# Patient Record
Sex: MALE | Race: BLACK OR AFRICAN AMERICAN | NOT HISPANIC OR LATINO
[De-identification: names, ages, dates, MRNs, and addresses within clinical notes are randomized per-mention and may not be internally consistent; named-entity substitution may affect disease eponyms.]

---

## 2017-03-21 ENCOUNTER — FORM ENCOUNTER (OUTPATIENT)
Age: 77
End: 2017-03-21

## 2017-03-22 ENCOUNTER — OUTPATIENT (OUTPATIENT)
Dept: OUTPATIENT SERVICES | Facility: HOSPITAL | Age: 77
LOS: 1 days | End: 2017-03-22
Payer: COMMERCIAL

## 2017-03-22 ENCOUNTER — APPOINTMENT (OUTPATIENT)
Dept: UROLOGY | Facility: CLINIC | Age: 77
End: 2017-03-22

## 2017-03-22 ENCOUNTER — APPOINTMENT (OUTPATIENT)
Dept: ULTRASOUND IMAGING | Facility: IMAGING CENTER | Age: 77
End: 2017-03-22

## 2017-03-22 DIAGNOSIS — R39.15 URGENCY OF URINATION: ICD-10-CM

## 2017-03-22 DIAGNOSIS — N40.1 BENIGN PROSTATIC HYPERPLASIA WITH LOWER URINARY TRACT SYMPTOMS: ICD-10-CM

## 2017-03-22 DIAGNOSIS — N13.8 OTHER OBSTRUCTIVE AND REFLUX UROPATHY: ICD-10-CM

## 2017-03-22 DIAGNOSIS — Z98.49 CATARACT EXTRACTION STATUS, UNSPECIFIED EYE: Chronic | ICD-10-CM

## 2017-03-22 PROCEDURE — 76770 US EXAM ABDO BACK WALL COMP: CPT

## 2017-04-20 ENCOUNTER — EMERGENCY (EMERGENCY)
Facility: HOSPITAL | Age: 77
LOS: 1 days | Discharge: HOME | End: 2017-04-20
Attending: EMERGENCY MEDICINE | Admitting: EMERGENCY MEDICINE
Payer: MEDICARE

## 2017-04-20 VITALS
SYSTOLIC BLOOD PRESSURE: 123 MMHG | TEMPERATURE: 98 F | HEART RATE: 88 BPM | DIASTOLIC BLOOD PRESSURE: 73 MMHG | OXYGEN SATURATION: 98 % | RESPIRATION RATE: 17 BRPM

## 2017-04-20 VITALS
HEART RATE: 150 BPM | OXYGEN SATURATION: 100 % | DIASTOLIC BLOOD PRESSURE: 76 MMHG | SYSTOLIC BLOOD PRESSURE: 106 MMHG | RESPIRATION RATE: 18 BRPM

## 2017-04-20 DIAGNOSIS — Z98.49 CATARACT EXTRACTION STATUS, UNSPECIFIED EYE: Chronic | ICD-10-CM

## 2017-04-20 LAB
ALBUMIN SERPL ELPH-MCNC: 4.2 G/DL — SIGNIFICANT CHANGE UP (ref 3.3–5)
ALP SERPL-CCNC: 52 U/L — SIGNIFICANT CHANGE UP (ref 40–120)
ALT FLD-CCNC: 12 U/L — SIGNIFICANT CHANGE UP (ref 4–41)
APTT BLD: 28.9 SEC — SIGNIFICANT CHANGE UP (ref 27.5–37.4)
AST SERPL-CCNC: 22 U/L — SIGNIFICANT CHANGE UP (ref 4–40)
BASOPHILS # BLD AUTO: 0.01 K/UL — SIGNIFICANT CHANGE UP (ref 0–0.2)
BASOPHILS NFR BLD AUTO: 0.2 % — SIGNIFICANT CHANGE UP (ref 0–2)
BILIRUB SERPL-MCNC: 0.6 MG/DL — SIGNIFICANT CHANGE UP (ref 0.2–1.2)
BUN SERPL-MCNC: 13 MG/DL — SIGNIFICANT CHANGE UP (ref 7–23)
CALCIUM SERPL-MCNC: 10 MG/DL — SIGNIFICANT CHANGE UP (ref 8.4–10.5)
CHLORIDE SERPL-SCNC: 102 MMOL/L — SIGNIFICANT CHANGE UP (ref 98–107)
CK MB BLD-MCNC: 4.05 NG/ML — SIGNIFICANT CHANGE UP (ref 1–6.6)
CK SERPL-CCNC: 263 U/L — HIGH (ref 30–200)
CO2 SERPL-SCNC: 28 MMOL/L — SIGNIFICANT CHANGE UP (ref 22–31)
CREAT SERPL-MCNC: 1.4 MG/DL — HIGH (ref 0.5–1.3)
EOSINOPHIL # BLD AUTO: 0.01 K/UL — SIGNIFICANT CHANGE UP (ref 0–0.5)
EOSINOPHIL NFR BLD AUTO: 0.2 % — SIGNIFICANT CHANGE UP (ref 0–6)
GLUCOSE SERPL-MCNC: 65 MG/DL — LOW (ref 70–99)
HCT VFR BLD CALC: 42.2 % — SIGNIFICANT CHANGE UP (ref 39–50)
HGB BLD-MCNC: 14.2 G/DL — SIGNIFICANT CHANGE UP (ref 13–17)
IMM GRANULOCYTES NFR BLD AUTO: 0.2 % — SIGNIFICANT CHANGE UP (ref 0–1.5)
INR BLD: 1.22 — HIGH (ref 0.88–1.17)
LYMPHOCYTES # BLD AUTO: 1.68 K/UL — SIGNIFICANT CHANGE UP (ref 1–3.3)
LYMPHOCYTES # BLD AUTO: 31.3 % — SIGNIFICANT CHANGE UP (ref 13–44)
MCHC RBC-ENTMCNC: 29.4 PG — SIGNIFICANT CHANGE UP (ref 27–34)
MCHC RBC-ENTMCNC: 33.6 % — SIGNIFICANT CHANGE UP (ref 32–36)
MCV RBC AUTO: 87.4 FL — SIGNIFICANT CHANGE UP (ref 80–100)
MONOCYTES # BLD AUTO: 0.39 K/UL — SIGNIFICANT CHANGE UP (ref 0–0.9)
MONOCYTES NFR BLD AUTO: 7.3 % — SIGNIFICANT CHANGE UP (ref 2–14)
NEUTROPHILS # BLD AUTO: 3.27 K/UL — SIGNIFICANT CHANGE UP (ref 1.8–7.4)
NEUTROPHILS NFR BLD AUTO: 60.8 % — SIGNIFICANT CHANGE UP (ref 43–77)
PLATELET # BLD AUTO: 165 K/UL — SIGNIFICANT CHANGE UP (ref 150–400)
PMV BLD: 11 FL — SIGNIFICANT CHANGE UP (ref 7–13)
POTASSIUM SERPL-MCNC: 4.5 MMOL/L — SIGNIFICANT CHANGE UP (ref 3.5–5.3)
POTASSIUM SERPL-SCNC: 4.5 MMOL/L — SIGNIFICANT CHANGE UP (ref 3.5–5.3)
PROT SERPL-MCNC: 7.3 G/DL — SIGNIFICANT CHANGE UP (ref 6–8.3)
PROTHROM AB SERPL-ACNC: 13.7 SEC — HIGH (ref 9.8–13.1)
RBC # BLD: 4.83 M/UL — SIGNIFICANT CHANGE UP (ref 4.2–5.8)
RBC # FLD: 13 % — SIGNIFICANT CHANGE UP (ref 10.3–14.5)
SODIUM SERPL-SCNC: 139 MMOL/L — SIGNIFICANT CHANGE UP (ref 135–145)
TROPONIN T SERPL-MCNC: < 0.06 NG/ML — SIGNIFICANT CHANGE UP (ref 0–0.06)
WBC # BLD: 5.37 K/UL — SIGNIFICANT CHANGE UP (ref 3.8–10.5)
WBC # FLD AUTO: 5.37 K/UL — SIGNIFICANT CHANGE UP (ref 3.8–10.5)

## 2017-04-20 PROCEDURE — 70450 CT HEAD/BRAIN W/O DYE: CPT | Mod: 26

## 2017-04-20 PROCEDURE — 93010 ELECTROCARDIOGRAM REPORT: CPT

## 2017-04-20 PROCEDURE — 99285 EMERGENCY DEPT VISIT HI MDM: CPT | Mod: 25,GC

## 2017-04-20 RX ORDER — SODIUM CHLORIDE 9 MG/ML
1000 INJECTION INTRAMUSCULAR; INTRAVENOUS; SUBCUTANEOUS ONCE
Qty: 0 | Refills: 0 | Status: COMPLETED | OUTPATIENT
Start: 2017-04-20 | End: 2017-04-20

## 2017-04-20 RX ADMIN — SODIUM CHLORIDE 1000 MILLILITER(S): 9 INJECTION INTRAMUSCULAR; INTRAVENOUS; SUBCUTANEOUS at 22:52

## 2017-04-20 NOTE — ED PROVIDER NOTE - OBJECTIVE STATEMENT
77 y/o male w/ hx of DM, HLD, HTN, Afib / flutter on Xarelto presents to the ED for dizziness and hypotension. Patient report that symptoms started today while patient was looking into the fridge when he had dizziness described as room spinning. NO associated nausea/vomiting. He sat down to check his HP and noted that it was lower than ususal. Reporting sbp in the ?80's. No chest pain, shortness of breath, no history of this in the past. On ROS, he has no other complaints.

## 2017-04-20 NOTE — ED PROVIDER NOTE - MEDICAL DECISION MAKING DETAILS
77 y/o male sent to ED for dizziness in the setting of tachycardia and hypotension. patient at [facility] reports symptoms resolved, in aflutter. per family he has been on xarelto for over 1 year and has history of flutter. sending cardiac enzymes, ct head, plan to dispo to home 75 y/o male sent to ED for dizziness in the setting of tachycardia and hypotension. patient at [facility] reports symptoms resolved, in aflutter. per family he has been on xarelto for over 1 year and has history of flutter. sending cardiac enzymes, ct head, plan to dispo to home  Yayo att: 75 yo man presenting with transient lightheadedness, palpitations.  No LOC, no chest pain.  Patient on xarelto for what sounds like atrial fibrillation, currently rate wnl.  Likely atrial flutter with RVR, now resolved.  Neurologic exam wnl.  Patient informed of ED visit findings, understands plan.  Patient provided with written and further verbal instructions not included in discharge paperwork.  Patient instructed to follow up with their primary care physician in 2-3 days and return for new, worsened, or persistent symptoms.

## 2017-04-20 NOTE — ED ADULT TRIAGE NOTE - CHIEF COMPLAINT QUOTE
Pt c/o of feeling lightheaded with palpitations he was sent in by his PMD for SVT. Pt denies chest pain.

## 2017-04-20 NOTE — ED PROVIDER NOTE - PHYSICAL EXAMINATION
Yayo att: General: Well appearing, nontoxic, no acute distress; Head: Normocephalic Atraumatic; Eyes: PERRL, EOMI; ENT: Airway patent; Neck: supple; Chest: Lungs clear to auscultation bilateral; Cardiac: irregular rhythm, normal rate, no murmurs, rubs or gallops; Abdomen: soft, nontender, nondistended; no guarding or rebound; Musculoskeletal: Calves symmetric, nontender, no palpable cord; Skin: No rash, normal skin tone; Neuro: Alert and Oriented to person, place, and time; No focal deficit, CN 2-12 symmetric and intact

## 2017-04-20 NOTE — ED PROVIDER NOTE - PMH
BPH (benign prostatic hyperplasia)    Hernia, inguinal, right    HTN (hypertension)    T2DM (type 2 diabetes mellitus)  > 20 yrs

## 2017-07-07 ENCOUNTER — OUTPATIENT (OUTPATIENT)
Dept: OUTPATIENT SERVICES | Facility: HOSPITAL | Age: 77
LOS: 1 days | End: 2017-07-07
Payer: COMMERCIAL

## 2017-07-07 ENCOUNTER — APPOINTMENT (OUTPATIENT)
Dept: NUCLEAR MEDICINE | Facility: IMAGING CENTER | Age: 77
End: 2017-07-07

## 2017-07-07 DIAGNOSIS — Z98.49 CATARACT EXTRACTION STATUS, UNSPECIFIED EYE: Chronic | ICD-10-CM

## 2017-07-07 DIAGNOSIS — Z00.8 ENCOUNTER FOR OTHER GENERAL EXAMINATION: ICD-10-CM

## 2017-07-07 PROCEDURE — A9538: CPT

## 2017-07-07 PROCEDURE — 78472 GATED HEART PLANAR SINGLE: CPT

## 2017-08-18 ENCOUNTER — MEDICATION RENEWAL (OUTPATIENT)
Age: 77
End: 2017-08-18

## 2017-08-23 ENCOUNTER — MEDICATION RENEWAL (OUTPATIENT)
Age: 77
End: 2017-08-23

## 2017-09-20 ENCOUNTER — APPOINTMENT (OUTPATIENT)
Dept: UROLOGY | Facility: CLINIC | Age: 77
End: 2017-09-20
Payer: MEDICARE

## 2017-09-20 VITALS — HEART RATE: 65 BPM | DIASTOLIC BLOOD PRESSURE: 80 MMHG | RESPIRATION RATE: 16 BRPM | SYSTOLIC BLOOD PRESSURE: 144 MMHG

## 2017-09-20 PROCEDURE — 99214 OFFICE O/P EST MOD 30 MIN: CPT

## 2017-09-24 LAB
ALBUMIN SERPL ELPH-MCNC: 4 G/DL
ALP BLD-CCNC: 44 U/L
ALT SERPL-CCNC: 19 U/L
ANION GAP SERPL CALC-SCNC: 14 MMOL/L
APPEARANCE: CLEAR
AST SERPL-CCNC: 23 U/L
BACTERIA UR CULT: NORMAL
BACTERIA: NEGATIVE
BASOPHILS # BLD AUTO: 0.01 K/UL
BASOPHILS NFR BLD AUTO: 0.3 %
BILIRUB SERPL-MCNC: 0.6 MG/DL
BILIRUBIN URINE: NEGATIVE
BLOOD URINE: NEGATIVE
BUN SERPL-MCNC: 10 MG/DL
CALCIUM SERPL-MCNC: 9.5 MG/DL
CHLORIDE SERPL-SCNC: 103 MMOL/L
CO2 SERPL-SCNC: 25 MMOL/L
COLOR: YELLOW
CORE LAB FLUID CYTOLOGY: NORMAL
CREAT SERPL-MCNC: 1.16 MG/DL
EOSINOPHIL # BLD AUTO: 0.06 K/UL
EOSINOPHIL NFR BLD AUTO: 1.8 %
GLUCOSE QUALITATIVE U: NORMAL MG/DL
GLUCOSE SERPL-MCNC: 130 MG/DL
HBA1C MFR BLD HPLC: 6.4 %
HCT VFR BLD CALC: 39.3 %
HGB BLD-MCNC: 13 G/DL
HYALINE CASTS: 0 /LPF
IMM GRANULOCYTES NFR BLD AUTO: 0.3 %
KETONES URINE: NEGATIVE
LEUKOCYTE ESTERASE URINE: NEGATIVE
LYMPHOCYTES # BLD AUTO: 0.97 K/UL
LYMPHOCYTES NFR BLD AUTO: 29.4 %
MAN DIFF?: NORMAL
MCHC RBC-ENTMCNC: 29.1 PG
MCHC RBC-ENTMCNC: 33.1 GM/DL
MCV RBC AUTO: 87.9 FL
MICROSCOPIC-UA: NORMAL
MONOCYTES # BLD AUTO: 0.38 K/UL
MONOCYTES NFR BLD AUTO: 11.5 %
NEUTROPHILS # BLD AUTO: 1.87 K/UL
NEUTROPHILS NFR BLD AUTO: 56.7 %
NITRITE URINE: NEGATIVE
PH URINE: 7.5
PLATELET # BLD AUTO: 161 K/UL
POTASSIUM SERPL-SCNC: 4.4 MMOL/L
PROT SERPL-MCNC: 7 G/DL
PROTEIN URINE: NEGATIVE MG/DL
PSA SERPL-MCNC: 1.21 NG/ML
RBC # BLD: 4.47 M/UL
RBC # FLD: 13.2 %
RED BLOOD CELLS URINE: 1 /HPF
SODIUM SERPL-SCNC: 142 MMOL/L
SPECIFIC GRAVITY URINE: 1.01
SQUAMOUS EPITHELIAL CELLS: 0 /HPF
TESTOST SERPL-MCNC: 321.7 NG/DL
UROBILINOGEN URINE: 1 MG/DL
WBC # FLD AUTO: 3.3 K/UL
WHITE BLOOD CELLS URINE: 0 /HPF

## 2017-10-01 LAB — CORE LAB FLUID CYTOLOGY: NORMAL

## 2017-10-06 LAB
BASOPHILS # BLD AUTO: 0.01 K/UL
BASOPHILS NFR BLD AUTO: 0.3 %
EOSINOPHIL # BLD AUTO: 0.04 K/UL
EOSINOPHIL NFR BLD AUTO: 1.2 %
HCT VFR BLD CALC: 35.8 %
HGB BLD-MCNC: 12.4 G/DL
IMM GRANULOCYTES NFR BLD AUTO: 0 %
LYMPHOCYTES # BLD AUTO: 1.2 K/UL
LYMPHOCYTES NFR BLD AUTO: 35.9 %
MAN DIFF?: NORMAL
MCHC RBC-ENTMCNC: 30 PG
MCHC RBC-ENTMCNC: 34.6 GM/DL
MCV RBC AUTO: 86.7 FL
MONOCYTES # BLD AUTO: 0.34 K/UL
MONOCYTES NFR BLD AUTO: 10.2 %
NEUTROPHILS # BLD AUTO: 1.75 K/UL
NEUTROPHILS NFR BLD AUTO: 52.4 %
PLATELET # BLD AUTO: 158 K/UL
RBC # BLD: 4.13 M/UL
RBC # FLD: 13.4 %
WBC # FLD AUTO: 3.34 K/UL

## 2017-10-27 ENCOUNTER — INPATIENT (INPATIENT)
Facility: HOSPITAL | Age: 77
LOS: 12 days | Discharge: HOME | End: 2017-11-09
Attending: INTERNAL MEDICINE | Admitting: INTERNAL MEDICINE
Payer: MEDICARE

## 2017-10-27 VITALS
RESPIRATION RATE: 14 BRPM | SYSTOLIC BLOOD PRESSURE: 124 MMHG | OXYGEN SATURATION: 97 % | DIASTOLIC BLOOD PRESSURE: 77 MMHG | TEMPERATURE: 98 F | HEART RATE: 75 BPM

## 2017-10-27 DIAGNOSIS — N40.0 BENIGN PROSTATIC HYPERPLASIA WITHOUT LOWER URINARY TRACT SYMPTOMS: ICD-10-CM

## 2017-10-27 DIAGNOSIS — Z98.49 CATARACT EXTRACTION STATUS, UNSPECIFIED EYE: Chronic | ICD-10-CM

## 2017-10-27 DIAGNOSIS — E78.5 HYPERLIPIDEMIA, UNSPECIFIED: ICD-10-CM

## 2017-10-27 DIAGNOSIS — E11.9 TYPE 2 DIABETES MELLITUS WITHOUT COMPLICATIONS: ICD-10-CM

## 2017-10-27 DIAGNOSIS — I31.3 PERICARDIAL EFFUSION (NONINFLAMMATORY): ICD-10-CM

## 2017-10-27 DIAGNOSIS — I10 ESSENTIAL (PRIMARY) HYPERTENSION: ICD-10-CM

## 2017-10-27 DIAGNOSIS — I48.91 UNSPECIFIED ATRIAL FIBRILLATION: ICD-10-CM

## 2017-10-27 LAB
ALBUMIN SERPL ELPH-MCNC: 3.8 G/DL — SIGNIFICANT CHANGE UP (ref 3.3–5)
ALP SERPL-CCNC: 98 U/L — SIGNIFICANT CHANGE UP (ref 40–120)
ALT FLD-CCNC: 133 U/L — HIGH (ref 4–41)
APPEARANCE UR: CLEAR — SIGNIFICANT CHANGE UP
AST SERPL-CCNC: 102 U/L — HIGH (ref 4–40)
BACTERIA # UR AUTO: SIGNIFICANT CHANGE UP
BASOPHILS # BLD AUTO: 0.01 K/UL — SIGNIFICANT CHANGE UP (ref 0–0.2)
BASOPHILS NFR BLD AUTO: 0.2 % — SIGNIFICANT CHANGE UP (ref 0–2)
BILIRUB SERPL-MCNC: 0.8 MG/DL — SIGNIFICANT CHANGE UP (ref 0.2–1.2)
BILIRUB UR-MCNC: NEGATIVE — SIGNIFICANT CHANGE UP
BLOOD UR QL VISUAL: NEGATIVE — SIGNIFICANT CHANGE UP
BUN SERPL-MCNC: 25 MG/DL — HIGH (ref 7–23)
CALCIUM SERPL-MCNC: 9 MG/DL — SIGNIFICANT CHANGE UP (ref 8.4–10.5)
CHLORIDE SERPL-SCNC: 99 MMOL/L — SIGNIFICANT CHANGE UP (ref 98–107)
CO2 SERPL-SCNC: 24 MMOL/L — SIGNIFICANT CHANGE UP (ref 22–31)
COLOR SPEC: YELLOW — SIGNIFICANT CHANGE UP
CREAT SERPL-MCNC: 1.28 MG/DL — SIGNIFICANT CHANGE UP (ref 0.5–1.3)
EOSINOPHIL # BLD AUTO: 0.01 K/UL — SIGNIFICANT CHANGE UP (ref 0–0.5)
EOSINOPHIL NFR BLD AUTO: 0.2 % — SIGNIFICANT CHANGE UP (ref 0–6)
GLUCOSE BLDC GLUCOMTR-MCNC: 182 MG/DL — HIGH (ref 70–99)
GLUCOSE BLDC GLUCOMTR-MCNC: 196 MG/DL — HIGH (ref 70–99)
GLUCOSE SERPL-MCNC: 216 MG/DL — HIGH (ref 70–99)
GLUCOSE UR-MCNC: NEGATIVE — SIGNIFICANT CHANGE UP
HCT VFR BLD CALC: 32.8 % — LOW (ref 39–50)
HGB BLD-MCNC: 11.4 G/DL — LOW (ref 13–17)
HYALINE CASTS # UR AUTO: SIGNIFICANT CHANGE UP (ref 0–?)
IMM GRANULOCYTES # BLD AUTO: 0.06 # — SIGNIFICANT CHANGE UP
IMM GRANULOCYTES NFR BLD AUTO: 1 % — SIGNIFICANT CHANGE UP (ref 0–1.5)
KETONES UR-MCNC: SIGNIFICANT CHANGE UP
LEUKOCYTE ESTERASE UR-ACNC: NEGATIVE — SIGNIFICANT CHANGE UP
LIDOCAIN IGE QN: 66.1 U/L — HIGH (ref 7–60)
LYMPHOCYTES # BLD AUTO: 0.6 K/UL — LOW (ref 1–3.3)
LYMPHOCYTES # BLD AUTO: 10.1 % — LOW (ref 13–44)
MCHC RBC-ENTMCNC: 29.6 PG — SIGNIFICANT CHANGE UP (ref 27–34)
MCHC RBC-ENTMCNC: 34.8 % — SIGNIFICANT CHANGE UP (ref 32–36)
MCV RBC AUTO: 85.2 FL — SIGNIFICANT CHANGE UP (ref 80–100)
MONOCYTES # BLD AUTO: 0.71 K/UL — SIGNIFICANT CHANGE UP (ref 0–0.9)
MONOCYTES NFR BLD AUTO: 12 % — SIGNIFICANT CHANGE UP (ref 2–14)
MUCOUS THREADS # UR AUTO: SIGNIFICANT CHANGE UP
NEUTROPHILS # BLD AUTO: 4.53 K/UL — SIGNIFICANT CHANGE UP (ref 1.8–7.4)
NEUTROPHILS NFR BLD AUTO: 76.5 % — SIGNIFICANT CHANGE UP (ref 43–77)
NITRITE UR-MCNC: NEGATIVE — SIGNIFICANT CHANGE UP
NRBC # FLD: 0 — SIGNIFICANT CHANGE UP
NT-PROBNP SERPL-SCNC: 497.9 PG/ML — SIGNIFICANT CHANGE UP
PH UR: 5.5 — SIGNIFICANT CHANGE UP (ref 4.6–8)
PLATELET # BLD AUTO: 183 K/UL — SIGNIFICANT CHANGE UP (ref 150–400)
PMV BLD: 11.3 FL — SIGNIFICANT CHANGE UP (ref 7–13)
POTASSIUM SERPL-MCNC: 5 MMOL/L — SIGNIFICANT CHANGE UP (ref 3.5–5.3)
POTASSIUM SERPL-SCNC: 5 MMOL/L — SIGNIFICANT CHANGE UP (ref 3.5–5.3)
PROT SERPL-MCNC: 7.1 G/DL — SIGNIFICANT CHANGE UP (ref 6–8.3)
PROT UR-MCNC: 30 — SIGNIFICANT CHANGE UP
RBC # BLD: 3.85 M/UL — LOW (ref 4.2–5.8)
RBC # FLD: 13.6 % — SIGNIFICANT CHANGE UP (ref 10.3–14.5)
RBC CASTS # UR COMP ASSIST: SIGNIFICANT CHANGE UP (ref 0–?)
SODIUM SERPL-SCNC: 137 MMOL/L — SIGNIFICANT CHANGE UP (ref 135–145)
SP GR SPEC: 1.02 — SIGNIFICANT CHANGE UP (ref 1–1.03)
SQUAMOUS # UR AUTO: SIGNIFICANT CHANGE UP
UROBILINOGEN FLD QL: NORMAL E.U. — SIGNIFICANT CHANGE UP (ref 0.1–0.2)
WBC # BLD: 5.92 K/UL — SIGNIFICANT CHANGE UP (ref 3.8–10.5)
WBC # FLD AUTO: 5.92 K/UL — SIGNIFICANT CHANGE UP (ref 3.8–10.5)
WBC UR QL: SIGNIFICANT CHANGE UP (ref 0–?)

## 2017-10-27 PROCEDURE — 71010: CPT | Mod: 26

## 2017-10-27 PROCEDURE — 74177 CT ABD & PELVIS W/CONTRAST: CPT | Mod: 26

## 2017-10-27 PROCEDURE — 93010 ELECTROCARDIOGRAM REPORT: CPT

## 2017-10-27 PROCEDURE — 93306 TTE W/DOPPLER COMPLETE: CPT | Mod: 26

## 2017-10-27 RX ORDER — DEXTROSE 50 % IN WATER 50 %
25 SYRINGE (ML) INTRAVENOUS ONCE
Qty: 0 | Refills: 0 | Status: DISCONTINUED | OUTPATIENT
Start: 2017-10-27 | End: 2017-11-09

## 2017-10-27 RX ORDER — ACETAMINOPHEN 500 MG
1000 TABLET ORAL ONCE
Qty: 0 | Refills: 0 | Status: COMPLETED | OUTPATIENT
Start: 2017-10-27 | End: 2017-10-27

## 2017-10-27 RX ORDER — DEXTROSE 50 % IN WATER 50 %
1 SYRINGE (ML) INTRAVENOUS ONCE
Qty: 0 | Refills: 0 | Status: DISCONTINUED | OUTPATIENT
Start: 2017-10-27 | End: 2017-11-09

## 2017-10-27 RX ORDER — DEXTROSE 50 % IN WATER 50 %
12.5 SYRINGE (ML) INTRAVENOUS ONCE
Qty: 0 | Refills: 0 | Status: DISCONTINUED | OUTPATIENT
Start: 2017-10-27 | End: 2017-11-09

## 2017-10-27 RX ORDER — SIMVASTATIN 20 MG/1
20 TABLET, FILM COATED ORAL AT BEDTIME
Qty: 0 | Refills: 0 | Status: DISCONTINUED | OUTPATIENT
Start: 2017-10-27 | End: 2017-11-09

## 2017-10-27 RX ORDER — ENOXAPARIN SODIUM 100 MG/ML
80 INJECTION SUBCUTANEOUS
Qty: 0 | Refills: 0 | Status: DISCONTINUED | OUTPATIENT
Start: 2017-10-27 | End: 2017-10-28

## 2017-10-27 RX ORDER — CARVEDILOL PHOSPHATE 80 MG/1
12.5 CAPSULE, EXTENDED RELEASE ORAL EVERY 12 HOURS
Qty: 0 | Refills: 0 | Status: DISCONTINUED | OUTPATIENT
Start: 2017-10-27 | End: 2017-11-09

## 2017-10-27 RX ORDER — INFLUENZA VIRUS VACCINE 15; 15; 15; 15 UG/.5ML; UG/.5ML; UG/.5ML; UG/.5ML
0.5 SUSPENSION INTRAMUSCULAR ONCE
Qty: 0 | Refills: 0 | Status: DISCONTINUED | OUTPATIENT
Start: 2017-10-27 | End: 2017-11-09

## 2017-10-27 RX ORDER — FUROSEMIDE 40 MG
40 TABLET ORAL
Qty: 0 | Refills: 0 | Status: DISCONTINUED | OUTPATIENT
Start: 2017-10-27 | End: 2017-10-28

## 2017-10-27 RX ORDER — HYDRALAZINE HCL 50 MG
50 TABLET ORAL THREE TIMES A DAY
Qty: 0 | Refills: 0 | Status: DISCONTINUED | OUTPATIENT
Start: 2017-10-27 | End: 2017-11-09

## 2017-10-27 RX ORDER — ONDANSETRON 8 MG/1
4 TABLET, FILM COATED ORAL ONCE
Qty: 0 | Refills: 0 | Status: COMPLETED | OUTPATIENT
Start: 2017-10-27 | End: 2017-10-27

## 2017-10-27 RX ORDER — SODIUM CHLORIDE 9 MG/ML
1000 INJECTION, SOLUTION INTRAVENOUS
Qty: 0 | Refills: 0 | Status: DISCONTINUED | OUTPATIENT
Start: 2017-10-27 | End: 2017-11-09

## 2017-10-27 RX ORDER — INSULIN LISPRO 100/ML
VIAL (ML) SUBCUTANEOUS AT BEDTIME
Qty: 0 | Refills: 0 | Status: DISCONTINUED | OUTPATIENT
Start: 2017-10-27 | End: 2017-11-09

## 2017-10-27 RX ORDER — GLUCAGON INJECTION, SOLUTION 0.5 MG/.1ML
1 INJECTION, SOLUTION SUBCUTANEOUS ONCE
Qty: 0 | Refills: 0 | Status: DISCONTINUED | OUTPATIENT
Start: 2017-10-27 | End: 2017-11-09

## 2017-10-27 RX ORDER — OXYBUTYNIN CHLORIDE 5 MG
10 TABLET ORAL DAILY
Qty: 0 | Refills: 0 | Status: DISCONTINUED | OUTPATIENT
Start: 2017-10-27 | End: 2017-11-07

## 2017-10-27 RX ORDER — DOXAZOSIN MESYLATE 4 MG
8 TABLET ORAL AT BEDTIME
Qty: 0 | Refills: 0 | Status: DISCONTINUED | OUTPATIENT
Start: 2017-10-27 | End: 2017-11-09

## 2017-10-27 RX ORDER — INSULIN LISPRO 100/ML
VIAL (ML) SUBCUTANEOUS
Qty: 0 | Refills: 0 | Status: DISCONTINUED | OUTPATIENT
Start: 2017-10-27 | End: 2017-11-09

## 2017-10-27 RX ORDER — SODIUM CHLORIDE 9 MG/ML
1000 INJECTION INTRAMUSCULAR; INTRAVENOUS; SUBCUTANEOUS ONCE
Qty: 0 | Refills: 0 | Status: COMPLETED | OUTPATIENT
Start: 2017-10-27 | End: 2017-10-27

## 2017-10-27 RX ORDER — LISINOPRIL 2.5 MG/1
20 TABLET ORAL DAILY
Qty: 0 | Refills: 0 | Status: DISCONTINUED | OUTPATIENT
Start: 2017-10-27 | End: 2017-11-09

## 2017-10-27 RX ADMIN — Medication 400 MILLIGRAM(S): at 05:39

## 2017-10-27 RX ADMIN — Medication 40 MILLIGRAM(S): at 18:10

## 2017-10-27 RX ADMIN — CARVEDILOL PHOSPHATE 12.5 MILLIGRAM(S): 80 CAPSULE, EXTENDED RELEASE ORAL at 18:09

## 2017-10-27 RX ADMIN — Medication: at 18:10

## 2017-10-27 RX ADMIN — SIMVASTATIN 20 MILLIGRAM(S): 20 TABLET, FILM COATED ORAL at 23:48

## 2017-10-27 RX ADMIN — SODIUM CHLORIDE 1000 MILLILITER(S): 9 INJECTION INTRAMUSCULAR; INTRAVENOUS; SUBCUTANEOUS at 05:40

## 2017-10-27 RX ADMIN — Medication 50 MILLIGRAM(S): at 23:48

## 2017-10-27 NOTE — ED PROVIDER NOTE - PROGRESS NOTE DETAILS
Patient signed out to me. Stable, appears comfortable no concerns at this time. CT read results with no SBO but found incidental moderate pericardial effusion.

## 2017-10-27 NOTE — CONSULT NOTE ADULT - ASSESSMENT
77 male with incidental finding of large pericardial effusion with echo findings of signs of early tamponade.      Plan:  Urgent CT chest  Hold anticoagulation until pericardial effusion drained  Please have IR drain effusion in am   Above discussed with Dr. Chew 77 male with incidental finding of large pericardial effusion with echo findings of signs of early tamponade.      Plan:  Urgent CT chest  Hold anticoagulation until pericardial effusion drained (Xarelto, Lovenox)  Please have IR drain effusion in am   Above discussed with Dr. Chew

## 2017-10-27 NOTE — H&P ADULT - PMH
Afib    BPH (benign prostatic hyperplasia)    Hernia, inguinal, right    HTN (hypertension)    Hyperlipidemia    T2DM (type 2 diabetes mellitus)  > 20 yrs

## 2017-10-27 NOTE — ED PROVIDER NOTE - MEDICAL DECISION MAKING DETAILS
77M with abd pain and distention. concern for SBO, volvulus, appy, margarita, etc. Plan: labs, fluids, pain control/antiemetics PRN, UA, CTAP, reassess.

## 2017-10-27 NOTE — ED ADULT TRIAGE NOTE - CHIEF COMPLAINT QUOTE
pt c/o weakness and abdominal pain x 1 week. last Bm this morning. denies n/v/d, constipation, chest pain, fevers, chills or urinary symtpoms. pt was scheduled for abdominal sonogram today @ 1pm but pt states he is in too much pain.

## 2017-10-27 NOTE — ED ADULT NURSE REASSESSMENT NOTE - NS ED NURSE REASSESS COMMENT FT1
Patient is in NAD, and has room available.  Report given to nurse on floor via phone.  Patient awaiting transportation.  Will continue to monitor patient closely. Bladimir VIERA

## 2017-10-27 NOTE — H&P ADULT - HISTORY OF PRESENT ILLNESS
78 yo M presents with abdominal discomfort after eating x 1 month. PT state diffuse abdominal discomfort after eating meals.+ occasional RAMACHANDRAN ( walks 10 blocks) NO chest pain, SOB, RAMACHANDRAN,  orthopnea, palpitations, diaphoresis, lightheadedness, dizziness, syncope, fever chills, malaise, myalgias, anorexia, weight changes ( loss or gain), nightsweats, generalized fatigue abdominal pain, N/V/C/D BRBPR, melena, urinary symptoms, cough, and wheezing.

## 2017-10-27 NOTE — CONSULT NOTE ADULT - SUBJECTIVE AND OBJECTIVE BOX
76 yo M with history of a fib, htn, hld, dm and bph  presents with abdominal discomfort after eating x 3 weeks. PT state diffuse abdominal discomfort after eating meals.+ occasional RAMACHANDRAN ( walks 10 blocks).  Patient presented to ER after waking up this morning unable to catch his breath.  Patient with sudden onset of shortness of breath.  Ct of abd/pelvis done in ER with incidental finding of large pericardial effusion, small bilateral pleural effusion and cholelithasis.  Echo showing large pericardial effusion with signs of early tamponade.       PAST MEDICAL & SURGICAL HISTORY:  Afib  Hyperlipidemia  HTN (hypertension)  Hernia, inguinal, right  BPH (benign prostatic hyperplasia)  T2DM (type 2 diabetes mellitus): &gt; 20 yrs  S/P cataract surgery: bilateral 4 years ago   REVIEW OF SYSTEMS      General:No Weight change/ Fatigue/ HA/Dizzy	    Skin/Breast: No Rashes/ Lesions/ Masses  	  Ophthalmologic: No Blurry vision/ Glaucoma/ Blindness  	  ENMT: No Hearing loss/ Drainage/ Lesions	    Respiratory and Thorax: No Cough/ Wheezing/ Hemoptysis/ Sputum production; +SOB, +RAMACHANDRAN  	  Cardiovascular: No Chest pain/ Palpitations/ Diaphoresis: +RAMACHANDRAN     Gastrointestinal: No Nausea/ Vomiting; +Abdominal discomfort after eating x 3 weeks,  constipation followed by diarrhea   Genitourinary: No Heamturia/ Dysuria/ Frequency change/ Impotence	    Musculoskeletal: No Pain/ Weakness/ Claudication	    Neurological: No Seizures/ TIA/CVA/ Parastesias	    Psychiatric: No Dementia/ Depression/ SI/HI	    Hematology/Lymphatics: No hx of bleeding/ +pedal edema 	    Endocrine:	No Hyperglycemia/ Hypoglycemia    Allergic/Immunologic:	 No Anaphylaxis/ Intolerance/ Recent illnesses    Physical Exam:    ICU Vital Signs Last 24 Hrs  T(C): 36.2 (27 Oct 2017 13:34), Max: 36.6 (27 Oct 2017 05:27)  T(F): 97.2 (27 Oct 2017 13:34), Max: 97.9 (27 Oct 2017 05:27)  HR: 89 (27 Oct 2017 18:02) (68 - 110)  BP: 140/99 (27 Oct 2017 18:02) (117/85 - 140/99)  BP(mean): --  ABP: --  ABP(mean): --  RR: 18 (27 Oct 2017 18:02) (14 - 18)  SpO2: 98% (27 Oct 2017 18:02) (95% - 100%)    General: WN/WD NAD  Neurology: A&Ox3, nonfocal, JIMENEZ x 4  Eyes: PERRLA/ EOMI, Gross vision intact  ENT/Neck: Neck supple, trachea midline, No JVD, Gross hearing intact  Respiratory: CTA B/L, No wheezing, rales, rhonchi  CV: irregular, A fib   Abdominal: Soft, NT, ND +BS, softly distended   Extremities: +pedal edema b/l, + peripheral pulses  Skin: No Rashes, Hematoma, Ecchymosis

## 2017-10-27 NOTE — ED PROVIDER NOTE - ATTENDING CONTRIBUTION TO CARE
ED Attending (Jordan HIGGINS): I have personally performed a face to face bedside history and physical examination of this patient. I have discussed the history, examination, assessment and plan of management with the resident. ED Attending (Jordan HIGGINS): I have personally performed a face to face bedside history and physical examination of this patient. I have discussed the history, examination, assessment and plan of management with the resident.  LIN CLINTON  ATTENDING, MD: 76 yo M with past medical history of Hypertension, hyperlipidemia, DM.  Patient states he is on Xarelto but pt doesn't know why.  Patient presents with abd pain for 1 week (PSHx hernia surgery). The pain is diffuse, non-radiating, intermittent, a/w N without vomiting, but also decreased appetite, decreased appetite and abdominal distention. BM today less than usual.  +Flatus.  No F/C.  No chest pain, SOB.  LIN CLINTON, ATTENDING NOTE:  Patient is awake and alert and in no acute distress.  Normocephalic/atraumatic.  Auricles are normal.  Neck supple.  Lungs CTAB, no wheeze, no rhonchi,  no rales.  Heart is regular rate and rhythm.  Abdomen is soft, ++BS, diffuse tenderness to palpation.  Back is nontender, no CVAT.  Moving all 4 extremities.   Neurologically grossly intact.  Affect is appropriate.  DR. CLINTON, ATTENDING MD-  I performed a face to face bedside interview with patient regarding history of present illness, review of symptoms and past medical history. I completed an independent physical exam.  I have discussed patient's plan of care with the resident.   I agree with note as stated above, having amended the EMR as needed to reflect my findings. I have discussed the assessment and plan of care.  This includes during the time I functioned as the attending physician for this patient.

## 2017-10-27 NOTE — ED ADULT NURSE NOTE - OBJECTIVE STATEMENT
Pt 77y male, aaox3 and amb, pt speaks Ecuadorean but family at bedside willing to translate. Pt presents to ED c/o generalized diffuse abd pain x1wk. As per family pt was due to get ultrasound of abd today but was told by pmd that if pain increased to go to ED. Pt abd appears distended and soft, no tenderness observed, this is a new onset x 1 wk as per family. Pt has had decreased appetite. LBM this morning but "not a lot". Pt denies n/v/d, cp, sob, ha, fever, chills. Pt has swelling to bilat feet that decreases with elevation. IV placed 20G to left AC, labs drawn and sent, will continue to monitor. Pt 77y male, aaox3 and amb, pt speaks Tanzanian but family at bedside willing to translate. Pt presents to ED c/o generalized diffuse abd pain x1wk. As per family pt was due to get ultrasound of abd today but was told by pmd that if pain increased to go to ED. Pt abd appears distended and soft, no tenderness observed, this is a new onset x 1 wk as per family. Pt has had decreased appetite. LBM this morning but "not a lot". Pt denies n/v/d, cp, sob, ha, fever, chills. Skin clean, dry and intact. Pt has swelling to bilat feet that decreases with elevation. IV placed 20G to left AC, labs drawn and sent, will continue to monitor.

## 2017-10-27 NOTE — ED ADULT NURSE REASSESSMENT NOTE - NS ED NURSE REASSESS COMMENT FT1
Received patient report at 08:15 a.m., VSS and in NAD.  (+) abd distension noted, soft and non tender.  Patient awaiting CT scan, CT PO contrast completed and tolerated by patient. Family present at the bedside. Bladimir VIERA

## 2017-10-27 NOTE — H&P ADULT - PROBLEM SELECTOR PLAN 1
Check TTE  ESR, CRP. TSH Consider pericardiocentesis for diagnostic and therapeutic tap- possible 2/2 viral pericarditis  will obtain prior TST/echo results from DR Levine's office  Case dw Dr Babin/ shauna  Will check RUQ sonogram pt with + cholithiasis

## 2017-10-27 NOTE — ED PROVIDER NOTE - NOTES
Was unaware of any effusion. Would like the patient to be admitted for further work up of this new finding.

## 2017-10-27 NOTE — ED PROVIDER NOTE - CARDIAC, MLM
Normal rate, regular rhythm.  Heart sounds S1, S2. Normal rate, regular rhythm.  Heart sounds S1, S2. Pulses normal and equal bilaterally

## 2017-10-27 NOTE — ED PROVIDER NOTE - OBJECTIVE STATEMENT
77M h/o HTN, HLD, DM, on xarelto but pt unsure why, p/w abd pain x 1 week. The pain is diffuse, nonradiating, intermittent, associated with nausea, decreased appetite and abdominal distention. Hx hernia surgery. Last BM a few hours ago, small amount, +flatus. No fever, cp, sob, vomiting, diarrhea, or dysuria. Pt fluent and comfortable conversing in English.

## 2017-10-28 DIAGNOSIS — R79.89 OTHER SPECIFIED ABNORMAL FINDINGS OF BLOOD CHEMISTRY: ICD-10-CM

## 2017-10-28 DIAGNOSIS — K59.00 CONSTIPATION, UNSPECIFIED: ICD-10-CM

## 2017-10-28 DIAGNOSIS — I48.91 UNSPECIFIED ATRIAL FIBRILLATION: ICD-10-CM

## 2017-10-28 DIAGNOSIS — K80.20 CALCULUS OF GALLBLADDER WITHOUT CHOLECYSTITIS WITHOUT OBSTRUCTION: ICD-10-CM

## 2017-10-28 LAB
APTT BLD: 27.3 SEC — LOW (ref 27.5–37.4)
BACTERIA UR CULT: SIGNIFICANT CHANGE UP
BASOPHILS # BLD AUTO: 0.01 K/UL — SIGNIFICANT CHANGE UP (ref 0–0.2)
BASOPHILS NFR BLD AUTO: 0.2 % — SIGNIFICANT CHANGE UP (ref 0–2)
BLD GP AB SCN SERPL QL: NEGATIVE — SIGNIFICANT CHANGE UP
BODY FLUID TYPE: SIGNIFICANT CHANGE UP
BUN SERPL-MCNC: 21 MG/DL — SIGNIFICANT CHANGE UP (ref 7–23)
CALCIUM SERPL-MCNC: 8.7 MG/DL — SIGNIFICANT CHANGE UP (ref 8.4–10.5)
CHLORIDE SERPL-SCNC: 99 MMOL/L — SIGNIFICANT CHANGE UP (ref 98–107)
CHOLEST SERPL-MCNC: 87 MG/DL — LOW (ref 120–199)
CK SERPL-CCNC: 141 U/L — SIGNIFICANT CHANGE UP (ref 30–200)
CLARITY SPEC: SIGNIFICANT CHANGE UP
CO2 SERPL-SCNC: 24 MMOL/L — SIGNIFICANT CHANGE UP (ref 22–31)
COLOR FLD: SIGNIFICANT CHANGE UP
CREAT SERPL-MCNC: 1.1 MG/DL — SIGNIFICANT CHANGE UP (ref 0.5–1.3)
CRYSTALS FLD MICRO: SIGNIFICANT CHANGE UP
EOSINOPHIL # BLD AUTO: 0.01 K/UL — SIGNIFICANT CHANGE UP (ref 0–0.5)
EOSINOPHIL NFR BLD AUTO: 0.2 % — SIGNIFICANT CHANGE UP (ref 0–6)
ERYTHROCYTE [SEDIMENTATION RATE] IN BLOOD: 21 MM/HR — HIGH (ref 1–15)
GLUCOSE BLDC GLUCOMTR-MCNC: 166 MG/DL — HIGH (ref 70–99)
GLUCOSE BLDC GLUCOMTR-MCNC: 231 MG/DL — HIGH (ref 70–99)
GLUCOSE BLDC GLUCOMTR-MCNC: 248 MG/DL — HIGH (ref 70–99)
GLUCOSE FLD-MCNC: 104 MG/DL — SIGNIFICANT CHANGE UP
GLUCOSE SERPL-MCNC: 212 MG/DL — HIGH (ref 70–99)
GRAM STN WND: SIGNIFICANT CHANGE UP
HBA1C BLD-MCNC: 7.2 % — HIGH (ref 4–5.6)
HCT VFR BLD CALC: 30.2 % — LOW (ref 39–50)
HCT VFR BLD CALC: 31.3 % — LOW (ref 39–50)
HCT VFR BLD CALC: 31.4 % — LOW (ref 39–50)
HDLC SERPL-MCNC: 18 MG/DL — LOW (ref 35–55)
HGB BLD-MCNC: 10.4 G/DL — LOW (ref 13–17)
HGB BLD-MCNC: 10.8 G/DL — LOW (ref 13–17)
HGB BLD-MCNC: 10.8 G/DL — LOW (ref 13–17)
IMM GRANULOCYTES # BLD AUTO: 0.04 # — SIGNIFICANT CHANGE UP
IMM GRANULOCYTES NFR BLD AUTO: 0.9 % — SIGNIFICANT CHANGE UP (ref 0–1.5)
INR BLD: 1.41 — HIGH (ref 0.88–1.17)
LDH SERPL L TO P-CCNC: SIGNIFICANT CHANGE UP U/L
LIPID PNL WITH DIRECT LDL SERPL: 56 MG/DL — SIGNIFICANT CHANGE UP
LYMPHOCYTES # BLD AUTO: 0.72 K/UL — LOW (ref 1–3.3)
LYMPHOCYTES # BLD AUTO: 15.9 % — SIGNIFICANT CHANGE UP (ref 13–44)
LYMPHOCYTES NFR FLD: 15 % — SIGNIFICANT CHANGE UP
MAGNESIUM SERPL-MCNC: 2.2 MG/DL — SIGNIFICANT CHANGE UP (ref 1.6–2.6)
MCHC RBC-ENTMCNC: 28.7 PG — SIGNIFICANT CHANGE UP (ref 27–34)
MCHC RBC-ENTMCNC: 29.1 PG — SIGNIFICANT CHANGE UP (ref 27–34)
MCHC RBC-ENTMCNC: 29.3 PG — SIGNIFICANT CHANGE UP (ref 27–34)
MCHC RBC-ENTMCNC: 34.4 % — SIGNIFICANT CHANGE UP (ref 32–36)
MCHC RBC-ENTMCNC: 34.4 % — SIGNIFICANT CHANGE UP (ref 32–36)
MCHC RBC-ENTMCNC: 34.5 % — SIGNIFICANT CHANGE UP (ref 32–36)
MCV RBC AUTO: 83.5 FL — SIGNIFICANT CHANGE UP (ref 80–100)
MCV RBC AUTO: 84.6 FL — SIGNIFICANT CHANGE UP (ref 80–100)
MCV RBC AUTO: 84.8 FL — SIGNIFICANT CHANGE UP (ref 80–100)
MONOCYTES # BLD AUTO: 0.59 K/UL — SIGNIFICANT CHANGE UP (ref 0–0.9)
MONOCYTES # FLD: 3 % — SIGNIFICANT CHANGE UP
MONOCYTES NFR BLD AUTO: 13 % — SIGNIFICANT CHANGE UP (ref 2–14)
NEUTROPHILS # BLD AUTO: 3.17 K/UL — SIGNIFICANT CHANGE UP (ref 1.8–7.4)
NEUTROPHILS NFR BLD AUTO: 69.8 % — SIGNIFICANT CHANGE UP (ref 43–77)
NEUTS SEG NFR FLD MANUAL: 77 % — SIGNIFICANT CHANGE UP
NRBC # FLD: 0 — SIGNIFICANT CHANGE UP
OTHER CELLS FLD MANUAL: 5 % — SIGNIFICANT CHANGE UP
PLATELET # BLD AUTO: 184 K/UL — SIGNIFICANT CHANGE UP (ref 150–400)
PLATELET # BLD AUTO: 188 K/UL — SIGNIFICANT CHANGE UP (ref 150–400)
PLATELET # BLD AUTO: 206 K/UL — SIGNIFICANT CHANGE UP (ref 150–400)
PMV BLD: 10.8 FL — SIGNIFICANT CHANGE UP (ref 7–13)
PMV BLD: 10.8 FL — SIGNIFICANT CHANGE UP (ref 7–13)
PMV BLD: 11.8 FL — SIGNIFICANT CHANGE UP (ref 7–13)
POTASSIUM SERPL-MCNC: 4.4 MMOL/L — SIGNIFICANT CHANGE UP (ref 3.5–5.3)
POTASSIUM SERPL-SCNC: 4.4 MMOL/L — SIGNIFICANT CHANGE UP (ref 3.5–5.3)
PROT FLD-MCNC: 5.7 G/DL — SIGNIFICANT CHANGE UP
PROTHROM AB SERPL-ACNC: 15.9 SEC — HIGH (ref 9.8–13.1)
RBC # BLD: 3.57 M/UL — LOW (ref 4.2–5.8)
RBC # BLD: 3.69 M/UL — LOW (ref 4.2–5.8)
RBC # BLD: 3.76 M/UL — LOW (ref 4.2–5.8)
RBC # FLD: 13.2 % — SIGNIFICANT CHANGE UP (ref 10.3–14.5)
RBC # FLD: 13.7 % — SIGNIFICANT CHANGE UP (ref 10.3–14.5)
RBC # FLD: 13.8 % — SIGNIFICANT CHANGE UP (ref 10.3–14.5)
RCV VOL RI: HIGH CELL/UL (ref 0–5)
RH IG SCN BLD-IMP: POSITIVE — SIGNIFICANT CHANGE UP
RH IG SCN BLD-IMP: POSITIVE — SIGNIFICANT CHANGE UP
SODIUM SERPL-SCNC: 137 MMOL/L — SIGNIFICANT CHANGE UP (ref 135–145)
SPECIMEN SOURCE: SIGNIFICANT CHANGE UP
SPECIMEN SOURCE: SIGNIFICANT CHANGE UP
TOTAL CELLS COUNTED, BODY FLUID: 100 CELLS — SIGNIFICANT CHANGE UP
TOTAL NUCLEATED CELL COUNT, BODY FLUID: 8904 CELL/UL — HIGH (ref 0–5)
TRIGL SERPL-MCNC: 62 MG/DL — SIGNIFICANT CHANGE UP (ref 10–149)
TSH SERPL-MCNC: 2.02 UIU/ML — SIGNIFICANT CHANGE UP (ref 0.27–4.2)
WBC # BLD: 4.54 K/UL — SIGNIFICANT CHANGE UP (ref 3.8–10.5)
WBC # BLD: 4.77 K/UL — SIGNIFICANT CHANGE UP (ref 3.8–10.5)
WBC # BLD: 6.27 K/UL — SIGNIFICANT CHANGE UP (ref 3.8–10.5)
WBC # FLD AUTO: 4.54 K/UL — SIGNIFICANT CHANGE UP (ref 3.8–10.5)
WBC # FLD AUTO: 4.77 K/UL — SIGNIFICANT CHANGE UP (ref 3.8–10.5)
WBC # FLD AUTO: 6.27 K/UL — SIGNIFICANT CHANGE UP (ref 3.8–10.5)

## 2017-10-28 PROCEDURE — 71250 CT THORAX DX C-: CPT | Mod: 26

## 2017-10-28 PROCEDURE — 76700 US EXAM ABDOM COMPLETE: CPT | Mod: 26

## 2017-10-28 PROCEDURE — 71010: CPT | Mod: 26

## 2017-10-28 PROCEDURE — 77012 CT SCAN FOR NEEDLE BIOPSY: CPT | Mod: 26

## 2017-10-28 PROCEDURE — 88305 TISSUE EXAM BY PATHOLOGIST: CPT | Mod: 26

## 2017-10-28 PROCEDURE — 88112 CYTOPATH CELL ENHANCE TECH: CPT | Mod: 26

## 2017-10-28 PROCEDURE — 99233 SBSQ HOSP IP/OBS HIGH 50: CPT

## 2017-10-28 PROCEDURE — 33015: CPT

## 2017-10-28 RX ORDER — METOPROLOL TARTRATE 50 MG
5 TABLET ORAL ONCE
Qty: 0 | Refills: 0 | Status: COMPLETED | OUTPATIENT
Start: 2017-10-28 | End: 2017-10-28

## 2017-10-28 RX ORDER — POLYETHYLENE GLYCOL 3350 17 G/17G
17 POWDER, FOR SOLUTION ORAL
Qty: 0 | Refills: 0 | Status: DISCONTINUED | OUTPATIENT
Start: 2017-10-28 | End: 2017-11-09

## 2017-10-28 RX ORDER — DIGOXIN 250 MCG
0.25 TABLET ORAL ONCE
Qty: 0 | Refills: 0 | Status: DISCONTINUED | OUTPATIENT
Start: 2017-10-28 | End: 2017-10-28

## 2017-10-28 RX ORDER — DOCUSATE SODIUM 100 MG
100 CAPSULE ORAL THREE TIMES A DAY
Qty: 0 | Refills: 0 | Status: DISCONTINUED | OUTPATIENT
Start: 2017-10-28 | End: 2017-11-09

## 2017-10-28 RX ORDER — SODIUM CHLORIDE 9 MG/ML
1000 INJECTION INTRAMUSCULAR; INTRAVENOUS; SUBCUTANEOUS
Qty: 0 | Refills: 0 | Status: DISCONTINUED | OUTPATIENT
Start: 2017-10-28 | End: 2017-10-30

## 2017-10-28 RX ORDER — SENNA PLUS 8.6 MG/1
2 TABLET ORAL AT BEDTIME
Qty: 0 | Refills: 0 | Status: DISCONTINUED | OUTPATIENT
Start: 2017-10-28 | End: 2017-11-09

## 2017-10-28 RX ADMIN — Medication 50 MILLIGRAM(S): at 05:46

## 2017-10-28 RX ADMIN — SIMVASTATIN 20 MILLIGRAM(S): 20 TABLET, FILM COATED ORAL at 21:44

## 2017-10-28 RX ADMIN — SODIUM CHLORIDE 100 MILLILITER(S): 9 INJECTION INTRAMUSCULAR; INTRAVENOUS; SUBCUTANEOUS at 18:00

## 2017-10-28 RX ADMIN — Medication 8 MILLIGRAM(S): at 21:44

## 2017-10-28 RX ADMIN — CARVEDILOL PHOSPHATE 12.5 MILLIGRAM(S): 80 CAPSULE, EXTENDED RELEASE ORAL at 19:25

## 2017-10-28 RX ADMIN — Medication: at 17:22

## 2017-10-28 RX ADMIN — Medication 40 MILLIGRAM(S): at 05:46

## 2017-10-28 RX ADMIN — CARVEDILOL PHOSPHATE 12.5 MILLIGRAM(S): 80 CAPSULE, EXTENDED RELEASE ORAL at 05:46

## 2017-10-28 RX ADMIN — Medication 8 MILLIGRAM(S): at 00:29

## 2017-10-28 RX ADMIN — Medication: at 12:15

## 2017-10-28 RX ADMIN — Medication 100 MILLIGRAM(S): at 21:43

## 2017-10-28 RX ADMIN — Medication 50 MILLIGRAM(S): at 17:19

## 2017-10-28 RX ADMIN — Medication 5 MILLIGRAM(S): at 11:56

## 2017-10-28 RX ADMIN — LISINOPRIL 20 MILLIGRAM(S): 2.5 TABLET ORAL at 05:46

## 2017-10-28 RX ADMIN — SENNA PLUS 2 TABLET(S): 8.6 TABLET ORAL at 21:44

## 2017-10-28 RX ADMIN — Medication 10 MILLIGRAM(S): at 17:22

## 2017-10-28 NOTE — CONSULT NOTE ADULT - SUBJECTIVE AND OBJECTIVE BOX
Chief Complaint:  Patient is a 77y old  Male who presents with a chief complaint of "Abdominal discomfort x 1 month" (27 Oct 2017 16:30)      HPI: This ia a 77 year old male with a past medical history significant for A fib on Xarelto, HTN HLD, DM, BPH who presents with abdominal discomfort after eating x 1 month. PT states he has diffuse abdominal discomfort after eating meals.+ occasional RAMACHANDRAN ( walks 10 blocks) NO chest pain, SOB, RAMACHANDRAN,  orthopnea, palpitations, diaphoresis, lightheadedness, dizziness, syncope, fever chills, malaise, myalgias, anorexia, weight changes ( loss or gain), night sweats, generalized fatigue abdominal pain, N/V/C/D BRBPR, melena, urinary symptoms, cough, and wheezing.    On admission pt had a CT Abd/pelvis  No bowel obstruction. Moderate to large pericardial effusion. Mild ascites. Cholelithiasis.  US abd: Small ascites. Cholelithiasis without acute cholecystitis.        Allergies:  No Known Allergies      Medications:  carvedilol 12.5 milliGRAM(s) Oral every 12 hours  dextrose 5%. 1000 milliLiter(s) IV Continuous <Continuous>  dextrose 50% Injectable 12.5 Gram(s) IV Push once  dextrose 50% Injectable 25 Gram(s) IV Push once  dextrose 50% Injectable 25 Gram(s) IV Push once  dextrose Gel 1 Dose(s) Oral once PRN  doxazosin 8 milliGRAM(s) Oral at bedtime  glucagon  Injectable 1 milliGRAM(s) IntraMuscular once PRN  hydrALAZINE 50 milliGRAM(s) Oral three times a day  influenza   Vaccine 0.5 milliLiter(s) IntraMuscular once  insulin lispro (HumaLOG) corrective regimen sliding scale   SubCutaneous three times a day before meals  insulin lispro (HumaLOG) corrective regimen sliding scale   SubCutaneous at bedtime  lisinopril 20 milliGRAM(s) Oral daily  oxybutynin 10 milliGRAM(s) Oral daily  simvastatin 20 milliGRAM(s) Oral at bedtime      PMHX/PSHX:  Afib  Hyperlipidemia  HTN (hypertension)  Hernia, inguinal, right  BPH (benign prostatic hyperplasia)  T2DM (type 2 diabetes mellitus)  HTN (hypertension)  S/P cataract surgery  No significant past surgical history  No significant past surgical history      Family history:  No pertinent family history in first degree relatives      Social History: denies etoh, tobacco use, and illicit drug use    ROS:     General:  No wt loss, fevers, chills, night sweats, fatigue,   Eyes:  Good vision, no reported pain  ENT:  No sore throat, pain, runny nose, dysphagia  CV:  No pain, palpitations, hypo/hypertension  Resp:  No dyspnea, cough, tachypnea, wheezing  GI:  +diffuse abd pain, + No constipation,  No nausea, No vomiting, No diarrhea, No weight loss, No fever, No pruritis, No rectal bleeding, No tarry stools, No dysphagia,  :  No pain, bleeding, incontinence, nocturia  Muscle:  No pain, weakness  Neuro:  No weakness, tingling, memory problems  Psych:  No fatigue, insomnia, mood problems, depression  Endocrine:  No polyuria, polydipsia, cold/heat intolerance  Heme:  No petechiae, ecchymosis, easy bruisability  Skin:  No rash, tattoos, scars, edema      PHYSICAL EXAM:   Vital Signs:  Vital Signs Last 24 Hrs  T(C): 37 (28 Oct 2017 05:34), Max: 37 (28 Oct 2017 05:34)  T(F): 98.6 (28 Oct 2017 05:34), Max: 98.6 (28 Oct 2017 05:34)  HR: 77 (28 Oct 2017 14:30) (71 - 134)  BP: 115/78 (28 Oct 2017 14:30) (115/78 - 140/99)  BP(mean): --  RR: 24 (28 Oct 2017 14:30) (18 - 24)  SpO2: 98% (28 Oct 2017 14:30) (98% - 100%)  Daily     Daily     GENERAL:  Appears stated age, well-groomed, well-nourished, no distress  HEENT:  NC/AT,  conjunctivae clear and pink, no thyromegaly, nodules, adenopathy, no JVD, sclera -anicteric  CHEST:  Full & symmetric excursion, no increased effort, breath sounds clear  HEART:  Regular rhythm, S1, S2, no murmur/rub/S3/S4, no abdominal bruit, no edema  ABDOMEN:  Soft, +diffuse tenderness, + distended, normoactive bowel sounds  EXTREMITIES:  no cyanosis, clubbing or edema  SKIN:  No rash/erythema/ecchymoses/petechiae/wounds/abscess/warm/dry  NEURO:  Alert, oriented, no asterixis, no tremor, no encephalopathy    LABS:                        10.4   4.54  )-----------( 188      ( 28 Oct 2017 05:50 )             30.2     10-28    137  |  99  |  21  ----------------------------<  212<H>  4.4   |  24  |  1.10    Ca    8.7      28 Oct 2017 05:50  Mg     2.2     10-28    TPro  7.1  /  Alb  3.8  /  TBili  0.8  /  DBili  x   /  AST  102<H>  /  ALT  133<H>  /  AlkPhos  98  10-27    LIVER FUNCTIONS - ( 27 Oct 2017 05:09 )  Alb: 3.8 g/dL / Pro: 7.1 g/dL / ALK PHOS: 98 u/L / ALT: 133 u/L / AST: 102 u/L / GGT: x           PT/INR - ( 28 Oct 2017 09:00 )   PT: 15.9 SEC;   INR: 1.41          PTT - ( 28 Oct 2017 09:00 )  PTT:27.3 SEC  Urinalysis Basic - ( 27 Oct 2017 05:48 )    Color: YELLOW / Appearance: CLEAR / S.019 / pH: 5.5  Gluc: NEGATIVE / Ketone: TRACE  / Bili: NEGATIVE / Urobili: NORMAL E.U.   Blood: NEGATIVE / Protein: 30 / Nitrite: NEGATIVE   Leuk Esterase: NEGATIVE / RBC: 0-2 / WBC 0-2   Sq Epi: OCC / Non Sq Epi: x / Bacteria: FEW          Imaging: Chief Complaint:  Patient is a 77y old  Male who presents with a chief complaint of "Abdominal discomfort x 1 month" (27 Oct 2017 16:30)      HPI: This ia a 77 year old male with a past medical history significant for A fib on Xarelto, HTN HLD, DM, BPH who presents with abdominal discomfort after eating x 1 month. PT states he has diffuse abdominal discomfort after eating meals.+ occasional RAMACHANDRAN ( walks 10 blocks) NO chest pain, SOB, RAMACHANDRAN,  orthopnea, palpitations, diaphoresis, lightheadedness, dizziness, syncope, fever chills, malaise, myalgias, anorexia, weight changes ( loss or gain), night sweats, generalized fatigue abdominal pain, N/V/C/D BRBPR, melena, urinary symptoms, cough, and wheezing.    On admission pt had a CT Abd/pelvis  No bowel obstruction. Moderate to large pericardial effusion. Mild ascites. Cholelithiasis.  US abd: Small ascites. Cholelithiasis without acute cholecystitis.        Allergies:  No Known Allergies      Medications:  carvedilol 12.5 milliGRAM(s) Oral every 12 hours  dextrose 5%. 1000 milliLiter(s) IV Continuous <Continuous>  dextrose 50% Injectable 12.5 Gram(s) IV Push once  dextrose 50% Injectable 25 Gram(s) IV Push once  dextrose 50% Injectable 25 Gram(s) IV Push once  dextrose Gel 1 Dose(s) Oral once PRN  doxazosin 8 milliGRAM(s) Oral at bedtime  glucagon  Injectable 1 milliGRAM(s) IntraMuscular once PRN  hydrALAZINE 50 milliGRAM(s) Oral three times a day  influenza   Vaccine 0.5 milliLiter(s) IntraMuscular once  insulin lispro (HumaLOG) corrective regimen sliding scale   SubCutaneous three times a day before meals  insulin lispro (HumaLOG) corrective regimen sliding scale   SubCutaneous at bedtime  lisinopril 20 milliGRAM(s) Oral daily  oxybutynin 10 milliGRAM(s) Oral daily  simvastatin 20 milliGRAM(s) Oral at bedtime      PMHX/PSHX:  Afib  Hyperlipidemia  HTN (hypertension)  Hernia, inguinal, right  BPH (benign prostatic hyperplasia)  T2DM (type 2 diabetes mellitus)  HTN (hypertension)  S/P cataract surgery  No significant past surgical history  No significant past surgical history      Family history:  No pertinent family history in first degree relatives      Social History: denies etoh, tobacco use, and illicit drug use    ROS:     General:  No wt loss, fevers, chills, night sweats, fatigue,   Eyes:  Good vision, no reported pain  ENT:  No sore throat, pain, runny nose, dysphagia  CV:  No pain, palpitations, hypo/hypertension  Resp:  No dyspnea, cough, tachypnea, wheezing  GI:  +diffuse abd pain, + constipation,  No nausea, No vomiting, No diarrhea, No weight loss, No fever, No pruritis, No rectal bleeding, No tarry stools, No dysphagia,  :  No pain, bleeding, incontinence, nocturia  Muscle:  No pain, weakness  Neuro:  No weakness, tingling, memory problems  Psych:  No fatigue, insomnia, mood problems, depression  Endocrine:  No polyuria, polydipsia, cold/heat intolerance  Heme:  No petechiae, ecchymosis, easy bruisability  Skin:  No rash, tattoos, scars, edema      PHYSICAL EXAM:   Vital Signs:  Vital Signs Last 24 Hrs  T(C): 37 (28 Oct 2017 05:34), Max: 37 (28 Oct 2017 05:34)  T(F): 98.6 (28 Oct 2017 05:34), Max: 98.6 (28 Oct 2017 05:34)  HR: 77 (28 Oct 2017 14:30) (71 - 134)  BP: 115/78 (28 Oct 2017 14:30) (115/78 - 140/99)  BP(mean): --  RR: 24 (28 Oct 2017 14:30) (18 - 24)  SpO2: 98% (28 Oct 2017 14:30) (98% - 100%)  Daily     Daily     GENERAL:  Appears stated age, well-groomed, well-nourished, no distress  HEENT:  NC/AT,  conjunctivae clear and pink, no thyromegaly, nodules, adenopathy, no JVD, sclera -anicteric  CHEST:  Full & symmetric excursion, no increased effort, breath sounds clear  HEART:  Regular rhythm, S1, S2, no murmur/rub/S3/S4, no abdominal bruit, no edema  ABDOMEN:  Soft, +diffuse tenderness, + distended, normoactive bowel sounds  EXTREMITIES:  no cyanosis, clubbing or edema  SKIN:  No rash/erythema/ecchymoses/petechiae/wounds/abscess/warm/dry  NEURO:  Alert, oriented, no asterixis, no tremor, no encephalopathy    LABS:                        10.4   4.54  )-----------( 188      ( 28 Oct 2017 05:50 )             30.2     10-28    137  |  99  |  21  ----------------------------<  212<H>  4.4   |  24  |  1.10    Ca    8.7      28 Oct 2017 05:50  Mg     2.2     10-28    TPro  7.1  /  Alb  3.8  /  TBili  0.8  /  DBili  x   /  AST  102<H>  /  ALT  133<H>  /  AlkPhos  98  10-27    LIVER FUNCTIONS - ( 27 Oct 2017 05:09 )  Alb: 3.8 g/dL / Pro: 7.1 g/dL / ALK PHOS: 98 u/L / ALT: 133 u/L / AST: 102 u/L / GGT: x           PT/INR - ( 28 Oct 2017 09:00 )   PT: 15.9 SEC;   INR: 1.41          PTT - ( 28 Oct 2017 09:00 )  PTT:27.3 SEC  Urinalysis Basic - ( 27 Oct 2017 05:48 )    Color: YELLOW / Appearance: CLEAR / S.019 / pH: 5.5  Gluc: NEGATIVE / Ketone: TRACE  / Bili: NEGATIVE / Urobili: NORMAL E.U.   Blood: NEGATIVE / Protein: 30 / Nitrite: NEGATIVE   Leuk Esterase: NEGATIVE / RBC: 0-2 / WBC 0-2   Sq Epi: OCC / Non Sq Epi: x / Bacteria: FEW          Imaging:

## 2017-10-28 NOTE — CONSULT NOTE ADULT - PROBLEM SELECTOR RECOMMENDATION 4
echocardiogram with large pericardial effusion consistent with early tamponade physiology  plan for pericardiocentesis with IR  f/u cardiology recs

## 2017-10-28 NOTE — CONSULT NOTE ADULT - PROBLEM SELECTOR RECOMMENDATION 3
likely secondary to passive venous congestion from pericardial effusion  viral hepatitis serologies ordered likely secondary to passive venous congestion from pericardial effusion  viral hepatitis serologies ordered  hepatic cysts noted on imaging -- ? simple/infectious/pre malignancy  check echinococcus ab, entameba ab  f/u AFP, CEA  MRI abdomen when optimized

## 2017-10-28 NOTE — CONSULT NOTE ADULT - ASSESSMENT
77 year old male with abdominal pain and RAMACHANDRAN found to have pericardial effusion and elevated LFTs

## 2017-10-28 NOTE — CONSULT NOTE ADULT - PROBLEM SELECTOR RECOMMENDATION 9
abd U/s with cholelithiasis  surgery follow up abd U/s with cholelithiasis  consider HIDA  surgery follow up

## 2017-10-28 NOTE — PROGRESS NOTE ADULT - SUBJECTIVE AND OBJECTIVE BOX
Pt. seen this am with Dr. Chew. Pt. ambulating and sitting on edge of bed. In no distress  Denies CP or SOB at present. States some RAMACHANDRAN that has worsen over past few days.   Pt. when seen had new onset Afib, HR 130s on Tele. Denies palpitations. Family at bedside as well.  Vital Signs Last 24 Hrs  T(C): 37 (28 Oct 2017 05:34), Max: 37 (28 Oct 2017 05:34)  T(F): 98.6 (28 Oct 2017 05:34), Max: 98.6 (28 Oct 2017 05:34)  HR: 89 (28 Oct 2017 15:01) (71 - 134)  BP: 127/84 (28 Oct 2017 15:01) (115/78 - 151/75)  BP(mean): --  RR: 24 (28 Oct 2017 15:01) (18 - 24)  SpO2: 100% (28 Oct 2017 15:01) (97% - 100%)    A&O x3 Full conversant and appropriate.  On RA, no SOB  Lungs CTA  HR tachy, irregular. Afib on monitor  Abd soft NT  No LE edema    EXAM: CT CHEST      PROCEDURE DATE: Oct 28 2017        INTERPRETATION: CLINICAL INFORMATION: Pericardial effusion, incidentally  identified on recent CT abdomen/pelvis    COMPARISON: CT abdomen/pelvis 10/27/2017    PROCEDURE:  CT of the Chest was performed without intravenous contrast.  Sagittal and coronal reformats were performed.    FINDINGS:    CHEST:    LUNGS AND LARGE AIRWAYS: Imaging was acquired in expiratory phase. Bilateral  lower lobe passive atelectasis. Lungs are otherwise clear.  PLEURA: Small bilateral pleural effusions, larger on the right.  VESSELS: Atherosclerotic calcifications of the aorta and coronary arteries.  HEART: Moderate pericardial effusion with intermediate density fluid. No  compression of the right atrium. The heart is normal in size.  MEDIASTINUM AND ALICE: No lymphadenopathy.  CHEST WALL AND LOWER NECK: Within normal limits.  VISUALIZED UPPER ABDOMEN: Multiple hepatic cysts, better characterized on  same day CT abdomen/pelvis. Trace ascites.  BONES: A well-circumscribed calcific density adjacent to left lateral eighth  rib. Mild thoracic spondylosis.    IMPRESSION:    Moderate pericardial effusion with slightly higher density fluid which may  be secondary to proteinaceous/hemorrhagic material.  Small bilateral pleural effusions, right greater than left.    Patient name: COLTON JARAMILLO  YOB: 1940   Age: 77 (M)   MR#: [id number]  Study Date: 10/27/2017  Location: [address] kSonographer: Gloria Hernandez  Study quality: Technically Fair  Referring Physician: Yadiel Babin MD  Blood Pressure: 151/83 mmHg  Height: 178 cm  Weight: 90 kg  BSA: 2.1 m2  ------------------------------------------------------------------------  PROCEDURE: Transthoracic echocardiogram with 2-D, M-Mode  and complete spectral and color flow Doppler.  INDICATION: Pericardial effusion (noninflammatory) (I31.3)  ------------------------------------------------------------------------  DIMENSIONS:  Dimensions:     Normal Values:  LA:     3.2 cm    2.0 - 4.0 cm  Ao:     3.3 cm    2.0 - 3.8 cm  SEPTUM: 0.9 cm    0.6 - 1.2 cm  PWT:    0.8 cm    0.6 - 1.1 cm  LVIDd:  5.9 cm    3.0 - 5.6 cm  LVIDs:  4.4 cm    1.8 - 4.0 cm  Derived Variables:  LVMI: 94 g/m2  RWT: 0.27  Fractional short: 25 %  Ejection Fraction (Teicholtz): 49 %  ------------------------------------------------------------------------  OBSERVATIONS:  Mitral Valve: Mitral annular calcification, otherwise  normal mitral valve. Minimal mitral regurgitation.  Aortic Root: Normal aortic root.  Aortic Valve: Calcified trileaflet aortic valve with normal  opening.  Left Atrium: Normal left atrium.  LA volume index = 30  cc/m2.  Left Ventricle: Mild global left ventricular systolic  dysfunction. Normal left ventricular internal dimensions  and wall thicknesses.  Right Heart: Normal right atrium. Normal right ventricular  size and function. Normal tricuspid valve. Mild tricuspid  regurgitation. Normal pulmonic valve.  Pericardium/PleuraLarge pericardial effusion, measuring  about  2.3 cm posterior to the LV, about  2.6 cm lateral to  the LV, about  2.2 cm around the LV apex, and about  3.2 cm  adjacent to the RV free wall.  Moderate pericardial  effusion (about  1.5 cm) anterior to the RV.  Increased  respirophasic variability in the transmitral and  transtricuspid spectral Doppler signals is seen.  In  addition, the RA and RV appear somewhat compressed in the  subcostal view.  These findings are consistent with early  tamponade physiology.  Hemodynamic: Estimated right ventricular systolic pressure  equals 26 mm Hg, assuming right atrial pressure equals 10  mm Hg, consistent with normal pulmonary pressures.  ------------------------------------------------------------------------  CONCLUSIONS:  1. Mitral annular calcification, otherwise normal mitral  valve. Minimal mitral regurgitation.  2. Normal left ventricular internal dimensions and wall  thicknesses.  3. Mild global left ventricular systolic dysfunction.  4. Normal right ventricular size and function.  5. Large pericardial effusion, measuring about  2.3 cm  posterior to the LV, about  2.6 cm lateral to the LV, about   2.2 cm around the LV apex, and about  3.2 cm adjacent to  the RV free wall.  Moderate pericardial effusion (about  1.5 cm) anterior to the RV.  Increased respirophasic  variability in the transmitral and transtricuspid spectral  Doppler signals is seen.  In addition, the RA and RV appear  somewhat compressed in the subcostal view.  These findings  are consistent with early tamponade physiology.  *** No previous Echo exam.  Case discussed with Dr. Yadiel Babin at 18:30.  ------------------------------------------------------------------------  Confirmed on  10/27/2017 - 18:31:02 by Arjun Zuleta M.D.            MIKAELA POTTS M.D., RADIOLOGY RESIDENT  This document has been electronically signed.  LAVERN LITTLE M.D. ATTENDING RADIOLOGIST                        10.4   4.54  )-----------( 188      ( 28 Oct 2017 05:50 )             30.2   10-28    137  |  99  |  21  ----------------------------<  212<H>  4.4   |  24  |  1.10    Ca    8.7      28 Oct 2017 05:50  Mg     2.2     10-28    TPro  7.1  /  Alb  3.8  /  TBili  0.8  /  DBili  x   /  AST  102<H>  /  ALT  133<H>  /  AlkPhos  98  10-27  PT/INR - ( 28 Oct 2017 09:00 )   PT: 15.9 SEC;   INR: 1.41          PTT - ( 28 Oct 2017 09:00 )  PTT:27.3 SEC

## 2017-10-28 NOTE — CHART NOTE - NSCHARTNOTEFT_GEN_A_CORE
IR called this morning for pericardiocentesis, pt initially planned for IR procedure at 4-5pm given Xarelto use on 10/26 (prefer off AC for 48hrs). Around 10:30am pt noted to be in afib with -140s. HR previously at 70s afib. /61 sat 100 at RA. Pt seen by cardiothoracic at beside concern pt may become unstable given now in afib 140s. IR called again, will expedite procedure, planned at 12:30pm. Dr Babin aware of discussion recommend to give digoxin 0.25mg IV and metoprolol 5mg IV for rate control. Will continue to monitor IR called this morning for pericardiocentesis, pt initially planned for IR procedure at 4-5pm given Xarelto use on 10/26 (prefer off AC for 48hrs). Around 10:30am pt noted to be in afib with -140s. HR previously at 70s afib. /61 sat 100 at RA. Pt seen by cardiothoracic at beside concern pt may become unstable given now in afib 140s. IR called again, will expedite procedure, planned at 12:30pm. Dr Babin aware of discussion recommend to give digoxin 0.25mg IV and metoprolol 5mg IV for rate control. Will continue to monitor    Pt s/p IR aspirated 580cc of pericardial fluid (dark red), drain placed by IR. Upon arrival to floor. HR 80s. BP 150s systolic. sat well room air. 15 min upon arrival had drain 750cc of dark red fluid. IR and thoracic made aware (per consult expecting large drainage, monitor VS) If VS unstable would need CCU care. CBC check post procedure, case d/w Dr Babin will start IV fluid

## 2017-10-28 NOTE — CONSULT NOTE ADULT - PROBLEM SELECTOR RECOMMENDATION 2
abdominal pain 2/2 constipation?   bowel regimen with senna 2 tabs at bedtime, colace 100 mg TID, Miralax 17 grams BID ordered  monitor for bms

## 2017-10-29 DIAGNOSIS — D64.9 ANEMIA, UNSPECIFIED: ICD-10-CM

## 2017-10-29 DIAGNOSIS — K76.89 OTHER SPECIFIED DISEASES OF LIVER: ICD-10-CM

## 2017-10-29 LAB
ALBUMIN SERPL ELPH-MCNC: 3.1 G/DL — LOW (ref 3.3–5)
ALP SERPL-CCNC: 80 U/L — SIGNIFICANT CHANGE UP (ref 40–120)
ALT FLD-CCNC: 94 U/L — HIGH (ref 4–41)
AST SERPL-CCNC: 53 U/L — HIGH (ref 4–40)
BILIRUB DIRECT SERPL-MCNC: 0.2 MG/DL — SIGNIFICANT CHANGE UP (ref 0.1–0.2)
BILIRUB SERPL-MCNC: 0.6 MG/DL — SIGNIFICANT CHANGE UP (ref 0.2–1.2)
BUN SERPL-MCNC: 17 MG/DL — SIGNIFICANT CHANGE UP (ref 7–23)
CALCIUM SERPL-MCNC: 8.1 MG/DL — LOW (ref 8.4–10.5)
CEA SERPL-MCNC: 1.2 NG/ML — SIGNIFICANT CHANGE UP (ref 1–3.8)
CHLORIDE SERPL-SCNC: 104 MMOL/L — SIGNIFICANT CHANGE UP (ref 98–107)
CO2 SERPL-SCNC: 25 MMOL/L — SIGNIFICANT CHANGE UP (ref 22–31)
CREAT SERPL-MCNC: 1.06 MG/DL — SIGNIFICANT CHANGE UP (ref 0.5–1.3)
CULTURE - ACID FAST SMEAR CONCENTRATED: SIGNIFICANT CHANGE UP
GLUCOSE BLDC GLUCOMTR-MCNC: 168 MG/DL — HIGH (ref 70–99)
GLUCOSE BLDC GLUCOMTR-MCNC: 185 MG/DL — HIGH (ref 70–99)
GLUCOSE BLDC GLUCOMTR-MCNC: 244 MG/DL — HIGH (ref 70–99)
GLUCOSE BLDC GLUCOMTR-MCNC: 244 MG/DL — HIGH (ref 70–99)
GLUCOSE SERPL-MCNC: 128 MG/DL — HIGH (ref 70–99)
HBV SURFACE AG SER-ACNC: NEGATIVE — SIGNIFICANT CHANGE UP
HCT VFR BLD CALC: 30.9 % — LOW (ref 39–50)
HGB BLD-MCNC: 10.6 G/DL — LOW (ref 13–17)
MAGNESIUM SERPL-MCNC: 1.8 MG/DL — SIGNIFICANT CHANGE UP (ref 1.6–2.6)
MCHC RBC-ENTMCNC: 29.3 PG — SIGNIFICANT CHANGE UP (ref 27–34)
MCHC RBC-ENTMCNC: 34.3 % — SIGNIFICANT CHANGE UP (ref 32–36)
MCV RBC AUTO: 85.4 FL — SIGNIFICANT CHANGE UP (ref 80–100)
NRBC # FLD: 0 — SIGNIFICANT CHANGE UP
PLATELET # BLD AUTO: 212 K/UL — SIGNIFICANT CHANGE UP (ref 150–400)
PMV BLD: 11.3 FL — SIGNIFICANT CHANGE UP (ref 7–13)
POTASSIUM SERPL-MCNC: 3.8 MMOL/L — SIGNIFICANT CHANGE UP (ref 3.5–5.3)
POTASSIUM SERPL-SCNC: 3.8 MMOL/L — SIGNIFICANT CHANGE UP (ref 3.5–5.3)
PROT SERPL-MCNC: 5.8 G/DL — LOW (ref 6–8.3)
RBC # BLD: 3.62 M/UL — LOW (ref 4.2–5.8)
RBC # FLD: 13.7 % — SIGNIFICANT CHANGE UP (ref 10.3–14.5)
SODIUM SERPL-SCNC: 141 MMOL/L — SIGNIFICANT CHANGE UP (ref 135–145)
SPECIMEN SOURCE: SIGNIFICANT CHANGE UP
WBC # BLD: 5.57 K/UL — SIGNIFICANT CHANGE UP (ref 3.8–10.5)
WBC # FLD AUTO: 5.57 K/UL — SIGNIFICANT CHANGE UP (ref 3.8–10.5)

## 2017-10-29 PROCEDURE — 99232 SBSQ HOSP IP/OBS MODERATE 35: CPT

## 2017-10-29 RX ADMIN — Medication 100 MILLIGRAM(S): at 12:44

## 2017-10-29 RX ADMIN — Medication 1: at 18:23

## 2017-10-29 RX ADMIN — SIMVASTATIN 20 MILLIGRAM(S): 20 TABLET, FILM COATED ORAL at 21:24

## 2017-10-29 RX ADMIN — SENNA PLUS 2 TABLET(S): 8.6 TABLET ORAL at 21:24

## 2017-10-29 RX ADMIN — CARVEDILOL PHOSPHATE 12.5 MILLIGRAM(S): 80 CAPSULE, EXTENDED RELEASE ORAL at 17:27

## 2017-10-29 RX ADMIN — CARVEDILOL PHOSPHATE 12.5 MILLIGRAM(S): 80 CAPSULE, EXTENDED RELEASE ORAL at 05:28

## 2017-10-29 RX ADMIN — Medication 50 MILLIGRAM(S): at 12:44

## 2017-10-29 RX ADMIN — Medication 100 MILLIGRAM(S): at 21:24

## 2017-10-29 RX ADMIN — Medication 50 MILLIGRAM(S): at 21:24

## 2017-10-29 RX ADMIN — LISINOPRIL 20 MILLIGRAM(S): 2.5 TABLET ORAL at 05:28

## 2017-10-29 RX ADMIN — Medication 50 MILLIGRAM(S): at 05:28

## 2017-10-29 RX ADMIN — Medication 2: at 12:59

## 2017-10-29 RX ADMIN — Medication 1: at 09:09

## 2017-10-29 RX ADMIN — Medication 10 MILLIGRAM(S): at 12:44

## 2017-10-29 RX ADMIN — Medication 100 MILLIGRAM(S): at 05:28

## 2017-10-29 RX ADMIN — POLYETHYLENE GLYCOL 3350 17 GRAM(S): 17 POWDER, FOR SOLUTION ORAL at 17:28

## 2017-10-29 RX ADMIN — Medication 8 MILLIGRAM(S): at 21:24

## 2017-10-29 RX ADMIN — SODIUM CHLORIDE 100 MILLILITER(S): 9 INJECTION INTRAMUSCULAR; INTRAVENOUS; SUBCUTANEOUS at 05:27

## 2017-10-29 RX ADMIN — POLYETHYLENE GLYCOL 3350 17 GRAM(S): 17 POWDER, FOR SOLUTION ORAL at 05:28

## 2017-10-29 NOTE — CONSULT NOTE ADULT - ASSESSMENT
77M with anemia without any renal insufficiency and above clinical features. Discussed the results, relevance, the diagnosis of anemia, the broad differential in detail with him and his daughter and all questions answered. Will recommend:  - continue Rx as per medicine, cardiology  - initiate w/u as follows:  - obtain iron studies, B12, folate, retic, LD, hapto, occult blood  - SPEP, UPEP  - SIFx and UIFx  - f/u on cytology results on the pericardial fluid

## 2017-10-29 NOTE — PROGRESS NOTE ADULT - SUBJECTIVE AND OBJECTIVE BOX
Subjective: "I feel much better. No abd pain or SOB" Sitting OOB, family at bedside.     Vital Signs:  Vital Signs Last 24 Hrs  T(C): 36.7 (10-29-17 @ 11:44), Max: 36.8 (10-28-17 @ 21:29)  T(F): 98.1 (10-29-17 @ 11:44), Max: 98.3 (10-28-17 @ 21:29)  HR: 76 (10-29-17 @ 16:59) (76 - 88)  BP: 122/65 (10-29-17 @ 16:59) (104/52 - 146/99)  RR: 16 (10-29-17 @ 11:44) (16 - 20)  SpO2: 98% (10-29-17 @ 11:44) (96% - 98%) on (O2)    Telemetry/Alarms:  General: WN/WD NAD  Neurology: Awake, nonfocal, JIMENEZ x 4  Eyes: Scleras clear, PERRLA/ EOMI, Gross vision intact  ENT:Gross hearing intact, grossly patent pharynx, no stridor  Neck: Neck supple, trachea midline, No JVD,   Respiratory: CTA B/L, No wheezing, rales, rhonchi  CV: afib, rhythm irreg S1S2, no murmurs, rubs or gallops  Abdominal: Soft, NT, ND +BS,   Extremities: No edema, + peripheral pulses  Skin: No Rashes, Hematoma, Ecchymosis  Lymphatic: No Neck, axilla, groin LAD  Psych: Oriented x 3, normal affect  Tubes: Left ant chest pericardial PTC with serosang output approx 1.5 liters drained as of this am.   Relevant labs, radiology and Medications reviewed                        10.6   5.57  )-----------( 212      ( 29 Oct 2017 06:00 )             30.9     10-29    141  |  104  |  17  ----------------------------<  128<H>  3.8   |  25  |  1.06    Ca    8.1<L>      29 Oct 2017 06:00  Mg     1.8     10-29    TPro  5.8<L>  /  Alb  3.1<L>  /  TBili  0.6  /  DBili  0.2  /  AST  53<H>  /  ALT  94<H>  /  AlkPhos  80  10-29    PT/INR - ( 28 Oct 2017 09:00 )   PT: 15.9 SEC;   INR: 1.41          PTT - ( 28 Oct 2017 09:00 )  PTT:27.3 SEC  MEDICATIONS  (STANDING):  carvedilol 12.5 milliGRAM(s) Oral every 12 hours  dextrose 5%. 1000 milliLiter(s) (50 mL/Hr) IV Continuous <Continuous>  dextrose 50% Injectable 12.5 Gram(s) IV Push once  dextrose 50% Injectable 25 Gram(s) IV Push once  dextrose 50% Injectable 25 Gram(s) IV Push once  docusate sodium 100 milliGRAM(s) Oral three times a day  doxazosin 8 milliGRAM(s) Oral at bedtime  hydrALAZINE 50 milliGRAM(s) Oral three times a day  influenza   Vaccine 0.5 milliLiter(s) IntraMuscular once  insulin lispro (HumaLOG) corrective regimen sliding scale   SubCutaneous three times a day before meals  insulin lispro (HumaLOG) corrective regimen sliding scale   SubCutaneous at bedtime  lisinopril 20 milliGRAM(s) Oral daily  oxybutynin 10 milliGRAM(s) Oral daily  polyethylene glycol 3350 17 Gram(s) Oral two times a day  senna 2 Tablet(s) Oral at bedtime  simvastatin 20 milliGRAM(s) Oral at bedtime  sodium chloride 0.9%. 1000 milliLiter(s) (100 mL/Hr) IV Continuous <Continuous>    MEDICATIONS  (PRN):  dextrose Gel 1 Dose(s) Oral once PRN Blood Glucose LESS THAN 70 milliGRAM(s)/deciliter  glucagon  Injectable 1 milliGRAM(s) IntraMuscular once PRN Glucose LESS THAN 70 milligrams/deciliter    Pertinent Physical Exam  I&O's Summary    28 Oct 2017 07:01  -  29 Oct 2017 07:00  --------------------------------------------------------  IN: 0 mL / OUT: 930 mL / NET: -930 mL

## 2017-10-29 NOTE — CONSULT NOTE ADULT - SUBJECTIVE AND OBJECTIVE BOX
Patient is a 77y old  Male who presents with a chief complaint of "Abdominal discomfort x 1 month".       HPI:  76 yo M presents with abdominal discomfort after eating x 1 month. PT state diffuse abdominal discomfort after eating meals.+ occasional RAMACHANDRAN ( walks 10 blocks) NO chest pain, SOB, RAMACHANDRAN,  orthopnea, palpitations, diaphoresis, lightheadedness, dizziness, syncope, fever chills, malaise, myalgias, anorexia, weight changes ( loss or gain), nightsweats, generalized fatigue abdominal pain, N/V/C/D BRBPR, melena, urinary symptoms, cough, and wheezing. (27 Oct 2017 16:30)      PAST MEDICAL & SURGICAL HISTORY:  Afib  Hyperlipidemia  HTN (hypertension)  Hernia, inguinal, right  BPH (benign prostatic hyperplasia)  T2DM (type 2 diabetes mellitus): &gt; 20 yrs  S/P cataract surgery: bilateral 4 years ago      Review of Systems:   CONSTITUTIONAL: No fever, weight loss, or fatigue  EYES: No eye pain, visual disturbances, or discharge  ENMT:  No difficulty hearing, tinnitus, vertigo; No sinus or throat pain  NECK: No pain or stiffness  RESPIRATORY: No cough, wheezing, chills or hemoptysis; No shortness of breath  CARDIOVASCULAR: No chest pain, palpitations, dizziness, or leg swelling  GASTROINTESTINAL: No abdominal or epigastric pain. No nausea, vomiting, or hematemesis; No diarrhea or constipation. No melena or hematochezia.  NEUROLOGICAL: No headaches, memory loss, loss of strength, numbness, or tremors  SKIN: No itching, burning, rashes, or lesions   MUSCULOSKELETAL: No joint pain or swelling; No muscle, back, or extremity pain  PSYCHIATRIC: No depression, anxiety, mood swings, or difficulty sleeping      Allergies    No Known Allergies    Intolerances        Social History:     FAMILY HISTORY:  No pertinent family history in first degree relatives      MEDICATIONS  (STANDING):  carvedilol 12.5 milliGRAM(s) Oral every 12 hours  dextrose 5%. 1000 milliLiter(s) (50 mL/Hr) IV Continuous <Continuous>  dextrose 50% Injectable 12.5 Gram(s) IV Push once  dextrose 50% Injectable 25 Gram(s) IV Push once  dextrose 50% Injectable 25 Gram(s) IV Push once  docusate sodium 100 milliGRAM(s) Oral three times a day  doxazosin 8 milliGRAM(s) Oral at bedtime  hydrALAZINE 50 milliGRAM(s) Oral three times a day  influenza   Vaccine 0.5 milliLiter(s) IntraMuscular once  insulin lispro (HumaLOG) corrective regimen sliding scale   SubCutaneous three times a day before meals  insulin lispro (HumaLOG) corrective regimen sliding scale   SubCutaneous at bedtime  lisinopril 20 milliGRAM(s) Oral daily  oxybutynin 10 milliGRAM(s) Oral daily  polyethylene glycol 3350 17 Gram(s) Oral two times a day  senna 2 Tablet(s) Oral at bedtime  simvastatin 20 milliGRAM(s) Oral at bedtime  sodium chloride 0.9%. 1000 milliLiter(s) (100 mL/Hr) IV Continuous <Continuous>    MEDICATIONS  (PRN):  dextrose Gel 1 Dose(s) Oral once PRN Blood Glucose LESS THAN 70 milliGRAM(s)/deciliter  glucagon  Injectable 1 milliGRAM(s) IntraMuscular once PRN Glucose LESS THAN 70 milligrams/deciliter      CAPILLARY BLOOD GLUCOSE      POCT Blood Glucose.: 185 mg/dL (29 Oct 2017 17:31)  POCT Blood Glucose.: 244 mg/dL (29 Oct 2017 12:41)  POCT Blood Glucose.: 168 mg/dL (29 Oct 2017 08:35)  POCT Blood Glucose.: 166 mg/dL (28 Oct 2017 21:41)    I&O's Summary    28 Oct 2017 07:01  -  29 Oct 2017 07:00  --------------------------------------------------------  IN: 0 mL / OUT: 930 mL / NET: -930 mL        PHYSICAL EXAM:  GENERAL: NAD, well-developed  HEAD:  Atraumatic, Normocephalic  NECK: Supple, No JVD  CHEST/LUNG: Clear to auscultation bilaterally; No wheezing.  HEART: Regular rate and rhythm; No murmurs, rubs, or gallops  ABDOMEN: Soft, Nontender, Nondistended; Bowel sounds present  EXTREMITIES:  2+ Peripheral Pulses, No clubbing, cyanosis, or edema  PSYCH: AAOx3  NEUROLOGY: non-focal  SKIN: No rashes or lesions    LABS:                        10.6   5.57  )-----------( 212      ( 29 Oct 2017 06:00 )             30.9     10-29    141  |  104  |  17  ----------------------------<  128<H>  3.8   |  25  |  1.06    Ca    8.1<L>      29 Oct 2017 06:00  Mg     1.8     10-29    TPro  5.8<L>  /  Alb  3.1<L>  /  TBili  0.6  /  DBili  0.2  /  AST  53<H>  /  ALT  94<H>  /  AlkPhos  80  10-29    PT/INR - ( 28 Oct 2017 09:00 )   PT: 15.9 SEC;   INR: 1.41          PTT - ( 28 Oct 2017 09:00 )  PTT:27.3 SEC  CARDIAC MARKERS ( 28 Oct 2017 05:50 )  x     / x     / 141 u/L / x     / x            CAPILLARY BLOOD GLUCOSE      POCT Blood Glucose.: 185 mg/dL (29 Oct 2017 17:31)  POCT Blood Glucose.: 244 mg/dL (29 Oct 2017 12:41)  POCT Blood Glucose.: 168 mg/dL (29 Oct 2017 08:35)  POCT Blood Glucose.: 166 mg/dL (28 Oct 2017 21:41)    10-28 @ 17:26  Culture-urine --  Culture results --  method type --  Organism --  Organism Identification --  Specimen source DRAINAGE  10-27 @ 06:24  Culture-urine   NO GROWTH AT 24 HOURS  Culture results --  method type --  Organism --  Organism Identification --  Specimen source URINE MIDSTREAM           10-28 @ 17:26  Culture blood --  Culture results --  Gram stain --  Gram stain blood --  Method type --  Organism --  Organism identification --  Specimen source DRAINAGE   10-27 @ 06:24  Culture blood --  Culture results --  Gram stain --  Gram stain blood --  Method type --  Organism --  Organism identification --  Specimen source URINE MIDSTREAM      RADIOLOGY & ADDITIONAL TESTS:    Imaging Personally Reviewed:    Consultant(s) Notes Reviewed:      Care Discussed with Consultants/Other Providers:    Thanks for consult. Will follow.

## 2017-10-29 NOTE — CONSULT NOTE ADULT - SUBJECTIVE AND OBJECTIVE BOX
Patient is a 77y old  Male who presents with a chief complaint of "Abdominal discomfort x 1 month" (27 Oct 2017 16:30). Has no prior h/o anemia. Denies any visible bleeding, no h/o transfusion. A detailed ROS remarkable for change in bowel habits recently, did have a colonoscopy by Dr. Carbajal a couple of years ago. No weight loss.      PAST MEDICAL & SURGICAL HISTORY:  Afib  Hyperlipidemia  HTN (hypertension)  Hernia, inguinal, right  BPH (benign prostatic hyperplasia)  T2DM (type 2 diabetes mellitus): &gt; 20 yrs  S/P cataract surgery: bilateral 4 years ago    Meds:  carvedilol 12.5 milliGRAM(s) Oral every 12 hours  dextrose 5%. 1000 milliLiter(s) IV Continuous <Continuous>  dextrose 50% Injectable 12.5 Gram(s) IV Push once  dextrose 50% Injectable 25 Gram(s) IV Push once  dextrose 50% Injectable 25 Gram(s) IV Push once  dextrose Gel 1 Dose(s) Oral once PRN  docusate sodium 100 milliGRAM(s) Oral three times a day  doxazosin 8 milliGRAM(s) Oral at bedtime  glucagon  Injectable 1 milliGRAM(s) IntraMuscular once PRN  hydrALAZINE 50 milliGRAM(s) Oral three times a day  influenza   Vaccine 0.5 milliLiter(s) IntraMuscular once  insulin lispro (HumaLOG) corrective regimen sliding scale   SubCutaneous three times a day before meals  insulin lispro (HumaLOG) corrective regimen sliding scale   SubCutaneous at bedtime  lisinopril 20 milliGRAM(s) Oral daily  oxybutynin 10 milliGRAM(s) Oral daily  polyethylene glycol 3350 17 Gram(s) Oral two times a day  senna 2 Tablet(s) Oral at bedtime  simvastatin 20 milliGRAM(s) Oral at bedtime  sodium chloride 0.9%. 1000 milliLiter(s) IV Continuous <Continuous>      No Known Allergies    SHx: never smoked, denies alcohol use    FAMILY HISTORY:  No pertinent family history in first degree relatives      Vital Signs Last 24 Hrs  T(C): 36.7 (29 Oct 2017 11:44), Max: 36.8 (28 Oct 2017 21:29)  T(F): 98.1 (29 Oct 2017 11:44), Max: 98.3 (28 Oct 2017 21:29)  HR: 76 (29 Oct 2017 11:44) (76 - 89)  BP: 120/61 (29 Oct 2017 11:44) (104/52 - 151/75)  BP(mean): 89 (29 Oct 2017 05:22) (80 - 90)  RR: 16 (29 Oct 2017 11:44) (16 - 24)  SpO2: 98% (29 Oct 2017 11:44) (96% - 100%)                          10.6   5.57  )-----------( 212      ( 29 Oct 2017 06:00 )             30.9       10-29    141  |  104  |  17  ----------------------------<  128<H>  3.8   |  25  |  1.06    Ca    8.1<L>      29 Oct 2017 06:00  Mg     1.8     10-29        PT/INR - ( 28 Oct 2017 09:00 )   PT: 15.9 SEC;   INR: 1.41          PTT - ( 28 Oct 2017 09:00 )  PTT:27.3 SEC    PBS: RBCs are mainly normocytic, normochromic, no significant polychromasia, moderate rouleoux formation, no nucleated red cells. WBCs appear WNL, no significant bilobed or hypersegmented PMNs, no immature cells. WBCs adequate, no clumps.    CT chest: IMPRESSION:     Moderate pericardial effusion with slightly higher density fluid which   may be secondary to proteinaceous/hemorrhagicmaterial.   Small bilateral pleural effusions, right greater than left.     CT abd/pelvis: IMPRESSION:      No CT evidence of renal,  ureter or bladder lesions.    Simple hepatic cysts.      Enlarged prostate gland.    Mild cardiomegaly.    Cholelithiasis.      MRI pelvis: IMPRESSION: No evidence of scrotal or testicular lesion.    Large left hydrocele.      sonogram abd: IMPRESSION:     Small ascites. Cholelithiasis without acute cholecystitis.

## 2017-10-29 NOTE — PROGRESS NOTE ADULT - SUBJECTIVE AND OBJECTIVE BOX
PRESENTING CC: Abdominal Pain-noted Pericardial Effusion    SUBJ:78 yo Male Hx Atrial fibrillation on Xarelto-last dose-10/26,HTN, presents with abdominal discomfort after eating x 1 month. PT state diffuse abdominal discomfort after eating meals.+ occasional RAMACHANDRAN ( walks 10 blocks),had CT Abdomen,revealing incidental-Pericardial Effusion,TTE-Large pericardial effusion underwent IR guided drainage-drained 580cc dark red fluid,still draining-950cc overnight,no dyspnea,abdominal pain has resolved. Remains in atrial fibrillation      PMH -reviewed admission note, no change since admission  Heart faliure: acute [ ] chronic [ ] acute or chronic [ ] diastolic [ ] systolic [ ] combied systolic and diastolic[ ]  AIRAM: ATN[ ] renal medullary necrosis [ ] CKD I [ ]CKDII [ ]CKD III [ ]CKD IV [ ]CKD V [ ]Other pathological lesions [ ]    MEDICATIONS  (STANDING):  carvedilol 12.5 milliGRAM(s) Oral every 12 hours  docusate sodium 100 milliGRAM(s) Oral three times a day  doxazosin 8 milliGRAM(s) Oral at bedtime  hydrALAZINE 50 milliGRAM(s) Oral three times a day  influenza   Vaccine 0.5 milliLiter(s) IntraMuscular once  insulin lispro (HumaLOG) corrective regimen sliding scale   SubCutaneous three times a day before meals  insulin lispro (HumaLOG) corrective regimen sliding scale   SubCutaneous at bedtime  lisinopril 20 milliGRAM(s) Oral daily  oxybutynin 10 milliGRAM(s) Oral daily  polyethylene glycol 3350 17 Gram(s) Oral two times a day  senna 2 Tablet(s) Oral at bedtime  simvastatin 20 milliGRAM(s) Oral at bedtime  sodium chloride 0.9%. 1000 milliLiter(s) (100 mL/Hr) IV Continuous <Continuous>    MEDICATIONS  (PRN):  dextrose Gel 1 Dose(s) Oral once PRN Blood Glucose LESS THAN 70 milliGRAM(s)/deciliter  glucagon  Injectable 1 milliGRAM(s) IntraMuscular once PRN Glucose LESS THAN 70 milligrams/deciliter          FAMILY HISTORY:  No pertinent family history in first degree relatives  No family history of premature coronary artery disease or sudden cardiac death      REVIEW OF SYSTEMS:  Constitutional: [ ] fever, [ ]weight loss,  [x ]fatigue  Eyes: [ ] visual changes  Respiratory: [ ]shortness of breath;  [ ] cough, [ ]wheezing, [ ]chills, [ ]hemoptysis  Cardiovascular: [x ] chest pain, [ ]palpitations, [ ]dizziness,  [ ]leg swelling  Gastrointestinal: [x ] abdominal pain, [ ]nausea, [ ]vomiting,  [ ]diarrhea   Genitourinary: [ ] dysuria, [ ] hematuria  Neurologic: [ ] headaches [ ] tremors  Skin: [ ] itching, [ ]burning, [ ] rashes  Endocrine: [ ] heat or cold intolerance  Musculoskeletal: [ ] joint pain or swelling; [ ] muscle, back, or extremity pain  Psychiatric: [ ] depression, [ ]anxiety, [ ]mood swings, or [ ]difficulty sleeping  Hematologic: [ ] easy bruising, [ ] bleeding gums    [x] All remaining systems negative except as per above.     Vital Signs Last 24 Hrs  T(C): 36.8 (29 Oct 2017 05:22), Max: 36.8 (28 Oct 2017 21:29)  T(F): 98.2 (29 Oct 2017 05:22), Max: 98.3 (28 Oct 2017 21:29)  HR: 80 (29 Oct 2017 05:22) (77 - 134)  BP: 113/64 (29 Oct 2017 05:22) (104/52 - 151/75)  BP(mean): 89 (29 Oct 2017 05:22) (80 - 90)  RR: 19 (29 Oct 2017 05:22) (18 - 24)  SpO2: 98% (29 Oct 2017 05:22) (96% - 100%)  I&O's Summary    28 Oct 2017 07:01  -  29 Oct 2017 07:00  --------------------------------------------------------  IN: 0 mL / OUT: 930 mL / NET: -930 mL        PHYSICAL EXAM:  General: No acute distress  HEENT: EOMI, PERRL  Neck: Supple, No JVD  Lungs: Clear to percussion bilaterally; No rales or wheezing  Heart: Irregular irregular rhythm; 3/6 systolic murmur,no  rubs, or gallops  Abdomen: Nontender, bowel sounds present  Extremities: No clubbing, cyanosis, or edema  Nervous system:  Alert & Oriented X3, no focal deficits  Psychiatric: Normal affect  Skin: No rashes or lesions    LABS:  10-29    141  |  104  |  17  ----------------------------<  128<H>  3.8   |  25  |  1.06    Ca    8.1<L>      29 Oct 2017 06:00  Mg     1.8     10-29      Creatinine Trend: 1.06<--, 1.10<--, 1.28<--                        10.6   5.57  )-----------( 212      ( 29 Oct 2017 06:00 )             30.9     PT/INR - ( 28 Oct 2017 09:00 )   PT: 15.9 SEC;   INR: 1.41     PTT - ( 28 Oct 2017 09:00 )  PTT:27.3 SEC  Lipid Panel:   Cardiac Enzymes: CARDIAC MARKERS ( 28 Oct 2017 05:50 )  x     / x     / 141 u/L / x     / x        Body Fluid Differential (10.28.17 @ 13:45)    Total Cells Counted, Body Fluid: 100 cells    Seg Count, Body Fluid: 77 %    Lymphocyte Count, Body Fluid: 15 %    Monocytes - Body Fluid: 3 %    Other Body Cells: 5: IMMATURE CELLS  Cell Count, Body Fluid (10.28.17 @ 13:45)    Body Fluid Type: PERICARDIAL    Color - Body Fluid: RED    Clarity Body Fluid: BLOODY    Total Nucleated Cell Count, Body Fluid: 8904 cell/uL    Total RBC Count: [id number]: Red Cell count correlates with the number and proportion of cells on cytospin preparation. cell/uL    RADIOLOGY:IR PROCEDURE  Impression: SUCCESSFUL CT-GUIDED PERCUTANEOUS DRAINAGE OF THE PERICARDIAL  FLUID, YIELDING 580 ML OF DARK RED/BROWN FLUID.    TELEMETRY:Atrial fibrillation rates-~~80's      IMPRESSION AND PLAN:    78 yo M moderate to large pericardial effusion and biliary colic    Problem/Plan - 1:  ·  Problem: Pericardial effusion.  Plan:  Moderate Pericardial Effusion with early tamponade physiology,Thoracic consult noted.  ESR, CRP. TSH Had Pericardiocentesis for diagnostic and therapeutic tap- possible 2/2 viral pericarditis-IR drainage- drained dark red fluid-hemorrhagic-await cytology. Hematocrit remains stable.  Will check RUQ sonogram pt with + cholithiasis. IMPRESSION: Small ascites. Cholelithiasis without acute cholecystitis.    Problem/Plan - 2:  ·  Problem: Atrial Hhihmovlzmpe-Tnoruvwey-[id number].  Plan: on Xarelto- hold for pericardial effusion drainage  continue coreg.     Problem/Plan - 3:  ·  Problem: HTN (hypertension).  Plan: coreg, hydralazine, lasix held   Problem/Plan - 4:  ·  Problem: T2DM (type 2 diabetes mellitus).  Plan: FS Q6 NISS holding metformin, januvia and glipizide check A1c.     Problem/Plan - 5:  ·  Problem: BPH (benign prostatic hyperplasia).  Plan: doxazosin, proscar.     Problem/Plan - 6:  Problem: Hyperlipidemia. Plan: pravastatin.

## 2017-10-29 NOTE — CONSULT NOTE ADULT - ASSESSMENT
Patient is a 77y old  Male who presents with a chief complaint of "Abdominal discomfort x 1 month".     Pericardial Effusion:  S/p drainage.  Cardio f/up noted.  Pericardial window as per Thoracic surgery.    HTN:  Coreg/Hydralazine    A Fib:  Hold AC till procedure.    Dw family.

## 2017-10-29 NOTE — PROGRESS NOTE ADULT - SUBJECTIVE AND OBJECTIVE BOX
Interval Events: Abdominal pain has resolved as pt is now having bms. Pt denies n/v/d/c. Tolerating PO    HPI:76 yo M presents with abdominal discomfort after eating x 1 month. PT state diffuse abdominal discomfort after eating meals.+ occasional RAMACHANDRAN ( walks 10 blocks) NO chest pain, SOB, RAMACHANDRAN,  orthopnea, palpitations, diaphoresis, lightheadedness, dizziness, syncope, fever chills, malaise, myalgias, anorexia, weight changes ( loss or gain), nightsweats, generalized fatigue abdominal pain, N/V/C/D BRBPR, melena, urinary symptoms, cough, and wheezing. (27 Oct 2017 16:30)    MEDICATIONS  (STANDING):  carvedilol 12.5 milliGRAM(s) Oral every 12 hours  dextrose 5%. 1000 milliLiter(s) (50 mL/Hr) IV Continuous <Continuous>  dextrose 50% Injectable 12.5 Gram(s) IV Push once  dextrose 50% Injectable 25 Gram(s) IV Push once  dextrose 50% Injectable 25 Gram(s) IV Push once  docusate sodium 100 milliGRAM(s) Oral three times a day  doxazosin 8 milliGRAM(s) Oral at bedtime  hydrALAZINE 50 milliGRAM(s) Oral three times a day  influenza   Vaccine 0.5 milliLiter(s) IntraMuscular once  insulin lispro (HumaLOG) corrective regimen sliding scale   SubCutaneous three times a day before meals  insulin lispro (HumaLOG) corrective regimen sliding scale   SubCutaneous at bedtime  lisinopril 20 milliGRAM(s) Oral daily  oxybutynin 10 milliGRAM(s) Oral daily  polyethylene glycol 3350 17 Gram(s) Oral two times a day  senna 2 Tablet(s) Oral at bedtime  simvastatin 20 milliGRAM(s) Oral at bedtime  sodium chloride 0.9%. 1000 milliLiter(s) (100 mL/Hr) IV Continuous <Continuous>    MEDICATIONS  (PRN):  dextrose Gel 1 Dose(s) Oral once PRN Blood Glucose LESS THAN 70 milliGRAM(s)/deciliter  glucagon  Injectable 1 milliGRAM(s) IntraMuscular once PRN Glucose LESS THAN 70 milligrams/deciliter      Allergies    No Known Allergies    Intolerances          General:  No wt loss, fevers, chills, night sweats, fatigue,   Eyes:  Good vision, no reported pain  ENT:  No sore throat, pain, runny nose, dysphagia  CV:  No pain, palpitations, hypo/hypertension  Resp:  No dyspnea, cough, tachypnea, wheezing  GI:  No pain, No nausea, No vomiting, No diarrhea, No constipation, No weight loss, No fever, No pruritis, No rectal bleeding, No tarry stools, No dysphagia,  :  No pain, bleeding, incontinence, nocturia  Muscle:  No pain, weakness  Neuro:  No weakness, tingling, memory problems  Psych:  No fatigue, insomnia, mood problems, depression  Endocrine:  No polyuria, polydipsia, cold/heat intolerance  Heme:  No petechiae, ecchymosis, easy bruisability  Skin:  No rash, tattoos, scars, edema      PHYSICAL EXAM:   Vital Signs:  Vital Signs Last 24 Hrs  T(C): 36.7 (29 Oct 2017 11:44), Max: 36.8 (28 Oct 2017 21:29)  T(F): 98.1 (29 Oct 2017 11:44), Max: 98.3 (28 Oct 2017 21:29)  HR: 76 (29 Oct 2017 11:44) (76 - 88)  BP: 120/61 (29 Oct 2017 11:44) (104/52 - 146/99)  BP(mean): 89 (29 Oct 2017 05:22) (80 - 90)  RR: 16 (29 Oct 2017 11:44) (16 - 20)  SpO2: 98% (29 Oct 2017 11:44) (96% - 98%)  Daily     Daily Weight in k.3 (29 Oct 2017 05:22)I&O's Summary    28 Oct 2017 07:01  -  29 Oct 2017 07:00  --------------------------------------------------------  IN: 0 mL / OUT: 930 mL / NET: -930 mL        GENERAL:  Appears stated age, well-groomed, well-nourished, no distress  HEENT:  NC/AT,  conjunctivae clear and pink, no thyromegaly, nodules, adenopathy, no JVD, sclera -anicteric  CHEST:  Full & symmetric excursion, no increased effort, breath sounds clear  HEART:  Regular rhythm, S1, S2, no murmur/rub/S3/S4, no abdominal bruit, no edema  ABDOMEN:  Soft, non-tender, non-distended, normoactive bowel sounds,  no masses ,no hepato-splenomegaly, no signs of chronic liver disease  EXTREMITIES:  no cyanosis, clubbing or edema  SKIN:  No rash/erythema/ecchymoses/petechiae/wounds/abscess/warm/dry  NEURO:  Alert, oriented, no asterixis, no tremor, no encephalopathy      LABS:                        10.6   5.57  )-----------( 212      ( 29 Oct 2017 06:00 )             30.9     10-29    141  |  104  |  17  ----------------------------<  128<H>  3.8   |  25  |  1.06    Ca    8.1<L>      29 Oct 2017 06:00  Mg     1.8     10-29    TPro  5.8<L>  /  Alb  3.1<L>  /  TBili  0.6  /  DBili  0.2  /  AST  53<H>  /  ALT  94<H>  /  AlkPhos  80  10-29    PT/INR - ( 28 Oct 2017 09:00 )   PT: 15.9 SEC;   INR: 1.41          PTT - ( 28 Oct 2017 09:00 )  PTT:27.3 SEC    amylase   lipaseLipase, Serum: 66.1 U/L (10-27 @ 05:09)    RADIOLOGY & ADDITIONAL TESTS:

## 2017-10-30 ENCOUNTER — APPOINTMENT (OUTPATIENT)
Dept: UROLOGY | Facility: CLINIC | Age: 77
End: 2017-10-30

## 2017-10-30 ENCOUNTER — RESULT REVIEW (OUTPATIENT)
Age: 77
End: 2017-10-30

## 2017-10-30 LAB
AFP-TM SERPL-MCNC: 0.7 NG/ML — SIGNIFICANT CHANGE UP
APTT BLD: 28.3 SEC — SIGNIFICANT CHANGE UP (ref 27.5–37.4)
BLD GP AB SCN SERPL QL: NEGATIVE — SIGNIFICANT CHANGE UP
BUN SERPL-MCNC: 13 MG/DL — SIGNIFICANT CHANGE UP (ref 7–23)
CALCIUM SERPL-MCNC: 8.5 MG/DL — SIGNIFICANT CHANGE UP (ref 8.4–10.5)
CHLORIDE SERPL-SCNC: 103 MMOL/L — SIGNIFICANT CHANGE UP (ref 98–107)
CO2 SERPL-SCNC: 22 MMOL/L — SIGNIFICANT CHANGE UP (ref 22–31)
CREAT SERPL-MCNC: 0.96 MG/DL — SIGNIFICANT CHANGE UP (ref 0.5–1.3)
FERRITIN SERPL-MCNC: 175.8 NG/ML — SIGNIFICANT CHANGE UP (ref 30–400)
FOLATE SERPL-MCNC: 14.2 NG/ML — SIGNIFICANT CHANGE UP (ref 4.7–20)
GAS PNL BLDMV: SIGNIFICANT CHANGE UP
GAS PNL BLDMV: SIGNIFICANT CHANGE UP
GLUCOSE BLDC GLUCOMTR-MCNC: 177 MG/DL — HIGH (ref 70–99)
GLUCOSE BLDC GLUCOMTR-MCNC: 193 MG/DL — HIGH (ref 70–99)
GLUCOSE BLDC GLUCOMTR-MCNC: 198 MG/DL — HIGH (ref 70–99)
GLUCOSE SERPL-MCNC: 167 MG/DL — HIGH (ref 70–99)
GRAM STN WND: SIGNIFICANT CHANGE UP
GRAM STN WND: SIGNIFICANT CHANGE UP
HAPTOGLOB SERPL-MCNC: 214 MG/DL — HIGH (ref 34–200)
HAV IGG+IGM SER QL: REACTIVE — SIGNIFICANT CHANGE UP
HBV CORE AB SER-ACNC: REACTIVE — SIGNIFICANT CHANGE UP
HBV CORE IGM SER-ACNC: NONREACTIVE — SIGNIFICANT CHANGE UP
HBV SURFACE AB SER-ACNC: REACTIVE — SIGNIFICANT CHANGE UP
HCT VFR BLD CALC: 28.1 % — LOW (ref 39–50)
HCT VFR BLD CALC: 32.1 % — LOW (ref 39–50)
HCV AB S/CO SERPL IA: 0.08 S/CO — SIGNIFICANT CHANGE UP
HCV AB SERPL-IMP: SIGNIFICANT CHANGE UP
HGB BLD-MCNC: 11 G/DL — LOW (ref 13–17)
HGB BLD-MCNC: 11.1 G/DL — LOW (ref 13–17)
INR BLD: 1.37 — HIGH (ref 0.88–1.17)
IRON SATN MFR SERPL: 213 UG/DL — SIGNIFICANT CHANGE UP (ref 155–535)
IRON SATN MFR SERPL: 23 UG/DL — LOW (ref 45–165)
LDH SERPL L TO P-CCNC: 475 U/L — HIGH (ref 135–225)
MAGNESIUM SERPL-MCNC: 1.9 MG/DL — SIGNIFICANT CHANGE UP (ref 1.6–2.6)
MANUAL SMEAR VERIFICATION: SIGNIFICANT CHANGE UP
MCHC RBC-ENTMCNC: 28.8 PG — SIGNIFICANT CHANGE UP (ref 27–34)
MCHC RBC-ENTMCNC: 33.3 PG — SIGNIFICANT CHANGE UP (ref 27–34)
MCHC RBC-ENTMCNC: 34.6 % — SIGNIFICANT CHANGE UP (ref 32–36)
MCHC RBC-ENTMCNC: 39.1 % — HIGH (ref 32–36)
MCV RBC AUTO: 83.4 FL — SIGNIFICANT CHANGE UP (ref 80–100)
MCV RBC AUTO: 85.2 FL — SIGNIFICANT CHANGE UP (ref 80–100)
NRBC # FLD: 0 — SIGNIFICANT CHANGE UP
NRBC # FLD: 0.08 — SIGNIFICANT CHANGE UP
NRBC FLD-RTO: 1.6 — SIGNIFICANT CHANGE UP
OB PNL STL: NEGATIVE — SIGNIFICANT CHANGE UP
PH FLD: 7.1 PH — SIGNIFICANT CHANGE UP
PLATELET # BLD AUTO: 212 K/UL — SIGNIFICANT CHANGE UP (ref 150–400)
PLATELET # BLD AUTO: 874 K/UL — HIGH (ref 150–400)
PLATELET COUNT - ESTIMATE: NORMAL — SIGNIFICANT CHANGE UP
PMV BLD: 10.8 FL — SIGNIFICANT CHANGE UP (ref 7–13)
PMV BLD: 12.2 FL — SIGNIFICANT CHANGE UP (ref 7–13)
POTASSIUM SERPL-MCNC: 4.2 MMOL/L — SIGNIFICANT CHANGE UP (ref 3.5–5.3)
POTASSIUM SERPL-SCNC: 4.2 MMOL/L — SIGNIFICANT CHANGE UP (ref 3.5–5.3)
PROT SERPL-MCNC: 5.8 G/DL — LOW (ref 6–8.3)
PROT UR-MCNC: 12.9 MG/DL — SIGNIFICANT CHANGE UP
PROTHROM AB SERPL-ACNC: 15.4 SEC — HIGH (ref 9.8–13.1)
RBC # BLD: 3.3 M/UL — LOW (ref 4.2–5.8)
RBC # BLD: 3.85 M/UL — LOW (ref 4.2–5.8)
RBC # FLD: 13.4 % — SIGNIFICANT CHANGE UP (ref 10.3–14.5)
RBC # FLD: 15.6 % — HIGH (ref 10.3–14.5)
RETICS #: 0.1 10X6/UL — HIGH (ref 0.02–0.07)
RETICS/RBC NFR: 3.6 % — HIGH (ref 0.5–2.5)
RH IG SCN BLD-IMP: POSITIVE — SIGNIFICANT CHANGE UP
SODIUM SERPL-SCNC: 138 MMOL/L — SIGNIFICANT CHANGE UP (ref 135–145)
SPECIMEN SOURCE: SIGNIFICANT CHANGE UP
UIBC SERPL-MCNC: 190 UG/DL — SIGNIFICANT CHANGE UP (ref 110–370)
VIT B12 SERPL-MCNC: 212 PG/ML — SIGNIFICANT CHANGE UP (ref 200–900)
WBC # BLD: 4.96 K/UL — SIGNIFICANT CHANGE UP (ref 3.8–10.5)
WBC # BLD: 5.5 K/UL — SIGNIFICANT CHANGE UP (ref 3.8–10.5)
WBC # FLD AUTO: 4.96 K/UL — SIGNIFICANT CHANGE UP (ref 3.8–10.5)
WBC # FLD AUTO: 5.5 K/UL — SIGNIFICANT CHANGE UP (ref 3.8–10.5)

## 2017-10-30 PROCEDURE — 88112 CYTOPATH CELL ENHANCE TECH: CPT | Mod: 26

## 2017-10-30 PROCEDURE — 84166 PROTEIN E-PHORESIS/URINE/CSF: CPT | Mod: 26

## 2017-10-30 PROCEDURE — 84165 PROTEIN E-PHORESIS SERUM: CPT | Mod: 26

## 2017-10-30 PROCEDURE — 86335 IMMUNFIX E-PHORSIS/URINE/CSF: CPT | Mod: 26

## 2017-10-30 PROCEDURE — 71250 CT THORAX DX C-: CPT | Mod: 26

## 2017-10-30 PROCEDURE — 88341 IMHCHEM/IMCYTCHM EA ADD ANTB: CPT | Mod: 26

## 2017-10-30 PROCEDURE — 86334 IMMUNOFIX E-PHORESIS SERUM: CPT | Mod: 26

## 2017-10-30 PROCEDURE — 32659 THORACOSCOPY W/SAC DRAINAGE: CPT

## 2017-10-30 PROCEDURE — 88305 TISSUE EXAM BY PATHOLOGIST: CPT | Mod: 26,59

## 2017-10-30 PROCEDURE — 88342 IMHCHEM/IMCYTCHM 1ST ANTB: CPT | Mod: 26

## 2017-10-30 PROCEDURE — 71010: CPT | Mod: 26

## 2017-10-30 PROCEDURE — 88331 PATH CONSLTJ SURG 1 BLK 1SPC: CPT | Mod: 26

## 2017-10-30 PROCEDURE — 88305 TISSUE EXAM BY PATHOLOGIST: CPT | Mod: 26

## 2017-10-30 RX ORDER — FENTANYL CITRATE 50 UG/ML
25 INJECTION INTRAVENOUS
Qty: 0 | Refills: 0 | Status: DISCONTINUED | OUTPATIENT
Start: 2017-10-30 | End: 2017-10-30

## 2017-10-30 RX ORDER — HYDRALAZINE HCL 50 MG
5 TABLET ORAL ONCE
Qty: 0 | Refills: 0 | Status: COMPLETED | OUTPATIENT
Start: 2017-10-30 | End: 2017-10-30

## 2017-10-30 RX ORDER — SODIUM CHLORIDE 9 MG/ML
1000 INJECTION, SOLUTION INTRAVENOUS
Qty: 0 | Refills: 0 | Status: DISCONTINUED | OUTPATIENT
Start: 2017-10-30 | End: 2017-10-31

## 2017-10-30 RX ORDER — HEPARIN SODIUM 5000 [USP'U]/ML
5000 INJECTION INTRAVENOUS; SUBCUTANEOUS EVERY 8 HOURS
Qty: 0 | Refills: 0 | Status: DISCONTINUED | OUTPATIENT
Start: 2017-10-30 | End: 2017-10-31

## 2017-10-30 RX ADMIN — Medication 50 MILLIGRAM(S): at 22:19

## 2017-10-30 RX ADMIN — SIMVASTATIN 20 MILLIGRAM(S): 20 TABLET, FILM COATED ORAL at 22:19

## 2017-10-30 RX ADMIN — Medication 5 MILLIGRAM(S): at 12:02

## 2017-10-30 RX ADMIN — Medication 8 MILLIGRAM(S): at 22:19

## 2017-10-30 RX ADMIN — FENTANYL CITRATE 25 MICROGRAM(S): 50 INJECTION INTRAVENOUS at 16:30

## 2017-10-30 RX ADMIN — CARVEDILOL PHOSPHATE 12.5 MILLIGRAM(S): 80 CAPSULE, EXTENDED RELEASE ORAL at 19:05

## 2017-10-30 RX ADMIN — FENTANYL CITRATE 25 MICROGRAM(S): 50 INJECTION INTRAVENOUS at 17:00

## 2017-10-30 RX ADMIN — FENTANYL CITRATE 25 MICROGRAM(S): 50 INJECTION INTRAVENOUS at 16:45

## 2017-10-30 RX ADMIN — FENTANYL CITRATE 25 MICROGRAM(S): 50 INJECTION INTRAVENOUS at 17:15

## 2017-10-30 RX ADMIN — Medication 10 MILLIGRAM(S): at 19:05

## 2017-10-30 RX ADMIN — SENNA PLUS 2 TABLET(S): 8.6 TABLET ORAL at 22:18

## 2017-10-30 RX ADMIN — HEPARIN SODIUM 5000 UNIT(S): 5000 INJECTION INTRAVENOUS; SUBCUTANEOUS at 22:21

## 2017-10-30 RX ADMIN — SODIUM CHLORIDE 30 MILLILITER(S): 9 INJECTION, SOLUTION INTRAVENOUS at 17:00

## 2017-10-30 RX ADMIN — Medication 1: at 09:46

## 2017-10-30 RX ADMIN — Medication 100 MILLIGRAM(S): at 22:19

## 2017-10-30 RX ADMIN — FENTANYL CITRATE 25 MICROGRAM(S): 50 INJECTION INTRAVENOUS at 15:55

## 2017-10-30 RX ADMIN — FENTANYL CITRATE 25 MICROGRAM(S): 50 INJECTION INTRAVENOUS at 16:05

## 2017-10-30 RX ADMIN — Medication 1: at 16:50

## 2017-10-30 NOTE — PROGRESS NOTE ADULT - SUBJECTIVE AND OBJECTIVE BOX
PRESENTING CC: Abdominal pain    SUBJ: 78 yo Male Hx Atrial fibrillation on Xarelto-last dose-10/26,HTN, presents with abdominal discomfort after eating x 1 month. PT state diffuse abdominal discomfort after eating meals.+ occasional RAMACHANDRAN ( walks 10 blocks),had CT Abdomen,revealing incidental-Pericardial Effusion,TTE-Large pericardial effusion underwent IR guided drainage-drained 580cc dark red fluid,still draining-58cc overnight,no dyspnea,abdominal pain has resolved. Remains in paroxysmal, atrial fibrillation,bursts RVR.Seen by Oncology-        PMH -reviewed admission note, no change since admission  Heart faliure: acute [ ] chronic [ ] acute or chronic [ ] diastolic [ ] systolic [ ] combied systolic and diastolic[ ]  AIRAM: ATN[ ] renal medullary necrosis [ ] CKD I [ ]CKDII [ ]CKD III [ ]CKD IV [ ]CKD V [ ]Other pathological lesions [ ]    MEDICATIONS  (STANDING):  carvedilol 12.5 milliGRAM(s) Oral every 12 hours  docusate sodium 100 milliGRAM(s) Oral three times a day  doxazosin 8 milliGRAM(s) Oral at bedtime  hydrALAZINE 50 milliGRAM(s) Oral three times a day  influenza   Vaccine 0.5 milliLiter(s) IntraMuscular once  insulin lispro (HumaLOG) corrective regimen sliding scale   SubCutaneous three times a day before meals  insulin lispro (HumaLOG) corrective regimen sliding scale   SubCutaneous at bedtime  lisinopril 20 milliGRAM(s) Oral daily  oxybutynin 10 milliGRAM(s) Oral daily  polyethylene glycol 3350 17 Gram(s) Oral two times a day  senna 2 Tablet(s) Oral at bedtime  simvastatin 20 milliGRAM(s) Oral at bedtime  sodium chloride 0.9%. 1000 milliLiter(s) (100 mL/Hr) IV Continuous <Continuous>    MEDICATIONS  (PRN):  dextrose Gel 1 Dose(s) Oral once PRN Blood Glucose LESS THAN 70 milliGRAM(s)/deciliter  glucagon  Injectable 1 milliGRAM(s) IntraMuscular once PRN Glucose LESS THAN 70 milligrams/deciliter          FAMILY HISTORY:  No pertinent family history in first degree relatives  No family history of premature coronary artery disease or sudden cardiac death      REVIEW OF SYSTEMS:  Constitutional: [ ] fever, [ ]weight loss,  [x ]fatigue  Eyes: [ ] visual changes  Respiratory: [ ]shortness of breath;  [ ] cough, [ ]wheezing, [ ]chills, [ ]hemoptysis  Cardiovascular: [x ] chest pain, [ ]palpitations, [ ]dizziness,  [ ]leg swelling  Gastrointestinal: [x ] abdominal pain, [ ]nausea, [ ]vomiting,  [ ]diarrhea   Genitourinary: [ ] dysuria, [ ] hematuria  Neurologic: [ ] headaches [ ] tremors  Skin: [ ] itching, [ ]burning, [ ] rashes  Endocrine: [ ] heat or cold intolerance  Musculoskeletal: [ ] joint pain or swelling; [ ] muscle, back, or extremity pain  Psychiatric: [ ] depression, [ ]anxiety, [ ]mood swings, or [ ]difficulty sleeping  Hematologic: [ ] easy bruising, [ ] bleeding gums    [x] All remaining systems negative except as per above.     Vital Signs Last 24 Hrs  T(C): 36.6 (30 Oct 2017 06:01), Max: 36.9 (29 Oct 2017 21:21)  T(F): 97.8 (30 Oct 2017 06:01), Max: 98.4 (29 Oct 2017 21:21)  HR: 74 (30 Oct 2017 06:01) (74 - 80)  BP: 132/75 (30 Oct 2017 06:01) (120/61 - 132/75)  RR: 17 (30 Oct 2017 06:01) (16 - 17)  SpO2: 99% (30 Oct 2017 06:01) (98% - 99%)  I&O's Summary    29 Oct 2017 07:01  -  30 Oct 2017 07:00  --------------------------------------------------------  IN: 0 mL / OUT: 58 mL / NET: -58 mL        PHYSICAL EXAM:  General: No acute distress  HEENT: EOMI, PERRL  Neck: Supple, No JVD  Lungs: Clear to percussion bilaterally; No rales or wheezing  Heart: Regular rate and rhythm; 2/6 systolic murmur,no  rubs, or gallops  Abdomen: Nontender, bowel sounds present  Extremities: No clubbing, cyanosis, or edema  Nervous system:  Alert & Oriented X3, no focal deficits  Psychiatric: Normal affect  Skin: No rashes or lesions    LABS:  10-29    141  |  104  |  17  ----------------------------<  128<H>  3.8   |  25  |  1.06    Ca    8.1<L>      29 Oct 2017 06:00  Mg     1.8     10-29    TPro  5.8<L>  /  Alb  3.1<L>  /  TBili  0.6  /  DBili  0.2  /  AST  53<H>  /  ALT  94<H>  /  AlkPhos  80  10-29    Creatinine Trend: 1.06<--, 1.10<--, 1.28<--                        10.6   5.57  )-----------( 212      ( 29 Oct 2017 06:00 )             30.9     PT/INR - ( 28 Oct 2017 09:00 )   PT: 15.9 SEC;   INR: 1.41     PTT - ( 28 Oct 2017 09:00 )  PTT:27.3 SEC    RADIOLOGY: RAD CHEST PORTABLE ROUTINENo complicating pneumothoraces or  other extra alveolar air. Trace left effusion. No focal consolidations.      TELEMETRY:Paroxysmal atrial fibrillation,bursts RVR    IMPRESSION AND PLAN:      78 yo M moderate to large pericardial effusion and biliary colic    Problem/Plan - 1:  ·  Problem: Pericardial effusion.  Plan:  Moderate Pericardial Effusion with early tamponade physiology,Thoracic consult noted.  ESR, CRP. TSH Had Pericardiocentesis for diagnostic and therapeutic tap- possible 2/2 viral pericarditis-IR drainage- drained dark red fluid-hemorrhagic-await cytology. Hematocrit remains stable.  Will check RUQ sonogram pt with + cholithiasis. IMPRESSION: Small ascites. Cholelithiasis without acute cholecystitis.    Problem/Plan - 2:  ·  Problem: Atrial Juhbhnucqsca-Dxxwvfecz-XPBPV1-3.  Plan: on Xarelto- hold for pericardial effusion drainage  continue coreg.     Problem/Plan - 3:  ·  Problem: HTN (hypertension).  Plan: coreg, hydralazine, lasix held   Problem/Plan - 4:  ·  Problem: T2DM (type 2 diabetes mellitus).  Plan: FS Q6 NISS holding metformin, januvia and glipizide check A1c.     Problem/Plan - 5:  ·  Problem: BPH (benign prostatic hyperplasia).  Plan: doxazosin, proscar.

## 2017-10-30 NOTE — PROGRESS NOTE ADULT - SUBJECTIVE AND OBJECTIVE BOX
INTERVAL HPI/OVERNIGHT EVENTS:    pt reports no abdominal pain  denied any current n/v  "nervous for procedure"    MEDICATIONS  (STANDING):  carvedilol 12.5 milliGRAM(s) Oral every 12 hours  dextrose 5%. 1000 milliLiter(s) (50 mL/Hr) IV Continuous <Continuous>  dextrose 50% Injectable 12.5 Gram(s) IV Push once  dextrose 50% Injectable 25 Gram(s) IV Push once  dextrose 50% Injectable 25 Gram(s) IV Push once  docusate sodium 100 milliGRAM(s) Oral three times a day  doxazosin 8 milliGRAM(s) Oral at bedtime  hydrALAZINE 50 milliGRAM(s) Oral three times a day  influenza   Vaccine 0.5 milliLiter(s) IntraMuscular once  insulin lispro (HumaLOG) corrective regimen sliding scale   SubCutaneous three times a day before meals  insulin lispro (HumaLOG) corrective regimen sliding scale   SubCutaneous at bedtime  lisinopril 20 milliGRAM(s) Oral daily  oxybutynin 10 milliGRAM(s) Oral daily  polyethylene glycol 3350 17 Gram(s) Oral two times a day  senna 2 Tablet(s) Oral at bedtime  simvastatin 20 milliGRAM(s) Oral at bedtime  sodium chloride 0.9%. 1000 milliLiter(s) (100 mL/Hr) IV Continuous <Continuous>    MEDICATIONS  (PRN):  dextrose Gel 1 Dose(s) Oral once PRN Blood Glucose LESS THAN 70 milliGRAM(s)/deciliter  glucagon  Injectable 1 milliGRAM(s) IntraMuscular once PRN Glucose LESS THAN 70 milligrams/deciliter      Allergies    No Known Allergies    Intolerances        Review of Systems:    General:  No wt loss, fevers, chills, night sweats, fatigue   Eyes:  Good vision, no reported pain  ENT:  No sore throat, pain, runny nose, dysphagia  CV:  No pain, palpitations, hypo/hypertension  Resp:  No dyspnea, cough, tachypnea, wheezing  GI:  No pain, No nausea, No vomiting, No diarrhea, No constipation, No weight loss, No fever, No pruritis, No rectal bleeding, No melena, No dysphagia  :  No pain, bleeding, incontinence, nocturia  Muscle:  No pain, weakness  Neuro:  No weakness, tingling, memory problems  Psych:  No fatigue, insomnia, mood problems, depression  Endocrine:  No polyuria, polydypsia, cold/heat intolerance  Heme:  No petechiae, ecchymosis, easy bruisability  Skin:  No rash, tattoos, scars, edema      Vital Signs Last 24 Hrs  T(C): 36.8 (30 Oct 2017 11:46), Max: 36.9 (29 Oct 2017 21:21)  T(F): 98.3 (30 Oct 2017 11:46), Max: 98.4 (29 Oct 2017 21:21)  HR: 84 (30 Oct 2017 11:46) (74 - 84)  BP: 175/106 (30 Oct 2017 11:53) (122/65 - 175/106)  BP(mean): --  RR: 18 (30 Oct 2017 11:46) (17 - 18)  SpO2: 98% (30 Oct 2017 11:46) (98% - 99%)    PHYSICAL EXAM:    Constitutional: NAD  HEENT: EOMI, throat clear  Neck: No LAD, supple  Respiratory: CTA and P  Cardiovascular: S1 and S2, RRR, no M  Gastrointestinal: BS+, soft, NT/ND, neg HSM,  Extremities: No peripheral edema, neg clubbing, cyanosis  Vascular: 2+ peripheral pulses  Neurological: A/O x 3, no focal deficits  Psychiatric: Normal mood, normal affect  Skin: No rashes      LABS:                        11.1   5.50  )-----------( 212      ( 30 Oct 2017 10:35 )             32.1     10-30    138  |  103  |  13  ----------------------------<  167<H>  4.2   |  22  |  0.96    Ca    8.5      30 Oct 2017 06:12  Mg     1.9     10-30    TPro  5.8<L>  /  Alb  x   /  TBili  x   /  DBili  x   /  AST  x   /  ALT  x   /  AlkPhos  x   10-30    PT/INR - ( 30 Oct 2017 06:12 )   PT: 15.4 SEC;   INR: 1.37          PTT - ( 30 Oct 2017 06:12 )  PTT:28.3 SEC      RADIOLOGY & ADDITIONAL TESTS:    < from: US Abdomen Complete (10.28.17 @ 08:44) >    EXAM:  US ABDOMEN COMPLETE        PROCEDURE DATE:  Oct 28 2017         INTERPRETATION:  CLINICAL INFORMATION: Abdominal pain.    COMPARISON: CT 10/27/2017.     TECHNIQUE: Sonography of the abdomen.     FINDINGS:    Liver: Multiple cysts.    Bile ducts: Normal caliber. Common bile duct measures 5 mm.     Gallbladder: Gallstones.  Contracted.     Pancreas: Visualized portions are within normal limits.    Spleen: 8.5 cm. Within normal limits.    Right kidney: 10.4 cm. No hydronephrosis.        Left kidney: 11.1 cm.  No hydronephrosis.        Ascites: Small free fluid in the abdomen.    Aorta and IVC: Visualized portions are within normal limits.    IMPRESSION:     Small ascites. Cholelithiasis without acute cholecystitis.

## 2017-10-30 NOTE — PROGRESS NOTE ADULT - SUBJECTIVE AND OBJECTIVE BOX
Patient is a 77y old  Male who presents with a chief complaint of "Abdominal discomfort x 1 month" (27 Oct 2017 16:30)      SUBJECTIVE / OVERNIGHT EVENTS:   S/p pericardial window.  Denies CP/SOB/Palpitation/HA.    MEDICATIONS  (STANDING):  carvedilol 12.5 milliGRAM(s) Oral every 12 hours  dextrose 5%. 1000 milliLiter(s) (50 mL/Hr) IV Continuous <Continuous>  dextrose 50% Injectable 12.5 Gram(s) IV Push once  dextrose 50% Injectable 25 Gram(s) IV Push once  dextrose 50% Injectable 25 Gram(s) IV Push once  docusate sodium 100 milliGRAM(s) Oral three times a day  doxazosin 8 milliGRAM(s) Oral at bedtime  heparin  Injectable 5000 Unit(s) SubCutaneous every 8 hours  hydrALAZINE 50 milliGRAM(s) Oral three times a day  influenza   Vaccine 0.5 milliLiter(s) IntraMuscular once  insulin lispro (HumaLOG) corrective regimen sliding scale   SubCutaneous three times a day before meals  insulin lispro (HumaLOG) corrective regimen sliding scale   SubCutaneous at bedtime  lactated ringers. 1000 milliLiter(s) (30 mL/Hr) IV Continuous <Continuous>  lisinopril 20 milliGRAM(s) Oral daily  oxybutynin 10 milliGRAM(s) Oral daily  polyethylene glycol 3350 17 Gram(s) Oral two times a day  senna 2 Tablet(s) Oral at bedtime  simvastatin 20 milliGRAM(s) Oral at bedtime    MEDICATIONS  (PRN):  dextrose Gel 1 Dose(s) Oral once PRN Blood Glucose LESS THAN 70 milliGRAM(s)/deciliter  glucagon  Injectable 1 milliGRAM(s) IntraMuscular once PRN Glucose LESS THAN 70 milligrams/deciliter        CAPILLARY BLOOD GLUCOSE      POCT Blood Glucose.: 177 mg/dL (30 Oct 2017 22:26)  POCT Blood Glucose.: 198 mg/dL (30 Oct 2017 16:43)  POCT Blood Glucose.: 193 mg/dL (30 Oct 2017 09:01)    I&O's Summary    29 Oct 2017 07:01  -  30 Oct 2017 07:00  --------------------------------------------------------  IN: 0 mL / OUT: 58 mL / NET: -58 mL    30 Oct 2017 07:01  -  30 Oct 2017 23:51  --------------------------------------------------------  IN: 570 mL / OUT: 235 mL / NET: 335 mL        PHYSICAL EXAM:  GENERAL: NAD, well-developed  HEAD:  Atraumatic, Normocephalic  NECK: Supple, No JVD  CHEST/LUNG: Clear to auscultation bilaterally; No wheezing.  HEART: Regular rate and rhythm; No murmurs, rubs, or gallops  ABDOMEN: Soft, Nontender, Nondistended; Bowel sounds present  EXTREMITIES:   No clubbing, cyanosis, or edema  NEUROLOGY: AAO X 3  SKIN: No rashes    LABS:                        11.1   5.50  )-----------( 212      ( 30 Oct 2017 10:35 )             32.1     10-30    138  |  103  |  13  ----------------------------<  167<H>  4.2   |  22  |  0.96    Ca    8.5      30 Oct 2017 06:12  Mg     1.9     10-30    TPro  5.8<L>  /  Alb  x   /  TBili  x   /  DBili  x   /  AST  x   /  ALT  x   /  AlkPhos  x   10-30    PT/INR - ( 30 Oct 2017 06:12 )   PT: 15.4 SEC;   INR: 1.37          PTT - ( 30 Oct 2017 06:12 )  PTT:28.3 SEC        CAPILLARY BLOOD GLUCOSE      POCT Blood Glucose.: 177 mg/dL (30 Oct 2017 22:26)  POCT Blood Glucose.: 198 mg/dL (30 Oct 2017 16:43)  POCT Blood Glucose.: 193 mg/dL (30 Oct 2017 09:01)    10-28 @ 17:26  Culture-urine --  Culture results --  method type --  Organism --  Organism Identification --  Specimen source DRAINAGE  10-27 @ 06:24  Culture-urine   NO GROWTH AT 24 HOURS  Culture results --  method type --  Organism --  Organism Identification --  Specimen source URINE MIDSTREAM           10-28 @ 17:26  Culture blood --  Culture results --  Gram stain --  Gram stain blood --  Method type --  Organism --  Organism identification --  Specimen source DRAINAGE   10-27 @ 06:24  Culture blood --  Culture results --  Gram stain --  Gram stain blood --  Method type --  Organism --  Organism identification --  Specimen source URINE MIDSTREAM      RADIOLOGY & ADDITIONAL TESTS:    Imaging Personally Reviewed:    Consultant(s) Notes Reviewed:      Care Discussed with Consultants/Other Providers:

## 2017-10-30 NOTE — PROGRESS NOTE ADULT - PROBLEM SELECTOR PLAN 7
iron studies, vit B12, folate, Ferratin, retic, LDH, haptoglobin  FOBT -p  monitor H/H, transfuse prn
iron studies, vit B12, folate, Ferratin, retic, LDH, haptoglobin  FOBT -p  monitor H/H, transfuse prn

## 2017-10-31 LAB
APTT BLD: 27.8 SEC — SIGNIFICANT CHANGE UP (ref 27.5–37.4)
BUN SERPL-MCNC: 7 MG/DL — SIGNIFICANT CHANGE UP (ref 7–23)
CALCIUM SERPL-MCNC: 8.5 MG/DL — SIGNIFICANT CHANGE UP (ref 8.4–10.5)
CHLORIDE SERPL-SCNC: 100 MMOL/L — SIGNIFICANT CHANGE UP (ref 98–107)
CO2 SERPL-SCNC: 22 MMOL/L — SIGNIFICANT CHANGE UP (ref 22–31)
CREAT SERPL-MCNC: 0.8 MG/DL — SIGNIFICANT CHANGE UP (ref 0.5–1.3)
CULTURE - ACID FAST SMEAR CONCENTRATED: SIGNIFICANT CHANGE UP
CULTURE - ACID FAST SMEAR CONCENTRATED: SIGNIFICANT CHANGE UP
E HISTOLYT AB SER-ACNC: NEGATIVE — SIGNIFICANT CHANGE UP
ECHINOCOCCUS AB TITR SER: NEGATIVE — SIGNIFICANT CHANGE UP
GLUCOSE BLDC GLUCOMTR-MCNC: 169 MG/DL — HIGH (ref 70–99)
GLUCOSE BLDC GLUCOMTR-MCNC: 202 MG/DL — HIGH (ref 70–99)
GLUCOSE BLDC GLUCOMTR-MCNC: 266 MG/DL — HIGH (ref 70–99)
GLUCOSE BLDC GLUCOMTR-MCNC: 312 MG/DL — HIGH (ref 70–99)
GLUCOSE SERPL-MCNC: 168 MG/DL — HIGH (ref 70–99)
HCT VFR BLD CALC: 34.2 % — LOW (ref 39–50)
HGB BLD-MCNC: 11.8 G/DL — LOW (ref 13–17)
INR BLD: 1.31 — HIGH (ref 0.88–1.17)
MAGNESIUM SERPL-MCNC: 2.1 MG/DL — SIGNIFICANT CHANGE UP (ref 1.6–2.6)
MCHC RBC-ENTMCNC: 29.1 PG — SIGNIFICANT CHANGE UP (ref 27–34)
MCHC RBC-ENTMCNC: 34.5 % — SIGNIFICANT CHANGE UP (ref 32–36)
MCV RBC AUTO: 84.4 FL — SIGNIFICANT CHANGE UP (ref 80–100)
NRBC # FLD: 0 — SIGNIFICANT CHANGE UP
PLATELET # BLD AUTO: 217 K/UL — SIGNIFICANT CHANGE UP (ref 150–400)
PMV BLD: 10.8 FL — SIGNIFICANT CHANGE UP (ref 7–13)
POTASSIUM SERPL-MCNC: 4.2 MMOL/L — SIGNIFICANT CHANGE UP (ref 3.5–5.3)
POTASSIUM SERPL-SCNC: 4.2 MMOL/L — SIGNIFICANT CHANGE UP (ref 3.5–5.3)
PROTHROM AB SERPL-ACNC: 14.7 SEC — HIGH (ref 9.8–13.1)
RBC # BLD: 4.05 M/UL — LOW (ref 4.2–5.8)
RBC # FLD: 13.7 % — SIGNIFICANT CHANGE UP (ref 10.3–14.5)
SODIUM SERPL-SCNC: 137 MMOL/L — SIGNIFICANT CHANGE UP (ref 135–145)
SPECIMEN SOURCE: SIGNIFICANT CHANGE UP
SPECIMEN SOURCE: SIGNIFICANT CHANGE UP
WBC # BLD: 6.51 K/UL — SIGNIFICANT CHANGE UP (ref 3.8–10.5)
WBC # FLD AUTO: 6.51 K/UL — SIGNIFICANT CHANGE UP (ref 3.8–10.5)

## 2017-10-31 PROCEDURE — 71010: CPT | Mod: 26

## 2017-10-31 RX ORDER — MAGNESIUM SULFATE 500 MG/ML
2 VIAL (ML) INJECTION ONCE
Qty: 0 | Refills: 0 | Status: COMPLETED | OUTPATIENT
Start: 2017-10-31 | End: 2017-10-31

## 2017-10-31 RX ORDER — OXYCODONE HYDROCHLORIDE 5 MG/1
5 TABLET ORAL EVERY 4 HOURS
Qty: 0 | Refills: 0 | Status: DISCONTINUED | OUTPATIENT
Start: 2017-10-31 | End: 2017-11-04

## 2017-10-31 RX ORDER — ACETAMINOPHEN 500 MG
650 TABLET ORAL EVERY 6 HOURS
Qty: 0 | Refills: 0 | Status: DISCONTINUED | OUTPATIENT
Start: 2017-10-31 | End: 2017-11-09

## 2017-10-31 RX ORDER — HEPARIN SODIUM 5000 [USP'U]/ML
INJECTION INTRAVENOUS; SUBCUTANEOUS
Qty: 25000 | Refills: 0 | Status: DISCONTINUED | OUTPATIENT
Start: 2017-10-31 | End: 2017-11-04

## 2017-10-31 RX ORDER — METOPROLOL TARTRATE 50 MG
2.5 TABLET ORAL ONCE
Qty: 0 | Refills: 0 | Status: COMPLETED | OUTPATIENT
Start: 2017-10-31 | End: 2017-10-31

## 2017-10-31 RX ORDER — OXYCODONE HYDROCHLORIDE 5 MG/1
10 TABLET ORAL EVERY 6 HOURS
Qty: 0 | Refills: 0 | Status: DISCONTINUED | OUTPATIENT
Start: 2017-10-31 | End: 2017-11-05

## 2017-10-31 RX ADMIN — HEPARIN SODIUM 880 UNIT(S)/HR: 5000 INJECTION INTRAVENOUS; SUBCUTANEOUS at 21:53

## 2017-10-31 RX ADMIN — Medication: at 08:19

## 2017-10-31 RX ADMIN — SENNA PLUS 2 TABLET(S): 8.6 TABLET ORAL at 21:56

## 2017-10-31 RX ADMIN — HEPARIN SODIUM 5000 UNIT(S): 5000 INJECTION INTRAVENOUS; SUBCUTANEOUS at 05:10

## 2017-10-31 RX ADMIN — Medication 2.5 MILLIGRAM(S): at 04:59

## 2017-10-31 RX ADMIN — CARVEDILOL PHOSPHATE 12.5 MILLIGRAM(S): 80 CAPSULE, EXTENDED RELEASE ORAL at 05:10

## 2017-10-31 RX ADMIN — OXYCODONE HYDROCHLORIDE 5 MILLIGRAM(S): 5 TABLET ORAL at 04:50

## 2017-10-31 RX ADMIN — Medication 50 MILLIGRAM(S): at 14:22

## 2017-10-31 RX ADMIN — SIMVASTATIN 20 MILLIGRAM(S): 20 TABLET, FILM COATED ORAL at 21:56

## 2017-10-31 RX ADMIN — Medication 100 MILLIGRAM(S): at 21:56

## 2017-10-31 RX ADMIN — Medication 100 MILLIGRAM(S): at 14:21

## 2017-10-31 RX ADMIN — Medication 100 MILLIGRAM(S): at 05:11

## 2017-10-31 RX ADMIN — OXYCODONE HYDROCHLORIDE 5 MILLIGRAM(S): 5 TABLET ORAL at 03:55

## 2017-10-31 RX ADMIN — Medication 3: at 17:34

## 2017-10-31 RX ADMIN — Medication 8 MILLIGRAM(S): at 21:56

## 2017-10-31 RX ADMIN — CARVEDILOL PHOSPHATE 12.5 MILLIGRAM(S): 80 CAPSULE, EXTENDED RELEASE ORAL at 17:36

## 2017-10-31 RX ADMIN — Medication 10 MILLIGRAM(S): at 12:21

## 2017-10-31 RX ADMIN — POLYETHYLENE GLYCOL 3350 17 GRAM(S): 17 POWDER, FOR SOLUTION ORAL at 17:36

## 2017-10-31 RX ADMIN — POLYETHYLENE GLYCOL 3350 17 GRAM(S): 17 POWDER, FOR SOLUTION ORAL at 05:10

## 2017-10-31 RX ADMIN — SODIUM CHLORIDE 30 MILLILITER(S): 9 INJECTION, SOLUTION INTRAVENOUS at 03:55

## 2017-10-31 RX ADMIN — Medication 50 MILLIGRAM(S): at 05:11

## 2017-10-31 RX ADMIN — Medication 50 MILLIGRAM(S): at 21:56

## 2017-10-31 RX ADMIN — HEPARIN SODIUM 5000 UNIT(S): 5000 INJECTION INTRAVENOUS; SUBCUTANEOUS at 14:21

## 2017-10-31 RX ADMIN — LISINOPRIL 20 MILLIGRAM(S): 2.5 TABLET ORAL at 05:11

## 2017-10-31 RX ADMIN — Medication 2: at 12:21

## 2017-10-31 RX ADMIN — Medication 50 GRAM(S): at 03:56

## 2017-10-31 NOTE — PROGRESS NOTE ADULT - SUBJECTIVE AND OBJECTIVE BOX
PRESENTING CC: Pericardial effusion    SUBJ: 78 yo Male Hx Atrial fibrillation on Xarelto-last dose-10/26,HTN, presents with abdominal discomfort after eating x 1 month. PT state diffuse abdominal discomfort after eating meals.+ occasional RAMACHANDRAN ( walks 10 blocks),had CT Abdomen,revealing incidental-Pericardial Effusion,TTE-Large pericardial effusion,had IR guided drain-underwent Pericardial Window yesterday,no dyspnea,    PMH -reviewed admission note, no change since admission  Heart faliure: acute [ ] chronic [ ] acute or chronic [ ] diastolic [ ] systolic [ ] combied systolic and diastolic[ ]  AIRAM: ATN[ ] renal medullary necrosis [ ] CKD I [ ]CKDII [ ]CKD III [ ]CKD IV [ ]CKD V [ ]Other pathological lesions [ ]    MEDICATIONS  (STANDING):  carvedilol 12.5 milliGRAM(s) Oral every 12 hours  docusate sodium 100 milliGRAM(s) Oral three times a day  doxazosin 8 milliGRAM(s) Oral at bedtime  heparin  Injectable 5000 Unit(s) SubCutaneous every 8 hours  hydrALAZINE 50 milliGRAM(s) Oral three times a day  influenza   Vaccine 0.5 milliLiter(s) IntraMuscular once  insulin lispro (HumaLOG) corrective regimen sliding scale   SubCutaneous three times a day before meals  insulin lispro (HumaLOG) corrective regimen sliding scale   SubCutaneous at bedtime  lactated ringers. 1000 milliLiter(s) (30 mL/Hr) IV Continuous <Continuous>  lisinopril 20 milliGRAM(s) Oral daily  oxybutynin 10 milliGRAM(s) Oral daily  polyethylene glycol 3350 17 Gram(s) Oral two times a day  senna 2 Tablet(s) Oral at bedtime  simvastatin 20 milliGRAM(s) Oral at bedtime    MEDICATIONS  (PRN):  acetaminophen   Tablet. 650 milliGRAM(s) Oral every 6 hours PRN Mild Pain (1 - 3)  dextrose Gel 1 Dose(s) Oral once PRN Blood Glucose LESS THAN 70 milliGRAM(s)/deciliter  glucagon  Injectable 1 milliGRAM(s) IntraMuscular once PRN Glucose LESS THAN 70 milligrams/deciliter  oxyCODONE    IR 5 milliGRAM(s) Oral every 4 hours PRN Moderate Pain (4 - 6)  oxyCODONE    IR 10 milliGRAM(s) Oral every 6 hours PRN Severe Pain (7 - 10)          FAMILY HISTORY:  No pertinent family history in first degree relatives  No family history of premature coronary artery disease or sudden cardiac death      REVIEW OF SYSTEMS:  Constitutional: [ ] fever, [ ]weight loss,  [x ]fatigue  Eyes: [ ] visual changes  Respiratory: [ ]shortness of breath;  [ ] cough, [ ]wheezing, [ ]chills, [ ]hemoptysis  Cardiovascular: [x ] chest pain, [ ]palpitations, [ ]dizziness,  [ ]leg swelling  Gastrointestinal: [ ] abdominal pain, [ ]nausea, [ ]vomiting,  [ ]diarrhea   Genitourinary: [ ] dysuria, [ ] hematuria  Neurologic: [ ] headaches [ ] tremors  Skin: [ ] itching, [ ]burning, [ ] rashes  Endocrine: [ ] heat or cold intolerance  Musculoskeletal: [ ] joint pain or swelling; [ ] muscle, back, or extremity pain  Psychiatric: [ ] depression, [ ]anxiety, [ ]mood swings, or [ ]difficulty sleeping  Hematologic: [ ] easy bruising, [ ] bleeding gums    [x] All remaining systems negative except as per above.     Vital Signs Last 24 Hrs  T(C): 37.2 (31 Oct 2017 04:58), Max: 37.2 (31 Oct 2017 04:58)  T(F): 98.9 (31 Oct 2017 04:58), Max: 98.9 (31 Oct 2017 04:58)  HR: 106 (31 Oct 2017 04:58) (74 - 122)  BP: 124/76 (31 Oct 2017 04:58) (101/58 - 175/106)  BP(mean): --  RR: 18 (31 Oct 2017 04:58) (14 - 23)  SpO2: 98% (31 Oct 2017 04:58) (95% - 100%)  I&O's Summary    30 Oct 2017 07:01  -  31 Oct 2017 07:00  --------------------------------------------------------  IN: 720 mL / OUT: 1520 mL / NET: -800 mL        PHYSICAL EXAM:  General: No acute distress  HEENT: EOMI, PERRL  Neck: Supple, No JVD  Lungs: Clear to percussion bilaterally; No rales or wheezing  Heart: Irregular irregular rhythm; No murmurs, rubs, or gallops  Abdomen: Nontender, bowel sounds present  Extremities: No clubbing, cyanosis, or edema  Nervous system:  Alert & Oriented X3, no focal deficits  Psychiatric: Normal affect  Skin: No rashes or lesions    LABS:  10-31    137  |  100  |  7   ----------------------------<  168<H>  4.2   |  22  |  0.80    Ca    8.5      31 Oct 2017 05:30  Mg     2.1     10-31    TPro  5.8<L>  /  Alb  x   /  TBili  x   /  DBili  x   /  AST  x   /  ALT  x   /  AlkPhos  x   10-30    Creatinine Trend: 0.80<--, 0.96<--, 1.06<--, 1.10<--, 1.28<--                        11.8   6.51  )-----------( 217      ( 31 Oct 2017 05:30 )             34.2     PT/INR - ( 31 Oct 2017 05:30 )   PT: 14.7 SEC;   INR: 1.31          PTT - ( 30 Oct 2017 06:12 )  PTT:28.3 SEC  Lipid Panel:   Cardiac Enzymes:           TELEMETRY: Sinus rhythm PAF    IMPRESSION AND PLAN:      78 yo M moderate to large pericardial effusion and biliary colic    Problem/Plan - 1:  ·  Problem: Pericardial effusion.  Plan:  Moderate Pericardial Effusion with early tamponade physiology,s/p IR drainage s/p Pericardial Window POD#1  ESR, CRP. TSH Had Pericardiocentesis for diagnostic and therapeutic tap- possible 2/2 viral pericarditis-IR drainage- drained dark red fluid-hemorrhagic-await cytology. Hematocrit remains stable.  Will check RUQ sonogram pt with + cholithiasis. IMPRESSION: Small ascites. Cholelithiasis without acute cholecystitis.    Problem/Plan - 2:  ·  Problem: Atrial [id number].  Plan: on Xarelto- hold for pericardial effusion drainage  continue coreg.     Problem/Plan - 3:  ·  Problem: HTN (hypertension).  Plan: coreg, hydralazine, lasix held   Problem/Plan - 4:  ·  Problem: T2DM (type 2 diabetes mellitus).  Plan: FS Q6 NISS holding metformin, januvia and glipizide check A1c.     Problem/Plan - 5:  ·  Problem: BPH (benign prostatic hyperplasia).  Plan: doxazosin, proscar.

## 2017-10-31 NOTE — PROGRESS NOTE ADULT - SUBJECTIVE AND OBJECTIVE BOX
Subjective: no acute complaints    Vital Signs:  Vital Signs Last 24 Hrs  T(C): 36.7 (10-31-17 @ 13:13), Max: 37.2 (10-31-17 @ 04:58)  T(F): 98.1 (10-31-17 @ 13:13), Max: 98.9 (10-31-17 @ 04:58)  HR: 83 (10-31-17 @ 13:13) (74 - 122)  BP: 130/56 (10-31-17 @ 13:13) (101/58 - 163/86)  RR: 18 (10-31-17 @ 13:13) (14 - 23)  SpO2: 99% (10-31-17 @ 13:13) (95% - 100%) on (O2)    Telemetry/Alarms:  General: WN/WD NAD  Neurology: Awake, nonfocal, JIMENEZ x 4  Eyes: Scleras clear, PERRLA/ EOMI, Gross vision intact  ENT:Gross hearing intact, grossly patent pharynx, no stridor  Neck: Neck supple, trachea midline, No JVD,   Respiratory: CTA B/L, No wheezing, rales, rhonchi  CV: RRR, S1S2, no murmurs, rubs or gallops  Abdominal: Soft, NT, ND +BS, dela cruz iintact  Extremities: No edema, + peripheral pulses  Skin: No Rashes, Hematoma, Ecchymosis  Lymphatic: No Neck, axilla, groin LAD  Psych: Oriented x 3, normal affect  incisions: c,d,i  Tubes: pericardial drain to suction drained 100cc/24h    Relevant labs, radiology and Medications reviewed                        11.8   6.51  )-----------( 217      ( 31 Oct 2017 05:30 )             34.2     10-31    137  |  100  |  7   ----------------------------<  168<H>  4.2   |  22  |  0.80    Ca    8.5      31 Oct 2017 05:30  Mg     2.1     10-31    TPro  5.8<L>  /  Alb  x   /  TBili  x   /  DBili  x   /  AST  x   /  ALT  x   /  AlkPhos  x   10-30    PT/INR - ( 31 Oct 2017 05:30 )   PT: 14.7 SEC;   INR: 1.31          PTT - ( 30 Oct 2017 06:12 )  PTT:28.3 SEC  MEDICATIONS  (STANDING):  carvedilol 12.5 milliGRAM(s) Oral every 12 hours  dextrose 5%. 1000 milliLiter(s) (50 mL/Hr) IV Continuous <Continuous>  dextrose 50% Injectable 12.5 Gram(s) IV Push once  dextrose 50% Injectable 25 Gram(s) IV Push once  dextrose 50% Injectable 25 Gram(s) IV Push once  docusate sodium 100 milliGRAM(s) Oral three times a day  doxazosin 8 milliGRAM(s) Oral at bedtime  heparin  Injectable 5000 Unit(s) SubCutaneous every 8 hours  hydrALAZINE 50 milliGRAM(s) Oral three times a day  influenza   Vaccine 0.5 milliLiter(s) IntraMuscular once  insulin lispro (HumaLOG) corrective regimen sliding scale   SubCutaneous three times a day before meals  insulin lispro (HumaLOG) corrective regimen sliding scale   SubCutaneous at bedtime  lactated ringers. 1000 milliLiter(s) (30 mL/Hr) IV Continuous <Continuous>  lisinopril 20 milliGRAM(s) Oral daily  oxybutynin 10 milliGRAM(s) Oral daily  polyethylene glycol 3350 17 Gram(s) Oral two times a day  senna 2 Tablet(s) Oral at bedtime  simvastatin 20 milliGRAM(s) Oral at bedtime    MEDICATIONS  (PRN):  acetaminophen   Tablet. 650 milliGRAM(s) Oral every 6 hours PRN Mild Pain (1 - 3)  dextrose Gel 1 Dose(s) Oral once PRN Blood Glucose LESS THAN 70 milliGRAM(s)/deciliter  glucagon  Injectable 1 milliGRAM(s) IntraMuscular once PRN Glucose LESS THAN 70 milligrams/deciliter  oxyCODONE    IR 5 milliGRAM(s) Oral every 4 hours PRN Moderate Pain (4 - 6)  oxyCODONE    IR 10 milliGRAM(s) Oral every 6 hours PRN Severe Pain (7 - 10)    Pertinent Physical Exam  I&O's Summary    30 Oct 2017 07:01  -  31 Oct 2017 07:00  --------------------------------------------------------  IN: 810 mL / OUT: 1520 mL / NET: -710 mL    31 Oct 2017 07:01  -  31 Oct 2017 14:41  --------------------------------------------------------  IN: 430 mL / OUT: 775 mL / NET: -345 mL        Assessment  77y Male  w/ PAST MEDICAL & SURGICAL HISTORY:  Afib  Hyperlipidemia  HTN (hypertension)  Hernia, inguinal, right  BPH (benign prostatic hyperplasia)  T2DM (type 2 diabetes mellitus): &gt; 20 yrs  S/P cataract surgery: bilateral 4 years ago  admitted with complaints of Patient is a 77y old  Male who presents with a chief complaint of "Abdominal discomfort x 1 month" (27 Oct 2017 16:30)  .  On (10/30/17), patient underwent Pericardial window operation  . Postoperative course/issues: dela cruz inserted for retention    PLAN  chest tube to waterseal  will continue to hold anticoagulation  Neuro: Pain management  Pulm: Encourage coughing, deep breathing and use of incentive spirometry. Wean off supplemental oxygen as able. Daily CXR.   Cardio: Monitor telemetry/alarms  GI: Tolerating diet. Continue stool softeners.  Renal: monitor urine output, supplement electrolytes as needed  Vasc: Heparin SC/SCDs for DVT prophylaxis  Heme: Stable H/H. .   ID: Off antibiotics. Stable.  Therapy: OOB/ambulate  Tubes: Monitor Chest tube output  Disposition: Aim to D/C to home once drain removed  Discussed with Cardiothoracic Team at AM rounds.

## 2017-10-31 NOTE — PROGRESS NOTE ADULT - SUBJECTIVE AND OBJECTIVE BOX
78 yo male w/ PMHx a-fib, HTN, DM, BPH, and repair of hydrocele who presented w/ pericardial effusion currently POD#1 from pericardial drainage and window. Currently pt denies pain, sob, fever, chills, nausea and vomiting.       Vital Signs:  Vital Signs Last 24 Hrs  T(C): 37.2 (10-31-17 @ 04:58), Max: 37.2 (10-31-17 @ 04:58)  T(F): 98.9 (10-31-17 @ 04:58), Max: 98.9 (10-31-17 @ 04:58)  HR: 106 (10-31-17 @ 04:58) (74 - 122)  BP: 124/76 (10-31-17 @ 04:58) (101/58 - 175/106)  RR: 18 (10-31-17 @ 04:58) (14 - 23)  SpO2: 98% (10-31-17 @ 04:58) (95% - 100%) on (O2)    Telemetry/Alarms:  Gen:  Pulm:  Card:  Abd:  LE:  CT:    CXR:     Relevant labs, radiology and Medications reviewed                        11.1   5.50  )-----------( 212      ( 30 Oct 2017 10:35 )             32.1     10-30    138  |  103  |  13  ----------------------------<  167<H>  4.2   |  22  |  0.96    Ca    8.5      30 Oct 2017 06:12  Mg     1.9     10-30    TPro  5.8<L>  /  Alb  x   /  TBili  x   /  DBili  x   /  AST  x   /  ALT  x   /  AlkPhos  x   10-30    PT/INR - ( 30 Oct 2017 06:12 )   PT: 15.4 SEC;   INR: 1.37          PTT - ( 30 Oct 2017 06:12 )  PTT:28.3 SEC  Pertinent Physical Exam  I&O's Summary    29 Oct 2017 07:01  -  30 Oct 2017 07:00  --------------------------------------------------------  IN: 0 mL / OUT: 58 mL / NET: -58 mL    30 Oct 2017 07:01  -  31 Oct 2017 06:04  --------------------------------------------------------  IN: 720 mL / OUT: 1520 mL / NET: -800 mL        Assessment  77y Male  w/ PAST MEDICAL & SURGICAL HISTORY:  Afib  Hyperlipidemia  HTN (hypertension)  Hernia, inguinal, right  BPH (benign prostatic hyperplasia)  T2DM (type 2 diabetes mellitus): &gt; 20 yrs  S/P cataract surgery: bilateral 4 years ago   Patient is a 77y old  Male who presents with a chief complaint of "Abdominal discomfort x 1 month" (27 Oct 2017 16:30)  .  On (Date), patient underwent Pericardial window operation  .     Postoperative course/issues:                                                                                                       PLAN  78 yo male s/p drainage of pericardial effusion and pericardial window  - Continue monitoring   - Pain management w/ oxycodone  - Continue jatin drain to suction  - f/u CXR/labs and output  - s/p 2gr mag and x1 lopressor 2.5 for a-fib  - Failed TOV, dela cruz inserted

## 2017-10-31 NOTE — PROGRESS NOTE ADULT - SUBJECTIVE AND OBJECTIVE BOX
Patient is a 77y old  Male who presents with a chief complaint of "Abdominal discomfort x 1 month" (27 Oct 2017 16:30)      SUBJECTIVE / OVERNIGHT EVENTS:   Feels better.  Denies CP/SOB/Palpitation/HA.    MEDICATIONS  (STANDING):  carvedilol 12.5 milliGRAM(s) Oral every 12 hours  dextrose 5%. 1000 milliLiter(s) (50 mL/Hr) IV Continuous <Continuous>  dextrose 50% Injectable 12.5 Gram(s) IV Push once  dextrose 50% Injectable 25 Gram(s) IV Push once  dextrose 50% Injectable 25 Gram(s) IV Push once  docusate sodium 100 milliGRAM(s) Oral three times a day  doxazosin 8 milliGRAM(s) Oral at bedtime  heparin  Infusion.  Unit(s)/Hr (8.8 mL/Hr) IV Continuous <Continuous>  hydrALAZINE 50 milliGRAM(s) Oral three times a day  influenza   Vaccine 0.5 milliLiter(s) IntraMuscular once  insulin lispro (HumaLOG) corrective regimen sliding scale   SubCutaneous three times a day before meals  insulin lispro (HumaLOG) corrective regimen sliding scale   SubCutaneous at bedtime  lisinopril 20 milliGRAM(s) Oral daily  oxybutynin 10 milliGRAM(s) Oral daily  polyethylene glycol 3350 17 Gram(s) Oral two times a day  senna 2 Tablet(s) Oral at bedtime  simvastatin 20 milliGRAM(s) Oral at bedtime    MEDICATIONS  (PRN):  acetaminophen   Tablet. 650 milliGRAM(s) Oral every 6 hours PRN Mild Pain (1 - 3)  dextrose Gel 1 Dose(s) Oral once PRN Blood Glucose LESS THAN 70 milliGRAM(s)/deciliter  glucagon  Injectable 1 milliGRAM(s) IntraMuscular once PRN Glucose LESS THAN 70 milligrams/deciliter  oxyCODONE    IR 5 milliGRAM(s) Oral every 4 hours PRN Moderate Pain (4 - 6)  oxyCODONE    IR 10 milliGRAM(s) Oral every 6 hours PRN Severe Pain (7 - 10)        CAPILLARY BLOOD GLUCOSE      POCT Blood Glucose.: 202 mg/dL (31 Oct 2017 21:34)  POCT Blood Glucose.: 266 mg/dL (31 Oct 2017 16:19)  POCT Blood Glucose.: 312 mg/dL (31 Oct 2017 11:49)  POCT Blood Glucose.: 169 mg/dL (31 Oct 2017 08:05)    I&O's Summary    30 Oct 2017 07:01  -  31 Oct 2017 07:00  --------------------------------------------------------  IN: 810 mL / OUT: 1520 mL / NET: -710 mL    31 Oct 2017 07:01  -  31 Oct 2017 23:44  --------------------------------------------------------  IN: 650 mL / OUT: 1609 mL / NET: -959 mL        PHYSICAL EXAM:  GENERAL: NAD, well-developed  HEAD:  Atraumatic, Normocephalic  NECK: Supple, No JVD  CHEST/LUNG: Clear to auscultation bilaterally; No wheezing.  HEART: Regular rate and rhythm; No murmurs, rubs, or gallops  ABDOMEN: Soft, Nontender, Nondistended; Bowel sounds present  EXTREMITIES:   No clubbing, cyanosis, or edema  NEUROLOGY: AAO X 3  SKIN: No rashes    LABS:                        11.8   6.51  )-----------( 217      ( 31 Oct 2017 05:30 )             34.2     10-31    137  |  100  |  7   ----------------------------<  168<H>  4.2   |  22  |  0.80    Ca    8.5      31 Oct 2017 05:30  Mg     2.1     10-31    TPro  5.8<L>  /  Alb  x   /  TBili  x   /  DBili  x   /  AST  x   /  ALT  x   /  AlkPhos  x   10-30    PT/INR - ( 31 Oct 2017 05:30 )   PT: 14.7 SEC;   INR: 1.31          PTT - ( 31 Oct 2017 20:00 )  PTT:27.8 SEC        CAPILLARY BLOOD GLUCOSE      POCT Blood Glucose.: 202 mg/dL (31 Oct 2017 21:34)  POCT Blood Glucose.: 266 mg/dL (31 Oct 2017 16:19)  POCT Blood Glucose.: 312 mg/dL (31 Oct 2017 11:49)  POCT Blood Glucose.: 169 mg/dL (31 Oct 2017 08:05)    10-30 @ 20:43  Culture-urine --  Culture results --  method type --  Organism --  Organism Identification --  Specimen source OTHER  10-30 @ 20:39  Culture-urine --  Culture results --  method type --  Organism --  Organism Identification --  Specimen source OTHER  10-28 @ 17:26  Culture-urine --  Culture results --  method type --  Organism --  Organism Identification --  Specimen source DRAINAGE  10-27 @ 06:24  Culture-urine   NO GROWTH AT 24 HOURS  Culture results --  method type --  Organism --  Organism Identification --  Specimen source URINE MIDSTREAM           10-30 @ 20:43  Culture blood --  Culture results --  Gram stain --  Gram stain blood --  Method type --  Organism --  Organism identification --  Specimen source OTHER   10-30 @ 20:39  Culture blood --  Culture results --  Gram stain --  Gram stain blood --  Method type --  Organism --  Organism identification --  Specimen source OTHER   10-28 @ 17:26  Culture blood --  Culture results --  Gram stain --  Gram stain blood --  Method type --  Organism --  Organism identification --  Specimen source DRAINAGE   10-27 @ 06:24  Culture blood --  Culture results --  Gram stain --  Gram stain blood --  Method type --  Organism --  Organism identification --  Specimen source URINE MIDSTREAM      RADIOLOGY & ADDITIONAL TESTS:    Imaging Personally Reviewed:    Consultant(s) Notes Reviewed:      Care Discussed with Consultants/Other Providers:

## 2017-10-31 NOTE — PROGRESS NOTE ADULT - SUBJECTIVE AND OBJECTIVE BOX
INTERVAL HPI/OVERNIGHT EVENTS:    pt seen with family  bedside  no abdominal pain   tolerating diet  'feeling better'      MEDICATIONS  (STANDING):  carvedilol 12.5 milliGRAM(s) Oral every 12 hours  dextrose 5%. 1000 milliLiter(s) (50 mL/Hr) IV Continuous <Continuous>  dextrose 50% Injectable 12.5 Gram(s) IV Push once  dextrose 50% Injectable 25 Gram(s) IV Push once  dextrose 50% Injectable 25 Gram(s) IV Push once  docusate sodium 100 milliGRAM(s) Oral three times a day  doxazosin 8 milliGRAM(s) Oral at bedtime  heparin  Injectable 5000 Unit(s) SubCutaneous every 8 hours  hydrALAZINE 50 milliGRAM(s) Oral three times a day  influenza   Vaccine 0.5 milliLiter(s) IntraMuscular once  insulin lispro (HumaLOG) corrective regimen sliding scale   SubCutaneous three times a day before meals  insulin lispro (HumaLOG) corrective regimen sliding scale   SubCutaneous at bedtime  lactated ringers. 1000 milliLiter(s) (30 mL/Hr) IV Continuous <Continuous>  lisinopril 20 milliGRAM(s) Oral daily  oxybutynin 10 milliGRAM(s) Oral daily  polyethylene glycol 3350 17 Gram(s) Oral two times a day  senna 2 Tablet(s) Oral at bedtime  simvastatin 20 milliGRAM(s) Oral at bedtime    MEDICATIONS  (PRN):  acetaminophen   Tablet. 650 milliGRAM(s) Oral every 6 hours PRN Mild Pain (1 - 3)  dextrose Gel 1 Dose(s) Oral once PRN Blood Glucose LESS THAN 70 milliGRAM(s)/deciliter  glucagon  Injectable 1 milliGRAM(s) IntraMuscular once PRN Glucose LESS THAN 70 milligrams/deciliter  oxyCODONE    IR 5 milliGRAM(s) Oral every 4 hours PRN Moderate Pain (4 - 6)  oxyCODONE    IR 10 milliGRAM(s) Oral every 6 hours PRN Severe Pain (7 - 10)      Allergies    No Known Allergies    Intolerances        Review of Systems:    General:  No wt loss, fevers, chills, night sweats, fatigue   Eyes:  Good vision, no reported pain  ENT:  No sore throat, pain, runny nose, dysphagia  CV:  No pain, palpitations, hypo/hypertension  Resp:  No dyspnea, cough, tachypnea, wheezing  GI:  No pain, No nausea, No vomiting, No diarrhea, No constipation, No weight loss, No fever, No pruritis, No rectal bleeding, No melena, No dysphagia  :  No pain, bleeding, incontinence, nocturia  Muscle:  No pain, weakness  Neuro:  No weakness, tingling, memory problems  Psych:  No fatigue, insomnia, mood problems, depression  Endocrine:  No polyuria, polydypsia, cold/heat intolerance  Heme:  No petechiae, ecchymosis, easy bruisability  Skin:  No rash, tattoos, scars, edema      Vital Signs Last 24 Hrs  T(C): 37.2 (31 Oct 2017 09:05), Max: 37.2 (31 Oct 2017 04:58)  T(F): 98.9 (31 Oct 2017 09:05), Max: 98.9 (31 Oct 2017 04:58)  HR: 80 (31 Oct 2017 09:05) (74 - 122)  BP: 136/55 (31 Oct 2017 09:05) (101/58 - 163/86)  BP(mean): --  RR: 18 (31 Oct 2017 09:05) (14 - 23)  SpO2: 98% (31 Oct 2017 09:05) (95% - 100%)    PHYSICAL EXAM:    Constitutional: NAD  HEENT: EOMI, throat clear  Neck: No LAD, supple  Respiratory: CTA and P  Cardiovascular: S1 and S2, RRR, no M  Gastrointestinal: BS+, soft, NT/ND, neg HSM,  Extremities: No peripheral edema, neg clubbing, cyanosis  Vascular: 2+ peripheral pulses  Neurological: A/O x 3, no focal deficits  Psychiatric: Normal mood, normal affect  Skin: No rashes      LABS:                        11.8   6.51  )-----------( 217      ( 31 Oct 2017 05:30 )             34.2     10-31    137  |  100  |  7   ----------------------------<  168<H>  4.2   |  22  |  0.80    Ca    8.5      31 Oct 2017 05:30  Mg     2.1     10-31    TPro  5.8<L>  /  Alb  x   /  TBili  x   /  DBili  x   /  AST  x   /  ALT  x   /  AlkPhos  x   10-30    PT/INR - ( 31 Oct 2017 05:30 )   PT: 14.7 SEC;   INR: 1.31          PTT - ( 30 Oct 2017 06:12 )  PTT:28.3 SEC      RADIOLOGY & ADDITIONAL TESTS:

## 2017-11-01 LAB
APPEARANCE UR: SIGNIFICANT CHANGE UP
APTT BLD: 36.4 SEC — SIGNIFICANT CHANGE UP (ref 27.5–37.4)
APTT BLD: 47.1 SEC — HIGH (ref 27.5–37.4)
APTT BLD: 58.6 SEC — HIGH (ref 27.5–37.4)
BACTERIA # UR AUTO: SIGNIFICANT CHANGE UP
BILIRUB UR-MCNC: NEGATIVE — SIGNIFICANT CHANGE UP
BLOOD UR QL VISUAL: HIGH
BUN SERPL-MCNC: 10 MG/DL — SIGNIFICANT CHANGE UP (ref 7–23)
CALCIUM SERPL-MCNC: 8.4 MG/DL — SIGNIFICANT CHANGE UP (ref 8.4–10.5)
CHLORIDE SERPL-SCNC: 101 MMOL/L — SIGNIFICANT CHANGE UP (ref 98–107)
CO2 SERPL-SCNC: 25 MMOL/L — SIGNIFICANT CHANGE UP (ref 22–31)
COLOR SPEC: HIGH
CREAT SERPL-MCNC: 0.9 MG/DL — SIGNIFICANT CHANGE UP (ref 0.5–1.3)
GAS PNL BLDMV: SIGNIFICANT CHANGE UP
GLUCOSE BLDC GLUCOMTR-MCNC: 194 MG/DL — HIGH (ref 70–99)
GLUCOSE BLDC GLUCOMTR-MCNC: 254 MG/DL — HIGH (ref 70–99)
GLUCOSE BLDC GLUCOMTR-MCNC: 284 MG/DL — HIGH (ref 70–99)
GLUCOSE SERPL-MCNC: 199 MG/DL — HIGH (ref 70–99)
GLUCOSE UR-MCNC: 150 — SIGNIFICANT CHANGE UP
HCT VFR BLD CALC: 30.1 % — LOW (ref 39–50)
HCT VFR BLD CALC: 30.7 % — LOW (ref 39–50)
HGB BLD-MCNC: 10.7 G/DL — LOW (ref 13–17)
HGB BLD-MCNC: 10.7 G/DL — LOW (ref 13–17)
KETONES UR-MCNC: SIGNIFICANT CHANGE UP
LEUKOCYTE ESTERASE UR-ACNC: HIGH
MCHC RBC-ENTMCNC: 28.8 PG — SIGNIFICANT CHANGE UP (ref 27–34)
MCHC RBC-ENTMCNC: 29.7 PG — SIGNIFICANT CHANGE UP (ref 27–34)
MCHC RBC-ENTMCNC: 34.9 % — SIGNIFICANT CHANGE UP (ref 32–36)
MCHC RBC-ENTMCNC: 35.5 % — SIGNIFICANT CHANGE UP (ref 32–36)
MCV RBC AUTO: 82.7 FL — SIGNIFICANT CHANGE UP (ref 80–100)
MCV RBC AUTO: 83.6 FL — SIGNIFICANT CHANGE UP (ref 80–100)
MUCOUS THREADS # UR AUTO: SIGNIFICANT CHANGE UP
NITRITE UR-MCNC: NEGATIVE — SIGNIFICANT CHANGE UP
NRBC # FLD: 0 — SIGNIFICANT CHANGE UP
NRBC # FLD: 0 — SIGNIFICANT CHANGE UP
PH UR: 6 — SIGNIFICANT CHANGE UP (ref 4.6–8)
PLATELET # BLD AUTO: 206 K/UL — SIGNIFICANT CHANGE UP (ref 150–400)
PLATELET # BLD AUTO: 207 K/UL — SIGNIFICANT CHANGE UP (ref 150–400)
PMV BLD: 10.4 FL — SIGNIFICANT CHANGE UP (ref 7–13)
PMV BLD: 10.7 FL — SIGNIFICANT CHANGE UP (ref 7–13)
POTASSIUM SERPL-MCNC: 4.1 MMOL/L — SIGNIFICANT CHANGE UP (ref 3.5–5.3)
POTASSIUM SERPL-SCNC: 4.1 MMOL/L — SIGNIFICANT CHANGE UP (ref 3.5–5.3)
PROT UR-MCNC: 30 — SIGNIFICANT CHANGE UP
RBC # BLD: 3.6 M/UL — LOW (ref 4.2–5.8)
RBC # BLD: 3.71 M/UL — LOW (ref 4.2–5.8)
RBC # FLD: 13.1 % — SIGNIFICANT CHANGE UP (ref 10.3–14.5)
RBC # FLD: 13.5 % — SIGNIFICANT CHANGE UP (ref 10.3–14.5)
RBC CASTS # UR COMP ASSIST: >50 — HIGH (ref 0–?)
SODIUM SERPL-SCNC: 137 MMOL/L — SIGNIFICANT CHANGE UP (ref 135–145)
SP GR SPEC: 1.01 — SIGNIFICANT CHANGE UP (ref 1–1.03)
SPECIMEN SOURCE: SIGNIFICANT CHANGE UP
SPECIMEN SOURCE: SIGNIFICANT CHANGE UP
UROBILINOGEN FLD QL: NORMAL E.U. — SIGNIFICANT CHANGE UP (ref 0.1–0.2)
WBC # BLD: 5.99 K/UL — SIGNIFICANT CHANGE UP (ref 3.8–10.5)
WBC # BLD: 6.02 K/UL — SIGNIFICANT CHANGE UP (ref 3.8–10.5)
WBC # FLD AUTO: 5.99 K/UL — SIGNIFICANT CHANGE UP (ref 3.8–10.5)
WBC # FLD AUTO: 6.02 K/UL — SIGNIFICANT CHANGE UP (ref 3.8–10.5)
WBC UR QL: >50 — HIGH (ref 0–?)

## 2017-11-01 PROCEDURE — 71010: CPT | Mod: 26

## 2017-11-01 RX ORDER — FUROSEMIDE 40 MG
40 TABLET ORAL DAILY
Qty: 0 | Refills: 0 | Status: DISCONTINUED | OUTPATIENT
Start: 2017-11-01 | End: 2017-11-05

## 2017-11-01 RX ORDER — POTASSIUM CHLORIDE 20 MEQ
20 PACKET (EA) ORAL DAILY
Qty: 0 | Refills: 0 | Status: DISCONTINUED | OUTPATIENT
Start: 2017-11-01 | End: 2017-11-09

## 2017-11-01 RX ADMIN — Medication 8 MILLIGRAM(S): at 21:18

## 2017-11-01 RX ADMIN — Medication 10 MILLIGRAM(S): at 12:19

## 2017-11-01 RX ADMIN — POLYETHYLENE GLYCOL 3350 17 GRAM(S): 17 POWDER, FOR SOLUTION ORAL at 05:27

## 2017-11-01 RX ADMIN — Medication 100 MILLIGRAM(S): at 21:21

## 2017-11-01 RX ADMIN — Medication 50 MILLIGRAM(S): at 21:18

## 2017-11-01 RX ADMIN — Medication 50 MILLIGRAM(S): at 12:19

## 2017-11-01 RX ADMIN — Medication 2: at 12:17

## 2017-11-01 RX ADMIN — LISINOPRIL 20 MILLIGRAM(S): 2.5 TABLET ORAL at 05:27

## 2017-11-01 RX ADMIN — Medication 3: at 18:10

## 2017-11-01 RX ADMIN — HEPARIN SODIUM 1180 UNIT(S)/HR: 5000 INJECTION INTRAVENOUS; SUBCUTANEOUS at 18:56

## 2017-11-01 RX ADMIN — Medication 20 MILLIEQUIVALENT(S): at 12:17

## 2017-11-01 RX ADMIN — Medication 100 MILLIGRAM(S): at 05:27

## 2017-11-01 RX ADMIN — CARVEDILOL PHOSPHATE 12.5 MILLIGRAM(S): 80 CAPSULE, EXTENDED RELEASE ORAL at 05:27

## 2017-11-01 RX ADMIN — SIMVASTATIN 20 MILLIGRAM(S): 20 TABLET, FILM COATED ORAL at 21:18

## 2017-11-01 RX ADMIN — Medication 50 MILLIGRAM(S): at 05:27

## 2017-11-01 RX ADMIN — Medication 1: at 21:44

## 2017-11-01 RX ADMIN — SENNA PLUS 2 TABLET(S): 8.6 TABLET ORAL at 21:21

## 2017-11-01 RX ADMIN — CARVEDILOL PHOSPHATE 12.5 MILLIGRAM(S): 80 CAPSULE, EXTENDED RELEASE ORAL at 18:10

## 2017-11-01 RX ADMIN — Medication 40 MILLIGRAM(S): at 12:17

## 2017-11-01 RX ADMIN — Medication 100 MILLIGRAM(S): at 12:19

## 2017-11-01 RX ADMIN — Medication 1: at 08:30

## 2017-11-01 RX ADMIN — HEPARIN SODIUM 980 UNIT(S)/HR: 5000 INJECTION INTRAVENOUS; SUBCUTANEOUS at 04:47

## 2017-11-01 RX ADMIN — HEPARIN SODIUM 1180 UNIT(S)/HR: 5000 INJECTION INTRAVENOUS; SUBCUTANEOUS at 12:25

## 2017-11-01 NOTE — PROGRESS NOTE ADULT - SUBJECTIVE AND OBJECTIVE BOX
PRESENTING CC: Abdominal pain-Pericardial Effusion    SUBJ:  76 yo Male Hx Atrial fibrillation on Xarelto-last dose-10/26,HTN, presents with abdominal discomfort after eating x 1 month. PT state diffuse abdominal discomfort after eating meals.+ occasional RAMACHANDRAN ( walks 10 blocks),had CT Abdomen,revealing incidental-Pericardial Effusion,TTE-Large pericardial effusion,had IR guided drain-underwent Pericardial Window yesterday,no dyspnea,lying in bed-Drain-65cc/24 Hrs      PMH -reviewed admission note, no change since admission  Heart faliure: acute [ ] chronic [ ] acute or chronic [ ] diastolic [ ] systolic [ ] combied systolic and diastolic[ ]  AIRAM: ATN[ ] renal medullary necrosis [ ] CKD I [ ]CKDII [ ]CKD III [ ]CKD IV [ ]CKD V [ ]Other pathological lesions [ ]    MEDICATIONS  (STANDING):  carvedilol 12.5 milliGRAM(s) Oral every 12 hours  docusate sodium 100 milliGRAM(s) Oral three times a day  doxazosin 8 milliGRAM(s) Oral at bedtime  heparin  Infusion.  Unit(s)/Hr (8.8 mL/Hr) IV Continuous <Continuous>  hydrALAZINE 50 milliGRAM(s) Oral three times a day  influenza   Vaccine 0.5 milliLiter(s) IntraMuscular once  insulin lispro (HumaLOG) corrective regimen sliding scale   SubCutaneous three times a day before meals  insulin lispro (HumaLOG) corrective regimen sliding scale   SubCutaneous at bedtime  lisinopril 20 milliGRAM(s) Oral daily  oxybutynin 10 milliGRAM(s) Oral daily  polyethylene glycol 3350 17 Gram(s) Oral two times a day  senna 2 Tablet(s) Oral at bedtime  simvastatin 20 milliGRAM(s) Oral at bedtime    MEDICATIONS  (PRN):  acetaminophen   Tablet. 650 milliGRAM(s) Oral every 6 hours PRN Mild Pain (1 - 3)  dextrose Gel 1 Dose(s) Oral once PRN Blood Glucose LESS THAN 70 milliGRAM(s)/deciliter  glucagon  Injectable 1 milliGRAM(s) IntraMuscular once PRN Glucose LESS THAN 70 milligrams/deciliter  oxyCODONE    IR 5 milliGRAM(s) Oral every 4 hours PRN Moderate Pain (4 - 6)  oxyCODONE    IR 10 milliGRAM(s) Oral every 6 hours PRN Severe Pain (7 - 10)          FAMILY HISTORY:  No pertinent family history in first degree relatives  No family history of premature coronary artery disease or sudden cardiac death      REVIEW OF SYSTEMS:  Constitutional: [ ] fever, [ ]weight loss,  [x ]fatigue  Eyes: [ ] visual changes  Respiratory: [x ]shortness of breath;  [ ] cough, [ ]wheezing, [ ]chills, [ ]hemoptysis  Cardiovascular: [x ] chest pain, [ ]palpitations, [ ]dizziness,  [ ]leg swelling  Gastrointestinal: [x ] abdominal pain, [x ]nausea, [ ]vomiting,  [ ]diarrhea   Genitourinary: [ ] dysuria, [ ] hematuria  Neurologic: [ ] headaches [ ] tremors  Skin: [ ] itching, [ ]burning, [ ] rashes  Endocrine: [ ] heat or cold intolerance  Musculoskeletal: [ ] joint pain or swelling; [ ] muscle, back, or extremity pain  Psychiatric: [ ] depression, [ ]anxiety, [ ]mood swings, or [ ]difficulty sleeping  Hematologic: [ ] easy bruising, [ ] bleeding gums    [x] All remaining systems negative except as per above.     Vital Signs Last 24 Hrs  T(C): 37.1 (01 Nov 2017 05:20), Max: 37.5 (31 Oct 2017 16:29)  T(F): 98.7 (01 Nov 2017 05:20), Max: 99.5 (31 Oct 2017 16:29)  HR: 109 (01 Nov 2017 05:20) (80 - 109)  BP: 117/59 (01 Nov 2017 05:20) (117/59 - 153/81)  RR: 18 (01 Nov 2017 05:20) (18 - 18)  SpO2: 95% (01 Nov 2017 05:20) (95% - 99%)  I&O's Summary    30 Oct 2017 07:01  -  31 Oct 2017 07:00  --------------------------------------------------------  IN: 810 mL / OUT: 1520 mL / NET: -710 mL    31 Oct 2017 07:01  -  01 Nov 2017 06:30  --------------------------------------------------------  IN: 650 mL / OUT: 2165 mL / NET: -1515 mL        PHYSICAL EXAM:  General: No acute distress  HEENT: EOMI, PERRL  Neck: Supple, No JVD  Lungs: Clear to percussion bilaterally; No rales or wheezing  Heart: Regular rate and rhythm; 2/6 systolic murmur,no rubs, or gallops  Abdomen: Nontender, bowel sounds present  Extremities: No clubbing, cyanosis, or edema  Nervous system:  Alert & Oriented X3, no focal deficits  Psychiatric: Normal affect  Skin: No rashes or lesions    LABS:  10-31    137  |  100  |  7   ----------------------------<  168<H>  4.2   |  22  |  0.80    Ca    8.5      31 Oct 2017 05:30  Mg     2.1     10-31      Creatinine Trend: 0.80<--, 0.96<--, 1.06<--, 1.10<--, 1.28<--                        10.7   5.99  )-----------( 206      ( 01 Nov 2017 04:00 )             30.1     PT/INR - ( 31 Oct 2017 05:30 )   PT: 14.7 SEC;   INR: 1.31          PTT - ( 01 Nov 2017 04:00 )  PTT:47.1 SEC      RADIOLOGY: RAD CHEST PORTABLE ROUTINE  IMPRESSION:  Right midlung linear atelectasis again seen. Trace right pleural effusion.Pericardial drain is unchanged in position.No pneumothorax is seen.      TELEMETRY: Sinus Rhythm    IMPRESSION AND PLAN:    76 yo M moderate to large pericardial effusion and biliary colic    Problem/Plan - 1:  ·  Problem: Pericardial effusion.  Plan:  Moderate Pericardial Effusion with early tamponade physiology,s/p IR drainage s/p Pericardial Window POD#1  ESR, CRP. TSH Had Pericardiocentesis for diagnostic and therapeutic tap- possible 2/2 viral pericarditis-IR drainage- drained dark red fluid-hemorrhagic-await cytology. Hematocrit remains stable.  Will check RUQ sonogram pt with + cholithiasis. IMPRESSION: Small ascites. Cholelithiasis without acute cholecystitis. Awaiting drain removal as per Thoracic.    Problem/Plan - 2:  ·  Problem: Atrial Sshodurjbvms-Eknntlzjn-PXYCX2-3.  Plan: on Xarelto- hold for pericardial effusion drainage  continue coreg.     Problem/Plan - 3:  ·  Problem: HTN (hypertension).  Plan: coreg, hydralazine, lasix held     Problem/Plan - 4:  ·  Problem: T2DM (type 2 diabetes mellitus).  Plan: FS Q6 NISS holding metformin, januvia and glipizide check A1c.     Problem/Plan - 5:  ·  Problem: BPH (benign prostatic hyperplasia).  Plan: doxazosin, proscar.

## 2017-11-01 NOTE — PROGRESS NOTE ADULT - SUBJECTIVE AND OBJECTIVE BOX
Patient is a 77y old  Male who presents with a chief complaint of "Abdominal discomfort x 1 month" (27 Oct 2017 16:30)      SUBJECTIVE / OVERNIGHT EVENTS:   Feels better.  Denies CP/SOB/Palpitation/HA.    MEDICATIONS  (STANDING):  carvedilol 12.5 milliGRAM(s) Oral every 12 hours  dextrose 5%. 1000 milliLiter(s) (50 mL/Hr) IV Continuous <Continuous>  dextrose 50% Injectable 12.5 Gram(s) IV Push once  dextrose 50% Injectable 25 Gram(s) IV Push once  dextrose 50% Injectable 25 Gram(s) IV Push once  docusate sodium 100 milliGRAM(s) Oral three times a day  doxazosin 8 milliGRAM(s) Oral at bedtime  furosemide    Tablet 40 milliGRAM(s) Oral daily  heparin  Infusion.  Unit(s)/Hr (8.8 mL/Hr) IV Continuous <Continuous>  hydrALAZINE 50 milliGRAM(s) Oral three times a day  influenza   Vaccine 0.5 milliLiter(s) IntraMuscular once  insulin lispro (HumaLOG) corrective regimen sliding scale   SubCutaneous three times a day before meals  insulin lispro (HumaLOG) corrective regimen sliding scale   SubCutaneous at bedtime  lisinopril 20 milliGRAM(s) Oral daily  oxybutynin 10 milliGRAM(s) Oral daily  polyethylene glycol 3350 17 Gram(s) Oral two times a day  potassium chloride    Tablet ER 20 milliEquivalent(s) Oral daily  senna 2 Tablet(s) Oral at bedtime  simvastatin 20 milliGRAM(s) Oral at bedtime    MEDICATIONS  (PRN):  acetaminophen   Tablet. 650 milliGRAM(s) Oral every 6 hours PRN Mild Pain (1 - 3)  dextrose Gel 1 Dose(s) Oral once PRN Blood Glucose LESS THAN 70 milliGRAM(s)/deciliter  glucagon  Injectable 1 milliGRAM(s) IntraMuscular once PRN Glucose LESS THAN 70 milligrams/deciliter  oxyCODONE    IR 5 milliGRAM(s) Oral every 4 hours PRN Moderate Pain (4 - 6)  oxyCODONE    IR 10 milliGRAM(s) Oral every 6 hours PRN Severe Pain (7 - 10)        CAPILLARY BLOOD GLUCOSE  246 (2017 12:12)      POCT Blood Glucose.: 254 mg/dL (2017 21:37)  POCT Blood Glucose.: 284 mg/dL (2017 17:13)  POCT Blood Glucose.: 194 mg/dL (2017 08:25)    I&O's Summary    31 Oct 2017 07:  -  2017 07:00  --------------------------------------------------------  IN: 650 mL / OUT: 2165 mL / NET: -1515 mL    2017 07:01  -  2017 22:34  --------------------------------------------------------  IN: 0 mL / OUT: 1318 mL / NET: -1318 mL        PHYSICAL EXAM:  GENERAL: NAD, well-developed  HEAD:  Atraumatic, Normocephalic  NECK: Supple, No JVD  CHEST/LUNG: Clear to auscultation bilaterally; No wheezing.  HEART: Regular rate and rhythm; No murmurs, rubs, or gallops  ABDOMEN: Soft, Nontender, Nondistended; Bowel sounds present  EXTREMITIES:   No clubbing, cyanosis, or edema  NEUROLOGY: AAO X 3  SKIN: No rashes    LABS:                        10.7   6.02  )-----------( 207      ( 2017 05:48 )             30.7     11-01    137  |  101  |  10  ----------------------------<  199<H>  4.1   |  25  |  0.90    Ca    8.4      2017 05:48  Mg     2.1     10-31      PT/INR - ( 31 Oct 2017 05:30 )   PT: 14.7 SEC;   INR: 1.31          PTT - ( 2017 18:19 )  PTT:58.6 SEC      Urinalysis Basic - ( 2017 06:00 )    Color: PINK / Appearance: HAZY / S.015 / pH: 6.0  Gluc: 150 / Ketone: MODERATE  / Bili: NEGATIVE / Urobili: NORMAL E.U.   Blood: MODERATE / Protein: 30 / Nitrite: NEGATIVE   Leuk Esterase: MODERATE / RBC: >50 / WBC >50   Sq Epi: x / Non Sq Epi: x / Bacteria: MOD      CAPILLARY BLOOD GLUCOSE  246 (2017 12:12)      POCT Blood Glucose.: 254 mg/dL (2017 21:37)  POCT Blood Glucose.: 284 mg/dL (2017 17:13)  POCT Blood Glucose.: 194 mg/dL (2017 08:25)    10-30 @ 20:43  Culture-urine --  Culture results --  method type --  Organism --  Organism Identification --  Specimen source OTHER  10-30 @ 20:39  Culture-urine --  Culture results --  method type --  Organism --  Organism Identification --  Specimen source OTHER  10-28 @ 17:26  Culture-urine --  Culture results --  method type --  Organism --  Organism Identification --  Specimen source DRAINAGE  10-27 @ 06:24  Culture-urine   NO GROWTH AT 24 HOURS  Culture results --  method type --  Organism --  Organism Identification --  Specimen source URINE MIDSTREAM           10-30 @ 20:43  Culture blood --  Culture results --  Gram stain --  Gram stain blood --  Method type --  Organism --  Organism identification --  Specimen source OTHER   10-30 @ 20:39  Culture blood --  Culture results --  Gram stain --  Gram stain blood --  Method type --  Organism --  Organism identification --  Specimen source OTHER   10-28 @ 17:26  Culture blood --  Culture results --  Gram stain --  Gram stain blood --  Method type --  Organism --  Organism identification --  Specimen source DRAINAGE   10-27 @ 06:24  Culture blood --  Culture results --  Gram stain --  Gram stain blood --  Method type --  Organism --  Organism identification --  Specimen source URINE MIDSTREAM      RADIOLOGY & ADDITIONAL TESTS:    Imaging Personally Reviewed:    Consultant(s) Notes Reviewed:      Care Discussed with Consultants/Other Providers:

## 2017-11-01 NOTE — PROGRESS NOTE ADULT - SUBJECTIVE AND OBJECTIVE BOX
INTERVAL HPI/OVERNIGHT EVENTS:    +brown bm  no abd pain   tolerating po intake      MEDICATIONS  (STANDING):  carvedilol 12.5 milliGRAM(s) Oral every 12 hours  dextrose 5%. 1000 milliLiter(s) (50 mL/Hr) IV Continuous <Continuous>  dextrose 50% Injectable 12.5 Gram(s) IV Push once  dextrose 50% Injectable 25 Gram(s) IV Push once  dextrose 50% Injectable 25 Gram(s) IV Push once  docusate sodium 100 milliGRAM(s) Oral three times a day  doxazosin 8 milliGRAM(s) Oral at bedtime  furosemide    Tablet 40 milliGRAM(s) Oral daily  heparin  Infusion.  Unit(s)/Hr (8.8 mL/Hr) IV Continuous <Continuous>  hydrALAZINE 50 milliGRAM(s) Oral three times a day  influenza   Vaccine 0.5 milliLiter(s) IntraMuscular once  insulin lispro (HumaLOG) corrective regimen sliding scale   SubCutaneous three times a day before meals  insulin lispro (HumaLOG) corrective regimen sliding scale   SubCutaneous at bedtime  lisinopril 20 milliGRAM(s) Oral daily  oxybutynin 10 milliGRAM(s) Oral daily  polyethylene glycol 3350 17 Gram(s) Oral two times a day  potassium chloride    Tablet ER 20 milliEquivalent(s) Oral daily  senna 2 Tablet(s) Oral at bedtime  simvastatin 20 milliGRAM(s) Oral at bedtime    MEDICATIONS  (PRN):  acetaminophen   Tablet. 650 milliGRAM(s) Oral every 6 hours PRN Mild Pain (1 - 3)  dextrose Gel 1 Dose(s) Oral once PRN Blood Glucose LESS THAN 70 milliGRAM(s)/deciliter  glucagon  Injectable 1 milliGRAM(s) IntraMuscular once PRN Glucose LESS THAN 70 milligrams/deciliter  oxyCODONE    IR 5 milliGRAM(s) Oral every 4 hours PRN Moderate Pain (4 - 6)  oxyCODONE    IR 10 milliGRAM(s) Oral every 6 hours PRN Severe Pain (7 - 10)      Allergies    No Known Allergies    Intolerances        Review of Systems:    General:  No wt loss, fevers, chills, night sweats, fatigue   Eyes:  Good vision, no reported pain  ENT:  No sore throat, pain, runny nose, dysphagia  CV:  No pain, palpitations, hypo/hypertension  Resp:  No dyspnea, cough, tachypnea, wheezing  GI:  No pain, No nausea, No vomiting, No diarrhea, No constipation, No weight loss, No fever, No pruritis, No rectal bleeding, No melena, No dysphagia  :  No pain, bleeding, incontinence, nocturia  Muscle:  No pain, weakness  Neuro:  No weakness, tingling, memory problems  Psych:  No fatigue, insomnia, mood problems, depression  Endocrine:  No polyuria, polydypsia, cold/heat intolerance  Heme:  No petechiae, ecchymosis, easy bruisability  Skin:  No rash, tattoos, scars, edema      Vital Signs Last 24 Hrs  T(C): 36.8 (2017 12:13), Max: 37.5 (31 Oct 2017 16:29)  T(F): 98.2 (2017 12:13), Max: 99.5 (31 Oct 2017 16:29)  HR: 83 (2017 12:13) (83 - 109)  BP: 125/67 (2017 12:13) (117/59 - 153/81)  BP(mean): --  RR: 19 (2017 12:13) (17 - 19)  SpO2: 99% (2017 12:13) (95% - 99%)    PHYSICAL EXAM:    Constitutional: NAD  HEENT: EOMI, throat clear  Neck: No LAD, supple  Respiratory: CTA and P  Cardiovascular: S1 and S2, RRR, no M  Gastrointestinal: BS+, soft, NT/ND, neg HSM,  Extremities: No peripheral edema, neg clubbing, cyanosis  Vascular: 2+ peripheral pulses  Neurological: A/O x 3, no focal deficits  Psychiatric: Normal mood, normal affect  Skin: No rashes      LABS:                        10.7   6.02  )-----------( 207      ( 2017 05:48 )             30.7     11-01    137  |  101  |  10  ----------------------------<  199<H>  4.1   |  25  |  0.90    Ca    8.4      2017 05:48  Mg     2.1     10-31      PT/INR - ( 31 Oct 2017 05:30 )   PT: 14.7 SEC;   INR: 1.31          PTT - ( 2017 10:49 )  PTT:36.4 SEC  Urinalysis Basic - ( 2017 06:00 )    Color: PINK / Appearance: HAZY / S.015 / pH: 6.0  Gluc: 150 / Ketone: MODERATE  / Bili: NEGATIVE / Urobili: NORMAL E.U.   Blood: MODERATE / Protein: 30 / Nitrite: NEGATIVE   Leuk Esterase: MODERATE / RBC: >50 / WBC >50   Sq Epi: x / Non Sq Epi: x / Bacteria: MOD        RADIOLOGY & ADDITIONAL TESTS:

## 2017-11-01 NOTE — PROGRESS NOTE ADULT - SUBJECTIVE AND OBJECTIVE BOX
Events since last visit noted, denies any active symptoms other than pain on movement. Has some leg swelling. Rest of the detailed ROS unremarkable. Eating well.    Meds:   acetaminophen   Tablet. 650 milliGRAM(s) Oral every 6 hours PRN  carvedilol 12.5 milliGRAM(s) Oral every 12 hours  dextrose 5%. 1000 milliLiter(s) IV Continuous <Continuous>  dextrose 50% Injectable 12.5 Gram(s) IV Push once  dextrose 50% Injectable 25 Gram(s) IV Push once  dextrose 50% Injectable 25 Gram(s) IV Push once  dextrose Gel 1 Dose(s) Oral once PRN  docusate sodium 100 milliGRAM(s) Oral three times a day  doxazosin 8 milliGRAM(s) Oral at bedtime  furosemide    Tablet 40 milliGRAM(s) Oral daily  glucagon  Injectable 1 milliGRAM(s) IntraMuscular once PRN  heparin  Infusion.  Unit(s)/Hr IV Continuous <Continuous>  hydrALAZINE 50 milliGRAM(s) Oral three times a day  influenza   Vaccine 0.5 milliLiter(s) IntraMuscular once  insulin lispro (HumaLOG) corrective regimen sliding scale   SubCutaneous three times a day before meals  insulin lispro (HumaLOG) corrective regimen sliding scale   SubCutaneous at bedtime  lisinopril 20 milliGRAM(s) Oral daily  oxybutynin 10 milliGRAM(s) Oral daily  oxyCODONE    IR 5 milliGRAM(s) Oral every 4 hours PRN  oxyCODONE    IR 10 milliGRAM(s) Oral every 6 hours PRN  polyethylene glycol 3350 17 Gram(s) Oral two times a day  potassium chloride    Tablet ER 20 milliEquivalent(s) Oral daily  senna 2 Tablet(s) Oral at bedtime  simvastatin 20 milliGRAM(s) Oral at bedtime      Vital Signs Last 24 Hrs  T(C): 36.8 (2017 12:13), Max: 37.5 (2017 00:06)  T(F): 98.2 (2017 12:13), Max: 99.5 (2017 00:06)  HR: 83 (2017 12:13) (83 - 109)  BP: 125/67 (2017 12:13) (117/59 - 153/81)  BP(mean): --  RR: 19 (2017 12:13) (17 - 19)  SpO2: 99% (2017 12:13) (95% - 99%)                          10.7   6.02  )-----------( 207      ( 2017 05:48 )             30.7       11-01    137  |  101  |  10  ----------------------------<  199<H>  4.1   |  25  |  0.90    Ca    8.4      2017 05:48  Mg     2.1     10-31                PT/INR - ( 31 Oct 2017 05:30 )   PT: 14.7 SEC;   INR: 1.31          PTT - ( 2017 10:49 )  PTT:36.4 SEC    Special Hematology Pathology:                       Department of Pathology   Laboratory Medicine                           [phone] [place] [address] [phone]            PATIENT: COLTON JARAMILLO                   /SEX:      40 - M         ACCT#: [id number]                             REG DR:       STEFANY BRAGA                            UNIT#: [id number]                               LOCATION:     [facility]                                                 SPECIAL HEMATOLOGY PATHOLOGY              SUBMITTING DR:  STEFANY BRAGA                  SPECIMEN #: [id number]                           [address] [facility]                        DATE OBTAINED : 10/30/17                       [place]     [id number]                     DATE SUBMITTED: 10/30/17                                                        DATE REPORTED:  10/30/17       TELEPHONE:      [phone]                       [facility]                                 DR: STEFANY BRAGA                   ====================================================================================              ****DIAGNOSIS****                                      URINE PROTEIN ELECTROPHORESIS      ===========================================================                      Albumin  -  NONE      Alpha-1 Globulin  -  NONE      Alpha-2 Globulin  -  NONE      Beta Globulins    -  NONE      Gamma Globulins   -  NONE      Total Protein     - 12.9 MG/DL      Specimen Type     -  URINE             ASSESSMENT:        NORMAL URINE PROTEIN ELECTROPHORESIS           PROCEDURES: 55573 - PE CSF, D89.0    --------------------------------------------------------------------------------------------         Signed                                          HELENE VINES MD 10/30/17 1725              --------------------------------------------------------------------------------------------                                                                                                                       ** END OF REPORT **     (10.30.17 @ 11:10)    Special Hematology Pathology:                       Department of Pathology   Laboratory Medicine                           [phone] [place] [address] [phone]            PATIENT: COLTON JARAMILLO                   /SEX:      40 - M         ACCT#: [id number]                             REG DR:       CHASITY OLIVAS                          UNIT#: [id number]                               LOCATION:     [facility]                                                 SPECIAL HEMATOLOGY PATHOLOGY              SUBMITTING DR:  STEFANY BRAGA                  SPECIMEN #: [id number]                           [address] [facility]                        DATE OBTAINED : 10/30/17                       [place]     [id number]                     DATE SUBMITTED: 10/30/17                                                        DATE REPORTED:  17       TELEPHONE:      [phone]                       STATUS: JOHN                                 DR: CHASITY OLIVAS                 ====================================================================================              ****DIAGNOSIS****                        IMMUNOFIXATION               IGG IMMUNOFIX POLYCLONAL        IGA IMMUNOFIX    POLYCLONAL        IGM IMMUNOFIX    POLYCLONAL        SURJIT KAPPA        POLYCLONAL        SURJIT LAMBDA       POLYCLONAL        SPECIMEN TYPE    SERUM             INTERPRETATION OF IMMUNOFIXATION TESTING:      NO EVIDENCE OF MONOCLONAL COMPONENTS           PROCEDURES: 36322 - IMMUNOOSEI, D89.2    --------------------------------------------------------------------------------------------         Signed                                          HELENE VINES MD 17 0818              --------------------------------------------------------------------------------------------                                                                                                                                 ** END OF REPORT **     (10.30.17 @ 11:10)    Special Hematology Pathology:                       Department of Pathology   Laboratory Medicine                           [address] [address] [place] [address] [phone]            PATIENT: COLTON JARAMILLO                   /SEX:      40 - M         ACCT#: [id number]                             REG DR:       STEFANY BRAGA                            UNIT#: [id number]                               LOCATION:     [facility]                                                 SPECIAL HEMATOLOGY PATHOLOGY              SUBMITTING DR:  STEFANY BRAGA                  SPECIMEN #: [id number]                           [address] [facility]                        DATE OBTAINED : 10/30/17                       [place]     [id number]                     DATE SUBMITTED: 10/30/17                                                        DATE REPORTED:  10/30/17       TELEPHONE:      [phone]                       STATUS: JOHN                                 DR: STEFANY BRAGA                   ====================================================================================              ****DIAGNOSIS****                                      SERUM PROTEIN ELECTROPHORESIS      ===========================================================                                            Reference Range         Total Protein -  5.8                6.2 - 8.2 G/DL             Albumin   -  2.55                3.3 - 4.4 G/DL      Alpha-1 Globulin -  0.29                0.1 - 0.3 G/DL      Alpha-2 Globulin -  0.89                0.6 - 1.0 G/DL      Beta Globulins   -  0.98                0.6 - 1.1 G/DL      Gamma Globulins  -  1.10                0.7 - 1.7 G/DL             ASSESSMENT:        Hypoalbuminemia           PROCEDURES: 34996 - PJ NIXON, E88.09    --------------------------------------------------------------------------------------------         Tho VINES MD 10/30/17 1717              --------------------------------------------------------------------------------------------                                                                                                                            ** END OF REPORT **     (10.30.17 @ 11:10)

## 2017-11-01 NOTE — PROGRESS NOTE ADULT - SUBJECTIVE AND OBJECTIVE BOX
Subjective: "I feel ok. Sometimes I have some pain where the tube is" PT. denies chest pain or SOB. Taking some pain meds with relief. OOB and amb in hallways this morning.     Vital Signs:  Vital Signs Last 24 Hrs  T(C): 36.8 (11-01-17 @ 12:13), Max: 37.5 (10-31-17 @ 16:29)  T(F): 98.2 (11-01-17 @ 12:13), Max: 99.5 (10-31-17 @ 16:29)  HR: 83 (11-01-17 @ 12:13) (83 - 109)  BP: 125/67 (11-01-17 @ 12:13) (117/59 - 153/81)  RR: 19 (11-01-17 @ 12:13) (17 - 19)  SpO2: 99% (11-01-17 @ 12:13) (95% - 99%) on (O2)    Telemetry/Alarms:  General: WN/WD NAD  Neurology: Awake, nonfocal, JIMENEZ x 4  Eyes: Scleras clear, PERRLA/ EOMI, Gross vision intact  ENT:Gross hearing intact, grossly patent pharynx, no stridor  Neck: Neck supple, trachea midline, No JVD,   Respiratory: CTA B/L, No wheezing, rales, rhonchi  CV: RRR, S1S2, no murmurs, rubs or gallops  Abdominal: Soft, NT, ND +BS,   Extremities: No edema, + peripheral pulses  Skin: No Rashes, Hematoma, Ecchymosis  Lymphatic: No Neck, axilla, groin LAD  Psych: Oriented x 3, normal affect  Incisions:   Tubes:  Relevant labs, radiology and Medications reviewed                        10.7   6.02  )-----------( 207      ( 01 Nov 2017 05:48 )             30.7     11-01    137  |  101  |  10  ----------------------------<  199<H>  4.1   |  25  |  0.90    Ca    8.4      01 Nov 2017 05:48  Mg     2.1     10-31      PT/INR - ( 31 Oct 2017 05:30 )   PT: 14.7 SEC;   INR: 1.31          PTT - ( 01 Nov 2017 10:49 )  PTT:36.4 SEC  MEDICATIONS  (STANDING):  carvedilol 12.5 milliGRAM(s) Oral every 12 hours  dextrose 5%. 1000 milliLiter(s) (50 mL/Hr) IV Continuous <Continuous>  dextrose 50% Injectable 12.5 Gram(s) IV Push once  dextrose 50% Injectable 25 Gram(s) IV Push once  dextrose 50% Injectable 25 Gram(s) IV Push once  docusate sodium 100 milliGRAM(s) Oral three times a day  doxazosin 8 milliGRAM(s) Oral at bedtime  furosemide    Tablet 40 milliGRAM(s) Oral daily  heparin  Infusion.  Unit(s)/Hr (8.8 mL/Hr) IV Continuous <Continuous>  hydrALAZINE 50 milliGRAM(s) Oral three times a day  influenza   Vaccine 0.5 milliLiter(s) IntraMuscular once  insulin lispro (HumaLOG) corrective regimen sliding scale   SubCutaneous three times a day before meals  insulin lispro (HumaLOG) corrective regimen sliding scale   SubCutaneous at bedtime  lisinopril 20 milliGRAM(s) Oral daily  oxybutynin 10 milliGRAM(s) Oral daily  polyethylene glycol 3350 17 Gram(s) Oral two times a day  potassium chloride    Tablet ER 20 milliEquivalent(s) Oral daily  senna 2 Tablet(s) Oral at bedtime  simvastatin 20 milliGRAM(s) Oral at bedtime    MEDICATIONS  (PRN):  acetaminophen   Tablet. 650 milliGRAM(s) Oral every 6 hours PRN Mild Pain (1 - 3)  dextrose Gel 1 Dose(s) Oral once PRN Blood Glucose LESS THAN 70 milliGRAM(s)/deciliter  glucagon  Injectable 1 milliGRAM(s) IntraMuscular once PRN Glucose LESS THAN 70 milligrams/deciliter  oxyCODONE    IR 5 milliGRAM(s) Oral every 4 hours PRN Moderate Pain (4 - 6)  oxyCODONE    IR 10 milliGRAM(s) Oral every 6 hours PRN Severe Pain (7 - 10)    Pertinent Physical Exam  I&O's Summary    31 Oct 2017 07:01  -  01 Nov 2017 07:00  --------------------------------------------------------  IN: 650 mL / OUT: 2165 mL / NET: -1515 mL    01 Nov 2017 07:01  -  01 Nov 2017 15:02  --------------------------------------------------------  IN: 0 mL / OUT: 510 mL / NET: -510 mL        Assessment  77y Male  w/ PAST MEDICAL & SURGICAL HISTORY:  Afib  Hyperlipidemia  HTN (hypertension)  Hernia, inguinal, right  BPH (benign prostatic hyperplasia)  T2DM (type 2 diabetes mellitus): &gt; 20 yrs  S/P cataract surgery: bilateral 4 years ago  admitted with complaints of Patient is a 77y old  Male who presents with a chief complaint of "Abdominal discomfort x 1 month" (27 Oct 2017 16:30)  .  On (Date), patient underwent Pericardial window operation  . Postoperative course/issues:    PLAN  Neuro: Pain management  Pulm: Encourage coughing, deep breathing and use of incentive spirometry. Wean off supplemental oxygen as able. Daily CXR.   Cardio: Monitor telemetry/alarms  GI: Tolerating diet. Continue stool softeners.  Renal: monitor urine output, supplement electrolytes as needed  Vasc: Heparin SC/SCDs for DVT prophylaxis  Heme: Stable H/H. .   ID: Off antibiotics. Stable.  Therapy: OOB/ambulate  Tubes: Monitor Chest tube output  Disposition: Aim to D/C to home on  Discussed with Cardiothoracic Team at AM rounds. Subjective: "I feel ok. Sometimes I have some pain where the tube is" PT. denies chest pain or SOB. Taking some pain meds with relief. OOB and amb in hallways this morning.     Vital Signs:  Vital Signs Last 24 Hrs  T(C): 36.8 (11-01-17 @ 12:13), Max: 37.5 (10-31-17 @ 16:29)  T(F): 98.2 (11-01-17 @ 12:13), Max: 99.5 (10-31-17 @ 16:29)  HR: 83 (11-01-17 @ 12:13) (83 - 109)  BP: 125/67 (11-01-17 @ 12:13) (117/59 - 153/81)  RR: 19 (11-01-17 @ 12:13) (17 - 19)  SpO2: 99% (11-01-17 @ 12:13) (95% - 99%) on (O2)    Telemetry/Alarms: Tele- occasional -115 then converts to SR  General: WN/WD NAD  Neurology: Awake, nonfocal, JIMENEZ x 4  Eyes: Scleras clear, PERRLA/ EOMI, Gross vision intact  ENT:Gross hearing intact, grossly patent pharynx, no stridor  Neck: Neck supple, trachea midline, No JVD,   Respiratory: CTA B/L, slight dec bilat LL.  No wheezing, rales, rhonchi  CV: irreg, S1S2, no murmurs, rubs or gallops  Abdominal: Soft, NT, ND +BS, No BM  : Ramírez in place for retention, noted to have some increasing hematuria this am. No clots.   Extremities: No edema, + peripheral pulses  Skin: No Rashes, Hematoma, Ecchymosis  Lymphatic: No Neck, axilla, groin LAD  Psych: Oriented x 3, normal affect  Incisions:   Tubes:  Relevant labs, radiology and Medications reviewed                        10.7   6.02  )-----------( 207      ( 01 Nov 2017 05:48 )             30.7     11-01    137  |  101  |  10  ----------------------------<  199<H>  4.1   |  25  |  0.90    Ca    8.4      01 Nov 2017 05:48  Mg     2.1     10-31      PT/INR - ( 31 Oct 2017 05:30 )   PT: 14.7 SEC;   INR: 1.31          PTT - ( 01 Nov 2017 10:49 )  PTT:36.4 SEC  MEDICATIONS  (STANDING):  carvedilol 12.5 milliGRAM(s) Oral every 12 hours  dextrose 5%. 1000 milliLiter(s) (50 mL/Hr) IV Continuous <Continuous>  dextrose 50% Injectable 12.5 Gram(s) IV Push once  dextrose 50% Injectable 25 Gram(s) IV Push once  dextrose 50% Injectable 25 Gram(s) IV Push once  docusate sodium 100 milliGRAM(s) Oral three times a day  doxazosin 8 milliGRAM(s) Oral at bedtime  furosemide    Tablet 40 milliGRAM(s) Oral daily  heparin  Infusion.  Unit(s)/Hr (8.8 mL/Hr) IV Continuous <Continuous>  hydrALAZINE 50 milliGRAM(s) Oral three times a day  influenza   Vaccine 0.5 milliLiter(s) IntraMuscular once  insulin lispro (HumaLOG) corrective regimen sliding scale   SubCutaneous three times a day before meals  insulin lispro (HumaLOG) corrective regimen sliding scale   SubCutaneous at bedtime  lisinopril 20 milliGRAM(s) Oral daily  oxybutynin 10 milliGRAM(s) Oral daily  polyethylene glycol 3350 17 Gram(s) Oral two times a day  potassium chloride    Tablet ER 20 milliEquivalent(s) Oral daily  senna 2 Tablet(s) Oral at bedtime  simvastatin 20 milliGRAM(s) Oral at bedtime    MEDICATIONS  (PRN):  acetaminophen   Tablet. 650 milliGRAM(s) Oral every 6 hours PRN Mild Pain (1 - 3)  dextrose Gel 1 Dose(s) Oral once PRN Blood Glucose LESS THAN 70 milliGRAM(s)/deciliter  glucagon  Injectable 1 milliGRAM(s) IntraMuscular once PRN Glucose LESS THAN 70 milligrams/deciliter  oxyCODONE    IR 5 milliGRAM(s) Oral every 4 hours PRN Moderate Pain (4 - 6)  oxyCODONE    IR 10 milliGRAM(s) Oral every 6 hours PRN Severe Pain (7 - 10)    Pertinent Physical Exam  I&O's Summary    31 Oct 2017 07:01  -  01 Nov 2017 07:00  --------------------------------------------------------  IN: 650 mL / OUT: 2165 mL / NET: -1515 mL    01 Nov 2017 07:01  -  01 Nov 2017 15:02  --------------------------------------------------------  IN: 0 mL / OUT: 510 mL / NET: -510 mL        Assessment  77y Male  w/ PAST MEDICAL & SURGICAL HISTORY:  Afib  Hyperlipidemia  HTN (hypertension)  Hernia, inguinal, right  BPH (benign prostatic hyperplasia)  T2DM (type 2 diabetes mellitus): &gt; 20 yrs  S/P cataract surgery: bilateral 4 years ago  admitted with complaints of Patient is a 77y old  Male who presents with a chief complaint of "Abdominal discomfort x 1 month" (27 Oct 2017 16:30)  .  On (Date), patient underwent Pericardial window operation  . Postoperative course/issues:    PLAN  Neuro: Pain management  Pulm: Encourage coughing, deep breathing and use of incentive spirometry. Wean off supplemental oxygen as able. Daily CXR.   Cardio: Monitor telemetry/alarms  GI: Tolerating diet. Continue stool softeners.  Renal: monitor urine output, supplement electrolytes as needed  Vasc: Heparin SC/SCDs for DVT prophylaxis  Heme: Stable H/H. .   ID: Off antibiotics. Stable.  Therapy: OOB/ambulate  Tubes: Monitor Chest tube output  Disposition: Aim to D/C to home on  Discussed with Cardiothoracic Team at AM rounds. Subjective: "I feel ok. Sometimes I have some pain where the tube is" PT. denies chest pain or SOB. Taking some pain meds with relief. OOB and amb in hallways this morning.     Vital Signs:  Vital Signs Last 24 Hrs  T(C): 36.8 (11-01-17 @ 12:13), Max: 37.5 (10-31-17 @ 16:29)  T(F): 98.2 (11-01-17 @ 12:13), Max: 99.5 (10-31-17 @ 16:29)  HR: 83 (11-01-17 @ 12:13) (83 - 109)  BP: 125/67 (11-01-17 @ 12:13) (117/59 - 153/81)  RR: 19 (11-01-17 @ 12:13) (17 - 19)  SpO2: 99% (11-01-17 @ 12:13) (95% - 99%) on (O2)    Telemetry/Alarms: Tele- occasional -115 then converts to SR  General: WN/WD NAD  Neurology: Awake, nonfocal, JIMENEZ x 4  Eyes: Scleras clear, PERRLA/ EOMI, Gross vision intact  ENT:Gross hearing intact, grossly patent pharynx, no stridor  Neck: Neck supple, trachea midline, No JVD,   Respiratory: CTA B/L, slight dec bilat LL.  No wheezing, rales, rhonchi  CV: irreg, S1S2, no murmurs, rubs or gallops  Abdominal: Soft, NT, ND +BS, No BM  : Dela Cruz in place for retention, noted to have some increasing hematuria this am. No clots.   Extremities: No edema, + peripheral pulses  Skin: No Rashes, Hematoma, Ecchymosis  Lymphatic: No Neck, axilla, groin LAD  Psych: Oriented x 3, normal affect  Incisions: subxyphoid c/d/i  Tubes: Jatin 55cc/24hrs on WS  Relevant labs, radiology and Medications reviewed           CXR w some pulm edema               10.7   6.02  )-----------( 207      ( 01 Nov 2017 05:48 )             30.7     11-01    137  |  101  |  10  ----------------------------<  199<H>  4.1   |  25  |  0.90    Ca    8.4      01 Nov 2017 05:48  Mg     2.1     10-31      PT/INR - ( 31 Oct 2017 05:30 )   PT: 14.7 SEC;   INR: 1.31          PTT - ( 01 Nov 2017 10:49 )  PTT:36.4 SEC  MEDICATIONS  (STANDING):  carvedilol 12.5 milliGRAM(s) Oral every 12 hours  dextrose 5%. 1000 milliLiter(s) (50 mL/Hr) IV Continuous <Continuous>  dextrose 50% Injectable 12.5 Gram(s) IV Push once  dextrose 50% Injectable 25 Gram(s) IV Push once  dextrose 50% Injectable 25 Gram(s) IV Push once  docusate sodium 100 milliGRAM(s) Oral three times a day  doxazosin 8 milliGRAM(s) Oral at bedtime  furosemide    Tablet 40 milliGRAM(s) Oral daily  heparin  Infusion.  Unit(s)/Hr (8.8 mL/Hr) IV Continuous <Continuous>  hydrALAZINE 50 milliGRAM(s) Oral three times a day  influenza   Vaccine 0.5 milliLiter(s) IntraMuscular once  insulin lispro (HumaLOG) corrective regimen sliding scale   SubCutaneous three times a day before meals  insulin lispro (HumaLOG) corrective regimen sliding scale   SubCutaneous at bedtime  lisinopril 20 milliGRAM(s) Oral daily  oxybutynin 10 milliGRAM(s) Oral daily  polyethylene glycol 3350 17 Gram(s) Oral two times a day  potassium chloride    Tablet ER 20 milliEquivalent(s) Oral daily  senna 2 Tablet(s) Oral at bedtime  simvastatin 20 milliGRAM(s) Oral at bedtime    MEDICATIONS  (PRN):  acetaminophen   Tablet. 650 milliGRAM(s) Oral every 6 hours PRN Mild Pain (1 - 3)  dextrose Gel 1 Dose(s) Oral once PRN Blood Glucose LESS THAN 70 milliGRAM(s)/deciliter  glucagon  Injectable 1 milliGRAM(s) IntraMuscular once PRN Glucose LESS THAN 70 milligrams/deciliter  oxyCODONE    IR 5 milliGRAM(s) Oral every 4 hours PRN Moderate Pain (4 - 6)  oxyCODONE    IR 10 milliGRAM(s) Oral every 6 hours PRN Severe Pain (7 - 10)    Pertinent Physical Exam  I&O's Summary    31 Oct 2017 07:01 - 01 Nov 2017 07:00  --------------------------------------------------------  IN: 650 mL / OUT: 2165 mL / NET: -1515 mL    01 Nov 2017 07:01  -  01 Nov 2017 15:02  --------------------------------------------------------  IN: 0 mL / OUT: 510 mL / NET: -510 mL        Assessment  77y Male  w/ PAST MEDICAL & SURGICAL HISTORY:  Afib  Hyperlipidemia  HTN (hypertension)  Hernia, inguinal, right  BPH (benign prostatic hyperplasia)  T2DM (type 2 diabetes mellitus): &gt; 20 yrs  S/P cataract surgery: bilateral 4 years ago  admitted with complaints of Patient is a 77y old  Male who presents with a chief complaint of "Abdominal discomfort x 1 month" (27 Oct 2017 16:30)  .Assessment  77y Male  w/ PAST MEDICAL & SURGICAL HISTORY:  Afib  Hyperlipidemia  HTN (hypertension)  Hernia, inguinal, right  BPH (benign prostatic hyperplasia)  T2DM (type 2 diabetes mellitus): &gt; 20 yrs  S/P cataract surgery: bilateral 4 years ago  admitted with complaints of Patient is a 77y old  Male who presents with a chief complaint of "Abdominal discomfort x 1 month" (27 Oct 2017 16:30)  Found to have large pericardial effusion w early tamponade. On 10/28- Had IR placed pericardial PTC, over 1.5 liter drained. On 10/30-Had subxyphoid pericardial window. Post op with PAF, rapid rate at times. Urinary retention requriing dela cruz insertion.     PLAN  Neuro: Pain management  Pulm: Encourage coughing, deep breathing and use of incentive spirometry. Wean off supplemental oxygen as able. Daily CXR.   Cardio: Monitor telemetry/alarms. Dr. Babin to manage HR. On Heparin gtt Nomogram for a/c. Will restart Lasix daily for pulm edema.   GI: Tolerating diet. Continue stool softeners.  : Will cont dela cruz cath and monitor hematuria. Uro consult called.   Renal: monitor urine output, supplement electrolytes as needed  Vasc: Heparin SC/SCDs for DVT prophylaxis  Heme: Stable H/H. .   ID: Off antibiotics. Stable.  Therapy: OOB/ambulate  Tubes: Monitor jatin output, will d/c when less than 30cc/24hrs. FU cytology and cultures.   Disposition: Aim to D/C to home once medically/surgically cleared.   Discussed with Cardiothoracic Team at AM rounds.

## 2017-11-02 LAB
APTT BLD: 57.8 SEC — HIGH (ref 27.5–37.4)
BUN SERPL-MCNC: 10 MG/DL — SIGNIFICANT CHANGE UP (ref 7–23)
CALCIUM SERPL-MCNC: 8.8 MG/DL — SIGNIFICANT CHANGE UP (ref 8.4–10.5)
CHLORIDE SERPL-SCNC: 102 MMOL/L — SIGNIFICANT CHANGE UP (ref 98–107)
CO2 SERPL-SCNC: 26 MMOL/L — SIGNIFICANT CHANGE UP (ref 22–31)
CREAT SERPL-MCNC: 0.96 MG/DL — SIGNIFICANT CHANGE UP (ref 0.5–1.3)
GLUCOSE BLDC GLUCOMTR-MCNC: 223 MG/DL — HIGH (ref 70–99)
GLUCOSE BLDC GLUCOMTR-MCNC: 238 MG/DL — HIGH (ref 70–99)
GLUCOSE BLDC GLUCOMTR-MCNC: 250 MG/DL — HIGH (ref 70–99)
GLUCOSE BLDC GLUCOMTR-MCNC: 383 MG/DL — HIGH (ref 70–99)
GLUCOSE SERPL-MCNC: 210 MG/DL — HIGH (ref 70–99)
HCT VFR BLD CALC: 30.9 % — LOW (ref 39–50)
HGB BLD-MCNC: 10.6 G/DL — LOW (ref 13–17)
MCHC RBC-ENTMCNC: 28.5 PG — SIGNIFICANT CHANGE UP (ref 27–34)
MCHC RBC-ENTMCNC: 34.3 % — SIGNIFICANT CHANGE UP (ref 32–36)
MCV RBC AUTO: 83.1 FL — SIGNIFICANT CHANGE UP (ref 80–100)
NON-GYNECOLOGICAL CYTOLOGY STUDY: SIGNIFICANT CHANGE UP
NON-GYNECOLOGICAL CYTOLOGY STUDY: SIGNIFICANT CHANGE UP
NRBC # FLD: 0 — SIGNIFICANT CHANGE UP
PLATELET # BLD AUTO: 230 K/UL — SIGNIFICANT CHANGE UP (ref 150–400)
PMV BLD: 10.6 FL — SIGNIFICANT CHANGE UP (ref 7–13)
POTASSIUM SERPL-MCNC: 3.9 MMOL/L — SIGNIFICANT CHANGE UP (ref 3.5–5.3)
POTASSIUM SERPL-SCNC: 3.9 MMOL/L — SIGNIFICANT CHANGE UP (ref 3.5–5.3)
RBC # BLD: 3.72 M/UL — LOW (ref 4.2–5.8)
RBC # FLD: 13.4 % — SIGNIFICANT CHANGE UP (ref 10.3–14.5)
SODIUM SERPL-SCNC: 139 MMOL/L — SIGNIFICANT CHANGE UP (ref 135–145)
SURGICAL PATHOLOGY STUDY: SIGNIFICANT CHANGE UP
WBC # BLD: 4.35 K/UL — SIGNIFICANT CHANGE UP (ref 3.8–10.5)
WBC # FLD AUTO: 4.35 K/UL — SIGNIFICANT CHANGE UP (ref 3.8–10.5)

## 2017-11-02 PROCEDURE — 71010: CPT | Mod: 26

## 2017-11-02 RX ADMIN — CARVEDILOL PHOSPHATE 12.5 MILLIGRAM(S): 80 CAPSULE, EXTENDED RELEASE ORAL at 17:45

## 2017-11-02 RX ADMIN — Medication 50 MILLIGRAM(S): at 22:03

## 2017-11-02 RX ADMIN — Medication 2: at 08:50

## 2017-11-02 RX ADMIN — CARVEDILOL PHOSPHATE 12.5 MILLIGRAM(S): 80 CAPSULE, EXTENDED RELEASE ORAL at 06:00

## 2017-11-02 RX ADMIN — Medication 10 MILLIGRAM(S): at 12:14

## 2017-11-02 RX ADMIN — Medication 100 MILLIGRAM(S): at 14:25

## 2017-11-02 RX ADMIN — Medication 2: at 17:44

## 2017-11-02 RX ADMIN — Medication 50 MILLIGRAM(S): at 14:25

## 2017-11-02 RX ADMIN — POLYETHYLENE GLYCOL 3350 17 GRAM(S): 17 POWDER, FOR SOLUTION ORAL at 06:00

## 2017-11-02 RX ADMIN — Medication 50 MILLIGRAM(S): at 06:00

## 2017-11-02 RX ADMIN — SENNA PLUS 2 TABLET(S): 8.6 TABLET ORAL at 22:03

## 2017-11-02 RX ADMIN — Medication 100 MILLIGRAM(S): at 22:03

## 2017-11-02 RX ADMIN — Medication 100 MILLIGRAM(S): at 06:00

## 2017-11-02 RX ADMIN — Medication 8 MILLIGRAM(S): at 22:03

## 2017-11-02 RX ADMIN — Medication 5: at 12:11

## 2017-11-02 RX ADMIN — OXYCODONE HYDROCHLORIDE 5 MILLIGRAM(S): 5 TABLET ORAL at 22:11

## 2017-11-02 RX ADMIN — LISINOPRIL 20 MILLIGRAM(S): 2.5 TABLET ORAL at 06:00

## 2017-11-02 RX ADMIN — Medication 40 MILLIGRAM(S): at 06:00

## 2017-11-02 RX ADMIN — HEPARIN SODIUM 1180 UNIT(S)/HR: 5000 INJECTION INTRAVENOUS; SUBCUTANEOUS at 03:19

## 2017-11-02 RX ADMIN — OXYCODONE HYDROCHLORIDE 5 MILLIGRAM(S): 5 TABLET ORAL at 23:10

## 2017-11-02 RX ADMIN — SIMVASTATIN 20 MILLIGRAM(S): 20 TABLET, FILM COATED ORAL at 22:03

## 2017-11-02 RX ADMIN — Medication 20 MILLIEQUIVALENT(S): at 12:12

## 2017-11-02 NOTE — CHART NOTE - NSCHARTNOTEFT_GEN_A_CORE
Called to evaluate gross hematuria after dela cruz placement. Dela Cruz was repositioned by  team yesterday, urine now returning to normal color.  Would suggest flomax if no medical contraindication.  No  contraindication to TOV.

## 2017-11-02 NOTE — PROGRESS NOTE ADULT - SUBJECTIVE AND OBJECTIVE BOX
Patient is a 77y old  Male who presents with a chief complaint of "Abdominal discomfort x 1 month" (27 Oct 2017 16:30)      SUBJECTIVE / OVERNIGHT EVENTS:   Feels better.  Denies CP/SOB/Palpitation/HA.    MEDICATIONS  (STANDING):  carvedilol 12.5 milliGRAM(s) Oral every 12 hours  dextrose 5%. 1000 milliLiter(s) (50 mL/Hr) IV Continuous <Continuous>  dextrose 50% Injectable 12.5 Gram(s) IV Push once  dextrose 50% Injectable 25 Gram(s) IV Push once  dextrose 50% Injectable 25 Gram(s) IV Push once  docusate sodium 100 milliGRAM(s) Oral three times a day  doxazosin 8 milliGRAM(s) Oral at bedtime  furosemide    Tablet 40 milliGRAM(s) Oral daily  heparin  Infusion.  Unit(s)/Hr (8.8 mL/Hr) IV Continuous <Continuous>  hydrALAZINE 50 milliGRAM(s) Oral three times a day  influenza   Vaccine 0.5 milliLiter(s) IntraMuscular once  insulin lispro (HumaLOG) corrective regimen sliding scale   SubCutaneous three times a day before meals  insulin lispro (HumaLOG) corrective regimen sliding scale   SubCutaneous at bedtime  lisinopril 20 milliGRAM(s) Oral daily  oxybutynin 10 milliGRAM(s) Oral daily  polyethylene glycol 3350 17 Gram(s) Oral two times a day  potassium chloride    Tablet ER 20 milliEquivalent(s) Oral daily  senna 2 Tablet(s) Oral at bedtime  simvastatin 20 milliGRAM(s) Oral at bedtime    MEDICATIONS  (PRN):  acetaminophen   Tablet. 650 milliGRAM(s) Oral every 6 hours PRN Mild Pain (1 - 3)  dextrose Gel 1 Dose(s) Oral once PRN Blood Glucose LESS THAN 70 milliGRAM(s)/deciliter  glucagon  Injectable 1 milliGRAM(s) IntraMuscular once PRN Glucose LESS THAN 70 milligrams/deciliter  oxyCODONE    IR 5 milliGRAM(s) Oral every 4 hours PRN Moderate Pain (4 - 6)  oxyCODONE    IR 10 milliGRAM(s) Oral every 6 hours PRN Severe Pain (7 - 10)        CAPILLARY BLOOD GLUCOSE      POCT Blood Glucose.: 238 mg/dL (2017 17:32)  POCT Blood Glucose.: 383 mg/dL (2017 11:55)  POCT Blood Glucose.: 223 mg/dL (2017 08:28)  POCT Blood Glucose.: 254 mg/dL (2017 21:37)    I&O's Summary    2017 07:01  -  2017 07:00  --------------------------------------------------------  IN: 0 mL / OUT: 2326 mL / NET: -2326 mL    2017 07:01  -  2017 21:01  --------------------------------------------------------  IN: 50 mL / OUT: 2325 mL / NET: -2275 mL        PHYSICAL EXAM:  GENERAL: NAD, well-developed  HEAD:  Atraumatic, Normocephalic  NECK: Supple, No JVD  CHEST/LUNG: Clear to auscultation bilaterally; No wheezing.  HEART: Regular rate and rhythm; No murmurs, rubs, or gallops  ABDOMEN: Soft, Nontender, Nondistended; Bowel sounds present  EXTREMITIES:   No clubbing, cyanosis, or edema  NEUROLOGY: AAO X 3  SKIN: No rashes    LABS:                        10.6   4.35  )-----------( 230      ( 2017 05:54 )             30.9     11-02    139  |  102  |  10  ----------------------------<  210<H>  3.9   |  26  |  0.96    Ca    8.8      2017 05:54      PTT - ( 2017 02:03 )  PTT:57.8 SEC      Urinalysis Basic - ( 2017 06:00 )    Color: PINK / Appearance: HAZY / S.015 / pH: 6.0  Gluc: 150 / Ketone: MODERATE  / Bili: NEGATIVE / Urobili: NORMAL E.U.   Blood: MODERATE / Protein: 30 / Nitrite: NEGATIVE   Leuk Esterase: MODERATE / RBC: >50 / WBC >50   Sq Epi: x / Non Sq Epi: x / Bacteria: MOD      CAPILLARY BLOOD GLUCOSE      POCT Blood Glucose.: 238 mg/dL (2017 17:32)  POCT Blood Glucose.: 383 mg/dL (2017 11:55)  POCT Blood Glucose.: 223 mg/dL (2017 08:28)  POCT Blood Glucose.: 254 mg/dL (2017 21:37)    10-30 @ 20:43  Culture-urine --  Culture results --  method type --  Organism --  Organism Identification --  Specimen source OTHER  10-30 @ 20:39  Culture-urine --  Culture results --  method type --  Organism --  Organism Identification --  Specimen source OTHER  10-28 @ 17:26  Culture-urine --  Culture results --  method type --  Organism --  Organism Identification --  Specimen source DRAINAGE  10-27 @ 06:24  Culture-urine   NO GROWTH AT 24 HOURS  Culture results --  method type --  Organism --  Organism Identification --  Specimen source URINE MIDSTREAM           10-30 @ 20:43  Culture blood --  Culture results --  Gram stain --  Gram stain blood --  Method type --  Organism --  Organism identification --  Specimen source OTHER   10-30 @ 20:39  Culture blood --  Culture results --  Gram stain --  Gram stain blood --  Method type --  Organism --  Organism identification --  Specimen source OTHER   10-28 @ 17:26  Culture blood --  Culture results --  Gram stain --  Gram stain blood --  Method type --  Organism --  Organism identification --  Specimen source DRAINAGE   10-27 @ 06:24  Culture blood --  Culture results --  Gram stain --  Gram stain blood --  Method type --  Organism --  Organism identification --  Specimen source URINE MIDSTREAM      RADIOLOGY & ADDITIONAL TESTS:    Imaging Personally Reviewed:    Consultant(s) Notes Reviewed:      Care Discussed with Consultants/Other Providers:

## 2017-11-02 NOTE — PROVIDER CONTACT NOTE (OTHER) - REASON
Lab was unable to result patient's PTT sample; need new order for new sample - c/w home Protonix 40 mg PO daily

## 2017-11-02 NOTE — PROGRESS NOTE ADULT - SUBJECTIVE AND OBJECTIVE BOX
Pt. seen. OOB to chair this am. Feels well. No chest pain or SOB    Vital Signs Last 24 Hrs  T(C): 36.8 (02 Nov 2017 09:39), Max: 37.3 (01 Nov 2017 21:11)  T(F): 98.3 (02 Nov 2017 09:39), Max: 99.1 (01 Nov 2017 21:11)  HR: 70 (02 Nov 2017 09:39) (70 - 88)  BP: 124/64 (02 Nov 2017 09:39) (121/60 - 153/77)  BP(mean): --  RR: 17 (02 Nov 2017 09:39) (17 - 19)  SpO2: 97% (02 Nov 2017 09:39) (95% - 100%)    Tele- PAF 70s-80s  A+O x 3 MAEE  OOB w min assist  Lungs CTA  HR irreg, tachy at times. S1 S2  Pericardial Issac 27cc/24hrs.   Abd NT,ND  Ramírez in place, hematuria resolving.  consult pnding this am.  No LE edema  A/P: 78 yo M POD #3 of subxyphoid window.    Issac removed at bedside this am.   Dsg in place. Can be removed in 48hrs. No further Thoracic intervention or involvement.   FU cytology and culture  Per Dr. Babin can transfer back to Tele PA service  FU URo and TOV  Signed out to Tele PA  Pt. can FU with Dr. Chew in 2 weeks in office once discharged. [phone]

## 2017-11-02 NOTE — PROGRESS NOTE ADULT - SUBJECTIVE AND OBJECTIVE BOX
INTERVAL HPI/OVERNIGHT EVENTS:    denies n/v/d/c, abdominal pain, melena or brbpr     MEDICATIONS  (STANDING):  carvedilol 12.5 milliGRAM(s) Oral every 12 hours  dextrose 5%. 1000 milliLiter(s) (50 mL/Hr) IV Continuous <Continuous>  dextrose 50% Injectable 12.5 Gram(s) IV Push once  dextrose 50% Injectable 25 Gram(s) IV Push once  dextrose 50% Injectable 25 Gram(s) IV Push once  docusate sodium 100 milliGRAM(s) Oral three times a day  doxazosin 8 milliGRAM(s) Oral at bedtime  furosemide    Tablet 40 milliGRAM(s) Oral daily  heparin  Infusion.  Unit(s)/Hr (8.8 mL/Hr) IV Continuous <Continuous>  hydrALAZINE 50 milliGRAM(s) Oral three times a day  influenza   Vaccine 0.5 milliLiter(s) IntraMuscular once  insulin lispro (HumaLOG) corrective regimen sliding scale   SubCutaneous three times a day before meals  insulin lispro (HumaLOG) corrective regimen sliding scale   SubCutaneous at bedtime  lisinopril 20 milliGRAM(s) Oral daily  oxybutynin 10 milliGRAM(s) Oral daily  polyethylene glycol 3350 17 Gram(s) Oral two times a day  potassium chloride    Tablet ER 20 milliEquivalent(s) Oral daily  senna 2 Tablet(s) Oral at bedtime  simvastatin 20 milliGRAM(s) Oral at bedtime    MEDICATIONS  (PRN):  acetaminophen   Tablet. 650 milliGRAM(s) Oral every 6 hours PRN Mild Pain (1 - 3)  dextrose Gel 1 Dose(s) Oral once PRN Blood Glucose LESS THAN 70 milliGRAM(s)/deciliter  glucagon  Injectable 1 milliGRAM(s) IntraMuscular once PRN Glucose LESS THAN 70 milligrams/deciliter  oxyCODONE    IR 5 milliGRAM(s) Oral every 4 hours PRN Moderate Pain (4 - 6)  oxyCODONE    IR 10 milliGRAM(s) Oral every 6 hours PRN Severe Pain (7 - 10)      Allergies    No Known Allergies    Intolerances        Review of Systems:    General:  No wt loss, fevers, chills, night sweats, fatigue   Eyes:  Good vision, no reported pain  ENT:  No sore throat, pain, runny nose, dysphagia  CV:  No pain, palpitations, hypo/hypertension  Resp:  No dyspnea, cough, tachypnea, wheezing  GI:  No pain, No nausea, No vomiting, No diarrhea, No constipation, No weight loss, No fever, No pruritis, No rectal bleeding, No melena, No dysphagia  :  No pain, bleeding, incontinence, nocturia  Muscle:  No pain, weakness  Neuro:  No weakness, tingling, memory problems  Psych:  No fatigue, insomnia, mood problems, depression  Endocrine:  No polyuria, polydypsia, cold/heat intolerance  Heme:  No petechiae, ecchymosis, easy bruisability  Skin:  No rash, tattoos, scars, edema      Vital Signs Last 24 Hrs  T(C): 36.9 (2017 13:42), Max: 37.3 (2017 21:11)  T(F): 98.4 (2017 13:42), Max: 99.1 (2017 21:11)  HR: 85 (2017 13:42) (70 - 93)  BP: 132/69 (2017 13:42) (121/60 - 153/77)  BP(mean): --  RR: 18 (2017 13:42) (17 - 19)  SpO2: 100% (2017 13:42) (94% - 100%)    PHYSICAL EXAM:    Constitutional: NAD  HEENT: EOMI, throat clear  Neck: No LAD, supple  Respiratory: CTA and P  Cardiovascular: S1 and S2, RRR, no M  Gastrointestinal: BS+, soft, NT/ND, neg HSM,  Extremities: No peripheral edema, neg clubbing, cyanosis  Vascular: 2+ peripheral pulses  Neurological: A/O x 3, no focal deficits  Psychiatric: Normal mood, normal affect  Skin: No rashes      LABS:                        10.6   4.35  )-----------( 230      ( 2017 05:54 )             30.9     11-02    139  |  102  |  10  ----------------------------<  210<H>  3.9   |  26  |  0.96    Ca    8.8      2017 05:54      PTT - ( 2017 02:03 )  PTT:57.8 SEC  Urinalysis Basic - ( 2017 06:00 )    Color: PINK / Appearance: HAZY / S.015 / pH: 6.0  Gluc: 150 / Ketone: MODERATE  / Bili: NEGATIVE / Urobili: NORMAL E.U.   Blood: MODERATE / Protein: 30 / Nitrite: NEGATIVE   Leuk Esterase: MODERATE / RBC: >50 / WBC >50   Sq Epi: x / Non Sq Epi: x / Bacteria: MOD        RADIOLOGY & ADDITIONAL TESTS:

## 2017-11-03 ENCOUNTER — TRANSCRIPTION ENCOUNTER (OUTPATIENT)
Age: 77
End: 2017-11-03

## 2017-11-03 LAB
APTT BLD: 61.8 SEC — HIGH (ref 27.5–37.4)
CULTURE - SURGICAL SITE: SIGNIFICANT CHANGE UP
GAS PNL BLDMV: SIGNIFICANT CHANGE UP
GLUCOSE BLDC GLUCOMTR-MCNC: 241 MG/DL — HIGH (ref 70–99)
GLUCOSE BLDC GLUCOMTR-MCNC: 248 MG/DL — HIGH (ref 70–99)
GLUCOSE BLDC GLUCOMTR-MCNC: 258 MG/DL — HIGH (ref 70–99)
GLUCOSE BLDC GLUCOMTR-MCNC: 311 MG/DL — HIGH (ref 70–99)
HCT VFR BLD CALC: 32.8 % — LOW (ref 39–50)
HGB BLD-MCNC: 11.1 G/DL — LOW (ref 13–17)
MCHC RBC-ENTMCNC: 28.6 PG — SIGNIFICANT CHANGE UP (ref 27–34)
MCHC RBC-ENTMCNC: 33.8 % — SIGNIFICANT CHANGE UP (ref 32–36)
MCV RBC AUTO: 84.5 FL — SIGNIFICANT CHANGE UP (ref 80–100)
NRBC # FLD: 0 — SIGNIFICANT CHANGE UP
PLATELET # BLD AUTO: 240 K/UL — SIGNIFICANT CHANGE UP (ref 150–400)
PMV BLD: 10.9 FL — SIGNIFICANT CHANGE UP (ref 7–13)
RBC # BLD: 3.88 M/UL — LOW (ref 4.2–5.8)
RBC # FLD: 13.3 % — SIGNIFICANT CHANGE UP (ref 10.3–14.5)
WBC # BLD: 3.53 K/UL — LOW (ref 3.8–10.5)
WBC # FLD AUTO: 3.53 K/UL — LOW (ref 3.8–10.5)

## 2017-11-03 RX ORDER — LIDOCAINE 4 G/100G
1 CREAM TOPICAL THREE TIMES A DAY
Qty: 0 | Refills: 0 | Status: DISCONTINUED | OUTPATIENT
Start: 2017-11-03 | End: 2017-11-09

## 2017-11-03 RX ADMIN — Medication 50 MILLIGRAM(S): at 21:05

## 2017-11-03 RX ADMIN — Medication 8 MILLIGRAM(S): at 21:05

## 2017-11-03 RX ADMIN — Medication 40 MILLIGRAM(S): at 05:36

## 2017-11-03 RX ADMIN — Medication 4: at 12:11

## 2017-11-03 RX ADMIN — Medication 50 MILLIGRAM(S): at 14:03

## 2017-11-03 RX ADMIN — Medication 100 MILLIGRAM(S): at 14:03

## 2017-11-03 RX ADMIN — POLYETHYLENE GLYCOL 3350 17 GRAM(S): 17 POWDER, FOR SOLUTION ORAL at 05:36

## 2017-11-03 RX ADMIN — Medication 3: at 18:04

## 2017-11-03 RX ADMIN — CARVEDILOL PHOSPHATE 12.5 MILLIGRAM(S): 80 CAPSULE, EXTENDED RELEASE ORAL at 18:04

## 2017-11-03 RX ADMIN — CARVEDILOL PHOSPHATE 12.5 MILLIGRAM(S): 80 CAPSULE, EXTENDED RELEASE ORAL at 05:36

## 2017-11-03 RX ADMIN — Medication 2: at 07:58

## 2017-11-03 RX ADMIN — OXYCODONE HYDROCHLORIDE 5 MILLIGRAM(S): 5 TABLET ORAL at 21:05

## 2017-11-03 RX ADMIN — Medication 100 MILLIGRAM(S): at 05:36

## 2017-11-03 RX ADMIN — Medication 10 MILLIGRAM(S): at 12:17

## 2017-11-03 RX ADMIN — OXYCODONE HYDROCHLORIDE 5 MILLIGRAM(S): 5 TABLET ORAL at 21:40

## 2017-11-03 RX ADMIN — Medication 50 MILLIGRAM(S): at 05:36

## 2017-11-03 RX ADMIN — HEPARIN SODIUM 1180 UNIT(S)/HR: 5000 INJECTION INTRAVENOUS; SUBCUTANEOUS at 08:48

## 2017-11-03 RX ADMIN — SIMVASTATIN 20 MILLIGRAM(S): 20 TABLET, FILM COATED ORAL at 21:05

## 2017-11-03 RX ADMIN — LISINOPRIL 20 MILLIGRAM(S): 2.5 TABLET ORAL at 05:36

## 2017-11-03 RX ADMIN — Medication 20 MILLIEQUIVALENT(S): at 12:04

## 2017-11-03 NOTE — PROGRESS NOTE ADULT - SUBJECTIVE AND OBJECTIVE BOX
INTERVAL HPI/OVERNIGHT EVENTS:    denies n/v/d/c, abdominal pain, melena or brbpr     MEDICATIONS  (STANDING):  carvedilol 12.5 milliGRAM(s) Oral every 12 hours  dextrose 5%. 1000 milliLiter(s) (50 mL/Hr) IV Continuous <Continuous>  dextrose 50% Injectable 12.5 Gram(s) IV Push once  dextrose 50% Injectable 25 Gram(s) IV Push once  dextrose 50% Injectable 25 Gram(s) IV Push once  docusate sodium 100 milliGRAM(s) Oral three times a day  doxazosin 8 milliGRAM(s) Oral at bedtime  furosemide    Tablet 40 milliGRAM(s) Oral daily  heparin  Infusion.  Unit(s)/Hr (8.8 mL/Hr) IV Continuous <Continuous>  hydrALAZINE 50 milliGRAM(s) Oral three times a day  influenza   Vaccine 0.5 milliLiter(s) IntraMuscular once  insulin lispro (HumaLOG) corrective regimen sliding scale   SubCutaneous three times a day before meals  insulin lispro (HumaLOG) corrective regimen sliding scale   SubCutaneous at bedtime  lisinopril 20 milliGRAM(s) Oral daily  oxybutynin 10 milliGRAM(s) Oral daily  polyethylene glycol 3350 17 Gram(s) Oral two times a day  potassium chloride    Tablet ER 20 milliEquivalent(s) Oral daily  senna 2 Tablet(s) Oral at bedtime  simvastatin 20 milliGRAM(s) Oral at bedtime    MEDICATIONS  (PRN):  acetaminophen   Tablet. 650 milliGRAM(s) Oral every 6 hours PRN Mild Pain (1 - 3)  dextrose Gel 1 Dose(s) Oral once PRN Blood Glucose LESS THAN 70 milliGRAM(s)/deciliter  glucagon  Injectable 1 milliGRAM(s) IntraMuscular once PRN Glucose LESS THAN 70 milligrams/deciliter  lidocaine 2% Gel 1 Application(s) Topical three times a day PRN dela cruz discomfort  oxyCODONE    IR 5 milliGRAM(s) Oral every 4 hours PRN Moderate Pain (4 - 6)  oxyCODONE    IR 10 milliGRAM(s) Oral every 6 hours PRN Severe Pain (7 - 10)      Allergies    No Known Allergies    Intolerances        Review of Systems:    General:  No wt loss, fevers, chills, night sweats, fatigue   Eyes:  Good vision, no reported pain  ENT:  No sore throat, pain, runny nose, dysphagia  CV:  No pain, palpitations, hypo/hypertension  Resp:  No dyspnea, cough, tachypnea, wheezing  GI:  No pain, No nausea, No vomiting, No diarrhea, No constipation, No weight loss, No fever, No pruritis, No rectal bleeding, No melena, No dysphagia  :  No pain, bleeding, incontinence, nocturia  Muscle:  No pain, weakness  Neuro:  No weakness, tingling, memory problems  Psych:  No fatigue, insomnia, mood problems, depression  Endocrine:  No polyuria, polydypsia, cold/heat intolerance  Heme:  No petechiae, ecchymosis, easy bruisability  Skin:  No rash, tattoos, scars, edema      Vital Signs Last 24 Hrs  T(C): 36.8 (03 Nov 2017 11:54), Max: 36.9 (02 Nov 2017 13:42)  T(F): 98.3 (03 Nov 2017 11:54), Max: 98.4 (02 Nov 2017 13:42)  HR: 80 (03 Nov 2017 11:54) (70 - 85)  BP: 150/71 (03 Nov 2017 11:54) (126/61 - 150/71)  BP(mean): --  RR: 18 (03 Nov 2017 11:54) (18 - 18)  SpO2: 99% (03 Nov 2017 11:54) (95% - 100%)    PHYSICAL EXAM:    Constitutional: NAD  HEENT: EOMI, throat clear  Neck: No LAD, supple  Respiratory: CTA and P  Cardiovascular: S1 and S2, RRR, no M  Gastrointestinal: BS+, soft, NT/ND, neg HSM,  Extremities: No peripheral edema, neg clubbing, cyanosis  Vascular: 2+ peripheral pulses  Neurological: A/O x 3, no focal deficits  Psychiatric: Normal mood, normal affect  Skin: No rashes      LABS:                        11.1   3.53  )-----------( 240      ( 03 Nov 2017 06:00 )             32.8     11-02    139  |  102  |  10  ----------------------------<  210<H>  3.9   |  26  |  0.96    Ca    8.8      02 Nov 2017 05:54      PTT - ( 03 Nov 2017 06:00 )  PTT:61.8 SEC      RADIOLOGY & ADDITIONAL TESTS:

## 2017-11-03 NOTE — PROGRESS NOTE ADULT - SUBJECTIVE AND OBJECTIVE BOX
Pt has been feeling OK, other than some pain at the site of catheter, none other active symptoms on ROS.    Meds:  acetaminophen   Tablet. 650 milliGRAM(s) Oral every 6 hours PRN  carvedilol 12.5 milliGRAM(s) Oral every 12 hours  dextrose 5%. 1000 milliLiter(s) IV Continuous <Continuous>  dextrose 50% Injectable 12.5 Gram(s) IV Push once  dextrose 50% Injectable 25 Gram(s) IV Push once  dextrose 50% Injectable 25 Gram(s) IV Push once  dextrose Gel 1 Dose(s) Oral once PRN  docusate sodium 100 milliGRAM(s) Oral three times a day  doxazosin 8 milliGRAM(s) Oral at bedtime  furosemide    Tablet 40 milliGRAM(s) Oral daily  glucagon  Injectable 1 milliGRAM(s) IntraMuscular once PRN  heparin  Infusion.  Unit(s)/Hr IV Continuous <Continuous>  hydrALAZINE 50 milliGRAM(s) Oral three times a day  influenza   Vaccine 0.5 milliLiter(s) IntraMuscular once  insulin lispro (HumaLOG) corrective regimen sliding scale   SubCutaneous three times a day before meals  insulin lispro (HumaLOG) corrective regimen sliding scale   SubCutaneous at bedtime  lidocaine 2% Gel 1 Application(s) Topical three times a day PRN  lisinopril 20 milliGRAM(s) Oral daily  oxybutynin 10 milliGRAM(s) Oral daily  oxyCODONE    IR 5 milliGRAM(s) Oral every 4 hours PRN  oxyCODONE    IR 10 milliGRAM(s) Oral every 6 hours PRN  polyethylene glycol 3350 17 Gram(s) Oral two times a day  potassium chloride    Tablet ER 20 milliEquivalent(s) Oral daily  senna 2 Tablet(s) Oral at bedtime  simvastatin 20 milliGRAM(s) Oral at bedtime      Vital Signs Last 24 Hrs  T(C): 36.8 (03 Nov 2017 08:00), Max: 36.9 (02 Nov 2017 12:12)  T(F): 98.3 (03 Nov 2017 08:00), Max: 98.5 (02 Nov 2017 12:12)  HR: 80 (03 Nov 2017 08:00) (70 - 93)  BP: 126/61 (03 Nov 2017 08:00) (126/61 - 143/69)  BP(mean): --  RR: 18 (03 Nov 2017 08:00) (18 - 19)  SpO2: 98% (03 Nov 2017 08:00) (94% - 100%)                          11.1   3.53  )-----------( 240      ( 03 Nov 2017 06:00 )             32.8       11-02    139  |  102  |  10  ----------------------------<  210<H>  3.9   |  26  |  0.96    Ca    8.8      02 Nov 2017 05:54      PTT - ( 03 Nov 2017 06:00 )  PTT:61.8 SEC    cytopath: Cytopathology - Non Gyn Report:   ACCESSION No:  [id number]    COLTON JARAMILLO               2        Cytopathology Report            Specimen(s) Submitted  PERICARDIAL FLUID      Clinical History  Pericardial effusion.      Gross Description  Received: 40  ml of bloody fluid received in [facility]  Prepared: 1 Thin Prep slide, 1 cell block , 1 smear      Final Diagnosis  PERICARDIAL FLUID  NEGATIVE FOR MALIGNANT CELLS.    The cytology specimen and cell block consist of reactive  mesothelial cells in a bacground of lymphocytes, blood, and  scattered histiocytes.  Immunostains for CK 5/6 and calretinin show reactive mesothelial  cells.  Stain for  demonstrates histiocytes. CEA shows  extensive background staining.  MOC-31 and Terell/ep4 are negative.  The immunophenotype supports the above diagnosis.    Please see concurrent Cytology specimen report:  [id number].  Please see concurrent biopsy specimen  report  [id number].    Slide(s) with built in immunohistochemical study control(s) and  negative control associated with this case has/have been verified  by the sign out pathologist.  For slide(s) without built in controls positive control slides  has/have been reviewed and approved by immunohistochemistry lab  These immunohistochemical/ in-situ hybridization tests have been  developed and their performance characteristics determined by  [facility], Department of Pathology,  Division of Immunopathology, [address] [address].  It has not been cleared or approved by the U.[place]. Food  and Drug Administration.  The FDA has determined that such  clearance or approval is not necessary.  This test is used for  clinical purposes.  The laboratory is certified under the CLIA-88  as qualified to perform high complexity clinical              COLTON JARAMILLO               2        Cytopathology Report          testing.    Screened by: Garry HERRERA(ASCP)  Verified by: ELPIDIO GUZMAN MD Pathologist  (Electronic Signature)  Reported on: 11/02/17 13:00 EDT, [address] [place] [address]  [id number]  Cytology technical processing performed at [address] [place] [id number]  _________________________________________________________________ (10.30.17 @ 15:28)      Surg Path: Surgical Pathology Report:   ACCESSION No:  [id number]    COLTON JARAMILLO               1        Surgical Final Report          Final Diagnosis    1,2.  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  (Electronic Signature)  Reported on: 11/02/17 10:17 EDT,[address] [place] [address]  [id number]  _________________________________________________________________    Intraoperative Consultation  1. Pericardium  - Benign with chronic inflammation  By Dr. LINDSAY Guzman    Clinical History  pericardial effusion    Specimen(s) Submitted  1     1-Pericardium  2     2-Pericardium    Gross Description  1.   The specimen is received fresh for intraoperative  consultation and the specimen container is labeled: Pericardium.  It consists of a 1.5 x 1.3 x 0.2 cm portion of tan-brown shaggy  soft tissue which is bisected and entirely submitted for frozen  sectioning.  Entirely submitted.  One cassette.    2.   The specimen is received in formalin andthe specimen  container is labeled: Pericardium.  It consists of a 3.7 x 3.7 x  0.1 cm sheetlike portion of tan-pink rubbery soft tissue.  Both  aspects are slightly shaggy.  Representative sections submitted.  One cassette.    In addition to other data that may appear on the specimen  containers, all labels have been inspected to confirm the  presence of the patient's name and date of birth.  RO 10/31/17 18:20 (10.30.17 @ 13:40)

## 2017-11-04 LAB
APPEARANCE UR: CLEAR — SIGNIFICANT CHANGE UP
APTT BLD: 70 SEC — HIGH (ref 27.5–37.4)
BILIRUB UR-MCNC: NEGATIVE — SIGNIFICANT CHANGE UP
BLOOD UR QL VISUAL: HIGH
BUN SERPL-MCNC: 6 MG/DL — LOW (ref 7–23)
CALCIUM SERPL-MCNC: 8.6 MG/DL — SIGNIFICANT CHANGE UP (ref 8.4–10.5)
CHLORIDE SERPL-SCNC: 102 MMOL/L — SIGNIFICANT CHANGE UP (ref 98–107)
CO2 SERPL-SCNC: 29 MMOL/L — SIGNIFICANT CHANGE UP (ref 22–31)
COLOR SPEC: SIGNIFICANT CHANGE UP
CREAT SERPL-MCNC: 0.85 MG/DL — SIGNIFICANT CHANGE UP (ref 0.5–1.3)
GLUCOSE BLDC GLUCOMTR-MCNC: 217 MG/DL — HIGH (ref 70–99)
GLUCOSE BLDC GLUCOMTR-MCNC: 229 MG/DL — HIGH (ref 70–99)
GLUCOSE BLDC GLUCOMTR-MCNC: 254 MG/DL — HIGH (ref 70–99)
GLUCOSE BLDC GLUCOMTR-MCNC: 290 MG/DL — HIGH (ref 70–99)
GLUCOSE SERPL-MCNC: 222 MG/DL — HIGH (ref 70–99)
GLUCOSE UR-MCNC: 1000 — HIGH
HCT VFR BLD CALC: 29.7 % — LOW (ref 39–50)
HGB BLD-MCNC: 10.3 G/DL — LOW (ref 13–17)
KETONES UR-MCNC: NEGATIVE — SIGNIFICANT CHANGE UP
LEUKOCYTE ESTERASE UR-ACNC: NEGATIVE — SIGNIFICANT CHANGE UP
MCHC RBC-ENTMCNC: 29 PG — SIGNIFICANT CHANGE UP (ref 27–34)
MCHC RBC-ENTMCNC: 34.7 % — SIGNIFICANT CHANGE UP (ref 32–36)
MCV RBC AUTO: 83.7 FL — SIGNIFICANT CHANGE UP (ref 80–100)
MUCOUS THREADS # UR AUTO: SIGNIFICANT CHANGE UP
NITRITE UR-MCNC: NEGATIVE — SIGNIFICANT CHANGE UP
NRBC # FLD: 0 — SIGNIFICANT CHANGE UP
PH UR: 7 — SIGNIFICANT CHANGE UP (ref 4.6–8)
PLATELET # BLD AUTO: 229 K/UL — SIGNIFICANT CHANGE UP (ref 150–400)
PMV BLD: 11.1 FL — SIGNIFICANT CHANGE UP (ref 7–13)
POTASSIUM SERPL-MCNC: 4 MMOL/L — SIGNIFICANT CHANGE UP (ref 3.5–5.3)
POTASSIUM SERPL-SCNC: 4 MMOL/L — SIGNIFICANT CHANGE UP (ref 3.5–5.3)
PROT UR-MCNC: NEGATIVE — SIGNIFICANT CHANGE UP
RBC # BLD: 3.55 M/UL — LOW (ref 4.2–5.8)
RBC # FLD: 13.3 % — SIGNIFICANT CHANGE UP (ref 10.3–14.5)
RBC CASTS # UR COMP ASSIST: >50 — HIGH (ref 0–?)
SODIUM SERPL-SCNC: 140 MMOL/L — SIGNIFICANT CHANGE UP (ref 135–145)
SP GR SPEC: 1 — SIGNIFICANT CHANGE UP (ref 1–1.03)
SPECIMEN SOURCE: SIGNIFICANT CHANGE UP
SQUAMOUS # UR AUTO: SIGNIFICANT CHANGE UP
UROBILINOGEN FLD QL: NORMAL E.U. — SIGNIFICANT CHANGE UP (ref 0.1–0.2)
WBC # BLD: 3.44 K/UL — LOW (ref 3.8–10.5)
WBC # FLD AUTO: 3.44 K/UL — LOW (ref 3.8–10.5)

## 2017-11-04 RX ORDER — PHENAZOPYRIDINE HCL 100 MG
100 TABLET ORAL
Qty: 0 | Refills: 0 | Status: COMPLETED | OUTPATIENT
Start: 2017-11-04 | End: 2017-11-05

## 2017-11-04 RX ORDER — RIVAROXABAN 15 MG-20MG
20 KIT ORAL EVERY 24 HOURS
Qty: 0 | Refills: 0 | Status: DISCONTINUED | OUTPATIENT
Start: 2017-11-04 | End: 2017-11-09

## 2017-11-04 RX ADMIN — Medication 100 MILLIGRAM(S): at 21:42

## 2017-11-04 RX ADMIN — LISINOPRIL 20 MILLIGRAM(S): 2.5 TABLET ORAL at 05:13

## 2017-11-04 RX ADMIN — Medication 3: at 08:26

## 2017-11-04 RX ADMIN — OXYCODONE HYDROCHLORIDE 5 MILLIGRAM(S): 5 TABLET ORAL at 23:39

## 2017-11-04 RX ADMIN — Medication 50 MILLIGRAM(S): at 16:03

## 2017-11-04 RX ADMIN — Medication 50 MILLIGRAM(S): at 21:43

## 2017-11-04 RX ADMIN — SIMVASTATIN 20 MILLIGRAM(S): 20 TABLET, FILM COATED ORAL at 21:43

## 2017-11-04 RX ADMIN — LIDOCAINE 1 APPLICATION(S): 4 CREAM TOPICAL at 08:25

## 2017-11-04 RX ADMIN — SENNA PLUS 2 TABLET(S): 8.6 TABLET ORAL at 21:43

## 2017-11-04 RX ADMIN — CARVEDILOL PHOSPHATE 12.5 MILLIGRAM(S): 80 CAPSULE, EXTENDED RELEASE ORAL at 17:26

## 2017-11-04 RX ADMIN — Medication 20 MILLIEQUIVALENT(S): at 12:44

## 2017-11-04 RX ADMIN — CARVEDILOL PHOSPHATE 12.5 MILLIGRAM(S): 80 CAPSULE, EXTENDED RELEASE ORAL at 05:13

## 2017-11-04 RX ADMIN — Medication 40 MILLIGRAM(S): at 05:13

## 2017-11-04 RX ADMIN — OXYCODONE HYDROCHLORIDE 5 MILLIGRAM(S): 5 TABLET ORAL at 05:13

## 2017-11-04 RX ADMIN — Medication 100 MILLIGRAM(S): at 05:12

## 2017-11-04 RX ADMIN — RIVAROXABAN 20 MILLIGRAM(S): KIT at 18:20

## 2017-11-04 RX ADMIN — Medication 3: at 12:45

## 2017-11-04 RX ADMIN — Medication 8 MILLIGRAM(S): at 21:42

## 2017-11-04 RX ADMIN — Medication 50 MILLIGRAM(S): at 05:12

## 2017-11-04 RX ADMIN — OXYCODONE HYDROCHLORIDE 5 MILLIGRAM(S): 5 TABLET ORAL at 05:45

## 2017-11-04 RX ADMIN — Medication 2: at 17:25

## 2017-11-04 RX ADMIN — Medication 100 MILLIGRAM(S): at 16:03

## 2017-11-04 RX ADMIN — HEPARIN SODIUM 1180 UNIT(S)/HR: 5000 INJECTION INTRAVENOUS; SUBCUTANEOUS at 07:07

## 2017-11-04 RX ADMIN — Medication 10 MILLIGRAM(S): at 12:44

## 2017-11-04 RX ADMIN — POLYETHYLENE GLYCOL 3350 17 GRAM(S): 17 POWDER, FOR SOLUTION ORAL at 05:13

## 2017-11-04 RX ADMIN — Medication 100 MILLIGRAM(S): at 18:19

## 2017-11-04 NOTE — PROGRESS NOTE ADULT - SUBJECTIVE AND OBJECTIVE BOX
PRESENTING CC: Pericardial effusion s/p Window    SUBJ: 76 yo Male Hx Atrial fibrillation on Xarelto-last dose-10/26,HTN, presents with abdominal discomfort after eating x 1 month. PT state diffuse abdominal discomfort after eating meals.+ occasional RAMACHANDRAN ( walks 10 blocks),had CT Abdomen,revealing incidental-Pericardial Effusion,TTE-Large pericardial effusion,had IR guided drain-underwent Pericardial Window,CT removed. NO dyspnea chest pain.      PMH -reviewed admission note, no change since admission  Heart faliure: acute [ ] chronic [ ] acute or chronic [ ] diastolic [ ] systolic [ ] combied systolic and diastolic[ ]  AIRAM: ATN[ ] renal medullary necrosis [ ] CKD I [ ]CKDII [ ]CKD III [ ]CKD IV [ ]CKD V [ ]Other pathological lesions [ ]    MEDICATIONS  (STANDING):  carvedilol 12.5 milliGRAM(s) Oral every 12 hours  docusate sodium 100 milliGRAM(s) Oral three times a day  doxazosin 8 milliGRAM(s) Oral at bedtime  furosemide    Tablet 40 milliGRAM(s) Oral daily  heparin  Infusion.  Unit(s)/Hr (8.8 mL/Hr) IV Continuous <Continuous>  hydrALAZINE 50 milliGRAM(s) Oral three times a day  influenza   Vaccine 0.5 milliLiter(s) IntraMuscular once  insulin lispro (HumaLOG) corrective regimen sliding scale   SubCutaneous three times a day before meals  insulin lispro (HumaLOG) corrective regimen sliding scale   SubCutaneous at bedtime  lisinopril 20 milliGRAM(s) Oral daily  oxybutynin 10 milliGRAM(s) Oral daily  polyethylene glycol 3350 17 Gram(s) Oral two times a day  potassium chloride    Tablet ER 20 milliEquivalent(s) Oral daily  senna 2 Tablet(s) Oral at bedtime  simvastatin 20 milliGRAM(s) Oral at bedtime    MEDICATIONS  (PRN):  acetaminophen   Tablet. 650 milliGRAM(s) Oral every 6 hours PRN Mild Pain (1 - 3)  dextrose Gel 1 Dose(s) Oral once PRN Blood Glucose LESS THAN 70 milliGRAM(s)/deciliter  glucagon  Injectable 1 milliGRAM(s) IntraMuscular once PRN Glucose LESS THAN 70 milligrams/deciliter  lidocaine 2% Gel 1 Application(s) Topical three times a day PRN dela cruz discomfort  oxyCODONE    IR 5 milliGRAM(s) Oral every 4 hours PRN Moderate Pain (4 - 6)  oxyCODONE    IR 10 milliGRAM(s) Oral every 6 hours PRN Severe Pain (7 - 10)          FAMILY HISTORY:  No pertinent family history in first degree relatives    No family history of premature coronary artery disease or sudden cardiac death      REVIEW OF SYSTEMS:  Constitutional: [ ] fever, [ ]weight loss,  [x ]fatigue  Eyes: [ ] visual changes  Respiratory: [x ]shortness of breath;  [ ] cough, [ ]wheezing, [ ]chills, [ ]hemoptysis  Cardiovascular: [x ] chest pain, [ ]palpitations, [ ]dizziness,  [ ]leg swelling  Gastrointestinal: [ ] abdominal pain, [ ]nausea, [ ]vomiting,  [ ]diarrhea   Genitourinary: [ ] dysuria, [ ] hematuria  Neurologic: [ ] headaches [ ] tremors  Skin: [ ] itching, [ ]burning, [ ] rashes  Endocrine: [ ] heat or cold intolerance  Musculoskeletal: [ ] joint pain or swelling; [ ] muscle, back, or extremity pain  Psychiatric: [ ] depression, [ ]anxiety, [ ]mood swings, or [ ]difficulty sleeping  Hematologic: [ ] easy bruising, [ ] bleeding gums    [x] All remaining systems negative except as per above.     Vital Signs Last 24 Hrs  T(C): 36.8 (04 Nov 2017 05:11), Max: 36.9 (03 Nov 2017 20:52)  T(F): 98.2 (04 Nov 2017 05:11), Max: 98.5 (03 Nov 2017 20:52)  HR: 80 (04 Nov 2017 08:30) (71 - 80)  BP: 151/59 (04 Nov 2017 08:30) (127/63 - 151/59)  BP(mean): 84 (04 Nov 2017 08:30) (84 - 84)  RR: 18 (04 Nov 2017 08:30) (18 - 19)  SpO2: 97% (04 Nov 2017 08:30) (96% - 99%)  I&O's Summary    03 Nov 2017 07:01  -  04 Nov 2017 07:00  --------------------------------------------------------  IN: 715 mL / OUT: 3020 mL / NET: -2305 mL    04 Nov 2017 08:01  -  04 Nov 2017 09:09  --------------------------------------------------------  IN: 0 mL / OUT: 500 mL / NET: -500 mL        PHYSICAL EXAM:  General: No acute distress  HEENT: EOMI, PERRL  Neck: Supple, No JVD  Lungs: Clear to percussion bilaterally; No rales or wheezing  Heart: Regular rate and rhythm; No murmurs, rubs, or gallops  Abdomen: Nontender, bowel sounds present  Extremities: No clubbing, cyanosis, or edema  Nervous system:  Alert & Oriented X3, no focal deficits  Psychiatric: Normal affect  Skin: No rashes or lesions    LABS:  11-04    140  |  102  |  6<L>  ----------------------------<  222<H>  4.0   |  29  |  0.85    Ca    8.6      04 Nov 2017 05:15      Creatinine Trend: 0.85<--, 0.96<--, 0.90<--, 0.80<--, 0.96<--, 1.06<--                        10.3   3.44  )-----------( 229      ( 04 Nov 2017 05:15 )             29.7     PTT - ( 04 Nov 2017 05:15 )  PTT:70.0 SEC  Lipid Panel:   Cardiac Enzymes:           IMPRESSION AND PLAN:      76 yo M moderate to large pericardial effusion and biliary colic    Problem/Plan - 1:  ·  Problem: Pericardial effusion.  Plan:  Moderate Pericardial Effusion with early tamponade physiology,s/p IR drainage s/p Pericardial Window POD#1  ESR, CRP. TSH Had Pericardiocentesis for diagnostic and therapeutic tap- possible 2/2 viral pericarditis-IR drainage- drained dark red fluid-hemorrhagic-await cytology. Hematocrit remains stable.  Will check RUQ sonogram pt with + cholithiasis. IMPRESSION: Small ascites. Cholelithiasis without acute cholecystitis.     Problem/Plan - 2:  ·  Problem: Atrial Aigkkycgpdtb-Vxrxnrbgx-[id number].  Plan: on Xarelto- Restart  continue coreg.     Problem/Plan - 3:  ·  Problem: HTN (hypertension).  Plan: coreg, hydralazine, lasix held     Problem/Plan - 4:  ·  Problem: T2DM (type 2 diabetes mellitus).  Plan: FS Q6 NISS holding metformin, januvia and glipizide check A1c.     Problem/Plan - 5:  ·  Problem: BPH (benign prostatic hyperplasia).  Plan: doxazosin, proscar.

## 2017-11-04 NOTE — CHART NOTE - NSCHARTNOTEFT_GEN_A_CORE
Discussed with Dr. Babin. Will D/C Heparin gtt and resume Xarelto 20mg daily. Repeat echocardiogram ordered to assess pericardial effusion S/P window.

## 2017-11-04 NOTE — PROGRESS NOTE ADULT - SUBJECTIVE AND OBJECTIVE BOX
INTERVAL HPI/OVERNIGHT EVENTS:    denies n/v/d/c, abdominal pain, melena or brbpr     MEDICATIONS  (STANDING):  carvedilol 12.5 milliGRAM(s) Oral every 12 hours  dextrose 5%. 1000 milliLiter(s) (50 mL/Hr) IV Continuous <Continuous>  dextrose 50% Injectable 12.5 Gram(s) IV Push once  dextrose 50% Injectable 25 Gram(s) IV Push once  dextrose 50% Injectable 25 Gram(s) IV Push once  docusate sodium 100 milliGRAM(s) Oral three times a day  doxazosin 8 milliGRAM(s) Oral at bedtime  furosemide    Tablet 40 milliGRAM(s) Oral daily  heparin  Infusion.  Unit(s)/Hr (8.8 mL/Hr) IV Continuous <Continuous>  hydrALAZINE 50 milliGRAM(s) Oral three times a day  influenza   Vaccine 0.5 milliLiter(s) IntraMuscular once  insulin lispro (HumaLOG) corrective regimen sliding scale   SubCutaneous three times a day before meals  insulin lispro (HumaLOG) corrective regimen sliding scale   SubCutaneous at bedtime  lisinopril 20 milliGRAM(s) Oral daily  oxybutynin 10 milliGRAM(s) Oral daily  polyethylene glycol 3350 17 Gram(s) Oral two times a day  potassium chloride    Tablet ER 20 milliEquivalent(s) Oral daily  senna 2 Tablet(s) Oral at bedtime  simvastatin 20 milliGRAM(s) Oral at bedtime    MEDICATIONS  (PRN):  acetaminophen   Tablet. 650 milliGRAM(s) Oral every 6 hours PRN Mild Pain (1 - 3)  dextrose Gel 1 Dose(s) Oral once PRN Blood Glucose LESS THAN 70 milliGRAM(s)/deciliter  glucagon  Injectable 1 milliGRAM(s) IntraMuscular once PRN Glucose LESS THAN 70 milligrams/deciliter  lidocaine 2% Gel 1 Application(s) Topical three times a day PRN dela cruz discomfort  oxyCODONE    IR 5 milliGRAM(s) Oral every 4 hours PRN Moderate Pain (4 - 6)  oxyCODONE    IR 10 milliGRAM(s) Oral every 6 hours PRN Severe Pain (7 - 10)      Allergies    No Known Allergies    Intolerances        Review of Systems:    General:  No wt loss, fevers, chills, night sweats, fatigue   Eyes:  Good vision, no reported pain  ENT:  No sore throat, pain, runny nose, dysphagia  CV:  No pain, palpitations, hypo/hypertension  Resp:  No dyspnea, cough, tachypnea, wheezing  GI:  No pain, No nausea, No vomiting, No diarrhea, No constipation, No weight loss, No fever, No pruritis, No rectal bleeding, No melena, No dysphagia  :  No pain, bleeding, incontinence, nocturia  Muscle:  No pain, weakness  Neuro:  No weakness, tingling, memory problems  Psych:  No fatigue, insomnia, mood problems, depression  Endocrine:  No polyuria, polydypsia, cold/heat intolerance  Heme:  No petechiae, ecchymosis, easy bruisability  Skin:  No rash, tattoos, scars, edema      Vital Signs Last 24 Hrs  T(C): 36.8 (04 Nov 2017 05:11), Max: 36.9 (03 Nov 2017 20:52)  T(F): 98.2 (04 Nov 2017 05:11), Max: 98.5 (03 Nov 2017 20:52)  HR: 80 (04 Nov 2017 08:30) (71 - 80)  BP: 151/59 (04 Nov 2017 08:30) (127/63 - 151/59)  BP(mean): 84 (04 Nov 2017 08:30) (84 - 84)  RR: 18 (04 Nov 2017 08:30) (18 - 19)  SpO2: 97% (04 Nov 2017 08:30) (96% - 99%)    PHYSICAL EXAM:    Constitutional: NAD  HEENT: EOMI, throat clear  Neck: No LAD, supple  Respiratory: CTA and P  Cardiovascular: S1 and S2, RRR, no M  Gastrointestinal: BS+, soft, NT/ND, neg HSM,  Extremities: No peripheral edema, neg clubbing, cyanosis  Vascular: 2+ peripheral pulses  Neurological: A/O x 3, no focal deficits  Psychiatric: Normal mood, normal affect  Skin: No rashes      LABS:                        10.3   3.44  )-----------( 229      ( 04 Nov 2017 05:15 )             29.7     11-04    140  |  102  |  6<L>  ----------------------------<  222<H>  4.0   |  29  |  0.85    Ca    8.6      04 Nov 2017 05:15      PTT - ( 04 Nov 2017 05:15 )  PTT:70.0 SEC      RADIOLOGY & ADDITIONAL TESTS:

## 2017-11-04 NOTE — PROGRESS NOTE ADULT - SUBJECTIVE AND OBJECTIVE BOX
Patient is a 77y old  Male who presents with a chief complaint of "Abdominal discomfort x 1 month" (27 Oct 2017 16:30)      SUBJECTIVE / OVERNIGHT EVENTS:   Feels better.  Denies CP/SOB/Palpitation/HA.    MEDICATIONS  (STANDING):  carvedilol 12.5 milliGRAM(s) Oral every 12 hours  dextrose 5%. 1000 milliLiter(s) (50 mL/Hr) IV Continuous <Continuous>  dextrose 50% Injectable 12.5 Gram(s) IV Push once  dextrose 50% Injectable 25 Gram(s) IV Push once  dextrose 50% Injectable 25 Gram(s) IV Push once  docusate sodium 100 milliGRAM(s) Oral three times a day  doxazosin 8 milliGRAM(s) Oral at bedtime  furosemide    Tablet 40 milliGRAM(s) Oral daily  hydrALAZINE 50 milliGRAM(s) Oral three times a day  influenza   Vaccine 0.5 milliLiter(s) IntraMuscular once  insulin lispro (HumaLOG) corrective regimen sliding scale   SubCutaneous three times a day before meals  insulin lispro (HumaLOG) corrective regimen sliding scale   SubCutaneous at bedtime  lisinopril 20 milliGRAM(s) Oral daily  oxybutynin 10 milliGRAM(s) Oral daily  phenazopyridine 100 milliGRAM(s) Oral two times a day  polyethylene glycol 3350 17 Gram(s) Oral two times a day  potassium chloride    Tablet ER 20 milliEquivalent(s) Oral daily  rivaroxaban 20 milliGRAM(s) Oral every 24 hours  senna 2 Tablet(s) Oral at bedtime  simvastatin 20 milliGRAM(s) Oral at bedtime    MEDICATIONS  (PRN):  acetaminophen   Tablet. 650 milliGRAM(s) Oral every 6 hours PRN Mild Pain (1 - 3)  dextrose Gel 1 Dose(s) Oral once PRN Blood Glucose LESS THAN 70 milliGRAM(s)/deciliter  glucagon  Injectable 1 milliGRAM(s) IntraMuscular once PRN Glucose LESS THAN 70 milligrams/deciliter  lidocaine 2% Gel 1 Application(s) Topical three times a day PRN dela cruz discomfort  oxyCODONE    IR 5 milliGRAM(s) Oral every 4 hours PRN Moderate Pain (4 - 6)  oxyCODONE    IR 10 milliGRAM(s) Oral every 6 hours PRN Severe Pain (7 - 10)        CAPILLARY BLOOD GLUCOSE  217 (2017 17:21)  290 (2017 12:42)      POCT Blood Glucose.: 217 mg/dL (2017 16:59)  POCT Blood Glucose.: 290 mg/dL (2017 11:57)  POCT Blood Glucose.: 254 mg/dL (2017 08:09)  POCT Blood Glucose.: 248 mg/dL (2017 21:59)    I&O's Summary    2017 07:01  -  2017 07:00  --------------------------------------------------------  IN: 715 mL / OUT: 3020 mL / NET: -2305 mL    2017 08:01  -  2017 18:56  --------------------------------------------------------  IN: 0 mL / OUT: 1750 mL / NET: -1750 mL        PHYSICAL EXAM:  GENERAL: NAD, well-developed  HEAD:  Atraumatic, Normocephalic  NECK: Supple, No JVD  CHEST/LUNG: Clear to auscultation bilaterally; No wheezing.  HEART: Regular rate and rhythm; No murmurs, rubs, or gallops  ABDOMEN: Soft, Nontender, Nondistended; Bowel sounds present  EXTREMITIES:   No clubbing, cyanosis, or edema  NEUROLOGY: AAO X 3  SKIN: No rashes    LABS:                        10.3   3.44  )-----------( 229      ( 2017 05:15 )             29.7     11-04    140  |  102  |  6<L>  ----------------------------<  222<H>  4.0   |  29  |  0.85    Ca    8.6      2017 05:15      PTT - ( 2017 05:15 )  PTT:70.0 SEC      Urinalysis Basic - ( 2017 12:05 )    Color: PLYEL / Appearance: CLEAR / S.005 / pH: 7.0  Gluc: 1000 / Ketone: NEGATIVE  / Bili: NEGATIVE / Urobili: NORMAL E.U.   Blood: MODERATE / Protein: NEGATIVE / Nitrite: NEGATIVE   Leuk Esterase: NEGATIVE / RBC: >50 / WBC x   Sq Epi: OCC / Non Sq Epi: x / Bacteria: x      CAPILLARY BLOOD GLUCOSE  217 (2017 17:21)  290 (2017 12:42)      POCT Blood Glucose.: 217 mg/dL (2017 16:59)  POCT Blood Glucose.: 290 mg/dL (2017 11:57)  POCT Blood Glucose.: 254 mg/dL (2017 08:09)  POCT Blood Glucose.: 248 mg/dL (2017 21:59)    10-30 @ 20:43  Culture-urine --  Culture results --  method type --  Organism --  Organism Identification --  Specimen source OTHER  10-30 @ 20:39  Culture-urine --  Culture results --  method type --  Organism --  Organism Identification --  Specimen source OTHER           10-30 @ 20:43  Culture blood --  Culture results --  Gram stain --  Gram stain blood --  Method type --  Organism --  Organism identification --  Specimen source OTHER   10-30 @ 20:39  Culture blood --  Culture results --  Gram stain --  Gram stain blood --  Method type --  Organism --  Organism identification --  Specimen source OTHER      RADIOLOGY & ADDITIONAL TESTS:    Imaging Personally Reviewed:    Consultant(s) Notes Reviewed:      Care Discussed with Consultants/Other Providers:

## 2017-11-05 LAB
ALBUMIN SERPL ELPH-MCNC: 3.3 G/DL — SIGNIFICANT CHANGE UP (ref 3.3–5)
ALP SERPL-CCNC: 71 U/L — SIGNIFICANT CHANGE UP (ref 40–120)
ALT FLD-CCNC: 30 U/L — SIGNIFICANT CHANGE UP (ref 4–41)
APTT BLD: 32.8 SEC — SIGNIFICANT CHANGE UP (ref 27.5–37.4)
AST SERPL-CCNC: 24 U/L — SIGNIFICANT CHANGE UP (ref 4–40)
BILIRUB DIRECT SERPL-MCNC: 0.2 MG/DL — SIGNIFICANT CHANGE UP (ref 0.1–0.2)
BILIRUB SERPL-MCNC: 0.5 MG/DL — SIGNIFICANT CHANGE UP (ref 0.2–1.2)
BUN SERPL-MCNC: 9 MG/DL — SIGNIFICANT CHANGE UP (ref 7–23)
CALCIUM SERPL-MCNC: 9.1 MG/DL — SIGNIFICANT CHANGE UP (ref 8.4–10.5)
CHLORIDE SERPL-SCNC: 97 MMOL/L — LOW (ref 98–107)
CO2 SERPL-SCNC: 25 MMOL/L — SIGNIFICANT CHANGE UP (ref 22–31)
CREAT SERPL-MCNC: 0.93 MG/DL — SIGNIFICANT CHANGE UP (ref 0.5–1.3)
CULTURE - SURGICAL SITE: SIGNIFICANT CHANGE UP
CULTURE - SURGICAL SITE: SIGNIFICANT CHANGE UP
GLUCOSE BLDC GLUCOMTR-MCNC: 185 MG/DL — HIGH (ref 70–99)
GLUCOSE BLDC GLUCOMTR-MCNC: 248 MG/DL — HIGH (ref 70–99)
GLUCOSE BLDC GLUCOMTR-MCNC: 249 MG/DL — HIGH (ref 70–99)
GLUCOSE BLDC GLUCOMTR-MCNC: 288 MG/DL — HIGH (ref 70–99)
GLUCOSE SERPL-MCNC: 268 MG/DL — HIGH (ref 70–99)
HCT VFR BLD CALC: 36.4 % — LOW (ref 39–50)
HGB BLD-MCNC: 12.3 G/DL — LOW (ref 13–17)
MCHC RBC-ENTMCNC: 28.5 PG — SIGNIFICANT CHANGE UP (ref 27–34)
MCHC RBC-ENTMCNC: 33.8 % — SIGNIFICANT CHANGE UP (ref 32–36)
MCV RBC AUTO: 84.3 FL — SIGNIFICANT CHANGE UP (ref 80–100)
NRBC # FLD: 0 — SIGNIFICANT CHANGE UP
PLATELET # BLD AUTO: 260 K/UL — SIGNIFICANT CHANGE UP (ref 150–400)
PMV BLD: 11.3 FL — SIGNIFICANT CHANGE UP (ref 7–13)
POTASSIUM SERPL-MCNC: 4.3 MMOL/L — SIGNIFICANT CHANGE UP (ref 3.5–5.3)
POTASSIUM SERPL-SCNC: 4.3 MMOL/L — SIGNIFICANT CHANGE UP (ref 3.5–5.3)
PROT SERPL-MCNC: 6.9 G/DL — SIGNIFICANT CHANGE UP (ref 6–8.3)
RBC # BLD: 4.32 M/UL — SIGNIFICANT CHANGE UP (ref 4.2–5.8)
RBC # FLD: 13.3 % — SIGNIFICANT CHANGE UP (ref 10.3–14.5)
SODIUM SERPL-SCNC: 134 MMOL/L — LOW (ref 135–145)
WBC # BLD: 3.09 K/UL — LOW (ref 3.8–10.5)
WBC # FLD AUTO: 3.09 K/UL — LOW (ref 3.8–10.5)

## 2017-11-05 RX ORDER — METOPROLOL TARTRATE 50 MG
5 TABLET ORAL ONCE
Qty: 0 | Refills: 0 | Status: COMPLETED | OUTPATIENT
Start: 2017-11-05 | End: 2017-11-05

## 2017-11-05 RX ORDER — PHENAZOPYRIDINE HCL 100 MG
100 TABLET ORAL
Qty: 0 | Refills: 0 | Status: DISCONTINUED | OUTPATIENT
Start: 2017-11-05 | End: 2017-11-05

## 2017-11-05 RX ORDER — PHENAZOPYRIDINE HCL 100 MG
100 TABLET ORAL EVERY 8 HOURS
Qty: 0 | Refills: 0 | Status: COMPLETED | OUTPATIENT
Start: 2017-11-05 | End: 2017-11-08

## 2017-11-05 RX ORDER — FUROSEMIDE 40 MG
20 TABLET ORAL
Qty: 0 | Refills: 0 | Status: COMPLETED | OUTPATIENT
Start: 2017-11-05 | End: 2017-11-06

## 2017-11-05 RX ADMIN — Medication 100 MILLIGRAM(S): at 13:05

## 2017-11-05 RX ADMIN — OXYCODONE HYDROCHLORIDE 5 MILLIGRAM(S): 5 TABLET ORAL at 00:09

## 2017-11-05 RX ADMIN — SENNA PLUS 2 TABLET(S): 8.6 TABLET ORAL at 21:41

## 2017-11-05 RX ADMIN — Medication 20 MILLIGRAM(S): at 17:17

## 2017-11-05 RX ADMIN — Medication: at 12:57

## 2017-11-05 RX ADMIN — Medication 50 MILLIGRAM(S): at 13:02

## 2017-11-05 RX ADMIN — OXYCODONE HYDROCHLORIDE 10 MILLIGRAM(S): 5 TABLET ORAL at 18:54

## 2017-11-05 RX ADMIN — CARVEDILOL PHOSPHATE 12.5 MILLIGRAM(S): 80 CAPSULE, EXTENDED RELEASE ORAL at 17:17

## 2017-11-05 RX ADMIN — RIVAROXABAN 20 MILLIGRAM(S): KIT at 17:17

## 2017-11-05 RX ADMIN — POLYETHYLENE GLYCOL 3350 17 GRAM(S): 17 POWDER, FOR SOLUTION ORAL at 06:32

## 2017-11-05 RX ADMIN — Medication 100 MILLIGRAM(S): at 21:41

## 2017-11-05 RX ADMIN — Medication 650 MILLIGRAM(S): at 21:52

## 2017-11-05 RX ADMIN — Medication 100 MILLIGRAM(S): at 13:01

## 2017-11-05 RX ADMIN — Medication 50 MILLIGRAM(S): at 06:32

## 2017-11-05 RX ADMIN — SIMVASTATIN 20 MILLIGRAM(S): 20 TABLET, FILM COATED ORAL at 21:41

## 2017-11-05 RX ADMIN — Medication 10 MILLIGRAM(S): at 12:57

## 2017-11-05 RX ADMIN — Medication 100 MILLIGRAM(S): at 06:32

## 2017-11-05 RX ADMIN — Medication 40 MILLIGRAM(S): at 06:32

## 2017-11-05 RX ADMIN — Medication 650 MILLIGRAM(S): at 22:45

## 2017-11-05 RX ADMIN — Medication 20 MILLIEQUIVALENT(S): at 12:59

## 2017-11-05 RX ADMIN — Medication 5 MILLIGRAM(S): at 03:37

## 2017-11-05 RX ADMIN — Medication 8 MILLIGRAM(S): at 21:41

## 2017-11-05 RX ADMIN — CARVEDILOL PHOSPHATE 12.5 MILLIGRAM(S): 80 CAPSULE, EXTENDED RELEASE ORAL at 06:32

## 2017-11-05 RX ADMIN — Medication: at 17:17

## 2017-11-05 RX ADMIN — Medication 50 MILLIGRAM(S): at 21:41

## 2017-11-05 RX ADMIN — LISINOPRIL 20 MILLIGRAM(S): 2.5 TABLET ORAL at 06:32

## 2017-11-05 RX ADMIN — POLYETHYLENE GLYCOL 3350 17 GRAM(S): 17 POWDER, FOR SOLUTION ORAL at 17:17

## 2017-11-05 RX ADMIN — Medication: at 08:45

## 2017-11-05 RX ADMIN — LIDOCAINE 1 APPLICATION(S): 4 CREAM TOPICAL at 03:38

## 2017-11-05 NOTE — PROVIDER CONTACT NOTE (OTHER) - BACKGROUND
gabriel -  Tele Service -[phone]-  Patient asymptomatic converted from NSR to a. fib  90's low high 105HR - yelena haynes for any question ext [phone]

## 2017-11-05 NOTE — PROVIDER CONTACT NOTE (OTHER) - REASON
c/o of difficulty urinating - States he has to bear down for the urine to come but I&0 500cc 300 cc consistently.

## 2017-11-05 NOTE — PROGRESS NOTE ADULT - SUBJECTIVE AND OBJECTIVE BOX
PRESENTING CC: Pericardial Effusion s/p Window    SUBJ: 76 yo Male Hx Atrial fibrillation on Xarelto-last dose-10/26,HTN, presents with abdominal discomfort after eating x 1 month. PT state diffuse abdominal discomfort after eating meals.+ occasional RAMACHANDRAN ( walks 10 blocks),had CT Abdomen,revealing incidental-Pericardial Effusion,TTE-Large pericardial effusion,had IR guided drain-underwent Pericardial Window,CT removed. NO dyspnea chest pain.C/o dysuria,noted foot edema.Restaerted on Xarelto.      PMH -reviewed admission note, no change since admission  Heart faliure: acute [x ] chronic [ ] acute or chronic [ ] diastolic [x ] systolic [ ] combied systolic and diastolic[ ]  AIRAM: ATN[ ] renal medullary necrosis [ ] CKD I [ ]CKDII [ ]CKD III [ ]CKD IV [ ]CKD V [ ]Other pathological lesions [ ]    MEDICATIONS  (STANDING):  carvedilol 12.5 milliGRAM(s) Oral every 12 hours  docusate sodium 100 milliGRAM(s) Oral three times a day  doxazosin 8 milliGRAM(s) Oral at bedtime  furosemide    Tablet 40 milliGRAM(s) Oral daily  hydrALAZINE 50 milliGRAM(s) Oral three times a day  influenza   Vaccine 0.5 milliLiter(s) IntraMuscular once  insulin lispro (HumaLOG) corrective regimen sliding scale   SubCutaneous three times a day before meals  insulin lispro (HumaLOG) corrective regimen sliding scale   SubCutaneous at bedtime  lisinopril 20 milliGRAM(s) Oral daily  oxybutynin 10 milliGRAM(s) Oral daily  polyethylene glycol 3350 17 Gram(s) Oral two times a day  potassium chloride    Tablet ER 20 milliEquivalent(s) Oral daily  rivaroxaban 20 milliGRAM(s) Oral every 24 hours  senna 2 Tablet(s) Oral at bedtime  simvastatin 20 milliGRAM(s) Oral at bedtime    MEDICATIONS  (PRN):  acetaminophen   Tablet. 650 milliGRAM(s) Oral every 6 hours PRN Mild Pain (1 - 3)  dextrose Gel 1 Dose(s) Oral once PRN Blood Glucose LESS THAN 70 milliGRAM(s)/deciliter  glucagon  Injectable 1 milliGRAM(s) IntraMuscular once PRN Glucose LESS THAN 70 milligrams/deciliter  lidocaine 2% Gel 1 Application(s) Topical three times a day PRN dela cruz discomfort  oxyCODONE    IR 5 milliGRAM(s) Oral every 4 hours PRN Moderate Pain (4 - 6)  oxyCODONE    IR 10 milliGRAM(s) Oral every 6 hours PRN Severe Pain (7 - 10)          FAMILY HISTORY:  No pertinent family history in first degree relatives  No family history of premature coronary artery disease or sudden cardiac death      REVIEW OF SYSTEMS:  Constitutional: [ ] fever, [ ]weight loss,  [x ]fatigue  Eyes: [ ] visual changes  Respiratory: [ ]shortness of breath;  [ ] cough, [ ]wheezing, [ ]chills, [ ]hemoptysis  Cardiovascular: [ ] chest pain, [ ]palpitations, [ ]dizziness,  [x ]leg swelling  Gastrointestinal: [ ] abdominal pain, [ ]nausea, [ ]vomiting,  [ ]diarrhea   Genitourinary: [x ] dysuria, [ ] hematuria  Neurologic: [ ] headaches [ ] tremors  Skin: [ ] itching, [ ]burning, [ ] rashes  Endocrine: [ ] heat or cold intolerance  Musculoskeletal: [ ] joint pain or swelling; [ ] muscle, back, or extremity pain  Psychiatric: [ ] depression, [ ]anxiety, [ ]mood swings, or [ ]difficulty sleeping  Hematologic: [ ] easy bruising, [ ] bleeding gums    [x] All remaining systems negative except as per above.     Vital Signs Last 24 Hrs  T(C): 36.9 (05 Nov 2017 04:47), Max: 36.9 (05 Nov 2017 04:47)  T(F): 98.4 (05 Nov 2017 04:47), Max: 98.4 (05 Nov 2017 04:47)  HR: 104 (05 Nov 2017 08:43) (74 - 104)  BP: 102/56 (05 Nov 2017 08:43) (102/56 - 140/72)  BP(mean): 90 (04 Nov 2017 12:42) (90 - 90)  RR: 20 (05 Nov 2017 08:43) (17 - 20)  SpO2: 95% (05 Nov 2017 08:43) (94% - 99%)  I&O's Summary    04 Nov 2017 08:01  -  05 Nov 2017 07:00  --------------------------------------------------------  IN: 0 mL / OUT: 3350 mL / NET: -3350 mL        PHYSICAL EXAM:  General: No acute distress  HEENT: EOMI, PERRL  Neck: Supple, No JVD  Lungs: Clear to percussion bilaterally; No rales or wheezing  Heart: Regular rate and rhythm; 2/6 systolic murmur,no rubs, or gallops  Abdomen: Nontender, bowel sounds present  Extremities: No clubbing, cyanosis, 1+ edema  Nervous system:  Alert & Oriented X3, no focal deficits  Psychiatric: Normal affect  Skin: No rashes or lesions    LABS:  11-05    134<L>  |  97<L>  |  9   ----------------------------<  268<H>  4.3   |  25  |  0.93    Ca    9.1      05 Nov 2017 06:00    TPro  6.9  /  Alb  3.3  /  TBili  0.5  /  DBili  0.2  /  AST  24  /  ALT  30  /  AlkPhos  71  11-05    Creatinine Trend: 0.93<--, 0.85<--, 0.96<--, 0.90<--, 0.80<--, 0.96<--                        12.3   3.09  )-----------( 260      ( 05 Nov 2017 06:00 )             36.4     PTT - ( 05 Nov 2017 06:00 )  PTT:32.8 SEC          IMPRESSION AND PLAN:    76 yo M moderate to large pericardial effusion and biliary colic    Problem/Plan - 1:  ·  Problem: Pericardial effusion.  Plan:  Moderate Pericardial Effusion with early tamponade physiology,s/p IR drainage s/p Pericardial Window -PATHOLOGY_FIBRINOUS PERICARDITIS>  ESR, CRP. TSH Had Pericardiocentesis for diagnostic and therapeutic tap- possible 2/2 viral pericarditis-IR drainage- drained dark red fluid-hemorrhagic- cytology-NEGATIVE. Hematocrit remains stable.  Will check RUQ sonogram pt with + cholithiasis. IMPRESSION: Small ascites. Cholelithiasis without acute cholecystitis.     Problem/Plan - 2:  ·  Problem: Atrial Wgowadovgwxr-Yhuhayihn-[id number].  Plan: on Xarelto- Restarted  continue coreg.     Problem/Plan - 3:  ·  Problem: HTN (hypertension).  Plan: coreg, hydralazine, lasix held     Problem/Plan - 4:  ·  Problem: T2DM (type 2 diabetes mellitus).  Plan: FS Q6 NISS holding metformin, januvia and glipizide check A1c.     Problem/Plan - 5:  ·  Problem: BPH (benign prostatic hyperplasia).  Plan: doxazosin, prosca

## 2017-11-05 NOTE — PROVIDER CONTACT NOTE (OTHER) - SITUATION
gabriel -  Tele Service -[phone]-  Patient asymptomatic converted from NSR to a. fib  90's low high 105HR - yelena haynes for any question ext [phone]

## 2017-11-05 NOTE — PROGRESS NOTE ADULT - SUBJECTIVE AND OBJECTIVE BOX
INTERVAL HPI/OVERNIGHT EVENTS:    pt reports +bm last evening, no melena or brbpr  no abdominal pain   no n/v  tolerating diet    MEDICATIONS  (STANDING):  carvedilol 12.5 milliGRAM(s) Oral every 12 hours  dextrose 5%. 1000 milliLiter(s) (50 mL/Hr) IV Continuous <Continuous>  dextrose 50% Injectable 12.5 Gram(s) IV Push once  dextrose 50% Injectable 25 Gram(s) IV Push once  dextrose 50% Injectable 25 Gram(s) IV Push once  docusate sodium 100 milliGRAM(s) Oral three times a day  doxazosin 8 milliGRAM(s) Oral at bedtime  furosemide   Injectable 20 milliGRAM(s) IV Push two times a day  hydrALAZINE 50 milliGRAM(s) Oral three times a day  influenza   Vaccine 0.5 milliLiter(s) IntraMuscular once  insulin lispro (HumaLOG) corrective regimen sliding scale   SubCutaneous three times a day before meals  insulin lispro (HumaLOG) corrective regimen sliding scale   SubCutaneous at bedtime  lisinopril 20 milliGRAM(s) Oral daily  oxybutynin 10 milliGRAM(s) Oral daily  phenazopyridine 100 milliGRAM(s) Oral every 8 hours  polyethylene glycol 3350 17 Gram(s) Oral two times a day  potassium chloride    Tablet ER 20 milliEquivalent(s) Oral daily  rivaroxaban 20 milliGRAM(s) Oral every 24 hours  senna 2 Tablet(s) Oral at bedtime  simvastatin 20 milliGRAM(s) Oral at bedtime    MEDICATIONS  (PRN):  acetaminophen   Tablet. 650 milliGRAM(s) Oral every 6 hours PRN Mild Pain (1 - 3)  dextrose Gel 1 Dose(s) Oral once PRN Blood Glucose LESS THAN 70 milliGRAM(s)/deciliter  glucagon  Injectable 1 milliGRAM(s) IntraMuscular once PRN Glucose LESS THAN 70 milligrams/deciliter  lidocaine 2% Gel 1 Application(s) Topical three times a day PRN dela cruz discomfort  oxyCODONE    IR 5 milliGRAM(s) Oral every 4 hours PRN Moderate Pain (4 - 6)  oxyCODONE    IR 10 milliGRAM(s) Oral every 6 hours PRN Severe Pain (7 - 10)      Allergies    No Known Allergies    Intolerances        Review of Systems:    General:  No wt loss, fevers, chills, night sweats, fatigue   Eyes:  Good vision, no reported pain  ENT:  No sore throat, pain, runny nose, dysphagia  CV:  No pain, palpitations, hypo/hypertension  Resp:  No dyspnea, cough, tachypnea, wheezing  GI:  No pain, No nausea, No vomiting, No diarrhea, No constipation, No weight loss, No fever, No pruritis, No rectal bleeding, No melena, No dysphagia  :  No pain, bleeding, incontinence, nocturia  Muscle:  No pain, weakness  Neuro:  No weakness, tingling, memory problems  Psych:  No fatigue, insomnia, mood problems, depression  Endocrine:  No polyuria, polydypsia, cold/heat intolerance  Heme:  No petechiae, ecchymosis, easy bruisability  Skin:  No rash, tattoos, scars, edema      Vital Signs Last 24 Hrs  T(C): 36.9 (2017 04:47), Max: 36.9 (2017 04:47)  T(F): 98.4 (2017 04:47), Max: 98.4 (2017 04:47)  HR: 104 (2017 08:43) (74 - 104)  BP: 102/56 (2017 08:43) (102/56 - 140/72)  BP(mean): 90 (2017 12:42) (90 - 90)  RR: 20 (2017 08:43) (17 - 20)  SpO2: 95% (2017 08:43) (94% - 99%)    PHYSICAL EXAM:    Constitutional: NAD  HEENT: EOMI, throat clear  Neck: No LAD, supple  Respiratory: CTA and P  Cardiovascular: S1 and S2, RRR, no M  Gastrointestinal: BS+, soft, NT/ND, neg HSM,  Extremities: No peripheral edema, neg clubbing, cyanosis  Vascular: 2+ peripheral pulses  Neurological: A/O x 3, no focal deficits  Psychiatric: Normal mood, normal affect  Skin: No rashes      LABS:                        12.3   3.09  )-----------( 260      ( 2017 06:00 )             36.4     11-05    134<L>  |  97<L>  |  9   ----------------------------<  268<H>  4.3   |  25  |  0.93    Ca    9.1      2017 06:00    TPro  6.9  /  Alb  3.3  /  TBili  0.5  /  DBili  0.2  /  AST  24  /  ALT  30  /  AlkPhos  71  11-05    PTT - ( 2017 06:00 )  PTT:32.8 SEC  Urinalysis Basic - ( 2017 12:05 )    Color: PLYEL / Appearance: CLEAR / S.005 / pH: 7.0  Gluc: 1000 / Ketone: NEGATIVE  / Bili: NEGATIVE / Urobili: NORMAL E.U.   Blood: MODERATE / Protein: NEGATIVE / Nitrite: NEGATIVE   Leuk Esterase: NEGATIVE / RBC: >50 / WBC x   Sq Epi: OCC / Non Sq Epi: x / Bacteria: x        RADIOLOGY & ADDITIONAL TESTS:

## 2017-11-05 NOTE — PROVIDER CONTACT NOTE (OTHER) - SITUATION
c/o of difficulty urinating - States he has to bear down for the urine to come but I&0 500cc 300 cc consistently. Denies burning. urine color yellow and clear

## 2017-11-05 NOTE — PROGRESS NOTE ADULT - SUBJECTIVE AND OBJECTIVE BOX
Patient is a 77y old  Male who presents with a chief complaint of "Abdominal discomfort x 1 month" (27 Oct 2017 16:30)      SUBJECTIVE / OVERNIGHT EVENTS:   Feels better.  Denies CP/SOB/Palpitation/HA.    MEDICATIONS  (STANDING):  carvedilol 12.5 milliGRAM(s) Oral every 12 hours  dextrose 5%. 1000 milliLiter(s) (50 mL/Hr) IV Continuous <Continuous>  dextrose 50% Injectable 12.5 Gram(s) IV Push once  dextrose 50% Injectable 25 Gram(s) IV Push once  dextrose 50% Injectable 25 Gram(s) IV Push once  docusate sodium 100 milliGRAM(s) Oral three times a day  doxazosin 8 milliGRAM(s) Oral at bedtime  furosemide   Injectable 20 milliGRAM(s) IV Push two times a day  hydrALAZINE 50 milliGRAM(s) Oral three times a day  influenza   Vaccine 0.5 milliLiter(s) IntraMuscular once  insulin lispro (HumaLOG) corrective regimen sliding scale   SubCutaneous three times a day before meals  insulin lispro (HumaLOG) corrective regimen sliding scale   SubCutaneous at bedtime  lisinopril 20 milliGRAM(s) Oral daily  oxybutynin 10 milliGRAM(s) Oral daily  phenazopyridine 100 milliGRAM(s) Oral every 8 hours  polyethylene glycol 3350 17 Gram(s) Oral two times a day  potassium chloride    Tablet ER 20 milliEquivalent(s) Oral daily  rivaroxaban 20 milliGRAM(s) Oral every 24 hours  senna 2 Tablet(s) Oral at bedtime  simvastatin 20 milliGRAM(s) Oral at bedtime    MEDICATIONS  (PRN):  acetaminophen   Tablet. 650 milliGRAM(s) Oral every 6 hours PRN Mild Pain (1 - 3)  dextrose Gel 1 Dose(s) Oral once PRN Blood Glucose LESS THAN 70 milliGRAM(s)/deciliter  glucagon  Injectable 1 milliGRAM(s) IntraMuscular once PRN Glucose LESS THAN 70 milligrams/deciliter  lidocaine 2% Gel 1 Application(s) Topical three times a day PRN dela cruz discomfort  oxyCODONE    IR 5 milliGRAM(s) Oral every 4 hours PRN Moderate Pain (4 - 6)  oxyCODONE    IR 10 milliGRAM(s) Oral every 6 hours PRN Severe Pain (7 - 10)        CAPILLARY BLOOD GLUCOSE      POCT Blood Glucose.: 249 mg/dL (2017 21:31)  POCT Blood Glucose.: 185 mg/dL (2017 16:30)  POCT Blood Glucose.: 288 mg/dL (2017 12:08)  POCT Blood Glucose.: 248 mg/dL (2017 08:07)    I&O's Summary    2017 08:01  -  2017 07:00  --------------------------------------------------------  IN: 0 mL / OUT: 3350 mL / NET: -3350 mL    2017 07:01  -  2017 23:36  --------------------------------------------------------  IN: 0 mL / OUT: 2000 mL / NET: -2000 mL        PHYSICAL EXAM:  GENERAL: NAD, well-developed  HEAD:  Atraumatic, Normocephalic  NECK: Supple, No JVD  CHEST/LUNG: Clear to auscultation bilaterally; No wheezing.  HEART: Regular rate and rhythm; No murmurs, rubs, or gallops  ABDOMEN: Soft, Nontender, Nondistended; Bowel sounds present  EXTREMITIES:   No clubbing, cyanosis, or edema  NEUROLOGY: AAO X 3  SKIN: No rashes    LABS:                        12.3   3.09  )-----------( 260      ( 2017 06:00 )             36.4     11-05    134<L>  |  97<L>  |  9   ----------------------------<  268<H>  4.3   |  25  |  0.93    Ca    9.1      2017 06:00    TPro  6.9  /  Alb  3.3  /  TBili  0.5  /  DBili  0.2  /  AST  24  /  ALT  30  /  AlkPhos  71  11-05    PTT - ( 2017 06:00 )  PTT:32.8 SEC      Urinalysis Basic - ( 2017 12:05 )    Color: PLYEL / Appearance: CLEAR / S.005 / pH: 7.0  Gluc: 1000 / Ketone: NEGATIVE  / Bili: NEGATIVE / Urobili: NORMAL E.U.   Blood: MODERATE / Protein: NEGATIVE / Nitrite: NEGATIVE   Leuk Esterase: NEGATIVE / RBC: >50 / WBC x   Sq Epi: OCC / Non Sq Epi: x / Bacteria: x      CAPILLARY BLOOD GLUCOSE      POCT Blood Glucose.: 249 mg/dL (2017 21:31)  POCT Blood Glucose.: 185 mg/dL (2017 16:30)  POCT Blood Glucose.: 288 mg/dL (2017 12:08)  POCT Blood Glucose.: 248 mg/dL (2017 08:07)    10-30 @ 20:43  Culture-urine --  Culture results --  method type --  Organism --  Organism Identification --  Specimen source OTHER  10-30 @ 20:39  Culture-urine --  Culture results --  method type --  Organism --  Organism Identification --  Specimen source OTHER           10-30 @ 20:43  Culture blood --  Culture results --  Gram stain --  Gram stain blood --  Method type --  Organism --  Organism identification --  Specimen source OTHER   10-30 @ 20:39  Culture blood --  Culture results --  Gram stain --  Gram stain blood --  Method type --  Organism --  Organism identification --  Specimen source OTHER      RADIOLOGY & ADDITIONAL TESTS:    Imaging Personally Reviewed:    Consultant(s) Notes Reviewed:      Care Discussed with Consultants/Other Providers:

## 2017-11-06 LAB
ALBUMIN SERPL ELPH-MCNC: 3.2 G/DL — LOW (ref 3.3–5)
ALP SERPL-CCNC: 64 U/L — SIGNIFICANT CHANGE UP (ref 40–120)
ALT FLD-CCNC: 26 U/L — SIGNIFICANT CHANGE UP (ref 4–41)
APPEARANCE UR: CLEAR — SIGNIFICANT CHANGE UP
AST SERPL-CCNC: 24 U/L — SIGNIFICANT CHANGE UP (ref 4–40)
BACTERIA # UR AUTO: SIGNIFICANT CHANGE UP
BILIRUB SERPL-MCNC: 0.5 MG/DL — SIGNIFICANT CHANGE UP (ref 0.2–1.2)
BILIRUB UR-MCNC: NEGATIVE — SIGNIFICANT CHANGE UP
BLOOD UR QL VISUAL: HIGH
BUN SERPL-MCNC: 11 MG/DL — SIGNIFICANT CHANGE UP (ref 7–23)
BUN SERPL-MCNC: 11 MG/DL — SIGNIFICANT CHANGE UP (ref 7–23)
BUN SERPL-MCNC: 15 MG/DL — SIGNIFICANT CHANGE UP (ref 7–23)
CALCIUM SERPL-MCNC: 8.8 MG/DL — SIGNIFICANT CHANGE UP (ref 8.4–10.5)
CALCIUM SERPL-MCNC: 8.9 MG/DL — SIGNIFICANT CHANGE UP (ref 8.4–10.5)
CALCIUM SERPL-MCNC: 8.9 MG/DL — SIGNIFICANT CHANGE UP (ref 8.4–10.5)
CHLORIDE SERPL-SCNC: 96 MMOL/L — LOW (ref 98–107)
CHLORIDE SERPL-SCNC: 98 MMOL/L — SIGNIFICANT CHANGE UP (ref 98–107)
CHLORIDE SERPL-SCNC: 98 MMOL/L — SIGNIFICANT CHANGE UP (ref 98–107)
CO2 SERPL-SCNC: 28 MMOL/L — SIGNIFICANT CHANGE UP (ref 22–31)
COLOR SPEC: YELLOW — SIGNIFICANT CHANGE UP
CREAT SERPL-MCNC: 0.99 MG/DL — SIGNIFICANT CHANGE UP (ref 0.5–1.3)
CREAT SERPL-MCNC: 0.99 MG/DL — SIGNIFICANT CHANGE UP (ref 0.5–1.3)
CREAT SERPL-MCNC: 1.09 MG/DL — SIGNIFICANT CHANGE UP (ref 0.5–1.3)
GLUCOSE BLDC GLUCOMTR-MCNC: 218 MG/DL — HIGH (ref 70–99)
GLUCOSE BLDC GLUCOMTR-MCNC: 240 MG/DL — HIGH (ref 70–99)
GLUCOSE BLDC GLUCOMTR-MCNC: 246 MG/DL — HIGH (ref 70–99)
GLUCOSE BLDC GLUCOMTR-MCNC: 265 MG/DL — HIGH (ref 70–99)
GLUCOSE SERPL-MCNC: 252 MG/DL — HIGH (ref 70–99)
GLUCOSE SERPL-MCNC: 252 MG/DL — HIGH (ref 70–99)
GLUCOSE SERPL-MCNC: 283 MG/DL — HIGH (ref 70–99)
GLUCOSE UR-MCNC: 300 — SIGNIFICANT CHANGE UP
HCT VFR BLD CALC: 32.2 % — LOW (ref 39–50)
HGB BLD-MCNC: 11.1 G/DL — LOW (ref 13–17)
KETONES UR-MCNC: NEGATIVE — SIGNIFICANT CHANGE UP
LEUKOCYTE ESTERASE UR-ACNC: NEGATIVE — SIGNIFICANT CHANGE UP
MAGNESIUM SERPL-MCNC: 1.8 MG/DL — SIGNIFICANT CHANGE UP (ref 1.6–2.6)
MCHC RBC-ENTMCNC: 28.4 PG — SIGNIFICANT CHANGE UP (ref 27–34)
MCHC RBC-ENTMCNC: 34.5 % — SIGNIFICANT CHANGE UP (ref 32–36)
MCV RBC AUTO: 82.4 FL — SIGNIFICANT CHANGE UP (ref 80–100)
MUCOUS THREADS # UR AUTO: SIGNIFICANT CHANGE UP
NITRITE UR-MCNC: POSITIVE — HIGH
NRBC # FLD: 0 — SIGNIFICANT CHANGE UP
PH UR: 6.5 — SIGNIFICANT CHANGE UP (ref 4.6–8)
PLATELET # BLD AUTO: 244 K/UL — SIGNIFICANT CHANGE UP (ref 150–400)
PMV BLD: 11 FL — SIGNIFICANT CHANGE UP (ref 7–13)
POTASSIUM SERPL-MCNC: 3.7 MMOL/L — SIGNIFICANT CHANGE UP (ref 3.5–5.3)
POTASSIUM SERPL-MCNC: 3.7 MMOL/L — SIGNIFICANT CHANGE UP (ref 3.5–5.3)
POTASSIUM SERPL-MCNC: 4.2 MMOL/L — SIGNIFICANT CHANGE UP (ref 3.5–5.3)
POTASSIUM SERPL-SCNC: 3.7 MMOL/L — SIGNIFICANT CHANGE UP (ref 3.5–5.3)
POTASSIUM SERPL-SCNC: 3.7 MMOL/L — SIGNIFICANT CHANGE UP (ref 3.5–5.3)
POTASSIUM SERPL-SCNC: 4.2 MMOL/L — SIGNIFICANT CHANGE UP (ref 3.5–5.3)
PROT SERPL-MCNC: 6.3 G/DL — SIGNIFICANT CHANGE UP (ref 6–8.3)
PROT UR-MCNC: NEGATIVE — SIGNIFICANT CHANGE UP
RBC # BLD: 3.91 M/UL — LOW (ref 4.2–5.8)
RBC # FLD: 13.3 % — SIGNIFICANT CHANGE UP (ref 10.3–14.5)
RBC CASTS # UR COMP ASSIST: >50 — HIGH (ref 0–?)
SODIUM SERPL-SCNC: 135 MMOL/L — SIGNIFICANT CHANGE UP (ref 135–145)
SODIUM SERPL-SCNC: 137 MMOL/L — SIGNIFICANT CHANGE UP (ref 135–145)
SODIUM SERPL-SCNC: 137 MMOL/L — SIGNIFICANT CHANGE UP (ref 135–145)
SP GR SPEC: 1.01 — SIGNIFICANT CHANGE UP (ref 1–1.03)
SPECIMEN SOURCE: SIGNIFICANT CHANGE UP
SPECIMEN SOURCE: SIGNIFICANT CHANGE UP
UROBILINOGEN FLD QL: NORMAL E.U. — SIGNIFICANT CHANGE UP (ref 0.1–0.2)
WBC # BLD: 3.64 K/UL — LOW (ref 3.8–10.5)
WBC # FLD AUTO: 3.64 K/UL — LOW (ref 3.8–10.5)
WBC UR QL: HIGH (ref 0–?)

## 2017-11-06 PROCEDURE — 93010 ELECTROCARDIOGRAM REPORT: CPT

## 2017-11-06 RX ORDER — CEFTRIAXONE 500 MG/1
INJECTION, POWDER, FOR SOLUTION INTRAMUSCULAR; INTRAVENOUS
Qty: 0 | Refills: 0 | Status: DISCONTINUED | OUTPATIENT
Start: 2017-11-06 | End: 2017-11-09

## 2017-11-06 RX ORDER — CEFTRIAXONE 500 MG/1
1 INJECTION, POWDER, FOR SOLUTION INTRAMUSCULAR; INTRAVENOUS ONCE
Qty: 0 | Refills: 0 | Status: COMPLETED | OUTPATIENT
Start: 2017-11-06 | End: 2017-11-06

## 2017-11-06 RX ORDER — CEFTRIAXONE 500 MG/1
1 INJECTION, POWDER, FOR SOLUTION INTRAMUSCULAR; INTRAVENOUS EVERY 24 HOURS
Qty: 0 | Refills: 0 | Status: DISCONTINUED | OUTPATIENT
Start: 2017-11-07 | End: 2017-11-09

## 2017-11-06 RX ORDER — LIDOCAINE 4 G/100G
1 CREAM TOPICAL ONCE
Qty: 0 | Refills: 0 | Status: DISCONTINUED | OUTPATIENT
Start: 2017-11-06 | End: 2017-11-09

## 2017-11-06 RX ADMIN — Medication 8 MILLIGRAM(S): at 22:42

## 2017-11-06 RX ADMIN — CARVEDILOL PHOSPHATE 12.5 MILLIGRAM(S): 80 CAPSULE, EXTENDED RELEASE ORAL at 05:07

## 2017-11-06 RX ADMIN — Medication 20 MILLIGRAM(S): at 05:07

## 2017-11-06 RX ADMIN — SIMVASTATIN 20 MILLIGRAM(S): 20 TABLET, FILM COATED ORAL at 22:44

## 2017-11-06 RX ADMIN — Medication 2: at 12:14

## 2017-11-06 RX ADMIN — Medication 50 MILLIGRAM(S): at 05:07

## 2017-11-06 RX ADMIN — Medication 100 MILLIGRAM(S): at 22:44

## 2017-11-06 RX ADMIN — POLYETHYLENE GLYCOL 3350 17 GRAM(S): 17 POWDER, FOR SOLUTION ORAL at 05:07

## 2017-11-06 RX ADMIN — Medication 50 MILLIGRAM(S): at 15:00

## 2017-11-06 RX ADMIN — Medication 10 MILLIGRAM(S): at 12:15

## 2017-11-06 RX ADMIN — Medication 20 MILLIEQUIVALENT(S): at 12:15

## 2017-11-06 RX ADMIN — LISINOPRIL 20 MILLIGRAM(S): 2.5 TABLET ORAL at 05:07

## 2017-11-06 RX ADMIN — Medication 100 MILLIGRAM(S): at 15:00

## 2017-11-06 RX ADMIN — CEFTRIAXONE 100 GRAM(S): 500 INJECTION, POWDER, FOR SOLUTION INTRAMUSCULAR; INTRAVENOUS at 05:51

## 2017-11-06 RX ADMIN — POLYETHYLENE GLYCOL 3350 17 GRAM(S): 17 POWDER, FOR SOLUTION ORAL at 18:45

## 2017-11-06 RX ADMIN — CARVEDILOL PHOSPHATE 12.5 MILLIGRAM(S): 80 CAPSULE, EXTENDED RELEASE ORAL at 18:45

## 2017-11-06 RX ADMIN — Medication 50 MILLIGRAM(S): at 22:44

## 2017-11-06 RX ADMIN — Medication 3: at 08:26

## 2017-11-06 RX ADMIN — Medication 100 MILLIGRAM(S): at 05:07

## 2017-11-06 RX ADMIN — SENNA PLUS 2 TABLET(S): 8.6 TABLET ORAL at 22:43

## 2017-11-06 RX ADMIN — Medication 2: at 18:45

## 2017-11-06 RX ADMIN — RIVAROXABAN 20 MILLIGRAM(S): KIT at 18:45

## 2017-11-06 NOTE — PROGRESS NOTE ADULT - SUBJECTIVE AND OBJECTIVE BOX
INTERVAL HPI/OVERNIGHT EVENTS:    pt reports no n/v/d or abdominal pains  'feeling better'    MEDICATIONS  (STANDING):  carvedilol 12.5 milliGRAM(s) Oral every 12 hours  cefTRIAXone   IVPB      dextrose 5%. 1000 milliLiter(s) (50 mL/Hr) IV Continuous <Continuous>  dextrose 50% Injectable 12.5 Gram(s) IV Push once  dextrose 50% Injectable 25 Gram(s) IV Push once  dextrose 50% Injectable 25 Gram(s) IV Push once  docusate sodium 100 milliGRAM(s) Oral three times a day  doxazosin 8 milliGRAM(s) Oral at bedtime  hydrALAZINE 50 milliGRAM(s) Oral three times a day  influenza   Vaccine 0.5 milliLiter(s) IntraMuscular once  insulin lispro (HumaLOG) corrective regimen sliding scale   SubCutaneous three times a day before meals  insulin lispro (HumaLOG) corrective regimen sliding scale   SubCutaneous at bedtime  lidocaine 2% Gel 1 Application(s) Topical once  lisinopril 20 milliGRAM(s) Oral daily  oxybutynin 10 milliGRAM(s) Oral daily  phenazopyridine 100 milliGRAM(s) Oral every 8 hours  polyethylene glycol 3350 17 Gram(s) Oral two times a day  potassium chloride    Tablet ER 20 milliEquivalent(s) Oral daily  rivaroxaban 20 milliGRAM(s) Oral every 24 hours  senna 2 Tablet(s) Oral at bedtime  simvastatin 20 milliGRAM(s) Oral at bedtime    MEDICATIONS  (PRN):  acetaminophen   Tablet. 650 milliGRAM(s) Oral every 6 hours PRN Mild Pain (1 - 3)  dextrose Gel 1 Dose(s) Oral once PRN Blood Glucose LESS THAN 70 milliGRAM(s)/deciliter  glucagon  Injectable 1 milliGRAM(s) IntraMuscular once PRN Glucose LESS THAN 70 milligrams/deciliter  lidocaine 2% Gel 1 Application(s) Topical three times a day PRN dela cruz discomfort  oxyCODONE    IR 5 milliGRAM(s) Oral every 4 hours PRN Moderate Pain (4 - 6)  oxyCODONE    IR 10 milliGRAM(s) Oral every 6 hours PRN Severe Pain (7 - 10)      Allergies    No Known Allergies    Intolerances        Review of Systems:    General:  No wt loss, fevers, chills, night sweats, fatigue   Eyes:  Good vision, no reported pain  ENT:  No sore throat, pain, runny nose, dysphagia  CV:  No pain, palpitations, hypo/hypertension  Resp:  No dyspnea, cough, tachypnea, wheezing  GI:  No pain, No nausea, No vomiting, No diarrhea, No constipation, No weight loss, No fever, No pruritis, No rectal bleeding, No melena, No dysphagia  :  No pain, bleeding, incontinence, nocturia  Muscle:  No pain, weakness  Neuro:  No weakness, tingling, memory problems  Psych:  No fatigue, insomnia, mood problems, depression  Endocrine:  No polyuria, polydypsia, cold/heat intolerance  Heme:  No petechiae, ecchymosis, easy bruisability  Skin:  No rash, tattoos, scars, edema      Vital Signs Last 24 Hrs  T(C): 36.4 (2017 12:10), Max: 36.9 (2017 20:55)  T(F): 97.6 (2017 12:10), Max: 98.5 (2017 20:55)  HR: 81 (2017 12:10) (72 - 82)  BP: 128/64 (2017 12:10) (119/56 - 146/80)  BP(mean): --  RR: 18 (2017 12:10) (18 - 18)  SpO2: 97% (2017 12:10) (91% - 99%)    PHYSICAL EXAM:    Constitutional: NAD  HEENT: EOMI, throat clear  Neck: No LAD, supple  Respiratory: CTA and P  Cardiovascular: S1 and S2, RRR, no M  Gastrointestinal: BS+, soft, NT/ND, neg HSM,  Extremities: No peripheral edema, neg clubbing, cyanosis  Vascular: 2+ peripheral pulses  Neurological: A/O x 3, no focal deficits  Psychiatric: Normal mood, normal affect  Skin: No rashes      LABS:                        11.1   3.64  )-----------( 244      ( 2017 06:00 )             32.2         137  |  98  |  11  ----------------------------<  252<H>  3.7   |  28  |  0.99    Ca    8.9      2017 06:00  Mg     1.8         TPro  6.3  /  Alb  3.2<L>  /  TBili  0.5  /  DBili  x   /  AST  24  /  ALT  26  /  AlkPhos  64  11-06    PTT - ( 2017 06:00 )  PTT:32.8 SEC  Urinalysis Basic - ( 2017 00:30 )    Color: YELLOW / Appearance: CLEAR / S.008 / pH: 6.5  Gluc: 300 / Ketone: NEGATIVE  / Bili: NEGATIVE / Urobili: NORMAL E.U.   Blood: MODERATE / Protein: NEGATIVE / Nitrite: POSITIVE   Leuk Esterase: NEGATIVE / RBC: >50 / WBC 5-10   Sq Epi: x / Non Sq Epi: x / Bacteria: MOD        RADIOLOGY & ADDITIONAL TESTS:

## 2017-11-06 NOTE — CONSULT NOTE ADULT - CONSULT REASON
Large pericardial effusion
elevated LFTs, abdominal pain
urinary retention
Med Eval.
Anemia, pericardial effusion, anasarca

## 2017-11-06 NOTE — PROGRESS NOTE ADULT - SUBJECTIVE AND OBJECTIVE BOX
PRESENTING CC:Pericardial effusion s/p Window    SUBJ:   78 yo Male Hx Atrial fibrillation on Xarelto-last dose-10/26,HTN, presents with abdominal discomfort after eating x 1 month. PT state diffuse abdominal discomfort after eating meals.+ occasional RAMACHANDRAN ( walks 10 blocks),had CT Abdomen,revealing incidental-Pericardial Effusion,TTE-Large pericardial effusion,had IR guided drain-underwent Pericardial Window,Chest Tube removed. NO dyspnea chest pain.C/o dysuria,noted foot edema.Restarted on Xarelto.Had Foleys reinserted last night.    PMH -reviewed admission note, no change since admission  Heart faliure: acute [ ] chronic [ ] acute or chronic [ ] diastolic [ ] systolic [ ] combied systolic and diastolic[ ]  AIRAM: ATN[ ] renal medullary necrosis [ ] CKD I [ ]CKDII [ ]CKD III [ ]CKD IV [ ]CKD V [ ]Other pathological lesions [ ]    MEDICATIONS  (STANDING):  carvedilol 12.5 milliGRAM(s) Oral every 12 hours  cefTRIAXone   IVPB      docusate sodium 100 milliGRAM(s) Oral three times a day  doxazosin 8 milliGRAM(s) Oral at bedtime  hydrALAZINE 50 milliGRAM(s) Oral three times a day  influenza   Vaccine 0.5 milliLiter(s) IntraMuscular once  insulin lispro (HumaLOG) corrective regimen sliding scale   SubCutaneous three times a day before meals  insulin lispro (HumaLOG) corrective regimen sliding scale   SubCutaneous at bedtime  lidocaine 2% Gel 1 Application(s) Topical once  lisinopril 20 milliGRAM(s) Oral daily  oxybutynin 10 milliGRAM(s) Oral daily  phenazopyridine 100 milliGRAM(s) Oral every 8 hours  polyethylene glycol 3350 17 Gram(s) Oral two times a day  potassium chloride    Tablet ER 20 milliEquivalent(s) Oral daily  rivaroxaban 20 milliGRAM(s) Oral every 24 hours  senna 2 Tablet(s) Oral at bedtime  simvastatin 20 milliGRAM(s) Oral at bedtime    MEDICATIONS  (PRN):  acetaminophen   Tablet. 650 milliGRAM(s) Oral every 6 hours PRN Mild Pain (1 - 3)  dextrose Gel 1 Dose(s) Oral once PRN Blood Glucose LESS THAN 70 milliGRAM(s)/deciliter  glucagon  Injectable 1 milliGRAM(s) IntraMuscular once PRN Glucose LESS THAN 70 milligrams/deciliter  lidocaine 2% Gel 1 Application(s) Topical three times a day PRN dela cruz discomfort  oxyCODONE    IR 5 milliGRAM(s) Oral every 4 hours PRN Moderate Pain (4 - 6)  oxyCODONE    IR 10 milliGRAM(s) Oral every 6 hours PRN Severe Pain (7 - 10)          FAMILY HISTORY:  No pertinent family history in first degree relatives  No family history of premature coronary artery disease or sudden cardiac death      REVIEW OF SYSTEMS:  Constitutional: [ ] fever, [ ]weight loss,  [x ]fatigue  Eyes: [ ] visual changes  Respiratory: [x ]shortness of breath;  [ ] cough, [ ]wheezing, [ ]chills, [ ]hemoptysis  Cardiovascular: [ ] chest pain, [ ]palpitations, [ ]dizziness,  [ ]leg swelling  Gastrointestinal: [ ] abdominal pain, [ ]nausea, [ ]vomiting,  [ ]diarrhea   Genitourinary: [x ] dysuria, [ ] hematuria  Neurologic: [ ] headaches [ ] tremors  Skin: [ ] itching, [ ]burning, [ ] rashes  Endocrine: [ ] heat or cold intolerance  Musculoskeletal: [ ] joint pain or swelling; [ ] muscle, back, or extremity pain  Psychiatric: [ ] depression, [ ]anxiety, [ ]mood swings, or [ ]difficulty sleeping  Hematologic: [ ] easy bruising, [ ] bleeding gums    [x] All remaining systems negative except as per above.     Vital Signs Last 24 Hrs  T(C): 36.6 (06 Nov 2017 05:45), Max: 36.9 (05 Nov 2017 20:55)  T(F): 97.9 (06 Nov 2017 05:45), Max: 98.5 (05 Nov 2017 20:55)  HR: 81 (06 Nov 2017 05:45) (72 - 104)  BP: 145/78 (06 Nov 2017 05:45) (102/56 - 146/80)  RR: 18 (06 Nov 2017 05:45) (18 - 20)  SpO2: 97% (06 Nov 2017 05:45) (93% - 99%)  I&O's Summary    04 Nov 2017 08:01  -  05 Nov 2017 07:00  --------------------------------------------------------  IN: 0 mL / OUT: 3350 mL / NET: -3350 mL    05 Nov 2017 07:01  -  06 Nov 2017 06:35  --------------------------------------------------------  IN: 0 mL / OUT: 3950 mL / NET: -3950 mL        PHYSICAL EXAM:  General: No acute distress,awake  HEENT: EOMI, PERRL  Neck: Supple, No JVD  Lungs: Clear to percussion bilaterally; No rales or wheezing  Heart: Regular rate and rhythm; 2/6 systolic murmur,no  rubs, or gallops  Abdomen: Nontender, bowel sounds present  Extremities: No clubbing, cyanosis, decreased edema  Nervous system:  Alert & Oriented X3, no focal deficits  Psychiatric: Normal affect  Skin: No rashes or lesions    LABS:  11-06    135  |  96<L>  |  15  ----------------------------<  283<H>  4.2   |  28  |  1.09    Ca    8.8      06 Nov 2017 00:30    TPro  6.9  /  Alb  3.3  /  TBili  0.5  /  DBili  0.2  /  AST  24  /  ALT  30  /  AlkPhos  71  11-05    Creatinine Trend: 1.09<--, 0.93<--, 0.85<--, 0.96<--, 0.90<--, 0.80<--                        12.3   3.09  )-----------( 260      ( 05 Nov 2017 06:00 )             36.4     PTT - ( 05 Nov 2017 06:00 )  PTT:32.8 SEC        IMPRESSION AND PLAN:    78 yo M moderate to large pericardial effusion and biliary colic    Problem/Plan - 1:  ·  Problem: Pericardial effusion.  Plan:  Moderate Pericardial Effusion with early tamponade physiology,s/p IR drainage s/p Pericardial Window -PATHOLOGY_FIBRINOUS PERICARDITIS>  ESR, CRP. TSH Had Pericardiocentesis for diagnostic and therapeutic tap- possible 2/2 viral pericarditis-IR drainage- drained dark red fluid-hemorrhagic- cytology-NEGATIVE. Hematocrit remains stable.  Will check RUQ sonogram pt with + cholithiasis. IMPRESSION: Small ascites. Cholelithiasis without acute cholecystitis.     Problem/Plan - 2:  ·  Problem: Atrial [id number].  Plan: on Xarelto- Restarted  continue coreg. Leg edema improved on lasix.    Problem/Plan - 3:  ·  Problem: HTN (hypertension).  Plan: coreg, hydralazine, lasix held     Problem/Plan - 4:  ·  Problem: T2DM (type 2 diabetes mellitus).  Plan: FS Q6 NISS holding metformin, januvia and glipizide check A1c.     Problem/Plan - 5:  ·  Problem: BPH (benign prostatic hyperplasia).  Plan: doxazosin, proscar.Foleys reinserted 2/2 retention-Urology evaluation.

## 2017-11-06 NOTE — CONSULT NOTE ADULT - ASSESSMENT
77M hx hydrocele, BPH on finasteride and doxazosin, urinary frequency on ditropan p/w urinary retention  -- c/w Jaylin dela cruz as outpt with Dr. Sibley  -- please hold ditropan  -- c/w bowel regimen, encourage fluid intake, ambulation  -- d/w Dr. High

## 2017-11-06 NOTE — DIETITIAN INITIAL EVALUATION ADULT. - OTHER INFO
Pt states that his appetite has been good and he has been eating well. Pt states that he knows and follows a consistent carb diet at home. Reviewed diet with Pt. Also reviewed a DASH diet with him. Pt had no complaints of GI distress nor of difficulty chewing or swallowing.

## 2017-11-06 NOTE — CHART NOTE - NSCHARTNOTEFT_GEN_A_CORE
Patient had TOV, found to be in retention with BS 1100, dela cruz placed with improvement. Will need Outpatient TOV with Dr. Sibley after 1 week. Full consult to follow    Luigi Pineda, PGY-1  Urology, pager #[phone]

## 2017-11-06 NOTE — CHART NOTE - NSCHARTNOTEFT_GEN_A_CORE
called by RN last night, pt c/o low abdominal pain and discomfort, states has the urge to urinate but not much is coming out. Pt abdomen appeared somewhat distended, bladder scan done and about 1100cc of Urine residual noted, pt was straight cath and felt relief, attempted TOV again  in a few hours- pt became symptomatic again, minimal urine outpt, repeated Bladder scan showed about 950 cc urine residual--> d/w urology # [id number]- advised to place dela cruz and monitor Urine outpt. will check UA, Ucx,  to f/up in am.

## 2017-11-06 NOTE — CONSULT NOTE ADULT - SUBJECTIVE AND OBJECTIVE BOX
HPI  77M hx hydrocele, BPH on finasteride and doxazosin, urinary frequency on ditropan p/w urinary retention. Initially retained 1100 cc urine this AM, seen on bladder scan. Pt was subsequently straight cathed, however, retained 950 on bladder scan after a 2nd attempted trial of void. Dela Cruz subsequently drained, now draining clear orange urine. Pt admitted for pericardial effusion now s/p pericardial window.    Pt's last BM was this AM, and he has since been ambulating several laps around the floor.    PAST MEDICAL & SURGICAL HISTORY:  Afib  Hyperlipidemia  HTN (hypertension)  Hernia, inguinal, right  BPH (benign prostatic hyperplasia)  T2DM (type 2 diabetes mellitus): &gt; 20 yrs  S/P cataract surgery: bilateral 4 years ago      MEDICATIONS  (STANDING):  carvedilol 12.5 milliGRAM(s) Oral every 12 hours  cefTRIAXone   IVPB      dextrose 5%. 1000 milliLiter(s) (50 mL/Hr) IV Continuous <Continuous>  dextrose 50% Injectable 12.5 Gram(s) IV Push once  dextrose 50% Injectable 25 Gram(s) IV Push once  dextrose 50% Injectable 25 Gram(s) IV Push once  docusate sodium 100 milliGRAM(s) Oral three times a day  doxazosin 8 milliGRAM(s) Oral at bedtime  hydrALAZINE 50 milliGRAM(s) Oral three times a day  influenza   Vaccine 0.5 milliLiter(s) IntraMuscular once  insulin lispro (HumaLOG) corrective regimen sliding scale   SubCutaneous three times a day before meals  insulin lispro (HumaLOG) corrective regimen sliding scale   SubCutaneous at bedtime  lidocaine 2% Gel 1 Application(s) Topical once  lisinopril 20 milliGRAM(s) Oral daily  oxybutynin 10 milliGRAM(s) Oral daily  phenazopyridine 100 milliGRAM(s) Oral every 8 hours  polyethylene glycol 3350 17 Gram(s) Oral two times a day  potassium chloride    Tablet ER 20 milliEquivalent(s) Oral daily  rivaroxaban 20 milliGRAM(s) Oral every 24 hours  senna 2 Tablet(s) Oral at bedtime  simvastatin 20 milliGRAM(s) Oral at bedtime    MEDICATIONS  (PRN):  acetaminophen   Tablet. 650 milliGRAM(s) Oral every 6 hours PRN Mild Pain (1 - 3)  dextrose Gel 1 Dose(s) Oral once PRN Blood Glucose LESS THAN 70 milliGRAM(s)/deciliter  glucagon  Injectable 1 milliGRAM(s) IntraMuscular once PRN Glucose LESS THAN 70 milligrams/deciliter  lidocaine 2% Gel 1 Application(s) Topical three times a day PRN dela cruz discomfort  oxyCODONE    IR 5 milliGRAM(s) Oral every 4 hours PRN Moderate Pain (4 - 6)  oxyCODONE    IR 10 milliGRAM(s) Oral every 6 hours PRN Severe Pain (7 - 10)      FAMILY HISTORY:  No pertinent family history in first degree relatives      Allergies    No Known Allergies    Intolerances        SOCIAL HISTORY:    REVIEW OF SYSTEMS: Otherwise negative as stated in HPI    Physical Exam  Vital signs  T(C): 36.8 (17 @ 20:24), Max: 36.8 (17 @ 20:24)  HR: 80 (17 @ 20:24)  BP: 135/74 (17 @ 20:24)  SpO2: 98% (17 @ 20:24)  Wt(kg): --    Output  I&O's Summary    2017 07:  -  2017 07:00  --------------------------------------------------------  IN: 0 mL / OUT: 5550 mL / NET: -5550 mL    2017 07:01  -  2017 23:22  --------------------------------------------------------  IN: 920 mL / OUT: 975 mL / NET: -55 mL      UOP    Gen:  [x] NAD [] toxic    Pulm:  [x] no resp distress [] no substernal retractions  	  CV:  [x] no JVD  [] RRR    GI:  [x] Soft [x] ND [x] NT    :  Glans Circumcised [x]Y  []N, []lesions:  Meatus Discharge []Y  [] N,  Blood []Y [] N  Testes  Descended []Y  []N,    Tender []Y  []N,   Epididymis Tender  []Y []N,    Cremasteric []Y  []N                        	  MSK:  Edema []Y []N    LABS:       @ 06:00    WBC 3.64  / Hct 32.2  / SCr 0.99      @ 00:30    WBC --    / Hct --    / SCr 1.09         137  |  98  |  11  ----------------------------<  252<H>  3.7   |  28  |  0.99    Ca    8.9      2017 06:00  Mg     1.8         TPro  6.3  /  Alb  3.2<L>  /  TBili  0.5  /  DBili  x   /  AST  24  /  ALT  26  /  AlkPhos  64      PTT - ( 2017 06:00 )  PTT:32.8 SEC  Urinalysis Basic - ( 2017 00:30 )    Color: YELLOW / Appearance: CLEAR / S.008 / pH: 6.5  Gluc: 300 / Ketone: NEGATIVE  / Bili: NEGATIVE / Urobili: NORMAL E.U.   Blood: MODERATE / Protein: NEGATIVE / Nitrite: POSITIVE   Leuk Esterase: NEGATIVE / RBC: >50 / WBC 5-10   Sq Epi: x / Non Sq Epi: x / Bacteria: MOD HPI  77M history of  hydrocele, BPH on finasteride and doxazosin, urinary frequency on ditropan p/w urinary retention. Initially retained 1100 cc urine this AM, seen on bladder scan. Pt was subsequently straight cathed, however, retained 950 on bladder scan after a 2nd attempted trial of void. Dela Cruz subsequently drained, now draining clear orange urine. Pt admitted for pericardial effusion now s/p pericardial window.    Pt's last BM was this AM, and he has since been ambulating several laps around the floor.    PAST MEDICAL & SURGICAL HISTORY:  Afib  Hyperlipidemia  HTN (hypertension)  Hernia, inguinal, right  BPH (benign prostatic hyperplasia)  T2DM (type 2 diabetes mellitus): &gt; 20 yrs  S/P cataract surgery: bilateral 4 years ago      MEDICATIONS  (STANDING):  carvedilol 12.5 milliGRAM(s) Oral every 12 hours  cefTRIAXone   IVPB      dextrose 5%. 1000 milliLiter(s) (50 mL/Hr) IV Continuous <Continuous>  dextrose 50% Injectable 12.5 Gram(s) IV Push once  dextrose 50% Injectable 25 Gram(s) IV Push once  dextrose 50% Injectable 25 Gram(s) IV Push once  docusate sodium 100 milliGRAM(s) Oral three times a day  doxazosin 8 milliGRAM(s) Oral at bedtime  hydrALAZINE 50 milliGRAM(s) Oral three times a day  influenza   Vaccine 0.5 milliLiter(s) IntraMuscular once  insulin lispro (HumaLOG) corrective regimen sliding scale   SubCutaneous three times a day before meals  insulin lispro (HumaLOG) corrective regimen sliding scale   SubCutaneous at bedtime  lidocaine 2% Gel 1 Application(s) Topical once  lisinopril 20 milliGRAM(s) Oral daily  oxybutynin 10 milliGRAM(s) Oral daily  phenazopyridine 100 milliGRAM(s) Oral every 8 hours  polyethylene glycol 3350 17 Gram(s) Oral two times a day  potassium chloride    Tablet ER 20 milliEquivalent(s) Oral daily  rivaroxaban 20 milliGRAM(s) Oral every 24 hours  senna 2 Tablet(s) Oral at bedtime  simvastatin 20 milliGRAM(s) Oral at bedtime    MEDICATIONS  (PRN):  acetaminophen   Tablet. 650 milliGRAM(s) Oral every 6 hours PRN Mild Pain (1 - 3)  dextrose Gel 1 Dose(s) Oral once PRN Blood Glucose LESS THAN 70 milliGRAM(s)/deciliter  glucagon  Injectable 1 milliGRAM(s) IntraMuscular once PRN Glucose LESS THAN 70 milligrams/deciliter  lidocaine 2% Gel 1 Application(s) Topical three times a day PRN dela cruz discomfort  oxyCODONE    IR 5 milliGRAM(s) Oral every 4 hours PRN Moderate Pain (4 - 6)  oxyCODONE    IR 10 milliGRAM(s) Oral every 6 hours PRN Severe Pain (7 - 10)      FAMILY HISTORY:  No pertinent family history in first degree relatives      Allergies    No Known Allergies    Intolerances        SOCIAL HISTORY:    REVIEW OF SYSTEMS: Otherwise negative as stated in HPI    Physical Exam  Vital signs  T(C): 36.8 (17 @ 20:24), Max: 36.8 (17 @ 20:24)  HR: 80 (17 @ 20:24)  BP: 135/74 (17 @ 20:24)  SpO2: 98% (17 @ 20:24)  Wt(kg): --    Output  I&O's Summary    2017 07:  -  2017 07:00  --------------------------------------------------------  IN: 0 mL / OUT: 5550 mL / NET: -5550 mL    2017 07:01  -  2017 23:22  --------------------------------------------------------  IN: 920 mL / OUT: 975 mL / NET: -55 mL      UOP    Gen:  [x] NAD [] toxic    Pulm:  [x] no resp distress [] no substernal retractions  	  CV:  [x] no JVD  [] RRR    GI:  [x] Soft [x] ND [x] NT    :  Glans Circumcised [x]Y  []N, []lesions:  Meatus Discharge []Y  [] N,  Blood []Y [] N  Testes  Descended []Y  []N,    Tender []Y  []N,   Epididymis Tender  []Y []N,    Cremasteric []Y  []N                        	  MSK:  Edema []Y []N    LABS:       @ 06:00    WBC 3.64  / Hct 32.2  / SCr 0.99      @ 00:30    WBC --    / Hct --    / SCr 1.09         137  |  98  |  11  ----------------------------<  252<H>  3.7   |  28  |  0.99    Ca    8.9      2017 06:00  Mg     1.8         TPro  6.3  /  Alb  3.2<L>  /  TBili  0.5  /  DBili  x   /  AST  24  /  ALT  26  /  AlkPhos  64      PTT - ( 2017 06:00 )  PTT:32.8 SEC  Urinalysis Basic - ( 2017 00:30 )    Color: YELLOW / Appearance: CLEAR / S.008 / pH: 6.5  Gluc: 300 / Ketone: NEGATIVE  / Bili: NEGATIVE / Urobili: NORMAL E.U.   Blood: MODERATE / Protein: NEGATIVE / Nitrite: POSITIVE   Leuk Esterase: NEGATIVE / RBC: >50 / WBC 5-10   Sq Epi: x / Non Sq Epi: x / Bacteria: MOD

## 2017-11-06 NOTE — PROVIDER CONTACT NOTE (OTHER) - ACTION/TREATMENT ORDERED:
CT NP Monique made aware; new PTT was ordered and sent.
CT NP Monique made aware; urinalysis was ordered and sent
CT NP Monique made aware; will continue to monitor.
Tele PA made aware; no new orders given at this time. Will continue to monitor.
Tele PA made aware; will order lidocaine gel for dela cruz discomfort; patient just received AM medications as per orders ; will continue to monitor.
VS taken. 12 lead EKG done. No new orders given
Monitor patient.
Provider ordered pain medications and Magnesium 2 grams
Will continue to monitor

## 2017-11-06 NOTE — PROGRESS NOTE ADULT - SUBJECTIVE AND OBJECTIVE BOX
Patient is a 77y old  Male who presents with a chief complaint of "Abdominal discomfort x 1 month" (27 Oct 2017 16:30)      SUBJECTIVE / OVERNIGHT EVENTS:  U. Retention: s/p dela cruz cath.  Denies CP/SOB/Palpitation/HA.    MEDICATIONS  (STANDING):  carvedilol 12.5 milliGRAM(s) Oral every 12 hours  cefTRIAXone   IVPB      dextrose 5%. 1000 milliLiter(s) (50 mL/Hr) IV Continuous <Continuous>  dextrose 50% Injectable 12.5 Gram(s) IV Push once  dextrose 50% Injectable 25 Gram(s) IV Push once  dextrose 50% Injectable 25 Gram(s) IV Push once  docusate sodium 100 milliGRAM(s) Oral three times a day  doxazosin 8 milliGRAM(s) Oral at bedtime  hydrALAZINE 50 milliGRAM(s) Oral three times a day  influenza   Vaccine 0.5 milliLiter(s) IntraMuscular once  insulin lispro (HumaLOG) corrective regimen sliding scale   SubCutaneous three times a day before meals  insulin lispro (HumaLOG) corrective regimen sliding scale   SubCutaneous at bedtime  lidocaine 2% Gel 1 Application(s) Topical once  lisinopril 20 milliGRAM(s) Oral daily  oxybutynin 10 milliGRAM(s) Oral daily  phenazopyridine 100 milliGRAM(s) Oral every 8 hours  polyethylene glycol 3350 17 Gram(s) Oral two times a day  potassium chloride    Tablet ER 20 milliEquivalent(s) Oral daily  rivaroxaban 20 milliGRAM(s) Oral every 24 hours  senna 2 Tablet(s) Oral at bedtime  simvastatin 20 milliGRAM(s) Oral at bedtime    MEDICATIONS  (PRN):  acetaminophen   Tablet. 650 milliGRAM(s) Oral every 6 hours PRN Mild Pain (1 - 3)  dextrose Gel 1 Dose(s) Oral once PRN Blood Glucose LESS THAN 70 milliGRAM(s)/deciliter  glucagon  Injectable 1 milliGRAM(s) IntraMuscular once PRN Glucose LESS THAN 70 milligrams/deciliter  lidocaine 2% Gel 1 Application(s) Topical three times a day PRN dela cruz discomfort  oxyCODONE    IR 5 milliGRAM(s) Oral every 4 hours PRN Moderate Pain (4 - 6)  oxyCODONE    IR 10 milliGRAM(s) Oral every 6 hours PRN Severe Pain (7 - 10)        CAPILLARY BLOOD GLUCOSE      POCT Blood Glucose.: 218 mg/dL (2017 21:11)  POCT Blood Glucose.: 246 mg/dL (2017 16:47)  POCT Blood Glucose.: 240 mg/dL (2017 11:43)  POCT Blood Glucose.: 265 mg/dL (2017 08:11)    I&O's Summary    2017 07:  -  2017 07:00  --------------------------------------------------------  IN: 0 mL / OUT: 5550 mL / NET: -5550 mL    2017 07:01  -  2017 21:46  --------------------------------------------------------  IN: 920 mL / OUT: 975 mL / NET: -55 mL        PHYSICAL EXAM:  GENERAL: NAD, well-developed  HEAD:  Atraumatic, Normocephalic  NECK: Supple, No JVD  CHEST/LUNG: Clear to auscultation bilaterally; No wheezing.  HEART: Regular rate and rhythm; No murmurs, rubs, or gallops  ABDOMEN: Soft, Nontender, Nondistended; Bowel sounds present  EXTREMITIES:   No clubbing, cyanosis, or edema  NEUROLOGY: AAO X 3  SKIN: No rashes    LABS:                        11.1   3.64  )-----------( 244      ( 2017 06:00 )             32.2         137  |  98  |  11  ----------------------------<  252<H>  3.7   |  28  |  0.99    Ca    8.9      2017 06:00  Mg     1.8         TPro  6.3  /  Alb  3.2<L>  /  TBili  0.5  /  DBili  x   /  AST  24  /  ALT  26  /  AlkPhos  64      PTT - ( 2017 06:00 )  PTT:32.8 SEC      Urinalysis Basic - ( 2017 00:30 )    Color: YELLOW / Appearance: CLEAR / S.008 / pH: 6.5  Gluc: 300 / Ketone: NEGATIVE  / Bili: NEGATIVE / Urobili: NORMAL E.U.   Blood: MODERATE / Protein: NEGATIVE / Nitrite: POSITIVE   Leuk Esterase: NEGATIVE / RBC: >50 / WBC 5-10   Sq Epi: x / Non Sq Epi: x / Bacteria: MOD      CAPILLARY BLOOD GLUCOSE      POCT Blood Glucose.: 218 mg/dL (2017 21:11)  POCT Blood Glucose.: 246 mg/dL (2017 16:47)  POCT Blood Glucose.: 240 mg/dL (2017 11:43)  POCT Blood Glucose.: 265 mg/dL (2017 08:11)                RADIOLOGY & ADDITIONAL TESTS:    Imaging Personally Reviewed:    Consultant(s) Notes Reviewed:      Care Discussed with Consultants/Other Providers:

## 2017-11-07 LAB
BACTERIA UR CULT: SIGNIFICANT CHANGE UP
BUN SERPL-MCNC: 13 MG/DL — SIGNIFICANT CHANGE UP (ref 7–23)
CALCIUM SERPL-MCNC: 8.8 MG/DL — SIGNIFICANT CHANGE UP (ref 8.4–10.5)
CHLORIDE SERPL-SCNC: 98 MMOL/L — SIGNIFICANT CHANGE UP (ref 98–107)
CO2 SERPL-SCNC: 25 MMOL/L — SIGNIFICANT CHANGE UP (ref 22–31)
CREAT SERPL-MCNC: 1.01 MG/DL — SIGNIFICANT CHANGE UP (ref 0.5–1.3)
GLUCOSE BLDC GLUCOMTR-MCNC: 235 MG/DL — HIGH (ref 70–99)
GLUCOSE BLDC GLUCOMTR-MCNC: 275 MG/DL — HIGH (ref 70–99)
GLUCOSE BLDC GLUCOMTR-MCNC: 277 MG/DL — HIGH (ref 70–99)
GLUCOSE BLDC GLUCOMTR-MCNC: 335 MG/DL — HIGH (ref 70–99)
GLUCOSE SERPL-MCNC: 271 MG/DL — HIGH (ref 70–99)
HBV DNA # SERPL NAA+PROBE: NOT DETECTED — SIGNIFICANT CHANGE UP
HBV DNA SERPL NAA+PROBE-LOG#: SIGNIFICANT CHANGE UP LOGIU/ML
HCT VFR BLD CALC: 33.1 % — LOW (ref 39–50)
HGB BLD-MCNC: 11.6 G/DL — LOW (ref 13–17)
MAGNESIUM SERPL-MCNC: 1.9 MG/DL — SIGNIFICANT CHANGE UP (ref 1.6–2.6)
MCHC RBC-ENTMCNC: 28.9 PG — SIGNIFICANT CHANGE UP (ref 27–34)
MCHC RBC-ENTMCNC: 35 % — SIGNIFICANT CHANGE UP (ref 32–36)
MCV RBC AUTO: 82.5 FL — SIGNIFICANT CHANGE UP (ref 80–100)
NRBC # FLD: 0 — SIGNIFICANT CHANGE UP
PLATELET # BLD AUTO: 259 K/UL — SIGNIFICANT CHANGE UP (ref 150–400)
PMV BLD: 11 FL — SIGNIFICANT CHANGE UP (ref 7–13)
POTASSIUM SERPL-MCNC: 4.1 MMOL/L — SIGNIFICANT CHANGE UP (ref 3.5–5.3)
POTASSIUM SERPL-SCNC: 4.1 MMOL/L — SIGNIFICANT CHANGE UP (ref 3.5–5.3)
RBC # BLD: 4.01 M/UL — LOW (ref 4.2–5.8)
RBC # FLD: 13.3 % — SIGNIFICANT CHANGE UP (ref 10.3–14.5)
SODIUM SERPL-SCNC: 135 MMOL/L — SIGNIFICANT CHANGE UP (ref 135–145)
SPECIMEN SOURCE: SIGNIFICANT CHANGE UP
WBC # BLD: 3.45 K/UL — LOW (ref 3.8–10.5)
WBC # FLD AUTO: 3.45 K/UL — LOW (ref 3.8–10.5)

## 2017-11-07 PROCEDURE — 93308 TTE F-UP OR LMTD: CPT | Mod: 26,GC

## 2017-11-07 PROCEDURE — 93321 DOPPLER ECHO F-UP/LMTD STD: CPT | Mod: 26

## 2017-11-07 PROCEDURE — 93325 DOPPLER ECHO COLOR FLOW MAPG: CPT | Mod: 26,GC

## 2017-11-07 RX ORDER — INSULIN GLARGINE 100 [IU]/ML
6 INJECTION, SOLUTION SUBCUTANEOUS AT BEDTIME
Qty: 0 | Refills: 0 | Status: DISCONTINUED | OUTPATIENT
Start: 2017-11-07 | End: 2017-11-09

## 2017-11-07 RX ADMIN — Medication 100 MILLIGRAM(S): at 14:38

## 2017-11-07 RX ADMIN — RIVAROXABAN 20 MILLIGRAM(S): KIT at 16:43

## 2017-11-07 RX ADMIN — Medication 100 MILLIGRAM(S): at 05:01

## 2017-11-07 RX ADMIN — SENNA PLUS 2 TABLET(S): 8.6 TABLET ORAL at 21:01

## 2017-11-07 RX ADMIN — Medication 100 MILLIGRAM(S): at 21:02

## 2017-11-07 RX ADMIN — Medication 100 MILLIGRAM(S): at 16:42

## 2017-11-07 RX ADMIN — CEFTRIAXONE 100 GRAM(S): 500 INJECTION, POWDER, FOR SOLUTION INTRAMUSCULAR; INTRAVENOUS at 05:02

## 2017-11-07 RX ADMIN — LISINOPRIL 20 MILLIGRAM(S): 2.5 TABLET ORAL at 05:01

## 2017-11-07 RX ADMIN — Medication 3: at 17:34

## 2017-11-07 RX ADMIN — Medication 8 MILLIGRAM(S): at 21:00

## 2017-11-07 RX ADMIN — POLYETHYLENE GLYCOL 3350 17 GRAM(S): 17 POWDER, FOR SOLUTION ORAL at 05:01

## 2017-11-07 RX ADMIN — Medication 50 MILLIGRAM(S): at 21:00

## 2017-11-07 RX ADMIN — Medication 4: at 12:19

## 2017-11-07 RX ADMIN — POLYETHYLENE GLYCOL 3350 17 GRAM(S): 17 POWDER, FOR SOLUTION ORAL at 16:43

## 2017-11-07 RX ADMIN — Medication 20 MILLIEQUIVALENT(S): at 12:21

## 2017-11-07 RX ADMIN — CARVEDILOL PHOSPHATE 12.5 MILLIGRAM(S): 80 CAPSULE, EXTENDED RELEASE ORAL at 05:01

## 2017-11-07 RX ADMIN — Medication 1: at 21:49

## 2017-11-07 RX ADMIN — Medication 100 MILLIGRAM(S): at 21:00

## 2017-11-07 RX ADMIN — Medication 50 MILLIGRAM(S): at 05:01

## 2017-11-07 RX ADMIN — Medication 50 MILLIGRAM(S): at 16:42

## 2017-11-07 RX ADMIN — CARVEDILOL PHOSPHATE 12.5 MILLIGRAM(S): 80 CAPSULE, EXTENDED RELEASE ORAL at 16:43

## 2017-11-07 RX ADMIN — Medication 2: at 08:39

## 2017-11-07 RX ADMIN — SIMVASTATIN 20 MILLIGRAM(S): 20 TABLET, FILM COATED ORAL at 21:01

## 2017-11-07 RX ADMIN — INSULIN GLARGINE 6 UNIT(S): 100 INJECTION, SOLUTION SUBCUTANEOUS at 21:48

## 2017-11-07 NOTE — PROGRESS NOTE ADULT - SUBJECTIVE AND OBJECTIVE BOX
PRESENTING CC:Pericardial Effusion    SUBJ: 76 yo Male Hx Atrial fibrillation on Xarelto-last dose-10/26,HTN, presents with abdominal discomfort after eating x 1 month. PT state diffuse abdominal discomfort after eating meals.+ occasional RAMACHANDRAN ( walks 10 blocks),had CT Abdomen,revealing incidental-Pericardial Effusion,TTE-Large pericardial effusion,had IR guided drain-underwent Pericardial Window,Chest Tube removed. NO dyspnea chest pain.C/o dysuria,noted foot edema.Restarted on Xarelto,still has Foleys-Urology consult noted.      PMH -reviewed admission note, no change since admission  Heart faliure: acute [ ] chronic [ ] acute or chronic [ ] diastolic [ ] systolic [ ] combied systolic and diastolic[ ]  AIRAM: ATN[ ] renal medullary necrosis [ ] CKD I [ ]CKDII [ ]CKD III [ ]CKD IV [ ]CKD V [ ]Other pathological lesions [ ]    MEDICATIONS  (STANDING):  carvedilol 12.5 milliGRAM(s) Oral every 12 hours  cefTRIAXone   IVPB      docusate sodium 100 milliGRAM(s) Oral three times a day  doxazosin 8 milliGRAM(s) Oral at bedtime  hydrALAZINE 50 milliGRAM(s) Oral three times a day  influenza   Vaccine 0.5 milliLiter(s) IntraMuscular once  insulin lispro (HumaLOG) corrective regimen sliding scale   SubCutaneous three times a day before meals  insulin lispro (HumaLOG) corrective regimen sliding scale   SubCutaneous at bedtime  lidocaine 2% Gel 1 Application(s) Topical once  lisinopril 20 milliGRAM(s) Oral daily  oxybutynin 10 milliGRAM(s) Oral daily  phenazopyridine 100 milliGRAM(s) Oral every 8 hours  polyethylene glycol 3350 17 Gram(s) Oral two times a day  potassium chloride    Tablet ER 20 milliEquivalent(s) Oral daily  rivaroxaban 20 milliGRAM(s) Oral every 24 hours  senna 2 Tablet(s) Oral at bedtime  simvastatin 20 milliGRAM(s) Oral at bedtime    MEDICATIONS  (PRN):  acetaminophen   Tablet. 650 milliGRAM(s) Oral every 6 hours PRN Mild Pain (1 - 3)  dextrose Gel 1 Dose(s) Oral once PRN Blood Glucose LESS THAN 70 milliGRAM(s)/deciliter  glucagon  Injectable 1 milliGRAM(s) IntraMuscular once PRN Glucose LESS THAN 70 milligrams/deciliter  lidocaine 2% Gel 1 Application(s) Topical three times a day PRN dela cruz discomfort  oxyCODONE    IR 5 milliGRAM(s) Oral every 4 hours PRN Moderate Pain (4 - 6)  oxyCODONE    IR 10 milliGRAM(s) Oral every 6 hours PRN Severe Pain (7 - 10)          FAMILY HISTORY:  No pertinent family history in first degree relatives  No family history of premature coronary artery disease or sudden cardiac death      REVIEW OF SYSTEMS:  Constitutional: [ ] fever, [ ]weight loss,  [x ]fatigue  Eyes: [ ] visual changes  Respiratory: [ ]shortness of breath;  [ ] cough, [ ]wheezing, [ ]chills, [ ]hemoptysis  Cardiovascular: [ ] chest pain, [ ]palpitations, [ ]dizziness,  [ ]leg swelling  Gastrointestinal: [ ] abdominal pain, [ ]nausea, [ ]vomiting,  [ ]diarrhea   Genitourinary: [x] dysuria, [ ] hematuria  Neurologic: [ ] headaches [ ] tremors  Skin: [ ] itching, [ ]burning, [ ] rashes  Endocrine: [ ] heat or cold intolerance  Musculoskeletal: [ ] joint pain or swelling; [ ] muscle, back, or extremity pain  Psychiatric: [ ] depression, [ ]anxiety, [ ]mood swings, or [ ]difficulty sleeping  Hematologic: [ ] easy bruising, [ ] bleeding gums    [x] All remaining systems negative except as per above.     Vital Signs Last 24 Hrs  T(C): 36.9 (07 Nov 2017 04:59), Max: 36.9 (07 Nov 2017 04:59)  T(F): 98.5 (07 Nov 2017 04:59), Max: 98.5 (07 Nov 2017 04:59)  HR: 91 (07 Nov 2017 04:59) (71 - 91)  BP: 129/62 (07 Nov 2017 04:59) (119/56 - 145/64)  BP(mean): --  RR: 18 (07 Nov 2017 04:59) (18 - 18)  SpO2: 100% (07 Nov 2017 04:59) (91% - 100%)  I&O's Summary    06 Nov 2017 07:01  -  07 Nov 2017 07:00  --------------------------------------------------------  IN: 920 mL / OUT: 2275 mL / NET: -1355 mL        PHYSICAL EXAM:  General: No acute distress  HEENT: EOMI, PERRL  Neck: Supple, No JVD  Lungs: Clear to percussion bilaterally; No rales or wheezing  Heart: Regular rate and rhythm; 2/6 systolic murmur,no  rubs, or gallops  Abdomen: Nontender, bowel sounds present  Extremities: No clubbing, cyanosis, or edema  Nervous system:  Alert & Oriented X3, no focal deficits  Psychiatric: Normal affect  Skin: No rashes or lesions    LABS:  11-07    135  |  98  |  13  ----------------------------<  271<H>  4.1   |  25  |  1.01    Ca    8.8      07 Nov 2017 06:34  Mg     1.9     11-07    TPro  6.3  /  Alb  3.2<L>  /  TBili  0.5  /  DBili  x   /  AST  24  /  ALT  26  /  AlkPhos  64  11-06    Creatinine Trend: 1.01<--, 0.99<--, 1.09<--, 0.93<--, 0.85<--, 0.96<--                        11.6   3.45  )-----------( 259      ( 07 Nov 2017 06:34 )             33.1         ECHO:PENDING      IMPRESSION AND PLAN:      76 yo M moderate to large pericardial effusion and biliary colic    Problem/Plan - 1:  ·  Problem: Pericardial effusion.  Plan:  Moderate Pericardial Effusion with early tamponade physiology,s/p IR drainage s/p Pericardial Window -PATHOLOGY_FIBRINOUS PERICARDITIS>  ESR, CRP. TSH Had Pericardiocentesis for diagnostic and therapeutic tap- possible 2/2 viral pericarditis-IR drainage- drained dark red fluid-hemorrhagic- cytology-NEGATIVE. Hematocrit remains stable.  Will check RUQ sonogram pt with + cholithiasis. IMPRESSION: Small ascites. Cholelithiasis without acute cholecystitis.   Awaiting repeat TTE.      Problem/Plan - 2:  ·  Problem: Atrial [id number].  Plan: on Xarelto- Restarted  continue coreg. Leg edema improved on lasix.    Problem/Plan - 3:  ·  Problem: HTN (hypertension).  Plan: coreg, hydralazine, lasix held     Problem/Plan - 4:  ·  Problem: T2DM (type 2 diabetes mellitus).  Plan: FS Q6 NISS holding metformin, januvia and glipizide check A1c.     Problem/Plan - 5:  ·  Problem: BPH (benign prostatic hyperplasia).  Plan: doxazosin, proscar.Foleys reinserted 2/2 retention-Urology evaluation noted hold Ditropan,TOV as outpatient.

## 2017-11-07 NOTE — PROGRESS NOTE ADULT - SUBJECTIVE AND OBJECTIVE BOX
INTERVAL HPI/OVERNIGHT EVENTS:    pt reports "feeling okay"  denies n/v/d/c, abdominal pain, melena or brbpr     MEDICATIONS  (STANDING):  carvedilol 12.5 milliGRAM(s) Oral every 12 hours  cefTRIAXone   IVPB      dextrose 5%. 1000 milliLiter(s) (50 mL/Hr) IV Continuous <Continuous>  dextrose 50% Injectable 12.5 Gram(s) IV Push once  dextrose 50% Injectable 25 Gram(s) IV Push once  dextrose 50% Injectable 25 Gram(s) IV Push once  docusate sodium 100 milliGRAM(s) Oral three times a day  doxazosin 8 milliGRAM(s) Oral at bedtime  hydrALAZINE 50 milliGRAM(s) Oral three times a day  influenza   Vaccine 0.5 milliLiter(s) IntraMuscular once  insulin lispro (HumaLOG) corrective regimen sliding scale   SubCutaneous three times a day before meals  insulin lispro (HumaLOG) corrective regimen sliding scale   SubCutaneous at bedtime  lidocaine 2% Gel 1 Application(s) Topical once  lisinopril 20 milliGRAM(s) Oral daily  phenazopyridine 100 milliGRAM(s) Oral every 8 hours  polyethylene glycol 3350 17 Gram(s) Oral two times a day  potassium chloride    Tablet ER 20 milliEquivalent(s) Oral daily  rivaroxaban 20 milliGRAM(s) Oral every 24 hours  senna 2 Tablet(s) Oral at bedtime  simvastatin 20 milliGRAM(s) Oral at bedtime    MEDICATIONS  (PRN):  acetaminophen   Tablet. 650 milliGRAM(s) Oral every 6 hours PRN Mild Pain (1 - 3)  dextrose Gel 1 Dose(s) Oral once PRN Blood Glucose LESS THAN 70 milliGRAM(s)/deciliter  glucagon  Injectable 1 milliGRAM(s) IntraMuscular once PRN Glucose LESS THAN 70 milligrams/deciliter  lidocaine 2% Gel 1 Application(s) Topical three times a day PRN dela cruz discomfort  oxyCODONE    IR 5 milliGRAM(s) Oral every 4 hours PRN Moderate Pain (4 - 6)  oxyCODONE    IR 10 milliGRAM(s) Oral every 6 hours PRN Severe Pain (7 - 10)      Allergies    No Known Allergies    Intolerances        Review of Systems:    General:  No wt loss, fevers, chills, night sweats, fatigue   Eyes:  Good vision, no reported pain  ENT:  No sore throat, pain, runny nose, dysphagia  CV:  No pain, palpitations, hypo/hypertension  Resp:  No dyspnea, cough, tachypnea, wheezing  GI:  No pain, No nausea, No vomiting, No diarrhea, No constipation, No weight loss, No fever, No pruritis, No rectal bleeding, No melena, No dysphagia  :  No pain, bleeding, incontinence, nocturia  Muscle:  No pain, weakness  Neuro:  No weakness, tingling, memory problems  Psych:  No fatigue, insomnia, mood problems, depression  Endocrine:  No polyuria, polydypsia, cold/heat intolerance  Heme:  No petechiae, ecchymosis, easy bruisability  Skin:  No rash, tattoos, scars, edema      Vital Signs Last 24 Hrs  T(C): 36.4 (2017 12:41), Max: 36.9 (2017 04:59)  T(F): 97.6 (2017 12:41), Max: 98.5 (2017 04:59)  HR: 79 (2017 12:41) (71 - 91)  BP: 140/82 (2017 12:41) (126/58 - 145/64)  BP(mean): --  RR: 18 (2017 12:41) (18 - 20)  SpO2: 96% (2017 12:41) (94% - 100%)    PHYSICAL EXAM:    Constitutional: NAD  HEENT: EOMI, throat clear  Neck: No LAD, supple  Respiratory: CTA and P  Cardiovascular: S1 and S2, RRR, no M  Gastrointestinal: BS+, soft, NT/ND, neg HSM,  Extremities: No peripheral edema, neg clubbing, cyanosis  Vascular: 2+ peripheral pulses  Neurological: A/O x 3, no focal deficits  Psychiatric: Normal mood, normal affect  Skin: No rashes      LABS:                        11.6   3.45  )-----------( 259      ( 2017 06:34 )             33.1     11    135  |  98  |  13  ----------------------------<  271<H>  4.1   |  25  |  1.01    Ca    8.8      2017 06:34  Mg     1.9         TPro  6.3  /  Alb  3.2<L>  /  TBili  0.5  /  DBili  x   /  AST  24  /  ALT  26  /  AlkPhos  64  11-06      Urinalysis Basic - ( 2017 00:30 )    Color: YELLOW / Appearance: CLEAR / S.008 / pH: 6.5  Gluc: 300 / Ketone: NEGATIVE  / Bili: NEGATIVE / Urobili: NORMAL E.U.   Blood: MODERATE / Protein: NEGATIVE / Nitrite: POSITIVE   Leuk Esterase: NEGATIVE / RBC: >50 / WBC 5-10   Sq Epi: x / Non Sq Epi: x / Bacteria: MOD        RADIOLOGY & ADDITIONAL TESTS:

## 2017-11-07 NOTE — PROGRESS NOTE ADULT - SUBJECTIVE AND OBJECTIVE BOX
Patient is a 77y old  Male who presents with a chief complaint of "Abdominal discomfort x 1 month" (27 Oct 2017 16:30)      SUBJECTIVE / OVERNIGHT EVENTS:   Feels better. Family at bedside.  Denies CP/SOB/Palpitation/HA.    MEDICATIONS  (STANDING):  carvedilol 12.5 milliGRAM(s) Oral every 12 hours  cefTRIAXone   IVPB      dextrose 5%. 1000 milliLiter(s) (50 mL/Hr) IV Continuous <Continuous>  dextrose 50% Injectable 12.5 Gram(s) IV Push once  dextrose 50% Injectable 25 Gram(s) IV Push once  dextrose 50% Injectable 25 Gram(s) IV Push once  docusate sodium 100 milliGRAM(s) Oral three times a day  doxazosin 8 milliGRAM(s) Oral at bedtime  hydrALAZINE 50 milliGRAM(s) Oral three times a day  influenza   Vaccine 0.5 milliLiter(s) IntraMuscular once  insulin glargine Injectable (LANTUS) 6 Unit(s) SubCutaneous at bedtime  insulin lispro (HumaLOG) corrective regimen sliding scale   SubCutaneous three times a day before meals  insulin lispro (HumaLOG) corrective regimen sliding scale   SubCutaneous at bedtime  lidocaine 2% Gel 1 Application(s) Topical once  lisinopril 20 milliGRAM(s) Oral daily  phenazopyridine 100 milliGRAM(s) Oral every 8 hours  polyethylene glycol 3350 17 Gram(s) Oral two times a day  potassium chloride    Tablet ER 20 milliEquivalent(s) Oral daily  rivaroxaban 20 milliGRAM(s) Oral every 24 hours  senna 2 Tablet(s) Oral at bedtime  simvastatin 20 milliGRAM(s) Oral at bedtime    MEDICATIONS  (PRN):  acetaminophen   Tablet. 650 milliGRAM(s) Oral every 6 hours PRN Mild Pain (1 - 3)  dextrose Gel 1 Dose(s) Oral once PRN Blood Glucose LESS THAN 70 milliGRAM(s)/deciliter  glucagon  Injectable 1 milliGRAM(s) IntraMuscular once PRN Glucose LESS THAN 70 milligrams/deciliter  lidocaine 2% Gel 1 Application(s) Topical three times a day PRN dela cruz discomfort  oxyCODONE    IR 5 milliGRAM(s) Oral every 4 hours PRN Moderate Pain (4 - 6)  oxyCODONE    IR 10 milliGRAM(s) Oral every 6 hours PRN Severe Pain (7 - 10)        CAPILLARY BLOOD GLUCOSE      POCT Blood Glucose.: 277 mg/dL (2017 21:41)  POCT Blood Glucose.: 275 mg/dL (2017 16:57)  POCT Blood Glucose.: 335 mg/dL (2017 11:59)  POCT Blood Glucose.: 235 mg/dL (2017 08:08)    I&O's Summary    2017 07:  -  2017 07:00  --------------------------------------------------------  IN: 920 mL / OUT: 2275 mL / NET: -1355 mL    2017 07:01  -  2017 22:44  --------------------------------------------------------  IN: 0 mL / OUT: 1220 mL / NET: -1220 mL        PHYSICAL EXAM:  GENERAL: NAD, well-developed  HEAD:  Atraumatic, Normocephalic  NECK: Supple, No JVD  CHEST/LUNG: Clear to auscultation bilaterally; No wheezing.  HEART: Regular rate and rhythm; No murmurs, rubs, or gallops  ABDOMEN: Soft, Nontender, Nondistended; Bowel sounds present  EXTREMITIES:   No clubbing, cyanosis, or edema  NEUROLOGY: AAO X 3  SKIN: No rashes    LABS:                        11.6   3.45  )-----------( 259      ( 2017 06:34 )             33.1         135  |  98  |  13  ----------------------------<  271<H>  4.1   |  25  |  1.01    Ca    8.8      2017 06:34  Mg     1.9         TPro  6.3  /  Alb  3.2<L>  /  TBili  0.5  /  DBili  x   /  AST  24  /  ALT  26  /  AlkPhos  64  11          Urinalysis Basic - ( 2017 00:30 )    Color: YELLOW / Appearance: CLEAR / S.008 / pH: 6.5  Gluc: 300 / Ketone: NEGATIVE  / Bili: NEGATIVE / Urobili: NORMAL E.U.   Blood: MODERATE / Protein: NEGATIVE / Nitrite: POSITIVE   Leuk Esterase: NEGATIVE / RBC: >50 / WBC 5-10   Sq Epi: x / Non Sq Epi: x / Bacteria: MOD      CAPILLARY BLOOD GLUCOSE      POCT Blood Glucose.: 277 mg/dL (2017 21:41)  POCT Blood Glucose.: 275 mg/dL (2017 16:57)  POCT Blood Glucose.: 335 mg/dL (2017 11:59)  POCT Blood Glucose.: 235 mg/dL (2017 08:08)     @ 01:24  Culture-urine   NO GROWTH AT 24 HOURS  Culture results --  method type --  Organism --  Organism Identification --  Specimen source URINE MIDSTREAM            @ 01:24  Culture blood --  Culture results --  Gram stain --  Gram stain blood --  Method type --  Organism --  Organism identification --  Specimen source URINE MIDSTREAM      RADIOLOGY & ADDITIONAL TESTS:    Imaging Personally Reviewed:    Consultant(s) Notes Reviewed:      Care Discussed with Consultants/Other Providers:

## 2017-11-08 LAB
BUN SERPL-MCNC: 9 MG/DL — SIGNIFICANT CHANGE UP (ref 7–23)
CALCIUM SERPL-MCNC: 8.9 MG/DL — SIGNIFICANT CHANGE UP (ref 8.4–10.5)
CHLORIDE SERPL-SCNC: 101 MMOL/L — SIGNIFICANT CHANGE UP (ref 98–107)
CO2 SERPL-SCNC: 29 MMOL/L — SIGNIFICANT CHANGE UP (ref 22–31)
CREAT SERPL-MCNC: 1.03 MG/DL — SIGNIFICANT CHANGE UP (ref 0.5–1.3)
GLUCOSE BLDC GLUCOMTR-MCNC: 215 MG/DL — HIGH (ref 70–99)
GLUCOSE BLDC GLUCOMTR-MCNC: 236 MG/DL — HIGH (ref 70–99)
GLUCOSE SERPL-MCNC: 242 MG/DL — HIGH (ref 70–99)
HCT VFR BLD CALC: 33.7 % — LOW (ref 39–50)
HGB BLD-MCNC: 11.4 G/DL — LOW (ref 13–17)
MAGNESIUM SERPL-MCNC: 2 MG/DL — SIGNIFICANT CHANGE UP (ref 1.6–2.6)
MCHC RBC-ENTMCNC: 28 PG — SIGNIFICANT CHANGE UP (ref 27–34)
MCHC RBC-ENTMCNC: 33.8 % — SIGNIFICANT CHANGE UP (ref 32–36)
MCV RBC AUTO: 82.8 FL — SIGNIFICANT CHANGE UP (ref 80–100)
NRBC # FLD: 0 — SIGNIFICANT CHANGE UP
PLATELET # BLD AUTO: 257 K/UL — SIGNIFICANT CHANGE UP (ref 150–400)
PMV BLD: 10.6 FL — SIGNIFICANT CHANGE UP (ref 7–13)
POTASSIUM SERPL-MCNC: 4.1 MMOL/L — SIGNIFICANT CHANGE UP (ref 3.5–5.3)
POTASSIUM SERPL-SCNC: 4.1 MMOL/L — SIGNIFICANT CHANGE UP (ref 3.5–5.3)
RBC # BLD: 4.07 M/UL — LOW (ref 4.2–5.8)
RBC # FLD: 13.6 % — SIGNIFICANT CHANGE UP (ref 10.3–14.5)
SODIUM SERPL-SCNC: 138 MMOL/L — SIGNIFICANT CHANGE UP (ref 135–145)
WBC # BLD: 3.61 K/UL — LOW (ref 3.8–10.5)
WBC # FLD AUTO: 3.61 K/UL — LOW (ref 3.8–10.5)

## 2017-11-08 PROCEDURE — 74182 MRI ABDOMEN W/CONTRAST: CPT | Mod: 26

## 2017-11-08 RX ADMIN — Medication 50 MILLIGRAM(S): at 05:01

## 2017-11-08 RX ADMIN — Medication 50 MILLIGRAM(S): at 21:15

## 2017-11-08 RX ADMIN — POLYETHYLENE GLYCOL 3350 17 GRAM(S): 17 POWDER, FOR SOLUTION ORAL at 17:25

## 2017-11-08 RX ADMIN — Medication 20 MILLIEQUIVALENT(S): at 11:38

## 2017-11-08 RX ADMIN — Medication 100 MILLIGRAM(S): at 05:00

## 2017-11-08 RX ADMIN — LISINOPRIL 20 MILLIGRAM(S): 2.5 TABLET ORAL at 05:01

## 2017-11-08 RX ADMIN — SIMVASTATIN 20 MILLIGRAM(S): 20 TABLET, FILM COATED ORAL at 21:15

## 2017-11-08 RX ADMIN — POLYETHYLENE GLYCOL 3350 17 GRAM(S): 17 POWDER, FOR SOLUTION ORAL at 05:01

## 2017-11-08 RX ADMIN — Medication 8 MILLIGRAM(S): at 21:15

## 2017-11-08 RX ADMIN — Medication 2: at 17:23

## 2017-11-08 RX ADMIN — CEFTRIAXONE 100 GRAM(S): 500 INJECTION, POWDER, FOR SOLUTION INTRAMUSCULAR; INTRAVENOUS at 05:01

## 2017-11-08 RX ADMIN — Medication 50 MILLIGRAM(S): at 13:22

## 2017-11-08 RX ADMIN — INSULIN GLARGINE 6 UNIT(S): 100 INJECTION, SOLUTION SUBCUTANEOUS at 21:15

## 2017-11-08 RX ADMIN — RIVAROXABAN 20 MILLIGRAM(S): KIT at 17:23

## 2017-11-08 RX ADMIN — Medication 100 MILLIGRAM(S): at 13:22

## 2017-11-08 RX ADMIN — Medication 3: at 12:02

## 2017-11-08 RX ADMIN — CARVEDILOL PHOSPHATE 12.5 MILLIGRAM(S): 80 CAPSULE, EXTENDED RELEASE ORAL at 17:23

## 2017-11-08 RX ADMIN — Medication 100 MILLIGRAM(S): at 21:15

## 2017-11-08 RX ADMIN — Medication 2: at 08:14

## 2017-11-08 RX ADMIN — CARVEDILOL PHOSPHATE 12.5 MILLIGRAM(S): 80 CAPSULE, EXTENDED RELEASE ORAL at 05:00

## 2017-11-08 RX ADMIN — SENNA PLUS 2 TABLET(S): 8.6 TABLET ORAL at 21:15

## 2017-11-08 NOTE — PROGRESS NOTE ADULT - SUBJECTIVE AND OBJECTIVE BOX
Patient is a 77y old  Male who presents with a chief complaint of "Abdominal discomfort x 1 month" (27 Oct 2017 16:30)      SUBJECTIVE / OVERNIGHT EVENTS:   Feels better.  Denies CP/SOB/Palpitation/HA.    MEDICATIONS  (STANDING):  carvedilol 12.5 milliGRAM(s) Oral every 12 hours  cefTRIAXone   IVPB      dextrose 5%. 1000 milliLiter(s) (50 mL/Hr) IV Continuous <Continuous>  dextrose 50% Injectable 12.5 Gram(s) IV Push once  dextrose 50% Injectable 25 Gram(s) IV Push once  dextrose 50% Injectable 25 Gram(s) IV Push once  docusate sodium 100 milliGRAM(s) Oral three times a day  doxazosin 8 milliGRAM(s) Oral at bedtime  hydrALAZINE 50 milliGRAM(s) Oral three times a day  influenza   Vaccine 0.5 milliLiter(s) IntraMuscular once  insulin glargine Injectable (LANTUS) 6 Unit(s) SubCutaneous at bedtime  insulin lispro (HumaLOG) corrective regimen sliding scale   SubCutaneous three times a day before meals  insulin lispro (HumaLOG) corrective regimen sliding scale   SubCutaneous at bedtime  lidocaine 2% Gel 1 Application(s) Topical once  lisinopril 20 milliGRAM(s) Oral daily  polyethylene glycol 3350 17 Gram(s) Oral two times a day  potassium chloride    Tablet ER 20 milliEquivalent(s) Oral daily  rivaroxaban 20 milliGRAM(s) Oral every 24 hours  senna 2 Tablet(s) Oral at bedtime  simvastatin 20 milliGRAM(s) Oral at bedtime    MEDICATIONS  (PRN):  acetaminophen   Tablet. 650 milliGRAM(s) Oral every 6 hours PRN Mild Pain (1 - 3)  dextrose Gel 1 Dose(s) Oral once PRN Blood Glucose LESS THAN 70 milliGRAM(s)/deciliter  glucagon  Injectable 1 milliGRAM(s) IntraMuscular once PRN Glucose LESS THAN 70 milligrams/deciliter  lidocaine 2% Gel 1 Application(s) Topical three times a day PRN dela cruz discomfort  oxyCODONE    IR 5 milliGRAM(s) Oral every 4 hours PRN Moderate Pain (4 - 6)  oxyCODONE    IR 10 milliGRAM(s) Oral every 6 hours PRN Severe Pain (7 - 10)        CAPILLARY BLOOD GLUCOSE  235 (08 Nov 2017 17:19)  264 (08 Nov 2017 12:01)      POCT Blood Glucose.: 215 mg/dL (08 Nov 2017 08:10)  POCT Blood Glucose.: 277 mg/dL (07 Nov 2017 21:41)    I&O's Summary    07 Nov 2017 07:01  -  08 Nov 2017 07:00  --------------------------------------------------------  IN: 0 mL / OUT: 2120 mL / NET: -2120 mL    08 Nov 2017 07:01  -  08 Nov 2017 17:21  --------------------------------------------------------  IN: 0 mL / OUT: 675 mL / NET: -675 mL        PHYSICAL EXAM:  GENERAL: NAD, well-developed  HEAD:  Atraumatic, Normocephalic  NECK: Supple, No JVD  CHEST/LUNG: Clear to auscultation bilaterally; No wheezing.  HEART: Regular rate and rhythm; No murmurs, rubs, or gallops  ABDOMEN: Soft, Nontender, Nondistended; Bowel sounds present  EXTREMITIES:   No clubbing, cyanosis, or edema  NEUROLOGY: AAO X 3  SKIN: No rashes    LABS:                        11.4   3.61  )-----------( 257      ( 08 Nov 2017 06:52 )             33.7     11-08    138  |  101  |  9   ----------------------------<  242<H>  4.1   |  29  |  1.03    Ca    8.9      08 Nov 2017 06:52  Mg     2.0     11-08              CAPILLARY BLOOD GLUCOSE  235 (08 Nov 2017 17:19)  264 (08 Nov 2017 12:01)      POCT Blood Glucose.: 215 mg/dL (08 Nov 2017 08:10)  POCT Blood Glucose.: 277 mg/dL (07 Nov 2017 21:41)    11-06 @ 01:24  Culture-urine   NO GROWTH AT 24 HOURS  Culture results --  method type --  Organism --  Organism Identification --  Specimen source URINE MIDSTREAM           11-06 @ 01:24  Culture blood --  Culture results --  Gram stain --  Gram stain blood --  Method type --  Organism --  Organism identification --  Specimen source URINE MIDSTREAM      RADIOLOGY & ADDITIONAL TESTS:    Imaging Personally Reviewed:    Consultant(s) Notes Reviewed:      Care Discussed with Consultants/Other Providers:

## 2017-11-08 NOTE — PROGRESS NOTE ADULT - SUBJECTIVE AND OBJECTIVE BOX
PRESENTING CC:Pericardial Effusion    SUBJ: 76 yo Male Hx Paroxysmal Atrial fibrillation on Xarelto-HTN, presenting  with Abdominal discomfort after eating x 1 month. PT states diffuse abdominal discomfort after eating meals.+ occasional RAMACHANDRAN ( walks 10 blocks),had CT Abdomen,revealing incidental-Pericardial Effusion,TTE-Large pericardial effusion,had IR guided drain-underwent Pericardial Window,Chest Tube removed. No dyspnea chest pain.C/o dysuria,noted foot edema.Restarted on Xarelto,still has Foleys-draining well,foot edema has resolved,awaiting MRI-abdomed.TTE-resolved pericardial effusion.      [facility] -reviewed admission note, no change since admission  Heart failure: acute [ ] chronic [ ] acute or chronic [ ] diastolic [ ] systolic [ ] combined systolic and diastolic[ ]  AIRAM: ATN[ ] renal medullary necrosis [ ] CKD I [ ]CKDII [ ]CKD III [ ]CKD IV [ ]CKD V [ ]Other pathological lesions [ ]    MEDICATIONS  (STANDING):  carvedilol 12.5 milliGRAM(s) Oral every 12 hours  cefTRIAXone   IVPB      docusate sodium 100 milliGRAM(s) Oral three times a day  doxazosin 8 milliGRAM(s) Oral at bedtime  hydrALAZINE 50 milliGRAM(s) Oral three times a day  influenza   Vaccine 0.5 milliLiter(s) IntraMuscular once  insulin glargine Injectable (LANTUS) 6 Unit(s) SubCutaneous at bedtime  insulin lispro (HumaLOG) corrective regimen sliding scale   SubCutaneous three times a day before meals  insulin lispro (HumaLOG) corrective regimen sliding scale   SubCutaneous at bedtime  lidocaine 2% Gel 1 Application(s) Topical once  lisinopril 20 milliGRAM(s) Oral daily  polyethylene glycol 3350 17 Gram(s) Oral two times a day  potassium chloride    Tablet ER 20 milliEquivalent(s) Oral daily  rivaroxaban 20 milliGRAM(s) Oral every 24 hours  senna 2 Tablet(s) Oral at bedtime  simvastatin 20 milliGRAM(s) Oral at bedtime    MEDICATIONS  (PRN):  acetaminophen   Tablet. 650 milliGRAM(s) Oral every 6 hours PRN Mild Pain (1 - 3)  dextrose Gel 1 Dose(s) Oral once PRN Blood Glucose LESS THAN 70 milliGRAM(s)/deciliter  glucagon  Injectable 1 milliGRAM(s) IntraMuscular once PRN Glucose LESS THAN 70 milligrams/deciliter  lidocaine 2% Gel 1 Application(s) Topical three times a day PRN dela cruz discomfort  oxyCODONE    IR 5 milliGRAM(s) Oral every 4 hours PRN Moderate Pain (4 - 6)  oxyCODONE    IR 10 milliGRAM(s) Oral every 6 hours PRN Severe Pain (7 - 10)          FAMILY HISTORY:  No pertinent family history in first degree relatives  No family history of premature coronary artery disease or sudden cardiac death      REVIEW OF SYSTEMS:  Constitutional: [ ] fever, [ ]weight loss,  [x ]fatigue  Eyes: [ ] visual changes  Respiratory: [ ]shortness of breath;  [ ] cough, [ ]wheezing, [ ]chills, [ ]hemoptysis  Cardiovascular: [ ] chest pain, [ ]palpitations, [ ]dizziness,  [ ]leg swelling  Gastrointestinal: [ ] abdominal pain, [ ]nausea, [ ]vomiting,  [ ]diarrhea   Genitourinary: [x ] dysuria, [ ] hematuria  Neurologic: [ ] headaches [ ] tremors  Skin: [ ] itching, [ ]burning, [ ] rashes  Endocrine: [ ] heat or cold intolerance  Musculoskeletal: [ ] joint pain or swelling; [ ] muscle, back, or extremity pain  Psychiatric: [ ] depression, [ ]anxiety, [ ]mood swings, or [ ]difficulty sleeping  Hematologic: [ ] easy bruising, [ ] bleeding gums    [x] All remaining systems negative except as per above.     Vital Signs Last 24 Hrs  T(C): 36.9 (08 Nov 2017 04:47), Max: 37.1 (07 Nov 2017 17:06)  T(F): 98.5 (08 Nov 2017 04:47), Max: 98.7 (07 Nov 2017 17:06)  HR: 72 (08 Nov 2017 04:47) (72 - 82)  BP: 139/66 (08 Nov 2017 04:47) (126/58 - 140/82)  RR: 18 (08 Nov 2017 04:47) (18 - 20)  SpO2: 97% (08 Nov 2017 04:47) (96% - 99%)  I&O's Summary    07 Nov 2017 07:01  -  08 Nov 2017 07:00  --------------------------------------------------------  IN: 0 mL / OUT: 2120 mL / NET: -2120 mL        PHYSICAL EXAM:  General: No acute distress  HEENT: EOMI, PERRL  Neck: Supple, No JVD  Lungs: Clear to percussion bilaterally; No rales or wheezing  Heart: Regular rate and rhythm; 2/6 systolic murmurs, rubs, or gallops  Abdomen: Nontender, bowel sounds present  Extremities: No clubbing, cyanosis, or edema  Nervous system:  Alert & Oriented X3, no focal deficits  Psychiatric: Normal affect  Skin: No rashes or lesions    LABS:  11-07    135  |  98  |  13  ----------------------------<  271<H>  4.1   |  25  |  1.01    Ca    8.8      07 Nov 2017 06:34  Mg     1.9     11-07      Creatinine Trend: 1.01<--, 0.99<--, 1.09<--, 0.93<--, 0.85<--, 0.96<--                        11.6   3.45  )-----------( 259      ( 07 Nov 2017 06:34 )             33.1     ECHO: Study Date: 11/7/2017 Limited study  Small pericardial effusion superior to the right atrium.  A left pleural effusion is seen.Compared with echocardiogram of 10/27/2017, the pericardial effusion has decreased significantly in size.      IMPRESSION AND PLAN:      76 yo M moderate to large pericardial effusion and biliary colic    Problem/Plan - 1:  ·  Problem: Pericardial effusion.  Plan:  Moderate Pericardial Effusion with early tamponade physiology,s/p IR drainage s/p Pericardial Window -PATHOLOGY_FIBRINOUS PERICARDITIS>  ESR, CRP. TSH Had Pericardiocentesis for diagnostic and therapeutic tap- possible 2/2 viral pericarditis-IR drainage- drained dark red fluid-hemorrhagic- cytology-NEGATIVE. Hematocrit remains stable.  Will check RUQ sonogram pt with + cholithiasis. IMPRESSION: Small ascites. Cholelithiasis without acute cholecystitis. For MRI_Abdomen  Repeat Limited TTE-Resolved Effusion.      Problem/Plan - 2:  ·  Problem: Atrial Fibrillation-Paroxysmal -CHADS2-3.  Plan: on Xarelto- Restarted  continue coreg. Leg edema improved after lasix.    Problem/Plan - 3:  ·  Problem: HTN (hypertension).  Plan: coreg, hydralazine,    Problem/Plan - 4:  ·  Problem: T2DM (type 2 diabetes mellitus).  Plan: FS Q6 NISS holding metformin, januvia and glipizide check A1c.     Problem/Plan - 5:  ·  Problem: BPH (benign prostatic hyperplasia).  Plan: doxazosin, proscar.Foleys reinserted 2/2 retention-Urology evaluation noted hold Ditropan,TOV as outpatient.

## 2017-11-08 NOTE — PROGRESS NOTE ADULT - SUBJECTIVE AND OBJECTIVE BOX
INTERVAL HPI/OVERNIGHT EVENTS:    pt with no n/v/d/c or abdominal pain   feeling okay    MEDICATIONS  (STANDING):  carvedilol 12.5 milliGRAM(s) Oral every 12 hours  cefTRIAXone   IVPB      dextrose 5%. 1000 milliLiter(s) (50 mL/Hr) IV Continuous <Continuous>  dextrose 50% Injectable 12.5 Gram(s) IV Push once  dextrose 50% Injectable 25 Gram(s) IV Push once  dextrose 50% Injectable 25 Gram(s) IV Push once  docusate sodium 100 milliGRAM(s) Oral three times a day  doxazosin 8 milliGRAM(s) Oral at bedtime  hydrALAZINE 50 milliGRAM(s) Oral three times a day  influenza   Vaccine 0.5 milliLiter(s) IntraMuscular once  insulin glargine Injectable (LANTUS) 6 Unit(s) SubCutaneous at bedtime  insulin lispro (HumaLOG) corrective regimen sliding scale   SubCutaneous three times a day before meals  insulin lispro (HumaLOG) corrective regimen sliding scale   SubCutaneous at bedtime  lidocaine 2% Gel 1 Application(s) Topical once  lisinopril 20 milliGRAM(s) Oral daily  polyethylene glycol 3350 17 Gram(s) Oral two times a day  potassium chloride    Tablet ER 20 milliEquivalent(s) Oral daily  rivaroxaban 20 milliGRAM(s) Oral every 24 hours  senna 2 Tablet(s) Oral at bedtime  simvastatin 20 milliGRAM(s) Oral at bedtime    MEDICATIONS  (PRN):  acetaminophen   Tablet. 650 milliGRAM(s) Oral every 6 hours PRN Mild Pain (1 - 3)  dextrose Gel 1 Dose(s) Oral once PRN Blood Glucose LESS THAN 70 milliGRAM(s)/deciliter  glucagon  Injectable 1 milliGRAM(s) IntraMuscular once PRN Glucose LESS THAN 70 milligrams/deciliter  lidocaine 2% Gel 1 Application(s) Topical three times a day PRN dela cruz discomfort  oxyCODONE    IR 5 milliGRAM(s) Oral every 4 hours PRN Moderate Pain (4 - 6)  oxyCODONE    IR 10 milliGRAM(s) Oral every 6 hours PRN Severe Pain (7 - 10)      Allergies    No Known Allergies    Intolerances        Review of Systems:    General:  No wt loss, fevers, chills, night sweats, fatigue   Eyes:  Good vision, no reported pain  ENT:  No sore throat, pain, runny nose, dysphagia  CV:  No pain, palpitations, hypo/hypertension  Resp:  No dyspnea, cough, tachypnea, wheezing  GI:  No pain, No nausea, No vomiting, No diarrhea, No constipation, No weight loss, No fever, No pruritis, No rectal bleeding, No melena, No dysphagia  :  No pain, bleeding, incontinence, nocturia  Muscle:  No pain, weakness  Neuro:  No weakness, tingling, memory problems  Psych:  No fatigue, insomnia, mood problems, depression  Endocrine:  No polyuria, polydypsia, cold/heat intolerance  Heme:  No petechiae, ecchymosis, easy bruisability  Skin:  No rash, tattoos, scars, edema      Vital Signs Last 24 Hrs  T(C): 36.9 (08 Nov 2017 12:01), Max: 37.1 (07 Nov 2017 17:06)  T(F): 98.5 (08 Nov 2017 12:01), Max: 98.7 (07 Nov 2017 17:06)  HR: 71 (08 Nov 2017 13:21) (71 - 76)  BP: 126/62 (08 Nov 2017 13:21) (126/62 - 140/73)  BP(mean): --  RR: 18 (08 Nov 2017 12:01) (18 - 20)  SpO2: 98% (08 Nov 2017 12:01) (97% - 99%)    PHYSICAL EXAM:    Constitutional: NAD  HEENT: EOMI, throat clear  Neck: No LAD, supple  Respiratory: CTA and P  Cardiovascular: S1 and S2, RRR, no M  Gastrointestinal: BS+, soft, NT/ND, neg HSM,  Extremities: No peripheral edema, neg clubbing, cyanosis  Vascular: 2+ peripheral pulses  Neurological: A/O x 3, no focal deficits  Psychiatric: Normal mood, normal affect  Skin: No rashes      LABS:                        11.4   3.61  )-----------( 257      ( 08 Nov 2017 06:52 )             33.7     11-08    138  |  101  |  9   ----------------------------<  242<H>  4.1   |  29  |  1.03    Ca    8.9      08 Nov 2017 06:52  Mg     2.0     11-08            RADIOLOGY & ADDITIONAL TESTS:

## 2017-11-09 ENCOUNTER — TRANSCRIPTION ENCOUNTER (OUTPATIENT)
Age: 77
End: 2017-11-09

## 2017-11-09 VITALS — DIASTOLIC BLOOD PRESSURE: 64 MMHG | SYSTOLIC BLOOD PRESSURE: 125 MMHG | HEART RATE: 72 BPM

## 2017-11-09 LAB
BUN SERPL-MCNC: 12 MG/DL — SIGNIFICANT CHANGE UP (ref 7–23)
CALCIUM SERPL-MCNC: 8.4 MG/DL — SIGNIFICANT CHANGE UP (ref 8.4–10.5)
CHLORIDE SERPL-SCNC: 103 MMOL/L — SIGNIFICANT CHANGE UP (ref 98–107)
CO2 SERPL-SCNC: 27 MMOL/L — SIGNIFICANT CHANGE UP (ref 22–31)
CREAT SERPL-MCNC: 0.98 MG/DL — SIGNIFICANT CHANGE UP (ref 0.5–1.3)
GLUCOSE BLDC GLUCOMTR-MCNC: 215 MG/DL — HIGH (ref 70–99)
GLUCOSE BLDC GLUCOMTR-MCNC: 325 MG/DL — HIGH (ref 70–99)
GLUCOSE SERPL-MCNC: 204 MG/DL — HIGH (ref 70–99)
HCT VFR BLD CALC: 33.1 % — LOW (ref 39–50)
HGB BLD-MCNC: 11 G/DL — LOW (ref 13–17)
MAGNESIUM SERPL-MCNC: 1.9 MG/DL — SIGNIFICANT CHANGE UP (ref 1.6–2.6)
MCHC RBC-ENTMCNC: 27.9 PG — SIGNIFICANT CHANGE UP (ref 27–34)
MCHC RBC-ENTMCNC: 33.2 % — SIGNIFICANT CHANGE UP (ref 32–36)
MCV RBC AUTO: 84 FL — SIGNIFICANT CHANGE UP (ref 80–100)
NRBC # FLD: 0 — SIGNIFICANT CHANGE UP
PLATELET # BLD AUTO: 249 K/UL — SIGNIFICANT CHANGE UP (ref 150–400)
PMV BLD: 11 FL — SIGNIFICANT CHANGE UP (ref 7–13)
POTASSIUM SERPL-MCNC: 4.2 MMOL/L — SIGNIFICANT CHANGE UP (ref 3.5–5.3)
POTASSIUM SERPL-SCNC: 4.2 MMOL/L — SIGNIFICANT CHANGE UP (ref 3.5–5.3)
RBC # BLD: 3.94 M/UL — LOW (ref 4.2–5.8)
RBC # FLD: 13.5 % — SIGNIFICANT CHANGE UP (ref 10.3–14.5)
SODIUM SERPL-SCNC: 138 MMOL/L — SIGNIFICANT CHANGE UP (ref 135–145)
WBC # BLD: 3.4 K/UL — LOW (ref 3.8–10.5)
WBC # FLD AUTO: 3.4 K/UL — LOW (ref 3.8–10.5)

## 2017-11-09 RX ORDER — RIVAROXABAN 15 MG-20MG
1 KIT ORAL
Qty: 0 | Refills: 0 | COMMUNITY

## 2017-11-09 RX ORDER — FUROSEMIDE 40 MG
1 TABLET ORAL
Qty: 0 | Refills: 0 | COMMUNITY

## 2017-11-09 RX ORDER — CARVEDILOL PHOSPHATE 80 MG/1
1 CAPSULE, EXTENDED RELEASE ORAL
Qty: 0 | Refills: 0 | COMMUNITY

## 2017-11-09 RX ORDER — POTASSIUM CHLORIDE 20 MEQ
1 PACKET (EA) ORAL
Qty: 30 | Refills: 0 | OUTPATIENT
Start: 2017-11-09 | End: 2017-12-09

## 2017-11-09 RX ORDER — OXYBUTYNIN CHLORIDE 5 MG
1 TABLET ORAL
Qty: 0 | Refills: 0 | COMMUNITY

## 2017-11-09 RX ORDER — LIDOCAINE 4 G/100G
1 CREAM TOPICAL
Qty: 2 | Refills: 0 | OUTPATIENT
Start: 2017-11-09 | End: 2017-12-09

## 2017-11-09 RX ORDER — HYDRALAZINE HCL 50 MG
1 TABLET ORAL
Qty: 0 | Refills: 0 | COMMUNITY

## 2017-11-09 RX ORDER — DOXAZOSIN MESYLATE 4 MG
1 TABLET ORAL
Qty: 0 | Refills: 0 | DISCHARGE
Start: 2017-11-09

## 2017-11-09 RX ORDER — CARVEDILOL PHOSPHATE 80 MG/1
1 CAPSULE, EXTENDED RELEASE ORAL
Qty: 0 | Refills: 0 | DISCHARGE
Start: 2017-11-09

## 2017-11-09 RX ORDER — ACETAMINOPHEN 500 MG
2 TABLET ORAL
Qty: 0 | Refills: 0 | DISCHARGE
Start: 2017-11-09

## 2017-11-09 RX ORDER — HYDRALAZINE HCL 50 MG
1 TABLET ORAL
Qty: 0 | Refills: 0 | DISCHARGE
Start: 2017-11-09

## 2017-11-09 RX ORDER — RIVAROXABAN 15 MG-20MG
1 KIT ORAL
Qty: 0 | Refills: 0 | DISCHARGE
Start: 2017-11-09

## 2017-11-09 RX ORDER — DOXAZOSIN MESYLATE 4 MG
1 TABLET ORAL
Qty: 0 | Refills: 0 | COMMUNITY

## 2017-11-09 RX ORDER — POLYETHYLENE GLYCOL 3350 17 G/17G
17 POWDER, FOR SOLUTION ORAL
Qty: 0 | Refills: 0 | DISCHARGE
Start: 2017-11-09

## 2017-11-09 RX ADMIN — Medication 50 MILLIGRAM(S): at 14:13

## 2017-11-09 RX ADMIN — LISINOPRIL 20 MILLIGRAM(S): 2.5 TABLET ORAL at 06:14

## 2017-11-09 RX ADMIN — LIDOCAINE 1 APPLICATION(S): 4 CREAM TOPICAL at 08:36

## 2017-11-09 RX ADMIN — CARVEDILOL PHOSPHATE 12.5 MILLIGRAM(S): 80 CAPSULE, EXTENDED RELEASE ORAL at 06:14

## 2017-11-09 RX ADMIN — Medication 100 MILLIGRAM(S): at 14:13

## 2017-11-09 RX ADMIN — Medication 4: at 12:10

## 2017-11-09 RX ADMIN — Medication 100 MILLIGRAM(S): at 06:14

## 2017-11-09 RX ADMIN — Medication 50 MILLIGRAM(S): at 06:14

## 2017-11-09 RX ADMIN — CEFTRIAXONE 100 GRAM(S): 500 INJECTION, POWDER, FOR SOLUTION INTRAMUSCULAR; INTRAVENOUS at 04:08

## 2017-11-09 RX ADMIN — Medication 20 MILLIEQUIVALENT(S): at 12:12

## 2017-11-09 RX ADMIN — POLYETHYLENE GLYCOL 3350 17 GRAM(S): 17 POWDER, FOR SOLUTION ORAL at 06:14

## 2017-11-09 RX ADMIN — Medication 2: at 08:11

## 2017-11-09 NOTE — PROGRESS NOTE ADULT - PROBLEM SELECTOR PROBLEM 4
Elevated LFTs

## 2017-11-09 NOTE — PROGRESS NOTE ADULT - SUBJECTIVE AND OBJECTIVE BOX
PRESENTING CC: Pericardial Effusion s/p Window    SUBJ: 78 yo Male Hx Paroxysmal Atrial fibrillation on Xarelto-HTN, presenting  with Abdominal discomfort after eating x 1 monthhad, CT Abdomen,revealing incidental-Pericardial Effusion-underwent Pericardial Window still has Foleys-draining well,foot edema has resolved,had MRI-multiple hepatic cysts-Hamartomas.      [facility] -reviewed admission note, no change since admission  Heart failure: acute [ ] chronic [ ] acute or chronic [ ] diastolic [ ] systolic [ ] combined systolic and diastolic[ ]  AIRAM: ATN[ ] renal medullary necrosis [ ] CKD I [ ]CKDII [ ]CKD III [ ]CKD IV [ ]CKD V [ ]Other pathological lesions [ ]    MEDICATIONS  (STANDING):  carvedilol 12.5 milliGRAM(s) Oral every 12 hours  cefTRIAXone   IVPB      docusate sodium 100 milliGRAM(s) Oral three times a day  doxazosin 8 milliGRAM(s) Oral at bedtime  hydrALAZINE 50 milliGRAM(s) Oral three times a day  influenza   Vaccine 0.5 milliLiter(s) IntraMuscular once  insulin glargine Injectable (LANTUS) 6 Unit(s) SubCutaneous at bedtime  insulin lispro (HumaLOG) corrective regimen sliding scale   SubCutaneous three times a day before meals  insulin lispro (HumaLOG) corrective regimen sliding scale   SubCutaneous at bedtime  lidocaine 2% Gel 1 Application(s) Topical once  lisinopril 20 milliGRAM(s) Oral daily  polyethylene glycol 3350 17 Gram(s) Oral two times a day  potassium chloride    Tablet ER 20 milliEquivalent(s) Oral daily  rivaroxaban 20 milliGRAM(s) Oral every 24 hours  senna 2 Tablet(s) Oral at bedtime  simvastatin 20 milliGRAM(s) Oral at bedtime    MEDICATIONS  (PRN):  acetaminophen   Tablet. 650 milliGRAM(s) Oral every 6 hours PRN Mild Pain (1 - 3)  dextrose Gel 1 Dose(s) Oral once PRN Blood Glucose LESS THAN 70 milliGRAM(s)/deciliter  glucagon  Injectable 1 milliGRAM(s) IntraMuscular once PRN Glucose LESS THAN 70 milligrams/deciliter  lidocaine 2% Gel 1 Application(s) Topical three times a day PRN dela cruz discomfort  oxyCODONE    IR 5 milliGRAM(s) Oral every 4 hours PRN Moderate Pain (4 - 6)  oxyCODONE    IR 10 milliGRAM(s) Oral every 6 hours PRN Severe Pain (7 - 10)          FAMILY HISTORY:  No pertinent family history in first degree relatives  No family history of premature coronary artery disease or sudden cardiac death      REVIEW OF SYSTEMS:  Constitutional: [ ] fever, [ ]weight loss,  [x ]fatigue  Eyes: [ ] visual changes  Respiratory: [ ]shortness of breath;  [ ] cough, [ ]wheezing, [ ]chills, [ ]hemoptysis  Cardiovascular: [ ] chest pain, [ ]palpitations, [ ]dizziness,  [ ]leg swelling  Gastrointestinal: [x ] abdominal pain, [ ]nausea, [ ]vomiting,  [ ]diarrhea   Genitourinary: [x ] dysuria, [ ] hematuria  Neurologic: [ ] headaches [ ] tremors  Skin: [ ] itching, [ ]burning, [ ] rashes  Endocrine: [ ] heat or cold intolerance  Musculoskeletal: [ ] joint pain or swelling; [ ] muscle, back, or extremity pain  Psychiatric: [ ] depression, [ ]anxiety, [ ]mood swings, or [ ]difficulty sleeping  Hematologic: [ ] easy bruising, [ ] bleeding gums    [x] All remaining systems negative except as per above.     Vital Signs Last 24 Hrs  T(C): 36.7 (09 Nov 2017 06:15), Max: 36.9 (08 Nov 2017 12:01)  T(F): 98.1 (09 Nov 2017 06:15), Max: 98.5 (08 Nov 2017 12:01)  HR: 77 (09 Nov 2017 06:15) (67 - 77)  BP: 129/73 (09 Nov 2017 06:15) (126/62 - 146/80)  RR: 18 (09 Nov 2017 06:15) (18 - 20)  SpO2: 97% (09 Nov 2017 06:15) (96% - 100%)  I&O's Summary    08 Nov 2017 07:01  -  09 Nov 2017 07:00  --------------------------------------------------------  IN: 720 mL / OUT: 2775 mL / NET: -2055 mL        PHYSICAL EXAM:  General: No acute distress  HEENT: EOMI, PERRL  Neck: Supple, No JVD  Lungs: Clear to percussion bilaterally; No rales or wheezing  Heart: Regular rate and rhythm;2/6 systolic murmur, no rubs, or gallops  Abdomen: Nontender, bowel sounds present  Extremities: No clubbing, cyanosis, or edema  Nervous system:  Alert & Oriented X3, no focal deficits  Psychiatric: Normal affect  Skin: No rashes or lesions    LABS:  11-09    138  |  103  |  12  ----------------------------<  204<H>  4.2   |  27  |  0.98    Ca    8.4      09 Nov 2017 05:30  Mg     1.9     11-09      Creatinine Trend: 0.98<--, 1.03<--, 1.01<--, 0.99<--, 1.09<--, 0.93<--                        11.0   3.40  )-----------( 249      ( 09 Nov 2017 05:30 )             33.1     RADIOLOGY: MR ABDOMEN IC  IMPRESSION:   Innumerable hepatic cysts with interval growth compared with prior  imaging in 2009. No suspicious features. No biliary ductal dilatation.    IMPRESSION AND PLAN:    78 yo M moderate to large pericardial effusion and biliary colic    Problem/Plan - 1:  ·  Problem: Pericardial effusion.  Plan:  Moderate Pericardial Effusion with early tamponade physiology,s/p IR drainage s/p Pericardial Window -PATHOLOGY_FIBRINOUS PERICARDITIS>  ESR, CRP. TSH Had Pericardiocentesis for diagnostic and therapeutic tap- possible 2/2 viral pericarditis-IR drainage- drained dark red fluid-hemorrhagic- cytology-NEGATIVE. Hematocrit remains stable.  Will check RUQ sonogram pt with + cholithiasis. IMPRESSION: Small ascites. Cholelithiasis without acute cholecystitis.  [id number] suspicious features  Repeat Limited TTE-Resolved Effusion.      Problem/Plan - 2:  ·  Problem: Atrial Fibrillation-Paroxysmal -CHADS2-3.  Plan: on Xarelto- Restarted  continue coreg. Leg edema improved after lasix.    Problem/Plan - 3:  ·  Problem: HTN (hypertension).  Plan: coreg, hydralazine,    Problem/Plan - 4:  ·  Problem: T2DM (type 2 diabetes mellitus).  Plan: FS Q6 NISS holding metformin, januvia and glipizide check A1c.   Problem/Plan - 5:  ·  Problem: BPH (benign prostatic hyperplasia).  Plan: doxazosin, proscar.Foleys reinserted 2/2 retention-Urology evaluation noted hold Ditropan,TOV as outpatient.

## 2017-11-09 NOTE — PROGRESS NOTE ADULT - PROBLEM SELECTOR PLAN 4
- likely secondary to passive venous congestion from pericardial effusion  - hepatitis serologies noted  - hepatic cysts noted on imaging, will need MRI once optimized by cardiology
- likely secondary to passive venous congestion from pericardial effusion  - hepatitis serologies noted  - hepatic cysts noted on imaging  - will eventually need an MRI once optimized inpatient vs outpatient given increase in size of cysts from prior imaging
- likely secondary to passive venous congestion from pericardial effusion  - hepatitis serologies pending  - hepatic cysts noted on imaging, will need MRI once optimized by cardiology
likely secondary to passive venous congestion from pericardial effusion  viral hepatitis serologies ordered  hepatic cysts noted on imaging -- ? simple/infectious/pre malignancy  check echinococcus ab, entameba ab  f/u AFP, CEA  MRI abdomen when optimized
- likely secondary to passive venous congestion from pericardial effusion  - hepatitis serologies pending  - hepatic cysts noted on imaging, will need MRI once optimized by cardiology
- likely secondary to passive venous congestion from pericardial effusion  - hepatitis serologies pending  - hepatic cysts noted on imaging, will need MRI once optimized by cardiology
- likely secondary to passive venous congestion from pericardial effusion  - hepatitis serologies noted  - hepatic cysts noted on imaging  - MRI results pending
- likely secondary to passive venous congestion from pericardial effusion  - hepatitis serologies noted  - hepatic cysts noted on imaging  - MRI with no suspicious findings
- likely secondary to passive venous congestion from pericardial effusion  - hepatitis serologies noted  - hepatic cysts noted on imaging  - will eventually need an MRI once optimized inpatient vs outpatient given increase in size of cysts from prior imaging
- likely secondary to passive venous congestion from pericardial effusion  - hepatitis serologies noted  - hepatic cysts noted on imaging, will need MRI once optimized by cardiology
- likely secondary to passive venous congestion from pericardial effusion  - hepatitis serologies pending  - hepatic cysts noted on imaging, will need MRI once optimized by cardiology
- likely secondary to passive venous congestion from pericardial effusion  - hepatitis serologies pending  - hepatic cysts noted on imaging, will need MRI once optimized by cardiology

## 2017-11-09 NOTE — DISCHARGE NOTE ADULT - PATIENT PORTAL LINK FT
“You can access the [facility] Patient Portal, offered by [facility], by registering with the following website: [email or web address]”

## 2017-11-09 NOTE — DISCHARGE NOTE ADULT - CARE PLAN
Principal Discharge DX:	Pericardial effusion  Goal:	Avoid reaccumulation of fluid  Instructions for follow-up, activity and diet:	Continue medications as prescribed  The fluid around your heart has been drained  Seek medical attention if you develop shortness of breath, trouble breathing lying down, swelling or fevers  Follow up with Dr. Chew in two weeks in office once discharged [phone]  Secondary Diagnosis:	Liver cyst  Instructions for follow-up, activity and diet:	The cysts in your liver need to be monitored  Follow up with Dr. Glass, Gastroenterology in 1-2 weeks  Secondary Diagnosis:	Atrial fibrillation, unspecified type  Instructions for follow-up, activity and diet:	Your irregular heart rhythm needs to be managed by your Cardiologist or [facility] or Dr. Babin  Continue medications as prescribed  You can follow up in1-2 weeks  Secondary Diagnosis:	BPH (benign prostatic hyperplasia)  Instructions for follow-up, activity and diet:	The dela cruz catheter needs to remain in until it is taken out by Urologist  Follow up with Dr. Sibley from urology in 1 week [phone]; continue with dela cruz  Seek medical attention if it stops draining, you have abdominal pr pelvic pain, fevers, bloody drainage  Secondary Diagnosis:	Polyclonal gammopathy  Instructions for follow-up, activity and diet:	You were treated for this  follow up with Hemotologist/Oncologist, Dr. Hager saw you in the hospital

## 2017-11-09 NOTE — PROGRESS NOTE ADULT - PROBLEM SELECTOR PROBLEM 5
Pericardial effusion

## 2017-11-09 NOTE — DISCHARGE NOTE ADULT - CONDITIONS AT DISCHARGE
Pt A&Ox4, VSS, No shortness of breath or chest pain. Surgical incision is well approximated and clean. Dressing is clean, dry and intact.  Pt dela cruz intact and maintained.  Pt will go home with dela cruz attached to leg bag.  Pt was pain free today.  A safe environment was maintained.  Pt was discharged.

## 2017-11-09 NOTE — PROGRESS NOTE ADULT - SUBJECTIVE AND OBJECTIVE BOX
INTERVAL HPI/OVERNIGHT EVENTS:    pt with no gi events  no n/v/d/c    MEDICATIONS  (STANDING):  carvedilol 12.5 milliGRAM(s) Oral every 12 hours  cefTRIAXone   IVPB 1 Gram(s) IV Intermittent every 24 hours  cefTRIAXone   IVPB      dextrose 5%. 1000 milliLiter(s) (50 mL/Hr) IV Continuous <Continuous>  dextrose 50% Injectable 12.5 Gram(s) IV Push once  dextrose 50% Injectable 25 Gram(s) IV Push once  dextrose 50% Injectable 25 Gram(s) IV Push once  docusate sodium 100 milliGRAM(s) Oral three times a day  doxazosin 8 milliGRAM(s) Oral at bedtime  hydrALAZINE 50 milliGRAM(s) Oral three times a day  influenza   Vaccine 0.5 milliLiter(s) IntraMuscular once  insulin glargine Injectable (LANTUS) 6 Unit(s) SubCutaneous at bedtime  insulin lispro (HumaLOG) corrective regimen sliding scale   SubCutaneous three times a day before meals  insulin lispro (HumaLOG) corrective regimen sliding scale   SubCutaneous at bedtime  lidocaine 2% Gel 1 Application(s) Topical once  lisinopril 20 milliGRAM(s) Oral daily  polyethylene glycol 3350 17 Gram(s) Oral two times a day  potassium chloride    Tablet ER 20 milliEquivalent(s) Oral daily  rivaroxaban 20 milliGRAM(s) Oral every 24 hours  senna 2 Tablet(s) Oral at bedtime  simvastatin 20 milliGRAM(s) Oral at bedtime    MEDICATIONS  (PRN):  acetaminophen   Tablet. 650 milliGRAM(s) Oral every 6 hours PRN Mild Pain (1 - 3)  dextrose Gel 1 Dose(s) Oral once PRN Blood Glucose LESS THAN 70 milliGRAM(s)/deciliter  glucagon  Injectable 1 milliGRAM(s) IntraMuscular once PRN Glucose LESS THAN 70 milligrams/deciliter  lidocaine 2% Gel 1 Application(s) Topical three times a day PRN dela cruz discomfort      Allergies    No Known Allergies    Intolerances        Review of Systems:    General:  No wt loss, fevers, chills, night sweats, fatigue   Eyes:  Good vision, no reported pain  ENT:  No sore throat, pain, runny nose, dysphagia  CV:  No pain, palpitations, hypo/hypertension  Resp:  No dyspnea, cough, tachypnea, wheezing  GI:  No pain, No nausea, No vomiting, No diarrhea, No constipation, No weight loss, No fever, No pruritis, No rectal bleeding, No melena, No dysphagia  :  No pain, bleeding, incontinence, nocturia  Muscle:  No pain, weakness  Neuro:  No weakness, tingling, memory problems  Psych:  No fatigue, insomnia, mood problems, depression  Endocrine:  No polyuria, polydypsia, cold/heat intolerance  Heme:  No petechiae, ecchymosis, easy bruisability  Skin:  No rash, tattoos, scars, edema      Vital Signs Last 24 Hrs  T(C): 36.9 (09 Nov 2017 12:32), Max: 36.9 (08 Nov 2017 20:16)  T(F): 98.5 (09 Nov 2017 12:32), Max: 98.5 (09 Nov 2017 12:32)  HR: 76 (09 Nov 2017 12:32) (67 - 77)  BP: 146/79 (09 Nov 2017 12:32) (126/62 - 146/80)  BP(mean): --  RR: 18 (09 Nov 2017 12:32) (18 - 18)  SpO2: 97% (09 Nov 2017 12:32) (96% - 100%)    PHYSICAL EXAM:    Constitutional: NAD  HEENT: EOMI, throat clear  Neck: No LAD, supple  Respiratory: CTA and P  Cardiovascular: S1 and S2, RRR, no M  Gastrointestinal: BS+, soft, NT/ND, neg HSM,  Extremities: No peripheral edema, neg clubbing, cyanosis  Vascular: 2+ peripheral pulses  Neurological: A/O x 3, no focal deficits  Psychiatric: Normal mood, normal affect  Skin: No rashes      LABS:                        11.0   3.40  )-----------( 249      ( 09 Nov 2017 05:30 )             33.1     11-09    138  |  103  |  12  ----------------------------<  204<H>  4.2   |  27  |  0.98    Ca    8.4      09 Nov 2017 05:30  Mg     1.9     11-09            RADIOLOGY & ADDITIONAL TESTS:    < from: MR Abdomen w/ IV Cont (11.08.17 @ 10:01) >    EXAM:  MR ABDOMEN IC        PROCEDURE DATE:  Nov 8 2017         INTERPRETATION:  CLINICAL INFORMATION: Hepatic cysts    COMPARISON: CT abdomen and pelvis 10/27/2017, 3/16/2009     PROCEDURE:   MRI of the abdomen was performed with and without intravenous contrast.  IV Gadavist, 8 cc administered, 2 cc discarded  MRCP was performed.    FINDINGS:    Motion limited study.    LOWER CHEST: Small bilateral pleural effusions and passive atelectasis.   Trace pericardial fluid, significantly decreased since 10/30/2017    LIVER: Normal morphology. No steatosis. Innumerable cysts and tiny   hamartomas in both hepatic lobes. Two dominant cysts in the left hepatic   lobe are increased in size from prior imaging. These include a 4.6 cm   cyst with a fewthin internal septations (4:18) which previously measured   2.7 cm in March 2009 and a 4.5 cm simple cyst (4:19) which previously   measured 1.5 cm in March 2009. An additional 1.5 cm hemorrhagic cyst is   noted in the periphery of the right hepatic lobe. None of the cysts   demonstrates abnormal enhancement or suspicious features.  BILE DUCTS: No biliary ductal dilatation.  GALLBLADDER: Within normal limits.  SPLEEN: Within normal limits.  PANCREAS: Pancreatic duct is normal in caliber. A 4 mm pancreatic tail   cyst.  ADRENALS: Within normal limits.  KIDNEYS/URETERS: Within normal limits.    VISUALIZED PORTIONS:    BOWEL: Within normal limits.   PERITONEUM: No ascites.  VESSELS: Within normal limits.  RETROPERITONEUM: No lymphadenopathy.    ABDOMINAL WALL: Postoperative changes.  BONES: Within normal limits.    IMPRESSION:        Innumerable hepatic cysts with interval growth compared with prior   imaging in 2009. No suspicious features.    No biliary ductal dilatation.              ABDULKADIR MERCER M.D., RADIOLOGY FELLOW  This document has been electronically signed.  LULY CHRISTIANSEN M.D., ATTENDING RADIOLOGIST  This document has been electronically signed. Nov 8 2017  5:11PM                  < end of copied text >

## 2017-11-09 NOTE — DISCHARGE NOTE ADULT - MEDICATION SUMMARY - MEDICATIONS TO TAKE
I will START or STAY ON the medications listed below when I get home from the hospital:    acetaminophen 325 mg oral tablet  -- 2 tab(s) by mouth every 6 hours, As needed, Mild Pain (1 - 3)  -- Indication: For Pain    ramipril 5 mg oral capsule  -- 1 cap(s) by mouth once a day  -- Indication: For Blood pressure    doxazosin 8 mg oral tablet  -- 1 tab(s) by mouth once a day (at bedtime)  -- Indication: For Blood pressure    rivaroxaban 20 mg oral tablet  -- 1 tab(s) by mouth every 24 hours  -- Indication: For Blood thinner    metFORMIN 1000 mg oral tablet  -- 1 tab(s) by mouth 2 times a day  -- Indication: For diabetes    Januvia 100 mg oral tablet  -- 1 tab(s) by mouth once a day  -- Indication: For diabetes    pravastatin 40 mg oral tablet  -- 1 tab(s) by mouth once a day  -- Indication: For Cholesterol    carvedilol 12.5 mg oral tablet  -- 1 tab(s) by mouth every 12 hours  -- Indication: For Heart rate    polyethylene glycol 3350 oral powder for reconstitution  -- 17 gram(s) by mouth 2 times a day  -- Indication: For Constipation    hydrALAZINE 50 mg oral tablet  -- 1 tab(s) by mouth 3 times a day  -- Indication: For Blood pressure I will START or STAY ON the medications listed below when I get home from the hospital:    acetaminophen 325 mg oral tablet  -- 2 tab(s) by mouth every 6 hours, As needed, Mild Pain (1 - 3)  -- Indication: For Pain    ramipril 5 mg oral capsule  -- 1 cap(s) by mouth once a day  -- Indication: For Blood pressure    doxazosin 8 mg oral tablet  -- 1 tab(s) by mouth once a day (at bedtime)  -- Indication: For Blood pressure    rivaroxaban 20 mg oral tablet  -- 1 tab(s) by mouth every 24 hours  -- Indication: For Blood thinner    metFORMIN 1000 mg oral tablet  -- 1 tab(s) by mouth 2 times a day  -- Indication: For diabetes    Januvia 100 mg oral tablet  -- 1 tab(s) by mouth once a day  -- Indication: For diabetes    pravastatin 40 mg oral tablet  -- 1 tab(s) by mouth once a day  -- Indication: For Cholesterol    carvedilol 12.5 mg oral tablet  -- 1 tab(s) by mouth every 12 hours  -- Indication: For Heart rate    Xylocaine Jelly 2% topical gel with applicator  -- Apply on skin to affected area 3 times a day x 30 days, As Needed -dela cruz discomfort   -- Indication: For Pain    polyethylene glycol 3350 oral powder for reconstitution  -- 17 gram(s) by mouth 2 times a day  -- Indication: For Constipation    potassium chloride 20 mEq oral tablet, extended release  -- 1 tab(s) by mouth once a day  -- Indication: For supplement    hydrALAZINE 50 mg oral tablet  -- 1 tab(s) by mouth 3 times a day  -- Indication: For Blood pressure

## 2017-11-09 NOTE — PROGRESS NOTE ADULT - ATTENDING COMMENTS
Yadiel Babin MD,FACC.  [address] [facility].  [facility].  [phone]
Thank you for the courtesy of the consultation,I would be available for any further discussion if needed.  Yadiel Babin MD,FACC.  [address] [place], [place] [id number] [phone]
Yadiel Babin MD,FACC.  [address] [facility].  [facility].  [phone]
Yadiel Babin MD,FACC.  [address] [facility].  [facility].  [phone]
Thank you for the courtesy of the consultation,I would be available for any further discussion if needed.  Yadiel Babin MD,FACC.  [address] [place], [place] [id number] [phone]
Yadiel Babin MD,FACC.  [address] [facility].  [facility].  [phone]
Yadiel Babin MD,FACC.  [address] [facility].  [facility].  [phone]
Yadiel Babin MD,FACC.  [address] [facility].  [facility].  [phone]
Yadiel Babin MD,FACC.  [address] [facility].  [facility].  [phone]
Yadiel Babin MD,FACC.  [address] [facility].  [facility].  [phone]
Yadiel Babin MD,FACC.  [address] [facility].  [facility].  [phone]

## 2017-11-09 NOTE — DISCHARGE NOTE ADULT - CARE PROVIDER_API CALL
Chris Sibley), Urology  [address] [facility] [address]  [place] [address]  Phone: [phone]  Fax: [phone]    Brendon Glass (DO), Gastroenterology; Internal Medicine  [address] [place] [address]  Phone: [phone]  Fax: [phone]    Arjun Chew (MD), Surgery; Thoracic Surgery  [address] [place] [address]  [place] [address]  Phone: [phone]  Fax: [phone]    Yadiel Babin (MBBS), Cardiology  [address] [place] [address]  Phone: [phone]  Fax: [phone]

## 2017-11-09 NOTE — DISCHARGE NOTE ADULT - PLAN OF CARE
Avoid reaccumulation of fluid Continue medications as prescribed  The fluid around your heart has been drained  Seek medical attention if you develop shortness of breath, trouble breathing lying down, swelling or fevers  Follow up with Dr. Chew in two weeks in office once discharged [phone] The cysts in your liver need to be monitored  Follow up with Dr. Glass, Gastroenterology in 1-2 weeks Your irregular heart rhythm needs to be managed by your Cardiologist or Intern[facility] or Dr. Babin  Continue medications as prescribed  You can follow up in1-2 weeks The dela cruz catheter needs to remain in until it is taken out by Urologist  Follow up with Dr. Sibley from urology in 1 week [phone]; continue with dela cruz  Seek medical attention if it stops draining, you have abdominal pr pelvic pain, fevers, bloody drainage You were treated for this  follow up with Hemotologist/Oncologist, Dr. Hager saw you in the hospital

## 2017-11-09 NOTE — DISCHARGE NOTE ADULT - MEDICATION SUMMARY - MEDICATIONS TO STOP TAKING
I will STOP taking the medications listed below when I get home from the hospital:    Percocet 5/325 325 mg-5 mg oral tablet  -- 1 tab(s) by mouth every 6 hours as needed for moderate or severe pain    Lasix 40 mg oral tablet  -- 1 tab(s) by mouth 2 times a day    oxybutynin 10 mg/24 hr oral tablet, extended release  -- 1 tab(s) by mouth once a day

## 2017-11-09 NOTE — DISCHARGE NOTE ADULT - CARE PROVIDERS DIRECT ADDRESSES
[email or web address]_[email or web address],DirectAddress_Unknown

## 2017-11-09 NOTE — PROGRESS NOTE ADULT - SUBJECTIVE AND OBJECTIVE BOX
Patient is a 77y old  Male who presents with a chief complaint of Abdominal distension and pain (09 Nov 2017 08:23)      SUBJECTIVE / OVERNIGHT EVENTS:   Feels better.  Denies CP/SOB/Palpitation/HA.    MEDICATIONS  (STANDING):  carvedilol 12.5 milliGRAM(s) Oral every 12 hours  cefTRIAXone   IVPB 1 Gram(s) IV Intermittent every 24 hours  cefTRIAXone   IVPB      dextrose 5%. 1000 milliLiter(s) (50 mL/Hr) IV Continuous <Continuous>  dextrose 50% Injectable 12.5 Gram(s) IV Push once  dextrose 50% Injectable 25 Gram(s) IV Push once  dextrose 50% Injectable 25 Gram(s) IV Push once  docusate sodium 100 milliGRAM(s) Oral three times a day  doxazosin 8 milliGRAM(s) Oral at bedtime  hydrALAZINE 50 milliGRAM(s) Oral three times a day  influenza   Vaccine 0.5 milliLiter(s) IntraMuscular once  insulin glargine Injectable (LANTUS) 6 Unit(s) SubCutaneous at bedtime  insulin lispro (HumaLOG) corrective regimen sliding scale   SubCutaneous three times a day before meals  insulin lispro (HumaLOG) corrective regimen sliding scale   SubCutaneous at bedtime  lidocaine 2% Gel 1 Application(s) Topical once  lisinopril 20 milliGRAM(s) Oral daily  polyethylene glycol 3350 17 Gram(s) Oral two times a day  potassium chloride    Tablet ER 20 milliEquivalent(s) Oral daily  rivaroxaban 20 milliGRAM(s) Oral every 24 hours  senna 2 Tablet(s) Oral at bedtime  simvastatin 20 milliGRAM(s) Oral at bedtime    MEDICATIONS  (PRN):  acetaminophen   Tablet. 650 milliGRAM(s) Oral every 6 hours PRN Mild Pain (1 - 3)  dextrose Gel 1 Dose(s) Oral once PRN Blood Glucose LESS THAN 70 milliGRAM(s)/deciliter  glucagon  Injectable 1 milliGRAM(s) IntraMuscular once PRN Glucose LESS THAN 70 milligrams/deciliter  lidocaine 2% Gel 1 Application(s) Topical three times a day PRN dela cruz discomfort        CAPILLARY BLOOD GLUCOSE  215 (09 Nov 2017 08:09)      POCT Blood Glucose.: 325 mg/dL (09 Nov 2017 12:07)  POCT Blood Glucose.: 215 mg/dL (09 Nov 2017 08:09)    I&O's Summary    08 Nov 2017 07:01  -  09 Nov 2017 07:00  --------------------------------------------------------  IN: 720 mL / OUT: 2775 mL / NET: -2055 mL    09 Nov 2017 07:01  -  09 Nov 2017 22:50  --------------------------------------------------------  IN: 0 mL / OUT: 500 mL / NET: -500 mL        PHYSICAL EXAM:  GENERAL: NAD, well-developed  HEAD:  Atraumatic, Normocephalic  NECK: Supple, No JVD  CHEST/LUNG: Clear to auscultation bilaterally; No wheezing.  HEART: Regular rate and rhythm; No murmurs, rubs, or gallops  ABDOMEN: Soft, Nontender, Nondistended; Bowel sounds present  EXTREMITIES:   No clubbing, cyanosis, or edema  NEUROLOGY: AAO X 3  SKIN: No rashes    LABS:                        11.0   3.40  )-----------( 249      ( 09 Nov 2017 05:30 )             33.1     11-09    138  |  103  |  12  ----------------------------<  204<H>  4.2   |  27  |  0.98    Ca    8.4      09 Nov 2017 05:30  Mg     1.9     11-09              CAPILLARY BLOOD GLUCOSE  215 (09 Nov 2017 08:09)      POCT Blood Glucose.: 325 mg/dL (09 Nov 2017 12:07)  POCT Blood Glucose.: 215 mg/dL (09 Nov 2017 08:09)    11-06 @ 01:24  Culture-urine   NO GROWTH AT 24 HOURS  Culture results --  method type --  Organism --  Organism Identification --  Specimen source URINE MIDSTREAM           11-06 @ 01:24  Culture blood --  Culture results --  Gram stain --  Gram stain blood --  Method type --  Organism --  Organism identification --  Specimen source URINE MIDSTREAM      RADIOLOGY & ADDITIONAL TESTS:    Imaging Personally Reviewed:    Consultant(s) Notes Reviewed:      Care Discussed with Consultants/Other Providers:

## 2017-11-09 NOTE — PROGRESS NOTE ADULT - PROBLEM SELECTOR PLAN 6
- Xarelto was on hold for pericardiocentesis - restart per cardiology recs  - gi ppx w/ppi qd
Xarelto was on hold for pericardiocentesis- to be restarted as per cardiology recs  continue coreg
- Xarelto was on hold for pericardiocentesis - restart per cardiology recs  - gi ppx w/ppi qd
- Xarelto was on hold for pericardiocentesis- to be restarted as per cardiology   - gi ppx w/ppi qd
- Xarelto was on hold for pericardiocentesis - restart per cardiology recs  - gi ppx w/ppi qd

## 2017-11-09 NOTE — PROGRESS NOTE ADULT - PROBLEM SELECTOR PROBLEM 2
Liver cyst
Liver cyst
Atrial fibrillation, unspecified type
Liver cyst

## 2017-11-09 NOTE — PROGRESS NOTE ADULT - PROBLEM SELECTOR PLAN 1
- abd US noted with cholelithiasis  - pain control prn
abd U/s with cholelithiasis  consider HIDA  surgery follow up
- abd US noted with cholelithiasis  - pain control prn
- abd US noted with cholelithiasis  - pain control prn
Would still recommend urgent IR drainage of pericardial effusion especially given new onset AFib.   Send fluid for chemistries, culture and cytology  Will cont to follow drain output over weekend, pending output and pt's clinical status, may tentatively plan for Pericardial window on Monday  Would continue to hold anti coagulation for now  Recommend CCU consult if pt. clinically worsens.  D/w Tele PA. Dr. Chew to speak w Dr. Babin directly.
- abd US noted with cholelithiasis  - no obstructive symptoms
- abd US noted with cholelithiasis  - pain control prn
Continue pericardial drain  FU cytology, culture  Plan for repeat non contrast CT of chest in am to assess for any residual effusion  Pending results, pt. tentative for subxyphoid window w Dr. Chew tomorrow  NPO after 12mn  Valid labs  Above d/w pt., family and Tele PA.
s/p drainage and window
Continue pericardial drain
- abd US noted with cholelithiasis  - no obstructive symptoms
- abd US noted with cholelithiasis  - pain control prn

## 2017-11-09 NOTE — PROGRESS NOTE ADULT - PROVIDER SPECIALTY LIST ADULT
CT Surgery
Cardiology
Gastroenterology
Heme/Onc
Heme/Onc
Internal Medicine
Thoracic Surgery
Thoracic Surgery
Internal Medicine
Internal Medicine
Cardiology
CT Surgery
Gastroenterology

## 2017-11-09 NOTE — DISCHARGE NOTE ADULT - HOSPITAL COURSE
78 y/o male with a PMHx of atrial flutter on Xarelto, NICM with mild LV dysfunction (EF 49%), HTN, DM and BPH presents to ED with abdominal distention/pain, found to have a moderate to large pericardial effusion on CT.    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 Sibley as outpatient for trial of void 76 y/o male with a PMHx of atrial flutter on Xarelto, NICM with mild LV dysfunction (EF 49%), HTN, DM and BPH presents to ED with abdominal distention/pain, found to have a moderate to large pericardial effusion on CT.    On admission:  EKG: Atrial flutter with variable AV block  CXR: Enlarged cardiac silhouette consistent with pericardial effusion  CT abdomen and pelvis: No bowel obstruction. Mild ascites. Moderate to large pericardial effusion. Cholelithiasis.  ProBNP: 497.9  H/H: 11.4/32.8  BUN/Cr: 25/1.28  AST/ALT: 102/133      10/27 Echo: EF 49%. Mitral annular calcification, otherwise normal mitral valve. Minimal mitral regurgitation. Normal left ventricular internal dimensions and wall thicknesses. Mild global left ventricular systolic dysfunction. Normal right ventricular size and function. Large pericardial effusion, measuring about 2.3 cm posterior to the LV, about 2.6 cm lateral to the LV, about 2.2 cm around the LV apex, and about  3.2 cm adjacent to the RV free wall. Moderate pericardial effusion (about 1.5 cm) anterior to the RV. Increased respirophasic variability in the transmitral and transtricuspid spectral Doppler signals is seen. In addition, the RA and RV appear somewhat compressed in the subcostal view.  These findings are consistent with early tamponade physiology.        During his hospital course he had:  + Large pericardial effusion secondary to chronic fibrinous pericarditis with mesothelial  hyperplasia, pericardial subxyphoid window on 10/30  + History of atrial flutter- Xarelto held for procedures, S/P Heparin gtt and Xarelto resumed on 11/4  + Transaminitis- GI following (Glass), likely secondary to passive congestion from pericardial effusion, also cholelithiasis on US abdomen (no cholecystitis)  + Increased size of multiple hepatic cysts- GI following (Glass), pending eventual MRI abdomen once more medically stable _-____  + Polyclonal gammopathy- heme following (Dr. Hager), no evidence of malignancy  + UTI (UA shows positive nitrites 11/5)- traeted with  Ceftriaxone IV, cultures negative to date ____________  + Urinary retention- urology following, dela cruz placed, continue dela cruz as outpatient and will follow up with Dr. Sibley as outpatient for trial of void    EKG: Atrial flutter with variable AV block  CXR: Enlarged cardiac silhouette consistent with pericardial effusion  CT abdomen and pelvis: No bowel obstruction. Mild ascites. Moderate to large pericardial effusion. Cholelithiasis.  ProBNP: 497.9  H/H: 11.4/32.8  BUN/Cr: 25/1.28  AST/ALT: 102/133      10/27 Echo: EF 49%. Mitral annular calcification, otherwise normal mitral valve. Minimal mitral regurgitation. Normal left ventricular internal dimensions and wall thicknesses. Mild global left ventricular systolic dysfunction. Normal right ventricular size and function. Large pericardial effusion, measuring about 2.3 cm posterior to the LV, about 2.6 cm lateral to the LV, about 2.2 cm around the LV apex, and about  3.2 cm adjacent to the RV free wall. Moderate pericardial effusion (about 1.5 cm) anterior to the RV. Increased respirophasic variability in the transmitral and transtricuspid spectral Doppler signals is seen. In addition, the RA and RV appear somewhat compressed in the subcostal view.  These findings are consistent with early tamponade physiology.    Constipation treated 11/7 Limited echo: Limited study to evaluate for pericardial effusion. Small pericardial effusion superior to the right atrium. A left pleural effusion is seen. Compared with echocardiogram of 10/27/2017, the pericardial effusion has decreased significantly in size.  11/8 Cardio: For MRI_Abdomen. Repeat Limited TTE - Resolved Effusion  11/8 GI: MRI results pending to better assist liver cysts from prior imaging  11/8 Med: S/p Pericardial window. Cardio f/up noted. Pain control. Cytology pleural fluid: Negative. ECHO reviewed. A Fib: Xarelto.  11/8 MRI abd: Innumerable hepatic cysts with interval growth compared with prior imaging in 2009. No suspicious features. No biliary ductal dilatation.        + Urinary retention- urology following, dela cruz placed, continue dela cruz as outpatient and will follow up with Dr. Sibley as outpatient for trial of void 78 y/o male with a PMHx of atrial flutter on Xarelto, NICM with mild LV dysfunction (EF 49%), HTN, DM and BPH presents to ED with abdominal distention/pain, found to have a moderate to large pericardial effusion on CT.    On admission:  EKG: Atrial flutter with variable AV block  CXR: Enlarged cardiac silhouette consistent with pericardial effusion  CT abdomen and pelvis: No bowel obstruction. Mild ascites. Moderate to large pericardial effusion. Cholelithiasis.  ProBNP: 497.9  H/H: 11.4/32.8  BUN/Cr: 25/1.28  AST/ALT: 102/133      10/27 Echo: EF 49%. Mitral annular calcification, otherwise normal mitral valve. Minimal mitral regurgitation. Normal left ventricular internal dimensions and wall thicknesses. Mild global left ventricular systolic dysfunction. Normal right ventricular size and function. Large pericardial effusion, measuring about 2.3 cm posterior to the LV, about 2.6 cm lateral to the LV, about 2.2 cm around the LV apex, and about  3.2 cm adjacent to the RV free wall. Moderate pericardial effusion (about 1.5 cm) anterior to the RV. Increased respirophasic variability in the transmitral and transtricuspid spectral Doppler signals is seen. In addition, the RA and RV appear somewhat compressed in the subcostal view.  These findings are consistent with early tamponade physiology.        During his hospital course he had:  + Large pericardial effusion secondary to chronic fibrinous pericarditis with mesothelial  hyperplasia, pericardial subxyphoid window on 10/30, Cytology pleural fluid: Negative.  + History of atrial flutter- Xarelto held for procedures, S/P Heparin gtt and Xarelto resumed on 11/4  + Transaminitis- GI following (Glass), likely secondary to passive congestion from pericardial effusion, also cholelithiasis on US abdomen (no cholecystitis)  + Increased size of multiple hepatic cysts- GI following (Glass), pending eventual MRI abdomen once more medically stable _-____  + Polyclonal gammopathy- heme following (Dr. Hager), no evidence of malignancy  + UTI (UA shows positive nitrites 11/5)- traeted with  Ceftriaxone IV, cultures negative to date ____________  + Urinary retention- urology following, dela cruz placed, continue dela cruz as outpatient and will follow up with Dr. Sibley as outpatient for trial of void      10/27 Echo: EF 49%. Mitral annular calcification, otherwise normal mitral valve. Minimal mitral regurgitation. Normal left ventricular internal dimensions and wall thicknesses. Mild global left ventricular systolic dysfunction. Normal right ventricular size and function. Large pericardial effusion, measuring about 2.3 cm posterior to the LV, about 2.6 cm lateral to the LV, about 2.2 cm around the LV apex, and about  3.2 cm adjacent to the RV free wall. Moderate pericardial effusion (about 1.5 cm) anterior to the RV. Increased respirophasic variability in the transmitral and transtricuspid spectral Doppler signals is seen. In addition, the RA and RV appear somewhat compressed in the subcostal view.  These findings are consistent with early tamponade physiology.  11/7 Limited echo: Limited study to evaluate for pericardial effusion. Small pericardial effusion superior to the right atrium. A left pleural effusion is seen. Compared with echocardiogram of 10/27/2017, the pericardial effusion has decreased significantly in size.    11/8 MRI abd: Innumerable hepatic cysts with interval growth compared with prior imaging in 2009. No suspicious features. No biliary ductal dilatation.    Constipation treated     Urinary retention, continue dela cruz as outpatient and will follow up with Dr. Sibley as outpatient for trial of void    INCOMPLETE------------------------------------ 76 y/o male with a PMHx of atrial flutter on Xarelto, NICM with mild LV dysfunction (EF 49%), HTN, DM and BPH presents to ED with abdominal distention/pain, found to have a moderate to large pericardial effusion on CT.    On admission:  EKG: Atrial flutter with variable AV block  CXR: Enlarged cardiac silhouette consistent with pericardial effusion  CT abdomen and pelvis: No bowel obstruction. Mild ascites. Moderate to large pericardial effusion. Cholelithiasis.  ProBNP: 497.9  H/H: 11.4/32.8  BUN/Cr: 25/1.28  AST/ALT: 102/133      10/27 Echo: EF 49%. Mitral annular calcification, otherwise normal mitral valve. Minimal mitral regurgitation. Normal left ventricular internal dimensions and wall thicknesses. Mild global left ventricular systolic dysfunction. Normal right ventricular size and function. Large pericardial effusion, measuring about 2.3 cm posterior to the LV, about 2.6 cm lateral to the LV, about 2.2 cm around the LV apex, and about  3.2 cm adjacent to the RV free wall. Moderate pericardial effusion (about 1.5 cm) anterior to the RV. Increased respirophasic variability in the transmitral and transtricuspid spectral Doppler signals is seen. In addition, the RA and RV appear somewhat compressed in the subcostal view.  These findings are consistent with early tamponade physiology.        During his hospital course he had:  + Large pericardial effusion secondary to chronic fibrinous pericarditis with mesothelial  hyperplasia, pericardial subxyphoid window on 10/30, Cytology pleural fluid: Negative.  + History of atrial flutter- Xarelto held for procedures, S/P Heparin gtt and Xarelto resumed on 11/4  + Transaminitis- GI following (Glass), likely secondary to passive congestion from pericardial effusion, also cholelithiasis on US abdomen (no cholecystitis)  + Increased size of multiple hepatic cysts- GI following (Glass), pending eventual MRI abdomen once more medically stable _-____  + Polyclonal gammopathy- heme following (Dr. Hager), no evidence of malignancy  + UTI (UA shows positive nitrites 11/5)- traeted with  Ceftriaxone IV, cultures negative to date ____________  + Urinary retention- urology following, dela cruz placed, continue dela cruz as outpatient and will follow up with Dr. Sibley as outpatient for trial of void      10/27 Echo: EF 49%. Mitral annular calcification, otherwise normal mitral valve. Minimal mitral regurgitation. Normal left ventricular internal dimensions and wall thicknesses. Mild global left ventricular systolic dysfunction. Normal right ventricular size and function. Large pericardial effusion, measuring about 2.3 cm posterior to the LV, about 2.6 cm lateral to the LV, about 2.2 cm around the LV apex, and about  3.2 cm adjacent to the RV free wall. Moderate pericardial effusion (about 1.5 cm) anterior to the RV. Increased respirophasic variability in the transmitral and transtricuspid spectral Doppler signals is seen. In addition, the RA and RV appear somewhat compressed in the subcostal view.  These findings are consistent with early tamponade physiology.  11/7 Limited echo: Limited study to evaluate for pericardial effusion. Small pericardial effusion superior to the right atrium. A left pleural effusion is seen. Compared with echocardiogram of 10/27/2017, the pericardial effusion has decreased significantly in size.    11/8 MRI abd: Innumerable hepatic cysts with interval growth compared with prior imaging in 2009. No suspicious features. No biliary ductal dilatation.    Constipation treated     Urinary retention, continue dela cruz as outpatient and will follow up with Dr. Sibley as outpatient for trial of void, no further antibiotics    Stable for discharge home

## 2017-11-09 NOTE — DISCHARGE NOTE ADULT - SECONDARY DIAGNOSIS.
Liver cyst Atrial fibrillation, unspecified type BPH (benign prostatic hyperplasia) Polyclonal gammopathy

## 2017-11-09 NOTE — PROGRESS NOTE ADULT - PROBLEM SELECTOR PROBLEM 1
Cholelithiasis
Cholelithiasis
Pericardial effusion
Cholelithiasis
Pericardial effusion
Cholelithiasis

## 2017-11-09 NOTE — DISCHARGE NOTE ADULT - ADDITIONAL INSTRUCTIONS
Follow up with Dr. Chew in two weeks in office once discharged [phone]  Follow up with Dr. Sibley from urology in 1 week [phone]; continue with dela cruz

## 2017-11-09 NOTE — PROGRESS NOTE ADULT - PROBLEM SELECTOR PLAN 2
- CT a/p with Increased size of multiple hepatic cysts; left cyst 5.1 x 3.8 cm, previously 1.6 x 1.6 cm  - AFP and CEA wnl  - Echinococcus species Ab, Entamoeba Ab  - will eventually need an MRI once optimized
CT a/p with Increased size of multiple hepatic cysts. For example lateral left   hepatic cyst measures 5.1 x 3.8 cm, previously 1.6 x 1.6 cm  f/u AFP, CEA, Echinococcus species Ab, Entamoeba Ab  MRI once optimized
- CT a/p with Increased size of multiple hepatic cysts; left cyst 5.1 x 3.8 cm, previously 1.6 x 1.6 cm  - AFP and CEA wnl  - Echinococcus species Ab, Entamoeba Ab  - will eventually need an MRI once optimized
- CT a/p with Increased size of multiple hepatic cysts; left cyst 5.1 x 3.8 cm, previously 1.6 x 1.6 cm  - AFP and CEA wnl  - Echinococcus species Ab, Entamoeba Ab  - will eventually need an MRI once optimized
Recommend HR control  Continue to monitor vital signs and clinical status closely.
- CT a/p with Increased size of multiple hepatic cysts; left cyst 5.1 x 3.8 cm, previously 1.6 x 1.6 cm  - AFP and CEA wnl  - Echinococcus species Ab, Entamoeba Ab  - will eventually need an MRI once optimized
- CT a/p with Increased size of multiple hepatic cysts; left cyst 5.1 x 3.8 cm, previously 1.6 x 1.6 cm  - AFP and CEA wnl  - Echinococcus species Ab, Entamoeba Ab  - will eventually need an MRI once optimized
- CT a/p with Increased size of multiple hepatic cysts; left cyst 5.1 x 3.8 cm, previously 1.6 x 1.6 cm  - AFP and CEA wnl  - Echinococcus species Ab, Entamoeba Ab both negative  - MRI results pending to better assist liver cysts from prior imaging
- CT a/p with Increased size of multiple hepatic cysts; left cyst 5.1 x 3.8 cm, previously 1.6 x 1.6 cm  - AFP and CEA wnl  - Echinococcus species Ab, Entamoeba Ab both negative  - MRI with no suspicious findings
- CT a/p with Increased size of multiple hepatic cysts; left cyst 5.1 x 3.8 cm, previously 1.6 x 1.6 cm  - AFP and CEA wnl  - Echinococcus species Ab, Entamoeba Ab both negative  - will eventually need an MRI once optimized inpatient vs outpatient given increase in size of cysts from prior imaging
- CT a/p with Increased size of multiple hepatic cysts; left cyst 5.1 x 3.8 cm, previously 1.6 x 1.6 cm  - AFP and CEA wnl  - Hep B pcr testing  - Echinococcus species Ab, Entamoeba Ab  - will eventually need an MRI once optimized
- CT a/p with Increased size of multiple hepatic cysts; left cyst 5.1 x 3.8 cm, previously 1.6 x 1.6 cm  - AFP and CEA wnl  - Echinococcus species Ab, Entamoeba Ab  - will eventually need an MRI once optimized
- CT a/p with Increased size of multiple hepatic cysts; left cyst 5.1 x 3.8 cm, previously 1.6 x 1.6 cm  - AFP and CEA wnl  - Echinococcus species Ab, Entamoeba Ab both negative  - will eventually need an MRI once optimized inpatient vs outpatient given increase in size of cysts from prior imaging

## 2017-11-09 NOTE — PROGRESS NOTE ADULT - PROBLEM SELECTOR PLAN 5
- s/p pericardial window  - cardiothoracic following closely/appreciated
Moderate Pericardial Effusion with early tamponade physiology  s/p IR drainage- drained dark red fluid-hemorrhagic-await cytology  f/u thoracic recs
- cardiothoracic following closely/appreciated  - plans for pericardial window
- s/p pericardial window  - cardiothoracic following closely/appreciated

## 2017-11-09 NOTE — PROGRESS NOTE ADULT - PROBLEM SELECTOR PLAN 3
- improved  - senna/colace  - miralax BID  - monitor GI fxn
Continue bowel regimen with senna 2 tabs at bedtime, colace 100 mg TID, Miralax 17 grams BID ordered  Pt is having bms
- improved  - senna/colace  - miralax BID  - monitor GI fxn

## 2017-11-09 NOTE — PROGRESS NOTE ADULT - PROBLEM SELECTOR PROBLEM 3
Constipation

## 2017-11-14 ENCOUNTER — APPOINTMENT (OUTPATIENT)
Dept: UROLOGY | Facility: CLINIC | Age: 77
End: 2017-11-14
Payer: MEDICARE

## 2017-11-14 PROCEDURE — 99214 OFFICE O/P EST MOD 30 MIN: CPT

## 2017-11-15 ENCOUNTER — APPOINTMENT (OUTPATIENT)
Dept: UROLOGY | Facility: CLINIC | Age: 77
End: 2017-11-15
Payer: MEDICARE

## 2017-11-15 ENCOUNTER — LABORATORY RESULT (OUTPATIENT)
Age: 77
End: 2017-11-15

## 2017-11-15 PROCEDURE — 99214 OFFICE O/P EST MOD 30 MIN: CPT

## 2017-11-16 LAB
ALBUMIN SERPL ELPH-MCNC: 3.8 G/DL
ALP BLD-CCNC: 71 U/L
ALT SERPL-CCNC: 17 U/L
ANION GAP SERPL CALC-SCNC: 14 MMOL/L
APPEARANCE: CLEAR
AST SERPL-CCNC: 24 U/L
BACTERIA: NEGATIVE
BILIRUB SERPL-MCNC: 0.5 MG/DL
BILIRUBIN URINE: NEGATIVE
BLOOD URINE: NEGATIVE
BUN SERPL-MCNC: 11 MG/DL
CALCIUM SERPL-MCNC: 9.6 MG/DL
CHLORIDE SERPL-SCNC: 108 MMOL/L
CO2 SERPL-SCNC: 21 MMOL/L
COLOR: YELLOW
CREAT SERPL-MCNC: 1.13 MG/DL
GLUCOSE QUALITATIVE U: NEGATIVE MG/DL
GLUCOSE SERPL-MCNC: 145 MG/DL
HYALINE CASTS: 0 /LPF
KETONES URINE: NEGATIVE
LEUKOCYTE ESTERASE URINE: NEGATIVE
MICROSCOPIC-UA: NORMAL
NITRITE URINE: NEGATIVE
PH URINE: 6
POTASSIUM SERPL-SCNC: 4.6 MMOL/L
PROT SERPL-MCNC: 7.1 G/DL
PROTEIN URINE: NEGATIVE MG/DL
PSA SERPL-MCNC: 1.77 NG/ML
RED BLOOD CELLS URINE: 1 /HPF
SODIUM SERPL-SCNC: 143 MMOL/L
SPECIFIC GRAVITY URINE: 1.01
SQUAMOUS EPITHELIAL CELLS: 0 /HPF
TESTOST SERPL-MCNC: 342.1 NG/DL
UROBILINOGEN URINE: NEGATIVE MG/DL
WHITE BLOOD CELLS URINE: 0 /HPF

## 2017-11-17 LAB — BACTERIA UR CULT: NORMAL

## 2017-11-21 ENCOUNTER — APPOINTMENT (OUTPATIENT)
Dept: THORACIC SURGERY | Facility: CLINIC | Age: 77
End: 2017-11-21
Payer: MEDICARE

## 2017-11-21 ENCOUNTER — APPOINTMENT (OUTPATIENT)
Dept: UROLOGY | Facility: CLINIC | Age: 77
End: 2017-11-21

## 2017-11-21 VITALS
HEIGHT: 70 IN | HEART RATE: 76 BPM | DIASTOLIC BLOOD PRESSURE: 90 MMHG | RESPIRATION RATE: 16 BRPM | WEIGHT: 180 LBS | OXYGEN SATURATION: 97 % | BODY MASS INDEX: 25.77 KG/M2 | SYSTOLIC BLOOD PRESSURE: 168 MMHG

## 2017-11-21 DIAGNOSIS — Z86.79 PERSONAL HISTORY OF OTHER DISEASES OF THE CIRCULATORY SYSTEM: ICD-10-CM

## 2017-11-21 LAB
BASOPHILS # BLD AUTO: 0.05 K/UL
BASOPHILS NFR BLD AUTO: 1.7 %
CORE LAB FLUID CYTOLOGY: NORMAL
EOSINOPHIL # BLD AUTO: 0.18 K/UL
EOSINOPHIL NFR BLD AUTO: 6.1
HCT VFR BLD CALC: 36.8 %
HGB BLD-MCNC: 12.4 G/DL
LYMPHOCYTES # BLD AUTO: 0.82 K/UL
LYMPHOCYTES NFR BLD AUTO: 27.8 %
MAN DIFF?: NORMAL
MCHC RBC-ENTMCNC: 28.6 PG
MCHC RBC-ENTMCNC: 33.7 GM/DL
MCV RBC AUTO: 84.8 FL
MONOCYTES # BLD AUTO: 0.31 K/UL
MONOCYTES NFR BLD AUTO: 10.4 %
NEUTROPHILS # BLD AUTO: 1.57 K/UL
NEUTROPHILS NFR BLD AUTO: 53.1 %
PLATELET # BLD AUTO: 269 K/UL
RBC # BLD: 4.34 M/UL
RBC # FLD: 13.7 %
WBC # FLD AUTO: 2.95 K/UL

## 2017-11-21 PROCEDURE — 99024 POSTOP FOLLOW-UP VISIT: CPT

## 2017-11-27 LAB — FUNGUS SPEC QL CULT: SIGNIFICANT CHANGE UP

## 2017-11-28 LAB
FUNGUS SPEC QL CULT: SIGNIFICANT CHANGE UP
FUNGUS SPEC QL CULT: SIGNIFICANT CHANGE UP

## 2017-12-09 LAB — ACID FAST STN SPEC: SIGNIFICANT CHANGE UP

## 2017-12-11 LAB
ACID FAST STN SPEC: SIGNIFICANT CHANGE UP
ACID FAST STN SPEC: SIGNIFICANT CHANGE UP

## 2017-12-14 ENCOUNTER — INPATIENT (INPATIENT)
Facility: HOSPITAL | Age: 77
LOS: 5 days | Discharge: HOME | End: 2017-12-20
Attending: INTERNAL MEDICINE | Admitting: INTERNAL MEDICINE
Payer: MEDICARE

## 2017-12-14 VITALS
TEMPERATURE: 98 F | HEIGHT: 70 IN | DIASTOLIC BLOOD PRESSURE: 63 MMHG | RESPIRATION RATE: 16 BRPM | OXYGEN SATURATION: 95 % | WEIGHT: 179.9 LBS | HEART RATE: 77 BPM | SYSTOLIC BLOOD PRESSURE: 135 MMHG

## 2017-12-14 DIAGNOSIS — J90 PLEURAL EFFUSION, NOT ELSEWHERE CLASSIFIED: ICD-10-CM

## 2017-12-14 DIAGNOSIS — I10 ESSENTIAL (PRIMARY) HYPERTENSION: ICD-10-CM

## 2017-12-14 DIAGNOSIS — E78.5 HYPERLIPIDEMIA, UNSPECIFIED: ICD-10-CM

## 2017-12-14 DIAGNOSIS — R07.9 CHEST PAIN, UNSPECIFIED: ICD-10-CM

## 2017-12-14 DIAGNOSIS — E11.9 TYPE 2 DIABETES MELLITUS WITHOUT COMPLICATIONS: ICD-10-CM

## 2017-12-14 DIAGNOSIS — Z98.49 CATARACT EXTRACTION STATUS, UNSPECIFIED EYE: Chronic | ICD-10-CM

## 2017-12-14 DIAGNOSIS — I48.91 UNSPECIFIED ATRIAL FIBRILLATION: ICD-10-CM

## 2017-12-14 DIAGNOSIS — N40.0 BENIGN PROSTATIC HYPERPLASIA WITHOUT LOWER URINARY TRACT SYMPTOMS: ICD-10-CM

## 2017-12-14 DIAGNOSIS — Z29.9 ENCOUNTER FOR PROPHYLACTIC MEASURES, UNSPECIFIED: ICD-10-CM

## 2017-12-14 LAB
ALBUMIN SERPL ELPH-MCNC: 3.8 G/DL — SIGNIFICANT CHANGE UP (ref 3.3–5)
ALP SERPL-CCNC: 65 U/L — SIGNIFICANT CHANGE UP (ref 40–120)
ALT FLD-CCNC: 12 U/L — SIGNIFICANT CHANGE UP (ref 4–41)
AMYLASE P1 CFR SERPL: 108 U/L — SIGNIFICANT CHANGE UP (ref 25–125)
APTT BLD: 41.3 SEC — HIGH (ref 27.5–37.4)
AST SERPL-CCNC: 25 U/L — SIGNIFICANT CHANGE UP (ref 4–40)
BASOPHILS # BLD AUTO: 0.01 K/UL — SIGNIFICANT CHANGE UP (ref 0–0.2)
BASOPHILS NFR BLD AUTO: 0.2 % — SIGNIFICANT CHANGE UP (ref 0–2)
BILIRUB SERPL-MCNC: 0.4 MG/DL — SIGNIFICANT CHANGE UP (ref 0.2–1.2)
BUN SERPL-MCNC: 7 MG/DL — SIGNIFICANT CHANGE UP (ref 7–23)
BUN SERPL-MCNC: 9 MG/DL — SIGNIFICANT CHANGE UP (ref 7–23)
CALCIUM SERPL-MCNC: 8.5 MG/DL — SIGNIFICANT CHANGE UP (ref 8.4–10.5)
CALCIUM SERPL-MCNC: 8.7 MG/DL — SIGNIFICANT CHANGE UP (ref 8.4–10.5)
CHLORIDE SERPL-SCNC: 101 MMOL/L — SIGNIFICANT CHANGE UP (ref 98–107)
CHLORIDE SERPL-SCNC: 99 MMOL/L — SIGNIFICANT CHANGE UP (ref 98–107)
CHOLEST SERPL-MCNC: 102 MG/DL — LOW (ref 120–199)
CK MB BLD-MCNC: 2.68 NG/ML — SIGNIFICANT CHANGE UP (ref 1–6.6)
CK MB BLD-MCNC: SIGNIFICANT CHANGE UP (ref 0–2.5)
CK SERPL-CCNC: 103 U/L — SIGNIFICANT CHANGE UP (ref 30–200)
CK SERPL-CCNC: 123 U/L — SIGNIFICANT CHANGE UP (ref 30–200)
CO2 SERPL-SCNC: 26 MMOL/L — SIGNIFICANT CHANGE UP (ref 22–31)
CO2 SERPL-SCNC: 28 MMOL/L — SIGNIFICANT CHANGE UP (ref 22–31)
CREAT SERPL-MCNC: 0.91 MG/DL — SIGNIFICANT CHANGE UP (ref 0.5–1.3)
CREAT SERPL-MCNC: 0.98 MG/DL — SIGNIFICANT CHANGE UP (ref 0.5–1.3)
EOSINOPHIL # BLD AUTO: 0.05 K/UL — SIGNIFICANT CHANGE UP (ref 0–0.5)
EOSINOPHIL NFR BLD AUTO: 1 % — SIGNIFICANT CHANGE UP (ref 0–6)
GLUCOSE BLDC GLUCOMTR-MCNC: 142 MG/DL — HIGH (ref 70–99)
GLUCOSE BLDC GLUCOMTR-MCNC: 149 MG/DL — HIGH (ref 70–99)
GLUCOSE BLDC GLUCOMTR-MCNC: 151 MG/DL — HIGH (ref 70–99)
GLUCOSE BLDC GLUCOMTR-MCNC: 152 MG/DL — HIGH (ref 70–99)
GLUCOSE SERPL-MCNC: 128 MG/DL — HIGH (ref 70–99)
GLUCOSE SERPL-MCNC: 129 MG/DL — HIGH (ref 70–99)
HBA1C BLD-MCNC: 7.3 % — HIGH (ref 4–5.6)
HCT VFR BLD CALC: 36.5 % — LOW (ref 39–50)
HCT VFR BLD CALC: 37.6 % — LOW (ref 39–50)
HDLC SERPL-MCNC: 39 MG/DL — SIGNIFICANT CHANGE UP (ref 35–55)
HGB BLD-MCNC: 12.1 G/DL — LOW (ref 13–17)
HGB BLD-MCNC: 12.6 G/DL — LOW (ref 13–17)
IMM GRANULOCYTES # BLD AUTO: 0.04 # — SIGNIFICANT CHANGE UP
IMM GRANULOCYTES NFR BLD AUTO: 0.8 % — SIGNIFICANT CHANGE UP (ref 0–1.5)
INR BLD: 1.57 — HIGH (ref 0.88–1.17)
LIDOCAIN IGE QN: 138.5 U/L — HIGH (ref 7–60)
LIDOCAIN IGE QN: 86.3 U/L — HIGH (ref 7–60)
LIPID PNL WITH DIRECT LDL SERPL: 65 MG/DL — SIGNIFICANT CHANGE UP
LYMPHOCYTES # BLD AUTO: 0.95 K/UL — LOW (ref 1–3.3)
LYMPHOCYTES # BLD AUTO: 18.6 % — SIGNIFICANT CHANGE UP (ref 13–44)
MAGNESIUM SERPL-MCNC: 1.7 MG/DL — SIGNIFICANT CHANGE UP (ref 1.6–2.6)
MCHC RBC-ENTMCNC: 27.4 PG — SIGNIFICANT CHANGE UP (ref 27–34)
MCHC RBC-ENTMCNC: 27.5 PG — SIGNIFICANT CHANGE UP (ref 27–34)
MCHC RBC-ENTMCNC: 33.2 % — SIGNIFICANT CHANGE UP (ref 32–36)
MCHC RBC-ENTMCNC: 33.5 % — SIGNIFICANT CHANGE UP (ref 32–36)
MCV RBC AUTO: 82.1 FL — SIGNIFICANT CHANGE UP (ref 80–100)
MCV RBC AUTO: 82.6 FL — SIGNIFICANT CHANGE UP (ref 80–100)
MONOCYTES # BLD AUTO: 0.6 K/UL — SIGNIFICANT CHANGE UP (ref 0–0.9)
MONOCYTES NFR BLD AUTO: 11.8 % — SIGNIFICANT CHANGE UP (ref 2–14)
NEUTROPHILS # BLD AUTO: 3.45 K/UL — SIGNIFICANT CHANGE UP (ref 1.8–7.4)
NEUTROPHILS NFR BLD AUTO: 67.6 % — SIGNIFICANT CHANGE UP (ref 43–77)
NRBC # FLD: 0 — SIGNIFICANT CHANGE UP
NRBC # FLD: 0 — SIGNIFICANT CHANGE UP
PHOSPHATE SERPL-MCNC: 2.5 MG/DL — SIGNIFICANT CHANGE UP (ref 2.5–4.5)
PLATELET # BLD AUTO: 225 K/UL — SIGNIFICANT CHANGE UP (ref 150–400)
PLATELET # BLD AUTO: 241 K/UL — SIGNIFICANT CHANGE UP (ref 150–400)
PMV BLD: 10 FL — SIGNIFICANT CHANGE UP (ref 7–13)
PMV BLD: 10 FL — SIGNIFICANT CHANGE UP (ref 7–13)
POTASSIUM SERPL-MCNC: 3.9 MMOL/L — SIGNIFICANT CHANGE UP (ref 3.5–5.3)
POTASSIUM SERPL-MCNC: 4.4 MMOL/L — SIGNIFICANT CHANGE UP (ref 3.5–5.3)
POTASSIUM SERPL-SCNC: 3.9 MMOL/L — SIGNIFICANT CHANGE UP (ref 3.5–5.3)
POTASSIUM SERPL-SCNC: 4.4 MMOL/L — SIGNIFICANT CHANGE UP (ref 3.5–5.3)
PROT SERPL-MCNC: 7.3 G/DL — SIGNIFICANT CHANGE UP (ref 6–8.3)
PROTHROM AB SERPL-ACNC: 18.2 SEC — HIGH (ref 9.8–13.1)
RBC # BLD: 4.42 M/UL — SIGNIFICANT CHANGE UP (ref 4.2–5.8)
RBC # BLD: 4.58 M/UL — SIGNIFICANT CHANGE UP (ref 4.2–5.8)
RBC # FLD: 13.8 % — SIGNIFICANT CHANGE UP (ref 10.3–14.5)
RBC # FLD: 14.2 % — SIGNIFICANT CHANGE UP (ref 10.3–14.5)
SODIUM SERPL-SCNC: 137 MMOL/L — SIGNIFICANT CHANGE UP (ref 135–145)
SODIUM SERPL-SCNC: 139 MMOL/L — SIGNIFICANT CHANGE UP (ref 135–145)
TRIGL SERPL-MCNC: 35 MG/DL — SIGNIFICANT CHANGE UP (ref 10–149)
TROPONIN T SERPL-MCNC: < 0.06 NG/ML — SIGNIFICANT CHANGE UP (ref 0–0.06)
TROPONIN T SERPL-MCNC: < 0.06 NG/ML — SIGNIFICANT CHANGE UP (ref 0–0.06)
TSH SERPL-MCNC: 1.11 UIU/ML — SIGNIFICANT CHANGE UP (ref 0.27–4.2)
WBC # BLD: 4.22 K/UL — SIGNIFICANT CHANGE UP (ref 3.8–10.5)
WBC # BLD: 5.1 K/UL — SIGNIFICANT CHANGE UP (ref 3.8–10.5)
WBC # FLD AUTO: 4.22 K/UL — SIGNIFICANT CHANGE UP (ref 3.8–10.5)
WBC # FLD AUTO: 5.1 K/UL — SIGNIFICANT CHANGE UP (ref 3.8–10.5)

## 2017-12-14 PROCEDURE — 71020: CPT | Mod: 26

## 2017-12-14 PROCEDURE — 71250 CT THORAX DX C-: CPT | Mod: 26

## 2017-12-14 RX ORDER — SODIUM CHLORIDE 9 MG/ML
1000 INJECTION, SOLUTION INTRAVENOUS
Qty: 0 | Refills: 0 | Status: DISCONTINUED | OUTPATIENT
Start: 2017-12-14 | End: 2017-12-20

## 2017-12-14 RX ORDER — DEXTROSE 50 % IN WATER 50 %
12.5 SYRINGE (ML) INTRAVENOUS ONCE
Qty: 0 | Refills: 0 | Status: DISCONTINUED | OUTPATIENT
Start: 2017-12-14 | End: 2017-12-20

## 2017-12-14 RX ORDER — ASPIRIN/CALCIUM CARB/MAGNESIUM 324 MG
325 TABLET ORAL ONCE
Qty: 0 | Refills: 0 | Status: COMPLETED | OUTPATIENT
Start: 2017-12-14 | End: 2017-12-14

## 2017-12-14 RX ORDER — DEXTROSE 50 % IN WATER 50 %
25 SYRINGE (ML) INTRAVENOUS ONCE
Qty: 0 | Refills: 0 | Status: DISCONTINUED | OUTPATIENT
Start: 2017-12-14 | End: 2017-12-20

## 2017-12-14 RX ORDER — ACETAMINOPHEN 500 MG
650 TABLET ORAL EVERY 6 HOURS
Qty: 0 | Refills: 0 | Status: DISCONTINUED | OUTPATIENT
Start: 2017-12-14 | End: 2017-12-20

## 2017-12-14 RX ORDER — INSULIN LISPRO 100/ML
VIAL (ML) SUBCUTANEOUS
Qty: 0 | Refills: 0 | Status: DISCONTINUED | OUTPATIENT
Start: 2017-12-14 | End: 2017-12-20

## 2017-12-14 RX ORDER — GLUCAGON INJECTION, SOLUTION 0.5 MG/.1ML
1 INJECTION, SOLUTION SUBCUTANEOUS ONCE
Qty: 0 | Refills: 0 | Status: DISCONTINUED | OUTPATIENT
Start: 2017-12-14 | End: 2017-12-20

## 2017-12-14 RX ORDER — POLYETHYLENE GLYCOL 3350 17 G/17G
17 POWDER, FOR SOLUTION ORAL
Qty: 0 | Refills: 0 | Status: DISCONTINUED | OUTPATIENT
Start: 2017-12-14 | End: 2017-12-20

## 2017-12-14 RX ORDER — LISINOPRIL 2.5 MG/1
20 TABLET ORAL DAILY
Qty: 0 | Refills: 0 | Status: DISCONTINUED | OUTPATIENT
Start: 2017-12-14 | End: 2017-12-20

## 2017-12-14 RX ORDER — CARVEDILOL PHOSPHATE 80 MG/1
12.5 CAPSULE, EXTENDED RELEASE ORAL EVERY 12 HOURS
Qty: 0 | Refills: 0 | Status: DISCONTINUED | OUTPATIENT
Start: 2017-12-14 | End: 2017-12-20

## 2017-12-14 RX ORDER — DOXAZOSIN MESYLATE 4 MG
8 TABLET ORAL AT BEDTIME
Qty: 0 | Refills: 0 | Status: DISCONTINUED | OUTPATIENT
Start: 2017-12-14 | End: 2017-12-20

## 2017-12-14 RX ORDER — INSULIN LISPRO 100/ML
VIAL (ML) SUBCUTANEOUS AT BEDTIME
Qty: 0 | Refills: 0 | Status: DISCONTINUED | OUTPATIENT
Start: 2017-12-14 | End: 2017-12-20

## 2017-12-14 RX ORDER — HYDRALAZINE HCL 50 MG
50 TABLET ORAL THREE TIMES A DAY
Qty: 0 | Refills: 0 | Status: DISCONTINUED | OUTPATIENT
Start: 2017-12-14 | End: 2017-12-20

## 2017-12-14 RX ORDER — SIMVASTATIN 20 MG/1
20 TABLET, FILM COATED ORAL AT BEDTIME
Qty: 0 | Refills: 0 | Status: DISCONTINUED | OUTPATIENT
Start: 2017-12-14 | End: 2017-12-20

## 2017-12-14 RX ORDER — DEXTROSE 50 % IN WATER 50 %
1 SYRINGE (ML) INTRAVENOUS ONCE
Qty: 0 | Refills: 0 | Status: DISCONTINUED | OUTPATIENT
Start: 2017-12-14 | End: 2017-12-20

## 2017-12-14 RX ORDER — RIVAROXABAN 15 MG-20MG
20 KIT ORAL EVERY 24 HOURS
Qty: 0 | Refills: 0 | Status: DISCONTINUED | OUTPATIENT
Start: 2017-12-14 | End: 2017-12-16

## 2017-12-14 RX ORDER — SODIUM CHLORIDE 9 MG/ML
3 INJECTION INTRAMUSCULAR; INTRAVENOUS; SUBCUTANEOUS EVERY 8 HOURS
Qty: 0 | Refills: 0 | Status: DISCONTINUED | OUTPATIENT
Start: 2017-12-14 | End: 2017-12-20

## 2017-12-14 RX ADMIN — RIVAROXABAN 20 MILLIGRAM(S): KIT at 17:07

## 2017-12-14 RX ADMIN — POLYETHYLENE GLYCOL 3350 17 GRAM(S): 17 POWDER, FOR SOLUTION ORAL at 17:07

## 2017-12-14 RX ADMIN — SODIUM CHLORIDE 3 MILLILITER(S): 9 INJECTION INTRAMUSCULAR; INTRAVENOUS; SUBCUTANEOUS at 06:19

## 2017-12-14 RX ADMIN — CARVEDILOL PHOSPHATE 12.5 MILLIGRAM(S): 80 CAPSULE, EXTENDED RELEASE ORAL at 06:18

## 2017-12-14 RX ADMIN — Medication 50 MILLIGRAM(S): at 13:28

## 2017-12-14 RX ADMIN — SIMVASTATIN 20 MILLIGRAM(S): 20 TABLET, FILM COATED ORAL at 22:44

## 2017-12-14 RX ADMIN — SODIUM CHLORIDE 3 MILLILITER(S): 9 INJECTION INTRAMUSCULAR; INTRAVENOUS; SUBCUTANEOUS at 22:21

## 2017-12-14 RX ADMIN — Medication 50 MILLIGRAM(S): at 06:18

## 2017-12-14 RX ADMIN — SODIUM CHLORIDE 3 MILLILITER(S): 9 INJECTION INTRAMUSCULAR; INTRAVENOUS; SUBCUTANEOUS at 13:17

## 2017-12-14 RX ADMIN — Medication 8 MILLIGRAM(S): at 22:44

## 2017-12-14 RX ADMIN — CARVEDILOL PHOSPHATE 12.5 MILLIGRAM(S): 80 CAPSULE, EXTENDED RELEASE ORAL at 17:07

## 2017-12-14 RX ADMIN — LISINOPRIL 20 MILLIGRAM(S): 2.5 TABLET ORAL at 06:18

## 2017-12-14 RX ADMIN — Medication 50 MILLIGRAM(S): at 22:44

## 2017-12-14 RX ADMIN — Medication 2: at 18:19

## 2017-12-14 RX ADMIN — Medication 325 MILLIGRAM(S): at 04:39

## 2017-12-14 RX ADMIN — POLYETHYLENE GLYCOL 3350 17 GRAM(S): 17 POWDER, FOR SOLUTION ORAL at 06:19

## 2017-12-14 NOTE — ED PROCEDURE NOTE - PROCEDURE ADDITIONAL DETAILS
Focused ED TTE  Grey scale imaging obtained in four views ( parasternal long, parasternal short, apical and subxiphoid)  no pericardial effusion noted.  no gross wall motion abnormalities, normal LV contractility    impression: no pericardial effusion, normal contractility  Daniel [id number]
ED Focused Bedside Ultrasound of Lung   Right Lung: [N] B-lines, [Y] Lung sliding, [small] Pleural effusion   Left Lung: [N] B-lines, [Y] Lung sliding, [] Pleural effusion   Impression: [N] Pulmonary edema, [N] Pneumothorax, [small] Pleural effusion.   Y=Yes, N=No. CPT Code 82077. [id number].
Focused ED Ultrasound: RUQ   Findings: No wall thickening. No wall edema. No pericholecystic fluid. Negative sonographic  Floyd's Sign. No gallstones.  Anterior gallbladder wall: 0.26 cm  CBD : 0.41 cm  1.69 x 1.33 cm anechoic region in liver    Impression: No acute cholelithiasis. No acute cholecystitis.  Attending: Ludmila Sanchez DO  [id number]

## 2017-12-14 NOTE — H&P ADULT - GASTROINTESTINAL DETAILS
no distention/no masses palpable/no organomegaly/soft/no guarding/no bruit/no rebound tenderness/bowel sounds normal/nontender

## 2017-12-14 NOTE — ED ADULT TRIAGE NOTE - CHIEF COMPLAINT QUOTE
Pt arrives to ED c/o chest pain to left side of chest starting this afternoon while at rest.  Pain lessens when standing. Pain worsens when laying on left side.  Pain does not radiate.  EKG in triage.  Pt was seen 1 month ago for Pericardial effusion.

## 2017-12-14 NOTE — H&P ADULT - HISTORY OF PRESENT ILLNESS
77yM, self ambulating, with a history of chronic fibrous pericarditis s/p pericardial window,10/30/17 and discharged on 11/9, with recent admission for pericardial tamponade, polyclonal gammopathy, Aflutter on Xarelto, DM2, HTN, experiencing, non exertional, substernal/ epigastric pain, that started 12/13 AM, radiates to the LUQ, worsens upon laying and leaning forward, improves with sitting. Has not tried any meds for the pain. Positive RAMACHANDRAN after a few steps.  Denies nausea, vomit, cough, chills, diaphoresis.  Currently the pt say his chest pain has improved.

## 2017-12-14 NOTE — H&P ADULT - NEUROLOGICAL DETAILS
alert and oriented x 3/sensation intact/cranial nerves intact/normal strength/responds to verbal commands

## 2017-12-14 NOTE — ED PROVIDER NOTE - OBJECTIVE STATEMENT
76yo M, hx of chronic fibrous pericarditis s/p pericardial window (10/30) with recent admission for pericardial tamponade, polyclonal gammopathy, aflutter on Xarelto, DM, HTN p/w epigastric pain. Epigastric pain started this AM, is constant and worsening, radiates to the LUQ, worsens upon laying and leaning forward, improves with sitting. Has not tried any meds for the pain. Also endorses sob. Was recently admitted to Dr. Yadiel Babin for pericardial tamponade s/p window, discharged on 11/9. Was seen by his primary cardiologist last Thursday for f/u - EKG wnl per pt's wife. Pt supposed to have echo today.

## 2017-12-14 NOTE — ED PROVIDER NOTE - ATTENDING CONTRIBUTION TO CARE
I was physically present for the E/M service provided. I agree with above history, physical, and plan which I have reviewed and edited where appropriate. I was physically present for the key portions of the service provided.    77M PMH of pericarditis s/p pericardial window October 2017, HTN and DM p/w chest pain starting this evening and exertional dyspnea. Afebrile. EKG NSR without ischemia. Troponin WNL x1. No pericardial effusions on bedside echo. No gallbladder pathology. Bilateral pleural effusions with comfortable respirations on RA. Pt to be admitted for ACS r/o.

## 2017-12-14 NOTE — H&P ADULT - NEGATIVE OPHTHALMOLOGIC SYMPTOMS
no blurred vision R/no discharge R/no photophobia/no lacrimation L/no lacrimation R/no blurred vision L/no discharge L

## 2017-12-14 NOTE — ED PROVIDER NOTE - PROGRESS NOTE DETAILS
Ivonne Garcia MD PGY1: No pericardial effusion/tamponade physiology visualized on bedside echo. No stones or biliary sludge visualized on bedside RUQ u/s. Small amount of fluid visualized in Nguyen's pouch. Ivonne Garcia MD PGY1: No pericardial effusion/tamponade physiology visualized on bedside echo. No stones or biliary sludge visualized on bedside RUQ u/s. Small b/l pleural effusions noted. Ivonne Garcia MD PGY1: case d/w Dr. Yadiel Babin for admission for further cardiac workup.

## 2017-12-14 NOTE — H&P ADULT - NEGATIVE ENMT SYMPTOMS
no tinnitus/no hearing difficulty/no ear pain/no sinus symptoms/no nasal discharge/no vertigo/no nasal congestion/no nasal obstruction

## 2017-12-14 NOTE — ED PROVIDER NOTE - NS ED ROS FT
Constitutional: no fevers, chills  HEENT: no visual changes, no sore throat, no rhinorrhea  CV: no cp  Resp: + sob  GI: + abd pain, n/v, diarrhea/constipation  : no dysuria, hematuria  MSK: no joint pains  skin: no rashes Constitutional: no fevers, chills  HEENT: no visual changes, no sore throat, no rhinorrhea  CV: no cp  Resp: + sob  GI: + abd pain, no n/v, diarrhea/constipation  : no dysuria, hematuria  MSK: no joint pains  skin: no rashes

## 2017-12-14 NOTE — ED PROVIDER NOTE - PHYSICAL EXAMINATION
Vitals: WNL  Gen: laying comfortably in NAD  Head: NCAT  ENT: sclerae white, anicterus, moist mucous membranes. No exudates.  CV: RRR. Audible S1 and S2. No murmurs, rubs, gallops, S3, nor S4, 2+ radial and DP pulses   Pulm: R lower lobe decreased breath sounds, rest of lungs clear to auscultation bilaterally. No wheezes, rales, or rhonchi  Abd: soft, normoactive BS x4, epigastric ttp, RUQ ttp, no rebound, no guarding, no rashes  Musculoskeletal:  No peripheral edema  Skin: no lesions or scars noted  Neurologic: responds to questions appropriately  : no CVA tenderness

## 2017-12-14 NOTE — CONSULT NOTE ADULT - SUBJECTIVE AND OBJECTIVE BOX
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 Has not tried any meds for the pain. Positive RAMACHANDRAN after a few steps.  Denies nausea, vomit, cough, chills, diaphoresis.  Currently the pt say his chest pain has improved. (14 Dec 2017 05:51)      ?FOLLOWING PRESENT  [x ] Hx of PE/DVT, [x ] Hx COPD, [x ] Hx of Asthma, [y ] Hx of Hospitalization, [x ]  Hx of BiPAP/CPAP use, [x ] Hx of GABRIELLA    Allergies    No Known Allergies    Intolerances        PAST MEDICAL & SURGICAL HISTORY:  Afib  Hyperlipidemia  HTN (hypertension)  Hernia, inguinal, right  BPH (benign prostatic hyperplasia)  T2DM (type 2 diabetes mellitus): &gt; 20 yrs  S/P cataract surgery: bilateral 4 years ago      FAMILY HISTORY:  No pertinent family history in first degree relatives      Social History: [ x ] TOBACCO                  [ x ] ETOH                                 [ x ] IVDA/DRUGS    REVIEW OF SYSTEMS      General:	x    Skin/Breast:x  	  Ophthalmologic:x  	  ENMT:	x    Respiratory and Thorax: chest pain, SOB   	  Cardiovascular:	x    Gastrointestinal:	x    Genitourinary:	x    Musculoskeletal:	x    Neurological:	x    Psychiatric:	x  xx  Hematology/Lymphatics:	x    Endocrine:	x    Allergic/Immunologic:	x    MEDICATIONS  (STANDING):  carvedilol 12.5 milliGRAM(s) Oral every 12 hours  dextrose 5%. 1000 milliLiter(s) (50 mL/Hr) IV Continuous <Continuous>  dextrose 50% Injectable 12.5 Gram(s) IV Push once  dextrose 50% Injectable 25 Gram(s) IV Push once  dextrose 50% Injectable 25 Gram(s) IV Push once  doxazosin 8 milliGRAM(s) Oral at bedtime  hydrALAZINE 50 milliGRAM(s) Oral three times a day  insulin lispro (HumaLOG) corrective regimen sliding scale   SubCutaneous three times a day before meals  insulin lispro (HumaLOG) corrective regimen sliding scale   SubCutaneous at bedtime  lisinopril 20 milliGRAM(s) Oral daily  polyethylene glycol 3350 17 Gram(s) Oral two times a day  rivaroxaban 20 milliGRAM(s) Oral every 24 hours  simvastatin 20 milliGRAM(s) Oral at bedtime  sodium chloride 0.9% lock flush 3 milliLiter(s) IV Push every 8 hours    MEDICATIONS  (PRN):  acetaminophen   Tablet 650 milliGRAM(s) Oral every 6 hours PRN Mild Pain and moderate pain  dextrose Gel 1 Dose(s) Oral once PRN Blood Glucose LESS THAN 70 milliGRAM(s)/deciliter  glucagon  Injectable 1 milliGRAM(s) IntraMuscular once PRN Glucose LESS THAN 70 milligrams/deciliter       Vital Signs Last 24 Hrs  T(C): 36.4 (14 Dec 2017 09:20), Max: 36.9 (14 Dec 2017 03:30)  T(F): 97.5 (14 Dec 2017 09:20), Max: 98.5 (14 Dec 2017 03:30)  HR: 63 (14 Dec 2017 09:20) (63 - 82)  BP: 115/61 (14 Dec 2017 09:20) (115/61 - 151/78)  BP(mean): --  RR: 16 (14 Dec 2017 09:20) (14 - 20)  SpO2: 100% (14 Dec 2017 09:20) (95% - 100%)        I&O's Summary      Physical Exam:   GENERAL: NAD, well-groomed, well-developed  HEENT: DARLING/   Atraumatic, Normocephalic  ENMT: No tonsillar erythema, exudates, or enlargement; Moist mucous membranes, Good dentition, No lesions  NECK: Supple, No JVD, Normal thyroid  CHEST/LUNG: Decreased air entry right base:   CVS: Regular rate and rhythm; No murmurs, rubs, or gallops  GI: : Soft, Nontender, Nondistended; Bowel sounds present  NERVOUS SYSTEM:  Alert & Oriented X3  EXTREMITIES:  2+ Peripheral Pulses, No clubbing, cyanosis, or edema  LYMPH: No lymphadenopathy noted  SKIN: No rashes or lesions  ENDOCRINOLOGY: No Thyromegaly  PSYCH: Appropriate    Labs:    CARDIAC MARKERS ( 14 Dec 2017 07:25 )  x     / < 0.06 ng/mL / 103 u/L / x     / x      CARDIAC MARKERS ( 14 Dec 2017 02:20 )  x     / < 0.06 ng/mL / 123 u/L / 2.68 ng/mL / x                                12.1   4.22  )-----------( 225      ( 14 Dec 2017 07:25 )             36.5                         12.6   5.10  )-----------( 241      ( 14 Dec 2017 02:20 )             37.6     12-14    139  |  101  |  7   ----------------------------<  129<H>  3.9   |  28  |  0.91  12-14    137  |  99  |  9   ----------------------------<  128<H>  4.4   |  26  |  0.98    Ca    8.5      14 Dec 2017 07:25  Ca    8.7      14 Dec 2017 02:20  Phos  2.5     12-14  Mg     1.7     12-14    TPro  7.3  /  Alb  3.8  /  TBili  0.4  /  DBili  x   /  AST  25  /  ALT  12  /  AlkPhos  65  12-14    CAPILLARY BLOOD GLUCOSE      POCT Blood Glucose.: 142 mg/dL (14 Dec 2017 09:27)    LIVER FUNCTIONS - ( 14 Dec 2017 02:20 )  Alb: 3.8 g/dL / Pro: 7.3 g/dL / ALK PHOS: 65 u/L / ALT: 12 u/L / AST: 25 u/L / GGT: x           PT/INR - ( 14 Dec 2017 02:20 )   PT: 18.2 SEC;   INR: 1.57          PTT - ( 14 Dec 2017 02:20 )  PTT:41.3 SEC    D DImer    Cultures:     < from: Xray Chest 2 Views PA/Lat (12.14.17 @ 04:08) >    EXAM:  RAD CHEST PA LAT        PROCEDURE DATE:  Dec 14 2017         INTERPRETATION:  CLINICAL INDICATION: Chest pain    TECHNIQUE: PA and lateral radiographs of the chest     COMPARISON: There are no similar prior studies available for comparison.     FINDINGS:    The lungs are clear.   Small to moderate bilateral pleural effusions   There is no evidence of pneumothorax.  The heart is normal in size.  The visualized osseous and soft tissue structures are unremarkable.     IMPRESSION:  Small-to-moderate bilateral pleural effusions.  Clear lungs.               DIAMOND BURTON M.D., RADIOLOGY RESIDENT  This document has been electronically signed.  ELLIE SMITH M.D.; ATTENDING RADIOLOGIST  This document has been electronically signed. Dec 14 2017  5:48AM        < end of copied text >                        Studies  Chest X-RAY  CT SCAN Chest   CT Abdomen  Venous Dopplers: LE:   Others
Patient is a 77y old  Male who presents with a chief complaint of Chest pain.       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 Has not tried any meds for the pain. Positive RAMACHANDRAN after a few steps.  Denies nausea, vomit, cough, chills, diaphoresis.  Currently the pt say his chest pain has improved. (14 Dec 2017 05:51)      PAST MEDICAL & SURGICAL HISTORY:  Afib  Hyperlipidemia  HTN (hypertension)  Hernia, inguinal, right  BPH (benign prostatic hyperplasia)  T2DM (type 2 diabetes mellitus): &gt; 20 yrs  S/P cataract surgery: bilateral 4 years ago      Review of Systems:   CONSTITUTIONAL: No fever, weight loss, or fatigue  EYES: No eye pain, visual disturbances, or discharge  ENMT:  No difficulty hearing, tinnitus, vertigo; No sinus or throat pain  NECK: No pain or stiffness  RESPIRATORY: No cough, wheezing, chills or hemoptysis; No shortness of breath  CARDIOVASCULAR: No chest pain, palpitations, dizziness, or leg swelling  GASTROINTESTINAL: No abdominal or epigastric pain. No nausea, vomiting, or hematemesis; No diarrhea or constipation. No melena or hematochezia.  NEUROLOGICAL: No headaches, memory loss, loss of strength, numbness, or tremors  SKIN: No itching, burning, rashes, or lesions   MUSCULOSKELETAL: No joint pain or swelling; No muscle, back, or extremity pain  PSYCHIATRIC: No depression, anxiety, mood swings, or difficulty sleeping      Allergies    No Known Allergies    Intolerances        Social History:     FAMILY HISTORY:  No pertinent family history in first degree relatives      MEDICATIONS  (STANDING):  carvedilol 12.5 milliGRAM(s) Oral every 12 hours  dextrose 5%. 1000 milliLiter(s) (50 mL/Hr) IV Continuous <Continuous>  dextrose 50% Injectable 12.5 Gram(s) IV Push once  dextrose 50% Injectable 25 Gram(s) IV Push once  dextrose 50% Injectable 25 Gram(s) IV Push once  doxazosin 8 milliGRAM(s) Oral at bedtime  hydrALAZINE 50 milliGRAM(s) Oral three times a day  insulin lispro (HumaLOG) corrective regimen sliding scale   SubCutaneous three times a day before meals  insulin lispro (HumaLOG) corrective regimen sliding scale   SubCutaneous at bedtime  lisinopril 20 milliGRAM(s) Oral daily  polyethylene glycol 3350 17 Gram(s) Oral two times a day  rivaroxaban 20 milliGRAM(s) Oral every 24 hours  simvastatin 20 milliGRAM(s) Oral at bedtime  sodium chloride 0.9% lock flush 3 milliLiter(s) IV Push every 8 hours    MEDICATIONS  (PRN):  acetaminophen   Tablet 650 milliGRAM(s) Oral every 6 hours PRN Mild Pain and moderate pain  dextrose Gel 1 Dose(s) Oral once PRN Blood Glucose LESS THAN 70 milliGRAM(s)/deciliter  glucagon  Injectable 1 milliGRAM(s) IntraMuscular once PRN Glucose LESS THAN 70 milligrams/deciliter      CAPILLARY BLOOD GLUCOSE      POCT Blood Glucose.: 152 mg/dL (14 Dec 2017 21:22)  POCT Blood Glucose.: 151 mg/dL (14 Dec 2017 17:45)  POCT Blood Glucose.: 149 mg/dL (14 Dec 2017 13:17)  POCT Blood Glucose.: 142 mg/dL (14 Dec 2017 09:27)    I&O's Summary      PHYSICAL EXAM:  GENERAL: NAD, well-developed  HEAD:  Atraumatic, Normocephalic  NECK: Supple, No JVD  CHEST/LUNG: Clear to auscultation bilaterally; No wheezing.  HEART: Regular rate and rhythm; No murmurs, rubs, or gallops  ABDOMEN: Soft, Nontender, Nondistended; Bowel sounds present  EXTREMITIES:  2+ Peripheral Pulses, No clubbing, cyanosis, or edema  PSYCH: AAOx3  NEUROLOGY: non-focal  SKIN: No rashes or lesions    LABS:                        12.1   4.22  )-----------( 225      ( 14 Dec 2017 07:25 )             36.5     12-14    139  |  101  |  7   ----------------------------<  129<H>  3.9   |  28  |  0.91    Ca    8.5      14 Dec 2017 07:25  Phos  2.5     12-14  Mg     1.7     12-14    TPro  7.3  /  Alb  3.8  /  TBili  0.4  /  DBili  x   /  AST  25  /  ALT  12  /  AlkPhos  65  12-14    PT/INR - ( 14 Dec 2017 02:20 )   PT: 18.2 SEC;   INR: 1.57          PTT - ( 14 Dec 2017 02:20 )  PTT:41.3 SEC  CARDIAC MARKERS ( 14 Dec 2017 07:25 )  x     / < 0.06 ng/mL / 103 u/L / x     / x      CARDIAC MARKERS ( 14 Dec 2017 02:20 )  x     / < 0.06 ng/mL / 123 u/L / 2.68 ng/mL / x            CAPILLARY BLOOD GLUCOSE      POCT Blood Glucose.: 152 mg/dL (14 Dec 2017 21:22)  POCT Blood Glucose.: 151 mg/dL (14 Dec 2017 17:45)  POCT Blood Glucose.: 149 mg/dL (14 Dec 2017 13:17)  POCT Blood Glucose.: 142 mg/dL (14 Dec 2017 09:27)                RADIOLOGY & ADDITIONAL TESTS:    Imaging Personally Reviewed:    Consultant(s) Notes Reviewed:      Care Discussed with Consultants/Other Providers:    Thanks for consult. Will follow.

## 2017-12-14 NOTE — ED PROCEDURE NOTE - ATTENDING CONTRIBUTION TO CARE
I was physically present for the E/M service provided. I was physically present for the key portions of the service provided. NICOLASA.

## 2017-12-14 NOTE — CONSULT NOTE ADULT - ASSESSMENT
77M admitted for chest pain & small-to-moderate bilateral pleural effusions.
Patient is a 77y old  Male who presents with a chief complaint of Chest pain.     ACS:  Tele  Cardio f/up noted.  NST    A Fib:  Xarelto    HTN:  Coreg/Hydralazine    DM II:  FSSS

## 2017-12-14 NOTE — H&P ADULT - NEGATIVE NEUROLOGICAL SYMPTOMS
no tremors/no vertigo/no loss of sensation/no weakness/no generalized seizures/no focal seizures/no transient paralysis/no syncope/no paresthesias

## 2017-12-14 NOTE — ED PROVIDER NOTE - MEDICAL DECISION MAKING DETAILS
76yo M, hx of chronic fibrous pericarditis s/p pericardial window (10/30) with recent admission for pericardial tamponade, polyclonal gammopathy, aflutter on Xarelto, DM, HTN p/w epigastric pain.  Concern for recurrence of pericardial effusion v ACS v pna v pancreatitis v cholecystitis. Will eval with bedside echo, bedside RUQ u/s, labs, cxr, ekg, reassess. Will likely need admission for formal echo.

## 2017-12-14 NOTE — H&P ADULT - MUSCULOSKELETAL
details… detailed exam no joint warmth/no calf tenderness/normal strength/no joint erythema/no joint swelling

## 2017-12-14 NOTE — CONSULT NOTE ADULT - PROBLEM SELECTOR RECOMMENDATION 9
the etiology of pleural effusion seems to be cardiac in nature: would rpt echo as well as do ct chest without contrast to look for any underlying pulmonary disease:

## 2017-12-14 NOTE — H&P ADULT - NSHPLABSRESULTS_GEN_ALL_CORE
CXR preliminary = small-to-moderate bilateral pleural effusions.  Clear lungs.  H & H = 12.6/ 37.6  PT/ INR = 18.2/ 1.57.  On Rivaroxaban for A flutter.  BUN/ Creatinine= 9/ 0.98  Glucose= 128  Lipase = 139  CE negative X 1   EKG NSR @ 82b/ min

## 2017-12-15 LAB
BUN SERPL-MCNC: 12 MG/DL — SIGNIFICANT CHANGE UP (ref 7–23)
CALCIUM SERPL-MCNC: 8.5 MG/DL — SIGNIFICANT CHANGE UP (ref 8.4–10.5)
CHLORIDE SERPL-SCNC: 101 MMOL/L — SIGNIFICANT CHANGE UP (ref 98–107)
CO2 SERPL-SCNC: 26 MMOL/L — SIGNIFICANT CHANGE UP (ref 22–31)
CREAT SERPL-MCNC: 0.99 MG/DL — SIGNIFICANT CHANGE UP (ref 0.5–1.3)
GLUCOSE BLDC GLUCOMTR-MCNC: 145 MG/DL — HIGH (ref 70–99)
GLUCOSE BLDC GLUCOMTR-MCNC: 172 MG/DL — HIGH (ref 70–99)
GLUCOSE BLDC GLUCOMTR-MCNC: 173 MG/DL — HIGH (ref 70–99)
GLUCOSE BLDC GLUCOMTR-MCNC: 201 MG/DL — HIGH (ref 70–99)
GLUCOSE SERPL-MCNC: 142 MG/DL — HIGH (ref 70–99)
HCT VFR BLD CALC: 34.3 % — LOW (ref 39–50)
HGB BLD-MCNC: 11.5 G/DL — LOW (ref 13–17)
MCHC RBC-ENTMCNC: 27.4 PG — SIGNIFICANT CHANGE UP (ref 27–34)
MCHC RBC-ENTMCNC: 33.5 % — SIGNIFICANT CHANGE UP (ref 32–36)
MCV RBC AUTO: 81.7 FL — SIGNIFICANT CHANGE UP (ref 80–100)
NRBC # FLD: 0 — SIGNIFICANT CHANGE UP
PLATELET # BLD AUTO: 215 K/UL — SIGNIFICANT CHANGE UP (ref 150–400)
PMV BLD: 10.6 FL — SIGNIFICANT CHANGE UP (ref 7–13)
POTASSIUM SERPL-MCNC: 4.2 MMOL/L — SIGNIFICANT CHANGE UP (ref 3.5–5.3)
POTASSIUM SERPL-SCNC: 4.2 MMOL/L — SIGNIFICANT CHANGE UP (ref 3.5–5.3)
RBC # BLD: 4.2 M/UL — SIGNIFICANT CHANGE UP (ref 4.2–5.8)
RBC # FLD: 14 % — SIGNIFICANT CHANGE UP (ref 10.3–14.5)
SODIUM SERPL-SCNC: 138 MMOL/L — SIGNIFICANT CHANGE UP (ref 135–145)
WBC # BLD: 3.81 K/UL — SIGNIFICANT CHANGE UP (ref 3.8–10.5)
WBC # FLD AUTO: 3.81 K/UL — SIGNIFICANT CHANGE UP (ref 3.8–10.5)

## 2017-12-15 PROCEDURE — 93018 CV STRESS TEST I&R ONLY: CPT | Mod: GC

## 2017-12-15 PROCEDURE — 78452 HT MUSCLE IMAGE SPECT MULT: CPT | Mod: 26

## 2017-12-15 PROCEDURE — 93016 CV STRESS TEST SUPVJ ONLY: CPT | Mod: GC

## 2017-12-15 RX ADMIN — LISINOPRIL 20 MILLIGRAM(S): 2.5 TABLET ORAL at 05:35

## 2017-12-15 RX ADMIN — POLYETHYLENE GLYCOL 3350 17 GRAM(S): 17 POWDER, FOR SOLUTION ORAL at 17:01

## 2017-12-15 RX ADMIN — SODIUM CHLORIDE 3 MILLILITER(S): 9 INJECTION INTRAMUSCULAR; INTRAVENOUS; SUBCUTANEOUS at 05:24

## 2017-12-15 RX ADMIN — SODIUM CHLORIDE 3 MILLILITER(S): 9 INJECTION INTRAMUSCULAR; INTRAVENOUS; SUBCUTANEOUS at 14:41

## 2017-12-15 RX ADMIN — Medication 8 MILLIGRAM(S): at 22:01

## 2017-12-15 RX ADMIN — SODIUM CHLORIDE 3 MILLILITER(S): 9 INJECTION INTRAMUSCULAR; INTRAVENOUS; SUBCUTANEOUS at 22:03

## 2017-12-15 RX ADMIN — SIMVASTATIN 20 MILLIGRAM(S): 20 TABLET, FILM COATED ORAL at 22:01

## 2017-12-15 RX ADMIN — CARVEDILOL PHOSPHATE 12.5 MILLIGRAM(S): 80 CAPSULE, EXTENDED RELEASE ORAL at 17:01

## 2017-12-15 RX ADMIN — Medication 50 MILLIGRAM(S): at 22:01

## 2017-12-15 RX ADMIN — Medication 2: at 13:12

## 2017-12-15 RX ADMIN — Medication 50 MILLIGRAM(S): at 14:43

## 2017-12-15 RX ADMIN — Medication 50 MILLIGRAM(S): at 05:35

## 2017-12-15 RX ADMIN — Medication 4: at 09:50

## 2017-12-16 LAB
BUN SERPL-MCNC: 14 MG/DL — SIGNIFICANT CHANGE UP (ref 7–23)
CALCIUM SERPL-MCNC: 8.8 MG/DL — SIGNIFICANT CHANGE UP (ref 8.4–10.5)
CHLORIDE SERPL-SCNC: 102 MMOL/L — SIGNIFICANT CHANGE UP (ref 98–107)
CO2 SERPL-SCNC: 25 MMOL/L — SIGNIFICANT CHANGE UP (ref 22–31)
CREAT SERPL-MCNC: 0.99 MG/DL — SIGNIFICANT CHANGE UP (ref 0.5–1.3)
GLUCOSE BLDC GLUCOMTR-MCNC: 127 MG/DL — HIGH (ref 70–99)
GLUCOSE BLDC GLUCOMTR-MCNC: 177 MG/DL — HIGH (ref 70–99)
GLUCOSE BLDC GLUCOMTR-MCNC: 192 MG/DL — HIGH (ref 70–99)
GLUCOSE BLDC GLUCOMTR-MCNC: 198 MG/DL — HIGH (ref 70–99)
GLUCOSE SERPL-MCNC: 170 MG/DL — HIGH (ref 70–99)
HCT VFR BLD CALC: 35.9 % — LOW (ref 39–50)
HGB BLD-MCNC: 11.7 G/DL — LOW (ref 13–17)
MAGNESIUM SERPL-MCNC: 1.8 MG/DL — SIGNIFICANT CHANGE UP (ref 1.6–2.6)
MCHC RBC-ENTMCNC: 27 PG — SIGNIFICANT CHANGE UP (ref 27–34)
MCHC RBC-ENTMCNC: 32.6 % — SIGNIFICANT CHANGE UP (ref 32–36)
MCV RBC AUTO: 82.7 FL — SIGNIFICANT CHANGE UP (ref 80–100)
NRBC # FLD: 0 — SIGNIFICANT CHANGE UP
PLATELET # BLD AUTO: 230 K/UL — SIGNIFICANT CHANGE UP (ref 150–400)
PMV BLD: 11 FL — SIGNIFICANT CHANGE UP (ref 7–13)
POTASSIUM SERPL-MCNC: 4.1 MMOL/L — SIGNIFICANT CHANGE UP (ref 3.5–5.3)
POTASSIUM SERPL-SCNC: 4.1 MMOL/L — SIGNIFICANT CHANGE UP (ref 3.5–5.3)
RBC # BLD: 4.34 M/UL — SIGNIFICANT CHANGE UP (ref 4.2–5.8)
RBC # FLD: 13.9 % — SIGNIFICANT CHANGE UP (ref 10.3–14.5)
SODIUM SERPL-SCNC: 139 MMOL/L — SIGNIFICANT CHANGE UP (ref 135–145)
WBC # BLD: 3.88 K/UL — SIGNIFICANT CHANGE UP (ref 3.8–10.5)
WBC # FLD AUTO: 3.88 K/UL — SIGNIFICANT CHANGE UP (ref 3.8–10.5)

## 2017-12-16 RX ORDER — SENNA PLUS 8.6 MG/1
2 TABLET ORAL AT BEDTIME
Qty: 0 | Refills: 0 | Status: DISCONTINUED | OUTPATIENT
Start: 2017-12-16 | End: 2017-12-20

## 2017-12-16 RX ORDER — DOCUSATE SODIUM 100 MG
100 CAPSULE ORAL DAILY
Qty: 0 | Refills: 0 | Status: DISCONTINUED | OUTPATIENT
Start: 2017-12-16 | End: 2017-12-20

## 2017-12-16 RX ADMIN — Medication 50 MILLIGRAM(S): at 21:53

## 2017-12-16 RX ADMIN — CARVEDILOL PHOSPHATE 12.5 MILLIGRAM(S): 80 CAPSULE, EXTENDED RELEASE ORAL at 17:00

## 2017-12-16 RX ADMIN — Medication 2: at 09:21

## 2017-12-16 RX ADMIN — LISINOPRIL 20 MILLIGRAM(S): 2.5 TABLET ORAL at 05:24

## 2017-12-16 RX ADMIN — POLYETHYLENE GLYCOL 3350 17 GRAM(S): 17 POWDER, FOR SOLUTION ORAL at 05:24

## 2017-12-16 RX ADMIN — POLYETHYLENE GLYCOL 3350 17 GRAM(S): 17 POWDER, FOR SOLUTION ORAL at 17:00

## 2017-12-16 RX ADMIN — SODIUM CHLORIDE 3 MILLILITER(S): 9 INJECTION INTRAMUSCULAR; INTRAVENOUS; SUBCUTANEOUS at 22:01

## 2017-12-16 RX ADMIN — SODIUM CHLORIDE 3 MILLILITER(S): 9 INJECTION INTRAMUSCULAR; INTRAVENOUS; SUBCUTANEOUS at 13:23

## 2017-12-16 RX ADMIN — SENNA PLUS 2 TABLET(S): 8.6 TABLET ORAL at 21:59

## 2017-12-16 RX ADMIN — SODIUM CHLORIDE 3 MILLILITER(S): 9 INJECTION INTRAMUSCULAR; INTRAVENOUS; SUBCUTANEOUS at 05:26

## 2017-12-16 RX ADMIN — Medication 8 MILLIGRAM(S): at 21:53

## 2017-12-16 RX ADMIN — Medication 50 MILLIGRAM(S): at 05:24

## 2017-12-16 RX ADMIN — Medication 2: at 13:22

## 2017-12-16 RX ADMIN — SIMVASTATIN 20 MILLIGRAM(S): 20 TABLET, FILM COATED ORAL at 21:54

## 2017-12-16 RX ADMIN — Medication 50 MILLIGRAM(S): at 13:22

## 2017-12-16 RX ADMIN — CARVEDILOL PHOSPHATE 12.5 MILLIGRAM(S): 80 CAPSULE, EXTENDED RELEASE ORAL at 05:24

## 2017-12-17 ENCOUNTER — RESULT REVIEW (OUTPATIENT)
Age: 77
End: 2017-12-17

## 2017-12-17 LAB
ALBUMIN FLD-MCNC: 2.8 G/DL — SIGNIFICANT CHANGE UP
BODY FLUID TYPE: SIGNIFICANT CHANGE UP
BUN SERPL-MCNC: 12 MG/DL — SIGNIFICANT CHANGE UP (ref 7–23)
CALCIUM SERPL-MCNC: 9 MG/DL — SIGNIFICANT CHANGE UP (ref 8.4–10.5)
CHLORIDE SERPL-SCNC: 95 MMOL/L — LOW (ref 98–107)
CLARITY SPEC: SIGNIFICANT CHANGE UP
CO2 SERPL-SCNC: 26 MMOL/L — SIGNIFICANT CHANGE UP (ref 22–31)
COLOR FLD: SIGNIFICANT CHANGE UP
CREAT SERPL-MCNC: 0.95 MG/DL — SIGNIFICANT CHANGE UP (ref 0.5–1.3)
CRYSTALS FLD MICRO: SIGNIFICANT CHANGE UP
GLUCOSE BLDC GLUCOMTR-MCNC: 173 MG/DL — HIGH (ref 70–99)
GLUCOSE BLDC GLUCOMTR-MCNC: 180 MG/DL — HIGH (ref 70–99)
GLUCOSE BLDC GLUCOMTR-MCNC: 217 MG/DL — HIGH (ref 70–99)
GLUCOSE BLDC GLUCOMTR-MCNC: 220 MG/DL — HIGH (ref 70–99)
GLUCOSE FLD-MCNC: 210 MG/DL — SIGNIFICANT CHANGE UP
GLUCOSE SERPL-MCNC: 164 MG/DL — HIGH (ref 70–99)
GRAM STN FLD: SIGNIFICANT CHANGE UP
HCT VFR BLD CALC: 38 % — LOW (ref 39–50)
HGB BLD-MCNC: 12.5 G/DL — LOW (ref 13–17)
LDH SERPL L TO P-CCNC: 111 U/L — SIGNIFICANT CHANGE UP
LYMPHOCYTES NFR FLD: 68 % — SIGNIFICANT CHANGE UP
MACROPHAGES # FLD: 9 % — SIGNIFICANT CHANGE UP
MAGNESIUM SERPL-MCNC: 1.9 MG/DL — SIGNIFICANT CHANGE UP (ref 1.6–2.6)
MCHC RBC-ENTMCNC: 26.8 PG — LOW (ref 27–34)
MCHC RBC-ENTMCNC: 32.9 % — SIGNIFICANT CHANGE UP (ref 32–36)
MCV RBC AUTO: 81.4 FL — SIGNIFICANT CHANGE UP (ref 80–100)
MESOTHL CELL # FLD: 10 % — SIGNIFICANT CHANGE UP
NEUTS SEG NFR FLD MANUAL: 13 % — SIGNIFICANT CHANGE UP
NRBC # FLD: 0 — SIGNIFICANT CHANGE UP
PLATELET # BLD AUTO: 252 K/UL — SIGNIFICANT CHANGE UP (ref 150–400)
PMV BLD: 10.9 FL — SIGNIFICANT CHANGE UP (ref 7–13)
POTASSIUM SERPL-MCNC: 4.2 MMOL/L — SIGNIFICANT CHANGE UP (ref 3.5–5.3)
POTASSIUM SERPL-SCNC: 4.2 MMOL/L — SIGNIFICANT CHANGE UP (ref 3.5–5.3)
PROT FLD-MCNC: 4.8 G/DL — SIGNIFICANT CHANGE UP
RBC # BLD: 4.67 M/UL — SIGNIFICANT CHANGE UP (ref 4.2–5.8)
RBC # FLD: 13.8 % — SIGNIFICANT CHANGE UP (ref 10.3–14.5)
RCV VOL RI: HIGH CELL/UL (ref 0–5)
SODIUM SERPL-SCNC: 135 MMOL/L — SIGNIFICANT CHANGE UP (ref 135–145)
SPECIMEN SOURCE: SIGNIFICANT CHANGE UP
TOTAL CELLS COUNTED, BODY FLUID: 100 CELLS — SIGNIFICANT CHANGE UP
TOTAL NUCLEATED CELL COUNT, BODY FLUID: 1512 CELL/UL — HIGH (ref 0–5)
WBC # BLD: 3.95 K/UL — SIGNIFICANT CHANGE UP (ref 3.8–10.5)
WBC # FLD AUTO: 3.95 K/UL — SIGNIFICANT CHANGE UP (ref 3.8–10.5)

## 2017-12-17 PROCEDURE — 88342 IMHCHEM/IMCYTCHM 1ST ANTB: CPT | Mod: 26,59

## 2017-12-17 PROCEDURE — 88112 CYTOPATH CELL ENHANCE TECH: CPT | Mod: 26

## 2017-12-17 PROCEDURE — 88341 IMHCHEM/IMCYTCHM EA ADD ANTB: CPT | Mod: 26,59

## 2017-12-17 PROCEDURE — 71010: CPT | Mod: 26

## 2017-12-17 PROCEDURE — 88360 TUMOR IMMUNOHISTOCHEM/MANUAL: CPT | Mod: 26

## 2017-12-17 PROCEDURE — 88305 TISSUE EXAM BY PATHOLOGIST: CPT | Mod: 26

## 2017-12-17 RX ORDER — LANOLIN ALCOHOL/MO/W.PET/CERES
3 CREAM (GRAM) TOPICAL ONCE
Qty: 0 | Refills: 0 | Status: COMPLETED | OUTPATIENT
Start: 2017-12-17 | End: 2017-12-17

## 2017-12-17 RX ORDER — LANOLIN ALCOHOL/MO/W.PET/CERES
3 CREAM (GRAM) TOPICAL AT BEDTIME
Qty: 0 | Refills: 0 | Status: DISCONTINUED | OUTPATIENT
Start: 2017-12-17 | End: 2017-12-20

## 2017-12-17 RX ADMIN — SENNA PLUS 2 TABLET(S): 8.6 TABLET ORAL at 21:21

## 2017-12-17 RX ADMIN — CARVEDILOL PHOSPHATE 12.5 MILLIGRAM(S): 80 CAPSULE, EXTENDED RELEASE ORAL at 06:10

## 2017-12-17 RX ADMIN — Medication 2: at 18:13

## 2017-12-17 RX ADMIN — Medication 3 MILLIGRAM(S): at 21:21

## 2017-12-17 RX ADMIN — Medication 8 MILLIGRAM(S): at 21:21

## 2017-12-17 RX ADMIN — SODIUM CHLORIDE 3 MILLILITER(S): 9 INJECTION INTRAMUSCULAR; INTRAVENOUS; SUBCUTANEOUS at 06:12

## 2017-12-17 RX ADMIN — SIMVASTATIN 20 MILLIGRAM(S): 20 TABLET, FILM COATED ORAL at 21:21

## 2017-12-17 RX ADMIN — SODIUM CHLORIDE 3 MILLILITER(S): 9 INJECTION INTRAMUSCULAR; INTRAVENOUS; SUBCUTANEOUS at 21:22

## 2017-12-17 RX ADMIN — Medication 50 MILLIGRAM(S): at 21:21

## 2017-12-17 RX ADMIN — POLYETHYLENE GLYCOL 3350 17 GRAM(S): 17 POWDER, FOR SOLUTION ORAL at 17:04

## 2017-12-17 RX ADMIN — SODIUM CHLORIDE 3 MILLILITER(S): 9 INJECTION INTRAMUSCULAR; INTRAVENOUS; SUBCUTANEOUS at 13:05

## 2017-12-17 RX ADMIN — Medication 50 MILLIGRAM(S): at 06:10

## 2017-12-17 RX ADMIN — Medication 4: at 13:04

## 2017-12-17 RX ADMIN — LISINOPRIL 20 MILLIGRAM(S): 2.5 TABLET ORAL at 06:10

## 2017-12-17 RX ADMIN — Medication 2: at 09:02

## 2017-12-17 RX ADMIN — POLYETHYLENE GLYCOL 3350 17 GRAM(S): 17 POWDER, FOR SOLUTION ORAL at 06:10

## 2017-12-17 RX ADMIN — Medication 100 MILLIGRAM(S): at 13:05

## 2017-12-17 RX ADMIN — Medication 50 MILLIGRAM(S): at 13:05

## 2017-12-17 RX ADMIN — CARVEDILOL PHOSPHATE 12.5 MILLIGRAM(S): 80 CAPSULE, EXTENDED RELEASE ORAL at 17:04

## 2017-12-17 RX ADMIN — Medication 3 MILLIGRAM(S): at 00:19

## 2017-12-17 NOTE — PROCEDURE NOTE - NSPROCDETAILS_GEN_ALL_CORE
location identified, draped/prepped, sterile technique used, needle inserted/introduced/connection to syringe/catheter inserted over needle/ultrasound assessment of effusion (size)/ultrasound assessment of effusion (localization)

## 2017-12-18 LAB
BUN SERPL-MCNC: 13 MG/DL — SIGNIFICANT CHANGE UP (ref 7–23)
CALCIUM SERPL-MCNC: 8.4 MG/DL — SIGNIFICANT CHANGE UP (ref 8.4–10.5)
CHLORIDE SERPL-SCNC: 107 MMOL/L — SIGNIFICANT CHANGE UP (ref 98–107)
CO2 SERPL-SCNC: 26 MMOL/L — SIGNIFICANT CHANGE UP (ref 22–31)
CREAT SERPL-MCNC: 0.95 MG/DL — SIGNIFICANT CHANGE UP (ref 0.5–1.3)
CULTURE - ACID FAST SMEAR CONCENTRATED: SIGNIFICANT CHANGE UP
ERYTHROCYTE [SEDIMENTATION RATE] IN BLOOD: 23 MM/HR — HIGH (ref 1–15)
GLUCOSE BLDC GLUCOMTR-MCNC: 161 MG/DL — HIGH (ref 70–99)
GLUCOSE BLDC GLUCOMTR-MCNC: 198 MG/DL — HIGH (ref 70–99)
GLUCOSE BLDC GLUCOMTR-MCNC: 213 MG/DL — HIGH (ref 70–99)
GLUCOSE BLDC GLUCOMTR-MCNC: 254 MG/DL — HIGH (ref 70–99)
GLUCOSE SERPL-MCNC: 188 MG/DL — HIGH (ref 70–99)
HCT VFR BLD CALC: 34.9 % — LOW (ref 39–50)
HGB BLD-MCNC: 11.4 G/DL — LOW (ref 13–17)
LDH SERPL L TO P-CCNC: 197 U/L — SIGNIFICANT CHANGE UP (ref 135–225)
MAGNESIUM SERPL-MCNC: 1.8 MG/DL — SIGNIFICANT CHANGE UP (ref 1.6–2.6)
MCHC RBC-ENTMCNC: 26.6 PG — LOW (ref 27–34)
MCHC RBC-ENTMCNC: 32.7 % — SIGNIFICANT CHANGE UP (ref 32–36)
MCV RBC AUTO: 81.4 FL — SIGNIFICANT CHANGE UP (ref 80–100)
NRBC # FLD: 0 — SIGNIFICANT CHANGE UP
PLATELET # BLD AUTO: 227 K/UL — SIGNIFICANT CHANGE UP (ref 150–400)
PMV BLD: 10.6 FL — SIGNIFICANT CHANGE UP (ref 7–13)
POTASSIUM SERPL-MCNC: 4 MMOL/L — SIGNIFICANT CHANGE UP (ref 3.5–5.3)
POTASSIUM SERPL-SCNC: 4 MMOL/L — SIGNIFICANT CHANGE UP (ref 3.5–5.3)
PROT SERPL-MCNC: 7.3 G/DL — SIGNIFICANT CHANGE UP (ref 6–8.3)
RBC # BLD: 4.29 M/UL — SIGNIFICANT CHANGE UP (ref 4.2–5.8)
RBC # FLD: 13.7 % — SIGNIFICANT CHANGE UP (ref 10.3–14.5)
SODIUM SERPL-SCNC: 143 MMOL/L — SIGNIFICANT CHANGE UP (ref 135–145)
SPECIMEN SOURCE: SIGNIFICANT CHANGE UP
WBC # BLD: 4.38 K/UL — SIGNIFICANT CHANGE UP (ref 3.8–10.5)
WBC # FLD AUTO: 4.38 K/UL — SIGNIFICANT CHANGE UP (ref 3.8–10.5)

## 2017-12-18 PROCEDURE — 88189 FLOWCYTOMETRY/READ 16 & >: CPT

## 2017-12-18 RX ORDER — RIVAROXABAN 15 MG-20MG
20 KIT ORAL EVERY 24 HOURS
Qty: 0 | Refills: 0 | Status: DISCONTINUED | OUTPATIENT
Start: 2017-12-18 | End: 2017-12-20

## 2017-12-18 RX ORDER — MAGNESIUM SULFATE 500 MG/ML
1 VIAL (ML) INJECTION ONCE
Qty: 0 | Refills: 0 | Status: COMPLETED | OUTPATIENT
Start: 2017-12-18 | End: 2017-12-18

## 2017-12-18 RX ADMIN — Medication 100 GRAM(S): at 10:22

## 2017-12-18 RX ADMIN — Medication 3 MILLIGRAM(S): at 21:54

## 2017-12-18 RX ADMIN — Medication 6: at 13:14

## 2017-12-18 RX ADMIN — SIMVASTATIN 20 MILLIGRAM(S): 20 TABLET, FILM COATED ORAL at 21:55

## 2017-12-18 RX ADMIN — Medication 100 MILLIGRAM(S): at 13:15

## 2017-12-18 RX ADMIN — SODIUM CHLORIDE 3 MILLILITER(S): 9 INJECTION INTRAMUSCULAR; INTRAVENOUS; SUBCUTANEOUS at 09:10

## 2017-12-18 RX ADMIN — RIVAROXABAN 20 MILLIGRAM(S): KIT at 10:22

## 2017-12-18 RX ADMIN — Medication 50 MILLIGRAM(S): at 21:54

## 2017-12-18 RX ADMIN — POLYETHYLENE GLYCOL 3350 17 GRAM(S): 17 POWDER, FOR SOLUTION ORAL at 17:00

## 2017-12-18 RX ADMIN — Medication 2: at 09:09

## 2017-12-18 RX ADMIN — SODIUM CHLORIDE 3 MILLILITER(S): 9 INJECTION INTRAMUSCULAR; INTRAVENOUS; SUBCUTANEOUS at 16:47

## 2017-12-18 RX ADMIN — Medication 50 MILLIGRAM(S): at 13:14

## 2017-12-18 RX ADMIN — LISINOPRIL 20 MILLIGRAM(S): 2.5 TABLET ORAL at 05:34

## 2017-12-18 RX ADMIN — Medication 2: at 18:25

## 2017-12-18 RX ADMIN — SENNA PLUS 2 TABLET(S): 8.6 TABLET ORAL at 21:54

## 2017-12-18 RX ADMIN — CARVEDILOL PHOSPHATE 12.5 MILLIGRAM(S): 80 CAPSULE, EXTENDED RELEASE ORAL at 17:00

## 2017-12-18 RX ADMIN — POLYETHYLENE GLYCOL 3350 17 GRAM(S): 17 POWDER, FOR SOLUTION ORAL at 05:34

## 2017-12-18 RX ADMIN — Medication 8 MILLIGRAM(S): at 21:54

## 2017-12-18 RX ADMIN — Medication 50 MILLIGRAM(S): at 05:34

## 2017-12-18 RX ADMIN — SODIUM CHLORIDE 3 MILLILITER(S): 9 INJECTION INTRAMUSCULAR; INTRAVENOUS; SUBCUTANEOUS at 21:55

## 2017-12-18 RX ADMIN — CARVEDILOL PHOSPHATE 12.5 MILLIGRAM(S): 80 CAPSULE, EXTENDED RELEASE ORAL at 05:34

## 2017-12-18 NOTE — PROVIDER CONTACT NOTE (OTHER) - BACKGROUND
pt admitted for shortness of breath. s/p thoracentesis with 1.5L fluid removal. now found to have 11 beats nsvt on tele
pt admitted with pleural effusions, s/p tap yesterday. history of afib, on xaralto. was sinus rhythm all today. now aflutter 120-140. pt already received beta blocker and anticoagulation
Pt admitted for B/L pleural effusions

## 2017-12-18 NOTE — PROVIDER CONTACT NOTE (OTHER) - RECOMMENDATIONS
2L O2 NC, administer evening antihypertensive medications
check electrolytes, supplement as needed and continue to monitor
continue to monitor

## 2017-12-18 NOTE — PROVIDER CONTACT NOTE (OTHER) - ASSESSMENT
/98, RR 18, O2 99% on room air
no chest pain or shortness of breath vss
no complaints of chest pain or shortness of breath. vss

## 2017-12-18 NOTE — PROVIDER CONTACT NOTE (OTHER) - ACTION/TREATMENT ORDERED:
To administer O2 NC via orders, administer evening antihypertensives medications per orders. Continue to monitor pt per orders.
continue to monitor on telemetry
check electrolytes, supplement as needed and continue to monitor
continue to monitor

## 2017-12-19 LAB
ANA PAT FLD IF-IMP: SIGNIFICANT CHANGE UP
ANA TITR SER: SIGNIFICANT CHANGE UP
BUN SERPL-MCNC: 12 MG/DL — SIGNIFICANT CHANGE UP (ref 7–23)
C-ANCA SER-ACNC: NEGATIVE — SIGNIFICANT CHANGE UP
CALCIUM SERPL-MCNC: 8.2 MG/DL — LOW (ref 8.4–10.5)
CHLORIDE SERPL-SCNC: 100 MMOL/L — SIGNIFICANT CHANGE UP (ref 98–107)
CO2 SERPL-SCNC: 25 MMOL/L — SIGNIFICANT CHANGE UP (ref 22–31)
CREAT SERPL-MCNC: 0.94 MG/DL — SIGNIFICANT CHANGE UP (ref 0.5–1.3)
CRP SERPL-MCNC: < 5 MG/L — SIGNIFICANT CHANGE UP
DSDNA AB SER-ACNC: <12 IU/ML — SIGNIFICANT CHANGE UP
ENA SCL70 AB SER-ACNC: <0.2 — SIGNIFICANT CHANGE UP
ERYTHROCYTE [SEDIMENTATION RATE] IN BLOOD: 19 MM/HR — HIGH (ref 1–15)
GBM IGG SER-ACNC: <0.2 U — SIGNIFICANT CHANGE UP
GLUCOSE BLDC GLUCOMTR-MCNC: 147 MG/DL — HIGH (ref 70–99)
GLUCOSE BLDC GLUCOMTR-MCNC: 198 MG/DL — HIGH (ref 70–99)
GLUCOSE BLDC GLUCOMTR-MCNC: 229 MG/DL — HIGH (ref 70–99)
GLUCOSE BLDC GLUCOMTR-MCNC: 268 MG/DL — HIGH (ref 70–99)
GLUCOSE BLDC GLUCOMTR-MCNC: 288 MG/DL — HIGH (ref 70–99)
GLUCOSE SERPL-MCNC: 277 MG/DL — HIGH (ref 70–99)
HCT VFR BLD CALC: 34.2 % — LOW (ref 39–50)
HGB BLD-MCNC: 11.3 G/DL — LOW (ref 13–17)
MCHC RBC-ENTMCNC: 26.7 PG — LOW (ref 27–34)
MCHC RBC-ENTMCNC: 33 % — SIGNIFICANT CHANGE UP (ref 32–36)
MCV RBC AUTO: 80.9 FL — SIGNIFICANT CHANGE UP (ref 80–100)
NRBC # FLD: 0 — SIGNIFICANT CHANGE UP
P-ANCA SER-ACNC: NEGATIVE — SIGNIFICANT CHANGE UP
PH FLD: 7.7 PH — SIGNIFICANT CHANGE UP
PLATELET # BLD AUTO: 213 K/UL — SIGNIFICANT CHANGE UP (ref 150–400)
PMV BLD: 10.9 FL — SIGNIFICANT CHANGE UP (ref 7–13)
POTASSIUM SERPL-MCNC: 4.3 MMOL/L — SIGNIFICANT CHANGE UP (ref 3.5–5.3)
POTASSIUM SERPL-SCNC: 4.3 MMOL/L — SIGNIFICANT CHANGE UP (ref 3.5–5.3)
RBC # BLD: 4.23 M/UL — SIGNIFICANT CHANGE UP (ref 4.2–5.8)
RBC # FLD: 14.1 % — SIGNIFICANT CHANGE UP (ref 10.3–14.5)
RHEUMATOID FACT SERPL-ACNC: 56.3 IU/ML — SIGNIFICANT CHANGE UP
SODIUM SERPL-SCNC: 135 MMOL/L — SIGNIFICANT CHANGE UP (ref 135–145)
SPECIMEN SOURCE: SIGNIFICANT CHANGE UP
TM INTERPRETATION: SIGNIFICANT CHANGE UP
WBC # BLD: 3.8 K/UL — SIGNIFICANT CHANGE UP (ref 3.8–10.5)
WBC # FLD AUTO: 3.8 K/UL — SIGNIFICANT CHANGE UP (ref 3.8–10.5)

## 2017-12-19 RX ADMIN — Medication 50 MILLIGRAM(S): at 14:32

## 2017-12-19 RX ADMIN — Medication 100 MILLIGRAM(S): at 11:07

## 2017-12-19 RX ADMIN — SENNA PLUS 2 TABLET(S): 8.6 TABLET ORAL at 21:31

## 2017-12-19 RX ADMIN — Medication 6: at 18:20

## 2017-12-19 RX ADMIN — Medication 8 MILLIGRAM(S): at 21:31

## 2017-12-19 RX ADMIN — Medication 4: at 09:15

## 2017-12-19 RX ADMIN — LISINOPRIL 20 MILLIGRAM(S): 2.5 TABLET ORAL at 05:06

## 2017-12-19 RX ADMIN — CARVEDILOL PHOSPHATE 12.5 MILLIGRAM(S): 80 CAPSULE, EXTENDED RELEASE ORAL at 05:06

## 2017-12-19 RX ADMIN — Medication 50 MILLIGRAM(S): at 21:31

## 2017-12-19 RX ADMIN — POLYETHYLENE GLYCOL 3350 17 GRAM(S): 17 POWDER, FOR SOLUTION ORAL at 17:20

## 2017-12-19 RX ADMIN — SODIUM CHLORIDE 3 MILLILITER(S): 9 INJECTION INTRAMUSCULAR; INTRAVENOUS; SUBCUTANEOUS at 13:31

## 2017-12-19 RX ADMIN — RIVAROXABAN 20 MILLIGRAM(S): KIT at 09:16

## 2017-12-19 RX ADMIN — SODIUM CHLORIDE 3 MILLILITER(S): 9 INJECTION INTRAMUSCULAR; INTRAVENOUS; SUBCUTANEOUS at 05:06

## 2017-12-19 RX ADMIN — SIMVASTATIN 20 MILLIGRAM(S): 20 TABLET, FILM COATED ORAL at 21:31

## 2017-12-19 RX ADMIN — Medication 3 MILLIGRAM(S): at 21:31

## 2017-12-19 RX ADMIN — SODIUM CHLORIDE 3 MILLILITER(S): 9 INJECTION INTRAMUSCULAR; INTRAVENOUS; SUBCUTANEOUS at 21:31

## 2017-12-19 RX ADMIN — CARVEDILOL PHOSPHATE 12.5 MILLIGRAM(S): 80 CAPSULE, EXTENDED RELEASE ORAL at 17:20

## 2017-12-19 RX ADMIN — Medication 50 MILLIGRAM(S): at 05:06

## 2017-12-19 RX ADMIN — Medication 2: at 13:25

## 2017-12-20 ENCOUNTER — TRANSCRIPTION ENCOUNTER (OUTPATIENT)
Age: 77
End: 2017-12-20

## 2017-12-20 VITALS — DIASTOLIC BLOOD PRESSURE: 73 MMHG | SYSTOLIC BLOOD PRESSURE: 155 MMHG | HEART RATE: 82 BPM

## 2017-12-20 LAB
ANA PAT FLD IF-IMP: SIGNIFICANT CHANGE UP
ANA TITR SER: SIGNIFICANT CHANGE UP
BUN SERPL-MCNC: 12 MG/DL — SIGNIFICANT CHANGE UP (ref 7–23)
CALCIUM SERPL-MCNC: 8.4 MG/DL — SIGNIFICANT CHANGE UP (ref 8.4–10.5)
CHLORIDE SERPL-SCNC: 102 MMOL/L — SIGNIFICANT CHANGE UP (ref 98–107)
CO2 SERPL-SCNC: 24 MMOL/L — SIGNIFICANT CHANGE UP (ref 22–31)
CREAT SERPL-MCNC: 0.91 MG/DL — SIGNIFICANT CHANGE UP (ref 0.5–1.3)
GLUCOSE BLDC GLUCOMTR-MCNC: 169 MG/DL — HIGH (ref 70–99)
GLUCOSE BLDC GLUCOMTR-MCNC: 201 MG/DL — HIGH (ref 70–99)
GLUCOSE BLDC GLUCOMTR-MCNC: 228 MG/DL — HIGH (ref 70–99)
GLUCOSE SERPL-MCNC: 193 MG/DL — HIGH (ref 70–99)
HCT VFR BLD CALC: 34 % — LOW (ref 39–50)
HGB BLD-MCNC: 11 G/DL — LOW (ref 13–17)
MAGNESIUM SERPL-MCNC: 1.9 MG/DL — SIGNIFICANT CHANGE UP (ref 1.6–2.6)
MCHC RBC-ENTMCNC: 26.4 PG — LOW (ref 27–34)
MCHC RBC-ENTMCNC: 32.4 % — SIGNIFICANT CHANGE UP (ref 32–36)
MCV RBC AUTO: 81.7 FL — SIGNIFICANT CHANGE UP (ref 80–100)
NRBC # FLD: 0 — SIGNIFICANT CHANGE UP
PLATELET # BLD AUTO: 226 K/UL — SIGNIFICANT CHANGE UP (ref 150–400)
PMV BLD: 11 FL — SIGNIFICANT CHANGE UP (ref 7–13)
POTASSIUM SERPL-MCNC: 4.3 MMOL/L — SIGNIFICANT CHANGE UP (ref 3.5–5.3)
POTASSIUM SERPL-SCNC: 4.3 MMOL/L — SIGNIFICANT CHANGE UP (ref 3.5–5.3)
RBC # BLD: 4.16 M/UL — LOW (ref 4.2–5.8)
RBC # FLD: 13.8 % — SIGNIFICANT CHANGE UP (ref 10.3–14.5)
SODIUM SERPL-SCNC: 137 MMOL/L — SIGNIFICANT CHANGE UP (ref 135–145)
WBC # BLD: 4.26 K/UL — SIGNIFICANT CHANGE UP (ref 3.8–10.5)
WBC # FLD AUTO: 4.26 K/UL — SIGNIFICANT CHANGE UP (ref 3.8–10.5)

## 2017-12-20 RX ORDER — LANOLIN ALCOHOL/MO/W.PET/CERES
1 CREAM (GRAM) TOPICAL
Qty: 0 | Refills: 0 | DISCHARGE
Start: 2017-12-20

## 2017-12-20 RX ORDER — SENNA PLUS 8.6 MG/1
2 TABLET ORAL
Qty: 0 | Refills: 0 | DISCHARGE
Start: 2017-12-20

## 2017-12-20 RX ORDER — DOCUSATE SODIUM 100 MG
1 CAPSULE ORAL
Qty: 0 | Refills: 0 | DISCHARGE
Start: 2017-12-20

## 2017-12-20 RX ADMIN — Medication 4: at 13:22

## 2017-12-20 RX ADMIN — CARVEDILOL PHOSPHATE 12.5 MILLIGRAM(S): 80 CAPSULE, EXTENDED RELEASE ORAL at 17:33

## 2017-12-20 RX ADMIN — Medication 50 MILLIGRAM(S): at 05:02

## 2017-12-20 RX ADMIN — POLYETHYLENE GLYCOL 3350 17 GRAM(S): 17 POWDER, FOR SOLUTION ORAL at 17:33

## 2017-12-20 RX ADMIN — RIVAROXABAN 20 MILLIGRAM(S): KIT at 09:18

## 2017-12-20 RX ADMIN — Medication 100 MILLIGRAM(S): at 09:18

## 2017-12-20 RX ADMIN — Medication 2: at 18:13

## 2017-12-20 RX ADMIN — Medication 4: at 09:17

## 2017-12-20 RX ADMIN — SODIUM CHLORIDE 3 MILLILITER(S): 9 INJECTION INTRAMUSCULAR; INTRAVENOUS; SUBCUTANEOUS at 09:19

## 2017-12-20 RX ADMIN — LISINOPRIL 20 MILLIGRAM(S): 2.5 TABLET ORAL at 05:02

## 2017-12-20 RX ADMIN — CARVEDILOL PHOSPHATE 12.5 MILLIGRAM(S): 80 CAPSULE, EXTENDED RELEASE ORAL at 05:02

## 2017-12-20 RX ADMIN — SODIUM CHLORIDE 3 MILLILITER(S): 9 INJECTION INTRAMUSCULAR; INTRAVENOUS; SUBCUTANEOUS at 05:03

## 2017-12-20 RX ADMIN — Medication 50 MILLIGRAM(S): at 14:45

## 2017-12-20 NOTE — PROGRESS NOTE ADULT - PROBLEM SELECTOR PROBLEM 3
Atrial fibrillation, unspecified type

## 2017-12-20 NOTE — PROGRESS NOTE ADULT - ASSESSMENT
Patient is a 77y old  Male who presents with a chief complaint of Chest pain.     ACS:  Tele  Cardio f/up noted.  ECHO reviewed  Will need work up    A Fib:  Xarelto    HTN:  Coreg/Hydralazine    DM II:  FSSS
77M admitted for chest pain & small-to-moderate bilateral pleural effusions.
Patient is a 77y old  Male who presents with a chief complaint of Chest pain.     ACS:  Tele  Cardio f/up noted.  NST reviewed.      Pleural Effusion:  S/p thoracentesis: Exudative effusion    A Fib:  Xarelto    HTN:  Coreg/Hydralazine    DM II:  FSSS    Dw family.
Patient is a 77y old  Male who presents with a chief complaint of Chest pain.     ACS:  Tele  Cardio f/up noted.  NST reviewed.      Pleural Effusion:  S/p thoracentesis: Exudative effusion  High RF.  F/up with anti CCP antibody    A Fib:  Xarelto    HTN:  Coreg/Hydralazine    DM II:  FSSS    Dw family.
Patient is a 77y old  Male who presents with a chief complaint of Chest pain.     ACS:  Tele  Cardio f/up noted.  NST reviewed.      Pleural Effusion:  S/p thoracentesis: Exudative effusion  High RF.  F/up with anti CCP antibody    A Fib:  Xarelto    HTN:  Coreg/Hydralazine    DM II:  FSSS    Dw family. Dc planning. OP f/up.
Patient is a 77y old  Male who presents with a chief complaint of Chest pain.     ACS:  Tele  Cardio f/up noted.  NST reviewed.  ECHO      A Fib:  Xarelto    HTN:  Coreg/Hydralazine    DM II:  FSSS    Dw family.
Patient is a 77y old  Male who presents with a chief complaint of Chest pain.     ACS:  Tele  Cardio f/up noted.  NST reviewed.  ECHO    Pleural Effusion:  S/p thoracentesis    A Fib:  Xarelto    HTN:  Coreg/Hydralazine    DM II:  FSSS    Dw family.
77M admitted for chest pain & small-to-moderate bilateral pleural effusions.

## 2017-12-20 NOTE — DISCHARGE NOTE ADULT - MEDICATION SUMMARY - MEDICATIONS TO TAKE
I will START or STAY ON the medications listed below when I get home from the hospital:    acetaminophen 325 mg oral tablet  -- 2 tab(s) by mouth every 6 hours, As needed, Mild Pain (1 - 3)  -- Indication: For Pain    ramipril 5 mg oral capsule  -- 1 cap(s) by mouth once a day  -- Indication: For Essential hypertension    doxazosin 8 mg oral tablet  -- 1 tab(s) by mouth once a day (at bedtime)  -- Indication: For Benign prostatic hyperplasia, unspecified whether lower urinary tract symptoms present    rivaroxaban 20 mg oral tablet  -- 1 tab(s) by mouth every 24 hours  -- Indication: For Atrial fibrillation, unspecified type    metFORMIN 1000 mg oral tablet  -- 1 tab(s) by mouth 2 times a day  -- Indication: For Diabetes mellitus, type 2    Januvia 100 mg oral tablet  -- 1 tab(s) by mouth once a day  -- Indication: For Diabetes mellitus, type 2    pravastatin 40 mg oral tablet  -- 1 tab(s) by mouth once a day  -- Indication: For Hyperlipidemia, unspecified hyperlipidemia type    carvedilol 12.5 mg oral tablet  -- 1 tab(s) by mouth every 12 hours  -- Indication: For Essential hypertension    polyethylene glycol 3350 oral powder for reconstitution  -- 17 gram(s) by mouth 2 times a day  -- Indication: For Constipation    senna oral tablet  -- 2 tab(s) by mouth once a day (at bedtime)  -- Indication: For Constipation    docusate sodium 100 mg oral capsule  -- 1 cap(s) by mouth once a day  -- Indication: For Constipation    melatonin 3 mg oral tablet  -- 1 tab(s) by mouth once a day (at bedtime)  -- Indication: For to help sleep    hydrALAZINE 50 mg oral tablet  -- 1 tab(s) by mouth 3 times a day  -- Indication: For Essential hypertension

## 2017-12-20 NOTE — DISCHARGE NOTE ADULT - MEDICATION SUMMARY - MEDICATIONS TO STOP TAKING
I will STOP taking the medications listed below when I get home from the hospital:    potassium chloride 20 mEq oral tablet, extended release  -- 1 tab(s) by mouth once a day    Xylocaine Jelly 2% topical gel with applicator  -- Apply on skin to affected area 3 times a day x 30 days, As Needed -dela cruz discomfort

## 2017-12-20 NOTE — PROGRESS NOTE ADULT - SUBJECTIVE AND OBJECTIVE BOX
Patient is a 77y old  Male who presents with a chief complaint of Chest pain (14 Dec 2017 05:51)      SUBJECTIVE / OVERNIGHT EVENTS:   Feels better.  Denies CP/SOB/Palpitation/HA.    MEDICATIONS  (STANDING):  carvedilol 12.5 milliGRAM(s) Oral every 12 hours  dextrose 5%. 1000 milliLiter(s) (50 mL/Hr) IV Continuous <Continuous>  dextrose 50% Injectable 12.5 Gram(s) IV Push once  dextrose 50% Injectable 25 Gram(s) IV Push once  dextrose 50% Injectable 25 Gram(s) IV Push once  doxazosin 8 milliGRAM(s) Oral at bedtime  hydrALAZINE 50 milliGRAM(s) Oral three times a day  insulin lispro (HumaLOG) corrective regimen sliding scale   SubCutaneous three times a day before meals  insulin lispro (HumaLOG) corrective regimen sliding scale   SubCutaneous at bedtime  lisinopril 20 milliGRAM(s) Oral daily  polyethylene glycol 3350 17 Gram(s) Oral two times a day  rivaroxaban 20 milliGRAM(s) Oral every 24 hours  simvastatin 20 milliGRAM(s) Oral at bedtime  sodium chloride 0.9% lock flush 3 milliLiter(s) IV Push every 8 hours    MEDICATIONS  (PRN):  acetaminophen   Tablet 650 milliGRAM(s) Oral every 6 hours PRN Mild Pain and moderate pain  dextrose Gel 1 Dose(s) Oral once PRN Blood Glucose LESS THAN 70 milliGRAM(s)/deciliter  glucagon  Injectable 1 milliGRAM(s) IntraMuscular once PRN Glucose LESS THAN 70 milligrams/deciliter        CAPILLARY BLOOD GLUCOSE      POCT Blood Glucose.: 173 mg/dL (15 Dec 2017 22:15)  POCT Blood Glucose.: 145 mg/dL (15 Dec 2017 17:20)  POCT Blood Glucose.: 172 mg/dL (15 Dec 2017 12:57)  POCT Blood Glucose.: 201 mg/dL (15 Dec 2017 09:40)    I&O's Summary    14 Dec 2017 07:01  -  15 Dec 2017 07:00  --------------------------------------------------------  IN: 500 mL / OUT: 500 mL / NET: 0 mL    15 Dec 2017 07:01  -  15 Dec 2017 23:26  --------------------------------------------------------  IN: 100 mL / OUT: 0 mL / NET: 100 mL        PHYSICAL EXAM:  GENERAL: NAD, well-developed  HEAD:  Atraumatic, Normocephalic  NECK: Supple, No JVD  CHEST/LUNG: Clear to auscultation bilaterally; No wheezing.  HEART: Regular rate and rhythm; No murmurs, rubs, or gallops  ABDOMEN: Soft, Nontender, Nondistended; Bowel sounds present  EXTREMITIES:   No clubbing, cyanosis, or edema  NEUROLOGY: AAO X 3  SKIN: No rashes    LABS:                        11.5   3.81  )-----------( 215      ( 15 Dec 2017 07:00 )             34.3     12-15    138  |  101  |  12  ----------------------------<  142<H>  4.2   |  26  |  0.99    Ca    8.5      15 Dec 2017 07:00  Phos  2.5     12-14  Mg     1.7     12-14    TPro  7.3  /  Alb  3.8  /  TBili  0.4  /  DBili  x   /  AST  25  /  ALT  12  /  AlkPhos  65  12-14    PT/INR - ( 14 Dec 2017 02:20 )   PT: 18.2 SEC;   INR: 1.57          PTT - ( 14 Dec 2017 02:20 )  PTT:41.3 SEC  CARDIAC MARKERS ( 14 Dec 2017 07:25 )  x     / < 0.06 ng/mL / 103 u/L / x     / x      CARDIAC MARKERS ( 14 Dec 2017 02:20 )  x     / < 0.06 ng/mL / 123 u/L / 2.68 ng/mL / x            CAPILLARY BLOOD GLUCOSE      POCT Blood Glucose.: 173 mg/dL (15 Dec 2017 22:15)  POCT Blood Glucose.: 145 mg/dL (15 Dec 2017 17:20)  POCT Blood Glucose.: 172 mg/dL (15 Dec 2017 12:57)  POCT Blood Glucose.: 201 mg/dL (15 Dec 2017 09:40)                RADIOLOGY & ADDITIONAL TESTS:    Imaging Personally Reviewed:    Consultant(s) Notes Reviewed:      Care Discussed with Consultants/Other Providers:
PRESENTING CC:    SUBJ: 77yM, self ambulating, with a history of Pericarditis s/p pericardial window 2/2 early tamponade-,10/28/17 and discharged on 11/9, Polyclonal gammopathy, Atrial Flutter on Xarelto, T2DM,BPH HTN, experiencing, non exertional, substernal/ epigastric pain,resolved,had NST-EF-36%-with severe hypokinesis-no evidence for ischemia.For possible thoracentesis in AM      PMH -reviewed admission note, no change since admission  Heart failure: acute [ ] chronic [ ] acute or chronic [ ] diastolic [ ] systolic [ ] combined systolic and diastolic[ ]  AIRAM: ATN[ ] renal medullary necrosis [ ] CKD I [ ]CKDII [ ]CKD III [ ]CKD IV [ ]CKD V [ ]Other pathological lesions [ ]    MEDICATIONS  (STANDING):  carvedilol 12.5 milliGRAM(s) Oral every 12 hours  docusate sodium 100 milliGRAM(s) Oral daily  doxazosin 8 milliGRAM(s) Oral at bedtime  hydrALAZINE 50 milliGRAM(s) Oral three times a day  insulin lispro (HumaLOG) corrective regimen sliding scale   SubCutaneous three times a day before meals  insulin lispro (HumaLOG) corrective regimen sliding scale   SubCutaneous at bedtime  lisinopril 20 milliGRAM(s) Oral daily  melatonin 3 milliGRAM(s) Oral at bedtime  polyethylene glycol 3350 17 Gram(s) Oral two times a day  senna 2 Tablet(s) Oral at bedtime  simvastatin 20 milliGRAM(s) Oral at bedtime  sodium chloride 0.9% lock flush 3 milliLiter(s) IV Push every 8 hours    MEDICATIONS  (PRN):  acetaminophen   Tablet 650 milliGRAM(s) Oral every 6 hours PRN Mild Pain and moderate pain  dextrose Gel 1 Dose(s) Oral once PRN Blood Glucose LESS THAN 70 milliGRAM(s)/deciliter  glucagon  Injectable 1 milliGRAM(s) IntraMuscular once PRN Glucose LESS THAN 70 milligrams/deciliter          FAMILY HISTORY:  No pertinent family history in first degree relatives  No family history of premature coronary artery disease or sudden cardiac death      REVIEW OF SYSTEMS:  Constitutional: [ ] fever, [ ]weight loss,  [x ]fatigue  Eyes: [ ] visual changes  Respiratory: [x ]shortness of breath;  [ ] cough, [ ]wheezing, [ ]chills, [ ]hemoptysis  Cardiovascular: [ ] chest pain, [ ]palpitations, [x ]dizziness,  [ ]leg swelling[ ]orthopnea[ ]PND  Gastrointestinal: [ ] abdominal pain, [ ]nausea, [ ]vomiting,  [ ]diarrhea   Genitourinary: [ ] dysuria, [ ] hematuria  Neurologic: [ ] headaches [ ] tremors[ ]weakness  Skin: [ ] itching, [ ]burning, [ ] rashes  Endocrine: [ ] heat or cold intolerance  Musculoskeletal: [ ] joint pain or swelling; [ ] muscle, back, or extremity pain  Psychiatric: [ ] depression, [ ]anxiety, [ ]mood swings, or [ ]difficulty sleeping  Hematologic: [ ] easy bruising, [ ] bleeding gums    [x] All remaining systems negative except as per above.   [ ]Unable to obtain.    Vital Signs Last 24 Hrs  T(C): 36.9 (17 Dec 2017 06:06), Max: 37.1 (16 Dec 2017 13:20)  T(F): 98.4 (17 Dec 2017 06:06), Max: 98.7 (16 Dec 2017 13:20)  HR: 75 (17 Dec 2017 06:06) (66 - 75)  BP: 136/86 (17 Dec 2017 06:06) (136/86 - 161/98)  RR: 17 (17 Dec 2017 06:06) (16 - 18)  SpO2: 97% (17 Dec 2017 06:06) (96% - 99%)  I&O's Summary    16 Dec 2017 07:01  -  17 Dec 2017 07:00  --------------------------------------------------------  IN: 870 mL / OUT: 0 mL / NET: 870 mL    17 Dec 2017 07:01  -  17 Dec 2017 10:34  --------------------------------------------------------  IN: 240 mL / OUT: 0 mL / NET: 240 mL        PHYSICAL EXAM:  General: No acute distress BMI-  HEENT: EOMI, PERRL  Neck: Supple, [ ] JVD  Lungs: Equal air entry bilaterally; [ ] rales [ ] wheezing [ ] rhonchi  Heart: Regular rate and rhythm; [x ] murmur  2 /6 [x ] systolic [ ] diastolic [ ] radiation[ ] rubs [ ]  gallops  Abdomen: Nontender, bowel sounds present  Extremities: No clubbing, cyanosis, [ ] edema  Nervous system:  Alert & Oriented X3, no focal deficits  Psychiatric: Normal affect  Skin: No rashes or lesions    LABS:  12-17    135  |  95<L>  |  12  ----------------------------<  164<H>  4.2   |  26  |  0.95    Ca    9.0      17 Dec 2017 06:20  Mg     1.9     12-17      Creatinine Trend: 0.95<--, 0.99<--, 0.99<--, 0.91<--, 0.98<--                        12.5   3.95  )-----------( 252      ( 17 Dec 2017 06:20 )             38.0         RADIOLOGY:   CT CHEST  Dec 14 2017 IMPRESSION:  Moderate left and large right pleural effusions with complete atelectasis  of the right lower lobe and near complete atelectasis of the right middle  lobe and left lower lobe.      IMPRESSION AND PLAN:    77M admitted for chest pain noted -moderate bilateral pleural effusions.Hx pericardial effusion s/p IR drainage 10/28/17-no malignancy or infection etiology.       Problem/Plan - 1:  ·  Problem: Chest pain, unspecified type.  Plan: Observe on Telemetry  F/U CE & EKG-  Give O2, ASA, BB, ACE, Statin.   NST-EF 36%,severe hypokinesis-No [id number]-Ewdsdcfi Heart Failure    Problem/Plan - 2:  ·  Problem: Pleural effusion.  Plan: Consider repeat TTE.  O2 PRN.  Pulmonary consult-Dr James.-Possible tap on Monday-Hold Xarelto     Problem/Plan - 3:  ·  Problem: Atrial fibrillation,-Svgtzticic-[id number].  Plan: Currently NSR.  rate control with BB.  Anticoagulated with Xarelto.   Problem/Plan - 4:  ·  Problem: Hyperlipidemia, unspecified hyperlipidemia type.  Plan: F/U FLP.  Continue Statin.   Problem/Plan - 5:  ·  Problem: Essential hypertension.  Plan: Low salt diet.  Continue BP meds.     Problem/Plan - 6:  Problem: Benign prostatic hyperplasia, unspecified whether lower urinary tract symptoms present. Plan: Doxazosin 8mg po daily.    Problem/Plan - 7:  ·  Problem: Diabetes mellitus, type 2.  Plan: FS QID.  F/U A1C.  HISS.   DM diet.     Problem/Plan - 8:·  Problem: Need for prophylactic measure.  Plan: On Xarelto.
PRESENTING CC: Epigastric/Chest  pain    SUBJ:77yM, self ambulating, with a history of Pericarditis s/p pericardial window 2/2 early tamponade-,10/28/17 and discharged on 11/9, Polyclonal gammopathy, Atrial Flutter on Xarelto, T2DM,BPH HTN, experiencing, non exertional, substernal/ epigastric pain, that started 12/13 AM, radiating to  LUQ, worsens upon laying and leaning forward, improves with sitting. No dyspnea,fever,cough-had TTE in ED no reaccunulation of pericardial effusion.      PMH -reviewed admission note, no change since admission  Heart failure: acute [ ] chronic [ ] acute or chronic [ ] diastolic [ ] systolic [ ] combined systolic and diastolic[ ]  AIRAM: ATN[ ] renal medullary necrosis [ ] CKD I [ ]CKDII [ ]CKD III [ ]CKD IV [ ]CKD V [ ]Other pathological lesions [ ]    MEDICATIONS  (STANDING):  carvedilol 12.5 milliGRAM(s) Oral every 12 hours  doxazosin 8 milliGRAM(s) Oral at bedtime  hydrALAZINE 50 milliGRAM(s) Oral three times a day  insulin lispro (HumaLOG) corrective regimen sliding scale   SubCutaneous three times a day before meals  insulin lispro (HumaLOG) corrective regimen sliding scale   SubCutaneous at bedtime  lisinopril 20 milliGRAM(s) Oral daily  polyethylene glycol 3350 17 Gram(s) Oral two times a day  rivaroxaban 20 milliGRAM(s) Oral every 24 hours  simvastatin 20 milliGRAM(s) Oral at bedtime  sodium chloride 0.9% lock flush 3 milliLiter(s) IV Push every 8 hours    MEDICATIONS  (PRN):  acetaminophen   Tablet 650 milliGRAM(s) Oral every 6 hours PRN Mild Pain and moderate pain  dextrose Gel 1 Dose(s) Oral once PRN Blood Glucose LESS THAN 70 milliGRAM(s)/deciliter  glucagon  Injectable 1 milliGRAM(s) IntraMuscular once PRN Glucose LESS THAN 70 milligrams/deciliter          FAMILY HISTORY:  No pertinent family history in first degree relatives  No family history of premature coronary artery disease or sudden cardiac death      REVIEW OF SYSTEMS:  Constitutional: [ ] fever, [ ]weight loss,  [x ]fatigue  Eyes: [ ] visual changes  Respiratory: [ ]shortness of breath;  [ ] cough, [ ]wheezing, [ ]chills, [ ]hemoptysis  Cardiovascular: [x ] chest pain, [ ]palpitations, [ ]dizziness,  [ ]leg swelling[ ]orthopnea[ ]PND[x] RAMACHANDRAN  Gastrointestinal: [x ] abdominal pain, [ ]nausea, [ ]vomiting,  [ ]diarrhea   Genitourinary: [ ] dysuria, [ ] hematuria  Neurologic: [ ] headaches [ ] tremors[ ]weakness  Skin: [ ] itching, [ ]burning, [ ] rashes  Endocrine: [ ] heat or cold intolerance  Musculoskeletal: [ ] joint pain or swelling; [ ] muscle, back, or extremity pain  Psychiatric: [ ] depression, [ ]anxiety, [ ]mood swings, or [ ]difficulty sleeping  Hematologic: [ ] easy bruising, [ ] bleeding gums    [x] All remaining systems negative except as per above.   [ ]Unable to obtain.    Vital Signs Last 24 Hrs  T(C): 36.5 (14 Dec 2017 05:30), Max: 36.9 (14 Dec 2017 03:30)  T(F): 97.7 (14 Dec 2017 05:30), Max: 98.5 (14 Dec 2017 03:30)  HR: 78 (14 Dec 2017 05:30) (75 - 78)  BP: 151/78 (14 Dec 2017 05:30) (135/63 - 151/78)  RR: 14 (14 Dec 2017 05:30) (14 - 20)  SpO2: 95% (14 Dec 2017 05:30) (95% - 97%)  I&O's Summary      PHYSICAL EXAM:  General: No acute distress BMI-25.8  HEENT: EOMI, PERRL  Neck: Supple, [ ] JVD  Lungs: Fair air entry bilaterally-decreased bases; [ ] rales [ ] wheezing [x ] rhonchi-basilar  Heart: Regular rate and rhythm; [x ] murmur  2 /6 [x ] systolic [ ] diastolic [ ] radiation[ ] rubs [ ]  gallops  Abdomen:  LUQ tender, bowel sounds present  Extremities: No clubbing, cyanosis, [ ] edema  Nervous system:  Alert & Oriented X3, no focal deficits  Psychiatric: Normal affect  Skin: No rashes or lesions    LABS:  12-14    137  |  99  |  9   ----------------------------<  128<H>  4.4   |  26  |  0.98    Ca    8.7      14 Dec 2017 02:20    TPro  7.3  /  Alb  3.8  /  TBili  0.4  /  DBili  x   /  AST  25  /  ALT  12  /  AlkPhos  65  12-14    Creatinine Trend: 0.98<--                        12.6   5.10  )-----------( 241      ( 14 Dec 2017 02:20 )             37.6     PT/INR - ( 14 Dec 2017 02:20 )   PT: 18.2 SEC;   INR: 1.57     PTT - ( 14 Dec 2017 02:20 )  PTT:41.3 SEC  Lipid Panel:   Cardiac Enzymes: CARDIAC MARKERS ( 14 Dec 2017 02:20 )  x     / < 0.06 ng/mL / 123 u/L / 2.68 ng/mL / x        Cell Count, Body Fluid (10.28.17 @ 13:45)    Body Fluid Type: PERICARDIAL    Color - Body Fluid: RED    Clarity Body Fluid: BLOODY    Total Nucleated Cell Count, Body Fluid: 8904 cell/uL    Total RBC Count: [id number]: Red Cell count correlates with the number and proportion of cells on cytospin preparation. cell/uL      Final Diagnosis:-PERICARDIAL FLUID (10.28.17)  NEGATIVE FOR MALIGNANT CELLS.    RADIOLOGY: Oct 28 2017 CT-GUIDED PERICARDIAL DRAIN PLACEMENT  Impression: SUCCESSFUL CT-GUIDED PERCUTANEOUS DRAINAGE OF THE PERICARDIAL  FLUID, YIELDING 580 ML OF DARK RED/BROWN FLUID.    RAD CHEST PA LAT   IMPRESSION: Small-to-moderate bilateral pleural effusions. Clear lungs.     ECG [my interpretation]:Sinus rhythm at 82 bpm,no acute ST,T wave abnormalities.    ECHO:ED TTE  	Grey scale imaging obtained in four views ( parasternal long, parasternal short, apical and subxiphoid)  	no pericardial effusion noted.  	no gross wall motion abnormalities, normal LV contractility   	 Impression: no pericardial effusion, normal contractility      IMPRESSION AND PLAN:  77M admitted for chest pain nored small-to-moderate bilateral pleural effusions.Hx pericardial effusion s/p IR drainage 10/28/17-no malignancy or infection etiology.       Problem/Plan - 1:  ·  Problem: Chest pain, unspecified type.  Plan: Observe on Telemetry  F/U CE & EKG-consider ischemic work-up  Give O2, ASA, BB, ACE, Statin.     Problem/Plan - 2:  ·  Problem: Pleural effusion.  Plan: Consider repeat TTE.  O2 PRN.  Pulmonary consult-Dr James.    Problem/Plan - 3:  ·  Problem: Atrial fibrillation,-Spqttofxzv-IHXBF6-8.  Plan: Currently NSR.  rate control with BB.  Anticoagulate with Xarelto.     Problem/Plan - 4:  ·  Problem: Hyperlipidemia, unspecified hyperlipidemia type.  Plan: F/U FLP.  Continue Statin.     Problem/Plan - 5:  ·  Problem: Essential hypertension.  Plan: Low salt diet.  Continue BP meds.     Problem/Plan - 6:  Problem: Benign prostatic hyperplasia, unspecified whether lower urinary tract symptoms present. Plan: Doxazosin 8mg po daily.    Problem/Plan - 7:  ·  Problem: Diabetes mellitus, type 2.  Plan: FS QID.  F/U A1C.  HISS.   DM diet.     Problem/Plan - 8:  ·  Problem: Need for prophylactic measure.  Plan: On Xarelto.
PRESENTING CC:Dyspnea    SUBJ: 77yM, self ambulating, with a history of Pericarditis s/p pericardial window 2/2 early tamponade-,10/28/17 and discharged on 11/9, Polyclonal gammopathy, Atrial Flutter on Xarelto, T2DM,BPH HTN, experiencing, non exertional, substernal/ epigastric pain,resolved,had NST-EF-36%-with severe hypokinesis-no evidence for ischemia,had thoracentesis-exudative,restarted on Xarelto,no chest pain.      PMH -reviewed admission note, no change since admission  Heart failure: acute [ ] chronic [ ] acute or chronic [ ] diastolic [ ] systolic [ ] combined systolic and diastolic[ ]  AIRAM: ATN[ ] renal medullary necrosis [ ] CKD I [ ]CKDII [ ]CKD III [ ]CKD IV [ ]CKD V [ ]Other pathological lesions [ ]    MEDICATIONS  (STANDING):  carvedilol 12.5 milliGRAM(s) Oral every 12 hours  docusate sodium 100 milliGRAM(s) Oral daily  doxazosin 8 milliGRAM(s) Oral at bedtime  hydrALAZINE 50 milliGRAM(s) Oral three times a day  insulin lispro (HumaLOG) corrective regimen sliding scale   SubCutaneous three times a day before meals  insulin lispro (HumaLOG) corrective regimen sliding scale   SubCutaneous at bedtime  lisinopril 20 milliGRAM(s) Oral daily  melatonin 3 milliGRAM(s) Oral at bedtime  polyethylene glycol 3350 17 Gram(s) Oral two times a day  rivaroxaban 20 milliGRAM(s) Oral every 24 hours  senna 2 Tablet(s) Oral at bedtime  simvastatin 20 milliGRAM(s) Oral at bedtime  sodium chloride 0.9% lock flush 3 milliLiter(s) IV Push every 8 hours    MEDICATIONS  (PRN):  acetaminophen   Tablet 650 milliGRAM(s) Oral every 6 hours PRN Mild Pain and moderate pain  dextrose Gel 1 Dose(s) Oral once PRN Blood Glucose LESS THAN 70 milliGRAM(s)/deciliter  glucagon  Injectable 1 milliGRAM(s) IntraMuscular once PRN Glucose LESS THAN 70 milligrams/deciliter          FAMILY HISTORY:  No pertinent family history in first degree relatives  No family history of premature coronary artery disease or sudden cardiac death      REVIEW OF SYSTEMS:  Constitutional: [ ] fever, [ ]weight loss,  [ ]fatigue  Eyes: [ ] visual changes  Respiratory: [x ]shortness of breath;  [ ] cough, [ ]wheezing, [ ]chills, [ ]hemoptysis  Cardiovascular: [ ] chest pain, [ ]palpitations, [ ]dizziness,  [ ]leg swelling[ ]orthopnea[ ]PND  Gastrointestinal: [ ] abdominal pain, [ ]nausea, [ ]vomiting,  [ ]diarrhea   Genitourinary: [ ] dysuria, [ ] hematuria  Neurologic: [ ] headaches [ ] tremors[ ]weakness  Skin: [ ] itching, [ ]burning, [ ] rashes  Endocrine: [ ] heat or cold intolerance  Musculoskeletal: [ ] joint pain or swelling; [ ] muscle, back, or extremity pain  Psychiatric: [ ] depression, [ ]anxiety, [ ]mood swings, or [ ]difficulty sleeping  Hematologic: [ ] easy bruising, [ ] bleeding gums    [x] All remaining systems negative except as per above.   [ ]Unable to obtain.    Vital Signs Last 24 Hrs  T(C): 36.4 (19 Dec 2017 05:05), Max: 37.2 (18 Dec 2017 10:06)  T(F): 97.6 (19 Dec 2017 05:05), Max: 99 (18 Dec 2017 10:06)  HR: 91 (19 Dec 2017 05:05) (67 - 91)  BP: 111/72 (19 Dec 2017 05:05) (111/72 - 152/90)  RR: 18 (19 Dec 2017 05:05) (17 - 18)  SpO2: 97% (19 Dec 2017 05:05) (97% - 99%)  I&O's Summary    18 Dec 2017 07:01  -  19 Dec 2017 07:00  --------------------------------------------------------  IN: 787 mL / OUT: 450 mL / NET: 337 mL        PHYSICAL EXAM:  General: No acute distress BMI-  HEENT: EOMI, PERRL  Neck: Supple, [ ] JVD  Lungs: Decreased  air entry  bases bilaterally; [ ] rales [ ] wheezing [x ] rhonchi  Heart: Regular rate and rhythm; [x ] murmur  2 /6 [x ] systolic [ ] diastolic [ ] radiation[ ] rubs [ ]  gallops  Abdomen: Nontender, bowel sounds present  Extremities: No clubbing, cyanosis, [ ] edema  Nervous system:  Alert & Oriented X3, no focal deficits  Psychiatric: Normal affect  Skin: No rashes or lesions    LABS:  12-19    135  |  100  |  12  ----------------------------<  277<H>  4.3   |  25  |  0.94    Ca    8.2<L>      19 Dec 2017 06:05  Mg     1.8     12-18    TPro  7.3  /  Alb  x   /  TBili  x   /  DBili  x   /  AST  x   /  ALT  x   /  AlkPhos  x   12-18    Creatinine Trend: 0.94<--, 0.95<--, 0.95<--, 0.99<--, 0.99<--, 0.91<--                        11.4   4.38  )-----------( 227      ( 18 Dec 2017 04:32 )             34.9         IMPRESSION AND PLAN:      77M admitted for chest pain noted -moderate bilateral pleural effusions.Hx pericardial effusion s/p IR drainage 10/28/17-no malignancy or infection etiology.       Problem/Plan - 1:  ·  Problem: Chest pain, unspecified type.  Plan: Observe on Telemetry  F/U CE & EKG-  Give O2, ASA, BB, ACE, Statin.   NST-EF 36%,severe hypokinesis-No bmgdzyco-RIPG-Cdxhwweb Heart Failure    Problem/Plan - 2:  ·  Problem: Pleural effusion.  Plan: Consider repeat TTE.  O2 PRN.  Pulmonary consult-Dr James.-Follow up fluid analysis.    Problem/Plan - 3:  ·  Problem: Atrial fibrillation,-Ydtakulxip-IZALN8-2.  Plan: Currently NSR.  rate control with BB.  Anticoagulated with Xarelto.   Problem/Plan - 4:  ·  Problem: Hyperlipidemia, unspecified hyperlipidemia type.  Plan: F/U FLP.
PRESENTING CC:Dyspnea    SUBJ: 77yM, self ambulating, with a history of Pericarditis s/p pericardial window 2/2 early tamponade-,10/28/17 and discharged on 11/9, Polyclonal gammopathy, Atrial Flutter on Xarelto, T2DM,BPH HTN, experiencing, non exertional, substernal/ epigastric pain,resolved,had NST-EF-36%-with severe hypokinesis-no evidence for ischemia.      PMH -reviewed admission note, no change since admission  Heart failure: acute [ ] chronic [ ] acute or chronic [ ] diastolic [ ] systolic [ ] combined systolic and diastolic[ ]  AIRAM: ATN[ ] renal medullary necrosis [ ] CKD I [ ]CKDII [ ]CKD III [ ]CKD IV [ ]CKD V [ ]Other pathological lesions [ ]    MEDICATIONS  (STANDING):  carvedilol 12.5 milliGRAM(s) Oral every 12 hours  doxazosin 8 milliGRAM(s) Oral at bedtime  hydrALAZINE 50 milliGRAM(s) Oral three times a day  insulin lispro (HumaLOG) corrective regimen sliding scale   SubCutaneous three times a day before meals  insulin lispro (HumaLOG) corrective regimen sliding scale   SubCutaneous at bedtime  lisinopril 20 milliGRAM(s) Oral daily  polyethylene glycol 3350 17 Gram(s) Oral two times a day  rivaroxaban 20 milliGRAM(s) Oral every 24 hours  simvastatin 20 milliGRAM(s) Oral at bedtime  sodium chloride 0.9% lock flush 3 milliLiter(s) IV Push every 8 hours    MEDICATIONS  (PRN):  acetaminophen   Tablet 650 milliGRAM(s) Oral every 6 hours PRN Mild Pain and moderate pain  dextrose Gel 1 Dose(s) Oral once PRN Blood Glucose LESS THAN 70 milliGRAM(s)/deciliter  glucagon  Injectable 1 milliGRAM(s) IntraMuscular once PRN Glucose LESS THAN 70 milligrams/deciliter          FAMILY HISTORY:  No pertinent family history in first degree relatives  No family history of premature coronary artery disease or sudden cardiac death      REVIEW OF SYSTEMS:  Constitutional: [ ] fever, [ ]weight loss,  [x ]fatigue  Eyes: [ ] visual changes  Respiratory: [x ]shortness of breath;  [ ] cough, [ ]wheezing, [ ]chills, [ ]hemoptysis  Cardiovascular: [ ] chest pain, [ ]palpitations, [ ]dizziness,  [ ]leg swelling[ ]orthopnea[ ]PND  Gastrointestinal: [x ] abdominal pain, [ ]nausea, [ ]vomiting,  [ ]diarrhea   Genitourinary: [ ] dysuria, [ ] hematuria  Neurologic: [ ] headaches [ ] tremors[ ]weakness  Skin: [ ] itching, [ ]burning, [ ] rashes  Endocrine: [ ] heat or cold intolerance  Musculoskeletal: [ ] joint pain or swelling; [ ] muscle, back, or extremity pain  Psychiatric: [ ] depression, [ ]anxiety, [ ]mood swings, or [ ]difficulty sleeping  Hematologic: [ ] easy bruising, [ ] bleeding gums    [x] All remaining systems negative except as per above.   [ ]Unable to obtain.    Vital Signs Last 24 Hrs  T(C): 36.4 (16 Dec 2017 05:22), Max: 37.1 (15 Dec 2017 21:56)  T(F): 97.5 (16 Dec 2017 05:22), Max: 98.7 (15 Dec 2017 21:56)  HR: 82 (16 Dec 2017 05:22) (67 - 82)  BP: 135/55 (16 Dec 2017 05:22) (115/67 - 158/73)  RR: 17 (16 Dec 2017 05:22) (17 - 18)  SpO2: 96% (16 Dec 2017 05:22) (96% - 100%)  I&O's Summary    15 Dec 2017 07:01  -  16 Dec 2017 07:00  --------------------------------------------------------  IN: 340 mL / OUT: 0 mL / NET: 340 mL        PHYSICAL EXAM:  General: No acute distress BMI-  HEENT: EOMI, PERRL  Neck: Supple, [ ] JVD  Lungs: Equal air entry bilaterally; [ ] rales [ ] wheezing [ ] rhonchi  Heart: Regular  rhythm; [x ] murmur  2 /6 [x ] systolic [ ] diastolic [ ] radiation[ ] rubs [ ]  gallops  Abdomen: Nontender, bowel sounds present  Extremities: No clubbing, cyanosis, [ ] edema  Nervous system:  Alert & Oriented X3, no focal deficits  Psychiatric: Normal affect  Skin: No rashes or lesions    LABS:  12-15    138  |  101  |  12  ----------------------------<  142<H>  4.2   |  26  |  0.99    Ca    8.5      15 Dec 2017 07:00  Phos  2.5     12-14  Mg     1.7     12-14      Creatinine Trend: 0.99<--, 0.91<--, 0.98<--                        11.5   3.81  )-----------( 215      ( 15 Dec 2017 07:00 )             34.3       Lipid Panel:   Cardiac Enzymes: CARDIAC MARKERS ( 14 Dec 2017 07:25 )  x     / < 0.06 ng/mL / 103 u/L / x     / x        TELEMETRY:Sinus rhythm no arrhythmias    ECHO:PENDING    STRESS TEST: STUDY DATE: 12/15/2017  There is a medium sized, moderate defect in inferior wall consistent with diaphragmatic attenuation artifact.  No clear evidence of ischemia.(LVEF = 35 %;LVEDV = 156 ml.), revealing severe hypokinesis      IMPRESSION AND PLAN:    77M admitted for chest pain noted -moderate bilateral pleural effusions.Hx pericardial effusion s/p IR drainage 10/28/17-no malignancy or infection etiology.       Problem/Plan - 1:  ·  Problem: Chest pain, unspecified type.  Plan: Observe on Telemetry  F/U CE & EKG-  Give O2, ASA, BB, ACE, Statin.   NST-EF 36%,severe hypokinesis-No ischemia    Problem/Plan - 2:  ·  Problem: Pleural effusion.  Plan: Consider repeat TTE.  O2 PRN.  Pulmonary consult-Dr James.-Possible tap on Monday-Hold Xarelto     Problem/Plan - 3:  ·  Problem: Atrial fibrillation,-[id number].  Plan: Currently NSR.  rate control with BB.  Anticoagulated with Xarelto.     Problem/Plan - 4:  ·  Problem: Hyperlipidemia, unspecified hyperlipidemia type.  Plan: F/U FLP.  Continue Statin.   Problem/Plan - 5:  ·  Problem: Essential hypertension.  Plan: Low salt diet.  Continue BP meds.     Problem/Plan - 6:  Problem: Benign prostatic hyperplasia, unspecified whether lower urinary tract symptoms present. Plan: Doxazosin 8mg po daily.    Problem/Plan - 7:  ·  Problem: Diabetes mellitus, type 2.  Plan: FS QID.  F/U A1C.  HISS.   DM diet.     Problem/Plan - 8:·  Problem: Need for prophylactic measure.  Plan: On Xarelto.
Patient is a 77y old  Male who presents with a chief complaint of Chest pain (14 Dec 2017 05:51)      Any change in ROS:     MEDICATIONS  (STANDING):  carvedilol 12.5 milliGRAM(s) Oral every 12 hours  dextrose 5%. 1000 milliLiter(s) (50 mL/Hr) IV Continuous <Continuous>  dextrose 50% Injectable 12.5 Gram(s) IV Push once  dextrose 50% Injectable 25 Gram(s) IV Push once  dextrose 50% Injectable 25 Gram(s) IV Push once  docusate sodium 100 milliGRAM(s) Oral daily  doxazosin 8 milliGRAM(s) Oral at bedtime  hydrALAZINE 50 milliGRAM(s) Oral three times a day  insulin lispro (HumaLOG) corrective regimen sliding scale   SubCutaneous three times a day before meals  insulin lispro (HumaLOG) corrective regimen sliding scale   SubCutaneous at bedtime  lisinopril 20 milliGRAM(s) Oral daily  melatonin 3 milliGRAM(s) Oral at bedtime  polyethylene glycol 3350 17 Gram(s) Oral two times a day  rivaroxaban 20 milliGRAM(s) Oral every 24 hours  senna 2 Tablet(s) Oral at bedtime  simvastatin 20 milliGRAM(s) Oral at bedtime  sodium chloride 0.9% lock flush 3 milliLiter(s) IV Push every 8 hours    MEDICATIONS  (PRN):  acetaminophen   Tablet 650 milliGRAM(s) Oral every 6 hours PRN Mild Pain and moderate pain  dextrose Gel 1 Dose(s) Oral once PRN Blood Glucose LESS THAN 70 milliGRAM(s)/deciliter  glucagon  Injectable 1 milliGRAM(s) IntraMuscular once PRN Glucose LESS THAN 70 milligrams/deciliter    Vital Signs Last 24 Hrs  T(C): 36.4 (19 Dec 2017 05:05), Max: 36.4 (19 Dec 2017 05:05)  T(F): 97.6 (19 Dec 2017 05:05), Max: 97.6 (19 Dec 2017 05:05)  HR: 78 (19 Dec 2017 17:18) (74 - 91)  BP: 131/72 (19 Dec 2017 17:18) (111/72 - 147/77)  BP(mean): --  RR: 17 (19 Dec 2017 14:24) (17 - 18)  SpO2: 100% (19 Dec 2017 14:24) (97% - 100%)    I&O's Summary    18 Dec 2017 07:01  -  19 Dec 2017 07:00  --------------------------------------------------------  IN: 787 mL / OUT: 450 mL / NET: 337 mL    19 Dec 2017 07:01  -  19 Dec 2017 17:58  --------------------------------------------------------  IN: 480 mL / OUT: 0 mL / NET: 480 mL          Physical Exam:   GENERAL: NAD, well-groomed, well-developed  HEENT: DARLING/   Atraumatic, Normocephalic  ENMT: No tonsillar erythema, exudates, or enlargement; Moist mucous membranes, Good dentition, No lesions  NECK: Supple, No JVD, Normal thyroid  CHEST/LUNG: Clear to auscultaion, ; No rales, rhonchi, wheezing, or rubs  CVS: Regular rate and rhythm; No murmurs, rubs, or gallops  GI: : Soft, Nontender, Nondistended; Bowel sounds present  NERVOUS SYSTEM:  Alert & Oriented X3, Good concentration; Motor Strength 5/5 B/L upper and lower extremities; DTRs 2+ intact and symmetric  EXTREMITIES:  2+ Peripheral Pulses, No clubbing, cyanosis, or edema  LYMPH: No lymphadenopathy noted  SKIN: No rashes or lesions  ENDOCRINOLOGY: No Thyromegaly  PSYCH: Appropriate    Labs:                              11.3   3.80  )-----------( 213      ( 19 Dec 2017 06:05 )             34.2                         11.4   4.38  )-----------( 227      ( 18 Dec 2017 04:32 )             34.9                         12.5   3.95  )-----------( 252      ( 17 Dec 2017 06:20 )             38.0                         11.7   3.88  )-----------( 230      ( 16 Dec 2017 05:45 )             35.9     12-19    135  |  100  |  12  ----------------------------<  277<H>  4.3   |  25  |  0.94  12-18    143  |  107  |  13  ----------------------------<  188<H>  4.0   |  26  |  0.95  12-17    135  |  95<L>  |  12  ----------------------------<  164<H>  4.2   |  26  |  0.95  12-16    139  |  102  |  14  ----------------------------<  170<H>  4.1   |  25  |  0.99    Ca    8.2<L>      19 Dec 2017 06:05  Ca    8.4      18 Dec 2017 04:32  Mg     1.8     12-18    TPro  7.3  /  Alb  x   /  TBili  x   /  DBili  x   /  AST  x   /  ALT  x   /  AlkPhos  x   12-18    CAPILLARY BLOOD GLUCOSE      POCT Blood Glucose.: 198 mg/dL (19 Dec 2017 12:49)  POCT Blood Glucose.: 229 mg/dL (19 Dec 2017 09:02)  POCT Blood Glucose.: 268 mg/dL (19 Dec 2017 00:55)  POCT Blood Glucose.: 213 mg/dL (18 Dec 2017 22:38)  POCT Blood Glucose.: 161 mg/dL (18 Dec 2017 18:21)      LIVER FUNCTIONS - ( 18 Dec 2017 13:18 )  Alb: x     / Pro: 7.3 g/dL / ALK PHOS: x     / ALT: x     / AST: x     / GGT: x               Fluid Source --  Albumin, Fluid2.8 g/dL  Glucose, [id number] mg/dL  Protein total, Fluid4.8 g/dL  Lacatate Dehydrogenase, [id number] U/L  pH, Fluid7.7 pH  Cytopathology-Non Gyn Report--  Cultures:       Blood Culture:           12-17 @ 13:14  Organism --  Gram stain   WBC^White Blood Cells  QNTY CELLS IN GRAM STAIN: FEW (2+)  NOS^No Organisms Seen        Wound culture:                12-17 @ 15:50  Organism --  Culture w/ gram stain --  Specimen Source PLEURAL FLUID    Wound culture:                12-17 @ 13:14  Organism --  Culture w/ gram stain --  Specimen Source PLEURAL FLUID      Abscess culture:             12-17 @ 15:50  Organism --  Gram Stain --  Specimen Source PLEURAL FLUID    Abscess culture:             12-17 @ 13:14  Organism --  Gram Stain --  Specimen Source PLEURAL FLUID      CSF:              12-17 @ 13:14  Organism --  Gram Stain   WBC^White Blood Cells  QNTY CELLS IN GRAM STAIN: FEW (2+)  NOS^No Organisms Seen      Tissue culture:           12-17 @ 15:50  Organism --  Gram Stain --  Specimen Source PLEURAL FLUID    Tissue culture:           12-17 @ 13:14  Organism --  Gram Stain   WBC^White Blood Cells  QNTY CELLS IN GRAM STAIN: FEW (2+)  NOS^No Organisms Seen  Specimen Source PLEURAL FLUID      Body Fluid Smear & Culture:                        12-17 @ 15:50  AFB Smear  --  Culture Acid Fast Body Fluid w/ Smear  --  Culture Acid Fast Smear Concentrated     AFB SMEAR= NO ACID FAST BACILLI SEEN    Culture Results:     --  Specimen Source PLEURAL FLUID    Body Fluid Smear & Culture:                        12-17 @ 13:14  AFB Smear  --  Culture Acid Fast Body Fluid w/ Smear  --  Culture Acid Fast Smear Concentrated   --    Culture Results:     --  Specimen Source PLEURAL FLUID              Studies  Chest X-RAY  CT SCAN Chest   Venous Dopplers: LE:   CT Abdomen  Others    < from: Xray Chest 1 View AP- PORTABLE-Urgent (12.17.17 @ 12:27) >  ROCEDURE DATE:  Dec 17 2017         INTERPRETATION:  EXAMINATION: RAD CHEST PORTABLE URGENT    CLINICAL INDICATION: Thoracentesis    TECHNIQUE: Frontal radiograph of the chest was obtained.    COMPARISON: 12/14/2017.    FINDINGS:     Cardiac silhouette not well evaluated. Small bilateral pleural effusions,   decreased in the right compared to the prior study. No pneumothorax.    IMPRESSION:   No pneumothorax.                  TASHA YOON M.D., ATTENDINGRADIOLOGIST  This document has been electronically signed. Dec 17 2017 12:56PM    < end of copied text >
Patient is a 77y old  Male who presents with a chief complaint of Chest pain (14 Dec 2017 05:51)      Any change in ROS: breathing is much better  he is less SOB :  no chest pain      MEDICATIONS  (STANDING):  carvedilol 12.5 milliGRAM(s) Oral every 12 hours  dextrose 5%. 1000 milliLiter(s) (50 mL/Hr) IV Continuous <Continuous>  dextrose 50% Injectable 12.5 Gram(s) IV Push once  dextrose 50% Injectable 25 Gram(s) IV Push once  dextrose 50% Injectable 25 Gram(s) IV Push once  docusate sodium 100 milliGRAM(s) Oral daily  doxazosin 8 milliGRAM(s) Oral at bedtime  hydrALAZINE 50 milliGRAM(s) Oral three times a day  insulin lispro (HumaLOG) corrective regimen sliding scale   SubCutaneous three times a day before meals  insulin lispro (HumaLOG) corrective regimen sliding scale   SubCutaneous at bedtime  lisinopril 20 milliGRAM(s) Oral daily  melatonin 3 milliGRAM(s) Oral at bedtime  polyethylene glycol 3350 17 Gram(s) Oral two times a day  rivaroxaban 20 milliGRAM(s) Oral every 24 hours  senna 2 Tablet(s) Oral at bedtime  simvastatin 20 milliGRAM(s) Oral at bedtime  sodium chloride 0.9% lock flush 3 milliLiter(s) IV Push every 8 hours    MEDICATIONS  (PRN):  acetaminophen   Tablet 650 milliGRAM(s) Oral every 6 hours PRN Mild Pain and moderate pain  dextrose Gel 1 Dose(s) Oral once PRN Blood Glucose LESS THAN 70 milliGRAM(s)/deciliter  glucagon  Injectable 1 milliGRAM(s) IntraMuscular once PRN Glucose LESS THAN 70 milligrams/deciliter    Vital Signs Last 24 Hrs  T(C): 36.7 (18 Dec 2017 12:25), Max: 37.2 (18 Dec 2017 10:06)  T(F): 98 (18 Dec 2017 12:25), Max: 99 (18 Dec 2017 10:06)  HR: 74 (18 Dec 2017 12:25) (71 - 81)  BP: 152/90 (18 Dec 2017 12:25) (113/66 - 152/90)  BP(mean): --  RR: 18 (18 Dec 2017 12:25) (17 - 18)  SpO2: 98% (18 Dec 2017 12:25) (97% - 98%)    I&O's Summary    17 Dec 2017 07:01  -  18 Dec 2017 07:00  --------------------------------------------------------  IN: 440 mL / OUT: 400 mL / NET: 40 mL    18 Dec 2017 07:01  -  18 Dec 2017 13:03  --------------------------------------------------------  IN: 240 mL / OUT: 0 mL / NET: 240 mL          Physical Exam:   GENERAL: NAD, well-groomed, well-developed  HEENT: DARLING/   Atraumatic, Normocephalic  ENMT: No tonsillar erythema, exudates, or enlargement; Moist mucous membranes, Good dentition, No lesions  NECK: Supple, No JVD, Normal thyroid  CHEST/LUNG: Decreased air entry L>R   CVS: Regular rate and rhythm; No murmurs, rubs, or gallops  GI: : Soft, Nontender, Nondistended; Bowel sounds present  NERVOUS SYSTEM:  Alert & Oriented X3  EXTREMITIES:  2+ Peripheral Pulses, No clubbing, cyanosis, or edema  LYMPH: No lymphadenopathy noted  SKIN: No rashes or lesions  ENDOCRINOLOGY: No Thyromegaly  PSYCH: Appropriate    Labs:                              11.4   4.38  )-----------( 227      ( 18 Dec 2017 04:32 )             34.9                         12.5   3.95  )-----------( 252      ( 17 Dec 2017 06:20 )             38.0                         11.7   3.88  )-----------( 230      ( 16 Dec 2017 05:45 )             35.9                         11.5   3.81  )-----------( 215      ( 15 Dec 2017 07:00 )             34.3     12-18    143  |  107  |  13  ----------------------------<  188<H>  4.0   |  26  |  0.95  12-17    135  |  95<L>  |  12  ----------------------------<  164<H>  4.2   |  26  |  0.95  12-16    139  |  102  |  14  ----------------------------<  170<H>  4.1   |  25  |  0.99  12-15    138  |  101  |  12  ----------------------------<  142<H>  4.2   |  26  |  0.99    Ca    8.4      18 Dec 2017 04:32  Ca    9.0      17 Dec 2017 06:20  Mg     1.8     12-18  Mg     1.9     12-17      CAPILLARY BLOOD GLUCOSE      POCT Blood Glucose.: 198 mg/dL (18 Dec 2017 09:01)  POCT Blood Glucose.: 220 mg/dL (17 Dec 2017 21:35)  POCT Blood Glucose.: 180 mg/dL (17 Dec 2017 18:07)            Fluid Source --  Albumin, Fluid2.8 g/dL  Glucose, [id number] mg/dL  Protein total, Fluid4.8 g/dL  Lacatate Dehydrogenase, [id number] U/L  pH, Fluid--  Cytopathology-Non Gyn Report--  Cultures:       Blood Culture:           12-17 @ 13:14  Organism --  Gram stain   WBC^White Blood Cells  QNTY CELLS IN GRAM STAIN: FEW (2+)  NOS^No Organisms Seen        Wound culture:                12-17 @ 13:14  Organism --  Culture w/ gram stain --  Specimen Source PLEURAL FLUID      Abscess culture:             12-17 @ 13:14  Organism --  Gram Stain --  Specimen Source PLEURAL FLUID      CSF:              12-17 @ 13:14  Organism --  Gram Stain   WBC^White Blood Cells  QNTY CELLS IN GRAM STAIN: FEW (2+)  NOS^No Organisms Seen      Tissue culture:           12-17 @ 13:14  Organism --  Gram Stain   WBC^White Blood Cells  QNTY CELLS IN GRAM STAIN: FEW (2+)  NOS^No Organisms Seen  Specimen Source PLEURAL FLUID      Body Fluid Smear & Culture:                        12-17 @ 13:14  AFB Smear  --  Culture Acid Fast Body Fluid w/ Smear  --  Culture Acid Fast Smear Concentrated   --    Culture Results:     --  Specimen Source PLEURAL FLUID      < from: Xray Chest 1 View AP- PORTABLE-Urgent (12.17.17 @ 12:27) >  EXAM:  RAD CHEST PORTABLE URGENT        PROCEDURE DATE:  Dec 17 2017         INTERPRETATION:  EXAMINATION: RAD CHEST PORTABLE URGENT    CLINICAL INDICATION: Thoracentesis    TECHNIQUE: Frontal radiograph of the chest was obtained.    COMPARISON: 12/14/2017.    FINDINGS:     Cardiac silhouette not well evaluated. Small bilateral pleural effusions,   decreased in the right compared to the prior study. No pneumothorax.    IMPRESSION:   No pneumothorax.                  TASHA YOON M.D., ATTENDINGRADIOLOGIST  This document has been electronically signed. Dec 17 2017 12:56PM              < end of copied text >          Studies  Chest X-RAY  CT SCAN Chest   Venous Dopplers: LE:   CT Abdomen  Others      < from: Limited Transthoracic Echo (11.07.17 @ 15:40) >    Patient name: COLTON JARAMILLO  YOB: 1940   Age: 77 (M)   MR#: [id number]  Study Date: 11/7/2017  [address]: Flip Babb  [place]  Study quality: Technically good  Referring Physician: Yadiel Babin MD  Blood Pressure: 146/83 mmHg  Height: 177 cm  Weight: 89 kg  BSA: 2.1 m2  ------------------------------------------------------------------------  PROCEDURE: Limited transthoracic echocardiogram with 2-D.  M-Mode and spectral and color flow Doppler.  INDICATION: Pericardial effusion (noninflammatory) (I31.3)  ------------------------------------------------------------------------  CONCLUSIONS:  Limited study to evaluate for pericardial effusion.  Small  pericardial effusion superior to the right atrium.  A left  pleural effusion is seen.  *** Compared with echocardiogram of 10/27/2017, the  pericardial effusion has decreased significantly in size.  ------------------------------------------------------------------------  Confirmed on11/7/2017 - 16:37:04 by Arjun Zuleta M.D.  ------------------------------------------------------------------------    < end of copied text >        < from: Transthoracic Echocardiogram (10.27.17 @ 16:06) >  icuspid spectral Doppler signals is seen.  In  addition, the RA and RV appear somewhat compressed in the  subcostal view.  These findings are consistent with early  tamponade physiology.  Hemodynamic: Estimated right ventricular systolic pressure  equals 26 mm Hg, assuming right atrial pressure equals 10  mm Hg, consistent with normal pulmonary pressures.  ------------------------------------------------------------------------  CONCLUSIONS:  1. Mitral annular calcification, otherwise normal mitral  valve. Minimal mitral regurgitation.  2. Normal left ventricular internal dimensions and wall  thicknesses.  3. Mild global left ventricular systolic dysfunction.  4. Normal right ventricular size and function.  5. Large pericardial effusion, measuring about  2.3 cm  posterior to the LV, about  2.6 cm lateral to the LV, about   2.2 cm around the LV apex, and about  3.2 cm adjacent to  the RV free wall.  Moderate pericardial effusion (about  1.5 cm) anterior to the RV.  Increased respirophasic  variability in the transmitral and transtricuspid spectral  Doppler signals is seen.  In addition, the RA and RV appear  somewhat compressed in the subcostal view.  These findings  are consistent with early tamponade physiology.  *** No previous Echo exam.  Case discussed with Dr. Yadiel Babin at 18:30.  ------------------------------------------------------------------------  Confirmed on  10/27/2017 - 18:31:02 by Arjun Zuleta M.D.  ------------------------------------------------------------------------    < end of copied text >
Patient is a 77y old  Male who presents with a chief complaint of Chest pain (14 Dec 2017 05:51)      SUBJECTIVE / OVERNIGHT EVENTS:   Feels better.  Denies CP/SOB/Palpitation/HA.    MEDICATIONS  (STANDING):  carvedilol 12.5 milliGRAM(s) Oral every 12 hours  dextrose 5%. 1000 milliLiter(s) (50 mL/Hr) IV Continuous <Continuous>  dextrose 50% Injectable 12.5 Gram(s) IV Push once  dextrose 50% Injectable 25 Gram(s) IV Push once  dextrose 50% Injectable 25 Gram(s) IV Push once  docusate sodium 100 milliGRAM(s) Oral daily  doxazosin 8 milliGRAM(s) Oral at bedtime  hydrALAZINE 50 milliGRAM(s) Oral three times a day  insulin lispro (HumaLOG) corrective regimen sliding scale   SubCutaneous three times a day before meals  insulin lispro (HumaLOG) corrective regimen sliding scale   SubCutaneous at bedtime  lisinopril 20 milliGRAM(s) Oral daily  melatonin 3 milliGRAM(s) Oral at bedtime  polyethylene glycol 3350 17 Gram(s) Oral two times a day  rivaroxaban 20 milliGRAM(s) Oral every 24 hours  senna 2 Tablet(s) Oral at bedtime  simvastatin 20 milliGRAM(s) Oral at bedtime  sodium chloride 0.9% lock flush 3 milliLiter(s) IV Push every 8 hours    MEDICATIONS  (PRN):  acetaminophen   Tablet 650 milliGRAM(s) Oral every 6 hours PRN Mild Pain and moderate pain  dextrose Gel 1 Dose(s) Oral once PRN Blood Glucose LESS THAN 70 milliGRAM(s)/deciliter  glucagon  Injectable 1 milliGRAM(s) IntraMuscular once PRN Glucose LESS THAN 70 milligrams/deciliter        CAPILLARY BLOOD GLUCOSE      POCT Blood Glucose.: 147 mg/dL (19 Dec 2017 21:45)  POCT Blood Glucose.: 288 mg/dL (19 Dec 2017 17:38)  POCT Blood Glucose.: 198 mg/dL (19 Dec 2017 12:49)  POCT Blood Glucose.: 229 mg/dL (19 Dec 2017 09:02)  POCT Blood Glucose.: 268 mg/dL (19 Dec 2017 00:55)    I&O's Summary    18 Dec 2017 07:01  -  19 Dec 2017 07:00  --------------------------------------------------------  IN: 787 mL / OUT: 450 mL / NET: 337 mL    19 Dec 2017 07:01  -  20 Dec 2017 00:47  --------------------------------------------------------  IN: 687 mL / OUT: 0 mL / NET: 687 mL        PHYSICAL EXAM:  GENERAL: NAD, well-developed  HEAD:  Atraumatic, Normocephalic  NECK: Supple, No JVD  CHEST/LUNG: Clear to auscultation bilaterally; No wheezing.  HEART: Regular rate and rhythm; No murmurs, rubs, or gallops  ABDOMEN: Soft, Nontender, Nondistended; Bowel sounds present  EXTREMITIES:   No clubbing, cyanosis, or edema  NEUROLOGY: AAO X 3  SKIN: No rashes    LABS:                        11.3   3.80  )-----------( 213      ( 19 Dec 2017 06:05 )             34.2     12-19    135  |  100  |  12  ----------------------------<  277<H>  4.3   |  25  |  0.94    Ca    8.2<L>      19 Dec 2017 06:05  Mg     1.8     12-18    TPro  7.3  /  Alb  x   /  TBili  x   /  DBili  x   /  AST  x   /  ALT  x   /  AlkPhos  x   12-18            CAPILLARY BLOOD GLUCOSE      POCT Blood Glucose.: 147 mg/dL (19 Dec 2017 21:45)  POCT Blood Glucose.: 288 mg/dL (19 Dec 2017 17:38)  POCT Blood Glucose.: 198 mg/dL (19 Dec 2017 12:49)  POCT Blood Glucose.: 229 mg/dL (19 Dec 2017 09:02)  POCT Blood Glucose.: 268 mg/dL (19 Dec 2017 00:55)    12-17 @ 15:50  Culture-urine --  Culture results --  method type --  Organism --  Organism Identification --  Specimen source PLEURAL FLUID  12-17 @ 13:14  Culture-urine --  Culture results --  method type --  Organism --  Organism Identification --  Specimen source PLEURAL FLUID           12-17 @ 15:50  Culture blood --  Culture results --  Gram stain --  Gram stain blood --  Method type --  Organism --  Organism identification --  Specimen source PLEURAL FLUID   12-17 @ 13:14  Culture blood --  Culture results --  Gram stain   WBC^White Blood Cells  QNTY CELLS IN GRAM STAIN: FEW (2+)  NOS^No Organisms Seen  Gram stain blood --  Method type --  Organism --  Organism identification --  Specimen source PLEURAL FLUID      RADIOLOGY & ADDITIONAL TESTS:    Imaging Personally Reviewed:    Consultant(s) Notes Reviewed:      Care Discussed with Consultants/Other Providers:
Patient is a 77y old  Male who presents with a chief complaint of Chest pain (14 Dec 2017 05:51)      SUBJECTIVE / OVERNIGHT EVENTS:   Feels better.  Denies CP/SOB/Palpitation/HA.    MEDICATIONS  (STANDING):  carvedilol 12.5 milliGRAM(s) Oral every 12 hours  dextrose 5%. 1000 milliLiter(s) (50 mL/Hr) IV Continuous <Continuous>  dextrose 50% Injectable 12.5 Gram(s) IV Push once  dextrose 50% Injectable 25 Gram(s) IV Push once  dextrose 50% Injectable 25 Gram(s) IV Push once  docusate sodium 100 milliGRAM(s) Oral daily  doxazosin 8 milliGRAM(s) Oral at bedtime  hydrALAZINE 50 milliGRAM(s) Oral three times a day  insulin lispro (HumaLOG) corrective regimen sliding scale   SubCutaneous three times a day before meals  insulin lispro (HumaLOG) corrective regimen sliding scale   SubCutaneous at bedtime  lisinopril 20 milliGRAM(s) Oral daily  melatonin 3 milliGRAM(s) Oral at bedtime  polyethylene glycol 3350 17 Gram(s) Oral two times a day  rivaroxaban 20 milliGRAM(s) Oral every 24 hours  senna 2 Tablet(s) Oral at bedtime  simvastatin 20 milliGRAM(s) Oral at bedtime  sodium chloride 0.9% lock flush 3 milliLiter(s) IV Push every 8 hours    MEDICATIONS  (PRN):  acetaminophen   Tablet 650 milliGRAM(s) Oral every 6 hours PRN Mild Pain and moderate pain  dextrose Gel 1 Dose(s) Oral once PRN Blood Glucose LESS THAN 70 milliGRAM(s)/deciliter  glucagon  Injectable 1 milliGRAM(s) IntraMuscular once PRN Glucose LESS THAN 70 milligrams/deciliter        CAPILLARY BLOOD GLUCOSE      POCT Blood Glucose.: 213 mg/dL (18 Dec 2017 22:38)  POCT Blood Glucose.: 161 mg/dL (18 Dec 2017 18:21)  POCT Blood Glucose.: 254 mg/dL (18 Dec 2017 13:12)  POCT Blood Glucose.: 198 mg/dL (18 Dec 2017 09:01)    I&O's Summary    17 Dec 2017 07:01  -  18 Dec 2017 07:00  --------------------------------------------------------  IN: 440 mL / OUT: 400 mL / NET: 40 mL    18 Dec 2017 07:01  -  19 Dec 2017 00:50  --------------------------------------------------------  IN: 687 mL / OUT: 0 mL / NET: 687 mL        PHYSICAL EXAM:  GENERAL: NAD, well-developed  HEAD:  Atraumatic, Normocephalic  NECK: Supple, No JVD  CHEST/LUNG: Clear to auscultation bilaterally; No wheezing.  HEART: Regular rate and rhythm; No murmurs, rubs, or gallops  ABDOMEN: Soft, Nontender, Nondistended; Bowel sounds present  EXTREMITIES:   No clubbing, cyanosis, or edema  NEUROLOGY: AAO X 3  SKIN: No rashes    LABS:                        11.4   4.38  )-----------( 227      ( 18 Dec 2017 04:32 )             34.9     12-18    143  |  107  |  13  ----------------------------<  188<H>  4.0   |  26  |  0.95    Ca    8.4      18 Dec 2017 04:32  Mg     1.8     12-18    TPro  7.3  /  Alb  x   /  TBili  x   /  DBili  x   /  AST  x   /  ALT  x   /  AlkPhos  x   12-18            CAPILLARY BLOOD GLUCOSE      POCT Blood Glucose.: 213 mg/dL (18 Dec 2017 22:38)  POCT Blood Glucose.: 161 mg/dL (18 Dec 2017 18:21)  POCT Blood Glucose.: 254 mg/dL (18 Dec 2017 13:12)  POCT Blood Glucose.: 198 mg/dL (18 Dec 2017 09:01)    12-17 @ 15:50  Culture-urine --  Culture results --  method type --  Organism --  Organism Identification --  Specimen source PLEURAL FLUID  12-17 @ 13:14  Culture-urine --  Culture results --  method type --  Organism --  Organism Identification --  Specimen source PLEURAL FLUID           12-17 @ 15:50  Culture blood --  Culture results --  Gram stain --  Gram stain blood --  Method type --  Organism --  Organism identification --  Specimen source PLEURAL FLUID   12-17 @ 13:14  Culture blood --  Culture results --  Gram stain   WBC^White Blood Cells  QNTY CELLS IN GRAM STAIN: FEW (2+)  NOS^No Organisms Seen  Gram stain blood --  Method type --  Organism --  Organism identification --  Specimen source PLEURAL FLUID      RADIOLOGY & ADDITIONAL TESTS:    Imaging Personally Reviewed:    Consultant(s) Notes Reviewed:      Care Discussed with Consultants/Other Providers:
Patient is a 77y old  Male who presents with a chief complaint of Chest pain (14 Dec 2017 05:51)      SUBJECTIVE / OVERNIGHT EVENTS:   Feels better.  Denies CP/SOB/Palpitation/HA.    MEDICATIONS  (STANDING):  carvedilol 12.5 milliGRAM(s) Oral every 12 hours  dextrose 5%. 1000 milliLiter(s) (50 mL/Hr) IV Continuous <Continuous>  dextrose 50% Injectable 12.5 Gram(s) IV Push once  dextrose 50% Injectable 25 Gram(s) IV Push once  dextrose 50% Injectable 25 Gram(s) IV Push once  docusate sodium 100 milliGRAM(s) Oral daily  doxazosin 8 milliGRAM(s) Oral at bedtime  hydrALAZINE 50 milliGRAM(s) Oral three times a day  insulin lispro (HumaLOG) corrective regimen sliding scale   SubCutaneous three times a day before meals  insulin lispro (HumaLOG) corrective regimen sliding scale   SubCutaneous at bedtime  lisinopril 20 milliGRAM(s) Oral daily  melatonin 3 milliGRAM(s) Oral at bedtime  polyethylene glycol 3350 17 Gram(s) Oral two times a day  senna 2 Tablet(s) Oral at bedtime  simvastatin 20 milliGRAM(s) Oral at bedtime  sodium chloride 0.9% lock flush 3 milliLiter(s) IV Push every 8 hours    MEDICATIONS  (PRN):  acetaminophen   Tablet 650 milliGRAM(s) Oral every 6 hours PRN Mild Pain and moderate pain  dextrose Gel 1 Dose(s) Oral once PRN Blood Glucose LESS THAN 70 milliGRAM(s)/deciliter  glucagon  Injectable 1 milliGRAM(s) IntraMuscular once PRN Glucose LESS THAN 70 milligrams/deciliter        CAPILLARY BLOOD GLUCOSE      POCT Blood Glucose.: 220 mg/dL (17 Dec 2017 21:35)  POCT Blood Glucose.: 180 mg/dL (17 Dec 2017 18:07)  POCT Blood Glucose.: 217 mg/dL (17 Dec 2017 12:44)  POCT Blood Glucose.: 173 mg/dL (17 Dec 2017 08:55)    I&O's Summary    16 Dec 2017 07:01  -  17 Dec 2017 07:00  --------------------------------------------------------  IN: 870 mL / OUT: 0 mL / NET: 870 mL    17 Dec 2017 07:01  -  18 Dec 2017 01:14  --------------------------------------------------------  IN: 340 mL / OUT: 400 mL / NET: -60 mL        PHYSICAL EXAM:  GENERAL: NAD, well-developed  HEAD:  Atraumatic, Normocephalic  NECK: Supple, No JVD  CHEST/LUNG: Clear to auscultation bilaterally; No wheezing.  HEART: Regular rate and rhythm; No murmurs, rubs, or gallops  ABDOMEN: Soft, Nontender, Nondistended; Bowel sounds present  EXTREMITIES:   No clubbing, cyanosis, or edema  NEUROLOGY: AAO X 3  SKIN: No rashes    LABS:                        12.5   3.95  )-----------( 252      ( 17 Dec 2017 06:20 )             38.0     12-17    135  |  95<L>  |  12  ----------------------------<  164<H>  4.2   |  26  |  0.95    Ca    9.0      17 Dec 2017 06:20  Mg     1.9     12-17              CAPILLARY BLOOD GLUCOSE      POCT Blood Glucose.: 220 mg/dL (17 Dec 2017 21:35)  POCT Blood Glucose.: 180 mg/dL (17 Dec 2017 18:07)  POCT Blood Glucose.: 217 mg/dL (17 Dec 2017 12:44)  POCT Blood Glucose.: 173 mg/dL (17 Dec 2017 08:55)    12-17 @ 13:14  Culture-urine --  Culture results --  method type --  Organism --  Organism Identification --  Specimen source PLEURAL FLUID      12-17 @ 13:14  Culture-CSF --  Culture results --  Gram stain   WBC^White Blood Cells  QNTY CELLS IN GRAM STAIN: FEW (2+)  NOS^No Organisms Seen  Gram stain spinal fluid --  Method type --  Organism --  Organism identification --  Specimen source PLEURAL FLUID       12-17 @ 13:14  Culture blood --  Culture results --  Gram stain   WBC^White Blood Cells  QNTY CELLS IN GRAM STAIN: FEW (2+)  NOS^No Organisms Seen  Gram stain blood --  Method type --  Organism --  Organism identification --  Specimen source PLEURAL FLUID      RADIOLOGY & ADDITIONAL TESTS:    Imaging Personally Reviewed:    Consultant(s) Notes Reviewed:      Care Discussed with Consultants/Other Providers:
Patient is a 77y old  Male who presents with a chief complaint of Chest pain (14 Dec 2017 05:51)      SUBJECTIVE / OVERNIGHT EVENTS:   Feels better.  Denies CP/SOB/Palpitation/HA.    MEDICATIONS  (STANDING):  carvedilol 12.5 milliGRAM(s) Oral every 12 hours  dextrose 5%. 1000 milliLiter(s) (50 mL/Hr) IV Continuous <Continuous>  dextrose 50% Injectable 12.5 Gram(s) IV Push once  dextrose 50% Injectable 25 Gram(s) IV Push once  dextrose 50% Injectable 25 Gram(s) IV Push once  doxazosin 8 milliGRAM(s) Oral at bedtime  hydrALAZINE 50 milliGRAM(s) Oral three times a day  insulin lispro (HumaLOG) corrective regimen sliding scale   SubCutaneous three times a day before meals  insulin lispro (HumaLOG) corrective regimen sliding scale   SubCutaneous at bedtime  lisinopril 20 milliGRAM(s) Oral daily  polyethylene glycol 3350 17 Gram(s) Oral two times a day  rivaroxaban 20 milliGRAM(s) Oral every 24 hours  simvastatin 20 milliGRAM(s) Oral at bedtime  sodium chloride 0.9% lock flush 3 milliLiter(s) IV Push every 8 hours    MEDICATIONS  (PRN):  acetaminophen   Tablet 650 milliGRAM(s) Oral every 6 hours PRN Mild Pain and moderate pain  dextrose Gel 1 Dose(s) Oral once PRN Blood Glucose LESS THAN 70 milliGRAM(s)/deciliter  glucagon  Injectable 1 milliGRAM(s) IntraMuscular once PRN Glucose LESS THAN 70 milligrams/deciliter        CAPILLARY BLOOD GLUCOSE      POCT Blood Glucose.: 192 mg/dL (16 Dec 2017 12:49)  POCT Blood Glucose.: 198 mg/dL (16 Dec 2017 08:57)  POCT Blood Glucose.: 173 mg/dL (15 Dec 2017 22:15)  POCT Blood Glucose.: 145 mg/dL (15 Dec 2017 17:20)    I&O's Summary    15 Dec 2017 07:01  -  16 Dec 2017 07:00  --------------------------------------------------------  IN: 340 mL / OUT: 0 mL / NET: 340 mL    16 Dec 2017 07:01  -  16 Dec 2017 16:06  --------------------------------------------------------  IN: 720 mL / OUT: 0 mL / NET: 720 mL        PHYSICAL EXAM:  GENERAL: NAD, well-developed  HEAD:  Atraumatic, Normocephalic  NECK: Supple, No JVD  CHEST/LUNG: Clear to auscultation bilaterally; No wheezing.  HEART: Regular rate and rhythm; No murmurs, rubs, or gallops  ABDOMEN: Soft, Nontender, Nondistended; Bowel sounds present  EXTREMITIES:   No clubbing, cyanosis, or edema  NEUROLOGY: AAO X 3  SKIN: No rashes    LABS:                        11.7   3.88  )-----------( 230      ( 16 Dec 2017 05:45 )             35.9     12-16    139  |  102  |  14  ----------------------------<  170<H>  4.1   |  25  |  0.99    Ca    8.8      16 Dec 2017 05:45  Mg     1.8     12-16              CAPILLARY BLOOD GLUCOSE      POCT Blood Glucose.: 192 mg/dL (16 Dec 2017 12:49)  POCT Blood Glucose.: 198 mg/dL (16 Dec 2017 08:57)  POCT Blood Glucose.: 173 mg/dL (15 Dec 2017 22:15)  POCT Blood Glucose.: 145 mg/dL (15 Dec 2017 17:20)                RADIOLOGY & ADDITIONAL TESTS:    Imaging Personally Reviewed:    Consultant(s) Notes Reviewed:      Care Discussed with Consultants/Other Providers:
Patient is a 77y old  Male who presents with a chief complaint of Chest pain (14 Dec 2017 05:51)    Pertinent ROS: feels better. still endorsed orthopnea, no sob with exertion. no cough, no sputum, no chest pain    MEDICATIONS  (STANDING):  carvedilol 12.5 milliGRAM(s) Oral every 12 hours  dextrose 5%. 1000 milliLiter(s) (50 mL/Hr) IV Continuous <Continuous>  dextrose 50% Injectable 12.5 Gram(s) IV Push once  dextrose 50% Injectable 25 Gram(s) IV Push once  dextrose 50% Injectable 25 Gram(s) IV Push once  doxazosin 8 milliGRAM(s) Oral at bedtime  hydrALAZINE 50 milliGRAM(s) Oral three times a day  insulin lispro (HumaLOG) corrective regimen sliding scale   SubCutaneous three times a day before meals  insulin lispro (HumaLOG) corrective regimen sliding scale   SubCutaneous at bedtime  lisinopril 20 milliGRAM(s) Oral daily  polyethylene glycol 3350 17 Gram(s) Oral two times a day  rivaroxaban 20 milliGRAM(s) Oral every 24 hours  simvastatin 20 milliGRAM(s) Oral at bedtime  sodium chloride 0.9% lock flush 3 milliLiter(s) IV Push every 8 hours    MEDICATIONS  (PRN):  acetaminophen   Tablet 650 milliGRAM(s) Oral every 6 hours PRN Mild Pain and moderate pain  dextrose Gel 1 Dose(s) Oral once PRN Blood Glucose LESS THAN 70 milliGRAM(s)/deciliter  glucagon  Injectable 1 milliGRAM(s) IntraMuscular once PRN Glucose LESS THAN 70 milligrams/deciliter    Vital Signs Last 24 Hrs  T(C): 36.4 (16 Dec 2017 05:22), Max: 37.1 (15 Dec 2017 21:56)  T(F): 97.5 (16 Dec 2017 05:22), Max: 98.7 (15 Dec 2017 21:56)  HR: 82 (16 Dec 2017 05:22) (67 - 82)  BP: 135/55 (16 Dec 2017 05:22) (115/67 - 158/73)  BP(mean): --  RR: 17 (16 Dec 2017 05:22) (17 - 18)  SpO2: 96% (16 Dec 2017 05:22) (96% - 100%)    I&O's Summary    15 Dec 2017 07:01  -  16 Dec 2017 07:00  --------------------------------------------------------  IN: 340 mL / OUT: 0 mL / NET: 340 mL    16 Dec 2017 07:01  -  16 Dec 2017 12:39  --------------------------------------------------------  IN: 240 mL / OUT: 0 mL / NET: 240 mL        Physical Exam:   GENERAL: NAD, well-groomed, well-developed  HEENT: DARLING, Atraumatic, Normocephalic  NECK: Supple, No JVD, Normal thyroid  CHEST/LUNG: Clear to auscultation.   CVS: Regular rate and rhythm; No murmur  GI: : Soft, Nontender, Nondistended; Bowel sounds present  NERVOUS SYSTEM:  Alert & Oriented X3  EXTREMITIES:  2+ Peripheral Pulses, No clubbing, cyanosis, or edema  LYMPH: No lymphadenopathy noted  SKIN: Normal turgor , normal color, dry  ENDOCRINOLOGY: No Thyromegaly  PSYCH: Appropriate    Labs:                        11.7   3.88  )-----------( 230      ( 16 Dec 2017 05:45 )             35.9     12-16    139  |  102  |  14  ----------------------------<  170<H>  4.1   |  25  |  0.99    Ca    8.8      16 Dec 2017 05:45  Mg     1.8     12-16      CAPILLARY BLOOD GLUCOSE      POCT Blood Glucose.: 198 mg/dL (16 Dec 2017 08:57)  POCT Blood Glucose.: 173 mg/dL (15 Dec 2017 22:15)  POCT Blood Glucose.: 145 mg/dL (15 Dec 2017 17:20)  POCT Blood Glucose.: 172 mg/dL (15 Dec 2017 12:57)    D DImer  Cultures:       Studies  Chest X-RAY < from: Xray Chest 2 Views PA/Lat (12.14.17 @ 04:08) >    EXAM:  RAD CHEST PA LAT        PROCEDU< from: CT Chest No Cont (12.14.17 @ 21:33) >  ******PRELIMINARY REPORT******    ******PRELIMINARY REPORT******            EXAM:  CT CHEST        PROCEDURE DATE:  Dec 14 2017     ******PRELIMINARY REPORT******    ******PRELIMINARY REPORT******            INTERPRETATION:  Increased b/l pleural effusions and compressive lower   lobe atelectasis    < end of copied text >  RE DATE:  Dec 14 2017         INTERPRETATION:  CLINICAL INDICATION: Chest pain    TECHNIQUE: PA and lateral radiographs of the chest     COMPARISON: There are no similar prior studies available for comparison.     FINDINGS:    The lungs are clear.   Small to moderate bilateral pleural effusions   There is no evidence of pneumothorax.  The heart is normal in size.  The visualized osseous and soft tissue structures are unremarkable.     IMPRESSION:  Small-to-moderate bilateral pleural effusions.  Clear lungs.     < end of copied text >    CT SCAN Chest   CT Abdomen  Venous Dopplers: LE:   Others  < from: Limited Transthoracic Echo (11.07.17 @ 15:40) >  Patient name: COLTON JARAMILLO  YOB: 1940   Age: 77 (M)   MR#: [id number]  Study Date: 11/7/2017  [address] Flip Babb RDCS  Study quality: Technically good  Referring Physician: Yadiel Babin MD  Blood Pressure: 146/83 mmHg  Height: 177 cm  Weight: 89 kg  BSA: 2.1 m2  ------------------------------------------------------------------------  PROCEDURE: Limited transthoracic echocardiogram with 2-D.  M-Mode and spectral and color flow Doppler.  INDICATION: Pericardial effusion (noninflammatory) (I31.3)  ------------------------------------------------------------------------  CONCLUSIONS:  Limited study to evaluate for pericardial effusion.  Small  pericardial effusion superior to the right atrium.  A left  pleural effusion is seen.  *** Compared with echocardiogram of 10/27/2017, the  pericardial effusion has decreased significantly in size.  ------------------------------------------------------------------------    < end of copied text >
Patient is a 77y old  Male who presents with a chief complaint of Chest pain (14 Dec 2017 05:51)    Pertinent ROS: s/p sonoguided thoracentesis at the bedside. Pt tolerated well. no cough, no sputum     MEDICATIONS  (STANDING):  carvedilol 12.5 milliGRAM(s) Oral every 12 hours  dextrose 5%. 1000 milliLiter(s) (50 mL/Hr) IV Continuous <Continuous>  dextrose 50% Injectable 12.5 Gram(s) IV Push once  dextrose 50% Injectable 25 Gram(s) IV Push once  dextrose 50% Injectable 25 Gram(s) IV Push once  docusate sodium 100 milliGRAM(s) Oral daily  doxazosin 8 milliGRAM(s) Oral at bedtime  hydrALAZINE 50 milliGRAM(s) Oral three times a day  insulin lispro (HumaLOG) corrective regimen sliding scale   SubCutaneous three times a day before meals  insulin lispro (HumaLOG) corrective regimen sliding scale   SubCutaneous at bedtime  lisinopril 20 milliGRAM(s) Oral daily  melatonin 3 milliGRAM(s) Oral at bedtime  polyethylene glycol 3350 17 Gram(s) Oral two times a day  senna 2 Tablet(s) Oral at bedtime  simvastatin 20 milliGRAM(s) Oral at bedtime  sodium chloride 0.9% lock flush 3 milliLiter(s) IV Push every 8 hours    MEDICATIONS  (PRN):  acetaminophen   Tablet 650 milliGRAM(s) Oral every 6 hours PRN Mild Pain and moderate pain  dextrose Gel 1 Dose(s) Oral once PRN Blood Glucose LESS THAN 70 milliGRAM(s)/deciliter  glucagon  Injectable 1 milliGRAM(s) IntraMuscular once PRN Glucose LESS THAN 70 milligrams/deciliter    Vital Signs Last 24 Hrs  T(C): 36.3 (17 Dec 2017 13:02), Max: 37.1 (16 Dec 2017 13:20)  T(F): 97.4 (17 Dec 2017 13:02), Max: 98.7 (16 Dec 2017 13:20)  HR: 75 (17 Dec 2017 13:02) (66 - 75)  BP: 155/82 (17 Dec 2017 13:02) (136/86 - 161/98)  BP(mean): --  RR: 18 (17 Dec 2017 13:02) (16 - 18)  SpO2: 96% (17 Dec 2017 13:02) (96% - 99%)    I&O's Summary    16 Dec 2017 07:01  -  17 Dec 2017 07:00  --------------------------------------------------------  IN: 870 mL / OUT: 0 mL / NET: 870 mL    17 Dec 2017 07:01  -  17 Dec 2017 13:08  --------------------------------------------------------  IN: 240 mL / OUT: 0 mL / NET: 240 mL        Physical Exam:   GENERAL: NAD, well-groomed, well-developed  HEENT: DARLING, Atraumatic, Normocephalic  NECK: Supple, No JVD, Normal thyroid  CHEST/LUNG: Clear to auscultation.   CVS: Regular rate and rhythm; No murmur  GI: : Soft, Nontender, Nondistended; Bowel sounds present  NERVOUS SYSTEM:  Alert & Oriented X3  EXTREMITIES:  2+ Peripheral Pulses, No clubbing, cyanosis, or edema  LYMPH: No lymphadenopathy noted  SKIN: Normal turgor , normal color, dry  ENDOCRINOLOGY: No Thyromegaly  PSYCH: Appropriate  Labs:                 12.5   3.95  )-----------( 252      ( 17 Dec 2017 06:20 )             38.0     12-17    135  |  95<L>  |  12  ----------------------------<  164<H>  4.2   |  26  |  0.95    Ca    9.0      17 Dec 2017 06:20  Mg     1.9     12-17      CAPILLARY BLOOD GLUCOSE      POCT Blood Glucose.: 217 mg/dL (17 Dec 2017 12:44)  POCT Blood Glucose.: 173 mg/dL (17 Dec 2017 08:55)  POCT Blood Glucose.: 177 mg/dL (16 Dec 2017 21:54)  POCT Blood Glucose.: 127 mg/dL (16 Dec 2017 17:50)      D DImer  Cultures:         Studies  Chest X-RAY < from: Xray Chest 1 View AP- PORTABLE-Urgent (12.17.17 @ 12:27) >    EXAM:  RAD CHEST PORTABLE URGENT        PROCEDURE DATE:  Dec 17 2017         INTERPRETATION:  EXAMINATION: RAD CHEST PORTABLE URGENT    CLINICAL INDICATION: Thoracentesis    TECHNIQUE: Frontal radiograph of the chest was obtained.    COMPARISON: 12/14/2017.    FINDINGS:     Cardiac silhouette not well evaluated. Small bilateral pleural effusions,   decreased in the right compared to the prior study. No pneumothorax.    IMPRESSION:   No pneumothorax.    < end of copied text >    CT SCAN Chest < from: CT Chest No Cont (12.14.17 @ 21:33) >    EXAM:  CT CHEST        PROCEDURE DATE:  Dec 14 2017         INTERPRETATION:  CLINICAL INFORMATION: Evaluate pleural effusion.   Shortness of breath.    COMPARISON: MRI of the abdomen dated 11/8/2017 and CT chest dated   10/30/2017    PROCEDURE:   CT of the Chest was performed without intravenous contrast.  Sagittal and coronal reformats were performed.      FINDINGS:    CHEST:     LUNGS AND LARGE AIRWAYS/PLEURA: Moderate left and large right pleural   effusions with complete atelectasis of the right lower lobe and near   complete atelectasis of the right middle lobe and left lower lobe.  VESSELS: Atherosclerotic calcifications of the coronary arteries and   thoracic aorta.  HEART: Heart size is normal. No pericardial effusion.   MEDIASTINUMAND ALICE: No lymphadenopathy.  CHEST WALL AND LOWER NECK: Within normal limits.  VISUALIZED UPPER ABDOMEN: Redemonstration of multiple hepatic cysts,   grossly unchanged.  BONES: Within normal limits.    IMPRESSION:   Moderate left and large right pleural effusions with complete atelectasis   of the right lower lobe and near complete atelectasis of the right middle   lobe and left lower lobe.    < end of copied text >    CT Abdomen  Venous Dopplers: LE:   Others  < from: Limited Transthoracic Echo (11.07.17 @ 15:40) >  Patient name: COLTON JARAMILLO  YOB: 1940   Age: 77 (M)   MR#: [id number]  Study Date: 11/7/2017  [facility] Flip Babb RDCS  Study quality: Technically good  Referring Physician: Yadiel Babin MD  Blood Pressure: 146/83 mmHg  Height: 177 cm  Weight: 89 kg  BSA: 2.1 m2  ------------------------------------------------------------------------  PROCEDURE: Limited transthoracic echocardiogram with 2-D.  M-Mode and spectral and color flow Doppler.  INDICATION: Pericardial effusion (noninflammatory) (I31.3)  ------------------------------------------------------------------------  CONCLUSIONS:  Limited study to evaluate for pericardial effusion.  Small  pericardial effusion superior to the right atrium.  A left  pleural effusion is seen.  *** Compared with echocardiogram of 10/27/2017, the  pericardial effusion has decreased significantly in size.  ------------------------------------------------------------------------    < end of copied text >
Patient is a 77y old  Male who presents with a chief complaint of Chest pain (14 Dec 2017 05:51)    doing ok   no SOB   feels better   Any change in ROS:     MEDICATIONS  (STANDING):  carvedilol 12.5 milliGRAM(s) Oral every 12 hours  dextrose 5%. 1000 milliLiter(s) (50 mL/Hr) IV Continuous <Continuous>  dextrose 50% Injectable 12.5 Gram(s) IV Push once  dextrose 50% Injectable 25 Gram(s) IV Push once  dextrose 50% Injectable 25 Gram(s) IV Push once  docusate sodium 100 milliGRAM(s) Oral daily  doxazosin 8 milliGRAM(s) Oral at bedtime  hydrALAZINE 50 milliGRAM(s) Oral three times a day  insulin lispro (HumaLOG) corrective regimen sliding scale   SubCutaneous three times a day before meals  insulin lispro (HumaLOG) corrective regimen sliding scale   SubCutaneous at bedtime  lisinopril 20 milliGRAM(s) Oral daily  melatonin 3 milliGRAM(s) Oral at bedtime  polyethylene glycol 3350 17 Gram(s) Oral two times a day  rivaroxaban 20 milliGRAM(s) Oral every 24 hours  senna 2 Tablet(s) Oral at bedtime  simvastatin 20 milliGRAM(s) Oral at bedtime  sodium chloride 0.9% lock flush 3 milliLiter(s) IV Push every 8 hours    MEDICATIONS  (PRN):  acetaminophen   Tablet 650 milliGRAM(s) Oral every 6 hours PRN Mild Pain and moderate pain  dextrose Gel 1 Dose(s) Oral once PRN Blood Glucose LESS THAN 70 milliGRAM(s)/deciliter  glucagon  Injectable 1 milliGRAM(s) IntraMuscular once PRN Glucose LESS THAN 70 milligrams/deciliter    Vital Signs Last 24 Hrs  T(C): 36.4 (20 Dec 2017 05:01), Max: 36.7 (19 Dec 2017 21:29)  T(F): 97.5 (20 Dec 2017 05:01), Max: 98.1 (19 Dec 2017 21:29)  HR: 80 (20 Dec 2017 05:01) (74 - 80)  BP: 133/70 (20 Dec 2017 05:01) (128/64 - 133/70)  BP(mean): --  RR: 17 (20 Dec 2017 05:01) (17 - 18)  SpO2: 100% (20 Dec 2017 05:01) (99% - 100%)    I&O's Summary    19 Dec 2017 07:01  -  20 Dec 2017 07:00  --------------------------------------------------------  IN: 894 mL / OUT: 0 mL / NET: 894 mL    20 Dec 2017 07:01  -  20 Dec 2017 14:32  --------------------------------------------------------  IN: 360 mL / OUT: 200 mL / NET: 160 mL          Physical Exam:   GENERAL: NAD, well-groomed, well-developed  HEENT: DARLING/   Atraumatic, Normocephalic  ENMT: No tonsillar erythema, exudates, or enlargement; Moist mucous membranes, Good dentition, No lesions  NECK: Supple, No JVD, Normal thyroid  CHEST/LUNG: dECREASED air entry bilaterally l> r   CVS: Regular rate and rhythm; No murmurs, rubs, or gallops  GI: : Soft, Nontender, Nondistended; Bowel sounds present  NERVOUS SYSTEM:  Alert & Oriented X3  EXTREMITIES:  2+ Peripheral Pulses, No clubbing, cyanosis, or edema  LYMPH: No lymphadenopathy noted  SKIN: No rashes or lesions  ENDOCRINOLOGY: No Thyromegaly  PSYCH: Appropriate    Labs:                              11.0   4.26  )-----------( 226      ( 20 Dec 2017 06:40 )             34.0                         11.3   3.80  )-----------( 213      ( 19 Dec 2017 06:05 )             34.2                         11.4   4.38  )-----------( 227      ( 18 Dec 2017 04:32 )             34.9                         12.5   3.95  )-----------( 252      ( 17 Dec 2017 06:20 )             38.0     12-20    137  |  102  |  12  ----------------------------<  193<H>  4.3   |  24  |  0.91  12-19    135  |  100  |  12  ----------------------------<  277<H>  4.3   |  25  |  0.94  12-18    143  |  107  |  13  ----------------------------<  188<H>  4.0   |  26  |  0.95  12-17    135  |  95<L>  |  12  ----------------------------<  164<H>  4.2   |  26  |  0.95    Ca    8.4      20 Dec 2017 06:40  Ca    8.2<L>      19 Dec 2017 06:05  Mg     1.9     12-20    TPro  7.3  /  Alb  x   /  TBili  x   /  DBili  x   /  AST  x   /  ALT  x   /  AlkPhos  x   12-18    CAPILLARY BLOOD GLUCOSE      POCT Blood Glucose.: 228 mg/dL (20 Dec 2017 12:48)  POCT Blood Glucose.: 201 mg/dL (20 Dec 2017 09:08)  POCT Blood Glucose.: 147 mg/dL (19 Dec 2017 21:45)  POCT Blood Glucose.: 288 mg/dL (19 Dec 2017 17:38)            < from: Xray Chest 1 View AP- PORTABLE-Urgent (12.17.17 @ 12:27) >  INTERPRETATION:  EXAMINATION: RAD CHEST PORTABLE URGENT    CLINICAL INDICATION: Thoracentesis    TECHNIQUE: Frontal radiograph of the chest was obtained.    COMPARISON: 12/14/2017.    FINDINGS:     Cardiac silhouette not well evaluated. Small bilateral pleural effusions,   decreased in the right compared to the prior study. No pneumothorax.    IMPRESSION:   No pneumothorax.                  TASHA YOON M.D., ATTENDINGRADIOLOGIST  This document has been electronically signed. Dec 17 2017 12:56PM        < end of copied text >          Studies  Chest X-RAY  CT SCAN Chest   Venous Dopplers: LE:   CT Abdomen  Others
Patient is a 77y old  Male who presents with a chief complaint of Chest pain (20 Dec 2017 16:07)      SUBJECTIVE / OVERNIGHT EVENTS:   Feels better.  Denies CP/SOB/Palpitation/HA.    MEDICATIONS  (STANDING):  carvedilol 12.5 milliGRAM(s) Oral every 12 hours  dextrose 5%. 1000 milliLiter(s) (50 mL/Hr) IV Continuous <Continuous>  dextrose 50% Injectable 12.5 Gram(s) IV Push once  dextrose 50% Injectable 25 Gram(s) IV Push once  dextrose 50% Injectable 25 Gram(s) IV Push once  docusate sodium 100 milliGRAM(s) Oral daily  doxazosin 8 milliGRAM(s) Oral at bedtime  hydrALAZINE 50 milliGRAM(s) Oral three times a day  insulin lispro (HumaLOG) corrective regimen sliding scale   SubCutaneous three times a day before meals  insulin lispro (HumaLOG) corrective regimen sliding scale   SubCutaneous at bedtime  lisinopril 20 milliGRAM(s) Oral daily  melatonin 3 milliGRAM(s) Oral at bedtime  polyethylene glycol 3350 17 Gram(s) Oral two times a day  rivaroxaban 20 milliGRAM(s) Oral every 24 hours  senna 2 Tablet(s) Oral at bedtime  simvastatin 20 milliGRAM(s) Oral at bedtime  sodium chloride 0.9% lock flush 3 milliLiter(s) IV Push every 8 hours    MEDICATIONS  (PRN):  acetaminophen   Tablet 650 milliGRAM(s) Oral every 6 hours PRN Mild Pain and moderate pain  dextrose Gel 1 Dose(s) Oral once PRN Blood Glucose LESS THAN 70 milliGRAM(s)/deciliter  glucagon  Injectable 1 milliGRAM(s) IntraMuscular once PRN Glucose LESS THAN 70 milligrams/deciliter        CAPILLARY BLOOD GLUCOSE      POCT Blood Glucose.: 169 mg/dL (20 Dec 2017 17:32)  POCT Blood Glucose.: 228 mg/dL (20 Dec 2017 12:48)  POCT Blood Glucose.: 201 mg/dL (20 Dec 2017 09:08)  POCT Blood Glucose.: 147 mg/dL (19 Dec 2017 21:45)    I&O's Summary    19 Dec 2017 07:01  -  20 Dec 2017 07:00  --------------------------------------------------------  IN: 894 mL / OUT: 0 mL / NET: 894 mL    20 Dec 2017 07:01  -  20 Dec 2017 21:25  --------------------------------------------------------  IN: 600 mL / OUT: 200 mL / NET: 400 mL        PHYSICAL EXAM:  GENERAL: NAD, well-developed  HEAD:  Atraumatic, Normocephalic  NECK: Supple, No JVD  CHEST/LUNG: Clear to auscultation bilaterally; No wheezing.  HEART: Regular rate and rhythm; No murmurs, rubs, or gallops  ABDOMEN: Soft, Nontender, Nondistended; Bowel sounds present  EXTREMITIES:   No clubbing, cyanosis, or edema  NEUROLOGY: AAO X 3  SKIN: No rashes    LABS:                        11.0   4.26  )-----------( 226      ( 20 Dec 2017 06:40 )             34.0     12-20    137  |  102  |  12  ----------------------------<  193<H>  4.3   |  24  |  0.91    Ca    8.4      20 Dec 2017 06:40  Mg     1.9     12-20              CAPILLARY BLOOD GLUCOSE      POCT Blood Glucose.: 169 mg/dL (20 Dec 2017 17:32)  POCT Blood Glucose.: 228 mg/dL (20 Dec 2017 12:48)  POCT Blood Glucose.: 201 mg/dL (20 Dec 2017 09:08)  POCT Blood Glucose.: 147 mg/dL (19 Dec 2017 21:45)    12-17 @ 15:50  Culture-urine --  Culture results --  method type --  Organism --  Organism Identification --  Specimen source PLEURAL FLUID  12-17 @ 13:14  Culture-urine --  Culture results --  method type --  Organism --  Organism Identification --  Specimen source PLEURAL FLUID           12-17 @ 15:50  Culture blood --  Culture results --  Gram stain --  Gram stain blood --  Method type --  Organism --  Organism identification --  Specimen source PLEURAL FLUID   12-17 @ 13:14  Culture blood --  Culture results --  Gram stain   WBC^White Blood Cells  QNTY CELLS IN GRAM STAIN: FEW (2+)  NOS^No Organisms Seen  Gram stain blood --  Method type --  Organism --  Organism identification --  Specimen source PLEURAL FLUID      RADIOLOGY & ADDITIONAL TESTS:    Imaging Personally Reviewed:    Consultant(s) Notes Reviewed:      Care Discussed with Consultants/Other Providers:
Patient is a 77y old  Male who presents with a chief complaint of Chest pain (14 Dec 2017 05:51)      Any change in ROS:     MEDICATIONS  (STANDING):  carvedilol 12.5 milliGRAM(s) Oral every 12 hours  dextrose 5%. 1000 milliLiter(s) (50 mL/Hr) IV Continuous <Continuous>  dextrose 50% Injectable 12.5 Gram(s) IV Push once  dextrose 50% Injectable 25 Gram(s) IV Push once  dextrose 50% Injectable 25 Gram(s) IV Push once  doxazosin 8 milliGRAM(s) Oral at bedtime  hydrALAZINE 50 milliGRAM(s) Oral three times a day  insulin lispro (HumaLOG) corrective regimen sliding scale   SubCutaneous three times a day before meals  insulin lispro (HumaLOG) corrective regimen sliding scale   SubCutaneous at bedtime  lisinopril 20 milliGRAM(s) Oral daily  polyethylene glycol 3350 17 Gram(s) Oral two times a day  rivaroxaban 20 milliGRAM(s) Oral every 24 hours  simvastatin 20 milliGRAM(s) Oral at bedtime  sodium chloride 0.9% lock flush 3 milliLiter(s) IV Push every 8 hours    MEDICATIONS  (PRN):  acetaminophen   Tablet 650 milliGRAM(s) Oral every 6 hours PRN Mild Pain and moderate pain  dextrose Gel 1 Dose(s) Oral once PRN Blood Glucose LESS THAN 70 milliGRAM(s)/deciliter  glucagon  Injectable 1 milliGRAM(s) IntraMuscular once PRN Glucose LESS THAN 70 milligrams/deciliter    Vital Signs Last 24 Hrs  T(C): 36.9 (15 Dec 2017 05:31), Max: 36.9 (15 Dec 2017 05:31)  T(F): 98.4 (15 Dec 2017 05:31), Max: 98.4 (15 Dec 2017 05:31)  HR: 76 (15 Dec 2017 05:31) (75 - 79)  BP: 124/74 (15 Dec 2017 05:31) (119/56 - 152/74)  BP(mean): --  RR: 18 (15 Dec 2017 05:31) (18 - 18)  SpO2: 100% (15 Dec 2017 05:31) (100% - 100%)    I&O's Summary    14 Dec 2017 07:01  -  15 Dec 2017 07:00  --------------------------------------------------------  IN: 500 mL / OUT: 500 mL / NET: 0 mL          Physical Exam:   GENERAL: NAD, well-groomed, well-developed  HEENT: DARLING/   Atraumatic, Normocephalic  ENMT: No tonsillar erythema, exudates, or enlargement; Moist mucous membranes, Good dentition, No lesions  NECK: Supple, No JVD, Normal thyroid  CHEST/LUNG: Clear to auscultaion, ; No rales, rhonchi, wheezing, or rubs  CVS: Regular rate and rhythm; No murmurs, rubs, or gallops  GI: : Soft, Nontender, Nondistended; Bowel sounds present  NERVOUS SYSTEM:  Alert & Oriented X3, Good concentration; Motor Strength 5/5 B/L upper and lower extremities; DTRs 2+ intact and symmetric  EXTREMITIES:  2+ Peripheral Pulses, No clubbing, cyanosis, or edema  LYMPH: No lymphadenopathy noted  SKIN: No rashes or lesions  ENDOCRINOLOGY: No Thyromegaly  PSYCH: Appropriate    Labs:    CARDIAC MARKERS ( 14 Dec 2017 07:25 )  x     / < 0.06 ng/mL / 103 u/L / x     / x      CARDIAC MARKERS ( 14 Dec 2017 02:20 )  x     / < 0.06 ng/mL / 123 u/L / 2.68 ng/mL / x                                11.5   3.81  )-----------( 215      ( 15 Dec 2017 07:00 )             34.3                         12.1   4.22  )-----------( 225      ( 14 Dec 2017 07:25 )             36.5                         12.6   5.10  )-----------( 241      ( 14 Dec 2017 02:20 )             37.6     12-15    138  |  101  |  12  ----------------------------<  142<H>  4.2   |  26  |  0.99  12-14    139  |  101  |  7   ----------------------------<  129<H>  3.9   |  28  |  0.91  12-14    137  |  99  |  9   ----------------------------<  128<H>  4.4   |  26  |  0.98    Ca    8.5      15 Dec 2017 07:00  Ca    8.5      14 Dec 2017 07:25  Ca    8.7      14 Dec 2017 02:20  Phos  2.5     12-14  Mg     1.7     12-14    TPro  7.3  /  Alb  3.8  /  TBili  0.4  /  DBili  x   /  AST  25  /  ALT  12  /  AlkPhos  65  12-14    CAPILLARY BLOOD GLUCOSE      POCT Blood Glucose.: 172 mg/dL (15 Dec 2017 12:57)  POCT Blood Glucose.: 201 mg/dL (15 Dec 2017 09:40)  POCT Blood Glucose.: 152 mg/dL (14 Dec 2017 21:22)  POCT Blood Glucose.: 151 mg/dL (14 Dec 2017 17:45)      LIVER FUNCTIONS - ( 14 Dec 2017 02:20 )  Alb: 3.8 g/dL / Pro: 7.3 g/dL / ALK PHOS: 65 u/L / ALT: 12 u/L / AST: 25 u/L / GGT: x           PT/INR - ( 14 Dec 2017 02:20 )   PT: 18.2 SEC;   INR: 1.57          PTT - ( 14 Dec 2017 02:20 )  PTT:41.3 SEC    Cultures:                 < from: Limited Transthoracic Echo (11.07.17 @ 15:40) >  Patient name: COLTON JARAMILLO  YOB: 1940   Age: 77 (M)   MR#: [id number]  Study Date: 11/7/2017  [address]: Flip Babb RDCS  Study quality: Technically good  Referring Physician: Yadiel Babin MD  Blood Pressure: 146/83 mmHg  Height: 177 cm  Weight: 89 kg  BSA: 2.1 m2  ------------------------------------------------------------------------  PROCEDURE: Limited transthoracic echocardiogram with 2-D.  M-Mode and spectral and color flow Doppler.  INDICATION: Pericardial effusion (noninflammatory) (I31.3)  ------------------------------------------------------------------------  CONCLUSIONS:  Limited study to evaluate for pericardial effusion.  Small  pericardial effusion superior to the right atrium.  A left  pleural effusion is seen.  *** Compared with echocardiogram of 10/27/2017, the  pericardial effusion has decreased significantly in size.  ------------------------------------------------------------------------  Confirmed on11/7/2017 - 16:37:04 by Arjun Zuleta M.D.    < end of copied text >              Studies  Chest X-RAY  CT SCAN Chest   Venous Dopplers: LE:   CT Abdomen  Others

## 2017-12-20 NOTE — PROGRESS NOTE ADULT - PROBLEM SELECTOR PROBLEM 1
Pleural effusion

## 2017-12-20 NOTE — PROGRESS NOTE ADULT - PROBLEM SELECTOR PLAN 2
defer to cardiology  12/16 denied today
defer to cardiology  12/16 denied today  12/17 denies
defer to cardiology  12/16 denied today  12/17 denies  12/20'; resolved:
defer to cardiology

## 2017-12-20 NOTE — PROGRESS NOTE ADULT - PROBLEM SELECTOR PROBLEM 6
Diabetes mellitus, type 2

## 2017-12-20 NOTE — DISCHARGE NOTE ADULT - HOSPITAL COURSE
77yM, self ambulating, with a history of Pericarditis s/p pericardial window 2/2 early tamponade-,10/28/17 and discharged on 11/9, Polyclonal gammopathy, Atrial Flutter on Xarelto, T2DM,BPH HTN, experiencing, non exertional, substernal/ epigastric pain, resolved, had NST-EF-36%-with severe hypokinesis-no evidence for ischemia, had thoracentesis-exudative, restarted on Xarelto no chest pain.      pt to follow up with Dr James for cytology results

## 2017-12-20 NOTE — PROGRESS NOTE ADULT - PROBLEM SELECTOR PLAN 5
seems to be controlled
seems to be controlled  12/16 stable less than 160 for last 24 hrs
seems to be controlled  12/16 stable less than 160 for last 24 hrs  12/17 stable with current management

## 2017-12-20 NOTE — PROGRESS NOTE ADULT - PROBLEM SELECTOR PLAN 3
HR controlled: on AC
HR controlled: on AC  12/16 on AC.
HR controlled: on AC  12/16 on AC.  12/17 off C stable.  12/18: restarted xarelto today !
HR controlled: on AC  12/16 on AC.  12/17 off C stable.  12/18: restarted xarelto today !
HR controlled: on AC  12/16 on AC.  12/17 off C stable.  12/18: restarted xarelto today !  12/20: on AC
HR controlled: on AC  12/16 on AC.  12/17 on AC stable.

## 2017-12-20 NOTE — PROGRESS NOTE ADULT - PROVIDER SPECIALTY LIST ADULT
Cardiology
Internal Medicine
Pulmonology
Internal Medicine
Pulmonology

## 2017-12-20 NOTE — DISCHARGE NOTE ADULT - CARE PROVIDER_API CALL
Toño James), Critical Care Medicine; Internal Medicine; Pulmonary Disease; Sleep Medicine  [id number] [place] [address]  Phone: [phone]  Fax: [phone]    Yadiel Babin (ARIADNE), Cardiology  [address] [place] [address]  Phone: [phone]  Fax: [phone]

## 2017-12-20 NOTE — PROGRESS NOTE ADULT - PROBLEM SELECTOR PLAN 4
cont home meds
cont home meds  12/16 simvastatin
cont home meds  12/16 simvastatin  12/17 Statin

## 2017-12-20 NOTE — DISCHARGE NOTE ADULT - CARE PLAN
Principal Discharge DX:	Pleural effusion  Goal:	resolve  Instructions for follow-up, activity and diet:	follow up with Dr James within 1 week, please call for appointment  Secondary Diagnosis:	T2DM (type 2 diabetes mellitus)  Goal:	normal glucose levels  Instructions for follow-up, activity and diet:	take medications as prescribed  no concentrated sweets watch carb intake.  Secondary Diagnosis:	Essential hypertension  Goal:	normal blood pressure  Instructions for follow-up, activity and diet:	take medications as prescribed  Secondary Diagnosis:	Hyperlipidemia  Goal:	low cholesterol diet  Instructions for follow-up, activity and diet:	take medications as prescribed  Secondary Diagnosis:	Atrial fibrillation, unspecified type  Goal:	rate control  Instructions for follow-up, activity and diet:	cont to take medications as prescribed

## 2017-12-20 NOTE — DISCHARGE NOTE ADULT - SECONDARY DIAGNOSIS.
T2DM (type 2 diabetes mellitus) Essential hypertension Hyperlipidemia Atrial fibrillation, unspecified type

## 2017-12-20 NOTE — DISCHARGE NOTE ADULT - PATIENT PORTAL LINK FT
“You can access the Follow[facility] Patient Portal, offered by [facility], by registering with the following website: [email or web address]”

## 2017-12-20 NOTE — DISCHARGE NOTE ADULT - PLAN OF CARE
resolve follow up with Dr James within 1 week, please call for appointment normal glucose levels take medications as prescribed  no concentrated sweets watch carb intake. normal blood pressure take medications as prescribed low cholesterol diet rate control cont to take medications as prescribed

## 2017-12-20 NOTE — CHART NOTE - NSCHARTNOTEFT_GEN_A_CORE
D/W Dr. James, clear from pulmonary stand point to be discharged with outpt follow up for results of Thoracentesis pathology. Also cleared by cardiology for discharge.

## 2017-12-20 NOTE — PROGRESS NOTE ADULT - PROBLEM SELECTOR PLAN 1
CT scan reviewed by me: no official report available: But there are bilateral pleural effusion R> L ; seems secondary to CHF : May need thoracentesis on the right side given large sized effusion: check official ct scan report: He has bilateral pleural effusion on ct scan from 2017 too: will dw cardiology: His AC needs to be stopped before any procedure can be attempted:   stable. on Room air   s/p thoracentesis. will follow up with chemistry to evaluate pleural effusion  : seems exudative effusion: cultures have been negative: flow cyto as well as cyto is pendin/19: pt has exudative pleural effusion by protien/LDH criteria: await cytology as well as flow cytometry: his EF is low
CT scan reviewed by me: no official report available: But there are bilateral pleural effusion R> L ; seems secondary to CHF : May need thoracentesis on the right side given large sized effusion: check official ct scan report: He has bilateral pleural effusion on ct scan from 2017 too: will dw cardiology: His AC needs to be stopped before any procedure can be attempted:   stable. on Room air   s/p thoracentesis. will follow up with chemistry to evaluate pleural effusion  : seems exudative effusion: cultures have been negative: flow cyto as well as cyto is pendin/19: pt has exudative pleural effusion by protien/LDH criteria: await cytology as well as flow cytometry: his EF is low  : flow cyto is normal: await cytology ? need vats if it is negative: The etiology of pleural effusion as well as pericardial effusion is not clear:
CT scan reviewed by me: no official report available: But there are bilateral pleural effusion R> L ; seems secondary to CHF : May need thoracentesis on the right side given large sized effusion: check official ct scan report: He has bilateral pleural effusion on ct scan from october 2017 too: will dw cardiology: His AC needs to be stopped before any procedure can be attempted:  12/16 stable. on Room air  12/17 s/p thoracentesis. will follow up with chemistry to evaluate pleural effusion
CT scan reviewed by me: no official report available: But there are bilateral pleural effusion R> L ; seems secondary to CHF : May need thoracentesis on the right side given large sized effusion: check official ct scan report: He has bilateral pleural effusion on ct scan from october 2017 too: will dw cardiology: His AC needs to be stopped before any procedure can be attempted:  12/16 stable. on Room air  12/17 s/p thoracentesis. will follow up with chemistry to evaluate pleural effusion  12/18: seems exudative effusion: cultures have been negative: flow cyto as well as cyto is pending:
CT scan reviewed by me: no official report available: But there are bilateral pleural effusion R> L ; seems secondary to CHF : May need thoracentesis on the right side given large sized effusion: check official ct scan report: He has bilateral pleural effusion on ct scan from october 2017 too: will shaun cardiology: His AC needs to be stopped before any procedure can be attempted:  12/16 stable. on Room air
CT scan reviewed by me: no official report available: But there are bilateral pleural effusion R> L ; seems secondary to CHF : May need thoracentesis on the right side given large sized effusion: check official ct scan report: He has bilateral pleural effusion on ct scan from october 2017 too: will shaun cardiology: His AC needs to be stopped before any procedure can be attempted:

## 2017-12-20 NOTE — PROGRESS NOTE ADULT - PROBLEM SELECTOR PLAN 6
Monitor blood glucose:
Monitor blood glucose:  12/16 less than 200 for last 24 hrs
Monitor blood glucose:  12/16 less than 200 for last 24 hrs  12/17 stable.

## 2017-12-20 NOTE — PROGRESS NOTE ADULT - ATTENDING COMMENTS
Yadiel Babin MD,FACC.  [address] [facility].  [facility].  [phone]
Yadiel Babin MD,FACC.  [address] [facility].  [facility].  [phone]
Yadiel Babin MD,FACC.  [address] [facility].  [facility].  [phone]
Yadiel Babin MD,FACC.  [address] [facility].  [facility].  [phone]
12/16: The etiology of the pleural effusion is not clear: he did have pericardial effusion recently, the etiology of which is not known: Would need pleural tap:
12/17: Tapped today: 1.5 L of blood tinged fluid drained: It is an exudative effusion: pleural fluid sent for cytology as well as flow cytometry::  cant see the x-ray : as PACS is not working!!
12/17: Tapped today: 1.5 L of blood tinged fluid drained: It is an exudative effusion: pleural fluid sent for cytology as well as flow cytometry::  cant see the x-ray : as PACS is not working!!  12/18: no ptx after tap: exudative effusion: ? etiology not clear: await flow cyto as well as cyto: may needs vats bx, if no reason is found , would also order for collagen vascular disease work up::
12/17: Tapped today: 1.5 L of blood tinged fluid drained: It is an exudative effusion: pleural fluid sent for cytology as well as flow cytometry::  cant see the x-ray : as PACS is not working!!  12/18: no ptx after tap: exudative effusion: ? etiology not clear: await flow cyto as well as cyto: may needs vats bx, if no reason is found , would also order for collagen vascular disease work up::  12/19: awaited fluid cytology : RF high, check cccp
12/17: Tapped today: 1.5 L of blood tinged fluid drained: It is an exudative effusion: pleural fluid sent for cytology as well as flow cytometry::  cant see the x-ray : as PACS is not working!!  12/18: no ptx after tap: exudative effusion: ? etiology not clear: await flow cyto as well as cyto: may needs vats bx, if no reason is found , would also order for collagen vascular disease work up::  12/19: awaited fluid cytology : RF high, check cccp  12/20: shaun Babin: if cyto is negative: there is no explanation of the bilateral pleural effusion: The effusions are exudative: may need VATS biopsy of the pleura: His RF is high, await CCCP

## 2017-12-21 LAB — CCP AB SER-ACNC: <8 — SIGNIFICANT CHANGE UP

## 2017-12-23 LAB — BACTERIA FLD CULT: SIGNIFICANT CHANGE UP

## 2017-12-27 LAB — NON-GYNECOLOGICAL CYTOLOGY STUDY: SIGNIFICANT CHANGE UP

## 2018-01-05 ENCOUNTER — APPOINTMENT (OUTPATIENT)
Dept: PULMONOLOGY | Facility: CLINIC | Age: 78
End: 2018-01-05

## 2018-01-15 LAB — FUNGUS SPEC QL CULT: SIGNIFICANT CHANGE UP

## 2018-01-22 ENCOUNTER — APPOINTMENT (OUTPATIENT)
Dept: PULMONOLOGY | Facility: CLINIC | Age: 78
End: 2018-01-22
Payer: COMMERCIAL

## 2018-01-22 VITALS
DIASTOLIC BLOOD PRESSURE: 70 MMHG | HEIGHT: 70 IN | RESPIRATION RATE: 18 BRPM | SYSTOLIC BLOOD PRESSURE: 130 MMHG | TEMPERATURE: 98.7 F | OXYGEN SATURATION: 96 % | WEIGHT: 181 LBS | HEART RATE: 92 BPM | BODY MASS INDEX: 25.91 KG/M2

## 2018-01-22 PROCEDURE — 94729 DIFFUSING CAPACITY: CPT

## 2018-01-22 PROCEDURE — 94726 PLETHYSMOGRAPHY LUNG VOLUMES: CPT

## 2018-01-22 PROCEDURE — 99204 OFFICE O/P NEW MOD 45 MIN: CPT | Mod: 25

## 2018-01-22 PROCEDURE — 94060 EVALUATION OF WHEEZING: CPT

## 2018-01-22 PROCEDURE — ZZZZZ: CPT

## 2018-01-22 RX ORDER — GLUCOSAMINE HCL/CHONDROITIN SU 500-400 MG
3 CAPSULE ORAL
Refills: 0 | Status: ACTIVE | COMMUNITY

## 2018-01-22 RX ORDER — HYDRALAZINE HYDROCHLORIDE 50 MG/1
50 TABLET ORAL 3 TIMES DAILY
Refills: 0 | Status: ACTIVE | COMMUNITY

## 2018-01-28 LAB — ACID FAST STN FLD: SIGNIFICANT CHANGE UP

## 2018-02-22 ENCOUNTER — APPOINTMENT (OUTPATIENT)
Dept: PULMONOLOGY | Facility: CLINIC | Age: 78
End: 2018-02-22

## 2018-03-19 ENCOUNTER — APPOINTMENT (OUTPATIENT)
Dept: PULMONOLOGY | Facility: CLINIC | Age: 78
End: 2018-03-19
Payer: COMMERCIAL

## 2018-03-19 VITALS
HEIGHT: 70 IN | TEMPERATURE: 97.4 F | HEART RATE: 91 BPM | BODY MASS INDEX: 26.92 KG/M2 | WEIGHT: 188 LBS | SYSTOLIC BLOOD PRESSURE: 120 MMHG | DIASTOLIC BLOOD PRESSURE: 70 MMHG | RESPIRATION RATE: 18 BRPM

## 2018-03-19 PROCEDURE — 99215 OFFICE O/P EST HI 40 MIN: CPT

## 2018-09-27 PROBLEM — E78.5 HYPERLIPIDEMIA, UNSPECIFIED: Chronic | Status: ACTIVE | Noted: 2017-10-27

## 2018-11-05 NOTE — H&P ADULT - PROBLEM SELECTOR PLAN 4
Cystoscopy  Date/Time: 11/5/2018 5:08 PM  Performed by: Robert Salvador MD  Authorized by: Robert Salvador MD     Consent Done?:  Yes (Written)  Indications: BPH    Position:  Supine  Anesthesia:  Lidocaine jelly  Preparation: Patient was prepped and draped in usual sterile fashion      Scope type:  Flexible cystoscope  External exam normal: Yes    Urethra normal: Yes    Prostate normal: No (No intravesical or obstructing median lobe)          Hyperplasia (Bilobar)Bladder neck normal: Bladder neck normal   Bladder normal: Yes      Patient tolerance:  Patient tolerated the procedure well with no immediate complications     Impression:  BPH with KU    Plan:  Proceed with Urolift       F/U FLP.  Continue Statin.

## 2018-12-03 ENCOUNTER — EMERGENCY (EMERGENCY)
Facility: HOSPITAL | Age: 78
LOS: 1 days | Discharge: HOME | End: 2018-12-03
Attending: EMERGENCY MEDICINE | Admitting: EMERGENCY MEDICINE
Payer: MEDICARE

## 2018-12-03 VITALS
SYSTOLIC BLOOD PRESSURE: 103 MMHG | OXYGEN SATURATION: 100 % | HEART RATE: 70 BPM | DIASTOLIC BLOOD PRESSURE: 50 MMHG | RESPIRATION RATE: 18 BRPM | TEMPERATURE: 98 F

## 2018-12-03 VITALS
TEMPERATURE: 98 F | SYSTOLIC BLOOD PRESSURE: 122 MMHG | HEART RATE: 94 BPM | OXYGEN SATURATION: 100 % | DIASTOLIC BLOOD PRESSURE: 72 MMHG | RESPIRATION RATE: 16 BRPM

## 2018-12-03 DIAGNOSIS — Z98.49 CATARACT EXTRACTION STATUS, UNSPECIFIED EYE: Chronic | ICD-10-CM

## 2018-12-03 LAB
ALBUMIN SERPL ELPH-MCNC: 3.6 G/DL — SIGNIFICANT CHANGE UP (ref 3.3–5)
ALP SERPL-CCNC: 54 U/L — SIGNIFICANT CHANGE UP (ref 40–120)
ALT FLD-CCNC: 9 U/L — SIGNIFICANT CHANGE UP (ref 4–41)
APPEARANCE UR: CLEAR — SIGNIFICANT CHANGE UP
APTT BLD: 30.8 SEC — SIGNIFICANT CHANGE UP (ref 27.5–36.3)
AST SERPL-CCNC: 14 U/L — SIGNIFICANT CHANGE UP (ref 4–40)
BASOPHILS # BLD AUTO: 0.03 K/UL — SIGNIFICANT CHANGE UP (ref 0–0.2)
BASOPHILS NFR BLD AUTO: 0.5 % — SIGNIFICANT CHANGE UP (ref 0–2)
BILIRUB SERPL-MCNC: 0.5 MG/DL — SIGNIFICANT CHANGE UP (ref 0.2–1.2)
BILIRUB UR-MCNC: NEGATIVE — SIGNIFICANT CHANGE UP
BLOOD UR QL VISUAL: NEGATIVE — SIGNIFICANT CHANGE UP
BUN SERPL-MCNC: 19 MG/DL — SIGNIFICANT CHANGE UP (ref 7–23)
CALCIUM SERPL-MCNC: 9.6 MG/DL — SIGNIFICANT CHANGE UP (ref 8.4–10.5)
CHLORIDE SERPL-SCNC: 98 MMOL/L — SIGNIFICANT CHANGE UP (ref 98–107)
CO2 SERPL-SCNC: 26 MMOL/L — SIGNIFICANT CHANGE UP (ref 22–31)
COLOR SPEC: YELLOW — SIGNIFICANT CHANGE UP
CREAT SERPL-MCNC: 1.16 MG/DL — SIGNIFICANT CHANGE UP (ref 0.5–1.3)
EOSINOPHIL # BLD AUTO: 0.08 K/UL — SIGNIFICANT CHANGE UP (ref 0–0.5)
EOSINOPHIL NFR BLD AUTO: 1.3 % — SIGNIFICANT CHANGE UP (ref 0–6)
GLUCOSE SERPL-MCNC: 117 MG/DL — HIGH (ref 70–99)
GLUCOSE UR-MCNC: NEGATIVE — SIGNIFICANT CHANGE UP
HCT VFR BLD CALC: 34 % — LOW (ref 39–50)
HGB BLD-MCNC: 11.3 G/DL — LOW (ref 13–17)
IMM GRANULOCYTES # BLD AUTO: 0.03 # — SIGNIFICANT CHANGE UP
IMM GRANULOCYTES NFR BLD AUTO: 0.5 % — SIGNIFICANT CHANGE UP (ref 0–1.5)
INR BLD: 1.7 — HIGH (ref 0.88–1.17)
KETONES UR-MCNC: NEGATIVE — SIGNIFICANT CHANGE UP
LEUKOCYTE ESTERASE UR-ACNC: NEGATIVE — SIGNIFICANT CHANGE UP
LYMPHOCYTES # BLD AUTO: 1.06 K/UL — SIGNIFICANT CHANGE UP (ref 1–3.3)
LYMPHOCYTES # BLD AUTO: 16.9 % — SIGNIFICANT CHANGE UP (ref 13–44)
MCHC RBC-ENTMCNC: 28.6 PG — SIGNIFICANT CHANGE UP (ref 27–34)
MCHC RBC-ENTMCNC: 33.2 % — SIGNIFICANT CHANGE UP (ref 32–36)
MCV RBC AUTO: 86.1 FL — SIGNIFICANT CHANGE UP (ref 80–100)
MONOCYTES # BLD AUTO: 0.89 K/UL — SIGNIFICANT CHANGE UP (ref 0–0.9)
MONOCYTES NFR BLD AUTO: 14.1 % — HIGH (ref 2–14)
NEUTROPHILS # BLD AUTO: 4.2 K/UL — SIGNIFICANT CHANGE UP (ref 1.8–7.4)
NEUTROPHILS NFR BLD AUTO: 66.7 % — SIGNIFICANT CHANGE UP (ref 43–77)
NITRITE UR-MCNC: NEGATIVE — SIGNIFICANT CHANGE UP
NRBC # FLD: 0 — SIGNIFICANT CHANGE UP
PH UR: 6.5 — SIGNIFICANT CHANGE UP (ref 5–8)
PLATELET # BLD AUTO: 264 K/UL — SIGNIFICANT CHANGE UP (ref 150–400)
PMV BLD: 9.9 FL — SIGNIFICANT CHANGE UP (ref 7–13)
POTASSIUM SERPL-MCNC: 4.5 MMOL/L — SIGNIFICANT CHANGE UP (ref 3.5–5.3)
POTASSIUM SERPL-SCNC: 4.5 MMOL/L — SIGNIFICANT CHANGE UP (ref 3.5–5.3)
PROT SERPL-MCNC: 7.6 G/DL — SIGNIFICANT CHANGE UP (ref 6–8.3)
PROT UR-MCNC: NEGATIVE — SIGNIFICANT CHANGE UP
PROTHROM AB SERPL-ACNC: 19.2 SEC — HIGH (ref 9.8–13.1)
RBC # BLD: 3.95 M/UL — LOW (ref 4.2–5.8)
RBC # FLD: 13.3 % — SIGNIFICANT CHANGE UP (ref 10.3–14.5)
SODIUM SERPL-SCNC: 137 MMOL/L — SIGNIFICANT CHANGE UP (ref 135–145)
SP GR SPEC: 1.01 — SIGNIFICANT CHANGE UP (ref 1–1.04)
UROBILINOGEN FLD QL: SIGNIFICANT CHANGE UP
WBC # BLD: 6.29 K/UL — SIGNIFICANT CHANGE UP (ref 3.8–10.5)
WBC # FLD AUTO: 6.29 K/UL — SIGNIFICANT CHANGE UP (ref 3.8–10.5)

## 2018-12-03 PROCEDURE — 99284 EMERGENCY DEPT VISIT MOD MDM: CPT

## 2018-12-03 RX ORDER — SODIUM CHLORIDE 9 MG/ML
1000 INJECTION INTRAMUSCULAR; INTRAVENOUS; SUBCUTANEOUS ONCE
Qty: 0 | Refills: 0 | Status: COMPLETED | OUTPATIENT
Start: 2018-12-03 | End: 2018-12-03

## 2018-12-03 RX ADMIN — SODIUM CHLORIDE 2000 MILLILITER(S): 9 INJECTION INTRAMUSCULAR; INTRAVENOUS; SUBCUTANEOUS at 22:39

## 2018-12-03 NOTE — ED PROVIDER NOTE - GASTROINTESTINAL NEGATIVE STATEMENT, MLM
+ intermittent abdominal pain, no bloating, no constipation, no diarrhea, no nausea and no vomiting.

## 2018-12-03 NOTE — ED PROVIDER NOTE - PROGRESS NOTE DETAILS
Remains stable, HR increases upon standing however orthorstatics negative. Labs grossly wnl. CE pending however pt without chest pain.

## 2018-12-03 NOTE — ED PROVIDER NOTE - CARE PLAN
Principal Discharge DX:	Weakness Principal Discharge DX:	Weakness  Assessment and plan of treatment:	F/u outpatient

## 2018-12-03 NOTE — ED PROVIDER NOTE - NSFOLLOWUPINSTRUCTIONS_ED_ALL_ED_FT
Please follow up with your doctor within the next week for your dizziness. Please come back to the ED if you have chest pain, if you fall, or if you are particularly concerned about your health.

## 2018-12-03 NOTE — ED ADULT NURSE NOTE - NSIMPLEMENTINTERV_GEN_ALL_ED
Implemented All Fall Risk Interventions:  [place] to call system. Call bell, personal items and telephone within reach. Instruct patient to call for assistance. Room bathroom lighting operational. Non-slip footwear when patient is off stretcher. Physically safe environment: no spills, clutter or unnecessary equipment. Stretcher in lowest position, wheels locked, appropriate side rails in place. Provide visual cue, wrist band, yellow gown, etc. Monitor gait and stability. Monitor for mental status changes and reorient to person, place, and time. Review medications for side effects contributing to fall risk. Reinforce activity limits and safety measures with patient and family.

## 2018-12-03 NOTE — ED ADULT TRIAGE NOTE - CHIEF COMPLAINT QUOTE
Pt. c/o generalized weakness, dizziness and RAMACHANDRAN x 1 week. Difficulty walking long distances. Respirations unlabored. Denies cp, sob or LE edema. PMhx: pericardial effusion last year, afib, BPH, HTN, DM

## 2018-12-03 NOTE — ED PROVIDER NOTE - OBJECTIVE STATEMENT
78M PMH AF (on eliquis), pericardial effusion and pericarditis s/p window, polyclonal gammopathy, BPH, HTN, T2DM p/w 78M PMH AF (on eliquis), pericardial effusion and pericarditis s/p window, polyclonal gammopathy, BPH, HTN, T2DM p/w dizziness reported as a room spinning sensation upon standing quickly x 1wk. The pt reports that the room spinning sensation only lasts a short while before it improves and is always associated with him 78M PMH AF (on eliquis), pericardial effusion and pericarditis s/p window, polyclonal gammopathy, BPH, HTN, T2DM p/w RAMACHANDRAN, dizziness reported as a room spinning sensation upon standing quickly x 1wk. The pt reports that the room spinning sensation only lasts a short while before it improves and is always associated with him standing up. He also reports having abdominal pain that lasts 15min occurring 2x/day for the last 2 weeks, the pain is improved with bowel movement and is attributed to constipation. He also reports feeling SOB w/ walking about 1/2 block, previously he was able to walk 2 miles. He denies orthopnea, fevers, chills, N/V/D, chest pain, tinnitus, +travel 6mo to [place] and [place].    Medications include ramipril, eplerenone, doxazosin, finasteride, coreg, hydralazine, pravastatin, januvia, metformin, glipizide.

## 2018-12-03 NOTE — ED PROVIDER NOTE - MEDICAL DECISION MAKING DETAILS
78M w/ hx pericardial effusion s/p window, BPH, p/w vertigo, and RAMACHANDRAN. EKG wnl. Labs, CXR. Ddx includes medication induced, ADHF. 78M w/ hx pericardial effusion s/p window, BPH, p/w vertigo, and RAMACHANDRAN. EKG wnl. Labs, CXR. DDx includes medication induced, dehydration, low suspicion for ACS.

## 2018-12-03 NOTE — ED PROVIDER NOTE - CARE PROVIDER_API CALL
Tracy Benjamin), Internal Medicine  [address] [place] [address]  Phone: [phone]  Fax: [phone]

## 2018-12-03 NOTE — ED ADULT NURSE NOTE - OBJECTIVE STATEMENT
Received pt in spot 24. [id number]. C/o dizziness when ambulating x 1 week with RAMACHANDRAN. Denies chest pain or headache. Denies N/V/D. Endorses intermittent left arm and leg cramping as well as weakness. VS as noted. Afib 70s-100s on cardiac monitor. MD tejada in progress. Will continue to monitor.

## 2018-12-03 NOTE — ED PROVIDER NOTE - ATTENDING CONTRIBUTION TO CARE
DR. GARNER, ATTENDING MD-  I performed a face to face bedside interview with patient regarding history of present illness, review of symptoms and past medical history. I completed an independent physical exam.  I have discussed patient's plan of care with the resident.   Documentation as above in the note.    Alvaro: history as documented above.  c/o weakness upon standing over last several days.  No associated chest pain/dyspnea/palpitations.  No N/V/D.  No F/C.  No other complaints. On exam.  well appearing, neuro: fluent speech, cn 2-12 intact, 5/5 strength throughout, normal sens throughout, normal ftn.  Lungs clear, heart rrr, abd soft nt, ext: no kaur    A/P orthostatic weakness/dizziness.  unclear etiology.  no reason he would be dehydrated by history.  no CP or orthopnea suggestive of cardiac etiology, and EKG urnemarkable except afib (chronic).  Will check basic labs, incl UA, give IVFs, check orthostatic vitals, re-assess

## 2018-12-05 LAB
BACTERIA UR CULT: SIGNIFICANT CHANGE UP
SPECIMEN SOURCE: SIGNIFICANT CHANGE UP

## 2018-12-10 ENCOUNTER — APPOINTMENT (OUTPATIENT)
Dept: UROLOGY | Facility: CLINIC | Age: 78
End: 2018-12-10
Payer: COMMERCIAL

## 2018-12-10 VITALS
BODY MASS INDEX: 26.92 KG/M2 | SYSTOLIC BLOOD PRESSURE: 135 MMHG | HEIGHT: 70 IN | RESPIRATION RATE: 18 BRPM | WEIGHT: 188 LBS | TEMPERATURE: 98 F | DIASTOLIC BLOOD PRESSURE: 76 MMHG | HEART RATE: 96 BPM

## 2018-12-10 PROCEDURE — 99214 OFFICE O/P EST MOD 30 MIN: CPT

## 2018-12-12 ENCOUNTER — NON-APPOINTMENT (OUTPATIENT)
Age: 78
End: 2018-12-12

## 2018-12-12 ENCOUNTER — APPOINTMENT (OUTPATIENT)
Dept: CARDIOLOGY | Facility: CLINIC | Age: 78
End: 2018-12-12
Payer: MEDICARE

## 2018-12-12 VITALS
HEIGHT: 70 IN | HEART RATE: 85 BPM | DIASTOLIC BLOOD PRESSURE: 73 MMHG | WEIGHT: 183 LBS | SYSTOLIC BLOOD PRESSURE: 124 MMHG | BODY MASS INDEX: 26.2 KG/M2 | OXYGEN SATURATION: 96 %

## 2018-12-12 DIAGNOSIS — R53.1 WEAKNESS: ICD-10-CM

## 2018-12-12 PROCEDURE — 99204 OFFICE O/P NEW MOD 45 MIN: CPT

## 2018-12-12 PROCEDURE — 93000 ELECTROCARDIOGRAM COMPLETE: CPT

## 2018-12-12 RX ORDER — POTASSIUM 75 MG
TABLET ORAL
Refills: 0 | Status: DISCONTINUED | COMMUNITY
End: 2018-12-12

## 2018-12-12 NOTE — PHYSICAL EXAM
[General Appearance - Well Developed] : well developed [Normal Appearance] : normal appearance [Well Groomed] : well groomed [General Appearance - Well Nourished] : well nourished [No Deformities] : no deformities [General Appearance - In No Acute Distress] : no acute distress [Normal Conjunctiva] : the conjunctiva exhibited no abnormalities [Eyelids - No Xanthelasma] : the eyelids demonstrated no xanthelasmas [Normal Oral Mucosa] : normal oral mucosa [No Oral Pallor] : no oral pallor [No Oral Cyanosis] : no oral cyanosis [Normal Jugular Venous A Waves Present] : normal jugular venous A waves present [Normal Jugular Venous V Waves Present] : normal jugular venous V waves present [No Jugular Venous Burns A Waves] : no jugular venous burns A waves [FreeTextEntry1] : irreg irreg [Respiration, Rhythm And Depth] : normal respiratory rhythm and effort [Exaggerated Use Of Accessory Muscles For Inspiration] : no accessory muscle use [Auscultation Breath Sounds / Voice Sounds] : lungs were clear to auscultation bilaterally [Abdomen Soft] : soft [Abdomen Tenderness] : non-tender [Abdomen Mass (___ Cm)] : no abdominal mass palpated [Abnormal Walk] : normal gait [Gait - Sufficient For Exercise Testing] : the gait was sufficient for exercise testing [Nail Clubbing] : no clubbing of the fingernails [Cyanosis, Localized] : no localized cyanosis [Petechial Hemorrhages (___cm)] : no petechial hemorrhages [] : no ischemic changes

## 2018-12-12 NOTE — HISTORY OF PRESENT ILLNESS
[FreeTextEntry1] : 78 year old with a history of atrial fibrillation since last year on xarelto who comes in for evaluation.  No KEV, PND or orthopnea.  has not been walking more than 1 block.  No chest pain.

## 2018-12-18 LAB
ALBUMIN SERPL ELPH-MCNC: 4 G/DL
ALP BLD-CCNC: 58 U/L
ALT SERPL-CCNC: 17 U/L
ANION GAP SERPL CALC-SCNC: 10 MMOL/L
AST SERPL-CCNC: 19 U/L
BASOPHILS # BLD AUTO: 0.02 K/UL
BASOPHILS NFR BLD AUTO: 0.5 %
BILIRUB SERPL-MCNC: 0.4 MG/DL
BUN SERPL-MCNC: 13 MG/DL
CALCIUM SERPL-MCNC: 9.8 MG/DL
CHLORIDE SERPL-SCNC: 106 MMOL/L
CO2 SERPL-SCNC: 27 MMOL/L
CREAT SERPL-MCNC: 1.09 MG/DL
EOSINOPHIL # BLD AUTO: 0.02 K/UL
EOSINOPHIL NFR BLD AUTO: 0.5 %
GLUCOSE SERPL-MCNC: 153 MG/DL
HCT VFR BLD CALC: 32.3 %
HGB BLD-MCNC: 10.7 G/DL
IMM GRANULOCYTES NFR BLD AUTO: 0.5 %
LYMPHOCYTES # BLD AUTO: 1.08 K/UL
LYMPHOCYTES NFR BLD AUTO: 27.4 %
MAN DIFF?: NORMAL
MCHC RBC-ENTMCNC: 27.6 PG
MCHC RBC-ENTMCNC: 33.1 GM/DL
MCV RBC AUTO: 83.5 FL
MONOCYTES # BLD AUTO: 0.3 K/UL
MONOCYTES NFR BLD AUTO: 7.6 %
NEUTROPHILS # BLD AUTO: 2.5 K/UL
NEUTROPHILS NFR BLD AUTO: 63.5 %
PLATELET # BLD AUTO: 386 K/UL
POTASSIUM SERPL-SCNC: 5.2 MMOL/L
PROT SERPL-MCNC: 7 G/DL
PSA SERPL-MCNC: 2.45 NG/ML
RBC # BLD: 3.87 M/UL
RBC # FLD: 14.4 %
SODIUM SERPL-SCNC: 143 MMOL/L
TESTOST SERPL-MCNC: 342.8 NG/DL
WBC # FLD AUTO: 3.94 K/UL

## 2018-12-27 ENCOUNTER — APPOINTMENT (OUTPATIENT)
Dept: PULMONOLOGY | Facility: CLINIC | Age: 78
End: 2018-12-27
Payer: COMMERCIAL

## 2018-12-27 VITALS
SYSTOLIC BLOOD PRESSURE: 118 MMHG | OXYGEN SATURATION: 95 % | DIASTOLIC BLOOD PRESSURE: 73 MMHG | BODY MASS INDEX: 26.48 KG/M2 | HEART RATE: 77 BPM | RESPIRATION RATE: 15 BRPM | TEMPERATURE: 97.5 F | HEIGHT: 70 IN | WEIGHT: 185 LBS

## 2018-12-27 DIAGNOSIS — Z87.09 PERSONAL HISTORY OF OTHER DISEASES OF THE RESPIRATORY SYSTEM: ICD-10-CM

## 2018-12-27 PROCEDURE — 99214 OFFICE O/P EST MOD 30 MIN: CPT

## 2018-12-28 ENCOUNTER — APPOINTMENT (OUTPATIENT)
Dept: CV DIAGNOSITCS | Facility: HOSPITAL | Age: 78
End: 2018-12-28

## 2018-12-28 ENCOUNTER — APPOINTMENT (OUTPATIENT)
Dept: CV DIAGNOSTICS | Facility: HOSPITAL | Age: 78
End: 2018-12-28

## 2019-01-22 NOTE — BRIEF OPERATIVE NOTE - PROCEDURE
<<-----Click on this checkbox to enter Procedure Pericardial window operation  10/30/2017  Subxiphoid pericardial window, with drainage of approx 400 cc of serous-sanginous fluid, placement of 24F jatin drainage catheter  Active  MALEXIS6 pt with hyponatremia likely sec to CHF  Serum sodium relatively stable   TSH, Cortisol WNL  on lasix 20mg QD, still hypervolemic, consider increase lasix if bp can tolerate. started on midodrine 1/17  Monitor serum sodium.  Free water restriction 1L/day

## 2019-02-18 NOTE — ED PROVIDER NOTE - PMH
Abdomen soft, nontender, nondistended, bowel sounds present in all 4 quadrants. Afib    BPH (benign prostatic hyperplasia)    Hernia, inguinal, right    HTN (hypertension)    Hyperlipidemia    T2DM (type 2 diabetes mellitus)  > 20 yrs

## 2019-04-17 ENCOUNTER — NON-APPOINTMENT (OUTPATIENT)
Age: 79
End: 2019-04-17

## 2019-04-17 ENCOUNTER — APPOINTMENT (OUTPATIENT)
Dept: CARDIOLOGY | Facility: CLINIC | Age: 79
End: 2019-04-17
Payer: MEDICARE

## 2019-04-17 VITALS
BODY MASS INDEX: 26.69 KG/M2 | WEIGHT: 186 LBS | DIASTOLIC BLOOD PRESSURE: 76 MMHG | SYSTOLIC BLOOD PRESSURE: 128 MMHG | OXYGEN SATURATION: 98 % | HEART RATE: 69 BPM

## 2019-04-17 PROCEDURE — 93000 ELECTROCARDIOGRAM COMPLETE: CPT

## 2019-04-17 PROCEDURE — 99214 OFFICE O/P EST MOD 30 MIN: CPT

## 2019-04-17 NOTE — PHYSICAL EXAM
[General Appearance - Well Developed] : well developed [Normal Appearance] : normal appearance [Well Groomed] : well groomed [General Appearance - Well Nourished] : well nourished [No Deformities] : no deformities [General Appearance - In No Acute Distress] : no acute distress [Normal Conjunctiva] : the conjunctiva exhibited no abnormalities [Normal Oral Mucosa] : normal oral mucosa [Eyelids - No Xanthelasma] : the eyelids demonstrated no xanthelasmas [No Oral Pallor] : no oral pallor [No Oral Cyanosis] : no oral cyanosis [Normal Jugular Venous V Waves Present] : normal jugular venous V waves present [Normal Jugular Venous A Waves Present] : normal jugular venous A waves present [No Jugular Venous Burns A Waves] : no jugular venous burns A waves [Respiration, Rhythm And Depth] : normal respiratory rhythm and effort [Exaggerated Use Of Accessory Muscles For Inspiration] : no accessory muscle use [Auscultation Breath Sounds / Voice Sounds] : lungs were clear to auscultation bilaterally [Abdomen Soft] : soft [Abdomen Tenderness] : non-tender [Abdomen Mass (___ Cm)] : no abdominal mass palpated [Gait - Sufficient For Exercise Testing] : the gait was sufficient for exercise testing [Abnormal Walk] : normal gait [Nail Clubbing] : no clubbing of the fingernails [Cyanosis, Localized] : no localized cyanosis [] : no ischemic changes [Petechial Hemorrhages (___cm)] : no petechial hemorrhages [FreeTextEntry1] : irreg irreg

## 2019-04-17 NOTE — HISTORY OF PRESENT ILLNESS
[FreeTextEntry1] : 78 year old with a history of atrial fibrillation since last year on xarelto who comes in for evaluation.  No KEV, PND or orthopnea.  has not been walking more than 1 block.  No chest pain.  Did a trip without any issues.

## 2019-04-24 ENCOUNTER — OUTPATIENT (OUTPATIENT)
Dept: OUTPATIENT SERVICES | Facility: HOSPITAL | Age: 79
LOS: 1 days | End: 2019-04-24
Payer: MEDICARE

## 2019-04-24 ENCOUNTER — APPOINTMENT (OUTPATIENT)
Dept: CV DIAGNOSTICS | Facility: HOSPITAL | Age: 79
End: 2019-04-24

## 2019-04-24 ENCOUNTER — APPOINTMENT (OUTPATIENT)
Dept: CV DIAGNOSITCS | Facility: HOSPITAL | Age: 79
End: 2019-04-24

## 2019-04-24 DIAGNOSIS — I25.10 ATHEROSCLEROTIC HEART DISEASE OF NATIVE CORONARY ARTERY WITHOUT ANGINA PECTORIS: ICD-10-CM

## 2019-04-24 DIAGNOSIS — Z98.49 CATARACT EXTRACTION STATUS, UNSPECIFIED EYE: Chronic | ICD-10-CM

## 2019-04-24 PROCEDURE — 78452 HT MUSCLE IMAGE SPECT MULT: CPT

## 2019-04-24 PROCEDURE — 78452 HT MUSCLE IMAGE SPECT MULT: CPT | Mod: 26

## 2019-04-24 PROCEDURE — 93018 CV STRESS TEST I&R ONLY: CPT

## 2019-04-24 PROCEDURE — 93017 CV STRESS TEST TRACING ONLY: CPT

## 2019-04-24 PROCEDURE — 93016 CV STRESS TEST SUPVJ ONLY: CPT

## 2019-04-24 PROCEDURE — 99203 OFFICE O/P NEW LOW 30 MIN: CPT

## 2019-04-24 PROCEDURE — A9500: CPT

## 2019-04-24 PROCEDURE — A9505: CPT

## 2019-05-07 ENCOUNTER — APPOINTMENT (OUTPATIENT)
Dept: PULMONOLOGY | Facility: CLINIC | Age: 79
End: 2019-05-07
Payer: MEDICARE

## 2019-05-07 VITALS
RESPIRATION RATE: 16 BRPM | SYSTOLIC BLOOD PRESSURE: 107 MMHG | HEART RATE: 81 BPM | WEIGHT: 188 LBS | HEIGHT: 70 IN | BODY MASS INDEX: 26.92 KG/M2 | TEMPERATURE: 98.1 F | DIASTOLIC BLOOD PRESSURE: 67 MMHG

## 2019-05-07 PROCEDURE — 99214 OFFICE O/P EST MOD 30 MIN: CPT

## 2019-05-08 NOTE — PHYSICAL EXAM
[General Appearance - Well Developed] : well developed [Normal Appearance] : normal appearance [Well Groomed] : well groomed [General Appearance - Well Nourished] : well nourished [No Deformities] : no deformities [General Appearance - In No Acute Distress] : no acute distress [Normal Conjunctiva] : the conjunctiva exhibited no abnormalities [Neck Appearance] : the appearance of the neck was normal [Neck Cervical Mass (___cm)] : no neck mass was observed [Jugular Venous Distention Increased] : there was no jugular-venous distention [Heart Rate And Rhythm] : heart rate and rhythm were normal [Heart Sounds] : normal S1 and S2 [Edema] : no peripheral edema present [Arterial Pulses Normal] : the arterial pulses were normal [Respiration, Rhythm And Depth] : normal respiratory rhythm and effort [Exaggerated Use Of Accessory Muscles For Inspiration] : no accessory muscle use [Auscultation Breath Sounds / Voice Sounds] : lungs were clear to auscultation bilaterally [Bowel Sounds] : normal bowel sounds [Abdomen Tenderness] : non-tender [Abdomen Soft] : soft [Abnormal Walk] : normal gait [Nail Clubbing] : no clubbing of the fingernails [Cyanosis, Localized] : no localized cyanosis [Skin Color & Pigmentation] : normal skin color and pigmentation [Skin Turgor] : normal skin turgor [Motor Exam] : the motor exam was normal [] : no rash [No Focal Deficits] : no focal deficits [Oriented To Time, Place, And Person] : oriented to person, place, and time [Impaired Insight] : insight and judgment were intact [Affect] : the affect was normal [Mood] : the mood was normal [Erythema] : no erythema of the pharynx [FreeTextEntry1] : No crackles, wheeze, or rhonchi

## 2019-05-08 NOTE — ASSESSMENT
[FreeTextEntry1] : 78M HTN, DM2, Afib on AC, NICM, prior history of pericardial effusion and pleural effusions presents for routine pulmonary followup. He has been adherent to his diuretic therapy and has not had any evidence of respiratory distress.\par 1. Pleural effusion - by physical exam Mr. Gonzales has excellent air entry without significant crackles or decreased breath sounds. In addition, his LE edema has resolved. He should continue on his diuretic therapy. He denies any respiratory symptoms and therefore I do not think he requires any further pulmonary imaging at this time. If his respiratory status changes he will require imaging and then discussion regarding increased diuresis vs thoracentesis at that time.\par \par 2. NICM - He will continue his current cardiac medications including diuretics. He has been urinating well. He will continue to monitor his weight. He is awaiting a repeat echo. He had a recent stress test and will followup with Dr. Oconnor.\par \par 3. Dyspnea - has resolved\par 4. Followup visit in 4-6 months or sooner if patient develops respiratory symptoms.Discussed with patient and wife at length.

## 2019-05-08 NOTE — HISTORY OF PRESENT ILLNESS
[Stable] : are stable [None] : ~He/She~ has no significant interval events [Difficulty Breathing During Exertion] : denies dyspnea on exertion [Feelings Of Weakness On Exertion] : denies exercise intolerance [Cough] : denies coughing [Wheezing] : denies wheezing [Regional Soft Tissue Swelling Both Lower Extremities] : resolved lower extremity edema [Chest Pain Or Discomfort] : denies chest pain [Fever] : denies fever [0  -  Nothing at all] : 0, nothing at all [Date: ___] : was performed [unfilled] [FreeTextEntry1] : 78M HTN, HLD, DM2, Afib/Aflutter on AC, NICM, pericardial effusion s/p window (10/2017) and bilateral pleural effusions s/p thoracentesis (12/2017). Since that time he has been managed on diuretics and has not had any respiratory distress. He continues to breathe comfortably. He denies any chest pains or palpitations. He denies fevers or chills. He was recently seen by Dr. Oconnor of cardiology and was noted to be back in sinus rhythm.\par \par His weight has been stable since his last visit with me ( 188--> 185 --> 188). He states adherence to his diuretic therapy. He has had improvement in his orthopnea and his LE edema has resolved. He presents today for followup visit in the setting of prior pleural effusion. His prior pleural effusion was exudative with negative cytology. He does not have any signs of respiratory distress or orthopnea. He is breathing comfortably without any accessory muscle use.\par \par He denies fevers, chills, or sick contacts. He denies nausea, vomiting, or abdominal pain. He denies any other changes to his health. He lives at home with his wife, children, and grandchildren.  [Wt Gain ___ Lbs] : no recent weight gain [Oxygen] : the patient uses no supplemental oxygen [de-identified] : Moderate restriction with moderate reduction in DLCO

## 2019-05-08 NOTE — REVIEW OF SYSTEMS
[Palpitations] : palpitations [Nocturia] : nocturia [Diabetes] : diabetes mellitus [Negative] : HEENT [Cough] : no cough [Sputum] : not coughing up ~M sputum [Hemoptysis] : no hemoptysis [Dyspnea] : no dyspnea [Chest Tightness] : no chest tightness [Pleuritic Pain] : no pleuritic pain [Frequent URIs] : no frequent upper respiratory infections [Hypotension] : no hypotension [Wheezing] : no wheezing [Chest Discomfort] : no chest discomfort [PND] : no PND [Orthopnea] : no orthopnea [Dysrhythmia] : no dysrhythmia [Murmurs] : no murmurs were heard [Edema] : ~T edema was not present [Claudication] : no intermittent claudication [Leg Cramps] : no leg cramps [Hay Fever] : no hay fever [Itchy Eyes] : no itching of ~T the eyes [Nasal Discharge] : no nasal discharge [Watery Eyes] : no discharge from the eyes [Hives] : no urticaria was observed [Angioedema] : no angioedema [Dysphagia] : no dysphagia [Nausea] : no nausea [Vomiting] : no vomiting [Constipation] : no constipation [Diarrhea] : no diarrhea [Food Intolerance] : no ~T food intolerance [Dysuria] : no dysuria [Back Pain] : ~T no back pain [Myalgias] : no myalgias [Arthralgias] : no arthralgias [Raynaud] : no Raynaud's phenomenon was observed [Scleroderma] : no scleroderma [Rash] : no [unfilled] rash [Itch] : no itching [Easy Bruising] : no ~M tendency for easy bruising [Headache] : no headache [Dizziness] : no dizziness [Syncope] : no fainting [Focal Weakness] : no focal weakness [Numbness] : no numbness [Paralysis] : no paralysis was seen [Depression] : no depression [Anxiety] : no anxiety [HIV] : no HIV infection [DVT] : no DVT [Pulmonary Embolism] : no pulmonary embolism [Snoring] : no snoring [Witnessed Apneas] : demonstrated no ~M apnea [Nonrestorative Sleep] : restorative sleep [Awakes With Dry Mouth] : awakes without dry mouth [Awakes With Headache] : awakes without a headache

## 2019-05-22 ENCOUNTER — OUTPATIENT (OUTPATIENT)
Dept: OUTPATIENT SERVICES | Facility: HOSPITAL | Age: 79
LOS: 1 days | End: 2019-05-22
Payer: MEDICARE

## 2019-05-22 VITALS
SYSTOLIC BLOOD PRESSURE: 172 MMHG | DIASTOLIC BLOOD PRESSURE: 91 MMHG | OXYGEN SATURATION: 99 % | RESPIRATION RATE: 16 BRPM | HEART RATE: 66 BPM | TEMPERATURE: 98 F | WEIGHT: 186.07 LBS | HEIGHT: 70 IN

## 2019-05-22 DIAGNOSIS — Z98.49 CATARACT EXTRACTION STATUS, UNSPECIFIED EYE: Chronic | ICD-10-CM

## 2019-05-22 DIAGNOSIS — I48.91 UNSPECIFIED ATRIAL FIBRILLATION: ICD-10-CM

## 2019-05-22 LAB
ANION GAP SERPL CALC-SCNC: 8 MMOL/L — SIGNIFICANT CHANGE UP (ref 5–17)
BUN SERPL-MCNC: 10 MG/DL — SIGNIFICANT CHANGE UP (ref 7–23)
CALCIUM SERPL-MCNC: 9.5 MG/DL — SIGNIFICANT CHANGE UP (ref 8.4–10.5)
CHLORIDE SERPL-SCNC: 104 MMOL/L — SIGNIFICANT CHANGE UP (ref 96–108)
CO2 SERPL-SCNC: 27 MMOL/L — SIGNIFICANT CHANGE UP (ref 22–31)
CREAT SERPL-MCNC: 1.08 MG/DL — SIGNIFICANT CHANGE UP (ref 0.5–1.3)
GLUCOSE SERPL-MCNC: 147 MG/DL — HIGH (ref 70–99)
HCT VFR BLD CALC: 40.5 % — SIGNIFICANT CHANGE UP (ref 39–50)
HGB BLD-MCNC: 13.3 G/DL — SIGNIFICANT CHANGE UP (ref 13–17)
MCHC RBC-ENTMCNC: 28.5 PG — SIGNIFICANT CHANGE UP (ref 27–34)
MCHC RBC-ENTMCNC: 32.9 GM/DL — SIGNIFICANT CHANGE UP (ref 32–36)
MCV RBC AUTO: 86.5 FL — SIGNIFICANT CHANGE UP (ref 80–100)
PLATELET # BLD AUTO: 171 K/UL — SIGNIFICANT CHANGE UP (ref 150–400)
POTASSIUM SERPL-MCNC: 4.5 MMOL/L — SIGNIFICANT CHANGE UP (ref 3.5–5.3)
POTASSIUM SERPL-SCNC: 4.5 MMOL/L — SIGNIFICANT CHANGE UP (ref 3.5–5.3)
RBC # BLD: 4.68 M/UL — SIGNIFICANT CHANGE UP (ref 4.2–5.8)
RBC # FLD: 13.6 % — SIGNIFICANT CHANGE UP (ref 10.3–14.5)
SODIUM SERPL-SCNC: 139 MMOL/L — SIGNIFICANT CHANGE UP (ref 135–145)
WBC # BLD: 4.3 K/UL — SIGNIFICANT CHANGE UP (ref 3.8–10.5)
WBC # FLD AUTO: 4.3 K/UL — SIGNIFICANT CHANGE UP (ref 3.8–10.5)

## 2019-05-22 PROCEDURE — 93005 ELECTROCARDIOGRAM TRACING: CPT

## 2019-05-22 PROCEDURE — C1887: CPT

## 2019-05-22 PROCEDURE — C1894: CPT

## 2019-05-22 PROCEDURE — 93010 ELECTROCARDIOGRAM REPORT: CPT

## 2019-05-22 PROCEDURE — 99152 MOD SED SAME PHYS/QHP 5/>YRS: CPT

## 2019-05-22 PROCEDURE — 99152 MOD SED SAME PHYS/QHP 5/>YRS: CPT | Mod: GC

## 2019-05-22 PROCEDURE — 80048 BASIC METABOLIC PNL TOTAL CA: CPT

## 2019-05-22 PROCEDURE — 93458 L HRT ARTERY/VENTRICLE ANGIO: CPT | Mod: 26,GC

## 2019-05-22 PROCEDURE — 93458 L HRT ARTERY/VENTRICLE ANGIO: CPT

## 2019-05-22 PROCEDURE — C1769: CPT

## 2019-05-22 PROCEDURE — 85027 COMPLETE CBC AUTOMATED: CPT

## 2019-05-22 NOTE — H&P CARDIOLOGY - HISTORY OF PRESENT ILLNESS
78 year old [place] male with pmhx of Afib on Eliquis (last taken 5/19/19), pericardial effusion s/p pericardial window (2017), NICM, bilateral pleural effusion s/p thoracocentesis (2017), Type 2 DM, HTN, HLD and BPH presents with shortness of breath on more than moderate exertion. He states he does experience shortness of breath upon walking up 1 to 2 flights of stairs, although he admits he does not normally climb too many stairs. 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 He currently denies chest pain, orthopnea or dizziness.

## 2019-06-10 ENCOUNTER — APPOINTMENT (OUTPATIENT)
Dept: UROLOGY | Facility: CLINIC | Age: 79
End: 2019-06-10
Payer: MEDICARE

## 2019-06-10 PROCEDURE — 99214 OFFICE O/P EST MOD 30 MIN: CPT

## 2019-06-10 NOTE — REVIEW OF SYSTEMS
[Incontinence] : incontinence [Feeling Poorly] : not feeling poorly [Constipation] : no constipation [Chest Pain] : no chest pain [Diarrhea] : no diarrhea [Dysuria] : no dysuria

## 2019-06-10 NOTE — PHYSICAL EXAM
[General Appearance - Well Developed] : well developed [General Appearance - Well Nourished] : well nourished [Normal Appearance] : normal appearance [Well Groomed] : well groomed [General Appearance - In No Acute Distress] : no acute distress [Abdomen Soft] : soft [Abdomen Tenderness] : non-tender [Costovertebral Angle Tenderness] : no ~M costovertebral angle tenderness [Skin Color & Pigmentation] : normal skin color and pigmentation [Edema] : no peripheral edema [Heart Rate And Rhythm] : Heart rate and rhythm were normal [] : no respiratory distress [Exaggerated Use Of Accessory Muscles For Inspiration] : no accessory muscle use [Oriented To Time, Place, And Person] : oriented to person, place, and time [Mood] : the mood was normal [Affect] : the affect was normal [Not Anxious] : not anxious [Normal Station and Gait] : the gait and station were normal for the patient's age [No Focal Deficits] : no focal deficits [Cervical Lymph Nodes Enlarged Posterior Bilaterally] : posterior cervical [Cervical Lymph Nodes Enlarged Anterior Bilaterally] : anterior cervical [Supraclavicular Lymph Nodes Enlarged Bilaterally] : supraclavicular [FreeTextEntry1] : hand  5/5 bilaterally. Smile Symmetric. Tongue is Midline.\par  \par

## 2019-06-10 NOTE — ASSESSMENT
[FreeTextEntry1] : Mr Gonzales is a 78 year old man followed for hydrocele, past abnormal urine cytology, and BPH. The patient had a hydrocelectomy in October of 2014. Fluid cytology 06/08/16 was abnormal. Fluid cytology 12/07/16 and 12/19/16 were negative for malignant cells. The patient takes finasteride 5 mg, oxybutynin, ER 10 mg, and doxazosin 8 mg once daily. Cystoscopy 12/19/16 found 2+ trabeculation of the bladder. The prostate protruded moderately into the bladder. No bladder stones or tumors. The left and right ureteral orifice visualized and were very close to prostate. I advised him to discontinue taking oxybutynin. On 11/10/17 the patient made 2500 cc of urine. The patient will now begin taking tamsulosin HCl 0.4 MG  one capsule one half hour after breakfast and doxazosin at night. He was able to urinate well over night without any pain. \par 12/10/18: The patient returned today and reported that his urination is satisfactory. Currently takes Finasteride, Tamsulosin and Doxazosin. Complains of intermittent stream and urge incontinence. Denies dysuria and gross hematuria.\par  \par 6/10/19: Patient presents for 6 month follow up. Currently on Proscar and Doxazosin. States that he has no need to strain to initiate urination but flow is intermittent. Reports bothersome urinary urgency/frequency with occasional UUI. Nocturia. Regarding his anemia it is reported that it was evaluated by his PCP and he was told that it will be monitored but is nothing to worry about at this time. \par \par Blood work today included comprehensive metabolic panel, CBC, PSA and testosterone. \par \par A PVR was completed and 67 mL still remain after urination. \par \par It is my opinion that a bladder pressure test be completed.  \par \par The patient is to follow up for the bladder pressure test as scheduled.

## 2019-06-10 NOTE — ADDENDUM
[FreeTextEntry1] : This note was authored by Arjun Frausto working as scribe for Dr. Chris Sibley. I, Dr. Chris Sibley, have reviewed the content of this note and confirm it is true and accurate. I personally performed the history and physical examination and made all the decisions.\par 6/10/19

## 2019-06-10 NOTE — HISTORY OF PRESENT ILLNESS
[FreeTextEntry1] : Mr Gonzales is a 78 year old man followed for hydrocele, past abnormal urine cytology, and BPH. The patient had a hydrocelectomy in October of 2014. Fluid cytology 06/08/16 was abnormal. Fluid cytology 12/07/16 and 12/19/16 were negative for malignant cells. The patient takes finasteride 5 mg, oxybutynin, ER 10 mg, and doxazosin 8 mg once daily. Cystoscopy 12/19/16 found 2+ trabeculation of the bladder. The prostate protruded moderately into the bladder. No bladder stones or tumors. The left and right ureteral orifice visualized and were very close to prostate. I advised him to discontinue taking oxybutynin. On 11/10/17 the patient made 2500 cc of urine. The patient will now begin taking tamsulosin HCl 0.4 MG  one capsule one half hour after breakfast and doxazosin at night. He was able to urinate well over night without any pain. \par 12/10/18: The patient returned today and reported that his urination is satisfactory. Currently takes Finasteride, Tamsulosin and Doxazosin. Complains of intermittent stream and urge incontinence. Denies dysuria and gross hematuria.\par  \par 6/10/19: Patient presents for 6 month follow up. Currently on Proscar and Doxazosin. States that he has no need to strain to initiate urination but flow is intermittent. Reports bothersome urinary urgency/frequency with occasional UUI. Nocturia. Regarding his anemia it is reported that it was evaluated by his PCP and he was told that it will be monitored but is nothing to worry about at this time.

## 2019-06-12 LAB — BACTERIA UR CULT: NORMAL

## 2019-06-16 LAB
ALBUMIN SERPL ELPH-MCNC: 4.4 G/DL
ALP BLD-CCNC: 55 U/L
ALT SERPL-CCNC: 12 U/L
ANION GAP SERPL CALC-SCNC: 18 MMOL/L
APPEARANCE: CLEAR
AST SERPL-CCNC: 19 U/L
BACTERIA: NEGATIVE
BASOPHILS # BLD AUTO: 0.01 K/UL
BASOPHILS NFR BLD AUTO: 0.3 %
BILIRUB SERPL-MCNC: 0.8 MG/DL
BILIRUBIN URINE: NEGATIVE
BLOOD URINE: NEGATIVE
BUN SERPL-MCNC: 16 MG/DL
CALCIUM SERPL-MCNC: 9.5 MG/DL
CHLORIDE SERPL-SCNC: 101 MMOL/L
CO2 SERPL-SCNC: 21 MMOL/L
COLOR: YELLOW
CREAT SERPL-MCNC: 1.13 MG/DL
EOSINOPHIL # BLD AUTO: 0.01 K/UL
EOSINOPHIL NFR BLD AUTO: 0.3 %
GLUCOSE QUALITATIVE U: ABNORMAL
GLUCOSE SERPL-MCNC: 176 MG/DL
HCT VFR BLD CALC: 39.5 %
HGB BLD-MCNC: 12.9 G/DL
HYALINE CASTS: 0 /LPF
IMM GRANULOCYTES NFR BLD AUTO: 0.3 %
KETONES URINE: NEGATIVE
LEUKOCYTE ESTERASE URINE: NEGATIVE
LYMPHOCYTES # BLD AUTO: 1.2 K/UL
LYMPHOCYTES NFR BLD AUTO: 33.4 %
MAN DIFF?: NORMAL
MCHC RBC-ENTMCNC: 27.8 PG
MCHC RBC-ENTMCNC: 32.7 GM/DL
MCV RBC AUTO: 85.1 FL
MICROSCOPIC-UA: NORMAL
MONOCYTES # BLD AUTO: 0.39 K/UL
MONOCYTES NFR BLD AUTO: 10.9 %
NEUTROPHILS # BLD AUTO: 1.97 K/UL
NEUTROPHILS NFR BLD AUTO: 54.8 %
NITRITE URINE: NEGATIVE
PH URINE: 6.5
PLATELET # BLD AUTO: 184 K/UL
POTASSIUM SERPL-SCNC: 4.4 MMOL/L
PROT SERPL-MCNC: 7.1 G/DL
PROTEIN URINE: NEGATIVE
PSA SERPL-MCNC: 3.94 NG/ML
RBC # BLD: 4.64 M/UL
RBC # FLD: 14.7 %
RED BLOOD CELLS URINE: 3 /HPF
SODIUM SERPL-SCNC: 140 MMOL/L
SPECIFIC GRAVITY URINE: 1.02
SQUAMOUS EPITHELIAL CELLS: 0 /HPF
TESTOST SERPL-MCNC: 249 NG/DL
URINE CYTOLOGY: NORMAL
UROBILINOGEN URINE: ABNORMAL
WBC # FLD AUTO: 3.59 K/UL
WHITE BLOOD CELLS URINE: 0 /HPF

## 2019-06-26 ENCOUNTER — APPOINTMENT (OUTPATIENT)
Dept: UROLOGY | Facility: CLINIC | Age: 79
End: 2019-06-26
Payer: MEDICARE

## 2019-06-26 ENCOUNTER — APPOINTMENT (OUTPATIENT)
Dept: UROLOGY | Facility: CLINIC | Age: 79
End: 2019-06-26

## 2019-06-26 ENCOUNTER — OUTPATIENT (OUTPATIENT)
Dept: OUTPATIENT SERVICES | Facility: HOSPITAL | Age: 79
LOS: 1 days | End: 2019-06-26
Payer: MEDICARE

## 2019-06-26 VITALS — DIASTOLIC BLOOD PRESSURE: 81 MMHG | SYSTOLIC BLOOD PRESSURE: 158 MMHG | HEART RATE: 82 BPM | TEMPERATURE: 98.4 F

## 2019-06-26 DIAGNOSIS — R35.0 FREQUENCY OF MICTURITION: ICD-10-CM

## 2019-06-26 DIAGNOSIS — N43.3 HYDROCELE, UNSPECIFIED: ICD-10-CM

## 2019-06-26 DIAGNOSIS — R33.9 RETENTION OF URINE, UNSPECIFIED: ICD-10-CM

## 2019-06-26 DIAGNOSIS — N40.1 BENIGN PROSTATIC HYPERPLASIA WITH LOWER URINARY TRACT SYMPTOMS: ICD-10-CM

## 2019-06-26 DIAGNOSIS — K40.90 UNILATERAL INGUINAL HERNIA, WITHOUT OBSTRUCTION OR GANGRENE, NOT SPECIFIED AS RECURRENT: ICD-10-CM

## 2019-06-26 DIAGNOSIS — R89.6 ABNORMAL CYTOLOGICAL FINDINGS IN SPECIMENS FROM OTHER ORGANS, SYSTEMS AND TISSUES: ICD-10-CM

## 2019-06-26 DIAGNOSIS — Z98.49 CATARACT EXTRACTION STATUS, UNSPECIFIED EYE: Chronic | ICD-10-CM

## 2019-06-26 DIAGNOSIS — N50.9 DISORDER OF MALE GENITAL ORGANS, UNSPECIFIED: ICD-10-CM

## 2019-06-26 PROCEDURE — 51784 ANAL/URINARY MUSCLE STUDY: CPT | Mod: 26

## 2019-06-26 PROCEDURE — 51741 ELECTRO-UROFLOWMETRY FIRST: CPT | Mod: 26

## 2019-06-26 PROCEDURE — 51797 INTRAABDOMINAL PRESSURE TEST: CPT | Mod: 26

## 2019-06-26 PROCEDURE — 51797 INTRAABDOMINAL PRESSURE TEST: CPT

## 2019-06-26 PROCEDURE — 99214 OFFICE O/P EST MOD 30 MIN: CPT | Mod: 25

## 2019-06-26 PROCEDURE — 52000 CYSTOURETHROSCOPY: CPT

## 2019-06-26 PROCEDURE — 51784 ANAL/URINARY MUSCLE STUDY: CPT

## 2019-06-26 PROCEDURE — 51728 CYSTOMETROGRAM W/VP: CPT

## 2019-06-26 PROCEDURE — 51728 CYSTOMETROGRAM W/VP: CPT | Mod: 26

## 2019-06-26 PROCEDURE — 51741 ELECTRO-UROFLOWMETRY FIRST: CPT

## 2019-07-03 ENCOUNTER — APPOINTMENT (OUTPATIENT)
Dept: UROLOGY | Facility: CLINIC | Age: 79
End: 2019-07-03
Payer: MEDICARE

## 2019-07-03 ENCOUNTER — OUTPATIENT (OUTPATIENT)
Dept: OUTPATIENT SERVICES | Facility: HOSPITAL | Age: 79
LOS: 1 days | End: 2019-07-03
Payer: MEDICARE

## 2019-07-03 DIAGNOSIS — R89.6 ABNORMAL CYTOLOGICAL FINDINGS IN SPECIMENS FROM OTHER ORGANS, SYSTEMS AND TISSUES: ICD-10-CM

## 2019-07-03 DIAGNOSIS — N52.9 MALE ERECTILE DYSFUNCTION, UNSPECIFIED: ICD-10-CM

## 2019-07-03 DIAGNOSIS — N40.1 BENIGN PROSTATIC HYPERPLASIA WITH LOWER URINARY TRACT SYMPTOMS: ICD-10-CM

## 2019-07-03 DIAGNOSIS — N43.3 HYDROCELE, UNSPECIFIED: ICD-10-CM

## 2019-07-03 DIAGNOSIS — Z98.49 CATARACT EXTRACTION STATUS, UNSPECIFIED EYE: Chronic | ICD-10-CM

## 2019-07-03 DIAGNOSIS — K40.90 UNILATERAL INGUINAL HERNIA, WITHOUT OBSTRUCTION OR GANGRENE, NOT SPECIFIED AS RECURRENT: ICD-10-CM

## 2019-07-03 DIAGNOSIS — K40.90 UNILATERAL INGUINAL HERNIA, W/OUT OBSTRUCTION OR GANGRENE, NOT SPECIFIED AS RECURRENT: ICD-10-CM

## 2019-07-03 DIAGNOSIS — R33.9 RETENTION OF URINE, UNSPECIFIED: ICD-10-CM

## 2019-07-03 DIAGNOSIS — N50.9 DISORDER OF MALE GENITAL ORGANS, UNSPECIFIED: ICD-10-CM

## 2019-07-03 DIAGNOSIS — R35.0 FREQUENCY OF MICTURITION: ICD-10-CM

## 2019-07-03 PROCEDURE — 76872 US TRANSRECTAL: CPT | Mod: 26

## 2019-07-03 PROCEDURE — 99214 OFFICE O/P EST MOD 30 MIN: CPT | Mod: 25

## 2019-07-03 PROCEDURE — 76872 US TRANSRECTAL: CPT

## 2019-07-21 PROBLEM — K40.90 LEFT INGUINAL HERNIA: Status: ACTIVE | Noted: 2019-06-10

## 2019-07-21 NOTE — HISTORY OF PRESENT ILLNESS
[FreeTextEntry1] : Mr Gonzales is a 78 year old man followed for hydrocele, past abnormal urine cytology, and BPH. The patient had a hydrocelectomy in October of 2014. Fluid cytology 06/08/16 was abnormal. Fluid cytology 12/07/16 and 12/19/16 were negative for malignant cells. The patient takes finasteride 5 mg, oxybutynin, ER 10 mg, and doxazosin 8 mg once daily. Cystoscopy 12/19/16 found 2+ trabeculation of the bladder. The prostate protruded moderately into the bladder. No bladder stones or tumors. The left and right ureteral orifice visualized and were very close to prostate. I advised him to discontinue taking oxybutynin. On 11/10/17 the patient made 2500 cc of urine. The patient will now begin taking tamsulosin HCl 0.4 MG  one capsule one half hour after breakfast and doxazosin at night. He was able to urinate well over night without any pain. \par 12/10/18: The patient returned today and reported that his urination is satisfactory. Currently takes Finasteride, Tamsulosin and Doxazosin. Complains of intermittent stream and urge incontinence. Denies dysuria and gross hematuria.\par  6/10/19: Patient presents for 6 month follow up. Currently on Proscar and Doxazosin. States that he has no need to strain to initiate urination but flow is intermittent. Reports bothersome urinary urgency/frequency with occasional UUI. Nocturia. Regarding his anemia it is reported that it was evaluated by his PCP and he was told that it will be monitored but is nothing to worry about at this time. \par \par 6/26/19: Patient presents today for UDS and a cystoscopy.

## 2019-07-21 NOTE — ASSESSMENT
[FreeTextEntry1] : Mr Gonzales is a 78 year old man followed for hydrocele, past abnormal urine cytology, and BPH. The patient had a hydrocelectomy in October of 2014. Fluid cytology 06/08/16 was abnormal. Fluid cytology 12/07/16 and 12/19/16 were negative for malignant cells. The patient takes finasteride 5 mg, oxybutynin, ER 10 mg, and doxazosin 8 mg once daily. Cystoscopy 12/19/16 found 2+ trabeculation of the bladder. The prostate protruded moderately into the bladder. No bladder stones or tumors. The left and right ureteral orifice visualized and were very close to prostate. I advised him to discontinue taking oxybutynin. On 11/10/17 the patient made 2500 cc of urine. The patient will now begin taking tamsulosin HCl 0.4 MG  one capsule one half hour after breakfast and doxazosin at night. He was able to urinate well over night without any pain. \par 12/10/18: The patient returned today and reported that his urination is satisfactory. Currently takes Finasteride, Tamsulosin and Doxazosin. Complains of intermittent stream and urge incontinence. Denies dysuria and gross hematuria.\par  6/10/19: Patient presents for 6 month follow up. Currently on Proscar and Doxazosin. States that he has no need to strain to initiate urination but flow is intermittent. Reports bothersome urinary urgency/frequency with occasional UUI. Nocturia. Regarding his anemia it is reported that it was evaluated by his PCP and he was told that it will be monitored but is nothing to worry about at this time. \par 6/26/19: Patient presents today for UDS and a cystoscopy. \par \par 7/3/19: Patient presents today for an ultrasound of the prostate. He is here today to discuss the results. His urination is the same as it was at his last visit. He wake up 4x a night to urinate. During the day he urinates 5-6x. He denies dysuria, gross hematuria, urgency or incontinence. Following the cystoscopy he noticed a small amount of blood and tiny clots but they have since resolved. He is satisfied with his urination at this time. \par \par Upon review of the prostate ultrasound it is determined that: prostate is 80 ccm. \par We discussed the use of a prostate enucleation but he does not want to have surgery at this time. There is a left mid gland hyoechoechoic area which I would not biopsy at this time. InsteadI will consider it in light of next PSA.\par \par No changes are made to his current medical regimen. \par \par He is to follow up in 3 months or sooner if clinically indicated.

## 2019-07-21 NOTE — ADDENDUM
[FreeTextEntry1] : This note was authored by Arjun Frausto working as scribe for Dr. Chris Sibley. I, Dr. Chris Sibley, have reviewed the content of this note and confirm it is true and accurate. I personally performed the history and physical examination and made all the decisions.\par 6/26/19

## 2019-07-21 NOTE — PHYSICAL EXAM
[General Appearance - Well Nourished] : well nourished [Normal Appearance] : normal appearance [General Appearance - Well Developed] : well developed [General Appearance - In No Acute Distress] : no acute distress [Well Groomed] : well groomed [Abdomen Soft] : soft [Abdomen Tenderness] : non-tender [Costovertebral Angle Tenderness] : no ~M costovertebral angle tenderness [Skin Color & Pigmentation] : normal skin color and pigmentation [Edema] : no peripheral edema [Heart Rate And Rhythm] : Heart rate and rhythm were normal [] : no respiratory distress [Oriented To Time, Place, And Person] : oriented to person, place, and time [Exaggerated Use Of Accessory Muscles For Inspiration] : no accessory muscle use [Affect] : the affect was normal [Mood] : the mood was normal [Not Anxious] : not anxious [Normal Station and Gait] : the gait and station were normal for the patient's age [No Focal Deficits] : no focal deficits [Cervical Lymph Nodes Enlarged Posterior Bilaterally] : posterior cervical [Cervical Lymph Nodes Enlarged Anterior Bilaterally] : anterior cervical [Supraclavicular Lymph Nodes Enlarged Bilaterally] : supraclavicular [FreeTextEntry1] : hand  5/5 bilaterally. Smile Symmetric. Tongue is Midline.\par  \par

## 2019-07-21 NOTE — REVIEW OF SYSTEMS
[Incontinence] : incontinence [Feeling Poorly] : not feeling poorly [Chest Pain] : no chest pain [Constipation] : no constipation [Diarrhea] : no diarrhea [Dysuria] : no dysuria

## 2019-07-21 NOTE — ASSESSMENT
[FreeTextEntry1] : Mr Gonzales is a 78 year old man followed for hydrocele, past abnormal urine cytology, and BPH. The patient had a hydrocelectomy in October of 2014. Fluid cytology 06/08/16 was abnormal. Fluid cytology 12/07/16 and 12/19/16 were negative for malignant cells. The patient takes finasteride 5 mg, oxybutynin, ER 10 mg, and doxazosin 8 mg once daily. Cystoscopy 12/19/16 found 2+ trabeculation of the bladder. The prostate protruded moderately into the bladder. No bladder stones or tumors. The left and right ureteral orifice visualized and were very close to prostate. I advised him to discontinue taking oxybutynin. On 11/10/17 the patient made 2500 cc of urine. The patient will now begin taking tamsulosin HCl 0.4 MG  one capsule one half hour after breakfast and doxazosin at night. He was able to urinate well over night without any pain. \par 12/10/18: The patient returned today and reported that his urination is satisfactory. Currently takes Finasteride, Tamsulosin and Doxazosin. Complains of intermittent stream and urge incontinence. Denies dysuria and gross hematuria.\par  6/10/19: Patient presents for 6 month follow up. Currently on Proscar and Doxazosin. States that he has no need to strain to initiate urination but flow is intermittent. Reports bothersome urinary urgency/frequency with occasional UUI. Nocturia. Regarding his anemia it is reported that it was evaluated by his PCP and he was told that it will be monitored but is nothing to worry about at this time. \par \par 6/26/19: Patient presents today for UDS and a cystoscopy. \par \par Cystoscopy findings: slight bleeding from bladder neck, anterior of bladder looks fine, 2+ trabeculated, no stones in the bladder, difficult to see the trigone and orifice, large lateral lobe hypertrophy. \par \par It is my opinion that he would be a good candidate for a Prostate Enucleation. \par \par It is advised that a transrectal ultrasound be completed to determine the size of the prostate. This will allow me to better determine which procedure would be best. \par \par He is to follow up in 2 weeks for the prostate ultrasound.

## 2019-07-21 NOTE — ADDENDUM
[FreeTextEntry1] : This note was authored by Arjun Frausto working as scribe for Dr. Chris Sibley. I, Dr. Chris Sibley, have reviewed the content of this note and confirm it is true and accurate. I personally performed the history and physical examination and made all the decisions.\par 7/3/19

## 2019-07-21 NOTE — PHYSICAL EXAM
[General Appearance - Well Developed] : well developed [General Appearance - Well Nourished] : well nourished [Normal Appearance] : normal appearance [Well Groomed] : well groomed [General Appearance - In No Acute Distress] : no acute distress [Abdomen Soft] : soft [Abdomen Tenderness] : non-tender [Costovertebral Angle Tenderness] : no ~M costovertebral angle tenderness [Skin Color & Pigmentation] : normal skin color and pigmentation [Heart Rate And Rhythm] : Heart rate and rhythm were normal [] : no respiratory distress [Edema] : no peripheral edema [Oriented To Time, Place, And Person] : oriented to person, place, and time [Exaggerated Use Of Accessory Muscles For Inspiration] : no accessory muscle use [Affect] : the affect was normal [Normal Station and Gait] : the gait and station were normal for the patient's age [Mood] : the mood was normal [Not Anxious] : not anxious [No Focal Deficits] : no focal deficits [Cervical Lymph Nodes Enlarged Posterior Bilaterally] : posterior cervical [Cervical Lymph Nodes Enlarged Anterior Bilaterally] : anterior cervical [Supraclavicular Lymph Nodes Enlarged Bilaterally] : supraclavicular [FreeTextEntry1] : hand  5/5 bilaterally. Smile Symmetric. Tongue is Midline.\par  \par

## 2019-07-21 NOTE — REVIEW OF SYSTEMS
[Incontinence] : incontinence [Constipation] : no constipation [Chest Pain] : no chest pain [Feeling Poorly] : not feeling poorly [Diarrhea] : no diarrhea [Dysuria] : no dysuria

## 2019-07-21 NOTE — HISTORY OF PRESENT ILLNESS
[FreeTextEntry1] : Mr Gonzales is a 78 year old man followed for hydrocele, past abnormal urine cytology, and BPH. The patient had a hydrocelectomy in October of 2014. Fluid cytology 06/08/16 was abnormal. Fluid cytology 12/07/16 and 12/19/16 were negative for malignant cells. The patient takes finasteride 5 mg, oxybutynin, ER 10 mg, and doxazosin 8 mg once daily. Cystoscopy 12/19/16 found 2+ trabeculation of the bladder. The prostate protruded moderately into the bladder. No bladder stones or tumors. The left and right ureteral orifice visualized and were very close to prostate. I advised him to discontinue taking oxybutynin. On 11/10/17 the patient made 2500 cc of urine. The patient will now begin taking tamsulosin HCl 0.4 MG  one capsule one half hour after breakfast and doxazosin at night. He was able to urinate well over night without any pain. \par 12/10/18: The patient returned today and reported that his urination is satisfactory. Currently takes Finasteride, Tamsulosin and Doxazosin. Complains of intermittent stream and urge incontinence. Denies dysuria and gross hematuria.\par  6/10/19: Patient presents for 6 month follow up. Currently on Proscar and Doxazosin. States that he has no need to strain to initiate urination but flow is intermittent. Reports bothersome urinary urgency/frequency with occasional UUI. Nocturia. Regarding his anemia it is reported that it was evaluated by his PCP and he was told that it will be monitored but is nothing to worry about at this time. \par 6/26/19: Patient presents today for UDS and a cystoscopy. \par \par 7/3/19: Patient presents today for an ultrasound of the prostate. He is here today to discuss the results. His urination is the same as it was at his last visit. He wake up 4x a night to urinate. During the day he urinates 5-6x. He denies dysuria, gross hematuria, urgency or incontinence. Following the cystoscopy he noticed a small amount of blood and tiny clots but they have since resolved. He is satisfied with his urination at this time.

## 2019-07-23 ENCOUNTER — APPOINTMENT (OUTPATIENT)
Dept: PULMONOLOGY | Facility: CLINIC | Age: 79
End: 2019-07-23
Payer: MEDICARE

## 2019-07-23 VITALS
WEIGHT: 184 LBS | DIASTOLIC BLOOD PRESSURE: 80 MMHG | SYSTOLIC BLOOD PRESSURE: 155 MMHG | HEIGHT: 70 IN | RESPIRATION RATE: 18 BRPM | BODY MASS INDEX: 26.34 KG/M2 | OXYGEN SATURATION: 97 % | HEART RATE: 74 BPM | TEMPERATURE: 97.7 F

## 2019-07-23 DIAGNOSIS — Z87.898 PERSONAL HISTORY OF OTHER SPECIFIED CONDITIONS: ICD-10-CM

## 2019-07-23 DIAGNOSIS — I31.3 PERICARDIAL EFFUSION (NONINFLAMMATORY): ICD-10-CM

## 2019-07-23 PROCEDURE — 99214 OFFICE O/P EST MOD 30 MIN: CPT | Mod: 25

## 2019-07-24 NOTE — ASSESSMENT
[FreeTextEntry1] : 78M HTN, DM2, Afib on AC, NICM, prior history of pericardial effusion and pleural effusions presents for routine pulmonary followup. He has been adherent to his diuretic therapy and has not had any evidence of respiratory distress.\par \par 1. Pleural effusion - by physical exam Mr. Gonzales has excellent air entry without crackles or decreased breath sounds. In addition, his LE edema has resolved. He should continue on his diuretic therapy. He denies any respiratory symptoms and therefore I do not think he requires any further pulmonary imaging at this time. \par --If his respiratory status changes he will require imaging and then discussion regarding increased diuresis vs thoracentesis at that time.\par \par 2. NICM - He will continue his current cardiac medications including diuretics. He has been urinating well. He will continue to monitor his weight. \par - He will followup with Dr. Oconnor\par -- Afib on AC. He appears to be in sinus rhythm on exam today.\par \par 3. Dyspnea - has resolved\par \par 4. Followup visit in 8-12  months or sooner if patient develops respiratory symptoms.Discussed with patient at length. He will call my office with any issues.\par \par 5. HCM - Patient was offered Prevnar 13 vaccine today. He declined and stated he wanted to speak with his PMD directly about it.\par -- I called Dr. Benjamin (PMD) to inform of above and he will discuss with patient and arrange for PCV 13 if patient agrees.

## 2019-07-24 NOTE — HISTORY OF PRESENT ILLNESS
[Stable] : are stable [Difficulty Breathing During Exertion] : denies dyspnea on exertion [None] : ~He/She~ has no significant interval events [Feelings Of Weakness On Exertion] : denies exercise intolerance [Cough] : denies coughing [Wheezing] : denies wheezing [Regional Soft Tissue Swelling Both Lower Extremities] : denies lower extremity edema [Chest Pain Or Discomfort] : denies chest pain [Fever] : denies fever [0  -  Nothing at all] : 0, nothing at all [Date: ___] : was performed [unfilled] [FreeTextEntry1] : 78M HTN, HLD, DM2, Afib/Aflutter on AC, NICM, pericardial effusion s/p window (10/2017) and bilateral pleural effusions s/p thoracentesis (12/2017) presenting for followup pulmonary evaluation. He has been doing well without any respiratory complaints. He denies any shortness of breath or respiratory distress.\par \par Since the time of his thoracentesis he has been managed on diuretics and has not had any respiratory distress. He continues to breathe comfortably. He denies any chest pains or palpitations. He denies fevers or chills. He has not had any changes to his health. He continues his medications including diuresis. \par \par His weight has been stable since his last visit with me ( 188--> 185 --> 188--> 184). He states adherence to his diuretic therapy. He denies orthopnea or LE edema. \par \par He presents today for followup visit in the setting of prior pleural effusion. His prior pleural effusion was exudative with negative cytology. He does not have any signs of respiratory distress or orthopnea. He is breathing comfortably without any accessory muscle use.\par \par He denies fevers, chills, or sick contacts. He denies nausea, vomiting, or abdominal pain. He denies any other changes to his health. He lives at home with his wife of 41 years.  [Wt Gain ___ Lbs] : no recent weight gain [Oxygen] : the patient uses no supplemental oxygen [de-identified] : moderate restrictive ventilatory impairment with moderate reduction in diffusion capacity

## 2019-07-24 NOTE — PHYSICAL EXAM
[Normal Appearance] : normal appearance [General Appearance - Well Developed] : well developed [General Appearance - Well Nourished] : well nourished [Well Groomed] : well groomed [No Deformities] : no deformities [General Appearance - In No Acute Distress] : no acute distress [Normal Conjunctiva] : the conjunctiva exhibited no abnormalities [Neck Appearance] : the appearance of the neck was normal [Jugular Venous Distention Increased] : there was no jugular-venous distention [Neck Cervical Mass (___cm)] : no neck mass was observed [Heart Sounds] : normal S1 and S2 [Heart Rate And Rhythm] : heart rate and rhythm were normal [Edema] : no peripheral edema present [Arterial Pulses Normal] : the arterial pulses were normal [Exaggerated Use Of Accessory Muscles For Inspiration] : no accessory muscle use [Respiration, Rhythm And Depth] : normal respiratory rhythm and effort [Auscultation Breath Sounds / Voice Sounds] : lungs were clear to auscultation bilaterally [Bowel Sounds] : normal bowel sounds [Abdomen Tenderness] : non-tender [Abdomen Soft] : soft [Gait - Sufficient For Exercise Testing] : the gait was sufficient for exercise testing [Abnormal Walk] : normal gait [Nail Clubbing] : no clubbing of the fingernails [Skin Color & Pigmentation] : normal skin color and pigmentation [Cyanosis, Localized] : no localized cyanosis [Skin Turgor] : normal skin turgor [] : no rash [No Focal Deficits] : no focal deficits [Impaired Insight] : insight and judgment were intact [Oriented To Time, Place, And Person] : oriented to person, place, and time [Mood] : the mood was normal [Affect] : the affect was normal [Memory Recent] : recent memory was not impaired [Memory Remote] : remote memory was not impaired [Erythema] : no erythema of the pharynx [FreeTextEntry1] : no wheezes, crackles, or rhonchi

## 2019-07-24 NOTE — REVIEW OF SYSTEMS
[Hypertension] : ~T hypertension [Heartburn] : heartburn [Abdominal Pain] : abdominal pain [Frequency] : urinary frequency [Nocturia] : nocturia [Negative] : Constitutional [Dry Eyes] : no dryness of the eyes [Eye Irritation] : no ~T irritation of the eyes [Ear Disturbance] : no ear disturbance [Nasal Congestion] : no nasal congestion [Epistaxis] : no nosebleeds [Postnasal Drip] : no postnasal drip [Sinus Problems] : no sinus problems [Cough] : no cough [Hemoptysis] : no hemoptysis [Sputum] : not coughing up ~M sputum [Dyspnea] : no dyspnea [Chest Tightness] : no chest tightness [Pleuritic Pain] : no pleuritic pain [Frequent URIs] : no frequent upper respiratory infections [Wheezing] : no wheezing [Hypotension] : no hypotension [Orthopnea] : no orthopnea [PND] : no PND [Dysrhythmia] : no dysrhythmia [Edema] : ~T edema was not present [Palpitations] : no palpitations [Leg Cramps] : no leg cramps [Watery Eyes] : no discharge from the eyes [Nasal Discharge] : no nasal discharge [Hives] : no urticaria was observed [Angioedema] : no angioedema [Dysphagia] : no dysphagia [Nausea] : no nausea [Vomiting] : no vomiting [Dysuria] : no dysuria [Kyphoscoliosis] : no kyphoscoliosis [Myalgias] : no myalgias [Back Pain] : ~T no back pain [Arthralgias] : no arthralgias [Rash] : no [unfilled] rash [Itch] : no itching [Easy Bruising] : no ~M tendency for easy bruising [Headache] : no headache [Dizziness] : no dizziness [Syncope] : no fainting [Focal Weakness] : no focal weakness [Numbness] : no numbness [Head Injury] : no head injury [Paralysis] : no paralysis was seen [Depression] : no depression [Anxiety] : no anxiety [Diabetes] : no diabetes mellitus [Thyroid Problem] : no thyroid problem [HIV] : no HIV infection [Nonrestorative Sleep] : restorative sleep [Awakes With Headache] : awakes without a headache [Awakes With Dry Mouth] : awakes without dry mouth

## 2019-07-24 NOTE — HISTORY OF PRESENT ILLNESS
[Difficulty Breathing During Exertion] : denies dyspnea on exertion [None] : ~He/She~ has no significant interval events [Stable] : are stable [Feelings Of Weakness On Exertion] : denies exercise intolerance [Cough] : denies coughing [Regional Soft Tissue Swelling Both Lower Extremities] : denies lower extremity edema [Wheezing] : denies wheezing [Fever] : denies fever [Chest Pain Or Discomfort] : denies chest pain [Date: ___] : was performed [unfilled] [0  -  Nothing at all] : 0, nothing at all [FreeTextEntry1] : 78M HTN, HLD, DM2, Afib/Aflutter on AC, NICM, pericardial effusion s/p window (10/2017) and bilateral pleural effusions s/p thoracentesis (12/2017) presenting for followup pulmonary evaluation. He has been doing well without any respiratory complaints. He denies any shortness of breath or respiratory distress.\par \par Since the time of his thoracentesis he has been managed on diuretics and has not had any respiratory distress. He continues to breathe comfortably. He denies any chest pains or palpitations. He denies fevers or chills. He has not had any changes to his health. He continues his medications including diuresis. \par \par His weight has been stable since his last visit with me ( 188--> 185 --> 188--> 184). He states adherence to his diuretic therapy. He denies orthopnea or LE edema. \par \par He presents today for followup visit in the setting of prior pleural effusion. His prior pleural effusion was exudative with negative cytology. He does not have any signs of respiratory distress or orthopnea. He is breathing comfortably without any accessory muscle use.\par \par He denies fevers, chills, or sick contacts. He denies nausea, vomiting, or abdominal pain. He denies any other changes to his health. He lives at home with his wife of 41 years.  [Wt Gain ___ Lbs] : no recent weight gain [Oxygen] : the patient uses no supplemental oxygen [de-identified] : moderate restrictive ventilatory impairment with moderate reduction in diffusion capacity

## 2019-07-24 NOTE — PHYSICAL EXAM
[Normal Appearance] : normal appearance [General Appearance - Well Developed] : well developed [No Deformities] : no deformities [Well Groomed] : well groomed [General Appearance - Well Nourished] : well nourished [General Appearance - In No Acute Distress] : no acute distress [Normal Conjunctiva] : the conjunctiva exhibited no abnormalities [Neck Appearance] : the appearance of the neck was normal [Jugular Venous Distention Increased] : there was no jugular-venous distention [Neck Cervical Mass (___cm)] : no neck mass was observed [Heart Rate And Rhythm] : heart rate and rhythm were normal [Heart Sounds] : normal S1 and S2 [Edema] : no peripheral edema present [Arterial Pulses Normal] : the arterial pulses were normal [Respiration, Rhythm And Depth] : normal respiratory rhythm and effort [Exaggerated Use Of Accessory Muscles For Inspiration] : no accessory muscle use [Auscultation Breath Sounds / Voice Sounds] : lungs were clear to auscultation bilaterally [Bowel Sounds] : normal bowel sounds [Abdomen Soft] : soft [Abdomen Tenderness] : non-tender [Abnormal Walk] : normal gait [Gait - Sufficient For Exercise Testing] : the gait was sufficient for exercise testing [Nail Clubbing] : no clubbing of the fingernails [Cyanosis, Localized] : no localized cyanosis [Skin Color & Pigmentation] : normal skin color and pigmentation [] : no rash [Skin Turgor] : normal skin turgor [No Focal Deficits] : no focal deficits [Impaired Insight] : insight and judgment were intact [Affect] : the affect was normal [Oriented To Time, Place, And Person] : oriented to person, place, and time [Mood] : the mood was normal [Memory Remote] : remote memory was not impaired [Memory Recent] : recent memory was not impaired [Erythema] : no erythema of the pharynx [FreeTextEntry1] : no wheezes, crackles, or rhonchi

## 2019-07-24 NOTE — REVIEW OF SYSTEMS
[Hypertension] : ~T hypertension [Heartburn] : heartburn [Abdominal Pain] : abdominal pain [Frequency] : urinary frequency [Nocturia] : nocturia [Negative] : Constitutional [Dry Eyes] : no dryness of the eyes [Eye Irritation] : no ~T irritation of the eyes [Ear Disturbance] : no ear disturbance [Nasal Congestion] : no nasal congestion [Epistaxis] : no nosebleeds [Postnasal Drip] : no postnasal drip [Sinus Problems] : no sinus problems [Cough] : no cough [Sputum] : not coughing up ~M sputum [Hemoptysis] : no hemoptysis [Dyspnea] : no dyspnea [Chest Tightness] : no chest tightness [Pleuritic Pain] : no pleuritic pain [Frequent URIs] : no frequent upper respiratory infections [Wheezing] : no wheezing [Hypotension] : no hypotension [Orthopnea] : no orthopnea [PND] : no PND [Dysrhythmia] : no dysrhythmia [Edema] : ~T edema was not present [Palpitations] : no palpitations [Leg Cramps] : no leg cramps [Nasal Discharge] : no nasal discharge [Watery Eyes] : no discharge from the eyes [Hives] : no urticaria was observed [Angioedema] : no angioedema [Dysphagia] : no dysphagia [Nausea] : no nausea [Dysuria] : no dysuria [Vomiting] : no vomiting [Kyphoscoliosis] : no kyphoscoliosis [Myalgias] : no myalgias [Back Pain] : ~T no back pain [Rash] : no [unfilled] rash [Arthralgias] : no arthralgias [Itch] : no itching [Easy Bruising] : no ~M tendency for easy bruising [Headache] : no headache [Dizziness] : no dizziness [Syncope] : no fainting [Focal Weakness] : no focal weakness [Numbness] : no numbness [Head Injury] : no head injury [Paralysis] : no paralysis was seen [Anxiety] : no anxiety [Depression] : no depression [Diabetes] : no diabetes mellitus [Thyroid Problem] : no thyroid problem [HIV] : no HIV infection [Nonrestorative Sleep] : restorative sleep [Awakes With Dry Mouth] : awakes without dry mouth [Awakes With Headache] : awakes without a headache

## 2019-10-07 ENCOUNTER — APPOINTMENT (OUTPATIENT)
Dept: UROLOGY | Facility: CLINIC | Age: 79
End: 2019-10-07

## 2019-10-08 ENCOUNTER — OUTPATIENT (OUTPATIENT)
Dept: OUTPATIENT SERVICES | Facility: HOSPITAL | Age: 79
LOS: 1 days | End: 2019-10-08
Payer: MEDICARE

## 2019-10-08 ENCOUNTER — APPOINTMENT (OUTPATIENT)
Dept: UROLOGY | Facility: CLINIC | Age: 79
End: 2019-10-08
Payer: MEDICARE

## 2019-10-08 DIAGNOSIS — Z98.49 CATARACT EXTRACTION STATUS, UNSPECIFIED EYE: Chronic | ICD-10-CM

## 2019-10-08 DIAGNOSIS — R35.0 FREQUENCY OF MICTURITION: ICD-10-CM

## 2019-10-08 PROCEDURE — 99214 OFFICE O/P EST MOD 30 MIN: CPT | Mod: 25

## 2019-10-08 PROCEDURE — 76775 US EXAM ABDO BACK WALL LIM: CPT | Mod: 26

## 2019-10-08 PROCEDURE — 76775 US EXAM ABDO BACK WALL LIM: CPT

## 2019-10-10 NOTE — ASSESSMENT
[FreeTextEntry1] : Mr Gonzales is a 79 year old man followed for hydrocele, past abnormal urine cytology, and BPH. The patient had a hydrocelectomy in October of 2014. Fluid cytology 06/08/16 was abnormal. Fluid cytology 12/07/16 and 12/19/16 were negative for malignant cells. The patient takes finasteride 5 mg, oxybutynin, ER 10 mg, and doxazosin 8 mg once daily. Cystoscopy 12/19/16 found 2+ trabeculation of the bladder. The prostate protruded moderately into the bladder. No bladder stones or tumors. The left and right ureteral orifice visualized and were very close to prostate. I advised him to discontinue taking oxybutynin. On 11/10/17 the patient made 2500 cc of urine. The patient will now begin taking tamsulosin HCl 0.4 MG  one capsule one half hour after breakfast and doxazosin at night. He was able to urinate well over night without any pain. \par 12/10/18: The patient returned today and reported that his urination is satisfactory. Currently takes Finasteride, Tamsulosin and Doxazosin. Complains of intermittent stream and urge incontinence. Denies dysuria and gross hematuria.\par  6/10/19: Patient presents for 6 month follow up. Currently on Proscar and Doxazosin. States that he has no need to strain to initiate urination but flow is intermittent. Reports bothersome urinary urgency/frequency with occasional UUI. Nocturia. Regarding his anemia it is reported that it was evaluated by his PCP and he was told that it will be monitored but is nothing to worry about at this time. \par 6/26/19: Patient presents today for UDS and a cystoscopy. \par 7/3/19: Patient presents today for an ultrasound of the prostate. He is here today to discuss the results. His urination is the same as it was at his last visit. He wake up 4x a night to urinate. During the day he urinates 5-6x. He denies dysuria, gross hematuria, urgency or incontinence. Following the cystoscopy he noticed a small amount of blood and tiny clots but they have since resolved. He is satisfied with his urination at this time. \par \par 10/8/19: Patient presents today for a renal ultrasound and is here to discuss the results. Doxazosin, Finasteride and Tamsulosin were all prescribed previously. It is reported today that he has only been taking the Doxazosin and the Finasteride. His urination is still troublesome. Nocturia x 4-5 is reported. \par \par The patient produced a urine sample which will be sent for urinalysis, urine cytology and urine culture. \par Blood work today included comprehensive metabolic panel, CBC, PSA and testosterone. \par \par Upon review of the renal ultrasound it is determined that: Unremarkable exam. Both kidneys are normal in size and echogenicity without obvious stones, hydronephrosis or solid masses visualized. \par \par Tamsulosin 0.4 mg is renewed at this time. He is to take it along with the Doxazosin and the Finasteride. \par \par He is to follow up in 2 weeks or sooner if clinically indicated.

## 2019-10-10 NOTE — ADDENDUM
[FreeTextEntry1] : This note was authored by Arjun Frausto working as scribe for Dr. Chris Sibley. I, Dr. Chris Sibley, have reviewed the content of this note and confirm it is true and accurate. I personally performed the history and physical examination and made all the decisions.\par 10/8/19

## 2019-10-10 NOTE — PHYSICAL EXAM
[General Appearance - Well Nourished] : well nourished [General Appearance - Well Developed] : well developed [Normal Appearance] : normal appearance [Well Groomed] : well groomed [General Appearance - In No Acute Distress] : no acute distress [Costovertebral Angle Tenderness] : no ~M costovertebral angle tenderness [Abdomen Soft] : soft [Abdomen Tenderness] : non-tender [Skin Color & Pigmentation] : normal skin color and pigmentation [] : no respiratory distress [Heart Rate And Rhythm] : Heart rate and rhythm were normal [Edema] : no peripheral edema [Exaggerated Use Of Accessory Muscles For Inspiration] : no accessory muscle use [Oriented To Time, Place, And Person] : oriented to person, place, and time [Affect] : the affect was normal [Mood] : the mood was normal [Normal Station and Gait] : the gait and station were normal for the patient's age [Not Anxious] : not anxious [No Focal Deficits] : no focal deficits [Cervical Lymph Nodes Enlarged Posterior Bilaterally] : posterior cervical [Cervical Lymph Nodes Enlarged Anterior Bilaterally] : anterior cervical [Supraclavicular Lymph Nodes Enlarged Bilaterally] : supraclavicular [FreeTextEntry1] : hand  5/5 bilaterally. Smile Symmetric. Tongue is Midline.\par  \par

## 2019-10-10 NOTE — HISTORY OF PRESENT ILLNESS
[FreeTextEntry1] : Mr Gonzales is a 79 year old man followed for hydrocele, past abnormal urine cytology, and BPH. The patient had a hydrocelectomy in October of 2014. Fluid cytology 06/08/16 was abnormal. Fluid cytology 12/07/16 and 12/19/16 were negative for malignant cells. The patient takes finasteride 5 mg, oxybutynin, ER 10 mg, and doxazosin 8 mg once daily. Cystoscopy 12/19/16 found 2+ trabeculation of the bladder. The prostate protruded moderately into the bladder. No bladder stones or tumors. The left and right ureteral orifice visualized and were very close to prostate. I advised him to discontinue taking oxybutynin. On 11/10/17 the patient made 2500 cc of urine. The patient will now begin taking tamsulosin HCl 0.4 MG  one capsule one half hour after breakfast and doxazosin at night. He was able to urinate well over night without any pain. \par 12/10/18: The patient returned today and reported that his urination is satisfactory. Currently takes Finasteride, Tamsulosin and Doxazosin. Complains of intermittent stream and urge incontinence. Denies dysuria and gross hematuria.\par  6/10/19: Patient presents for 6 month follow up. Currently on Proscar and Doxazosin. States that he has no need to strain to initiate urination but flow is intermittent. Reports bothersome urinary urgency/frequency with occasional UUI. Nocturia. Regarding his anemia it is reported that it was evaluated by his PCP and he was told that it will be monitored but is nothing to worry about at this time. \par 6/26/19: Patient presents today for UDS and a cystoscopy. \par 7/3/19: Patient presents today for an ultrasound of the prostate. He is here today to discuss the results. His urination is the same as it was at his last visit. He wake up 4x a night to urinate. During the day he urinates 5-6x. He denies dysuria, gross hematuria, urgency or incontinence. Following the cystoscopy he noticed a small amount of blood and tiny clots but they have since resolved. He is satisfied with his urination at this time. \par \par 10/8/19: Patient presents today for a renal ultrasound and is here to discuss the results. Doxazosin, Finasteride and Tamsulosin were all prescribed previously. It is reported today that he has only been taking the Doxazosin and the Finasteride. His urination is still troublesome. Nocturia x 4-5 is reported.

## 2019-10-10 NOTE — REVIEW OF SYSTEMS
[Incontinence] : incontinence [Feeling Poorly] : not feeling poorly [Chest Pain] : no chest pain [Constipation] : no constipation [Dysuria] : no dysuria [Diarrhea] : no diarrhea

## 2019-10-15 ENCOUNTER — OUTPATIENT (OUTPATIENT)
Dept: OUTPATIENT SERVICES | Facility: HOSPITAL | Age: 79
LOS: 1 days | End: 2019-10-15
Payer: MEDICARE

## 2019-10-15 ENCOUNTER — APPOINTMENT (OUTPATIENT)
Dept: UROLOGY | Facility: CLINIC | Age: 79
End: 2019-10-15
Payer: MEDICARE

## 2019-10-15 VITALS
RESPIRATION RATE: 16 BRPM | TEMPERATURE: 98 F | HEIGHT: 70 IN | WEIGHT: 182.1 LBS | OXYGEN SATURATION: 97 % | HEART RATE: 75 BPM | SYSTOLIC BLOOD PRESSURE: 130 MMHG | DIASTOLIC BLOOD PRESSURE: 80 MMHG

## 2019-10-15 DIAGNOSIS — N40.1 BENIGN PROSTATIC HYPERPLASIA WITH LOWER URINARY TRACT SYMPTOMS: ICD-10-CM

## 2019-10-15 DIAGNOSIS — Z98.890 OTHER SPECIFIED POSTPROCEDURAL STATES: Chronic | ICD-10-CM

## 2019-10-15 DIAGNOSIS — Z98.49 CATARACT EXTRACTION STATUS, UNSPECIFIED EYE: Chronic | ICD-10-CM

## 2019-10-15 DIAGNOSIS — K40.90 UNILATERAL INGUINAL HERNIA, WITHOUT OBSTRUCTION OR GANGRENE, NOT SPECIFIED AS RECURRENT: ICD-10-CM

## 2019-10-15 DIAGNOSIS — R89.6 ABNORMAL CYTOLOGICAL FINDINGS IN SPECIMENS FROM OTHER ORGANS, SYSTEMS AND TISSUES: ICD-10-CM

## 2019-10-15 DIAGNOSIS — I48.91 UNSPECIFIED ATRIAL FIBRILLATION: ICD-10-CM

## 2019-10-15 DIAGNOSIS — E11.9 TYPE 2 DIABETES MELLITUS WITHOUT COMPLICATIONS: ICD-10-CM

## 2019-10-15 DIAGNOSIS — R93.89 ABNORMAL FINDINGS ON DIAGNOSTIC IMAGING OF OTHER SPECIFIED BODY STRUCTURES: ICD-10-CM

## 2019-10-15 DIAGNOSIS — K40.90 UNILATERAL INGUINAL HERNIA, W/OUT OBSTRUCTION OR GANGRENE, NOT SPECIFIED AS RECURRENT: ICD-10-CM

## 2019-10-15 LAB
ALBUMIN SERPL ELPH-MCNC: 4.2 G/DL — SIGNIFICANT CHANGE UP (ref 3.3–5)
ALBUMIN SERPL ELPH-MCNC: 4.3 G/DL
ALP BLD-CCNC: 60 U/L
ALP SERPL-CCNC: 53 U/L — SIGNIFICANT CHANGE UP (ref 40–120)
ALT FLD-CCNC: 12 U/L — SIGNIFICANT CHANGE UP (ref 4–41)
ALT SERPL-CCNC: 13 U/L
ANION GAP SERPL CALC-SCNC: 12 MMOL/L
ANION GAP SERPL CALC-SCNC: 9 MMO/L — SIGNIFICANT CHANGE UP (ref 7–14)
APPEARANCE: CLEAR
AST SERPL-CCNC: 17 U/L
AST SERPL-CCNC: 24 U/L — SIGNIFICANT CHANGE UP (ref 4–40)
BACTERIA UR CULT: NORMAL
BACTERIA: NEGATIVE
BASOPHILS # BLD AUTO: 0.02 K/UL
BASOPHILS NFR BLD AUTO: 0.5 %
BILIRUB SERPL-MCNC: 0.5 MG/DL
BILIRUB SERPL-MCNC: 0.6 MG/DL — SIGNIFICANT CHANGE UP (ref 0.2–1.2)
BILIRUBIN URINE: NEGATIVE
BLOOD URINE: NEGATIVE
BUN SERPL-MCNC: 12 MG/DL
BUN SERPL-MCNC: 14 MG/DL — SIGNIFICANT CHANGE UP (ref 7–23)
CALCIUM SERPL-MCNC: 10 MG/DL
CALCIUM SERPL-MCNC: 9.8 MG/DL — SIGNIFICANT CHANGE UP (ref 8.4–10.5)
CHLORIDE SERPL-SCNC: 100 MMOL/L — SIGNIFICANT CHANGE UP (ref 98–107)
CHLORIDE SERPL-SCNC: 101 MMOL/L
CO2 SERPL-SCNC: 28 MMOL/L
CO2 SERPL-SCNC: 30 MMOL/L — SIGNIFICANT CHANGE UP (ref 22–31)
COLOR: YELLOW
CREAT SERPL-MCNC: 1.11 MG/DL — SIGNIFICANT CHANGE UP (ref 0.5–1.3)
CREAT SERPL-MCNC: 1.23 MG/DL
EOSINOPHIL # BLD AUTO: 0.04 K/UL
EOSINOPHIL NFR BLD AUTO: 0.9 %
GLUCOSE QUALITATIVE U: NEGATIVE
GLUCOSE SERPL-MCNC: 144 MG/DL
GLUCOSE SERPL-MCNC: 64 MG/DL — LOW (ref 70–99)
HCT VFR BLD CALC: 39 % — SIGNIFICANT CHANGE UP (ref 39–50)
HCT VFR BLD CALC: 39.5 %
HGB BLD-MCNC: 12.7 G/DL
HGB BLD-MCNC: 12.8 G/DL — LOW (ref 13–17)
HYALINE CASTS: 0 /LPF
IMM GRANULOCYTES NFR BLD AUTO: 0.2 %
KETONES URINE: NEGATIVE
LEUKOCYTE ESTERASE URINE: NEGATIVE
LYMPHOCYTES # BLD AUTO: 1.46 K/UL
LYMPHOCYTES NFR BLD AUTO: 33.2 %
MAN DIFF?: NORMAL
MCHC RBC-ENTMCNC: 27.6 PG — SIGNIFICANT CHANGE UP (ref 27–34)
MCHC RBC-ENTMCNC: 27.8 PG
MCHC RBC-ENTMCNC: 32.2 GM/DL
MCHC RBC-ENTMCNC: 32.8 % — SIGNIFICANT CHANGE UP (ref 32–36)
MCV RBC AUTO: 84.2 FL — SIGNIFICANT CHANGE UP (ref 80–100)
MCV RBC AUTO: 86.4 FL
MICROSCOPIC-UA: NORMAL
MONOCYTES # BLD AUTO: 0.55 K/UL
MONOCYTES NFR BLD AUTO: 12.5 %
NEUTROPHILS # BLD AUTO: 2.32 K/UL
NEUTROPHILS NFR BLD AUTO: 52.7 %
NITRITE URINE: NEGATIVE
NRBC # FLD: 0 K/UL — SIGNIFICANT CHANGE UP (ref 0–0)
PH URINE: 8
PLATELET # BLD AUTO: 182 K/UL — SIGNIFICANT CHANGE UP (ref 150–400)
PLATELET # BLD AUTO: 208 K/UL
PMV BLD: 11.1 FL — SIGNIFICANT CHANGE UP (ref 7–13)
POTASSIUM SERPL-MCNC: 4.7 MMOL/L — SIGNIFICANT CHANGE UP (ref 3.5–5.3)
POTASSIUM SERPL-SCNC: 4.7 MMOL/L — SIGNIFICANT CHANGE UP (ref 3.5–5.3)
POTASSIUM SERPL-SCNC: 4.8 MMOL/L
PROT SERPL-MCNC: 7 G/DL
PROT SERPL-MCNC: 7.3 G/DL — SIGNIFICANT CHANGE UP (ref 6–8.3)
PROTEIN URINE: NEGATIVE
RBC # BLD: 4.57 M/UL
RBC # BLD: 4.63 M/UL — SIGNIFICANT CHANGE UP (ref 4.2–5.8)
RBC # FLD: 14.8 % — HIGH (ref 10.3–14.5)
RBC # FLD: 14.9 %
RED BLOOD CELLS URINE: 0 /HPF
SODIUM SERPL-SCNC: 139 MMOL/L — SIGNIFICANT CHANGE UP (ref 135–145)
SODIUM SERPL-SCNC: 141 MMOL/L
SPECIFIC GRAVITY URINE: 1.02
SQUAMOUS EPITHELIAL CELLS: 0 /HPF
URINE CYTOLOGY: NORMAL
UROBILINOGEN URINE: NORMAL
WBC # BLD: 4.59 K/UL — SIGNIFICANT CHANGE UP (ref 3.8–10.5)
WBC # FLD AUTO: 4.4 K/UL
WBC # FLD AUTO: 4.59 K/UL — SIGNIFICANT CHANGE UP (ref 3.8–10.5)
WHITE BLOOD CELLS URINE: 0 /HPF

## 2019-10-15 PROCEDURE — 93010 ELECTROCARDIOGRAM REPORT: CPT

## 2019-10-15 PROCEDURE — 99214 OFFICE O/P EST MOD 30 MIN: CPT

## 2019-10-15 RX ORDER — FOLIC ACID 0.8 MG
1 TABLET ORAL
Qty: 0 | Refills: 0 | DISCHARGE

## 2019-10-15 RX ORDER — EPLERENONE 50 MG/1
1 TABLET, FILM COATED ORAL
Qty: 0 | Refills: 0 | DISCHARGE

## 2019-10-15 RX ORDER — APIXABAN 2.5 MG/1
1 TABLET, FILM COATED ORAL
Qty: 0 | Refills: 0 | DISCHARGE

## 2019-10-15 RX ORDER — SODIUM CHLORIDE 9 MG/ML
1000 INJECTION, SOLUTION INTRAVENOUS
Refills: 0 | Status: DISCONTINUED | OUTPATIENT
Start: 2019-10-22 | End: 2019-11-09

## 2019-10-15 RX ORDER — OXYMETAZOLINE HYDROCHLORIDE 0.5 MG/ML
2 SPRAY NASAL
Qty: 0 | Refills: 0 | DISCHARGE

## 2019-10-15 RX ORDER — METFORMIN HYDROCHLORIDE 850 MG/1
1 TABLET ORAL
Qty: 0 | Refills: 0 | DISCHARGE

## 2019-10-15 RX ORDER — LANOLIN ALCOHOL/MO/W.PET/CERES
1 CREAM (GRAM) TOPICAL
Qty: 0 | Refills: 0 | DISCHARGE

## 2019-10-15 RX ORDER — SITAGLIPTIN 50 MG/1
1 TABLET, FILM COATED ORAL
Qty: 0 | Refills: 0 | DISCHARGE

## 2019-10-15 RX ORDER — RAMIPRIL 5 MG
1 CAPSULE ORAL
Qty: 0 | Refills: 0 | DISCHARGE

## 2019-10-15 NOTE — H&P PST ADULT - HISTORY OF PRESENT ILLNESS
78 year old [place] male with pmhx of Afib on Eliquis (last taken in am 10/15/19), pericardial effusion s/p pericardial window (2017), NICM, bilateral pleural effusion s/p thoracocentesis (2017), Type 2 DM, HTN, HLD and BPH. Presents to PST w/  apreop dx of unilateral hernia, without obstruction or gangrene not specified as recurrent and to be evaluated for a scheduled left IHR on 10/22/19.  Pt states "for a while pain to left groin noted and diagnosed w/ a hernia but didn't want sx until recently that pain worsen and re evaluated by Dr Marsh who recommended surgical intervention at this time. 78 year old Spanish male with pmhx of Afib on Eliquis (last taken in am 10/15/19), pericardial effusion s/p pericardial window (2017), NICM, bilateral pleural effusion s/p thoracocentesis (2017), Type 2 DM, HTN, HLD and BPH. Presents to PST w/  apreop dx of unilateral hernia, without obstruction or gangrene not specified as recurrent and to be evaluated for a scheduled left IHR on 10/22/19.  Pt states "for a while had pain to left groin and diagnosed w/ a left Inguinal hernia but didn't want sx until recently that pain worsen" and was re evaluated by Dr Marsh who recommended surgical intervention at this time.

## 2019-10-15 NOTE — HISTORY OF PRESENT ILLNESS
[FreeTextEntry1] : Mr Gonzales is a 79 year old man followed for hydrocele, past abnormal urine cytology, and BPH. The patient had a hydrocelectomy in October of 2014. Fluid cytology 06/08/16 was abnormal. Fluid cytology 12/07/16 and 12/19/16 were negative for malignant cells. The patient takes finasteride 5 mg, oxybutynin, ER 10 mg, and doxazosin 8 mg once daily. Cystoscopy 12/19/16 found 2+ trabeculation of the bladder. The prostate protruded moderately into the bladder. No bladder stones or tumors. The left and right ureteral orifice visualized and were very close to prostate. I advised him to discontinue taking oxybutynin. On 11/10/17 the patient made 2500 cc of urine. The patient will now begin taking tamsulosin HCl 0.4 MG  one capsule one half hour after breakfast and doxazosin at night. He was able to urinate well over night without any pain. \par 12/10/18: The patient returned today and reported that his urination is satisfactory. Currently takes Finasteride, Tamsulosin and Doxazosin. Complains of intermittent stream and urge incontinence. Denies dysuria and gross hematuria.\par  6/10/19: Patient presents for 6 month follow up. Currently on Proscar and Doxazosin. States that he has no need to strain to initiate urination but flow is intermittent. Reports bothersome urinary urgency/frequency with occasional UUI. Nocturia. Regarding his anemia it is reported that it was evaluated by his PCP and he was told that it will be monitored but is nothing to worry about at this time. \par 6/26/19: Patient presents today for UDS and a cystoscopy. \par 7/3/19: Patient presents today for an ultrasound of the prostate. He is here today to discuss the results. His urination is the same as it was at his last visit. He wake up 4x a night to urinate. During the day he urinates 5-6x. He denies dysuria, gross hematuria, urgency or incontinence. Following the cystoscopy he noticed a small amount of blood and tiny clots but they have since resolved. He is satisfied with his urination at this time. \par 10/8/19: Patient presents today for a renal ultrasound and is here to discuss the results. Doxazosin, Finasteride and Tamsulosin were all prescribed previously. It is reported today that he has only been taking the Doxazosin and the Finasteride. His urination is still troublesome. Nocturia x 4-5 is reported. \par \par 10/15/19: Patient presents today for a follow up. His most recent PSA from 10/8/19 was 5.14 ng/mL a slight increase when compared to the last PSA. Finasteride is currently taken. When adjusting for the use of Finasteride his PSA is around 10 ng/mL. He is scheduled for surgery with Dr. Rolon next week.

## 2019-10-15 NOTE — H&P PST ADULT - NEGATIVE GENERAL GENITOURINARY SYMPTOMS
no dysuria/no flank pain R/no bladder infections/no renal colic/no flank pain L/no urine discoloration/no hematuria

## 2019-10-15 NOTE — H&P PST ADULT - NEGATIVE OPHTHALMOLOGIC SYMPTOMS
no discharge L/no pain L/no irritation R/no lacrimation L/no lacrimation R/no blurred vision L/no pain R/no irritation L/no loss of vision L/no loss of vision R/no scleral injection L/no scleral injection R/no photophobia/no blurred vision R/no diplopia/no discharge R

## 2019-10-15 NOTE — H&P PST ADULT - NEGATIVE GASTROINTESTINAL SYMPTOMS
no hematochezia/no vomiting/no melena/no jaundice/no constipation/no steatorrhea/no hiccoughs/no nausea/no flatulence/no abdominal pain/no diarrhea/no change in bowel habits

## 2019-10-15 NOTE — H&P PST ADULT - NEGATIVE PSYCHIATRIC SYMPTOMS
no memory loss/no auditory hallucinations/no hyperactivity/no suicidal ideation/no anxiety/no depression/no insomnia/no mood swings/no agitation/no visual hallucinations

## 2019-10-15 NOTE — H&P PST ADULT - NSICDXPASTMEDICALHX_GEN_ALL_CORE_FT
PAST MEDICAL HISTORY:  Afib     BPH (benign prostatic hyperplasia)     Hernia, inguinal, right     HTN (hypertension)     Hyperlipidemia     T2DM (type 2 diabetes mellitus) > 20 yrs

## 2019-10-15 NOTE — H&P PST ADULT - NSICDXPROBLEM_GEN_ALL_CORE_FT
PROBLEM DIAGNOSES  Problem: Unilateral inguinal hernia without obstruction or gangrene  Assessment and Plan: preop instructions provided including NPO status, pepcid and hibiclense wash. Aware to stop fish oil, melatonin and MVI on 10/15/19. C?W all meds but eliquis needs stop date clarification from Dr Summers on CC tomorrow and aware to take last DM meds (metformin, januvia and glipizide on 10/21 am doses only, hold any pm doses and ot to take any on dos on 10/22/19). Verbilized understanding. HAs [facility] and [facility] scheduled on 10/19 forms provided. FS ordered for am dos.

## 2019-10-15 NOTE — H&P PST ADULT - NSICDXPASTSURGICALHX_GEN_ALL_CORE_FT
PAST SURGICAL HISTORY:  H/O hernia repair RIHR    S/P cataract surgery bilateral 4 years ago    S/P cataract surgery     S/P repair of hydrocele     Status post creation of pericardial window

## 2019-10-15 NOTE — H&P PST ADULT - NEGATIVE MUSCULOSKELETAL SYMPTOMS
no myalgia/no muscle cramps/no neck pain/no leg pain R/no arthralgia/no back pain/no muscle weakness/no arthritis/no arm pain L/no stiffness/no arm pain R/no leg pain L

## 2019-10-15 NOTE — H&P PST ADULT - GASTROINTESTINAL DETAILS
no distention/no rebound tenderness/no rigidity/bowel sounds normal/soft/no bruit/no guarding/nontender/no organomegaly

## 2019-10-15 NOTE — H&P PST ADULT - RS GEN PE MLT RESP DETAILS PC
clear to auscultation bilaterally/no chest wall tenderness/no intercostal retractions/no rhonchi/no subcutaneous emphysema/no wheezes/no rales/breath sounds equal/good air movement

## 2019-10-15 NOTE — PHYSICAL EXAM
[General Appearance - Well Nourished] : well nourished [General Appearance - Well Developed] : well developed [Well Groomed] : well groomed [Normal Appearance] : normal appearance [General Appearance - In No Acute Distress] : no acute distress [Abdomen Soft] : soft [Abdomen Tenderness] : non-tender [Costovertebral Angle Tenderness] : no ~M costovertebral angle tenderness [Skin Color & Pigmentation] : normal skin color and pigmentation [Edema] : no peripheral edema [Heart Rate And Rhythm] : Heart rate and rhythm were normal [Exaggerated Use Of Accessory Muscles For Inspiration] : no accessory muscle use [] : no respiratory distress [Oriented To Time, Place, And Person] : oriented to person, place, and time [Mood] : the mood was normal [Affect] : the affect was normal [Not Anxious] : not anxious [Normal Station and Gait] : the gait and station were normal for the patient's age [No Focal Deficits] : no focal deficits [Supraclavicular Lymph Nodes Enlarged Bilaterally] : supraclavicular [Cervical Lymph Nodes Enlarged Posterior Bilaterally] : posterior cervical [Cervical Lymph Nodes Enlarged Anterior Bilaterally] : anterior cervical [FreeTextEntry1] : hand  5/5 bilaterally. Smile Symmetric. Tongue is Midline.\par  \par

## 2019-10-15 NOTE — H&P PST ADULT - ASSESSMENT
DX: DX:  unilateral hernia, without obstruction or gangrene not specified as recurrent and evaluated for a scheduled left IHR on 10/22/19.

## 2019-10-15 NOTE — H&P PST ADULT - NEGATIVE SKIN SYMPTOMS
no itching/no rash/no dryness/no brittle nails/no hair loss/no change in size/color of mole/no tumor/no pitted nails

## 2019-10-15 NOTE — H&P PST ADULT - NEGATIVE ENMT SYMPTOMS
no sinus symptoms/no vertigo/no gum bleeding/no throat pain/no dysphagia/no dry mouth/no tinnitus/no hearing difficulty/no nasal congestion/no nasal obstruction/no post-nasal discharge/no nose bleeds/no abnormal taste sensation/no ear pain/no nasal discharge/no recurrent cold sores

## 2019-10-15 NOTE — REVIEW OF SYSTEMS
[Incontinence] : incontinence [Feeling Poorly] : not feeling poorly [Chest Pain] : no chest pain [Diarrhea] : no diarrhea [Constipation] : no constipation [Dysuria] : no dysuria

## 2019-10-15 NOTE — H&P PST ADULT - NEGATIVE CARDIOVASCULAR SYMPTOMS
no paroxysmal nocturnal dyspnea/no orthopnea/no palpitations/no chest pain/no dyspnea on exertion/no peripheral edema/no claudication

## 2019-10-15 NOTE — H&P PST ADULT - NSANTHOSAYNRD_GEN_A_CORE
never tested/No. GABRIELLA screening performed.  STOP BANG Legend: 0-2 = LOW Risk; 3-4 = INTERMEDIATE Risk; 5-8 = HIGH Risk

## 2019-10-15 NOTE — ASSESSMENT
[FreeTextEntry1] : Mr Gonzales is a 79 year old man followed for hydrocele, past abnormal urine cytology, and BPH. The patient had a hydrocelectomy in October of 2014. Fluid cytology 06/08/16 was abnormal. Fluid cytology 12/07/16 and 12/19/16 were negative for malignant cells. The patient takes finasteride 5 mg, oxybutynin, ER 10 mg, and doxazosin 8 mg once daily. Cystoscopy 12/19/16 found 2+ trabeculation of the bladder. The prostate protruded moderately into the bladder. No bladder stones or tumors. The left and right ureteral orifice visualized and were very close to prostate. I advised him to discontinue taking oxybutynin. On 11/10/17 the patient made 2500 cc of urine. The patient will now begin taking tamsulosin HCl 0.4 MG  one capsule one half hour after breakfast and doxazosin at night. He was able to urinate well over night without any pain. \par 12/10/18: The patient returned today and reported that his urination is satisfactory. Currently takes Finasteride, Tamsulosin and Doxazosin. Complains of intermittent stream and urge incontinence. Denies dysuria and gross hematuria.\par  6/10/19: Patient presents for 6 month follow up. Currently on Proscar and Doxazosin. States that he has no need to strain to initiate urination but flow is intermittent. Reports bothersome urinary urgency/frequency with occasional UUI. Nocturia. Regarding his anemia it is reported that it was evaluated by his PCP and he was told that it will be monitored but is nothing to worry about at this time. \par 6/26/19: Patient presents today for UDS and a cystoscopy. \par 7/3/19: Patient presents today for an ultrasound of the prostate. He is here today to discuss the results. His urination is the same as it was at his last visit. He wake up 4x a night to urinate. During the day he urinates 5-6x. He denies dysuria, gross hematuria, urgency or incontinence. Following the cystoscopy he noticed a small amount of blood and tiny clots but they have since resolved. He is satisfied with his urination at this time. \par 10/8/19: Patient presents today for a renal ultrasound and is here to discuss the results. Doxazosin, Finasteride and Tamsulosin were all prescribed previously. It is reported today that he has only been taking the Doxazosin and the Finasteride. His urination is still troublesome. Nocturia x 4-5 is reported. \par \par 10/15/19: Patient presents today for a follow up. His most recent PSA from 10/8/19 was 5.14 ng/mL a slight increase when compared to the last PSA. Finasteride is currently taken. When adjusting for the use of Finasteride his PSA is around 10 ng/mL. He is scheduled for surgery with Dr. Rolon next week. \par \par I have advised that he follow Dr. Rolon's care instructions for the next few weeks following his procedure. \par \par Once he is finished with Dr. Rolon's treatment he is to begin a Bactrim regimen that is prescribed today. If his PSA does not decrease after finishing the Bactrim regimen I will consider ordering a needle biopsy of the prostate. \par \par He is to follow up in 1 month or sooner if clinically indicated.

## 2019-10-15 NOTE — H&P PST ADULT - NEGATIVE GENERAL SYMPTOMS
no weight gain/no chills/no polydipsia/no polyuria/no sweating/no anorexia/no malaise/no weight loss/no polyphagia/no fatigue/no fever

## 2019-10-16 ENCOUNTER — APPOINTMENT (OUTPATIENT)
Dept: CARDIOLOGY | Facility: CLINIC | Age: 79
End: 2019-10-16
Payer: MEDICARE

## 2019-10-16 ENCOUNTER — NON-APPOINTMENT (OUTPATIENT)
Age: 79
End: 2019-10-16

## 2019-10-16 ENCOUNTER — APPOINTMENT (OUTPATIENT)
Dept: UROLOGY | Facility: CLINIC | Age: 79
End: 2019-10-16

## 2019-10-16 VITALS
DIASTOLIC BLOOD PRESSURE: 78 MMHG | WEIGHT: 182 LBS | HEART RATE: 72 BPM | SYSTOLIC BLOOD PRESSURE: 155 MMHG | OXYGEN SATURATION: 93 % | HEIGHT: 70 IN | BODY MASS INDEX: 26.05 KG/M2

## 2019-10-16 PROCEDURE — 99214 OFFICE O/P EST MOD 30 MIN: CPT

## 2019-10-16 PROCEDURE — 93000 ELECTROCARDIOGRAM COMPLETE: CPT

## 2019-10-16 NOTE — HISTORY OF PRESENT ILLNESS
[FreeTextEntry1] : 79 year old with a history of atrial fibrillation since last year on xarelto who comes in for evaluation.  No KEV, PND or orthopnea.  has not been walking more than 2 block.  No chest pain.  Did a trip without any issues.  Scheduled for a hernai surgery.

## 2019-10-16 NOTE — DISCUSSION/SUMMARY
[FreeTextEntry1] : 1.  atrial fib -  now in nsr.  Angiogram was nonobstructive in May, 2019.  He is at acceptable risk to proceed with his hernia surgery and can stop his eliquis 3-5 days before the procedure.

## 2019-10-16 NOTE — PHYSICAL EXAM
[General Appearance - Well Developed] : well developed [Well Groomed] : well groomed [General Appearance - Well Nourished] : well nourished [Normal Appearance] : normal appearance [General Appearance - In No Acute Distress] : no acute distress [No Deformities] : no deformities [Normal Conjunctiva] : the conjunctiva exhibited no abnormalities [Eyelids - No Xanthelasma] : the eyelids demonstrated no xanthelasmas [Normal Oral Mucosa] : normal oral mucosa [No Oral Pallor] : no oral pallor [No Oral Cyanosis] : no oral cyanosis [Normal Jugular Venous A Waves Present] : normal jugular venous A waves present [No Jugular Venous Burns A Waves] : no jugular venous burns A waves [Normal Jugular Venous V Waves Present] : normal jugular venous V waves present [Respiration, Rhythm And Depth] : normal respiratory rhythm and effort [Auscultation Breath Sounds / Voice Sounds] : lungs were clear to auscultation bilaterally [Exaggerated Use Of Accessory Muscles For Inspiration] : no accessory muscle use [FreeTextEntry1] : irreg irreg [Abdomen Soft] : soft [Abdomen Tenderness] : non-tender [Gait - Sufficient For Exercise Testing] : the gait was sufficient for exercise testing [Abdomen Mass (___ Cm)] : no abdominal mass palpated [Abnormal Walk] : normal gait [Cyanosis, Localized] : no localized cyanosis [Nail Clubbing] : no clubbing of the fingernails [Petechial Hemorrhages (___cm)] : no petechial hemorrhages [] : no ischemic changes

## 2019-10-21 ENCOUNTER — TRANSCRIPTION ENCOUNTER (OUTPATIENT)
Age: 79
End: 2019-10-21

## 2019-10-21 NOTE — ASU PATIENT PROFILE, ADULT - PSH
H/O hernia repair  RIHR  S/P cataract surgery    S/P cataract surgery  bilateral 4 years ago  S/P repair of hydrocele    Status post creation of pericardial window

## 2019-10-22 ENCOUNTER — OUTPATIENT (OUTPATIENT)
Dept: OUTPATIENT SERVICES | Facility: HOSPITAL | Age: 79
LOS: 1 days | Discharge: HOME | End: 2019-10-22
Payer: MEDICARE

## 2019-10-22 ENCOUNTER — RESULT REVIEW (OUTPATIENT)
Age: 79
End: 2019-10-22

## 2019-10-22 VITALS
HEART RATE: 82 BPM | DIASTOLIC BLOOD PRESSURE: 84 MMHG | SYSTOLIC BLOOD PRESSURE: 153 MMHG | RESPIRATION RATE: 20 BRPM | OXYGEN SATURATION: 96 %

## 2019-10-22 VITALS
HEART RATE: 83 BPM | DIASTOLIC BLOOD PRESSURE: 73 MMHG | OXYGEN SATURATION: 97 % | RESPIRATION RATE: 16 BRPM | HEIGHT: 70 IN | TEMPERATURE: 98 F | WEIGHT: 182.1 LBS | SYSTOLIC BLOOD PRESSURE: 142 MMHG

## 2019-10-22 DIAGNOSIS — Z98.890 OTHER SPECIFIED POSTPROCEDURAL STATES: Chronic | ICD-10-CM

## 2019-10-22 DIAGNOSIS — Z98.49 CATARACT EXTRACTION STATUS, UNSPECIFIED EYE: Chronic | ICD-10-CM

## 2019-10-22 DIAGNOSIS — K40.90 UNILATERAL INGUINAL HERNIA, WITHOUT OBSTRUCTION OR GANGRENE, NOT SPECIFIED AS RECURRENT: ICD-10-CM

## 2019-10-22 LAB — GLUCOSE BLDC GLUCOMTR-MCNC: 157 MG/DL — HIGH (ref 70–99)

## 2019-10-22 PROCEDURE — 88302 TISSUE EXAM BY PATHOLOGIST: CPT | Mod: 26

## 2019-10-22 RX ORDER — SODIUM CHLORIDE 9 MG/ML
1000 INJECTION, SOLUTION INTRAVENOUS
Refills: 0 | Status: DISCONTINUED | OUTPATIENT
Start: 2019-10-22 | End: 2019-11-09

## 2019-10-22 RX ORDER — OXYCODONE HYDROCHLORIDE 5 MG/1
1 TABLET ORAL
Qty: 12 | Refills: 0
Start: 2019-10-22 | End: 2019-10-23

## 2019-10-22 NOTE — ASU DISCHARGE PLAN (ADULT/PEDIATRIC) - CARE PROVIDER_API CALL
Ovi Rolon)  Surgery  [address], [address]  [place] [address]  Phone: [phone]  Fax: [phone]  Follow Up Time: 2 weeks

## 2019-10-22 NOTE — ASU DISCHARGE PLAN (ADULT/PEDIATRIC) - MEDICATION INSTRUCTIONS
Please Take Tylenol and Motrin in an alternating fashion to control pain. Please take Oxycodone as needed for severe pain

## 2019-10-22 NOTE — BRIEF OPERATIVE NOTE - NSICDXBRIEFPOSTOP_GEN_ALL_CORE_FT
POST-OP DIAGNOSIS:  Non-recurrent inguinal hernia of left side without obstruction or gangrene 22-Oct-2019 10:00:28  Juan Rolon

## 2019-10-22 NOTE — ASU DISCHARGE PLAN (ADULT/PEDIATRIC) - CALL YOUR DOCTOR IF YOU HAVE ANY OF THE FOLLOWING:
Wound/Surgical Site with redness, or foul smelling discharge or pus/Swelling that gets worse/Bleeding that does not stop Fever greater than (need to indicate Fahrenheit or Celsius)/Bleeding that does not stop/Wound/Surgical Site with redness, or foul smelling discharge or pus/Swelling that gets worse

## 2019-10-29 LAB — SURGICAL PATHOLOGY STUDY: SIGNIFICANT CHANGE UP

## 2019-11-10 LAB
PSA FREE FLD-MCNC: 28 %
PSA FREE SERPL-MCNC: 1.46 NG/ML
PSA SERPL-MCNC: 5.14 NG/ML

## 2019-11-17 LAB
PSA FREE FLD-MCNC: 24 %
PSA FREE SERPL-MCNC: 1.24 NG/ML
PSA SERPL-MCNC: 5.15 NG/ML

## 2019-11-20 ENCOUNTER — APPOINTMENT (OUTPATIENT)
Dept: UROLOGY | Facility: CLINIC | Age: 79
End: 2019-11-20
Payer: MEDICARE

## 2019-11-20 PROCEDURE — 99214 OFFICE O/P EST MOD 30 MIN: CPT

## 2019-11-20 NOTE — ASSESSMENT
[FreeTextEntry1] : Mr Gonzales is a 79 year old man followed for hydrocele, past abnormal urine cytology, and BPH. The patient had a hydrocelectomy in October of 2014. Fluid cytology 06/08/16 was abnormal. Fluid cytology 12/07/16 and 12/19/16 were negative for malignant cells. The patient takes finasteride 5 mg, oxybutynin, ER 10 mg, and doxazosin 8 mg once daily. Cystoscopy 12/19/16 found 2+ trabeculation of the bladder. The prostate protruded moderately into the bladder. No bladder stones or tumors. The left and right ureteral orifice visualized and were very close to prostate. I advised him to discontinue taking oxybutynin. On 11/10/17 the patient made 2500 cc of urine. The patient will now begin taking tamsulosin HCl 0.4 MG  one capsule one half hour after breakfast and doxazosin at night. He was able to urinate well over night without any pain. \par 12/10/18: The patient returned today and reported that his urination is satisfactory. Currently takes Finasteride, Tamsulosin and Doxazosin. Complains of intermittent stream and urge incontinence. Denies dysuria and gross hematuria.\par  6/10/19: Patient presents for 6 month follow up. Currently on Proscar and Doxazosin. States that he has no need to strain to initiate urination but flow is intermittent. Reports bothersome urinary urgency/frequency with occasional UUI. Nocturia. Regarding his anemia it is reported that it was evaluated by his PCP and he was told that it will be monitored but is nothing to worry about at this time. \par 6/26/19: Patient presents today for UDS and a cystoscopy. \par 7/3/19: Patient presents today for an ultrasound of the prostate. He is here today to discuss the results. His urination is the same as it was at his last visit. He wake up 4x a night to urinate. During the day he urinates 5-6x. He denies dysuria, gross hematuria, urgency or incontinence. Following the cystoscopy he noticed a small amount of blood and tiny clots but they have since resolved. He is satisfied with his urination at this time. \par 10/8/19: Patient presents today for a renal ultrasound and is here to discuss the results. Doxazosin, Finasteride and Tamsulosin were all prescribed previously. It is reported today that he has only been taking the Doxazosin and the Finasteride. His urination is still troublesome. Nocturia x 4-5 is reported. \par \par 10/15/19: Patient presents today for a follow up. His most recent PSA from 10/8/19 was 5.14 ng/mL a slight increase when compared to the last PSA. Finasteride is currently taken. When adjusting for the use of Finasteride his PSA is around 10 ng/mL. He is scheduled for surgery with Dr. Rolon next week. \par \par 11/20/19: Patient presents today for follow-up. He has completed surgery with Dr. Rolon and is improving well. He has finished his Sulfamethoxazole-Trimethoprim. His recent PSA on 11/12 was 5.15 ng/mL. He denies having a pacemaker. Patient has previous abnormal ultrasound and elevated PSA.\par \par The patient produced a urine sample which will be sent for urinalysis, urine cytology, and urine culture.\par Patient will obtain MRI imaging to evaluate for malignancy.\par \par Patient will follow up 1 week  after his MRI.

## 2019-11-20 NOTE — HISTORY OF PRESENT ILLNESS
[FreeTextEntry1] : Mr Gonzales is a 79 year old man followed for hydrocele, past abnormal urine cytology, and BPH. The patient had a hydrocelectomy in October of 2014. Fluid cytology 06/08/16 was abnormal. Fluid cytology 12/07/16 and 12/19/16 were negative for malignant cells. The patient takes finasteride 5 mg, oxybutynin, ER 10 mg, and doxazosin 8 mg once daily. Cystoscopy 12/19/16 found 2+ trabeculation of the bladder. The prostate protruded moderately into the bladder. No bladder stones or tumors. The left and right ureteral orifice visualized and were very close to prostate. I advised him to discontinue taking oxybutynin. On 11/10/17 the patient made 2500 cc of urine. The patient will now begin taking tamsulosin HCl 0.4 MG  one capsule one half hour after breakfast and doxazosin at night. He was able to urinate well over night without any pain. \par 12/10/18: The patient returned today and reported that his urination is satisfactory. Currently takes Finasteride, Tamsulosin and Doxazosin. Complains of intermittent stream and urge incontinence. Denies dysuria and gross hematuria.\par  6/10/19: Patient presents for 6 month follow up. Currently on Proscar and Doxazosin. States that he has no need to strain to initiate urination but flow is intermittent. Reports bothersome urinary urgency/frequency with occasional UUI. Nocturia. Regarding his anemia it is reported that it was evaluated by his PCP and he was told that it will be monitored but is nothing to worry about at this time. \par 6/26/19: Patient presents today for UDS and a cystoscopy. \par 7/3/19: Patient presents today for an ultrasound of the prostate. He is here today to discuss the results. His urination is the same as it was at his last visit. He wake up 4x a night to urinate. During the day he urinates 5-6x. He denies dysuria, gross hematuria, urgency or incontinence. Following the cystoscopy he noticed a small amount of blood and tiny clots but they have since resolved. He is satisfied with his urination at this time. \par 10/8/19: Patient presents today for a renal ultrasound and is here to discuss the results. Doxazosin, Finasteride and Tamsulosin were all prescribed previously. It is reported today that he has only been taking the Doxazosin and the Finasteride. His urination is still troublesome. Nocturia x 4-5 is reported. \par \par 10/15/19: Patient presents today for a follow up. His most recent PSA from 10/8/19 was 5.14 ng/mL a slight increase when compared to the last PSA. Finasteride is currently taken. When adjusting for the use of Finasteride his PSA is around 10 ng/mL. He is scheduled for surgery with Dr. Rolon next week. \par \par 11/20/19: Patient presents today for follow-up. He has completed surgery with Dr. Rolon and is improving well. He has finished his Sulfamethoxazole-Trimethoprim. His recent PSA on 11/12 was 5.15 ng/mL. He denies having a pacemaker. Patient has previous abnormal ultrasound and elevated PSA.

## 2019-11-20 NOTE — PHYSICAL EXAM
[General Appearance - Well Nourished] : well nourished [General Appearance - Well Developed] : well developed [Well Groomed] : well groomed [Normal Appearance] : normal appearance [General Appearance - In No Acute Distress] : no acute distress [Abdomen Soft] : soft [Costovertebral Angle Tenderness] : no ~M costovertebral angle tenderness [Abdomen Tenderness] : non-tender [Skin Color & Pigmentation] : normal skin color and pigmentation [Heart Rate And Rhythm] : Heart rate and rhythm were normal [Edema] : no peripheral edema [] : no respiratory distress [Exaggerated Use Of Accessory Muscles For Inspiration] : no accessory muscle use [Affect] : the affect was normal [Oriented To Time, Place, And Person] : oriented to person, place, and time [Not Anxious] : not anxious [Mood] : the mood was normal [Normal Station and Gait] : the gait and station were normal for the patient's age [No Focal Deficits] : no focal deficits [Cervical Lymph Nodes Enlarged Posterior Bilaterally] : posterior cervical [Cervical Lymph Nodes Enlarged Anterior Bilaterally] : anterior cervical [Supraclavicular Lymph Nodes Enlarged Bilaterally] : supraclavicular [FreeTextEntry1] : hand  5/5 bilaterally. Smile Symmetric. Tongue is Midline.\par  \par

## 2019-11-20 NOTE — ADDENDUM
[FreeTextEntry1] : This note was authored by Rony Carbajal working as scribe for Dr. Chris Sibley. I, Dr. Chris Sibley, have reviewed the content of this note and confirm it is true and accurate. I personally performed the history and physical examination and made all the decisions.\par 11/20/19

## 2019-11-21 LAB
APPEARANCE: CLEAR
BACTERIA: NEGATIVE
BILIRUBIN URINE: NEGATIVE
BLOOD URINE: NEGATIVE
COLOR: NORMAL
GLUCOSE QUALITATIVE U: NEGATIVE
HYALINE CASTS: 0 /LPF
KETONES URINE: NEGATIVE
LEUKOCYTE ESTERASE URINE: NEGATIVE
MICROSCOPIC-UA: NORMAL
NITRITE URINE: NEGATIVE
PH URINE: 7.5
PROTEIN URINE: NEGATIVE
RED BLOOD CELLS URINE: 0 /HPF
SPECIFIC GRAVITY URINE: 1.01
SQUAMOUS EPITHELIAL CELLS: 0 /HPF
UROBILINOGEN URINE: NORMAL
WHITE BLOOD CELLS URINE: 0 /HPF

## 2019-11-22 LAB — BACTERIA UR CULT: NORMAL

## 2019-12-16 ENCOUNTER — FORM ENCOUNTER (OUTPATIENT)
Age: 79
End: 2019-12-16

## 2019-12-17 ENCOUNTER — APPOINTMENT (OUTPATIENT)
Dept: MRI IMAGING | Facility: IMAGING CENTER | Age: 79
End: 2019-12-17
Payer: MEDICARE

## 2019-12-17 ENCOUNTER — OUTPATIENT (OUTPATIENT)
Dept: OUTPATIENT SERVICES | Facility: HOSPITAL | Age: 79
LOS: 1 days | End: 2019-12-17
Payer: MEDICARE

## 2019-12-17 DIAGNOSIS — Z98.890 OTHER SPECIFIED POSTPROCEDURAL STATES: Chronic | ICD-10-CM

## 2019-12-17 DIAGNOSIS — Z98.49 CATARACT EXTRACTION STATUS, UNSPECIFIED EYE: Chronic | ICD-10-CM

## 2019-12-17 DIAGNOSIS — R97.20 ELEVATED PROSTATE SPECIFIC ANTIGEN [PSA]: ICD-10-CM

## 2019-12-17 DIAGNOSIS — R93.89 ABNORMAL FINDINGS ON DIAGNOSTIC IMAGING OF OTHER SPECIFIED BODY STRUCTURES: ICD-10-CM

## 2019-12-17 PROCEDURE — 72197 MRI PELVIS W/O & W/DYE: CPT | Mod: 26

## 2019-12-17 PROCEDURE — A9585: CPT

## 2019-12-17 PROCEDURE — 72197 MRI PELVIS W/O & W/DYE: CPT

## 2019-12-23 ENCOUNTER — MEDICATION RENEWAL (OUTPATIENT)
Age: 79
End: 2019-12-23

## 2019-12-30 ENCOUNTER — APPOINTMENT (OUTPATIENT)
Dept: UROLOGY | Facility: CLINIC | Age: 79
End: 2019-12-30
Payer: MEDICARE

## 2019-12-30 LAB
PSA FREE FLD-MCNC: 38 %
PSA FREE SERPL-MCNC: 1.89 NG/ML
PSA SERPL-MCNC: 5.03 NG/ML

## 2019-12-30 PROCEDURE — 99214 OFFICE O/P EST MOD 30 MIN: CPT

## 2019-12-30 NOTE — PHYSICAL EXAM
[General Appearance - Well Developed] : well developed [General Appearance - Well Nourished] : well nourished [Well Groomed] : well groomed [Normal Appearance] : normal appearance [Abdomen Soft] : soft [General Appearance - In No Acute Distress] : no acute distress [Abdomen Tenderness] : non-tender [Costovertebral Angle Tenderness] : no ~M costovertebral angle tenderness [Skin Color & Pigmentation] : normal skin color and pigmentation [Heart Rate And Rhythm] : Heart rate and rhythm were normal [Edema] : no peripheral edema [] : no respiratory distress [Oriented To Time, Place, And Person] : oriented to person, place, and time [Affect] : the affect was normal [Exaggerated Use Of Accessory Muscles For Inspiration] : no accessory muscle use [Not Anxious] : not anxious [Mood] : the mood was normal [Normal Station and Gait] : the gait and station were normal for the patient's age [No Focal Deficits] : no focal deficits [Cervical Lymph Nodes Enlarged Anterior Bilaterally] : anterior cervical [Cervical Lymph Nodes Enlarged Posterior Bilaterally] : posterior cervical [Supraclavicular Lymph Nodes Enlarged Bilaterally] : supraclavicular [FreeTextEntry1] : hand  5/5 bilaterally. Smile Symmetric. Tongue is Midline.\par  \par

## 2019-12-30 NOTE — HISTORY OF PRESENT ILLNESS
[FreeTextEntry1] : Mr Gonzales is a 79 year old man followed for hydrocele, past abnormal urine cytology, and BPH. The patient had a hydrocelectomy in October of 2014. Fluid cytology 06/08/16 was abnormal. Fluid cytology 12/07/16 and 12/19/16 were negative for malignant cells. The patient takes finasteride 5 mg, oxybutynin, ER 10 mg, and doxazosin 8 mg once daily. Cystoscopy 12/19/16 found 2+ trabeculation of the bladder. The prostate protruded moderately into the bladder. No bladder stones or tumors. The left and right ureteral orifice visualized and were very close to prostate. I advised him to discontinue taking oxybutynin. On 11/10/17 the patient made 2500 cc of urine. The patient will now begin taking tamsulosin HCl 0.4 MG  one capsule one half hour after breakfast and doxazosin at night. He was able to urinate well over night without any pain. \par 12/10/18: The patient returned today and reported that his urination is satisfactory. Currently takes Finasteride, Tamsulosin and Doxazosin. Complains of intermittent stream and urge incontinence. Denies dysuria and gross hematuria.\par  6/10/19: Patient presents for 6 month follow up. Currently on Proscar and Doxazosin. States that he has no need to strain to initiate urination but flow is intermittent. Reports bothersome urinary urgency/frequency with occasional UUI. Nocturia. Regarding his anemia it is reported that it was evaluated by his PCP and he was told that it will be monitored but is nothing to worry about at this time. \par 6/26/19: Patient presents today for UDS and a cystoscopy. \par 7/3/19: Patient presents today for an ultrasound of the prostate. He is here today to discuss the results. His urination is the same as it was at his last visit. He wake up 4x a night to urinate. During the day he urinates 5-6x. He denies dysuria, gross hematuria, urgency or incontinence. Following the cystoscopy he noticed a small amount of blood and tiny clots but they have since resolved. He is satisfied with his urination at this time. \par 10/8/19: Patient presents today for a renal ultrasound and is here to discuss the results. Doxazosin, Finasteride and Tamsulosin were all prescribed previously. It is reported today that he has only been taking the Doxazosin and the Finasteride. His urination is still troublesome. Nocturia x 4-5 is reported. \par 10/15/19: Patient presents today for a follow up. His most recent PSA from 10/8/19 was 5.14 ng/mL a slight increase when compared to the last PSA. Finasteride is currently taken. When adjusting for the use of Finasteride his PSA is around 10 ng/mL. He is scheduled for surgery with Dr. Rolon next week. \par 11/20/19: Patient presents today for follow-up. He has completed surgery with Dr. Rolon and is improving well. He has finished his Sulfamethoxazole-Trimethoprim. His recent PSA on 11/12 was 5.15 ng/mL. He denies having a pacemaker. Patient has previous abnormal ultrasound and elevated PSA.\par \par 12/30/19: Patient presents today for a follow up. He reports waking up 2-3 times at night which is okay for him. He has been taking Finasteride and Tamsulosin which is working for him. He denies constipation or hematuria.

## 2019-12-30 NOTE — ASSESSMENT
[FreeTextEntry1] : 12/30/19: Patient presents today for a follow up. He reports waking up 2-3 times at night which is okay for him. He has been taking Finasteride and Tamsulosin which is working for him. He denies constipation or hematuria. \par \par The patient produced a urine sample which will be sent for urinalysis, urine cytology, and urine culture.\par Blood work today included CBC, PSA, and testosterone.\par \par \par Patient will follow up 3 months or sooner if clinically indicated.

## 2019-12-30 NOTE — ADDENDUM
[FreeTextEntry1] : This note was authored by Faith Gamble working as a scribe for Dr. Chris Sibley.\par \par I, Dr. Chris Sibley, have reviewed the content of this note and confirm it is true and accurate. I personally performed the history and physical examination and made all the decisions.\par

## 2019-12-31 LAB
APPEARANCE: CLEAR
BACTERIA UR CULT: NORMAL
BACTERIA: NEGATIVE
BILIRUBIN URINE: NEGATIVE
BLOOD URINE: NEGATIVE
COLOR: YELLOW
GLUCOSE QUALITATIVE U: NEGATIVE
HYALINE CASTS: 0 /LPF
KETONES URINE: NEGATIVE
LEUKOCYTE ESTERASE URINE: NEGATIVE
MICROSCOPIC-UA: NORMAL
NITRITE URINE: NEGATIVE
PH URINE: 8
PROTEIN URINE: NEGATIVE
RED BLOOD CELLS URINE: 1 /HPF
SPECIFIC GRAVITY URINE: 1.02
SQUAMOUS EPITHELIAL CELLS: 0 /HPF
UROBILINOGEN URINE: ABNORMAL
WHITE BLOOD CELLS URINE: 0 /HPF

## 2020-03-30 ENCOUNTER — APPOINTMENT (OUTPATIENT)
Dept: UROLOGY | Facility: CLINIC | Age: 80
End: 2020-03-30

## 2020-08-05 ENCOUNTER — NON-APPOINTMENT (OUTPATIENT)
Age: 80
End: 2020-08-05

## 2020-08-05 ENCOUNTER — APPOINTMENT (OUTPATIENT)
Dept: CARDIOLOGY | Facility: CLINIC | Age: 80
End: 2020-08-05
Payer: MEDICARE

## 2020-08-05 VITALS
HEART RATE: 72 BPM | SYSTOLIC BLOOD PRESSURE: 124 MMHG | HEIGHT: 70 IN | DIASTOLIC BLOOD PRESSURE: 71 MMHG | OXYGEN SATURATION: 97 %

## 2020-08-05 PROCEDURE — 99214 OFFICE O/P EST MOD 30 MIN: CPT

## 2020-08-05 PROCEDURE — 93000 ELECTROCARDIOGRAM COMPLETE: CPT

## 2020-08-05 RX ORDER — PRAVASTATIN SODIUM 40 MG/1
40 TABLET ORAL DAILY
Refills: 0 | Status: ACTIVE | COMMUNITY
Start: 2020-08-05

## 2020-08-05 RX ORDER — APIXABAN 5 MG/1
5 TABLET, FILM COATED ORAL
Refills: 5 | Status: DISCONTINUED | COMMUNITY
End: 2020-08-05

## 2020-08-05 RX ORDER — ATORVASTATIN CALCIUM 40 MG/1
40 TABLET, FILM COATED ORAL
Qty: 1 | Refills: 3 | Status: DISCONTINUED | COMMUNITY
Start: 2019-04-17 | End: 2020-08-05

## 2020-08-05 NOTE — PHYSICAL EXAM
[General Appearance - Well Developed] : well developed [Normal Appearance] : normal appearance [Well Groomed] : well groomed [General Appearance - In No Acute Distress] : no acute distress [No Deformities] : no deformities [General Appearance - Well Nourished] : well nourished [Eyelids - No Xanthelasma] : the eyelids demonstrated no xanthelasmas [Normal Conjunctiva] : the conjunctiva exhibited no abnormalities [No Oral Cyanosis] : no oral cyanosis [Normal Oral Mucosa] : normal oral mucosa [No Oral Pallor] : no oral pallor [Normal Jugular Venous A Waves Present] : normal jugular venous A waves present [Normal Jugular Venous V Waves Present] : normal jugular venous V waves present [No Jugular Venous Burns A Waves] : no jugular venous burns A waves [Respiration, Rhythm And Depth] : normal respiratory rhythm and effort [Exaggerated Use Of Accessory Muscles For Inspiration] : no accessory muscle use [Auscultation Breath Sounds / Voice Sounds] : lungs were clear to auscultation bilaterally [Abdomen Soft] : soft [FreeTextEntry1] : irreg irreg [Abdomen Mass (___ Cm)] : no abdominal mass palpated [Abdomen Tenderness] : non-tender [Nail Clubbing] : no clubbing of the fingernails [Abnormal Walk] : normal gait [Cyanosis, Localized] : no localized cyanosis [Gait - Sufficient For Exercise Testing] : the gait was sufficient for exercise testing [] : no ischemic changes [Petechial Hemorrhages (___cm)] : no petechial hemorrhages

## 2020-08-05 NOTE — HISTORY OF PRESENT ILLNESS
[FreeTextEntry1] : 79 year old with a history of atrial fibrillation since last year on eliquis who comes in for evaluation.  No KEV, PND or orthopnea.  has not been walking more than 2 block.  No chest pain.  Did a trip without any issues.  No dyspnea.  Has been trying to follow a diet.

## 2020-08-05 NOTE — DISCUSSION/SUMMARY
[FreeTextEntry1] : 1.  atrial fib -  now in nsr.  Angiogram was nonobstructive in May, 2019. Told him to stop his eliquis

## 2020-08-12 NOTE — H&P PST ADULT - HEIGHT IN FEET
August 12, 2020      Eli Soto, NP  [address]  [facility] [id number]  [facility] [address] Cardiology  [address] [facility] [address]  Phone: [phone]  Fax: [phone]          Patient: Sandra Flynn   MR Number: [id number]   YOB: 2019   Date of Visit: 8/12/2020       Dear Eli Soto:    Thank you for referring Sandra Flynn to me for evaluation. Attached you will find relevant portions of my assessment and plan of care.    If you have questions, please do not hesitate to call me. I look forward to following Sandra Flynn along with you.    Sincerely,    Sky Licea MD    Enclosure  CC:  No Recipients    If you would like to receive this communication electronically, please contact [email or web address] or [phone] to request more information on [facility] access.    For providers and/or their staff who would like to refer a patient to [facility], please contact us through our one-stop-shop provider referral line, [facility], at [phone].    If you feel you have received this communication in error or would no longer like to receive these types of communications, please e-mail [email or web address]         
[phone]

## 2020-10-19 NOTE — ED PROVIDER NOTE - NS ED MD DISPO ISOLATION TYPES
None Complex Repair And Flap Additional Text (Will Appearing After The Standard Complex Repair Text): The complex repair was not sufficient to completely close the primary defect. The remaining additional defect was repaired with the flap mentioned below.

## 2020-11-19 NOTE — ED PROVIDER NOTE - DATE/TIME 2
14-Dec-2017 04:32 O-L Flap Text: The defect edges were debeveled with a #15 scalpel blade.  Given the location of the defect, shape of the defect and the proximity to free margins an O-L flap was deemed most appropriate.  Using a sterile surgical marker, an appropriate advancement flap was drawn incorporating the defect and placing the expected incisions within the relaxed skin tension lines where possible.    The area thus outlined was incised deep to adipose tissue with a #15 scalpel blade.  The skin margins were undermined to an appropriate distance in all directions utilizing iris scissors.

## 2020-12-15 NOTE — H&P ADULT - PROBLEM/PLAN-8
Lee Meza is a 59 y.o. female who presents today for   Chief Complaint   Patient presents with    Annual Exam    Diabetes    Hypertension    Hyperlipidemia       HPI  58 y/o WF here for annual wellness and follow up on uncontrolled type II DM with hx of CAD, HTN, mixed hyperlipidemia, acquired hypothyroidism, primary insomnia, and chronic neck pain due to DDD with controlled med refill. She is still smoking 1 ppd and not ready to quit but she feels her mood has been very good since last visit and she has been doing very well with her diabetes and blood pressure control. She has been using her albuterol inhaler multiple times a day and during the night for past 3 weeks however for some SOA/wheezing but it does not seem to help as much as it has in the past. No fever or productive cough. Diabetes Mellitus Type 2: Current symptoms/problems include neuropathy. HbA1C is down to 7% from 11.6% three months ago. Home blood sugar records: fasting range: 150's-160's  Any episodes of hypoglycemia? no  Eye exam current (within one year): yes    Hypertension:  Home blood pressure monitoring: Yes - well controlled. She is adherent to a low sodium diet. Patient denies chest pain, peripheral edema and palpitations. Antihypertensive medication side effects: no medication side effects noted. Use of agents associated with hypertension: none. Hyperlipidemia:  No new myalgias or GI upset on rosuvastatin (Crestor). Hypothyroidism  Patient presents for follow up on thyroid function. Symptoms consist of denies fatigue, weight changes, heat/cold intolerance, bowel/skin changes or CVS symptoms. The problem has been stable. Patient has been compliant with levothyroxine.     Lab Results   Component Value Date    LABA1C 7.0 (H) 12/11/2020    LABA1C 11.6 (H) 09/03/2020    LABA1C 17.7 (H) 07/30/2020     Lab Results   Component Value Date    LABMICR <1.20 07/30/2020    CREATININE 0.8 12/11/2020     Lab Results Component Value Date    ALT 14 12/11/2020    AST 16 12/11/2020     Lab Results   Component Value Date    CHOL 140 (L) 12/11/2020    TRIG 140 12/11/2020    HDL 34 (L) 12/11/2020    LDLCALC 78 12/11/2020    LDLDIRECT 61 (L) 07/30/2020        Review of Systems   Constitutional: Negative for appetite change, chills, fatigue and fever. HENT: Negative for ear pain, rhinorrhea, sinus pressure, sore throat and voice change. Eyes: Negative for pain, discharge and redness. Respiratory: Negative for cough, chest tightness, shortness of breath and wheezing. Cardiovascular: Negative for chest pain and palpitations. Gastrointestinal: Negative for abdominal pain, blood in stool, diarrhea and vomiting. Endocrine: Negative for cold intolerance, heat intolerance and polydipsia. Genitourinary: Negative for dysuria and hematuria. Musculoskeletal: Positive for arthralgias, back pain and neck pain. Negative for myalgias and neck stiffness. See HPI   Skin: Negative for color change and rash. Neurological: Negative for dizziness, tremors, syncope, speech difficulty, weakness, numbness and headaches. Hematological: Negative for adenopathy. Does not bruise/bleed easily. Psychiatric/Behavioral: Positive for dysphoric mood and sleep disturbance. Negative for confusion, self-injury and suicidal ideas. The patient is nervous/anxious. All other systems reviewed and are negative.       Past Medical History:   Diagnosis Date    Abnormal stress test 2/24/2020    Adenomatous polyp 09/30/2009    Ankle fracture     left ankle    Arthritis     Bone density was normal    Atrial arrhythmia     B12 deficiency     CAD (coronary artery disease), native coronary artery     s/p stenting    Calcaneal spur     Carpal tunnel syndrome     no surgery    Closed fracture of right distal tibia     COPD [facility]chronic obstructive pulmonary disease) [facility])     still smoking    Depression with suicidal ideation 7/6/2018    Diabetes with meals 3x/day 1 vial 2    Insulin Syringe-Needle U-100 (INSULIN SYRINGE .3CC/31GX5/16\") 31G X 5/16\" 0.3 ML MISC For use with humalog insulin with meals 3x/day 100 each 3    pantoprazole (PROTONIX) 40 MG tablet TAKE 1 TABLET BY MOUTH TWO TIMES A DAY 60 tablet 5    DULoxetine (CYMBALTA) 30 MG extended release capsule TAKE 1 CAPSULE BY MOUTH DAILY AT BEDTIME 30 capsule 5    DULoxetine (CYMBALTA) 60 MG extended release capsule TAKE 1 CAPSULE BY MOUTH DAILY IN THE MORNING. 30 capsule 5    cyclobenzaprine (FLEXERIL) 10 MG tablet TAKE 1 TABLET BY MOUTH 3 TIMES A DAY AS NEEDED FOR MUSCLE SPASMS 60 tablet 2    Insulin Pen Needle (B-D UF III MINI PEN NEEDLES) 31G X 5 MM MISC USE AS DIRECTED. 100 each 2    gabapentin (NEURONTIN) 300 MG capsule TAKE 3 CAPSULES TWICE DAILY AS NEEDED 180 capsule 2    ondansetron (ZOFRAN) 8 MG tablet Take 1 tablet by mouth every 8 hours as needed for Nausea or Vomiting 30 tablet 3    losartan-hydroCHLOROthiazide (HYZAAR) 100-25 MG per tablet TAKE 1 TABLET BY MOUTH DAILY 30 tablet 5    ONETOUCH ULTRA strip Test 4 times a day & as needed for symptoms of irregular blood glucose.  100 strip 5    Cyanocobalamin (VITAMIN B12 PO) Take by mouth      Respiratory Therapy Supplies CICI Obstructive Sleep Apnea G47.33 1 Device 0    magnesium (MAGNESIUM-OXIDE) 250 MG TABS tablet Take 1 tablet by mouth 2 times daily 30 tablet 0    Coenzyme Q10 (CO Q 10) 100 MG CAPS Take by mouth      aspirin 81 MG tablet Take 81 mg by mouth daily      nitroGLYCERIN (NITROSTAT) 0.4 MG SL tablet Place 1 tablet under the tongue every 5 minutes as needed (chest pain) 25 tablet 5    Multiple Vitamins-Minerals (ICAPS AREDS 2 PO) Take 1 tablet by mouth 2 times daily       albuterol sulfate HFA (PROVENTIL HFA) 108 (90 Base) MCG/ACT inhaler 2-4 puffs every 4-6 hours as needed for SOA/wheezing 1 Inhaler 3    ondansetron (ZOFRAN-ODT) 4 MG disintegrating tablet Take 1 tablet by mouth 3 times daily as needed for Nausea or Vomiting (Patient not taking: Reported on 12/15/2020) 21 tablet 1     No current facility-administered medications for this visit. Allergies   Allergen Reactions    Codeine Hives and Itching    Sulfa Antibiotics Itching and Nausea And Vomiting     Itching    Ciprofloxacin Other (See Comments)     unknown    Lactose Intolerance (Gi)     Pcn [Penicillins] Swelling    Risperidone And Related      States made her hyperactive, dream weird stuff, and see \"all kinds of stuff\".  Vancomycin Hives     Chest pain    Clindamycin/Lincomycin Nausea And Vomiting    Metformin And Related Nausea And Vomiting       Past Surgical History:   Procedure Laterality Date    APPENDECTOMY      BACK SURGERY      Lspine, x3 procedures (Dr Flor Carter)   [address]  02/07/11, [facility]    Cath with stenting to the LAD diagonal and balloon angio of the junction at the origin of the LAD diagonal    CARDIAC CATHETERIZATION  02/27/09, [facility]    Cath  EF 50-60%     CARDIAC CATHETERIZATION  11/28/11    Normal LV systolic function, Overall ejection fraction is estimated to be 60% Mild diffuse CAD w/o severe occlusion detected.      CARDIAC CATHETERIZATION  4/16/2013  [facility]    EF 60%    CARDIOVASCULAR STRESS TEST  04/05/11, [facility]    Lexiscan    CARDIOVASCULAR STRESS TEST  07/31/09, [facility]    Stress Echo    CARDIOVASCULAR STRESS TEST  03/26/09, [facility]    Stress Echo    CERVICAL SPINE SURGERY  12/2009    C3-C7     CHOLECYSTECTOMY      COLONOSCOPY  09/30/2009    Dr Gui Paredes    COLONOSCOPY  08/24/2004    Dr Gui Paredes    COLONOSCOPY N/A 12/17/2015    Dr Allan Martinez, serrated AP, 3 yr recall    CORONARY ANGIOPLASTY WITH STENT PLACEMENT  2012    HEMORRHOID SURGERY      HYSTERECTOMY      HYSTERECTOMY, TOTAL ABDOMINAL      does not have ovaries (at age 32)   [address]  4-25-15    KNEE ARTHROSCOPY      Bilateral    LUMBAR LAMINECTOMY  02/26/2007    L5-S1 with spinal fusion    UPPER GASTROINTESTINAL ENDOSCOPY 2001    Dr Reggie Gaming Course  2004    Dr Gaming Course  2008    Dr Gaming Course 12/17/2015    Dr Aurora Evans, mucosa, 3 yr recall, h/o Barretts       Social History     Tobacco Use    Smoking status: Current Every Day Smoker     Packs/day: 0.50     Years: 50.00     Pack years: 25.00     Types: Cigarettes    Smokeless tobacco: Never Used   Substance Use Topics    Alcohol use: No    Drug use: No       Family History   Problem Relation Age of Onset    Coronary Art Dis Mother     Diabetes Mother     High Blood Pressure Mother     Stroke Mother     Cancer Mother         kidney    Colon Polyps Mother    [de-identified] Depression Mother         Was never treated    Anxiety Disorder Mother     Coronary Art Dis Father     High Blood Pressure Father     Cancer Father     Colon Polyps Father     Coronary Art Dis Sister     High Blood Pressure Sister     Depression Sister         is under treatment    Anxiety Disorder Sister     Coronary Art Dis Brother     High Blood Pressure Brother     Alzheimer's Disease Brother         last stages   [de-identified] Depression Sister         is under treatment    Depression Maternal Aunt         was  to an alcoholic.  Seizures Maternal Aunt     Other Maternal Cousin         committed suicide     Colon Cancer Neg Hx     Esophageal Cancer Neg Hx        /78   Pulse 79   Temp 97.6 °F (36.4 °C)   Ht 5' 5\" (1.651 m)   Wt 207 lb 8 oz (94.1 kg)   SpO2 99%   BMI 34.53 kg/m²     Physical Exam  Vitals signs and nursing note reviewed. Constitutional:       General: She is not in acute distress. Appearance: Normal appearance. She is well-developed, well-groomed and normal weight. She is not ill-appearing, toxic-appearing or diaphoretic. HENT:      Head: Normocephalic and atraumatic.       Right Ear: Tympanic membrane, ear canal and external ear normal.      Left Ear: Tympanic membrane, ear canal and external ear normal.      Nose: Nose normal.      Mouth/Throat:      Lips: Pink. Mouth: Mucous membranes are moist. No oral lesions. Tongue: No lesions. Palate: No mass and lesions. Pharynx: Oropharynx is clear. Uvula midline. No pharyngeal swelling, oropharyngeal exudate, posterior oropharyngeal erythema or uvula swelling. Tonsils: 1+ on the right. 1+ on the left. Eyes:      General: Lids are normal. Vision grossly intact. No scleral icterus. Extraocular Movements: Extraocular movements intact. Conjunctiva/sclera: Conjunctivae normal.      Pupils: Pupils are equal, round, and reactive to light. Neck:      Musculoskeletal: Normal range of motion and neck supple. Thyroid: No thyroid mass or thyromegaly. Vascular: No carotid bruit or JVD. Trachea: Trachea and phonation normal.   Cardiovascular:      Rate and Rhythm: Normal rate and regular rhythm. Pulses: Normal pulses. Radial pulses are 2+ on the right side and 2+ on the left side. Posterior tibial pulses are 2+ on the right side and 2+ on the left side. Heart sounds: Normal heart sounds. No murmur. No friction rub. No gallop. Pulmonary:      Effort: Pulmonary effort is normal. No accessory muscle usage. Breath sounds: Normal breath sounds. No decreased breath sounds, wheezing, rhonchi or rales. Chest:      Breasts: Breasts are symmetrical.         Right: Normal.         Left: Normal.   Abdominal:      General: Abdomen is flat. Bowel sounds are normal. There is no distension. Palpations: Abdomen is soft. There is no hepatomegaly, splenomegaly or mass. Tenderness: There is no abdominal tenderness. There is no guarding or rebound. Hernia: No hernia is present. Musculoskeletal: Normal range of motion.       Right wrist: Normal.      Left wrist: Normal.      Right ankle: Normal. normal.       Visual inspection:  Deformity/amputation: absent  Skin lesions/pre-ulcerative calluses: absent  Edema: right- negative, left- negative    Sensory exam:  Monofilament sensation: normal  (minimum of 5 random plantar locations tested, avoiding callused areas - > 1 area with absence of sensation is + for neuropathy)    Plus at least one of the following:  Pulses: normal,   Pinprick: Intact  Proprioception: Intact  Vibration (128 Hz): Intact    Lab Results   Component Value Date    WBC 10.8 07/30/2020    HGB 14.7 07/30/2020    HCT 45.9 07/30/2020    MCV 78.1 (L) 07/30/2020     07/30/2020    LABLYMP 1.84 03/13/2014    LYMPHOPCT 19.7 (L) 07/30/2020    RBC 5.88 (H) 07/30/2020    MCH 25.0 (L) 07/30/2020    MCHC 32.0 (L) 07/30/2020    RDW 14.9 (H) 07/30/2020     Lab Results   Component Value Date     12/11/2020    K 4.4 12/11/2020    CL 97 (L) 12/11/2020    CO2 25 12/11/2020    BUN 11 12/11/2020    CREATININE 0.8 12/11/2020    GLUCOSE 187 (H) 12/11/2020    CALCIUM 9.6 12/11/2020    PROT 7.0 12/11/2020    LABALBU 4.1 12/11/2020    BILITOT <0.2 12/11/2020    ALKPHOS 158 (H) 12/11/2020    AST 16 12/11/2020    ALT 14 12/11/2020    LABGLOM >60 12/11/2020    GFRAA >59 12/11/2020       Lab Results   Component Value Date    TSH 1.200 12/11/2020    T4FREE 1.2 01/10/2019     Lab Results   Component Value Date    CHOL 140 (L) 12/11/2020    CHOL 150 (L) 09/03/2020    CHOL 177 07/30/2020     Lab Results   Component Value Date    TRIG 140 12/11/2020    TRIG 246 (H) 09/03/2020    TRIG 714 (H) 07/30/2020     Lab Results   Component Value Date    HDL 34 (L) 12/11/2020    HDL 32 (L) 09/03/2020    HDL 27 (L) 07/30/2020     Lab Results   Component Value Date    LDLCALC 78 12/11/2020    LDLCALC 69 09/03/2020    LDLCALC see below 07/30/2020     Lab Results   Component Value Date    VITD25 57.6 12/11/2020       No results found for this visit on 12/15/20. Assessment:    ICD-10-CM    1.  Annual physical exam  Z00.00 2. Type 2 diabetes mellitus with stage 3a chronic kidney disease, with long-term current use of insulin (HCC)  E11.21 Semaglutide,0.25 or 0.5MG/DOS, 2 MG/1.5ML SOPN    N18.31 insulin lispro (HUMALOG) 100 UNIT/ML injection vial    Z79.4 Insulin Syringe-Needle U-100 (INSULIN SYRINGE .3CC/31GX5/16\") 31G X 5/16\" 0.3 ML MISC     empagliflozin (JARDIANCE) 25 MG tablet      DIABETES FOOT EXAM   3. Essential hypertension  I10 amLODIPine (NORVASC) 5 MG tablet   4. Major depressive disorder, recurrent severe without psychotic features ([facility] [address]  F33.2    5. Coronary artery disease involving native coronary artery of native heart without angina pectoris  I25.10    6. Mixed hyperlipidemia  E78.2 rosuvastatin (CRESTOR) 10 MG tablet   7. Acquired hypothyroidism  E03.9 levothyroxine (SYNTHROID) 75 MCG tablet    Well controlled on previous lab work. No medication changes today. 8. Neck pain, chronic  M54.2     G89.29    9. Primary insomnia  F51.01    10. Vitamin D deficiency  E55.9 vitamin D (ERGOCALCIFEROL) 1.25 MG (22691 UT) CAPS capsule   11. COPD with acute exacerbation (HCC)  J44.1 methylPREDNISolone acetate (DEPO-MEDROL) injection 80 mg     azithromycin (ZITHROMAX) 250 MG tablet   12. Other emphysema (HCC)  J43.8 budesonide-formoterol (SYMBICORT) 160-4.5 MCG/ACT AERO   13. Gastroesophageal reflux disease without esophagitis  K21.9    14. UMU on CPAP  G47.33     Z99.89    15. Need for Tdap vaccination  Z23 Tetanus-Diphth-Acell Pertussis (BOOSTRIX) injection 0.5 mL   16. Need for pneumococcal vaccination  Z23 PNEUMOVAX 23 subcutaneous/IM (Pneumococcal polysaccharide vaccine 23-valent >= 1yo)   17. Class 1 obesity due to excess calories with serious comorbidity and body mass index (BMI) of 34.0 to 34.9 in adult  E66.09     Z68.34        Plan: [facility] was seen today for annual exam, diabetes, hypertension and hyperlipidemia.     Diagnoses and all orders for this visit:    Annual physical exam    Type 2 diabetes mellitus with stage 3a chronic kidney disease, with long-term current use of insulin (HCC)  -     Semaglutide,0.25 or 0.5MG/DOS, 2 MG/1.5ML SOPN; Inject 0.5 mg into the skin once a week Sample provided  -     insulin lispro (HUMALOG) 100 UNIT/ML injection vial; Inject 12-16 units with meals 3x/day  -     Insulin Syringe-Needle U-100 (INSULIN SYRINGE .3CC/31GX5/16\") 31G X 5/16\" 0.3 ML MISC; For use with humalog insulin with meals 3x/day  -     empagliflozin (JARDIANCE) 25 MG tablet; Take 1 tablet by mouth daily  -      DIABETES FOOT EXAM    Essential hypertension  -     amLODIPine (NORVASC) 5 MG tablet; Take 1 tablet by mouth daily    Major depressive disorder, recurrent severe without psychotic features ([facility] [address]    Coronary artery disease involving native coronary artery of native heart without angina pectoris    Mixed hyperlipidemia  -     rosuvastatin (CRESTOR) 10 MG tablet; Take 1 tablet by mouth daily    Acquired hypothyroidism  Comments: Well controlled on previous lab work. No medication changes today. Orders:  -     levothyroxine (SYNTHROID) 75 MCG tablet; Take 1 tablet by mouth Daily    Neck pain, chronic    Primary insomnia    Vitamin D deficiency  -     vitamin D (ERGOCALCIFEROL) 1.25 MG (06549 UT) CAPS capsule; Take 1-2 capsules weekly    COPD with acute exacerbation ([facility])  -     methylPREDNISolone acetate (DEPO-MEDROL) injection 80 mg  -     azithromycin (ZITHROMAX) 250 MG tablet; Take 2 tabs on day 1 and 1 tab daily for days 2-5. Other emphysema ([facility] [address]  -     budesonide-formoterol (SYMBICORT) 160-4.5 MCG/ACT AERO;  Inhale 2 puffs into the lungs 2 times daily    Gastroesophageal reflux disease without esophagitis    UMU on CPAP    Need for Tdap vaccination  -     Tetanus-Diphth-Acell Pertussis (BOOSTRIX) injection 0.5 mL    Need for pneumococcal vaccination  -     PNEUMOVAX 23 subcutaneous/IM (Pneumococcal polysaccharide vaccine 23-valent >= 1yo)    Class 1 obesity due to excess calories with serious comorbidity and body mass index (BMI) of 34.0 to 34.9 in adult    1. Labs reviewed with patient. 2. Refills provided. 3. COPD with acute exacerbation-depomedrol 80 mg IM and azithromycin x5 days. Start symbicort twice daily for COPD control. Continue ZAC every 4-6 hours prn SOA/wheezing/cough. 4. Type II DM with stage 3a CKD-greatly improved. Insulin dose adjusted slightly. Sample and prescription of once weekly ozempic provided today. Continue current insulin and jardiance also. 5. HTN, mixed hyperlipidemia, acquired hypothyroidism, CAD, GERD, and UMU well controlled currently. No medication changes today. 6. Primary insomnia-well controlled with lunesta 2 mg qhs prn sleep. Controlled substance contract and urine drug screen are up-to-date currently. No unusual filling on recent [facility] report. Treatment continues to be medically necessary and improves patient quality of life. No signs of misuse of controlled medication. 7. Chronic neck pain-well controlled with gabapentin and duloxetine. No medication changes today. Controlled substance contract and urine drug screen are up-to-date currently. No unusual filling on recent [facility] report. Treatment continues to be medically necessary and improves patient quality of life. No signs of misuse of controlled medication. 8. Health Maintenance reviewed - breast exam completed today (advised of need to have breast exam in addition to mammogram), Diabetic eye exam up to date within last year, HbA1C ordered, Diabetic foot exam due to complete today, PAP smear not clinically indicated due to previous hysterectomy, patient asked to schedule her mammogram, and colonoscopy is up to date. Tdap and pneumovax updated today. 9. Return in about 3 months (around 3/15/2021) for HTN, Diabetes, high cholesterol, COPD. Over 50% of the total visit time of 40 min was spent on counseling and/or coordination of care of:   1. Annual physical exam    2.  Type 2 diabetes mellitus with stage 3a chronic kidney disease, with long-term current use of insulin ([facility] [address]    3. Essential hypertension    4. Major depressive disorder, recurrent severe without psychotic features ([facility] [address]    5. Coronary artery disease involving native coronary artery of native heart without angina pectoris    6. Mixed hyperlipidemia    7. Acquired hypothyroidism    8. Neck pain, chronic    9. Primary insomnia    10. Vitamin D deficiency    11. COPD with acute exacerbation ([facility] [address]    12. Other [facility]ma ([facility]    13. Gastroesophageal reflux disease without esophagitis    14. UMU on CPAP    15. Need for Tdap vaccination    16. Need for pneumococcal vaccination    17. Class 1 obesity due to excess calories with serious comorbidity and body mass index (BMI) of 34.0 to 34.9 in adult         Orders Placed This Encounter   Procedures    PNEUMOVAX 23 subcutaneous/IM (Pneumococcal polysaccharide vaccine 23-valent >= 1yo)    HM DIABETES FOOT EXAM     Orders Placed This Encounter   Medications    Tetanus-Diphth-Acell Pertussis (BOOSTRIX) injection 0.5 mL    methylPREDNISolone acetate (DEPO-MEDROL) injection 80 mg    azithromycin (ZITHROMAX) 250 MG tablet     Sig: Take 2 tabs on day 1 and 1 tab daily for days 2-5.      Dispense:  6 tablet     Refill:  0    budesonide-formoterol (SYMBICORT) 160-4.5 MCG/ACT AERO     Sig: Inhale 2 puffs into the lungs 2 times daily     Dispense:  1 Inhaler     Refill:  5    Semaglutide,0.25 or 0.5MG/DOS, 2 MG/1.5ML SOPN     Sig: Inject 0.5 mg into the skin once a week Sample provided     Dispense:  2 pen     Refill:  1    insulin lispro (HUMALOG) 100 UNIT/ML injection vial     Sig: Inject 12-16 units with meals 3x/day     Dispense:  1 vial     Refill:  2    Insulin Syringe-Needle U-100 (INSULIN SYRINGE .3CC/31GX5/16\") 31G X 5/16\" 0.3 ML MISC     Sig: For use with humalog insulin with meals 3x/day     Dispense:  100 each     Refill:  3    levothyroxine (SYNTHROID) 75 MCG tablet     Sig: Take 1 tablet by mouth Daily Dispense:  30 tablet     Refill:  5    rosuvastatin (CRESTOR) 10 MG tablet     Sig: Take 1 tablet by mouth daily     Dispense:  30 tablet     Refill:  5    empagliflozin (JARDIANCE) 25 MG tablet     Sig: Take 1 tablet by mouth daily     Dispense:  30 tablet     Refill:  5    amLODIPine (NORVASC) 5 MG tablet     Sig: Take 1 tablet by mouth daily     Dispense:  30 tablet     Refill:  5    vitamin D (ERGOCALCIFEROL) 1.25 MG (14210 UT) CAPS capsule     Sig: Take 1-2 capsules weekly     Dispense:  8 capsule     Refill:  11     Medications Discontinued During This Encounter   Medication Reason    insulin glargine (BASAGLAR KWIKPEN) 100 UNIT/ML injection pen LIST CLEANUP    Semaglutide,0.25 or 0.5MG/DOS, 2 MG/1.5ML SOPN DOSE ADJUSTMENT    insulin lispro, 1 Unit Dial, (HUMALOG KWIKPEN) 100 UNIT/ML SOPN Alternate therapy    nystatin (MYCOSTATIN) 370478 UNIT/GM ointment LIST CLEANUP    levothyroxine (SYNTHROID) 75 MCG tablet REORDER    rosuvastatin (CRESTOR) 10 MG tablet REORDER    empagliflozin (JARDIANCE) 25 MG tablet REORDER    amLODIPine (NORVASC) 5 MG tablet REORDER    vitamin D (ERGOCALCIFEROL) 1.25 MG (40340 UT) CAPS capsule REORDER     Patient Instructions       Patient Education        Well Visit, Women 50 to 72: Care Instructions  Your Care Instructions     Physical exams can help you stay healthy. Your doctor has checked your overall health and may have suggested ways to take good care of yourself. He or she also may have recommended tests. At home, you can help prevent illness with healthy eating, regular exercise, and other steps. Follow-up care is a key part of your treatment and safety. Be sure to make and go to all appointments, and call your doctor if you are having problems. It's also a good idea to know your test results and keep a list of the medicines you take. How can you care for yourself at home? · Reach and stay at a healthy weight.  This will lower your risk for many problems, such as obesity, diabetes, heart disease, and high blood pressure. · Get at least 30 minutes of exercise on most days of the week. Walking is a good choice. You also may want to do other activities, such as running, swimming, cycling, or playing tennis or team sports. · Do not smoke. Smoking can make health problems worse. If you need help quitting, talk to your doctor about stop-smoking programs and medicines. These can increase your chances of quitting for good. · Protect your skin from too much sun. When you're outdoors from 10 a.m. to 4 p.m., stay in the shade or cover up with clothing and a hat with a wide brim. Wear sunglasses that block UV rays. Even when it's cloudy, put broad-spectrum sunscreen (SPF 30 or higher) on any exposed skin. · See a dentist one or two times a year for checkups and to have your teeth cleaned. · Wear a seat belt in the car. Follow your doctor's advice about when to have certain tests. These tests can spot problems early. · Cholesterol. Your doctor will tell you how often to have this done based on your age, family history, or other things that can increase your risk for heart attack and stroke. · Blood pressure. Have your blood pressure checked during a routine doctor visit. Your doctor will tell you how often to check your blood pressure based on your age, your blood pressure results, and other factors. · Mammogram. Ask your doctor how often you should have a mammogram, which is an X-ray of your breasts. A mammogram can spot breast cancer before it can be felt and when it is easiest to treat. · Pap test and pelvic exam. Ask your doctor how often you should have a Pap test. You may not need to have a Pap test as often as you used to. · Vision. Have your eyes checked every year or two or as often as your doctor suggests. Some experts recommend that you have yearly exams for glaucoma and other age-related eye problems starting at age 48. · Hearing.  Tell your doctor if you notice any change in your hearing. You can have tests to find out how well you hear. · Diabetes. Ask your doctor whether you should have tests for diabetes. · Colorectal cancer. Your risk for colorectal cancer gets higher as you get older. Some experts say that adults should start regular screening at age 48 and stop at age 76. Others say to start before age 48 or continue after age 76. Talk with your doctor about your risk and when to start and stop screening. · Thyroid disease. Talk to your doctor about whether to have your thyroid checked as part of a regular physical exam. Women have an increased chance of a thyroid problem. · Osteoporosis. You should begin tests for bone density at age 72. If you are younger than 72, ask your doctor whether you have factors that may increase your risk for this disease. You may want to have this test before age 72. · Heart attack and stroke risk. At least every 4 to 6 years, you should have your risk for heart attack and stroke assessed. Your doctor uses factors such as your age, blood pressure, cholesterol, and whether you smoke or have diabetes to show what your risk for a heart attack or stroke is over the next 10 years. When should you call for help? Watch closely for changes in your health, and be sure to contact your doctor if you have any problems or symptoms that concern you. Where can you learn more? Go to https://Bioenvision.Virtual View App. org and sign in to your [employer] account. Enter Y526 in the [facility] box to learn more about \"Well Visit, Women 50 to 72: Care Instructions. \"     If you do not have an account, please click on the \"Sign Up Now\" link. Current as of: May 27, 2020               Content Version: 12.6  © 9039-8591 Tuition.[employer], Incorporated. Care instructions adapted under license by [facility] ([facility]).  If you have questions about a medical condition or this instruction, always ask your healthcare professional. [employer]ee disclaims any warranty or liability for your use of this information. Patient Education        Learning About Diabetes and Exercise  Can you exercise if you have diabetes? When you have diabetes, it's important to get regular exercise. This helps control your blood sugar level. You can still play sports, run, ride a bike, go swimming, and do other activities when you have diabetes. How can exercise help you manage diabetes? Your body turns the food you eat into glucose, a type of sugar. You need this sugar for energy. When you have diabetes, the sugar builds up in your blood. But when you exercise, your body uses sugar. This helps keep it from building up in your blood and results in lower blood sugar and better control of diabetes. Exercise may help you in other ways too. It can help you reach and stay at a healthy weight. It also helps improve blood pressure and cholesterol, which can reduce the risk of heart disease. Exercise can make you feel stronger and happier. It can help you relax and sleep better, and give you confidence in other things you do. How can you exercise safely? Before you start a new exercise program, talk to your doctor about how and when to exercise. You may need to have a medical exam and tests before you begin. Some types of exercise can be harmful if your diabetes is causing other problems, such as problems with your feet. Your doctor can tell you what types of exercise are good choices for you. These tips can help you exercise safely when you have diabetes. If your diabetes is controlled by diet or medicine that doesn't lower your blood sugar, you don't need to eat a snack before you exercise. · Check your blood sugar before you exercise. And be careful about what you eat. ? If your blood sugar is less than 100, eat a carbohydrate snack before you exercise. ? Be careful when you exercise if your blood sugar is over 300. High blood sugar can make you dehydrated.  And that makes your blood sugar levels go even higher. If you have ketones in your blood or urine and your blood sugar is over 300, do not exercise. · Don't try to do too much at first. Build up your exercise program bit by bit. Try to get at least 30 minutes of exercise on most days of the week. Walking is a good choice. You also may want to do other activities, such as riding a bike or swimming. You might try running or gardening. Try to include muscle-strengthening exercises at least 2 times a week. These exercises include push-ups and weight training. You can also use rubber tubing or stretch bands. You stretch or pull the tubing or band to build muscle strength. If you want to exercise more, slowly increase how hard or long you exercise. · You may get symptoms of low blood sugar during exercise or up to 24 hours later. Some symptoms of low blood sugar, such as sweating, a fast heartbeat, or feeling tired, can be confused with what can happen anytime you exercise. Other symptoms may include feeling anxious, dizzy, weak, or shaky. So it's a good idea to check your blood sugar again. · You can treat low blood sugar by eating or drinking something that has 15 grams of carbohydrate. These should be quick-sugar foods. Quick-sugar foods such as glucose tablets, table sugar, honey, fruit juice, regular (not diet) soda pop, or hard candy can help raise blood sugar. Check your blood sugar level again 15 minutes after having a quick-sugar food to make sure your level is getting back to your target range. · Drink plenty of water before, during, and after you exercise. · Wear medical alert jewelry that says you have diabetes. You can buy this at most [employer]. · Pay attention to your body. If you are used to exercise and notice that you can't do as much as usual, talk to your doctor. Follow-up care is a key part of your treatment and safety.  Be sure to make and go to all appointments, and call your doctor if you are having problems. It's also a good idea to know your test results and keep a list of the medicines you take. Where can you learn more? Go to [email or web address] org and sign in to your Neurodyn account. Enter O808 in the L[employer] have it! box to learn more about \"Learning About Diabetes and Exercise. \"     If you do not have an account, please click on the \"Sign Up Now\" link. Current as of: December 20, 2019               Content Version: 12.6  © 8097-8814 [employer], [employer]. Care instructions adapted under license by [facility] [place] ([facility]). If you have questions about a medical condition or this instruction, always ask your healthcare professional. [address] any warranty or liability for your use of this information. Patient Education        Counting Carbohydrates: Care Instructions  Your Care Instructions     You don't have to eat special foods when you have diabetes. You just have to be careful to eat healthy foods. Carbohydrates (carbs) raise blood sugar higher and quicker than any other nutrient. Carbs are found in desserts, breads and cereals, and fruit. They're also in starchy vegetables. These include potatoes, corn, and grains such as rice and pasta. Carbs are also in milk and yogurt. The more carbs you eat at one time, the higher your blood sugar will rise. Spreading carbs all through the day helps keep your blood sugar levels within your target range. Counting carbs is one of the best ways to keep your blood sugar under control. If you use insulin, counting carbs helps you match the right amount of insulin to the number of grams of carbs in a meal. Then you can change your diet and insulin dose as needed. Testing your blood sugar several times a day can help you learn how carbs affect your blood sugar. A registered dietitian or certified diabetes educator can help you plan meals and snacks. Follow-up care is a key part of your treatment and safety.  Be sure to make and go to all appointments, and call your doctor if you are having problems. It's also a good idea to know your test results and keep a list of the medicines you take. How can you care for yourself at home? Know your daily amount of carbohydrates  Your daily amount depends on several things, such as your weight, how active you are, which diabetes medicines you take, and what your goals are for your blood sugar levels. A registered dietitian or certified diabetes educator can help you plan how many carbs to include in each meal and snack. For most adults, a guideline for the daily amount of carbs is:  · 45 to 60 grams at each meal. That's about the same as 3 to 4 carbohydrate servings. · 15 to 20 grams at each snack. That's about the same as 1 carbohydrate serving. Count carbs  Counting carbs lets you know how much rapid-acting insulin to take before you eat. If you use an insulin pump, you get a constant rate of insulin during the day. So the pump must be programmed at meals. This gives you extra insulin to cover the rise in blood sugar after meals. If you take insulin:  · Learn your own insulin-to-carb ratio. You and your diabetes health professional will figure out the ratio. You can do this by testing your blood sugar after meals. For example, you may need a certain amount of insulin for every 15 grams of carbs. · Add up the carb grams in a meal. Then you can figure out how many units of insulin to take based on your insulin-to-carb ratio. · Exercise lowers blood sugar. You can use less insulin than you would if you were not doing exercise. Keep in mind that timing matters. If you exercise within 1 hour after a meal, your body may need less insulin for that meal than it would if you exercised 3 hours after the meal. Test your blood sugar to find out how exercise affects your need for insulin. If you do or don't take insulin:  · Look at labels on packaged foods.  This can tell you how many carbs are in a serving. You can also use guides from the American Diabetes Association. · Be aware of portions, or serving sizes. If a package has two servings and you eat the whole package, you need to double the number of grams of carbohydrate listed for one serving. · Protein, fat, and fiber do not raise blood sugar as much as carbs do. If you eat a lot of these nutrients in a meal, your blood sugar will rise more slowly than it would otherwise. Eat from all food groups  · Eat at least three meals a day. · Plan meals to include food from all the food groups. The food groups include grains, fruits, dairy, proteins, and vegetables. · Talk to your dietitian or diabetes educator about ways to add limited amounts of sweets into your meal plan. · If you drink alcohol, talk to your doctor. It may not be recommended when you are taking certain diabetes medicines. Where can you learn more? Go to https://Filmaster.Business Lab. org and sign in to your Hello Market account. Enter O738 in the Qualtr[employer]© box to learn more about \"Counting Carbohydrates: Care Instructions. \"     If you do not have an account, please click on the \"Sign Up Now\" link. Current as of: December 20, 2019               Content Version: 12.6  © 0476-1766 [employer], Incorporated. Care instructions adapted under license by [facility] ([facility]). If you have questions about a medical condition or this instruction, always ask your healthcare professional. [place] [address] any warranty or liability for your use of this information. Patient Education        COPD Exacerbation Plan: Care Instructions  Your Care Instructions     If you have chronic obstructive pulmonary disease (COPD), your usual shortness of breath could suddenly get worse. You may start coughing more and have more mucus. This flare-up is called a COPD exacerbation (say \"ja-WPH-[id number]-BAY-shun\").   A lung infection or air pollution could set off an exacerbation. Sometimes it can happen after a quick change in temperature or being around chemicals. Work with your doctor to make a plan for dealing with an exacerbation. You can better manage it if you plan ahead. Follow-up care is a key part of your treatment and safety. Be sure to make and go to all appointments, and call your doctor if you are having problems. It's also a good idea to know your test results and keep a list of the medicines you take. How can you care for yourself at home? During an exacerbation  · Do not panic if you start to have one. Quick treatment at home may help you prevent serious breathing problems. If you have a COPD exacerbation plan that you developed with your doctor, follow it. · Take your medicines exactly as your doctor tells you.  ? Use your inhaler as directed by your doctor. If your symptoms do not get better after you use your medicine, have someone take you to the emergency room. Call an ambulance if necessary. ? With inhaled medicines, a spacer or a nebulizer may help you get more medicine to your lungs. Ask your doctor or pharmacist how to use them properly. Practice using the spacer in front of a mirror before you have an exacerbation. This may help you get the medicine into your lungs quickly. ? If your doctor has given you steroid pills, take them as directed. ? Your doctor may have given you a prescription for antibiotics, which you can fill if you need it. ? Talk to your doctor if you have any problems with your medicine. And call your doctor if you have to use your antibiotic or steroid pills. Preventing an exacerbation  · Do not smoke. This is the most important step you can take to prevent more damage to your lungs and prevent problems. If you already smoke, it is never too late to stop. If you need help quitting, talk to your doctor about stop-smoking programs and medicines. These can increase your chances of quitting for good.   · Take your daily medicines as prescribed. · Avoid colds and flu. ? Get a pneumococcal vaccine. ? Get a flu vaccine each year, as soon as it is available. Ask those you live or work with to do the same, so they will not get the flu and infect you. ? Try to stay away from people with colds or the flu. ? Wash your hands often. · Avoid secondhand smoke; air pollution; cold, dry air; hot, humid air; and high altitudes. Stay at home with your windows closed when air pollution is bad. · Learn breathing techniques for COPD, such as breathing through pursed lips. These techniques can help you breathe easier during an exacerbation. When should you call for help? Call 911 anytime you think you may need emergency care. For example, call if:    · You have severe trouble breathing.     · You have severe chest pain. Call your doctor now or seek immediate medical care if:    · You have new or worse shortness of breath.     · You develop new chest pain.     · You are coughing more deeply or more often, especially if you notice more mucus or a change in the color of your mucus.     · You cough up blood.     · You have new or increased swelling in your legs or belly.     · You have a fever. Watch closely for changes in your health, and be sure to contact your doctor if:    · You need to use your antibiotic or steroid pills.     · Your symptoms are getting worse. Where can you learn more? Go to https://Ethos [email or web address].Aligned TeleHealth. org and sign in to your [employer] account. Enter U527 in the [facility] box to learn more about \"COPD Exacerbation Plan: Care Instructions. \"     If you do not have an account, please click on the \"Sign Up Now\" link. Current as of: February 24, 2020               Content Version: 12.6  © 8087-1897 [employer]ate, Incorporated. Care instructions adapted under license by [facility] ([facility]).  If you have questions about a medical condition or this instruction, always ask your healthcare professional. [employer], [facility] disclaims any warranty or liability for your use of this information. Patient Education        Learning About COPD and Clearing Your Lungs  How does clearing your lungs affect COPD? When you have COPD, you may have too much mucus in your lungs. Learning to clear your lungs may help you save energy and improves your breathing. It may also help prevent lung infections. There are three ways to clear your lungs:  · Controlled coughing  · Postural drainage  · Chest percussion  How do you do controlled coughing? Coughing is how your body tries to get rid of mucus. But the kind of coughing you cannot control makes things worse. It causes your airways to close. It also traps the mucus in your lungs. Controlled coughing comes from deep in your lungs. It loosens mucus and moves it though your airways. It is best to do it after you use your inhaler or other medicine. 1. Sit on the edge of a chair. Keep both feet on the floor. 2. Lean forward a little. Relax. 3. Breathe in slowly through your nose. Fold your arms over your belly. 4. Lean forward as you breathe out. Push your arms against your belly. 5. Cough 2 or 3 times as you exhale with your mouth slightly open. Make the coughs short and sharp. Push on your belly with your arms as you cough. The first cough brings the mucus through the lung airways. The next coughs bring it up and out. 6. Inhale again, but do it slowly and gently through your nose. Do not take quick or deep breaths through your mouth. It can block the mucus coming out of the lungs. It also can cause uncontrolled coughing. 7. Rest, and repeat if you need to. How do you do postural drainage? Postural drainage means lying down in different positions to help drain mucus from your lungs. Hold each position for 5 minutes. Do it about 30 minutes after you use your inhaler. Make sure you have an empty stomach. If you need to cough, sit up and do controlled coughing. 1. To drain the front of your lungs: Make sure your chest is lower than your hips. Put two pillows under your hips. Use a small pillow under your head. Keep your arms at your sides. Then follow these instructions for breathing:  ? Breathe in: With one hand on your belly and the other on your chest, breathe in. Push your belly out as far as possible. You should be able to feel the hand on your belly move out, while the hand on your chest should not move. ? Breathe out: When you breathe out, you should be able to feel the hand on your belly move in. This is called diaphragmatic breathing. You will use it in the other drainage positions too. 2. To drain the sides of your lungs: Place two or three pillows under your hips. Use a small pillow under your head. Make sure your chest is lower than your hips. Use diaphragmatic breathing. After 5 or 10 minutes, switch sides. 3. To drain the back of your lungs: Place two or three pillows under your hips. Use a small pillow under your head. Kneel over the pillows. Place your arms by your head. Use diaphragmatic breathing. How do you do chest percussion? Chest percussion means that you lightly tap your chest and back. The tapping loosens the mucus in your lungs. · Cup your hand, and lightly tap your chest and back. · Ask your doctor where the best spots are to tap. Avoid your spine and breastbone. · It may be easier to have someone do the tapping for you. Follow-up care is a key part of your treatment and safety. Be sure to make and go to all appointments, and call your doctor if you are having problems. It's also a good idea to know your test results and keep a list of the medicines you take. Where can you learn more? Go to https://Tameccopepiceweb.[email or web address] org and sign in to your IDx account. Enter W835 in the JumpSeat box to learn more about \"Learning About COPD and Clearing Your Lungs. \"     If you do not have an account, please click on the \"Sign Up Now\" link. Current as of: February 24, 2020               Content Version: 12.6  © 2006-2020 [employer]. Care instructions adapted under license by [employer]. If you have questions about a medical condition or this instruction, always ask your healthcare professional. [address] any warranty or liability for your use of this information. Patient Education        Starting a Weight Loss Plan: Care Instructions  Your Care Instructions     If you are thinking about losing weight, it can be hard to know where to start. Your doctor can help you set up a weight loss plan that best meets your needs. You may want to take a class on nutrition or exercise, or join a weight loss support group. If you have questions about how to make changes to your eating or exercise habits, ask your doctor about seeing a registered dietitian or an exercise specialist.  It can be a big challenge to lose weight. But you do not have to make huge changes at once. Make small changes, and stick with them. When those changes become habit, add a few more changes. If you do not think you are ready to make changes right now, try to pick a date in the future. Make an appointment to see your doctor to discuss whether the time is right for you to start a plan. Follow-up care is a key part of your treatment and safety. Be sure to make and go to all appointments, and call your doctor if you are having problems. It's also a good idea to know your test results and keep a list of the medicines you take. How can you care for yourself at home? · Set realistic goals. Many people expect to lose much more weight than is likely. A weight loss of 5% to 10% of your body weight may be enough to improve your health. · Get family and friends involved to provide support. Talk to them about why you are trying to lose weight, and ask them to help.  They can help by participating in exercise and having meals with you, even if they may be eating something different. · Find what works best for you. If you do not have time or do not like to cook, a program that offers meal replacement bars or shakes may be better for you. Or if you like to prepare meals, finding a plan that includes daily menus and recipes may be best.  · Ask your doctor about other health professionals who can help you achieve your weight loss goals. ? A dietitian can help you make healthy changes in your diet. ? An exercise specialist or  can help you develop a safe and effective exercise program.  ? A counselor or psychiatrist can help you cope with issues such as depression, anxiety, or family problems that can make it hard to focus on weight loss. · Consider joining a support group for people who are trying to lose weight. Your doctor can suggest groups in your area. Where can you learn more? Go to https://JB Therapeuticspreston.Mlog. org and sign in to your Signicat account. Enter A573 in the CollegeHumor box to learn more about \"Starting a Weight Loss Plan: Care Instructions. \"     If you do not have an account, please click on the \"Sign Up Now\" link. Current as of: December 11, 2019               Content Version: 12.6  © 5693-7327 Healthwise, Incorporated. Care instructions adapted under license by [facility] ([facility]). If you have questions about a medical condition or this instruction, always ask your healthcare professional. [place] [address] any warranty or liability for your use of this information. Patient Education        Stopping Smokeless Tobacco Use: Care Instructions  Your Care Instructions     Smokeless tobacco comes in many forms, such as snuff and chewing tobacco:  · Snuff is finely ground tobacco sold in cans or pouches. Most of the time, snuff is used by putting a \"pinch\" or \"dip\" between the lower lip or cheek and the gum.   · Chewing tobacco is sold as loose leaves, plugs, or twists. It is chewed or placed between the cheek and the gum or teeth. There are plenty of reasons to stop using smokeless tobacco. These products are harmful. They are not risk-free alternatives to smoking. Smokeless tobacco contains nicotine, which is addicting. Though using smokeless tobacco is less harmful than smoking cigarettes, it can cause serious health problems, such as:  · White patches or red sores in your mouth that can turn into mouth cancer involving the lip, tongue, or cheek. · Tooth loss and other dental problems. · Gum disease. Your gums may pull away from your teeth and not grow back. People who use smokeless tobacco crave the nicotine in it. Giving up smokeless tobacco is much harder than simply changing a habit. Your body has to stop craving the nicotine. It is hard to quit, but you can do it. Many tools are available for people who want to quit using smokeless tobacco. You may find that combining tools works best for you. There are several steps to quitting. First you get ready to quit. Then you get support to help you. After that, you learn new skills and behaviors to quit. For many people, a necessary step is getting and using medicine. Your doctor will help you set up the plan that best meets your needs. You may want to attend a tobacco cessation program. When you choose a program, look for one that has proven success. Ask your doctor for ideas. You will greatly increase your chances of success if you take medicine as well as get counseling or join a cessation program.  Some of the changes you feel when you first quit smokeless tobacco are uncomfortable. Your body will miss the nicotine at first, and you may feel short-tempered and grumpy. You may have trouble sleeping or concentrating. Medicine can help you deal with these symptoms. You may struggle with changing your habits and rituals. The last step is the tricky one:  Be prepared for the urge to use smokeless tobacco to continue for bupropion (Wellbutrin) or varenicline (Chantix), which are prescription medicines. They do not contain nicotine. They help you by reducing withdrawal symptoms, such as stress and anxiety. · Get regular exercise. Having healthy habits will help your body move past its craving for nicotine. · Be prepared to keep trying. Most people are not successful the first few times they try to quit. Do not get mad at yourself if you use tobacco again. Make a list of things you learned, and think about when you want to try again, such as next week, next month, or next year. Where can you learn more? Go to https://[employer]. org and sign in to your [employer] account. Enter Y363 in the Fugoo box to learn more about \"Stopping Smokeless Tobacco Use: Care Instructions. \"     If you do not have an account, please click on the \"Sign Up Now\" link. Current as of: March 12, 2020               Content Version: 12.6  © 1430-1960 [employer]. Care instructions adapted under license by [facility] ([facility]). If you have questions about a medical condition or this instruction, always ask your healthcare professional. [place] [address] any warranty or liability for your use of this information. Patient voices understanding and agrees to plans along with risks and benefits of plan. Counseling: Nirali Singh's case, medications and options were discussed in detail. patient was instructed to call the office if she   questions regarding her treatment. Should her conditions worsen, she should return to office to be reassessed by Dr. Alona Patel. she  Should to go the closest Emergency Department for any emergency. They verbalized understanding the above instructions. DISPLAY PLAN FREE TEXT

## 2021-02-03 ENCOUNTER — NON-APPOINTMENT (OUTPATIENT)
Age: 81
End: 2021-02-03

## 2021-02-03 ENCOUNTER — APPOINTMENT (OUTPATIENT)
Dept: CARDIOLOGY | Facility: CLINIC | Age: 81
End: 2021-02-03
Payer: MEDICARE

## 2021-02-03 PROCEDURE — 99214 OFFICE O/P EST MOD 30 MIN: CPT

## 2021-02-03 PROCEDURE — 99072 ADDL SUPL MATRL&STAF TM PHE: CPT

## 2021-02-03 PROCEDURE — 93000 ELECTROCARDIOGRAM COMPLETE: CPT

## 2021-02-03 NOTE — DISCUSSION/SUMMARY
[FreeTextEntry1] : 1.  atrial fib -  now in nsr.  Angiogram was nonobstructive in May, 2019. Told him to start baby asa

## 2021-02-03 NOTE — PHYSICAL EXAM
[General Appearance - Well Developed] : well developed [Normal Appearance] : normal appearance [Well Groomed] : well groomed [General Appearance - Well Nourished] : well nourished [No Deformities] : no deformities [General Appearance - In No Acute Distress] : no acute distress [Normal Conjunctiva] : the conjunctiva exhibited no abnormalities [Eyelids - No Xanthelasma] : the eyelids demonstrated no xanthelasmas [Normal Oral Mucosa] : normal oral mucosa [No Oral Pallor] : no oral pallor [No Oral Cyanosis] : no oral cyanosis [Normal Jugular Venous A Waves Present] : normal jugular venous A waves present [Normal Jugular Venous V Waves Present] : normal jugular venous V waves present [No Jugular Venous Burns A Waves] : no jugular venous burns A waves [Respiration, Rhythm And Depth] : normal respiratory rhythm and effort [Auscultation Breath Sounds / Voice Sounds] : lungs were clear to auscultation bilaterally [Exaggerated Use Of Accessory Muscles For Inspiration] : no accessory muscle use [Abdomen Soft] : soft [Abdomen Tenderness] : non-tender [Abdomen Mass (___ Cm)] : no abdominal mass palpated [Abnormal Walk] : normal gait [Gait - Sufficient For Exercise Testing] : the gait was sufficient for exercise testing [Nail Clubbing] : no clubbing of the fingernails [Cyanosis, Localized] : no localized cyanosis [] : no ischemic changes [Petechial Hemorrhages (___cm)] : no petechial hemorrhages [Heart Rate And Rhythm] : heart rate and rhythm were normal

## 2021-02-03 NOTE — HISTORY OF PRESENT ILLNESS
[FreeTextEntry1] : 80 year old with a history of atrial fibrillation last year.  No KEV, PND or orthopnea.  has not been walking more than 1 hour.  No chest pain.     No dyspnea.  Has been trying to follow a diet.

## 2021-04-14 NOTE — DIETITIAN INITIAL EVALUATION ADULT. - 25 CAL
4/14/2021         RE: Willard Manzano  [address] [facility] [id number]        Dear Colleague,    Thank you for referring your patient, Willard Manzano, to the [facility] [place]. Please see a copy of my visit note below.    ASSESSMENT & PLAN  Patient Instructions     1. Closed nondisplaced fracture of proximal phalanx of left little finger, initial encounter      -Patient has left fifth digit pain and swelling due to an avulsion fracture  -Patient will discontinue finger splint.  Patient will start buddy taping the fifth digit to the fourth digit  -Patient will begin gentle range of motion exercises to regain full range of motion  -Patient will start meloxicam 15 mg daily for the next 1 to 2 weeks and then on an as-needed basis for swelling and pain.  Patient may take 1 to 2 tablets of extra Tylenol as needed for breakthrough pain as well.  -Patient may continue to work as tolerated and limit lifting as his pain allows  -Patient will follow up in 3 weeks for repeat x-rays evaluation and progression of activity  -Call direct clinic number [[phone]] at any time with questions or concerns.    Albert Yeo MD [facility]  [facility] [place]          -----    SUBJECTIVE  Willard Manzano is a/an 58 year old Right handed male who is seen in consultation at the request of  James Cifuentes M.D. for evaluation of left hand pain. The patient is seen by themselves. Had the J&J the last part of march or beginning of April.    Onset: 6 day(s) ago. Patient describes injury as was lifting something and pinky got caught and twisted. Finger started to swell within the first hour.   Location of Pain: left base of the 5th finger  Rating of Pain at worst: 8/10  Rating of Pain Currently: 2/10  Worsened by: any movement of the pinky, unable to make a fist, spreading of the fingers  Better with: nothing  Treatments tried: ice, previous imaging (xray 4/8/21)  and finger splint and fernanda tape.  Associated symptoms: swelling, warmth and weakness of hand  Orthopedic history: NO  Relevant surgical history: NO  Social history: social history: works at in manufacturing.     Past Medical History:   Diagnosis Date     Arthritis 1/2011     Blood clotting disorder (H)      Cancer (H)     Skin     Chiari malformation type I (H)      Elevated fasting glucose 1/19/2012     Headaches, migraine      Headaches, migraine      Other and unspecified hyperlipidemia      Rotator cuff tear 1/22/2012     Rotator cuff tear 1/22/2012     Rotator cuff tendinitis 1/2011    Inj. Dr. Cheney.      Spinal headache 2007    spontaneous spinal fluid leak - 2 blood patches     Urethral polyp 6-24-10    Dr. Card     Urethral polyp 6/24/2010    Dr. Card      Social History     Socioeconomic History     Marital status:      Spouse name: Not on file     Number of children: Not on file     Years of education: Not on file     Highest education level: Not on file   Occupational History     Not on file   Social Needs     Financial resource strain: Not on file     Food insecurity     Worry: Not on file     Inability: Not on file     Transportation needs     Medical: Not on file     Non-medical: Not on file   Tobacco Use     Smoking status: Never Smoker     Smokeless tobacco: Never Used     Tobacco comment: Some sporadic use 40+ years ago- cigars   Substance and Sexual Activity     Alcohol use: Yes     Frequency: 2-4 times a month     Drinks per session: 1 or 2     Comment: 1-2 drinks a week     Drug use: No     Sexual activity: Yes     Partners: Female     Birth control/protection: Male Surgical   Lifestyle     Physical activity     Days per week: Not on file     Minutes per session: Not on file     Stress: Not on file   Relationships     Social connections     Talks on phone: Not on file     Gets together: Not on file     Attends Holiness service: Not on file     Active member of club or  "organization: Not on file     Attends meetings of clubs or organizations: Not on file     Relationship status: Not on file     Intimate partner violence     Fear of current or ex partner: Not on file     Emotionally abused: Not on file     Physically abused: Not on file     Forced sexual activity: Not on file   Other Topics Concern     Parent/sibling w/ CABG, MI or angioplasty before 65F 55M? Yes     Comment: Brother   Social History Narrative     Not on file         Patient's past medical, surgical, social, and family histories were reviewed today and no changes are noted.    REVIEW OF SYSTEMS:  10 point ROS is negative other than symptoms noted above in HPI, Past Medical History or as stated below  Constitutional: NEGATIVE for fever, chills, change in weight  Skin: NEGATIVE for worrisome rashes, moles or lesions  GI/: NEGATIVE for bowel or bladder changes  Neuro: NEGATIVE for weakness, dizziness or paresthesias    OBJECTIVE:  /74   Ht 1.778 m (5' 10\")   Wt 90.7 kg (200 lb)   BMI 28.70 kg/m     General: healthy, alert and in no distress  HEENT: no scleral icterus or conjunctival erythema  Skin: no suspicious lesions or rash. No jaundice.  CV: regular rhythm by palpation  Resp: normal respiratory effort without conversational dyspnea   Psych: normal mood and affect  Gait: normal steady gait with appropriate coordination and balance  Neuro: normal light touch sensory exam of the bilateral hands.    MSK:  LEFT HAND  Inspection:    No swelling or obvious deformity or asymmetry  Palpation:   Carpals: normal   Metacarpals: normal   Thumb: normal   Fingers: MCP joint 5th digit  Range of Motion:    flexion MCP limited slightly by pain limited by tightness, flexion PIP limited slightly by pain limited by tightness  Strength:    flexion limited slightly by pain  Special Tests:    Positive: laxity to valgus stress of 5th digit    Negative: flexor digitorum superficialis testing, flexor digitorum profundus " testing    Independent visualization of the below image:  No results found for this or any previous visit (from the past 24 hour(s)).  XR FINGER LT G/E 2 VW 4/8/2021 3:05 PM      HISTORY: s/p injury of 5th finger, swelling, pain at base of finger;  Injury of finger of left hand, initial encounter     COMPARISON: None.                                                                      IMPRESSION: Acute avulsion fracture in the radial base of the fifth  finger proximal phalanx at the insertion of the radial collateral  ligament with 2 mm of displacement at the distal cortical margin. No  additional fractures are evident. Degenerative changes in the IP  joints of the fifth finger.     SCOTT S NIELSEN, MD Albert Yeo MD [facility]        Again, thank you for allowing me to participate in the care of your patient.        Sincerely,        Albert Yeo, MD     [id number]

## 2021-04-27 ENCOUNTER — APPOINTMENT (OUTPATIENT)
Dept: UROLOGY | Facility: CLINIC | Age: 81
End: 2021-04-27
Payer: MEDICARE

## 2021-04-27 PROCEDURE — 99214 OFFICE O/P EST MOD 30 MIN: CPT

## 2021-04-27 PROCEDURE — 99072 ADDL SUPL MATRL&STAF TM PHE: CPT

## 2021-04-28 LAB
APPEARANCE: CLEAR
BACTERIA: ABNORMAL
BILIRUBIN URINE: NEGATIVE
BLOOD URINE: NEGATIVE
COLOR: YELLOW
GLUCOSE QUALITATIVE U: ABNORMAL
HYALINE CASTS: 0 /LPF
KETONES URINE: NEGATIVE
LEUKOCYTE ESTERASE URINE: ABNORMAL
MICROSCOPIC-UA: NORMAL
NITRITE URINE: NEGATIVE
PH URINE: 7
PROTEIN URINE: NORMAL
RED BLOOD CELLS URINE: 1 /HPF
SPECIFIC GRAVITY URINE: 1.02
SQUAMOUS EPITHELIAL CELLS: 0 /HPF
URINE COMMENTS: NORMAL
URINE CYTOLOGY: NORMAL
UROBILINOGEN URINE: NORMAL
WHITE BLOOD CELLS URINE: 21 /HPF

## 2021-04-28 NOTE — ASSESSMENT
[FreeTextEntry1] : 12/30/19: Patient presents today for a follow up. He reports waking up 2-3 times at night which is okay for him. He has been taking Finasteride and Tamsulosin which is working for him. He denies constipation or hematuria. \par \par The patient produced a urine sample which will be sent for urinalysis, urine cytology, and urine culture.\par Blood work today included CBC, PSA, and testosterone.\par Patient will follow up 3 months or sooner if clinically indicated. \par \par 04/27/2021: Patient presents today for a follow up accompanied by his wife. Pt has been doing well. However, he reports that after he urinates he feels the urge to go again a few minutes later and voids a pretty large amount when he does. This incomplete emptying and double voiding has been going on for a couple of months, however the patient's wife said they were nervous to come out due to covid. Pt was on a high BP medication, Eplerenone, that was discontinued. He is currently taking tamsulosin 0.4 mg once daily after lunch, metformin 1000 mg BID, Doxazosin 4 mg once daily, Januvia 100 mg for diabetes once daily, hydralazine 50 mg 3x a day, Ramipril 5 mg once daily, furosemide 20 mg once daily, and Pravastatin 40 mg once daily for cholesterol. \par \par The patient produced a urine sample which will be sent for urinalysis, urine cytology, and urine culture. \par \par I increased the dosage of Tamsulosin 0.4 mg from once daily to twice daily. \par \par I prescribed the patient Finasteride 5 mg to take 1 tablet daily. I informed the patient there may be erectile dysfunction, hot flashes, breast tenderness, and increased hair growth as a side effect. I informed him it could take a while for it to start working. I also informed him that the pill is hormonally active if crushed and to not let any woman that is child bearing or will be to clean it up as it could hurt the fetus if absorbed in the skin. \par \par Patient will RTO in 2 weeks for reassessment. \par \par Preparation, in person office visit, and coordination of care:  30 minutes.

## 2021-04-28 NOTE — ADDENDUM
[FreeTextEntry1] : This note was authored by Marissa Castro working as a scribe for Dr.Gary Sibley. I, Dr. Chris Sibley have reviewed the content of this note and confirm it is true and accurate. I personally performed the history and physical examination and made all the decisions 04/27/2021.

## 2021-04-28 NOTE — HISTORY OF PRESENT ILLNESS
[FreeTextEntry1] : Mr Gonzales is an 80 year old man followed for hydrocele, past abnormal urine cytology, and BPH. The patient had a hydrocelectomy in October of 2014. Fluid cytology 06/08/16 was abnormal. Fluid cytology 12/07/16 and 12/19/16 were negative for malignant cells. The patient takes finasteride 5 mg, oxybutynin, ER 10 mg, and doxazosin 8 mg once daily. Cystoscopy 12/19/16 found 2+ trabeculation of the bladder. The prostate protruded moderately into the bladder. No bladder stones or tumors. The left and right ureteral orifice visualized and were very close to prostate. I advised him to discontinue taking oxybutynin. On 11/10/17 the patient made 2500 cc of urine. The patient will now begin taking tamsulosin HCl 0.4 MG  one capsule one half hour after breakfast and doxazosin at night. He was able to urinate well over night without any pain. \par 12/10/18: The patient returned today and reported that his urination is satisfactory. Currently takes Finasteride, Tamsulosin and Doxazosin. Complains of intermittent stream and urge incontinence. Denies dysuria and gross hematuria.\par  6/10/19: Patient presents for 6 month follow up. Currently on Proscar and Doxazosin. States that he has no need to strain to initiate urination but flow is intermittent. Reports bothersome urinary urgency/frequency with occasional UUI. Nocturia. Regarding his anemia it is reported that it was evaluated by his PCP and he was told that it will be monitored but is nothing to worry about at this time. \par 6/26/19: Patient presents today for UDS and a cystoscopy. \par 7/3/19: Patient presents today for an ultrasound of the prostate. He is here today to discuss the results. His urination is the same as it was at his last visit. He wake up 4x a night to urinate. During the day he urinates 5-6x. He denies dysuria, gross hematuria, urgency or incontinence. Following the cystoscopy he noticed a small amount of blood and tiny clots but they have since resolved. He is satisfied with his urination at this time. \par 10/8/19: Patient presents today for a renal ultrasound and is here to discuss the results. Doxazosin, Finasteride and Tamsulosin were all prescribed previously. It is reported today that he has only been taking the Doxazosin and the Finasteride. His urination is still troublesome. Nocturia x 4-5 is reported. \par 10/15/19: Patient presents today for a follow up. His most recent PSA from 10/8/19 was 5.14 ng/mL a slight increase when compared to the last PSA. Finasteride is currently taken. When adjusting for the use of Finasteride his PSA is around 10 ng/mL. He is scheduled for surgery with Dr. Rolon next week. \par 11/20/19: Patient presents today for follow-up. He has completed surgery with Dr. Rolon and is improving well. He has finished his Sulfamethoxazole-Trimethoprim. His recent PSA on 11/12 was 5.15 ng/mL. He denies having a pacemaker. Patient has previous abnormal ultrasound and elevated PSA.\par \par 12/30/19: Patient presents today for a follow up. He reports waking up 2-3 times at night which is okay for him. He has been taking Finasteride and Tamsulosin which is working for him. He denies constipation or hematuria. \par \par 04/27/2021: Patient presents today for a follow up accompanied by his wife. Pt has been doing well. However, he reports that after he urinates he feels the urge to go again a few minutes later and voids a pretty large amount when he does. This incomplete emptying and double voiding has been going on for a couple of months, however the patient's wife said they were nervous to come out due to covid. Pt was on a high BP medication, Eplerenone, that was discontinued. He is currently taking tamsulosin 0.4 mg once daily after lunch, metformin BID, Doxazosin 4 mg once daily, Januvia 100 mg for diabetes once daily, hydralazine 50 mg 3x a day, Ramipril 5 mg once daily, furosemide 20 mg once daily, and Pravastatin 40 mg once daily for cholesterol.

## 2021-04-28 NOTE — REVIEW OF SYSTEMS
[Incomplete Emptying] : incomplete emptying of bladder [Feeling Poorly] : not feeling poorly [Feeling Tired] : not feeling tired [Difficulty Walking] : no difficulty walking

## 2021-04-29 LAB — BACTERIA UR CULT: NORMAL

## 2021-05-13 ENCOUNTER — NON-APPOINTMENT (OUTPATIENT)
Age: 81
End: 2021-05-13

## 2021-05-13 ENCOUNTER — EMERGENCY (EMERGENCY)
Facility: HOSPITAL | Age: 81
LOS: 1 days | Discharge: HOME | End: 2021-05-13
Attending: EMERGENCY MEDICINE | Admitting: EMERGENCY MEDICINE
Payer: MEDICARE

## 2021-05-13 VITALS
DIASTOLIC BLOOD PRESSURE: 72 MMHG | SYSTOLIC BLOOD PRESSURE: 139 MMHG | TEMPERATURE: 98 F | HEIGHT: 70 IN | OXYGEN SATURATION: 100 % | RESPIRATION RATE: 16 BRPM | HEART RATE: 92 BPM

## 2021-05-13 VITALS
HEART RATE: 84 BPM | DIASTOLIC BLOOD PRESSURE: 76 MMHG | RESPIRATION RATE: 16 BRPM | OXYGEN SATURATION: 100 % | SYSTOLIC BLOOD PRESSURE: 164 MMHG

## 2021-05-13 DIAGNOSIS — Z98.890 OTHER SPECIFIED POSTPROCEDURAL STATES: Chronic | ICD-10-CM

## 2021-05-13 DIAGNOSIS — Z98.49 CATARACT EXTRACTION STATUS, UNSPECIFIED EYE: Chronic | ICD-10-CM

## 2021-05-13 LAB
ALBUMIN SERPL ELPH-MCNC: 4.2 G/DL — SIGNIFICANT CHANGE UP (ref 3.3–5)
ALP SERPL-CCNC: 56 U/L — SIGNIFICANT CHANGE UP (ref 40–120)
ALT FLD-CCNC: 14 U/L — SIGNIFICANT CHANGE UP (ref 4–41)
ANION GAP SERPL CALC-SCNC: 10 MMOL/L — SIGNIFICANT CHANGE UP (ref 7–14)
APPEARANCE UR: CLEAR — SIGNIFICANT CHANGE UP
AST SERPL-CCNC: 22 U/L — SIGNIFICANT CHANGE UP (ref 4–40)
BACTERIA # UR AUTO: ABNORMAL
BASOPHILS # BLD AUTO: 0.03 K/UL — SIGNIFICANT CHANGE UP (ref 0–0.2)
BASOPHILS NFR BLD AUTO: 0.7 % — SIGNIFICANT CHANGE UP (ref 0–2)
BILIRUB SERPL-MCNC: 0.4 MG/DL — SIGNIFICANT CHANGE UP (ref 0.2–1.2)
BILIRUB UR-MCNC: NEGATIVE — SIGNIFICANT CHANGE UP
BUN SERPL-MCNC: 12 MG/DL — SIGNIFICANT CHANGE UP (ref 7–23)
CALCIUM SERPL-MCNC: 9.3 MG/DL — SIGNIFICANT CHANGE UP (ref 8.4–10.5)
CHLORIDE SERPL-SCNC: 100 MMOL/L — SIGNIFICANT CHANGE UP (ref 98–107)
CO2 SERPL-SCNC: 28 MMOL/L — SIGNIFICANT CHANGE UP (ref 22–31)
COLOR SPEC: SIGNIFICANT CHANGE UP
CREAT SERPL-MCNC: 1.09 MG/DL — SIGNIFICANT CHANGE UP (ref 0.5–1.3)
DIFF PNL FLD: ABNORMAL
EOSINOPHIL # BLD AUTO: 0.05 K/UL — SIGNIFICANT CHANGE UP (ref 0–0.5)
EOSINOPHIL NFR BLD AUTO: 1.1 % — SIGNIFICANT CHANGE UP (ref 0–6)
EPI CELLS # UR: 2 /HPF — SIGNIFICANT CHANGE UP (ref 0–5)
GLUCOSE SERPL-MCNC: 202 MG/DL — HIGH (ref 70–99)
GLUCOSE UR QL: NEGATIVE — SIGNIFICANT CHANGE UP
HCT VFR BLD CALC: 38.2 % — LOW (ref 39–50)
HGB BLD-MCNC: 12.7 G/DL — LOW (ref 13–17)
HYALINE CASTS # UR AUTO: 0 /LPF — SIGNIFICANT CHANGE UP (ref 0–7)
IANC: 2.6 K/UL — SIGNIFICANT CHANGE UP (ref 1.5–8.5)
IMM GRANULOCYTES NFR BLD AUTO: 0.2 % — SIGNIFICANT CHANGE UP (ref 0–1.5)
KETONES UR-MCNC: NEGATIVE — SIGNIFICANT CHANGE UP
LEUKOCYTE ESTERASE UR-ACNC: ABNORMAL
LYMPHOCYTES # BLD AUTO: 1.23 K/UL — SIGNIFICANT CHANGE UP (ref 1–3.3)
LYMPHOCYTES # BLD AUTO: 27.9 % — SIGNIFICANT CHANGE UP (ref 13–44)
MCHC RBC-ENTMCNC: 28.2 PG — SIGNIFICANT CHANGE UP (ref 27–34)
MCHC RBC-ENTMCNC: 33.2 GM/DL — SIGNIFICANT CHANGE UP (ref 32–36)
MCV RBC AUTO: 84.9 FL — SIGNIFICANT CHANGE UP (ref 80–100)
MONOCYTES # BLD AUTO: 0.49 K/UL — SIGNIFICANT CHANGE UP (ref 0–0.9)
MONOCYTES NFR BLD AUTO: 11.1 % — SIGNIFICANT CHANGE UP (ref 2–14)
NEUTROPHILS # BLD AUTO: 2.6 K/UL — SIGNIFICANT CHANGE UP (ref 1.8–7.4)
NEUTROPHILS NFR BLD AUTO: 59 % — SIGNIFICANT CHANGE UP (ref 43–77)
NITRITE UR-MCNC: NEGATIVE — SIGNIFICANT CHANGE UP
NRBC # BLD: 0 /100 WBCS — SIGNIFICANT CHANGE UP
NRBC # FLD: 0 K/UL — SIGNIFICANT CHANGE UP
PH UR: 7 — SIGNIFICANT CHANGE UP (ref 5–8)
PLATELET # BLD AUTO: 179 K/UL — SIGNIFICANT CHANGE UP (ref 150–400)
POTASSIUM SERPL-MCNC: 4.2 MMOL/L — SIGNIFICANT CHANGE UP (ref 3.5–5.3)
POTASSIUM SERPL-SCNC: 4.2 MMOL/L — SIGNIFICANT CHANGE UP (ref 3.5–5.3)
PROT SERPL-MCNC: 6.7 G/DL — SIGNIFICANT CHANGE UP (ref 6–8.3)
PROT UR-MCNC: ABNORMAL
RBC # BLD: 4.5 M/UL — SIGNIFICANT CHANGE UP (ref 4.2–5.8)
RBC # FLD: 13.2 % — SIGNIFICANT CHANGE UP (ref 10.3–14.5)
RBC CASTS # UR COMP ASSIST: 4 /HPF — SIGNIFICANT CHANGE UP (ref 0–4)
SODIUM SERPL-SCNC: 138 MMOL/L — SIGNIFICANT CHANGE UP (ref 135–145)
SP GR SPEC: 1.01 — SIGNIFICANT CHANGE UP (ref 1.01–1.02)
UROBILINOGEN FLD QL: SIGNIFICANT CHANGE UP
WBC # BLD: 4.41 K/UL — SIGNIFICANT CHANGE UP (ref 3.8–10.5)
WBC # FLD AUTO: 4.41 K/UL — SIGNIFICANT CHANGE UP (ref 3.8–10.5)
WBC UR QL: 17 /HPF — HIGH (ref 0–5)

## 2021-05-13 PROCEDURE — 99284 EMERGENCY DEPT VISIT MOD MDM: CPT

## 2021-05-13 RX ORDER — CEPHALEXIN 500 MG
1 CAPSULE ORAL
Qty: 14 | Refills: 0
Start: 2021-05-13 | End: 2021-05-19

## 2021-05-13 RX ORDER — CEPHALEXIN 500 MG
500 CAPSULE ORAL ONCE
Refills: 0 | Status: COMPLETED | OUTPATIENT
Start: 2021-05-13 | End: 2021-05-13

## 2021-05-13 RX ADMIN — Medication 500 MILLIGRAM(S): at 21:46

## 2021-05-13 NOTE — ED PROVIDER NOTE - PHYSICAL EXAMINATION
GEN - NAD; non-toxic; A+Ox3, speaking full sentences, steady gait   HENT - NC/AT, No visible Ecchymosis, No Abrasions, No Lac/Tears, MMM, no discharge  EYES - EOMI, no conjunctival pallor, no scleral icterus  PULM - CTA B/L,  symmetric breath sounds  CV -  Afib, S1 S2, no murmurs 2+ Pulses B/L UE  GI - (+) Suprapubic Fullness; (-) Floyd's, (-) Rovsings, (-) McBurneys; NT/ND, soft, no guarding, no rebound, no masses    MSK/EXT- no CVA tenderness; no edema, no gross deformity, warm and well perfused, no calf tenderness/swelling/erythema   SKIN - no rash or bruising  NEUROLOGIC - alert, moving all 4 ext with 5/5 Strength

## 2021-05-13 NOTE — ED PROVIDER NOTE - CLINICAL SUMMARY MEDICAL DECISION MAKING FREE TEXT BOX
80 male, Hx: HTN, HLD, AFib (no longer on Eliquis, PMD d/kasie), BPH - presents with difficulty completely voiding. Reports he last urinated in the waiting room where he was having difficulty maintaining a steady stream. PVR Residual demonstrating ~350 cc urine in bladder, will place dela cruz, obtain UA, UCx, CBC, CMP to r/o electrolyte abnormalities, cystitis, or obstructive uropathy. Denies any weight loss, nightsweats - do not acute suspect malignant obstruction - but pt will require Urology followup. VSS, non-toxic.

## 2021-05-13 NOTE — ED ADULT NURSE REASSESSMENT NOTE - NS ED NURSE REASSESS COMMENT FT1
16fr Indwelling dela cruz inserted utilizing aseptic technique, pt tolerated well. ~300cc urine output noted, urine clear and yellow. Endorsed to primary RN

## 2021-05-13 NOTE — ED ADULT TRIAGE NOTE - CHIEF COMPLAINT QUOTE
Pt discharged to home with self care. Discharge instructions reviewed. L&D phone number provided for any future questions/concerns. Pt educated on reasons to call/return to OB ED - vag bleeding, ctx that worsen in severity, or LOF. Pt verbalized understanding.  
Pt presents to OB ED c/o mild ctx. Pt denies leakage of fluids/vaginal bleeding. Pt reports + FM.   
Pt c/o difficulty urinating and dysuria since today.  Denies chills

## 2021-05-13 NOTE — ED PROVIDER NOTE - PATIENT PORTAL LINK FT
You can access the FollowMyHealth Patient Portal offered by [facility] by registering at the following website: [email or web address]. By joining [employer]’s FollowMyHealth portal, you will also be able to view your health information using other applications (apps) compatible with our system.

## 2021-05-13 NOTE — ED PROVIDER NOTE - NSFOLLOWUPINSTRUCTIONS_ED_ALL_ED_FT
You were seen and evaluated in the Emergency Department for your urinary retention. You were evaluated clinically and with laboratory studies.    - KEEP THE RIZZO IN YOUR PENIS UNTIL YOU SEE THE UROLOGIST    - FOLLOW UP WITH YOUR UROLOGIST WITHIN 3-5 DAYS FOR REMOVAL OF THE CATHETER AND FURTHER MANAGEMENT OF YOUR URINARY RETENTION CONDITION    While clinical evaluation does not demonstrate any acute, life-threatening pathology on your exam at this time, we strongly recommend you follow up with one of our Urology consultants (or your own) for further evaluation of your symptoms by calling the following number to make an appointment:    [facility] - Urology  Urology  [address], 3rd & 4th [place] [address]  Phone: [phone]    Should you develop new or worsening flank pain, fevers, chills, nausea, vomiting, diarrhea, or constipation - please return to the ED for immediate evaluation.     We also strongly encourage you make an appointment with your Primary Care Physician for a comprehensive evaluation of your health. You were seen and evaluated in the Emergency Department for your urinary retention. You were evaluated clinically and with laboratory studies.    - KEEP THE RIZZO IN YOUR PENIS UNTIL YOU SEE THE UROLOGIST    - TAKE THE ANTIBIOTICS WE PRESCRIBED TO YOU, AS DIRECTED    - FOLLOW UP WITH YOUR UROLOGIST WITHIN 3-5 DAYS FOR REMOVAL OF THE CATHETER AND FURTHER MANAGEMENT OF YOUR URINARY RETENTION CONDITION    While clinical evaluation does not demonstrate any acute, life-threatening pathology on your exam at this time, we strongly recommend you follow up with one of our Urology consultants (or your own) for further evaluation of your symptoms by calling the following number to make an appointment:    [facility] - Urology  Urology  [address], 3rd & 4th [place] [address]  Phone: [phone]    Should you develop new or worsening flank pain, fevers, chills, nausea, vomiting, diarrhea, or constipation - please return to the ED for immediate evaluation.     We also strongly encourage you make an appointment with your Primary Care Physician for a comprehensive evaluation of your health.

## 2021-05-13 NOTE — ED PROVIDER NOTE - ATTENDING CONTRIBUTION TO CARE
Attending Statement: I have personally seen and examined this patient. I have fully participated in the care of this patient. I have reviewed all pertinent clinical information, including history physical exam, plan and the Resident's note and agree except as noted   80 male, Hx: HTN, HLD, AFib (no longer on Eliquis, PMD d/kasie), BPH from home co urinary retention. has been able to urinate "small amount" over last three days. Just urinated in waiting room. Denies any fever/chills. No n/v/d no abdominal pain. Has had catheter in the past.   Vital signs noted. nontoxic male. soft mild suprapubic discomfort. no cvat.   plan labs, urine, dela cruz, re assess

## 2021-05-13 NOTE — ED PROVIDER NOTE - PROGRESS NOTE DETAILS
Ramírez placed, 300cc yellow urine drained, pt feeling better Resident Arnie: pt re-evaluated clinically, eval is NOT consistent with AIRAM or electrolyte abnormalities. UA is weak, will follow UCx given lack of burning/pain upon urination and afebrile presentation do not suspect Cystitis/Pyelo. Pt has Urology followup, and his primary Urologist is Chris Sibley MD. Pt informed his office of his ED clinical course and will follow up with him outpatient. Stable for discharge.

## 2021-05-13 NOTE — ED PROVIDER NOTE - NS ED ROS FT
Constitution: No Fever or chills, No Weight Loss,   Eyes: No visual changes  HEENT: No cough, No Discharge, No Rhinorrhea, No URI symptoms  Cardio: No Chest pain, No Palpitations, No Dyspnea  Resp: No SOB, No Wheezing  GI: No abdominal pain, No Nausea, No Vomiting, No Constipation, No Diarrhea  : (+) Incomplete Voiding; (+) Weak Stream; No burning upon urination, trouble urinating, no foul odor from urine  MSK: No Back pain, No Numbness, No Tingling, No Weakness  Neuro: No Headache, Normal Gait  Skin: No rashes, No Bruising, No Swelling

## 2021-05-13 NOTE — ED PROVIDER NOTE - OBJECTIVE STATEMENT
80 male, Hx: HTN, HLD, AFib (no longer on Eliquis, PMD d/kasie), BPH - presents with difficulty completely voiding. Reports he last urinated in the waiting room where he was having difficulty maintaining a steady stream. Pt reports he has been urinating normally earlier this week, but over the last 2 days has had intermittent episodes where he feels like he hasn't been able to completely void. Denies any fevers, chills, cough, CP, SOB, abdominal pain, nausea, vomiting, diarrhea, constipation, bloody stools, burning upon urination. Denies any history of prostatic cancer, recent unintentional weight loss nightsweats.

## 2021-05-13 NOTE — ED ADULT NURSE NOTE - OBJECTIVE STATEMENT
Facilitator RN: Pt presents to room 17, alert&orientedx4, ambulatory, PMHX [id number], HTN, BPH, A.fib  coming to ED for urinary retention x1-2 days. Pt states he went to the bathroom twice in the ED, but he feels "I still need to go". Denies any fever, n/v/d, chest pain or SOB. Denies any hematuria, but endorses burning sensation. Abd soft and non-distended, breathing even and non-labored, A.fib on cardiac monitor. MD at bedside for eval. Awaiting further plan of care

## 2021-05-15 LAB
CULTURE RESULTS: SIGNIFICANT CHANGE UP
SPECIMEN SOURCE: SIGNIFICANT CHANGE UP

## 2021-05-18 ENCOUNTER — NON-APPOINTMENT (OUTPATIENT)
Age: 81
End: 2021-05-18

## 2021-05-18 ENCOUNTER — OUTPATIENT (OUTPATIENT)
Dept: OUTPATIENT SERVICES | Facility: HOSPITAL | Age: 81
LOS: 1 days | End: 2021-05-18
Payer: MEDICARE

## 2021-05-18 ENCOUNTER — APPOINTMENT (OUTPATIENT)
Dept: UROLOGY | Facility: CLINIC | Age: 81
End: 2021-05-18
Payer: MEDICARE

## 2021-05-18 VITALS
DIASTOLIC BLOOD PRESSURE: 74 MMHG | BODY MASS INDEX: 26.05 KG/M2 | WEIGHT: 182 LBS | SYSTOLIC BLOOD PRESSURE: 156 MMHG | HEIGHT: 70 IN | RESPIRATION RATE: 17 BRPM | TEMPERATURE: 98.2 F | HEART RATE: 77 BPM

## 2021-05-18 DIAGNOSIS — Z98.49 CATARACT EXTRACTION STATUS, UNSPECIFIED EYE: Chronic | ICD-10-CM

## 2021-05-18 DIAGNOSIS — Z98.890 OTHER SPECIFIED POSTPROCEDURAL STATES: Chronic | ICD-10-CM

## 2021-05-18 PROCEDURE — 99072 ADDL SUPL MATRL&STAF TM PHE: CPT

## 2021-05-18 PROCEDURE — 51701 INSERT BLADDER CATHETER: CPT

## 2021-05-18 PROCEDURE — 99214 OFFICE O/P EST MOD 30 MIN: CPT | Mod: 25

## 2021-05-18 NOTE — REVIEW OF SYSTEMS
[Incomplete Emptying] : incomplete emptying of bladder [Dysuria] : dysuria [Burning Sensation] : burning sensation during urination [Feeling Poorly] : not feeling poorly [Feeling Tired] : not feeling tired [Constipation] : no constipation [Diarrhea] : no diarrhea [Difficulty Walking] : no difficulty walking

## 2021-05-18 NOTE — ASSESSMENT
[FreeTextEntry1] : 12/30/19: Patient presents today for a follow up. He reports waking up 2-3 times at night which is okay for him. He has been taking Finasteride and Tamsulosin which is working for him. He denies constipation or hematuria. \par \par The patient produced a urine sample which will be sent for urinalysis, urine cytology, and urine culture.\par Blood work today included CBC, PSA, and testosterone.\par Patient will follow up 3 months or sooner if clinically indicated. \par \par 04/27/2021: Patient presents today for a follow up accompanied by his wife. Pt has been doing well. However, he reports that after he urinates he feels the urge to go again a few minutes later and voids a pretty large amount when he does. This incomplete emptying and double voiding has been going on for a couple of months, however the patient's wife said they were nervous to come out due to covid. Pt was on a high BP medication, Eplerenone, that was discontinued. He is currently taking tamsulosin 0.4 mg once daily after lunch, metformin 1000 mg BID, Doxazosin 4 mg once daily, Januvia 100 mg for diabetes once daily, hydralazine 50 mg 3x a day, Ramipril 5 mg once daily, furosemide 20 mg once daily, and Pravastatin 40 mg once daily for cholesterol. \par \par The patient produced a urine sample which will be sent for urinalysis, urine cytology, and urine culture. \par I increased the dosage of Tamsulosin 0.4 mg from once daily to twice daily. \par I prescribed the patient Finasteride 5 mg to take 1 tablet daily. I informed the patient there may be erectile dysfunction, hot flashes, breast tenderness, and increased hair growth as a side effect. I informed him it could take a while for it to start working. I also informed him that the pill is hormonally active if crushed and to not let any woman that is child bearing or will be to clean it up as it could hurt the fetus if absorbed in the skin. \par Patient will RTO in 2 weeks for reassessment. \par \par 05/18/2021: Patient presents today for a follow up accompanied by his wife. The pt had been experiencing urinary frequency, dysuria, and burning last week. He went to the emergency room on 5/13/2021 and a Ramírez catheter was placed. He currently has the catheter in and feels better. He was also given cephalexin 500 mg BID as an antibiotic. This medication has not affected his GI system. The patient has been taking tamsulosin 0.4 mg BID (increased from once daily) as well as finasteride 5 mg once daily. \par \par Patient will continue finasteride 5 mg once daily and tamsulosin 0.4 mg BID. He will also continue his antibiotic. \par \par The patient had a fill and pull today that was unsuccessful. Coude catheter was reinserted. I spoke to the patient's niece as the patient requested, who is an anesthesiologist at [employer], after the catheter was reinserted. I informed her of her uncle's current urological status and my reasoning for how I am treating him. \par \par I am prescribing the patient tadalafil 5 mg once daily to aid urination. I informed him of the possible side effects of heart burn, tingling feeling on the skin, back ache, and on rare occasion visual and auditory problems. Instructed him on finding it at a more affordable cost with the [employer] application. \par \par RTO in one week for reassessment of his urination and voiding trial, fill and pull. \par \par Preparation, in person office visit, and coordination of care: 30 minutes.

## 2021-05-18 NOTE — HISTORY OF PRESENT ILLNESS
[FreeTextEntry1] : Mr Gonzales is an 80 year old man followed for hydrocele, past abnormal urine cytology, and BPH. The patient had a hydrocelectomy in October of 2014. Fluid cytology 06/08/16 was abnormal. Fluid cytology 12/07/16 and 12/19/16 were negative for malignant cells. The patient takes finasteride 5 mg, oxybutynin, ER 10 mg, and doxazosin 8 mg once daily. Cystoscopy 12/19/16 found 2+ trabeculation of the bladder. The prostate protruded moderately into the bladder. No bladder stones or tumors. The left and right ureteral orifice visualized and were very close to prostate. I advised him to discontinue taking oxybutynin. On 11/10/17 the patient made 2500 cc of urine. The patient will now begin taking tamsulosin HCl 0.4 MG  one capsule one half hour after breakfast and doxazosin at night. He was able to urinate well over night without any pain. \par 12/10/18: The patient returned today and reported that his urination is satisfactory. Currently takes Finasteride, Tamsulosin and Doxazosin. Complains of intermittent stream and urge incontinence. Denies dysuria and gross hematuria.\par  6/10/19: Patient presents for 6 month follow up. Currently on Proscar and Doxazosin. States that he has no need to strain to initiate urination but flow is intermittent. Reports bothersome urinary urgency/frequency with occasional UUI. Nocturia. Regarding his anemia it is reported that it was evaluated by his PCP and he was told that it will be monitored but is nothing to worry about at this time. \par 6/26/19: Patient presents today for UDS and a cystoscopy. \par 7/3/19: Patient presents today for an ultrasound of the prostate. He is here today to discuss the results. His urination is the same as it was at his last visit. He wake up 4x a night to urinate. During the day he urinates 5-6x. He denies dysuria, gross hematuria, urgency or incontinence. Following the cystoscopy he noticed a small amount of blood and tiny clots but they have since resolved. He is satisfied with his urination at this time. \par 10/8/19: Patient presents today for a renal ultrasound and is here to discuss the results. Doxazosin, Finasteride and Tamsulosin were all prescribed previously. It is reported today that he has only been taking the Doxazosin and the Finasteride. His urination is still troublesome. Nocturia x 4-5 is reported. \par 10/15/19: Patient presents today for a follow up. His most recent PSA from 10/8/19 was 5.14 ng/mL a slight increase when compared to the last PSA. Finasteride is currently taken. When adjusting for the use of Finasteride his PSA is around 10 ng/mL. He is scheduled for surgery with Dr. Rolon next week. \par 11/20/19: Patient presents today for follow-up. He has completed surgery with Dr. Rolon and is improving well. He has finished his Sulfamethoxazole-Trimethoprim. His recent PSA on 11/12 was 5.15 ng/mL. He denies having a pacemaker. Patient has previous abnormal ultrasound and elevated PSA.\par \par 12/30/19: Patient presents today for a follow up. He reports waking up 2-3 times at night which is okay for him. He has been taking Finasteride and Tamsulosin which is working for him. He denies constipation or hematuria. \par \par 04/27/2021: Patient presents today for a follow up accompanied by his wife. Pt has been doing well. However, he reports that after he urinates he feels the urge to go again a few minutes later and voids a pretty large amount when he does. This incomplete emptying and double voiding has been going on for a couple of months, however the patient's wife said they were nervous to come out due to covid. Pt was on a high BP medication, Eplerenone, that was discontinued. He is currently taking tamsulosin 0.4 mg once daily after lunch, metformin BID, Doxazosin 4 mg once daily, Januvia 100 mg for diabetes once daily, hydralazine 50 mg 3x a day, Ramipril 5 mg once daily, furosemide 20 mg once daily, and Pravastatin 40 mg once daily for cholesterol. \par \par 05/18/2021: Patient presents today for a follow up accompanied by his wife. The pt had been experiencing urinary frequency, dysuria, and burning last week. He went to the emergency room on 5/13/2021 and a Ramírez catheter was placed. He currently has the catheter in and feels better. He was also given cephalexin 500 mg BID as an antibiotic. This medication has not affected his GI system. The patient has been taking tamsulosin 0.4 mg BID (increased from once daily) as well as finasteride 5 mg once daily.

## 2021-05-18 NOTE — ADDENDUM
[FreeTextEntry1] : This note was authored by Marissa Castro working as a scribe for Dr.Gary Sibley. I, Dr. Chris Sibley have reviewed the content of this note and confirm it is true and accurate. I personally performed the history and physical examination and made all the decisions 05/18/2021.

## 2021-05-18 NOTE — PHYSICAL EXAM
[General Appearance - Well Developed] : well developed [General Appearance - Well Nourished] : well nourished [Normal Appearance] : normal appearance [Well Groomed] : well groomed [General Appearance - In No Acute Distress] : no acute distress [Abdomen Soft] : soft [Abdomen Tenderness] : non-tender [Skin Color & Pigmentation] : normal skin color and pigmentation [Edema] : no peripheral edema [] : no respiratory distress [Respiration, Rhythm And Depth] : normal respiratory rhythm and effort [Exaggerated Use Of Accessory Muscles For Inspiration] : no accessory muscle use [Normal Station and Gait] : the gait and station were normal for the patient's age [No Focal Deficits] : no focal deficits [FreeTextEntry1] : Patient has a Ramírez catheter in place.

## 2021-05-24 ENCOUNTER — APPOINTMENT (OUTPATIENT)
Dept: UROLOGY | Facility: CLINIC | Age: 81
End: 2021-05-24
Payer: MEDICARE

## 2021-05-24 ENCOUNTER — OUTPATIENT (OUTPATIENT)
Dept: OUTPATIENT SERVICES | Facility: HOSPITAL | Age: 81
LOS: 1 days | End: 2021-05-24
Payer: MEDICARE

## 2021-05-24 ENCOUNTER — LABORATORY RESULT (OUTPATIENT)
Age: 81
End: 2021-05-24

## 2021-05-24 VITALS — DIASTOLIC BLOOD PRESSURE: 63 MMHG | SYSTOLIC BLOOD PRESSURE: 111 MMHG | TEMPERATURE: 98 F | HEART RATE: 90 BPM

## 2021-05-24 DIAGNOSIS — R89.6 ABNORMAL CYTOLOGICAL FINDINGS IN SPECIMENS FROM OTHER ORGANS, SYSTEMS AND TISSUES: ICD-10-CM

## 2021-05-24 DIAGNOSIS — Z98.890 OTHER SPECIFIED POSTPROCEDURAL STATES: Chronic | ICD-10-CM

## 2021-05-24 DIAGNOSIS — Z98.49 CATARACT EXTRACTION STATUS, UNSPECIFIED EYE: Chronic | ICD-10-CM

## 2021-05-24 DIAGNOSIS — R39.15 URGENCY OF URINATION: ICD-10-CM

## 2021-05-24 DIAGNOSIS — E11.9 TYPE 2 DIABETES MELLITUS WITHOUT COMPLICATIONS: ICD-10-CM

## 2021-05-24 DIAGNOSIS — N39.0 URINARY TRACT INFECTION, SITE NOT SPECIFIED: ICD-10-CM

## 2021-05-24 DIAGNOSIS — R35.0 FREQUENCY OF MICTURITION: ICD-10-CM

## 2021-05-24 DIAGNOSIS — N52.9 MALE ERECTILE DYSFUNCTION, UNSPECIFIED: ICD-10-CM

## 2021-05-24 DIAGNOSIS — R33.9 RETENTION OF URINE, UNSPECIFIED: ICD-10-CM

## 2021-05-24 DIAGNOSIS — N40.1 BENIGN PROSTATIC HYPERPLASIA WITH LOWER URINARY TRACT SYMPTOMS: ICD-10-CM

## 2021-05-24 PROCEDURE — 99072 ADDL SUPL MATRL&STAF TM PHE: CPT

## 2021-05-24 PROCEDURE — 51702 INSERT TEMP BLADDER CATH: CPT

## 2021-05-24 PROCEDURE — 99215 OFFICE O/P EST HI 40 MIN: CPT | Mod: 25

## 2021-05-24 NOTE — ASSESSMENT
[FreeTextEntry1] : 12/30/19: Patient presents today for a follow up. He reports waking up 2-3 times at night which is okay for him. He has been taking Finasteride and Tamsulosin which is working for him. He denies constipation or hematuria. \par \par 04/27/2021: Patient presents today for a follow up accompanied by his wife. Pt has been doing well. However, he reports that after he urinates he feels the urge to go again a few minutes later and voids a pretty large amount when he does. This incomplete emptying and double voiding has been going on for a couple of months, however the patient's wife said they were nervous to come out due to covid. Pt was on a high BP medication, Eplerenone, that was discontinued. He is currently taking tamsulosin 0.4 mg once daily after lunch, metformin 1000 mg BID, Doxazosin 4 mg once daily, Januvia 100 mg for diabetes once daily, hydralazine 50 mg 3x a day, Ramipril 5 mg once daily, furosemide 20 mg once daily, and Pravastatin 40 mg once daily for cholesterol. \par \par 05/18/2021: Patient presents today for a follow up accompanied by his wife. The pt had been experiencing urinary frequency, dysuria, and burning last week. He went to the emergency room on 5/13/2021 and a Ramírez catheter was placed. He currently has the catheter in and feels better. He was also given cephalexin 500 mg BID as an antibiotic. This medication has not affected his GI system. The patient has been taking tamsulosin 0.4 mg BID (increased from once daily) as well as finasteride 5 mg once daily. \par \par 05/24/2021: Patient presents today for fill and pull voiding trial for urinary retention. Continues Tadalafil 5mg, Finasteride, and Tamsulosin BID. Has had UDS three years ago without issue. \par \par Continue Tamsulosin BID, Finasteride, and Tadalafil 5mg. \par \par Patient had fill and pull today that was unsuccessful. I recommend the patient go for UDS with cystoscopy. before deciding further treatment plan. If bladder contraction is good, I recommend the patient have a procedure. If bladder contraction is poor, I recommend that he not have a procedure. \par \par RTO in 1 week for UDS with cystoscopy.\par \par Preparation, in-person office visit, and coordination of care took: 42minutes

## 2021-05-24 NOTE — REVIEW OF SYSTEMS
[Dysuria] : dysuria [Burning Sensation] : burning sensation during urination [Incomplete Emptying] : incomplete emptying of bladder [Feeling Poorly] : not feeling poorly [Feeling Tired] : not feeling tired [Constipation] : no constipation [Diarrhea] : no diarrhea [Difficulty Walking] : no difficulty walking

## 2021-05-24 NOTE — HISTORY OF PRESENT ILLNESS
[FreeTextEntry1] : Mr Gonzales is an 80 year old man followed for hydrocele, past abnormal urine cytology, and BPH. The patient had a hydrocelectomy in October of 2014. Fluid cytology 06/08/16 was abnormal. Fluid cytology 12/07/16 and 12/19/16 were negative for malignant cells. The patient takes finasteride 5 mg, oxybutynin, ER 10 mg, and doxazosin 8 mg once daily. Cystoscopy 12/19/16 found 2+ trabeculation of the bladder. The prostate protruded moderately into the bladder. No bladder stones or tumors. The left and right ureteral orifice visualized and were very close to prostate. I advised him to discontinue taking oxybutynin. On 11/10/17 the patient made 2500 cc of urine. The patient will now begin taking tamsulosin HCl 0.4 MG  one capsule one half hour after breakfast and doxazosin at night. He was able to urinate well over night without any pain. \par 12/10/18: The patient returned today and reported that his urination is satisfactory. Currently takes Finasteride, Tamsulosin and Doxazosin. Complains of intermittent stream and urge incontinence. Denies dysuria and gross hematuria.\par  6/10/19: Patient presents for 6 month follow up. Currently on Proscar and Doxazosin. States that he has no need to strain to initiate urination but flow is intermittent. Reports bothersome urinary urgency/frequency with occasional UUI. Nocturia. Regarding his anemia it is reported that it was evaluated by his PCP and he was told that it will be monitored but is nothing to worry about at this time. \par 6/26/19: Patient presents today for UDS and a cystoscopy. \par 7/3/19: Patient presents today for an ultrasound of the prostate. He is here today to discuss the results. His urination is the same as it was at his last visit. He wake up 4x a night to urinate. During the day he urinates 5-6x. He denies dysuria, gross hematuria, urgency or incontinence. Following the cystoscopy he noticed a small amount of blood and tiny clots but they have since resolved. He is satisfied with his urination at this time. \par 10/8/19: Patient presents today for a renal ultrasound and is here to discuss the results. Doxazosin, Finasteride and Tamsulosin were all prescribed previously. It is reported today that he has only been taking the Doxazosin and the Finasteride. His urination is still troublesome. Nocturia x 4-5 is reported. \par 10/15/19: Patient presents today for a follow up. His most recent PSA from 10/8/19 was 5.14 ng/mL a slight increase when compared to the last PSA. Finasteride is currently taken. When adjusting for the use of Finasteride his PSA is around 10 ng/mL. He is scheduled for surgery with Dr. Rolon next week. \par 11/20/19: Patient presents today for follow-up. He has completed surgery with Dr. Rolon and is improving well. He has finished his Sulfamethoxazole-Trimethoprim. His recent PSA on 11/12 was 5.15 ng/mL. He denies having a pacemaker. Patient has previous abnormal ultrasound and elevated PSA.\par \par 12/30/19: Patient presents today for a follow up. He reports waking up 2-3 times at night which is okay for him. He has been taking Finasteride and Tamsulosin which is working for him. He denies constipation or hematuria. \par \par 04/27/2021: Patient presents today for a follow up accompanied by his wife. Pt has been doing well. However, he reports that after he urinates he feels the urge to go again a few minutes later and voids a pretty large amount when he does. This incomplete emptying and double voiding has been going on for a couple of months, however the patient's wife said they were nervous to come out due to covid. Pt was on a high BP medication, Eplerenone, that was discontinued. He is currently taking tamsulosin 0.4 mg once daily after lunch, metformin BID, Doxazosin 4 mg once daily, Januvia 100 mg for diabetes once daily, hydralazine 50 mg 3x a day, Ramipril 5 mg once daily, furosemide 20 mg once daily, and Pravastatin 40 mg once daily for cholesterol. \par \par 05/18/2021: Patient presents today for a follow up accompanied by his wife. The pt had been experiencing urinary frequency, dysuria, and burning last week. He went to the emergency room on 5/13/2021 and a Ramírez catheter was placed. He currently has the catheter in and feels better. He was also given cephalexin 500 mg BID as an antibiotic. This medication has not affected his GI system. The patient has been taking tamsulosin 0.4 mg BID (increased from once daily) as well as finasteride 5 mg once daily. \par \par 05/24/2021: Patient presents today for fill and pull voiding trial for urinary retention. Continues Tadalafil 5mg, Finasteride, and Tamsulosin BID. Has had UDS three years ago without issue.

## 2021-05-24 NOTE — ADDENDUM
[FreeTextEntry1] : I, Gemma Schwarzin, acted solely as a scribe for Dr. Chris Sibley on this date 05/24/2021.\par \par All medical record entries made by the Scribe were at my, Dr. Chris Sibley, direction and personally dictated by me on 05/24/2021. I have reviewed the chart and agree that the record accurately reflects my personal performance of the history, physical exam, assessment and plan.  I have also personally directed, reviewed and agreed with the chart.

## 2021-05-27 LAB
APPEARANCE: CLEAR
BACTERIA UR CULT: ABNORMAL
BACTERIA: NEGATIVE
BILIRUBIN URINE: NEGATIVE
BLOOD URINE: NEGATIVE
COLOR: NORMAL
GLUCOSE QUALITATIVE U: ABNORMAL
HYALINE CASTS: 2 /LPF
KETONES URINE: NEGATIVE
LEUKOCYTE ESTERASE URINE: NEGATIVE
MICROSCOPIC-UA: NORMAL
NITRITE URINE: NEGATIVE
PH URINE: 6.5
PROTEIN URINE: NEGATIVE
RED BLOOD CELLS URINE: 2 /HPF
SPECIFIC GRAVITY URINE: 1.01
SQUAMOUS EPITHELIAL CELLS: 0 /HPF
URINE CYTOLOGY: NORMAL
UROBILINOGEN URINE: NORMAL
WHITE BLOOD CELLS URINE: 0 /HPF

## 2021-06-08 ENCOUNTER — APPOINTMENT (OUTPATIENT)
Dept: UROLOGY | Facility: CLINIC | Age: 81
End: 2021-06-08

## 2021-06-11 DIAGNOSIS — R39.15 URGENCY OF URINATION: ICD-10-CM

## 2021-06-11 DIAGNOSIS — R33.9 RETENTION OF URINE, UNSPECIFIED: ICD-10-CM

## 2021-06-11 DIAGNOSIS — N40.1 BENIGN PROSTATIC HYPERPLASIA WITH LOWER URINARY TRACT SYMPTOMS: ICD-10-CM

## 2021-06-11 DIAGNOSIS — R97.20 ELEVATED PROSTATE SPECIFIC ANTIGEN [PSA]: ICD-10-CM

## 2021-06-11 DIAGNOSIS — R35.0 FREQUENCY OF MICTURITION: ICD-10-CM

## 2021-06-11 DIAGNOSIS — N52.9 MALE ERECTILE DYSFUNCTION, UNSPECIFIED: ICD-10-CM

## 2021-06-11 DIAGNOSIS — E11.9 TYPE 2 DIABETES MELLITUS WITHOUT COMPLICATIONS: ICD-10-CM

## 2021-06-16 ENCOUNTER — APPOINTMENT (OUTPATIENT)
Dept: UROLOGY | Facility: CLINIC | Age: 81
End: 2021-06-16
Payer: MEDICARE

## 2021-06-16 ENCOUNTER — OUTPATIENT (OUTPATIENT)
Dept: OUTPATIENT SERVICES | Facility: HOSPITAL | Age: 81
LOS: 1 days | End: 2021-06-16
Payer: MEDICARE

## 2021-06-16 VITALS
RESPIRATION RATE: 18 BRPM | OXYGEN SATURATION: 98 % | HEART RATE: 68 BPM | SYSTOLIC BLOOD PRESSURE: 156 MMHG | TEMPERATURE: 97.3 F | DIASTOLIC BLOOD PRESSURE: 88 MMHG

## 2021-06-16 DIAGNOSIS — Z98.49 CATARACT EXTRACTION STATUS, UNSPECIFIED EYE: Chronic | ICD-10-CM

## 2021-06-16 DIAGNOSIS — R33.9 RETENTION OF URINE, UNSPECIFIED: ICD-10-CM

## 2021-06-16 DIAGNOSIS — R35.0 FREQUENCY OF MICTURITION: ICD-10-CM

## 2021-06-16 DIAGNOSIS — Z98.890 OTHER SPECIFIED POSTPROCEDURAL STATES: Chronic | ICD-10-CM

## 2021-06-16 DIAGNOSIS — R97.20 ELEVATED PROSTATE SPECIFIC ANTIGEN [PSA]: ICD-10-CM

## 2021-06-16 DIAGNOSIS — N40.1 BENIGN PROSTATIC HYPERPLASIA WITH LOWER URINARY TRACT SYMPTOMS: ICD-10-CM

## 2021-06-16 DIAGNOSIS — R89.6 ABNORMAL CYTOLOGICAL FINDINGS IN SPECIMENS FROM OTHER ORGANS, SYSTEMS AND TISSUES: ICD-10-CM

## 2021-06-16 DIAGNOSIS — E11.9 TYPE 2 DIABETES MELLITUS WITHOUT COMPLICATIONS: ICD-10-CM

## 2021-06-16 PROCEDURE — 99215 OFFICE O/P EST HI 40 MIN: CPT | Mod: 25

## 2021-06-16 PROCEDURE — 52000 CYSTOURETHROSCOPY: CPT

## 2021-06-16 PROCEDURE — 51728 CYSTOMETROGRAM W/VP: CPT | Mod: 26

## 2021-06-16 PROCEDURE — 51728 CYSTOMETROGRAM W/VP: CPT

## 2021-06-16 PROCEDURE — 51797 INTRAABDOMINAL PRESSURE TEST: CPT | Mod: 26

## 2021-06-16 PROCEDURE — 51741 ELECTRO-UROFLOWMETRY FIRST: CPT | Mod: 26

## 2021-06-16 PROCEDURE — 51784 ANAL/URINARY MUSCLE STUDY: CPT | Mod: 26

## 2021-06-16 PROCEDURE — 99072 ADDL SUPL MATRL&STAF TM PHE: CPT

## 2021-06-16 PROCEDURE — 51741 ELECTRO-UROFLOWMETRY FIRST: CPT

## 2021-06-16 PROCEDURE — 51784 ANAL/URINARY MUSCLE STUDY: CPT

## 2021-06-16 PROCEDURE — 51797 INTRAABDOMINAL PRESSURE TEST: CPT

## 2021-06-16 NOTE — ADDENDUM
[FreeTextEntry1] : This note was authored by Marissa Castro working as a scribe for Dr.Gary Sibley. I, Dr. Chris Sibley have reviewed the content of this note and confirm it is true and accurate. I personally performed the history and physical examination and made all the decisions 06/16/2021 .

## 2021-06-16 NOTE — PHYSICAL EXAM
[General Appearance - Well Developed] : well developed [General Appearance - Well Nourished] : well nourished [Normal Appearance] : normal appearance [Well Groomed] : well groomed [General Appearance - In No Acute Distress] : no acute distress [Abdomen Soft] : soft [Abdomen Tenderness] : non-tender [Edema] : no peripheral edema [] : no respiratory distress [Exaggerated Use Of Accessory Muscles For Inspiration] : no accessory muscle use [Respiration, Rhythm And Depth] : normal respiratory rhythm and effort [Oriented To Time, Place, And Person] : oriented to person, place, and time [Affect] : the affect was normal [Mood] : the mood was normal [Not Anxious] : not anxious [Normal Station and Gait] : the gait and station were normal for the patient's age [No Focal Deficits] : no focal deficits

## 2021-06-16 NOTE — ASSESSMENT
[FreeTextEntry1] : 12/30/19: Patient presents today for a follow up. He reports waking up 2-3 times at night which is okay for him. He has been taking Finasteride and Tamsulosin which is working for him. He denies constipation or hematuria. \par \par 04/27/2021: Patient presents today for a follow up accompanied by his wife. Pt has been doing well. However, he reports that after he urinates he feels the urge to go again a few minutes later and voids a pretty large amount when he does. This incomplete emptying and double voiding has been going on for a couple of months, however the patient's wife said they were nervous to come out due to covid. Pt was on a high BP medication, Eplerenone, that was discontinued. He is currently taking tamsulosin 0.4 mg once daily after lunch, metformin 1000 mg BID, Doxazosin 4 mg once daily, Januvia 100 mg for diabetes once daily, hydralazine 50 mg 3x a day, Ramipril 5 mg once daily, furosemide 20 mg once daily, and Pravastatin 40 mg once daily for cholesterol. \par \par 05/18/2021: Patient presents today for a follow up accompanied by his wife. The pt had been experiencing urinary frequency, dysuria, and burning last week. He went to the emergency room on 5/13/2021 and a Ramírez catheter was placed. He currently has the catheter in and feels better. He was also given cephalexin 500 mg BID as an antibiotic. This medication has not affected his GI system. The patient has been taking tamsulosin 0.4 mg BID, doxazosin 8 mg at bedtime) as well as finasteride 5 mg once daily. \par \par 05/24/2021: Patient presents today for fill and pull voiding trial for urinary retention. Continues Tadalafil 5mg, Finasteride, and Tamsulosin BID. Has had UDS three years ago without issue. \par \par Continue Tamsulosin BID, Finasteride, and Tadalafil 5mg. \par Patient had fill and pull today that was unsuccessful. I recommend the patient go for UDS with cystoscopy. before deciding further treatment plan. If bladder contraction is good, I recommend the patient have a procedure. If bladder contraction is poor, I recommend that he not have a procedure. \par RTO in 1 week for UDS with cystoscopy.\par \par 06/16/2021: Patient presents today for urodynamics with cystoscopy. He tolerated the procedure well. There was some hematuria when the catheter was passed over the prostate. Patient continues to take doxazosin 8 mg once daily at bedtime, finasteride 5 mg once daily, tadalafil 5 mg once daily, and tamsulosin 0.4 mg BID after a meal for his prostate. He also takes Januvia for diabetes, baby aspirin, ramipril for BP, hydralazine for BP, folic acid, furosemide 20 mg once daily, METformin 1000 mg BID for diabetes and Pravastatin 40 mg for cholesterol. Last PSA from 12/30/2021 was 5.03. MRI of the prostate was satisfactory in December 2019 with a PIRADS-2 and revealed his prostate is 88 cc. PSA has been stable since then. \par \par Urodynamics: Normal bladder sensation. Decreased bladder capacity. Patient experiencing detrusor instability. The uroflow rate is decreased. The intravesical voiding pressure is increased. The patient has incomplete bladder emptying, a post void residual of 173 cc. The sphincter activity is normal synergic. The patient experienced no complications. the patient tolerated the procedure well. Post procedure instructions and information given and reviewed with patient. Post Procedure Pain Scale: 0. \par \par Cystoscopy: Lidocaine jelly was inserted for numbing and lubrication. Bladder has a little bit of catheter inflammation. Trigone is intact. Scope is retroflexed and the ureteral orifices were hard to visualize however they appear to be slits. No bladder calculi. Upon filling up he was uncomfortable. The prostate does not protrude that much into the bladder. There is a slight ooze of blood from bladder neck. Prostate is moderately large. Irrigation fluid returning was clear. No substantial bleeding. \par \par PVR was 130 and capacity was 256. 16 [place] Cou[place] Ramírez catheter was reinserted. 10 cc of water in the balloon. \par \par Reviewed prior PSA's and MRI reports with the patient and his brother in detail. \par \par Renewed tadalafil 5 mg once daily. Prescription was sent to the patient's local [facility] since he was being charged 59$ at his local [employer]. Instructed the patient and his brother to use the [employer] application. \par \par I explained to the patient and his brother that although he is on maximal medical therapy that has helped some, I don't believe it is enough and we have reached a plateau. My recommendation was that he undergo a laser enucleation of the prostate. I am referring the patient to Dr.Zeph Jernigan and if the patient would like to proceed with the operation, he will follow up with  for about 3 months after and can either return to me for care or continue with Dr. JerniganWe reviewed the risks and benefits of the procedure including ureteral orifice injury, incontinence, bleeding and infectio and urinary retention resulting in post-op Catherization as well as the alternatives of keeping the Ramírez catheter or learning intermittent catheterization. I spoke with the patient's niece, who is an anesthesiologist, on the phone at the conclusion of the visit. She understood after we discussed her uncle's condition and was agreeable with the plan of care. She did express concern about the length of the procedure, which I informed her is about 1.5 hours, due to his significant cardiac history, however she will also speak to  in regards to the details of the operation. \par \par Preparation, in person office visit, and coordination of care: 40 minutes.

## 2021-06-16 NOTE — HISTORY OF PRESENT ILLNESS
[FreeTextEntry1] : Mr Gonzales is an 80 year old man followed for hydrocele, past abnormal urine cytology, and BPH. The patient had a hydrocelectomy in October of 2014. Fluid cytology 06/08/16 was abnormal. Fluid cytology 12/07/16 and 12/19/16 were negative for malignant cells. The patient takes finasteride 5 mg, oxybutynin, ER 10 mg, and doxazosin 8 mg once daily. Cystoscopy 12/19/16 found 2+ trabeculation of the bladder. The prostate protruded moderately into the bladder. No bladder stones or tumors. The left and right ureteral orifice visualized and were very close to prostate. I advised him to discontinue taking oxybutynin. On 11/10/17 the patient made 2500 cc of urine. The patient will now begin taking tamsulosin HCl 0.4 MG  one capsule one half hour after breakfast and doxazosin at night. He was able to urinate well over night without any pain. \par 12/10/18: The patient returned today and reported that his urination is satisfactory. Currently takes Finasteride, Tamsulosin and Doxazosin. Complains of intermittent stream and urge incontinence. Denies dysuria and gross hematuria.\par  6/10/19: Patient presents for 6 month follow up. Currently on Proscar and Doxazosin. States that he has no need to strain to initiate urination but flow is intermittent. Reports bothersome urinary urgency/frequency with occasional UUI. Nocturia. Regarding his anemia it is reported that it was evaluated by his PCP and he was told that it will be monitored but is nothing to worry about at this time. \par 6/26/19: Patient presents today for UDS and a cystoscopy. \par 7/3/19: Patient presents today for an ultrasound of the prostate. He is here today to discuss the results. His urination is the same as it was at his last visit. He wake up 4x a night to urinate. During the day he urinates 5-6x. He denies dysuria, gross hematuria, urgency or incontinence. Following the cystoscopy he noticed a small amount of blood and tiny clots but they have since resolved. He is satisfied with his urination at this time. \par 10/8/19: Patient presents today for a renal ultrasound and is here to discuss the results. Doxazosin, Finasteride and Tamsulosin were all prescribed previously. It is reported today that he has only been taking the Doxazosin and the Finasteride. His urination is still troublesome. Nocturia x 4-5 is reported. \par 10/15/19: Patient presents today for a follow up. His most recent PSA from 10/8/19 was 5.14 ng/mL a slight increase when compared to the last PSA. Finasteride is currently taken. When adjusting for the use of Finasteride his PSA is around 10 ng/mL. He is scheduled for surgery with Dr. Rolon next week. \par 11/20/19: Patient presents today for follow-up. He has completed surgery with Dr. Rolon and is improving well. He has finished his Sulfamethoxazole-Trimethoprim. His recent PSA on 11/12 was 5.15 ng/mL. He denies having a pacemaker. Patient has previous abnormal ultrasound and elevated PSA.\par \par 12/30/19: Patient presents today for a follow up. He reports waking up 2-3 times at night which is okay for him. He has been taking Finasteride and Tamsulosin which is working for him. He denies constipation or hematuria. \par \par 04/27/2021: Patient presents today for a follow up accompanied by his wife. Pt has been doing well. However, he reports that after he urinates he feels the urge to go again a few minutes later and voids a pretty large amount when he does. This incomplete emptying and double voiding has been going on for a couple of months, however the patient's wife said they were nervous to come out due to covid. Pt was on a high BP medication, Eplerenone, that was discontinued. He is currently taking tamsulosin 0.4 mg once daily after lunch, metformin BID, Doxazosin 4 mg once daily, Januvia 100 mg for diabetes once daily, hydralazine 50 mg 3x a day, Ramipril 5 mg once daily, furosemide 20 mg once daily, and Pravastatin 40 mg once daily for cholesterol. \par \par 05/18/2021: Patient presents today for a follow up accompanied by his wife. The pt had been experiencing urinary frequency, dysuria, and burning last week. He went to the emergency room on 5/13/2021 and a Ramírez catheter was placed. He currently has the catheter in and feels better. He was also given cephalexin 500 mg BID as an antibiotic. This medication has not affected his GI system. The patient has been taking tamsulosin 0.4 mg BID (increased from once daily) as well as finasteride 5 mg once daily. \par \par 05/24/2021: Patient presents today for fill and pull voiding trial for urinary retention. Continues Tadalafil 5mg, Finasteride, and Tamsulosin BID. Has had UDS three years ago without issue. \par \par 06/16/2021: Patient presents today for urodynamics with cystoscopy. He tolerated the procedure well. There was some hematuria when the catheter was passed over the prostate. Patient continues to take doxazosin 8 mg once daily at bedtime, finasteride 5 mg once daily, tadalafil 5 mg once daily, and tamsulosin 0.4 mg BID after a meal for his prostate. He also takes Januvia for diabetes, baby aspirin, ramipril for BP, hydralazine for BP, folic acid, furosemide 20 mg once daily, METformin 1000 mg BID for diabetes and Pravastatin 40 mg for cholesterol. Last PSA from 12/30/2021 was 5.03. MRI of the prostate was satisfactory in December 2019 with a PIRADS-2 and revealed his prostate is 88 cc. PSA has been stable since then.

## 2021-06-28 ENCOUNTER — APPOINTMENT (OUTPATIENT)
Dept: UROLOGY | Facility: CLINIC | Age: 81
End: 2021-06-28
Payer: MEDICARE

## 2021-06-28 VITALS
BODY MASS INDEX: 26.05 KG/M2 | TEMPERATURE: 98 F | HEART RATE: 82 BPM | SYSTOLIC BLOOD PRESSURE: 115 MMHG | DIASTOLIC BLOOD PRESSURE: 62 MMHG | HEIGHT: 70 IN | WEIGHT: 182 LBS | RESPIRATION RATE: 17 BRPM

## 2021-06-28 DIAGNOSIS — R33.9 RETENTION OF URINE, UNSPECIFIED: ICD-10-CM

## 2021-06-28 PROCEDURE — 99214 OFFICE O/P EST MOD 30 MIN: CPT

## 2021-06-28 PROCEDURE — 99072 ADDL SUPL MATRL&STAF TM PHE: CPT

## 2021-06-28 NOTE — ASSESSMENT
[FreeTextEntry1] : Cardiology clearance\par Medical clearance\par Monthly Ramírez changes until surgery

## 2021-06-28 NOTE — HISTORY OF PRESENT ILLNESS
[FreeTextEntry1] : See note from Dr. Sibley\par Patient's Niece (Dr. Gonzales--Anesthesiologist) present by phone for consultation\par \par Chart, MRI, UDS  reviewed\par  \par \par Discussed Laser enucleation of prostate with morcellation (HOLEP/ThuLEP).\par \par Indications, options including chronic Ramírez catheter, suprapubic catheter, intermittent self-catheterization, transurethral resection of prostate, transurethral vaporization of prostate with laser or electrocautery, open or laparoscopic enucleation of the prostate, all discussed.\par \par Potential complications of transurethral holmium or thulium laser enucleation of prostate with morcellation discussed. This included risk of infection, bleeding, transfusion, conversion to open enucleation, need for staged procedure, adjacent organ injury, bladder injury, clot retention of urine requiring return to operating room for cystoscopy clot evacuation, prolonged duration of Ramírez catheterization, urethral stricture, transient or permanent urinary incontinence, retrograde ejaculation is a permanent and irreversible complication, redo or staged surgery due to equipment malfunction, anesthetic complications, cardiac complications, all discussed. \par \par Printed information including of the above and other more uncommon complications provided to patient.\par \par Continue monthly catheter changes until surgery (last changed a week ago)\par \par We'll schedule.\par

## 2021-06-28 NOTE — PHYSICAL EXAM
[General Appearance - Well Developed] : well developed [General Appearance - Well Nourished] : well nourished [Normal Appearance] : normal appearance [Well Groomed] : well groomed [General Appearance - In No Acute Distress] : no acute distress [Abdomen Soft] : soft [Abdomen Tenderness] : non-tender [Costovertebral Angle Tenderness] : no ~M costovertebral angle tenderness [Urinary Bladder Findings] : the bladder was normal on palpation [Skin Color & Pigmentation] : normal skin color and pigmentation [] : no respiratory distress [Respiration, Rhythm And Depth] : normal respiratory rhythm and effort [Exaggerated Use Of Accessory Muscles For Inspiration] : no accessory muscle use [Oriented To Time, Place, And Person] : oriented to person, place, and time [Affect] : the affect was normal [Mood] : the mood was normal [Not Anxious] : not anxious [Normal Station and Gait] : the gait and station were normal for the patient's age [Inguinal Lymph Nodes Enlarged Bilaterally] : inguinal

## 2021-07-13 ENCOUNTER — OUTPATIENT (OUTPATIENT)
Dept: OUTPATIENT SERVICES | Facility: HOSPITAL | Age: 81
LOS: 1 days | End: 2021-07-13
Payer: MEDICARE

## 2021-07-13 VITALS
TEMPERATURE: 98 F | HEIGHT: 70 IN | WEIGHT: 182.1 LBS | SYSTOLIC BLOOD PRESSURE: 160 MMHG | OXYGEN SATURATION: 96 % | HEART RATE: 66 BPM | DIASTOLIC BLOOD PRESSURE: 79 MMHG | RESPIRATION RATE: 16 BRPM

## 2021-07-13 DIAGNOSIS — Z98.49 CATARACT EXTRACTION STATUS, UNSPECIFIED EYE: Chronic | ICD-10-CM

## 2021-07-13 DIAGNOSIS — N40.1 BENIGN PROSTATIC HYPERPLASIA WITH LOWER URINARY TRACT SYMPTOMS: ICD-10-CM

## 2021-07-13 DIAGNOSIS — I50.22 CHRONIC SYSTOLIC (CONGESTIVE) HEART FAILURE: ICD-10-CM

## 2021-07-13 DIAGNOSIS — I10 ESSENTIAL (PRIMARY) HYPERTENSION: ICD-10-CM

## 2021-07-13 DIAGNOSIS — Z98.890 OTHER SPECIFIED POSTPROCEDURAL STATES: Chronic | ICD-10-CM

## 2021-07-13 DIAGNOSIS — Z01.818 ENCOUNTER FOR OTHER PREPROCEDURAL EXAMINATION: ICD-10-CM

## 2021-07-13 DIAGNOSIS — E11.9 TYPE 2 DIABETES MELLITUS WITHOUT COMPLICATIONS: ICD-10-CM

## 2021-07-13 DIAGNOSIS — I48.91 UNSPECIFIED ATRIAL FIBRILLATION: ICD-10-CM

## 2021-07-13 DIAGNOSIS — G47.33 OBSTRUCTIVE SLEEP APNEA (ADULT) (PEDIATRIC): ICD-10-CM

## 2021-07-13 LAB
A1C WITH ESTIMATED AVERAGE GLUCOSE RESULT: 8.2 % — HIGH (ref 4–5.6)
ALBUMIN SERPL ELPH-MCNC: 4.1 G/DL — SIGNIFICANT CHANGE UP (ref 3.3–5)
ALP SERPL-CCNC: 53 U/L — SIGNIFICANT CHANGE UP (ref 40–120)
ALT FLD-CCNC: 12 U/L — SIGNIFICANT CHANGE UP (ref 10–45)
ANION GAP SERPL CALC-SCNC: 11 MMOL/L — SIGNIFICANT CHANGE UP (ref 5–17)
AST SERPL-CCNC: 20 U/L — SIGNIFICANT CHANGE UP (ref 10–40)
BILIRUB SERPL-MCNC: 0.5 MG/DL — SIGNIFICANT CHANGE UP (ref 0.2–1.2)
BLD GP AB SCN SERPL QL: NEGATIVE — SIGNIFICANT CHANGE UP
BUN SERPL-MCNC: 10 MG/DL — SIGNIFICANT CHANGE UP (ref 7–23)
CALCIUM SERPL-MCNC: 9.2 MG/DL — SIGNIFICANT CHANGE UP (ref 8.4–10.5)
CHLORIDE SERPL-SCNC: 100 MMOL/L — SIGNIFICANT CHANGE UP (ref 96–108)
CO2 SERPL-SCNC: 24 MMOL/L — SIGNIFICANT CHANGE UP (ref 22–31)
CREAT SERPL-MCNC: 1 MG/DL — SIGNIFICANT CHANGE UP (ref 0.5–1.3)
ESTIMATED AVERAGE GLUCOSE: 189 MG/DL — HIGH (ref 68–114)
GLUCOSE SERPL-MCNC: 219 MG/DL — HIGH (ref 70–99)
HCT VFR BLD CALC: 37 % — LOW (ref 39–50)
HGB BLD-MCNC: 12.1 G/DL — LOW (ref 13–17)
MCHC RBC-ENTMCNC: 27.7 PG — SIGNIFICANT CHANGE UP (ref 27–34)
MCHC RBC-ENTMCNC: 32.7 GM/DL — SIGNIFICANT CHANGE UP (ref 32–36)
MCV RBC AUTO: 84.7 FL — SIGNIFICANT CHANGE UP (ref 80–100)
NRBC # BLD: 0 /100 WBCS — SIGNIFICANT CHANGE UP (ref 0–0)
PLATELET # BLD AUTO: 190 K/UL — SIGNIFICANT CHANGE UP (ref 150–400)
POTASSIUM SERPL-MCNC: 3.8 MMOL/L — SIGNIFICANT CHANGE UP (ref 3.5–5.3)
POTASSIUM SERPL-SCNC: 3.8 MMOL/L — SIGNIFICANT CHANGE UP (ref 3.5–5.3)
PROT SERPL-MCNC: 7.2 G/DL — SIGNIFICANT CHANGE UP (ref 6–8.3)
RBC # BLD: 4.37 M/UL — SIGNIFICANT CHANGE UP (ref 4.2–5.8)
RBC # FLD: 14.2 % — SIGNIFICANT CHANGE UP (ref 10.3–14.5)
RH IG SCN BLD-IMP: POSITIVE — SIGNIFICANT CHANGE UP
SODIUM SERPL-SCNC: 135 MMOL/L — SIGNIFICANT CHANGE UP (ref 135–145)
WBC # BLD: 3.37 K/UL — LOW (ref 3.8–10.5)
WBC # FLD AUTO: 3.37 K/UL — LOW (ref 3.8–10.5)

## 2021-07-13 PROCEDURE — G0463: CPT

## 2021-07-13 PROCEDURE — 83036 HEMOGLOBIN GLYCOSYLATED A1C: CPT

## 2021-07-13 PROCEDURE — 80053 COMPREHEN METABOLIC PANEL: CPT

## 2021-07-13 PROCEDURE — 86850 RBC ANTIBODY SCREEN: CPT

## 2021-07-13 PROCEDURE — 86900 BLOOD TYPING SEROLOGIC ABO: CPT

## 2021-07-13 PROCEDURE — 86901 BLOOD TYPING SEROLOGIC RH(D): CPT

## 2021-07-13 PROCEDURE — 87086 URINE CULTURE/COLONY COUNT: CPT

## 2021-07-13 PROCEDURE — 85027 COMPLETE CBC AUTOMATED: CPT

## 2021-07-13 RX ORDER — APIXABAN 2.5 MG/1
1 TABLET, FILM COATED ORAL
Qty: 0 | Refills: 0 | DISCHARGE

## 2021-07-13 RX ORDER — TAMSULOSIN HYDROCHLORIDE 0.4 MG/1
1 CAPSULE ORAL
Qty: 0 | Refills: 0 | DISCHARGE

## 2021-07-13 RX ORDER — FUROSEMIDE 40 MG
1 TABLET ORAL
Qty: 0 | Refills: 0 | DISCHARGE

## 2021-07-13 RX ORDER — EPLERENONE 50 MG/1
1 TABLET, FILM COATED ORAL
Qty: 0 | Refills: 0 | DISCHARGE

## 2021-07-13 RX ORDER — DOXAZOSIN MESYLATE 4 MG
1 TABLET ORAL
Qty: 0 | Refills: 0 | DISCHARGE

## 2021-07-13 RX ORDER — CEFAZOLIN SODIUM 1 G
2000 VIAL (EA) INJECTION ONCE
Refills: 0 | Status: DISCONTINUED | OUTPATIENT
Start: 2021-07-20 | End: 2021-08-03

## 2021-07-13 NOTE — H&P PST ADULT - NSICDXPROBLEM_GEN_ALL_CORE_FT
PROBLEM DIAGNOSES  Problem: GABRIELLA (obstructive sleep apnea)  Assessment and Plan: Follow GABRIELLA precautions    Problem: HTN (hypertension)  Assessment and Plan: continue with meds    Problem: Unspecified atrial fibrillation  Assessment and Plan: Continue with Aspirin    Problem: Chronic systolic heart failure  Assessment and Plan: continue with meds    Problem: Type 2 diabetes mellitus  Assessment and Plan: FS DOS    Problem: Enlarged prostate with lower urinary tract symptoms (LUTS)  Assessment and Plan: Laser enucleation of prostate  Labs- CBC, CMP, Hb A1C, Urine C&S, T&S  Pre op Medical eval prior to OR

## 2021-07-13 NOTE — H&P PST ADULT - HISTORY OF PRESENT ILLNESS
81 yo M witch h/o HTN, T2DM, BPH, pericardial effusion (2017), Atrial fib( on aspirin), chronic systolic HF c/o urinary retention x  3 months. Pt had ED admission 5/13/201 s/p Ramírez cath insertion- pt had urology evaluation- s/p MRI/UDS revealed enlarged prostate with LUTS. Pt scheduled for Laser enucleation of prostate on 7/20/21  Pt denies any fever, chills, s/s of UTI, recent travel or Covid related infections    **Received 2 doses of Covid vaccine(in [facility])

## 2021-07-13 NOTE — H&P PST ADULT - NSICDXPASTSURGICALHX_GEN_ALL_CORE_FT
PAST SURGICAL HISTORY:  H/O hernia repair RIHR    S/P cataract surgery bilateral 2017    S/P cataract surgery     S/P repair of hydrocele     Status post creation of pericardial window 2017

## 2021-07-13 NOTE — H&P PST ADULT - NSICDXPASTMEDICALHX_GEN_ALL_CORE_FT
PAST MEDICAL HISTORY:  Afib on Aspirin    BPH (benign prostatic hyperplasia)     BPH with urinary obstruction     Hernia, inguinal, right     HTN (hypertension)     Hyperlipidemia     T2DM (type 2 diabetes mellitus) > 20 yrs     PAST MEDICAL HISTORY:  Afib on Aspirin    BPH (benign prostatic hyperplasia)     BPH with urinary obstruction     Hernia, inguinal, right     HTN (hypertension)     Hyperlipidemia     GABRIELLA (obstructive sleep apnea) by symptoms    T2DM (type 2 diabetes mellitus) > 20 yrs     PAST MEDICAL HISTORY:  Afib on Aspirin    Anemia of chronic disease     BPH (benign prostatic hyperplasia)     BPH with urinary obstruction     Hernia, inguinal, right     HTN (hypertension)     Hyperlipidemia     GABRIELLA (obstructive sleep apnea) by symptoms    T2DM (type 2 diabetes mellitus) > 20 yrs

## 2021-07-13 NOTE — H&P PST ADULT - OTHER CARE PROVIDERS
Dr. Elvin Wan ([facility]) [phone] Dr. Elvin Wan ([facility]) [phone] Last visit 2/2021

## 2021-07-14 PROBLEM — G47.33 OBSTRUCTIVE SLEEP APNEA (ADULT) (PEDIATRIC): Chronic | Status: ACTIVE | Noted: 2021-07-13

## 2021-07-14 PROBLEM — I48.91 UNSPECIFIED ATRIAL FIBRILLATION: Chronic | Status: ACTIVE | Noted: 2017-10-27

## 2021-07-14 PROBLEM — D63.8 ANEMIA IN OTHER CHRONIC DISEASES CLASSIFIED ELSEWHERE: Chronic | Status: ACTIVE | Noted: 2021-07-13

## 2021-07-15 LAB
CULTURE RESULTS: SIGNIFICANT CHANGE UP
SPECIMEN SOURCE: SIGNIFICANT CHANGE UP

## 2021-07-19 ENCOUNTER — TRANSCRIPTION ENCOUNTER (OUTPATIENT)
Age: 81
End: 2021-07-19

## 2021-07-20 ENCOUNTER — APPOINTMENT (OUTPATIENT)
Dept: UROLOGY | Facility: HOSPITAL | Age: 81
End: 2021-07-20

## 2021-07-20 ENCOUNTER — OUTPATIENT (OUTPATIENT)
Dept: INPATIENT UNIT | Facility: HOSPITAL | Age: 81
LOS: 1 days | End: 2021-07-20
Payer: MEDICARE

## 2021-07-20 ENCOUNTER — RESULT REVIEW (OUTPATIENT)
Age: 81
End: 2021-07-20

## 2021-07-20 VITALS
SYSTOLIC BLOOD PRESSURE: 155 MMHG | RESPIRATION RATE: 18 BRPM | HEIGHT: 70 IN | OXYGEN SATURATION: 98 % | DIASTOLIC BLOOD PRESSURE: 87 MMHG | TEMPERATURE: 98 F | WEIGHT: 182.1 LBS | HEART RATE: 84 BPM

## 2021-07-20 DIAGNOSIS — Z01.818 ENCOUNTER FOR OTHER PREPROCEDURAL EXAMINATION: ICD-10-CM

## 2021-07-20 DIAGNOSIS — Z98.49 CATARACT EXTRACTION STATUS, UNSPECIFIED EYE: Chronic | ICD-10-CM

## 2021-07-20 DIAGNOSIS — N40.1 BENIGN PROSTATIC HYPERPLASIA WITH LOWER URINARY TRACT SYMPTOMS: ICD-10-CM

## 2021-07-20 DIAGNOSIS — Z98.890 OTHER SPECIFIED POSTPROCEDURAL STATES: Chronic | ICD-10-CM

## 2021-07-20 LAB
ANION GAP SERPL CALC-SCNC: 14 MMOL/L — SIGNIFICANT CHANGE UP (ref 5–17)
BUN SERPL-MCNC: 11 MG/DL — SIGNIFICANT CHANGE UP (ref 7–23)
CALCIUM SERPL-MCNC: 9.2 MG/DL — SIGNIFICANT CHANGE UP (ref 8.4–10.5)
CHLORIDE SERPL-SCNC: 100 MMOL/L — SIGNIFICANT CHANGE UP (ref 96–108)
CO2 SERPL-SCNC: 22 MMOL/L — SIGNIFICANT CHANGE UP (ref 22–31)
CREAT SERPL-MCNC: 1.04 MG/DL — SIGNIFICANT CHANGE UP (ref 0.5–1.3)
GAS PNL BLDA: SIGNIFICANT CHANGE UP
GLUCOSE BLDC GLUCOMTR-MCNC: 241 MG/DL — HIGH (ref 70–99)
GLUCOSE BLDC GLUCOMTR-MCNC: 269 MG/DL — HIGH (ref 70–99)
GLUCOSE BLDC GLUCOMTR-MCNC: 279 MG/DL — HIGH (ref 70–99)
GLUCOSE BLDC GLUCOMTR-MCNC: 390 MG/DL — HIGH (ref 70–99)
GLUCOSE SERPL-MCNC: 284 MG/DL — HIGH (ref 70–99)
HCT VFR BLD CALC: 37.4 % — LOW (ref 39–50)
HGB BLD-MCNC: 12.4 G/DL — LOW (ref 13–17)
MCHC RBC-ENTMCNC: 27.6 PG — SIGNIFICANT CHANGE UP (ref 27–34)
MCHC RBC-ENTMCNC: 33.2 GM/DL — SIGNIFICANT CHANGE UP (ref 32–36)
MCV RBC AUTO: 83.3 FL — SIGNIFICANT CHANGE UP (ref 80–100)
NRBC # BLD: 0 /100 WBCS — SIGNIFICANT CHANGE UP (ref 0–0)
PLATELET # BLD AUTO: 185 K/UL — SIGNIFICANT CHANGE UP (ref 150–400)
POTASSIUM SERPL-MCNC: 3.9 MMOL/L — SIGNIFICANT CHANGE UP (ref 3.5–5.3)
POTASSIUM SERPL-SCNC: 3.9 MMOL/L — SIGNIFICANT CHANGE UP (ref 3.5–5.3)
RBC # BLD: 4.49 M/UL — SIGNIFICANT CHANGE UP (ref 4.2–5.8)
RBC # FLD: 14.6 % — HIGH (ref 10.3–14.5)
RH IG SCN BLD-IMP: POSITIVE — SIGNIFICANT CHANGE UP
SODIUM SERPL-SCNC: 136 MMOL/L — SIGNIFICANT CHANGE UP (ref 135–145)
WBC # BLD: 4.57 K/UL — SIGNIFICANT CHANGE UP (ref 3.8–10.5)
WBC # FLD AUTO: 4.57 K/UL — SIGNIFICANT CHANGE UP (ref 3.8–10.5)

## 2021-07-20 PROCEDURE — 52649 PROSTATE LASER ENUCLEATION: CPT

## 2021-07-20 PROCEDURE — 88305 TISSUE EXAM BY PATHOLOGIST: CPT | Mod: 26

## 2021-07-20 RX ORDER — DOXAZOSIN MESYLATE 4 MG
8 TABLET ORAL AT BEDTIME
Refills: 0 | Status: DISCONTINUED | OUTPATIENT
Start: 2021-07-20 | End: 2021-08-03

## 2021-07-20 RX ORDER — ATORVASTATIN CALCIUM 80 MG/1
10 TABLET, FILM COATED ORAL AT BEDTIME
Refills: 0 | Status: DISCONTINUED | OUTPATIENT
Start: 2021-07-20 | End: 2021-08-03

## 2021-07-20 RX ORDER — SENNA PLUS 8.6 MG/1
1 TABLET ORAL AT BEDTIME
Refills: 0 | Status: DISCONTINUED | OUTPATIENT
Start: 2021-07-20 | End: 2021-08-03

## 2021-07-20 RX ORDER — ACETAMINOPHEN 500 MG
650 TABLET ORAL EVERY 6 HOURS
Refills: 0 | Status: DISCONTINUED | OUTPATIENT
Start: 2021-07-20 | End: 2021-08-03

## 2021-07-20 RX ORDER — CARVEDILOL PHOSPHATE 80 MG/1
12.5 CAPSULE, EXTENDED RELEASE ORAL EVERY 12 HOURS
Refills: 0 | Status: DISCONTINUED | OUTPATIENT
Start: 2021-07-20 | End: 2021-08-03

## 2021-07-20 RX ORDER — SODIUM CHLORIDE 9 MG/ML
3 INJECTION INTRAMUSCULAR; INTRAVENOUS; SUBCUTANEOUS EVERY 8 HOURS
Refills: 0 | Status: DISCONTINUED | OUTPATIENT
Start: 2021-07-20 | End: 2021-07-20

## 2021-07-20 RX ORDER — OXYCODONE HYDROCHLORIDE 5 MG/1
5 TABLET ORAL EVERY 4 HOURS
Refills: 0 | Status: DISCONTINUED | OUTPATIENT
Start: 2021-07-20 | End: 2021-07-20

## 2021-07-20 RX ORDER — CEFAZOLIN SODIUM 1 G
2000 VIAL (EA) INJECTION EVERY 8 HOURS
Refills: 0 | Status: DISCONTINUED | OUTPATIENT
Start: 2021-07-20 | End: 2021-08-03

## 2021-07-20 RX ORDER — FUROSEMIDE 40 MG
10 TABLET ORAL ONCE
Refills: 0 | Status: COMPLETED | OUTPATIENT
Start: 2021-07-21 | End: 2021-07-21

## 2021-07-20 RX ORDER — HYDRALAZINE HCL 50 MG
50 TABLET ORAL THREE TIMES A DAY
Refills: 0 | Status: DISCONTINUED | OUTPATIENT
Start: 2021-07-20 | End: 2021-08-03

## 2021-07-20 RX ORDER — SODIUM CHLORIDE 9 MG/ML
1000 INJECTION, SOLUTION INTRAVENOUS
Refills: 0 | Status: DISCONTINUED | OUTPATIENT
Start: 2021-07-20 | End: 2021-08-03

## 2021-07-20 RX ORDER — INSULIN LISPRO 100/ML
VIAL (ML) SUBCUTANEOUS AT BEDTIME
Refills: 0 | Status: DISCONTINUED | OUTPATIENT
Start: 2021-07-20 | End: 2021-07-20

## 2021-07-20 RX ORDER — FINASTERIDE 5 MG/1
5 TABLET, FILM COATED ORAL DAILY
Refills: 0 | Status: DISCONTINUED | OUTPATIENT
Start: 2021-07-20 | End: 2021-08-03

## 2021-07-20 RX ORDER — LIDOCAINE HCL 20 MG/ML
0.2 VIAL (ML) INJECTION ONCE
Refills: 0 | Status: DISCONTINUED | OUTPATIENT
Start: 2021-07-20 | End: 2021-07-20

## 2021-07-20 RX ORDER — GLUCAGON INJECTION, SOLUTION 0.5 MG/.1ML
1 INJECTION, SOLUTION SUBCUTANEOUS ONCE
Refills: 0 | Status: DISCONTINUED | OUTPATIENT
Start: 2021-07-20 | End: 2021-08-03

## 2021-07-20 RX ORDER — DEXTROSE 50 % IN WATER 50 %
12.5 SYRINGE (ML) INTRAVENOUS ONCE
Refills: 0 | Status: DISCONTINUED | OUTPATIENT
Start: 2021-07-20 | End: 2021-08-03

## 2021-07-20 RX ORDER — INSULIN LISPRO 100/ML
VIAL (ML) SUBCUTANEOUS
Refills: 0 | Status: DISCONTINUED | OUTPATIENT
Start: 2021-07-20 | End: 2021-08-03

## 2021-07-20 RX ORDER — INSULIN LISPRO 100/ML
VIAL (ML) SUBCUTANEOUS
Refills: 0 | Status: DISCONTINUED | OUTPATIENT
Start: 2021-07-20 | End: 2021-07-20

## 2021-07-20 RX ORDER — OXYCODONE HYDROCHLORIDE 5 MG/1
10 TABLET ORAL EVERY 4 HOURS
Refills: 0 | Status: DISCONTINUED | OUTPATIENT
Start: 2021-07-20 | End: 2021-07-20

## 2021-07-20 RX ORDER — HYDROMORPHONE HYDROCHLORIDE 2 MG/ML
0.25 INJECTION INTRAMUSCULAR; INTRAVENOUS; SUBCUTANEOUS
Refills: 0 | Status: DISCONTINUED | OUTPATIENT
Start: 2021-07-20 | End: 2021-07-21

## 2021-07-20 RX ORDER — DEXTROSE 50 % IN WATER 50 %
15 SYRINGE (ML) INTRAVENOUS ONCE
Refills: 0 | Status: DISCONTINUED | OUTPATIENT
Start: 2021-07-20 | End: 2021-08-03

## 2021-07-20 RX ORDER — FUROSEMIDE 40 MG
10 TABLET ORAL ONCE
Refills: 0 | Status: COMPLETED | OUTPATIENT
Start: 2021-07-20 | End: 2021-07-20

## 2021-07-20 RX ORDER — ASPIRIN/CALCIUM CARB/MAGNESIUM 324 MG
81 TABLET ORAL DAILY
Refills: 0 | Status: DISCONTINUED | OUTPATIENT
Start: 2021-07-20 | End: 2021-08-03

## 2021-07-20 RX ORDER — HEPARIN SODIUM 5000 [USP'U]/ML
5000 INJECTION INTRAVENOUS; SUBCUTANEOUS EVERY 8 HOURS
Refills: 0 | Status: DISCONTINUED | OUTPATIENT
Start: 2021-07-20 | End: 2021-08-03

## 2021-07-20 RX ORDER — LISINOPRIL 2.5 MG/1
20 TABLET ORAL DAILY
Refills: 0 | Status: DISCONTINUED | OUTPATIENT
Start: 2021-07-20 | End: 2021-08-03

## 2021-07-20 RX ORDER — ONDANSETRON 8 MG/1
4 TABLET, FILM COATED ORAL ONCE
Refills: 0 | Status: DISCONTINUED | OUTPATIENT
Start: 2021-07-20 | End: 2021-07-21

## 2021-07-20 RX ORDER — DEXTROSE 50 % IN WATER 50 %
25 SYRINGE (ML) INTRAVENOUS ONCE
Refills: 0 | Status: DISCONTINUED | OUTPATIENT
Start: 2021-07-20 | End: 2021-08-03

## 2021-07-20 RX ORDER — INSULIN LISPRO 100/ML
VIAL (ML) SUBCUTANEOUS AT BEDTIME
Refills: 0 | Status: DISCONTINUED | OUTPATIENT
Start: 2021-07-20 | End: 2021-08-03

## 2021-07-20 RX ADMIN — Medication 100 MILLIGRAM(S): at 14:07

## 2021-07-20 RX ADMIN — Medication 3: at 13:05

## 2021-07-20 RX ADMIN — Medication 8 MILLIGRAM(S): at 21:49

## 2021-07-20 RX ADMIN — HEPARIN SODIUM 5000 UNIT(S): 5000 INJECTION INTRAVENOUS; SUBCUTANEOUS at 19:31

## 2021-07-20 RX ADMIN — Medication 5: at 16:46

## 2021-07-20 RX ADMIN — ATORVASTATIN CALCIUM 10 MILLIGRAM(S): 80 TABLET, FILM COATED ORAL at 22:57

## 2021-07-20 RX ADMIN — Medication 2: at 21:45

## 2021-07-20 RX ADMIN — CARVEDILOL PHOSPHATE 12.5 MILLIGRAM(S): 80 CAPSULE, EXTENDED RELEASE ORAL at 18:04

## 2021-07-20 RX ADMIN — SENNA PLUS 1 TABLET(S): 8.6 TABLET ORAL at 22:59

## 2021-07-20 RX ADMIN — Medication 50 MILLIGRAM(S): at 21:48

## 2021-07-20 RX ADMIN — Medication 10 MILLIGRAM(S): at 12:57

## 2021-07-20 RX ADMIN — Medication 50 MILLIGRAM(S): at 16:44

## 2021-07-20 RX ADMIN — Medication 100 MILLIGRAM(S): at 21:44

## 2021-07-20 RX ADMIN — SODIUM CHLORIDE 75 MILLILITER(S): 9 INJECTION, SOLUTION INTRAVENOUS at 13:07

## 2021-07-20 NOTE — PRE-ANESTHESIA EVALUATION ADULT - NSANTHPMHFT_GEN_ALL_CORE
81 yo M witch h/o HTN, T2DM, BPH, pericardial effusion (2017), Atrial fib( on aspirin), chronic systolic HF c/o urinary retention x  3 months. Pt had ED admission 5/13/201 s/p Ramírez cath insertion- pt had urology evaluation- s/p MRI/UDS revealed enlarged prostate with LUTS. 81 yo M witch h/o HTN, T2DM, BPH, pericardial effusion (2017), Atrial fib( on aspirin), chronic systolic HF c/o urinary retention x  3 months. Pt had ED admission 5/13/201 s/p Ramírez cath insertion- pt had urology evaluation- s/p MRI/UDS revealed enlarged prostate with LUTS.    METS>4, patient walks over 1 hour/day

## 2021-07-21 VITALS
DIASTOLIC BLOOD PRESSURE: 76 MMHG | OXYGEN SATURATION: 97 % | RESPIRATION RATE: 16 BRPM | TEMPERATURE: 98 F | HEART RATE: 72 BPM | SYSTOLIC BLOOD PRESSURE: 150 MMHG

## 2021-07-21 LAB
ANION GAP SERPL CALC-SCNC: 12 MMOL/L — SIGNIFICANT CHANGE UP (ref 5–17)
BUN SERPL-MCNC: 15 MG/DL — SIGNIFICANT CHANGE UP (ref 7–23)
CALCIUM SERPL-MCNC: 9 MG/DL — SIGNIFICANT CHANGE UP (ref 8.4–10.5)
CHLORIDE SERPL-SCNC: 98 MMOL/L — SIGNIFICANT CHANGE UP (ref 96–108)
CO2 SERPL-SCNC: 24 MMOL/L — SIGNIFICANT CHANGE UP (ref 22–31)
CREAT SERPL-MCNC: 1.01 MG/DL — SIGNIFICANT CHANGE UP (ref 0.5–1.3)
GLUCOSE BLDC GLUCOMTR-MCNC: 228 MG/DL — HIGH (ref 70–99)
GLUCOSE SERPL-MCNC: 225 MG/DL — HIGH (ref 70–99)
HCT VFR BLD CALC: 34.2 % — LOW (ref 39–50)
HGB BLD-MCNC: 11.6 G/DL — LOW (ref 13–17)
MCHC RBC-ENTMCNC: 28 PG — SIGNIFICANT CHANGE UP (ref 27–34)
MCHC RBC-ENTMCNC: 33.9 GM/DL — SIGNIFICANT CHANGE UP (ref 32–36)
MCV RBC AUTO: 82.6 FL — SIGNIFICANT CHANGE UP (ref 80–100)
NRBC # BLD: 0 /100 WBCS — SIGNIFICANT CHANGE UP (ref 0–0)
PLATELET # BLD AUTO: 169 K/UL — SIGNIFICANT CHANGE UP (ref 150–400)
POTASSIUM SERPL-MCNC: 3.7 MMOL/L — SIGNIFICANT CHANGE UP (ref 3.5–5.3)
POTASSIUM SERPL-SCNC: 3.7 MMOL/L — SIGNIFICANT CHANGE UP (ref 3.5–5.3)
RBC # BLD: 4.14 M/UL — LOW (ref 4.2–5.8)
RBC # FLD: 14.4 % — SIGNIFICANT CHANGE UP (ref 10.3–14.5)
SODIUM SERPL-SCNC: 134 MMOL/L — LOW (ref 135–145)
WBC # BLD: 8.21 K/UL — SIGNIFICANT CHANGE UP (ref 3.8–10.5)
WBC # FLD AUTO: 8.21 K/UL — SIGNIFICANT CHANGE UP (ref 3.8–10.5)

## 2021-07-21 PROCEDURE — 84295 ASSAY OF SERUM SODIUM: CPT

## 2021-07-21 PROCEDURE — 52649 PROSTATE LASER ENUCLEATION: CPT

## 2021-07-21 PROCEDURE — 82947 ASSAY GLUCOSE BLOOD QUANT: CPT

## 2021-07-21 PROCEDURE — 82330 ASSAY OF CALCIUM: CPT

## 2021-07-21 PROCEDURE — 82565 ASSAY OF CREATININE: CPT

## 2021-07-21 PROCEDURE — 83605 ASSAY OF LACTIC ACID: CPT

## 2021-07-21 PROCEDURE — 85014 HEMATOCRIT: CPT

## 2021-07-21 PROCEDURE — 80048 BASIC METABOLIC PNL TOTAL CA: CPT

## 2021-07-21 PROCEDURE — C9399: CPT

## 2021-07-21 PROCEDURE — C1889: CPT

## 2021-07-21 PROCEDURE — C1769: CPT

## 2021-07-21 PROCEDURE — 82962 GLUCOSE BLOOD TEST: CPT

## 2021-07-21 PROCEDURE — 82803 BLOOD GASES ANY COMBINATION: CPT

## 2021-07-21 PROCEDURE — 84132 ASSAY OF SERUM POTASSIUM: CPT

## 2021-07-21 PROCEDURE — C1782: CPT

## 2021-07-21 PROCEDURE — 82435 ASSAY OF BLOOD CHLORIDE: CPT

## 2021-07-21 PROCEDURE — 88305 TISSUE EXAM BY PATHOLOGIST: CPT

## 2021-07-21 PROCEDURE — 85027 COMPLETE CBC AUTOMATED: CPT

## 2021-07-21 PROCEDURE — 85018 HEMOGLOBIN: CPT

## 2021-07-21 RX ORDER — TAMSULOSIN HYDROCHLORIDE 0.4 MG/1
1 CAPSULE ORAL
Qty: 0 | Refills: 0 | DISCHARGE

## 2021-07-21 RX ADMIN — Medication 50 MILLIGRAM(S): at 06:20

## 2021-07-21 RX ADMIN — Medication 20 MILLIGRAM(S): at 06:20

## 2021-07-21 RX ADMIN — CARVEDILOL PHOSPHATE 12.5 MILLIGRAM(S): 80 CAPSULE, EXTENDED RELEASE ORAL at 06:20

## 2021-07-21 RX ADMIN — Medication 10 MILLIGRAM(S): at 06:19

## 2021-07-21 RX ADMIN — HEPARIN SODIUM 5000 UNIT(S): 5000 INJECTION INTRAVENOUS; SUBCUTANEOUS at 02:34

## 2021-07-21 RX ADMIN — Medication 4: at 07:06

## 2021-07-21 RX ADMIN — Medication 100 MILLIGRAM(S): at 06:19

## 2021-07-21 NOTE — PROGRESS NOTE ADULT - ASSESSMENT
s/p HoLEP POD #1  CBI clamped color light red dela cruz removed TOV  PVR  Lasix  monitor FS  DVT prophylaxis   DC planning

## 2021-07-21 NOTE — ASU DISCHARGE PLAN (ADULT/PEDIATRIC) - ASU DC SPECIAL INSTRUCTIONSFT
PAIN CONTROL: You may take 650 mg of Tylenol every 4-6 hours. Do not exceed more than 4000mg or 4 grams of Tylenol daily.    ANTIBIOTICS: Please complete the course of antibiotics given prior to surgery    BATHING: No restrictions    ACTIVITY: No heavy lifting >10lbs, straining or strenuous activity for 4-6 weeks. Some blood in the urine is expected. This will improve    DIET: Return to your usual diet. Drink plenty of fluids    NOTIFY YOUR SURGEON IF: Fevers (over 100.4F) or chills, persistent nausea/vomiting, pain not controlled on pain medications, unable to urinate,  blood clots or worsening blood in urine    FOLLOW UP:  1. Please call to schedule an appointment with Dr. Pratt within 1 week

## 2021-07-21 NOTE — ASU DISCHARGE PLAN (ADULT/PEDIATRIC) - CARE PROVIDER_API CALL
Viktoria Pratt)  Urology  [address] [place] [address]  Phone: [phone]  Fax: [phone]  Follow Up Time:

## 2021-07-21 NOTE — PROGRESS NOTE ADULT - SUBJECTIVE AND OBJECTIVE BOX
Post op Check    Pt seen and examined without complaints. Pain is controlled. Denies SOB/CP/N/V.     Vital Signs Last 24 Hrs  T(C): 36.1 (20 Jul 2021 14:00), Max: 36.8 (20 Jul 2021 07:05)  T(F): 97 (20 Jul 2021 14:00), Max: 98.2 (20 Jul 2021 07:05)  HR: 64 (20 Jul 2021 14:00) (60 - 84)  BP: 145/81 (20 Jul 2021 14:00) (139/77 - 155/87)  BP(mean): 108 (20 Jul 2021 12:15) (104 - 108)  RR: 17 (20 Jul 2021 14:00) (16 - 18)  SpO2: 97% (20 Jul 2021 14:00) (97% - 100%)    I&O's Summary    20 Jul 2021 07:01  -  20 Jul 2021 14:55  --------------------------------------------------------  IN: 150 mL / OUT: 0 mL / NET: 150 mL    I&O's Detail    20 Jul 2021 07:01  -  20 Jul 2021 14:56  --------------------------------------------------------  IN:    Lactated Ringers: 150 mL  Total IN: 150 mL    OUT:  Total OUT: 0 mL    Total NET: 150 mL          Physical Exam  Gen: NAD  Pulm: No respiratory distress, no subcostal retractions  CV: RRR, no JVD  Abd: Soft, NT, ND  : CBI- clear urine                           12.4   4.57  )-----------( 185      ( 20 Jul 2021 13:01 )             37.4       07-20    136  |  100  |  11  ----------------------------<  284<H>  3.9   |  22  |  1.04    Ca    9.2      20 Jul 2021 13:01        A/P: 80y Male s/p HOLEP  CBI  DVT prophylaxis/OOB  Incentive spirometry  Strict I&O's  Analgesia and antiemetics as needed  Diet  AM labs  TOV in am if clear     
UROLOGY DAILY PROGRESS NOTE:     Subjective: Patient seen and examined at bedside.   No acute events overnight CBI clamped     Objective:  Vital signs  T(F): , Max: 97.9 (07-20-21 @ 22:00)  HR: 75 (07-21-21 @ 07:30)  BP: 110/63 (07-21-21 @ 07:30)  SpO2: 96% (07-21-21 @ 07:30)  Wt(kg): --    Output     I&O's Detail    20 Jul 2021 07:01  -  21 Jul 2021 07:00  --------------------------------------------------------  IN:    IV PiggyBack: 50 mL    Lactated Ringers: 1050 mL    Oral Fluid: 440 mL  Total IN: 1540 mL    OUT:    Indwelling Catheter - Urethral (mL): 520 mL  Total OUT: 520 mL    Total NET: 1020 mL      21 Jul 2021 07:01  -  21 Jul 2021 07:54  --------------------------------------------------------  IN:    Lactated Ringers: 75 mL  Total IN: 75 mL    OUT:    Voided (mL): 60 mL  Total OUT: 60 mL    Total NET: 15 mL          Physical Exam:  Gen: NAD  Abd: soft NTND  Back: NO CVAT  : dela cruz light red removed     Labs:  07-21  8.21  / 34.2  /1.01   07-20  4.57  / 37.4  /1.04                           11.6   8.21  )-----------( 169      ( 21 Jul 2021 02:49 )             34.2     07-21    134<L>  |  98  |  15  ----------------------------<  225<H>  3.7   |  24  |  1.01    Ca    9.0      21 Jul 2021 02:49    LABS/RADIOLOGY RESULTS:                          11.6   8.21  )-----------( 169      ( 21 Jul 2021 02:49 )             34.2   07-21    134<L>  |  98  |  15  ----------------------------<  225<H>  3.7   |  24  |  1.01    Ca    9.0      21 Jul 2021 02:49    Blood Cultures                Urine Cx:

## 2021-07-22 ENCOUNTER — APPOINTMENT (OUTPATIENT)
Dept: UROLOGY | Facility: CLINIC | Age: 81
End: 2021-07-22

## 2021-07-28 ENCOUNTER — APPOINTMENT (OUTPATIENT)
Dept: UROLOGY | Facility: CLINIC | Age: 81
End: 2021-07-28
Payer: MEDICARE

## 2021-07-28 VITALS
WEIGHT: 179 LBS | DIASTOLIC BLOOD PRESSURE: 77 MMHG | SYSTOLIC BLOOD PRESSURE: 129 MMHG | BODY MASS INDEX: 25.62 KG/M2 | OXYGEN SATURATION: 95 % | HEART RATE: 79 BPM | HEIGHT: 70 IN

## 2021-07-28 DIAGNOSIS — R93.89 ABNORMAL FINDINGS ON DIAGNOSTIC IMAGING OF OTHER SPECIFIED BODY STRUCTURES: ICD-10-CM

## 2021-07-28 PROCEDURE — 99024 POSTOP FOLLOW-UP VISIT: CPT

## 2021-07-30 LAB — SURGICAL PATHOLOGY STUDY: SIGNIFICANT CHANGE UP

## 2021-07-31 PROBLEM — R93.89 ABNORMAL ULTRASOUND OF PROSTATE: Status: ACTIVE | Noted: 2019-10-10

## 2021-07-31 NOTE — ASSESSMENT
[FreeTextEntry1] : 12/30/19: Patient presents today for a follow up. He reports waking up 2-3 times at night which is okay for him. He has been taking Finasteride and Tamsulosin which is working for him. He denies constipation or hematuria. \par \par 04/27/2021: Patient presents today for a follow up accompanied by his wife. Pt has been doing well. However, he reports that after he urinates he feels the urge to go again a few minutes later and voids a pretty large amount when he does. This incomplete emptying and double voiding has been going on for a couple of months, however the patient's wife said they were nervous to come out due to covid. Pt was on a high BP medication, Eplerenone, that was discontinued. He is currently taking tamsulosin 0.4 mg once daily after lunch, metformin 1000 mg BID, Doxazosin 4 mg once daily, Januvia 100 mg for diabetes once daily, hydralazine 50 mg 3x a day, Ramipril 5 mg once daily, furosemide 20 mg once daily, and Pravastatin 40 mg once daily for cholesterol. \par \par 05/18/2021: Patient presents today for a follow up accompanied by his wife. The pt had been experiencing urinary frequency, dysuria, and burning last week. He went to the emergency room on 5/13/2021 and a Ramírez catheter was placed. He currently has the catheter in and feels better. He was also given cephalexin 500 mg BID as an antibiotic. This medication has not affected his GI system. The patient has been taking tamsulosin 0.4 mg BID, doxazosin 8 mg at bedtime) as well as finasteride 5 mg once daily. \par \par 05/24/2021: Patient presents today for fill and pull voiding trial for urinary retention. Continues Tadalafil 5mg, Finasteride, and Tamsulosin BID. Has had UDS three years ago without issue. \par \par Continue Tamsulosin BID, Finasteride, and Tadalafil 5mg. \par Patient had fill and pull today that was unsuccessful. I recommend the patient go for UDS with cystoscopy. before deciding further treatment plan. If bladder contraction is good, I recommend the patient have a procedure. If bladder contraction is poor, I recommend that he not have a procedure. \par RTO in 1 week for UDS with cystoscopy.\par \par 06/16/2021: Patient presents today for urodynamics with cystoscopy. He tolerated the procedure well. There was some hematuria when the catheter was passed over the prostate. Patient continues to take doxazosin 8 mg once daily at bedtime, finasteride 5 mg once daily, tadalafil 5 mg once daily, and tamsulosin 0.4 mg BID after a meal for his prostate. He also takes Januvia for diabetes, baby aspirin, ramipril for BP, hydralazine for BP, folic acid, furosemide 20 mg once daily, METformin 1000 mg BID for diabetes and Pravastatin 40 mg for cholesterol. Last PSA from 12/30/2021 was 5.03. MRI of the prostate was satisfactory in December 2019 with a PIRADS-2 and revealed his prostate is 88 cc. PSA has been stable since then. \par \par Urodynamics: Normal bladder sensation. Decreased bladder capacity. Patient experiencing detrusor instability. The uroflow rate is decreased. The intravesical voiding pressure is increased. The patient has incomplete bladder emptying, a post void residual of 173 cc. The sphincter activity is normal synergic. The patient experienced no complications. the patient tolerated the procedure well. Post procedure instructions and information given and reviewed with patient. Post Procedure Pain Scale: 0. \par \par Cystoscopy: Lidocaine jelly was inserted for numbing and lubrication. Bladder has a little bit of catheter inflammation. Trigone is intact. Scope is retroflexed and the ureteral orifices were hard to visualize however they appear to be slits. No bladder calculi. Upon filling up he was uncomfortable. The prostate does not protrude that much into the bladder. There is a slight ooze of blood from bladder neck. Prostate is moderately large. Irrigation fluid returning was clear. No substantial bleeding. \par \par PVR was 130 and capacity was 256. 16 [place] Ramírez catheter was reinserted. 10 cc of water in the balloon. \par Reviewed prior PSA's and MRI reports with the patient and his brother in detail. \par Renewed tadalafil 5 mg once daily. Prescription was sent to the patient's local [facility] since he was being charged 59$ at his local [employer]. Instructed the patient and his brother to use the Good RX application. \par I explained to the patient and his brother that although he is on maximal medical therapy that has helped some, I don't believe it is enough and we have reached a plateau. My recommendation was that he undergo a laser enucleation of the prostate. I am referring the patient to Dr.Zeph Jerniagn and if the patient would like to proceed with the operation, he will follow up with  for about 3 months after and can either return to me for care or continue with Dr. JerniganWe reviewed the risks and benefits of the procedure including ureteral orifice injury, incontinence, bleeding and infectio and urinary retention resulting in post-op Catherization as well as the alternatives of keeping the Ramírez catheter or learning intermittent catheterization. I spoke with the patient's niece, who is an anesthesiologist, on the phone at the conclusion of the visit. She understood after we discussed her uncle's condition and was agreeable with the plan of care. She did express concern about the length of the procedure, which I informed her is about 1.5 hours, due to his significant cardiac history, however she will also speak to  in regards to the details of the operation. \par \par 07/28/2021: The patient presents today for a post-operative visit. 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 Viktoria Jenrigan on 7/20/2021. The procedure went very well and the patient currently has no pain. He no longer has a catheter. His wife reports that his urine still has a slightly pink tint. He has some post void leaking since surgery but his stream is stronger. Denies constipation and his appetite remains good. The patient has not been sexually active the past 3 months. \par \par The patient produced a urine sample which will be sent for urinalysis, urine cytology, and urine culture. \par \par Instructed the patient and his wife to discontinue Doxazosin and tamsulosin. If his BP goes up significantly after stopping Doxazosin (greater than 160/90), the patient and his wife were instructed to call and inform me of this. He will continue taking tadalafil 5 mg once daily. \par \par Patient will RTO in one month for reassessment. \par \par Preparation, in person office visit, and coordination of care: 21 minutes.

## 2021-07-31 NOTE — ADDENDUM
[FreeTextEntry1] : This note was authored by Marissa Castro working as a scribe for Dr.Gary Sibley. I, Dr. Chris Sibley have reviewed the content of this note and confirm it is true and accurate. I personally performed the history and physical examination and made all the decisions 07/28/2021.

## 2021-07-31 NOTE — REVIEW OF SYSTEMS
[Feeling Poorly] : not feeling poorly [Feeling Tired] : not feeling tired [Constipation] : no constipation [Diarrhea] : no diarrhea [Burning Sensation] : no burning sensation during urination [Incomplete Emptying] : no incomplete emptying of bladder [Urinary Stream Is Smaller] : urine stream was not smaller [Difficulty Walking] : no difficulty walking

## 2021-08-01 LAB
APPEARANCE: ABNORMAL
BACTERIA UR CULT: NORMAL
BACTERIA: NEGATIVE
BILIRUBIN URINE: NEGATIVE
BLOOD URINE: ABNORMAL
COLOR: COLORLESS
GLUCOSE QUALITATIVE U: ABNORMAL
HYALINE CASTS: 1 /LPF
KETONES URINE: NEGATIVE
LEUKOCYTE ESTERASE URINE: NEGATIVE
MICROSCOPIC-UA: NORMAL
NITRITE URINE: NEGATIVE
PH URINE: 6
PROTEIN URINE: ABNORMAL
RED BLOOD CELLS URINE: >720 /HPF
SPECIFIC GRAVITY URINE: 1.01
SQUAMOUS EPITHELIAL CELLS: 0 /HPF
URINE CYTOLOGY: NORMAL
UROBILINOGEN URINE: NORMAL
WHITE BLOOD CELLS URINE: 4 /HPF

## 2021-08-04 ENCOUNTER — NON-APPOINTMENT (OUTPATIENT)
Age: 81
End: 2021-08-04

## 2021-08-11 ENCOUNTER — APPOINTMENT (OUTPATIENT)
Dept: CARDIOLOGY | Facility: CLINIC | Age: 81
End: 2021-08-11

## 2021-08-19 ENCOUNTER — OUTPATIENT (OUTPATIENT)
Dept: OUTPATIENT SERVICES | Facility: HOSPITAL | Age: 81
LOS: 1 days | End: 2021-08-19
Payer: MEDICARE

## 2021-08-19 ENCOUNTER — APPOINTMENT (OUTPATIENT)
Dept: CT IMAGING | Facility: IMAGING CENTER | Age: 81
End: 2021-08-19
Payer: MEDICARE

## 2021-08-19 DIAGNOSIS — Z00.8 ENCOUNTER FOR OTHER GENERAL EXAMINATION: ICD-10-CM

## 2021-08-19 DIAGNOSIS — Z98.890 OTHER SPECIFIED POSTPROCEDURAL STATES: Chronic | ICD-10-CM

## 2021-08-19 DIAGNOSIS — Z98.49 CATARACT EXTRACTION STATUS, UNSPECIFIED EYE: Chronic | ICD-10-CM

## 2021-08-19 DIAGNOSIS — R31.29 OTHER MICROSCOPIC HEMATURIA: ICD-10-CM

## 2021-08-19 PROCEDURE — 74178 CT ABD&PLV WO CNTR FLWD CNTR: CPT

## 2021-08-19 PROCEDURE — 74178 CT ABD&PLV WO CNTR FLWD CNTR: CPT | Mod: 26

## 2021-08-24 ENCOUNTER — NON-APPOINTMENT (OUTPATIENT)
Age: 81
End: 2021-08-24

## 2021-09-10 NOTE — ASU DISCHARGE PLAN (ADULT/PEDIATRIC) - ASU DC SPECIAL INSTRUCTIONSFT
Cj Murray
You were given 1000mg IV Tylenol for pain management.  Please DO NOT take any Tylenol containing products, such as  Vicodin, Percocet, Excedrin, many cold preparations for the next 6 hours (until 230p).  DO NOT EXCEED 3000MG OF TYLENOL OVER 24 HOURS.

## 2021-09-23 ENCOUNTER — APPOINTMENT (OUTPATIENT)
Dept: UROLOGY | Facility: CLINIC | Age: 81
End: 2021-09-23
Payer: MEDICARE

## 2021-09-23 PROCEDURE — 99214 OFFICE O/P EST MOD 30 MIN: CPT | Mod: 24

## 2021-09-23 NOTE — ASSESSMENT
[FreeTextEntry1] : 12/30/19: Patient presents today for a follow up. He reports waking up 2-3 times at night which is okay for him. He has been taking Finasteride and Tamsulosin which is working for him. He denies constipation or hematuria. \par \par 04/27/2021: Patient presents today for a follow up accompanied by his wife. Pt has been doing well. However, he reports that after he urinates he feels the urge to go again a few minutes later and voids a pretty large amount when he does. This incomplete emptying and double voiding has been going on for a couple of months, however the patient's wife said they were nervous to come out due to covid. Pt was on a high BP medication, Eplerenone, that was discontinued. He is currently taking tamsulosin 0.4 mg once daily after lunch, metformin 1000 mg BID, Doxazosin 4 mg once daily, Januvia 100 mg for diabetes once daily, hydralazine 50 mg 3x a day, Ramipril 5 mg once daily, furosemide 20 mg once daily, and Pravastatin 40 mg once daily for cholesterol. \par \par 05/18/2021: Patient presents today for a follow up accompanied by his wife. The pt had been experiencing urinary frequency, dysuria, and burning last week. He went to the emergency room on 5/13/2021 and a Ramírez catheter was placed. He currently has the catheter in and feels better. He was also given cephalexin 500 mg BID as an antibiotic. This medication has not affected his GI system. The patient has been taking tamsulosin 0.4 mg BID, doxazosin 8 mg at bedtime) as well as finasteride 5 mg once daily. \par \par 05/24/2021: Patient presents today for fill and pull voiding trial for urinary retention. Continues Tadalafil 5mg, Finasteride, and Tamsulosin BID. Has had UDS three years ago without issue. \par \par Continue Tamsulosin BID, Finasteride, and Tadalafil 5mg. \par Patient had fill and pull today that was unsuccessful. I recommend the patient go for UDS with cystoscopy. before deciding further treatment plan. If bladder contraction is good, I recommend the patient have a procedure. If bladder contraction is poor, I recommend that he not have a procedure. \par RTO in 1 week for UDS with cystoscopy.\par \par 06/16/2021: Patient presents today for urodynamics with cystoscopy. He tolerated the procedure well. There was some hematuria when the catheter was passed over the prostate. Patient continues to take doxazosin 8 mg once daily at bedtime, finasteride 5 mg once daily, tadalafil 5 mg once daily, and tamsulosin 0.4 mg BID after a meal for his prostate. He also takes Januvia for diabetes, baby aspirin, ramipril for BP, hydralazine for BP, folic acid, furosemide 20 mg once daily, METformin 1000 mg BID for diabetes and Pravastatin 40 mg for cholesterol. Last PSA from 12/30/2021 was 5.03. MRI of the prostate was satisfactory in December 2019 with a PIRADS-2 and revealed his prostate is 88 cc. PSA has been stable since then. \par \par Urodynamics: Normal bladder sensation. Decreased bladder capacity. Patient experiencing detrusor instability. The uroflow rate is decreased. The intravesical voiding pressure is increased. The patient has incomplete bladder emptying, a post void residual of 173 cc. The sphincter activity is normal synergic. The patient experienced no complications. the patient tolerated the procedure well. Post procedure instructions and information given and reviewed with patient. Post Procedure Pain Scale: 0. \par \par Cystoscopy: Lidocaine jelly was inserted for numbing and lubrication. Bladder has a little bit of catheter inflammation. Trigone is intact. Scope is retroflexed and the ureteral orifices were hard to visualize however they appear to be slits. No bladder calculi. Upon filling up he was uncomfortable. The prostate does not protrude that much into the bladder. There is a slight ooze of blood from bladder neck. Prostate is moderately large. Irrigation fluid returning was clear. No substantial bleeding. \par \par PVR was 130 and capacity was 256. 16 [place] Ramírez catheter was reinserted. 10 cc of water in the balloon. \par Reviewed prior PSA's and MRI reports with the patient and his brother in detail. \par Renewed tadalafil 5 mg once daily. Prescription was sent to the patient's local [facility] shop since he was being charged 59$ at his local [employer]. Instructed the patient and his brother to use the Good RX application. \par I explained to the patient and his brother that although he is on maximal medical therapy that has helped some, I don't believe it is enough and we have reached a plateau. My recommendation was that he undergo a laser enucleation of the prostate. I am referring the patient to Dr.Zeph Jernigan and if the patient would like to proceed with the operation, he will follow up with  for about 3 months after and can either return to me for care or continue with Dr. JerniganWe reviewed the risks and benefits of the procedure including ureteral orifice injury, incontinence, bleeding and infectio and urinary retention resulting in post-op Catherization as well as the alternatives of keeping the Ramírez catheter or learning intermittent catheterization. I spoke with the patient's niece, who is an anesthesiologist, on the phone at the conclusion of the visit. She understood after we discussed her uncle's condition and was agreeable with the plan of care. She did express concern about the length of the procedure, which I informed her is about 1.5 hours, due to his significant cardiac history, however she will also speak to  in regards to the details of the operation. \par \par 07/28/2021: The patient presents today for a post-operative visit. 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 Viktoria Jernigan on 7/20/2021. The procedure went very well and the patient currently has no pain. He no longer has a catheter. His wife reports that his urine still has a slightly pink tint. He has some post void leaking since surgery but his stream is stronger. Denies constipation and his appetite remains good. The patient has not been sexually active the past 3 months. \par \par The patient produced a urine sample which will be sent for urinalysis, urine cytology, and urine culture. \par Instructed the patient and his wife to discontinue Doxazosin and tamsulosin. If his BP goes up significantly after stopping Doxazosin (greater than 160/90), the patient and his wife were instructed to call and inform me of this. He will continue taking tadalafil 5 mg once daily. \par Patient will RTO in one month for reassessment. \par \par 09/23/2021: Patient presents today for a follow up. The pt complains of mild stress incontinence as well as urinary urgency. He has been taking tadalafil 5 mg once daily. Erections are adequate. Denies constipation. \par \par Blood work today included BMP, alkaline phosphatase, PSA, and testosterone. \par \par The patient produced a urine sample which will be sent for urinalysis, urine cytology, and urine culture. \par \par I prescribed the patient Trospium 20 mg, one tablet once daily at bedtime. I informed the patient there may be constipation as a side effect. \par \par I instructed the patient to discontinue tadalafil since his erections are adequate now, however if he finds they are getting weaker he should start taking it again. \par \par Patient will RTO in 2 weeks for reassessment. \par \par Preparation, in person office visit, and coordination of care: 30 minutes.

## 2021-09-23 NOTE — HISTORY OF PRESENT ILLNESS
[FreeTextEntry1] : Mr Gonzales is an 81 year old man followed for hydrocele, past abnormal urine cytology, and BPH. The patient had a hydrocelectomy in October of 2014. Fluid cytology 06/08/16 was abnormal. Fluid cytology 12/07/16 and 12/19/16 were negative for malignant cells. The patient takes finasteride 5 mg, oxybutynin, ER 10 mg, and doxazosin 8 mg once daily. Cystoscopy 12/19/16 found 2+ trabeculation of the bladder. The prostate protruded moderately into the bladder. No bladder stones or tumors. The left and right ureteral orifice visualized and were very close to prostate. I advised him to discontinue taking oxybutynin. On 11/10/17 the patient made 2500 cc of urine. The patient will now begin taking tamsulosin HCl 0.4 MG  one capsule one half hour after breakfast and doxazosin at night. He was able to urinate well over night without any pain. \par 12/10/18: The patient returned today and reported that his urination is satisfactory. Currently takes Finasteride, Tamsulosin and Doxazosin. Complains of intermittent stream and urge incontinence. Denies dysuria and gross hematuria.\par  6/10/19: Patient presents for 6 month follow up. Currently on Proscar and Doxazosin. States that he has no need to strain to initiate urination but flow is intermittent. Reports bothersome urinary urgency/frequency with occasional UUI. Nocturia. Regarding his anemia it is reported that it was evaluated by his PCP and he was told that it will be monitored but is nothing to worry about at this time. \par 6/26/19: Patient presents today for UDS and a cystoscopy. \par 7/3/19: Patient presents today for an ultrasound of the prostate. He is here today to discuss the results. His urination is the same as it was at his last visit. He wake up 4x a night to urinate. During the day he urinates 5-6x. He denies dysuria, gross hematuria, urgency or incontinence. Following the cystoscopy he noticed a small amount of blood and tiny clots but they have since resolved. He is satisfied with his urination at this time. \par 10/8/19: Patient presents today for a renal ultrasound and is here to discuss the results. Doxazosin, Finasteride and Tamsulosin were all prescribed previously. It is reported today that he has only been taking the Doxazosin and the Finasteride. His urination is still troublesome. Nocturia x 4-5 is reported. \par 10/15/19: Patient presents today for a follow up. His most recent PSA from 10/8/19 was 5.14 ng/mL a slight increase when compared to the last PSA. Finasteride is currently taken. When adjusting for the use of Finasteride his PSA is around 10 ng/mL. He is scheduled for surgery with Dr. Rolon next week. \par 11/20/19: Patient presents today for follow-up. He has completed surgery with Dr. Rolon and is improving well. He has finished his Sulfamethoxazole-Trimethoprim. His recent PSA on 11/12 was 5.15 ng/mL. He denies having a pacemaker. Patient has previous abnormal ultrasound and elevated PSA.\par \par 12/30/19: Patient presents today for a follow up. He reports waking up 2-3 times at night which is okay for him. He has been taking Finasteride and Tamsulosin which is working for him. He denies constipation or hematuria. \par \par 04/27/2021: Patient presents today for a follow up accompanied by his wife. Pt has been doing well. However, he reports that after he urinates he feels the urge to go again a few minutes later and voids a pretty large amount when he does. This incomplete emptying and double voiding has been going on for a couple of months, however the patient's wife said they were nervous to come out due to covid. Pt was on a high BP medication, Eplerenone, that was discontinued. He is currently taking tamsulosin 0.4 mg once daily after lunch, metformin BID, Doxazosin 4 mg once daily, Januvia 100 mg for diabetes once daily, hydralazine 50 mg 3x a day, Ramipril 5 mg once daily, furosemide 20 mg once daily, and Pravastatin 40 mg once daily for cholesterol. \par \par 05/18/2021: Patient presents today for a follow up accompanied by his wife. The pt had been experiencing urinary frequency, dysuria, and burning last week. He went to the emergency room on 5/13/2021 and a Ramírez catheter was placed. He currently has the catheter in and feels better. He was also given cephalexin 500 mg BID as an antibiotic. This medication has not affected his GI system. The patient has been taking tamsulosin 0.4 mg BID (increased from once daily) as well as finasteride 5 mg once daily. \par \par 05/24/2021: Patient presents today for fill and pull voiding trial for urinary retention. Continues Tadalafil 5mg, Finasteride, and Tamsulosin BID. Has had UDS three years ago without issue. \par \par 06/16/2021: Patient presents today for urodynamics with cystoscopy. He tolerated the procedure well. There was some hematuria when the catheter was passed over the prostate. Patient continues to take doxazosin 8 mg once daily at bedtime, finasteride 5 mg once daily, tadalafil 5 mg once daily, and tamsulosin 0.4 mg BID after a meal for his prostate. He also takes Januvia for diabetes, baby aspirin, ramipril for BP, hydralazine for BP, folic acid, furosemide 20 mg once daily, METformin 1000 mg BID for diabetes and Pravastatin 40 mg for cholesterol. Last PSA from 12/30/2021 was 5.03. MRI of the prostate was satisfactory in December 2019 with a PIRADS-2 and revealed his prostate is 88 cc. PSA has been stable since then. \par \par 07/28/2021: The patient presents today for a post-operative visit. 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 Viktoria Jernigan on 7/20/2021. The procedure went very well and the patient currently has no pain. He no longer has a catheter. His wife reports that his urine still has a slightly pink tint. He has some post void leaking since surgery but his stream is stronger. Denies constipation and his appetite remains good. The patient has not been sexually active the past 3 months. \par \par 09/23/2021: Patient presents today for a follow up. The pt complains of mild stress incontinence as well as urinary urgency. He has been taking tadalafil 5 mg once daily. Erections are adequate. Denies constipation.

## 2021-09-23 NOTE — ADDENDUM
[FreeTextEntry1] : This note was authored by Marissa Castro working as a scribe for Dr.Gary Sibley. I, Dr. Chris Sibley have reviewed the content of this note and confirm it is true and accurate. I personally performed the history and physical examination and made all the decisions 09/23/2021.

## 2021-09-23 NOTE — REVIEW OF SYSTEMS
[Loss Of Hearing] : hearing loss [Feeling Poorly] : not feeling poorly [Feeling Tired] : not feeling tired [Constipation] : no constipation [Diarrhea] : no diarrhea [Burning Sensation] : no burning sensation during urination [Incomplete Emptying] : no incomplete emptying of bladder [Urinary Stream Is Smaller] : urine stream was not smaller [Difficulty Walking] : no difficulty walking

## 2021-09-25 LAB
ALP BLD-CCNC: 53 U/L
ANION GAP SERPL CALC-SCNC: 15 MMOL/L
APPEARANCE: CLEAR
BACTERIA UR CULT: NORMAL
BACTERIA: NEGATIVE
BILIRUBIN URINE: NEGATIVE
BLOOD URINE: NEGATIVE
BUN SERPL-MCNC: 10 MG/DL
CALCIUM SERPL-MCNC: 9.3 MG/DL
CHLORIDE SERPL-SCNC: 102 MMOL/L
CO2 SERPL-SCNC: 22 MMOL/L
COLOR: NORMAL
CREAT SERPL-MCNC: 1.03 MG/DL
GLUCOSE QUALITATIVE U: NEGATIVE
GLUCOSE SERPL-MCNC: 148 MG/DL
HYALINE CASTS: 1 /LPF
KETONES URINE: NEGATIVE
LEUKOCYTE ESTERASE URINE: NEGATIVE
MICROSCOPIC-UA: NORMAL
NITRITE URINE: NEGATIVE
PH URINE: 6.5
POTASSIUM SERPL-SCNC: 4.2 MMOL/L
PROTEIN URINE: NEGATIVE
PSA FREE FLD-MCNC: 31 %
PSA FREE SERPL-MCNC: 0.08 NG/ML
PSA SERPL-MCNC: 0.25 NG/ML
RED BLOOD CELLS URINE: 1 /HPF
SODIUM SERPL-SCNC: 140 MMOL/L
SPECIFIC GRAVITY URINE: 1.01
SQUAMOUS EPITHELIAL CELLS: 0 /HPF
TESTOST BND SERPL-MCNC: 5.6 PG/ML
TESTOSTERONE TOTAL S: 255 NG/DL
URINE CYTOLOGY: NORMAL
UROBILINOGEN URINE: NORMAL
WHITE BLOOD CELLS URINE: 3 /HPF

## 2021-10-18 ENCOUNTER — APPOINTMENT (OUTPATIENT)
Dept: UROLOGY | Facility: CLINIC | Age: 81
End: 2021-10-18
Payer: MEDICARE

## 2021-10-18 PROCEDURE — 99024 POSTOP FOLLOW-UP VISIT: CPT

## 2021-10-18 RX ORDER — SULFAMETHOXAZOLE AND TRIMETHOPRIM 800; 160 MG/1; MG/1
800-160 TABLET ORAL
Qty: 28 | Refills: 0 | Status: COMPLETED | COMMUNITY
Start: 2019-10-15 | End: 2021-10-18

## 2021-10-23 LAB
APPEARANCE: ABNORMAL
BACTERIA UR CULT: NORMAL
BACTERIA: NEGATIVE
BILIRUBIN URINE: NEGATIVE
BLOOD URINE: NEGATIVE
CALCIUM OXALATE CRYSTALS: ABNORMAL
COLOR: YELLOW
GLUCOSE QUALITATIVE U: NEGATIVE
HYALINE CASTS: 0 /LPF
KETONES URINE: NEGATIVE
LEUKOCYTE ESTERASE URINE: NEGATIVE
MICROSCOPIC-UA: NORMAL
NITRITE URINE: NEGATIVE
PH URINE: 6
PROTEIN URINE: NORMAL
RED BLOOD CELLS URINE: 4 /HPF
SPECIFIC GRAVITY URINE: 1.02
SQUAMOUS EPITHELIAL CELLS: 0 /HPF
URINE CYTOLOGY: NORMAL
UROBILINOGEN URINE: NORMAL
WHITE BLOOD CELLS URINE: 5 /HPF

## 2022-03-17 ENCOUNTER — APPOINTMENT (OUTPATIENT)
Dept: RADIOLOGY | Facility: IMAGING CENTER | Age: 82
End: 2022-03-17
Payer: MEDICARE

## 2022-03-17 ENCOUNTER — APPOINTMENT (OUTPATIENT)
Dept: PULMONOLOGY | Facility: CLINIC | Age: 82
End: 2022-03-17
Payer: MEDICARE

## 2022-03-17 ENCOUNTER — OUTPATIENT (OUTPATIENT)
Dept: OUTPATIENT SERVICES | Facility: HOSPITAL | Age: 82
LOS: 1 days | End: 2022-03-17
Payer: MEDICARE

## 2022-03-17 VITALS
WEIGHT: 180 LBS | HEART RATE: 76 BPM | OXYGEN SATURATION: 96 % | DIASTOLIC BLOOD PRESSURE: 71 MMHG | SYSTOLIC BLOOD PRESSURE: 136 MMHG | RESPIRATION RATE: 16 BRPM | BODY MASS INDEX: 25.77 KG/M2 | HEIGHT: 70 IN | TEMPERATURE: 98 F

## 2022-03-17 DIAGNOSIS — Z98.890 OTHER SPECIFIED POSTPROCEDURAL STATES: Chronic | ICD-10-CM

## 2022-03-17 DIAGNOSIS — Z98.49 CATARACT EXTRACTION STATUS, UNSPECIFIED EYE: Chronic | ICD-10-CM

## 2022-03-17 DIAGNOSIS — R06.02 SHORTNESS OF BREATH: ICD-10-CM

## 2022-03-17 PROCEDURE — 71046 X-RAY EXAM CHEST 2 VIEWS: CPT

## 2022-03-17 PROCEDURE — 71046 X-RAY EXAM CHEST 2 VIEWS: CPT | Mod: 26

## 2022-03-17 PROCEDURE — 76604 US EXAM CHEST: CPT

## 2022-03-17 PROCEDURE — 99215 OFFICE O/P EST HI 40 MIN: CPT | Mod: 25

## 2022-03-17 RX ORDER — EPLERENONE 25 MG/1
25 TABLET, COATED ORAL DAILY
Qty: 30 | Refills: 0 | Status: DISCONTINUED | COMMUNITY
Start: 2018-02-15 | End: 2022-03-17

## 2022-03-17 RX ORDER — EMPAGLIFLOZIN 10 MG/1
10 TABLET, FILM COATED ORAL DAILY
Qty: 90 | Refills: 3 | Status: DISCONTINUED | COMMUNITY
End: 2022-03-17

## 2022-03-17 RX ORDER — FLUCONAZOLE 100 MG/1
100 TABLET ORAL DAILY
Qty: 7 | Refills: 0 | Status: DISCONTINUED | COMMUNITY
Start: 2021-07-15 | End: 2022-03-17

## 2022-03-17 RX ORDER — TAMSULOSIN HYDROCHLORIDE 0.4 MG/1
0.4 CAPSULE ORAL
Qty: 90 | Refills: 3 | Status: DISCONTINUED | COMMUNITY
Start: 2017-11-15 | End: 2022-03-17

## 2022-03-17 RX ORDER — CIPROFLOXACIN HYDROCHLORIDE 500 MG/1
500 TABLET, FILM COATED ORAL
Qty: 14 | Refills: 0 | Status: DISCONTINUED | COMMUNITY
Start: 2021-07-15 | End: 2022-03-17

## 2022-03-17 NOTE — REASON FOR VISIT
[Follow-Up] : a follow-up visit [Family Member] : family member [Shortness of Breath] : shortness of breath

## 2022-03-18 LAB
ALBUMIN SERPL ELPH-MCNC: 4.2 G/DL
ALP BLD-CCNC: 55 U/L
ALT SERPL-CCNC: 70 U/L
ANION GAP SERPL CALC-SCNC: 13 MMOL/L
AST SERPL-CCNC: 42 U/L
BILIRUB SERPL-MCNC: 0.8 MG/DL
BUN SERPL-MCNC: 10 MG/DL
CALCIUM SERPL-MCNC: 9.4 MG/DL
CHLORIDE SERPL-SCNC: 102 MMOL/L
CO2 SERPL-SCNC: 24 MMOL/L
CREAT SERPL-MCNC: 1.15 MG/DL
EGFR: 64 ML/MIN/1.73M2
GLUCOSE SERPL-MCNC: 231 MG/DL
NT-PROBNP SERPL-MCNC: 3225 PG/ML
POTASSIUM SERPL-SCNC: 4.7 MMOL/L
PROT SERPL-MCNC: 6.4 G/DL
SODIUM SERPL-SCNC: 139 MMOL/L

## 2022-03-18 NOTE — REVIEW OF SYSTEMS
[Fever] : no fever [Fatigue] : fatigue [Recent Wt Gain (___ Lbs)] : ~T no recent weight gain [EDS] : eds [Chills] : no chills [Poor Appetite] : no poor appetite [Dry Eyes] : no dry eyes [Sore Throat] : no sore throat [Eye Irritation] : no eye irritation [Nasal Congestion] : no nasal congestion [Postnasal Drip] : no postnasal drip [Dry Mouth] : no dry mouth [Cough] : no cough [Hemoptysis] : no hemoptysis [Chest Tightness] : no chest tightness [Frequent URIs] : no frequent URIs [Sputum] : no sputum [Dyspnea] : dyspnea [Wheezing] : no wheezing [SOB on Exertion] : sob on exertion [Chest Discomfort] : no chest discomfort [Claudication] : no claudication [Edema] : edema [Leg Cramps] : no leg cramps [Orthopnea] : orthopnea [Hay Fever] : no hay fever [Watery Eyes] : no watery eyes [Itchy Eyes] : no itchy eyes [Seasonal Allergies] : no seasonal allergies [Abdominal Pain] : no abdominal pain [Vomiting] : no vomiting [Nocturia] : nocturia [Urgency] : no frequency [Dysuria] : no urgency [Arthralgias] : no arthralgias [Myalgias] : no myalgias [Chronic Pain] : no chronic pain [Easy Bruising] : no easy bruising [Rash] : no rash [Headache] : no headache [Focal Weakness] : no focal weakness [Seizures] : no seizures [Head Injury] : no head injury [Dizziness] : no dizziness [Anxiety] : anxiety [Diabetes] : diabetes

## 2022-03-18 NOTE — CONSULT LETTER
[Dear  ___] : Dear  [unfilled], [Courtesy Letter:] : I had the pleasure of seeing your patient, [unfilled], in my office today. [Please see my note below.] : Please see my note below. [Sincerely,] : Sincerely, [FreeTextEntry3] : Clemente Wells MD, FACP, FCCP\par Division of Pulmonary, Critical Care, and Sleep Medicine\par Associate Professor of Medicine \par [employer] at [facility]\par

## 2022-03-18 NOTE — HISTORY OF PRESENT ILLNESS
[Never] : never [Cough] : coughing [Wheezing] : wheezing [Nonspecific Pain, Swelling, And Stiffness] : chest pain [Fever] : fever [Difficulty Breathing During Exertion] : dyspnea on exertion [Nasal Passage Blockage (Stuffiness)] : edema [Feelings Of Weakness On Exertion] : exercise intolerance [0.5  -  Very, very slight] : 0.5, very, very slight [TextBox_4] : 81M extensive medical history including HTN, HLD, DM2, prior Afib, NICM, prior pericardial effusion s/p window (10/2017 with Dr. Chew) and bilateral pleural effusions s/p thoracentesis (12/2017) presenting for followup pulmonary evaluation in the setting of dyspnea and fatigue. patient was last seen 7/23/2019.\par \par He has not had any hospitalizations since that time. He has not had recent cardiology followup. He has been most troubled by Urology complaints recently.\par \par He denies cough or sputum production. He notes dyspnea on exertion which has been progressive. He states that his weight has been generally stable. He remains on Lasix 20mg PO BID. He states adherence to medications. He now follows with Dr. Medina for his primary care. \par \par He has not required any subsequent thoracenteses. He remains on diuretics. He has not had any acute changes to his respiratory status but more a slow progressive deterioration. he denies any fevers, chills, or sick contacts. He denies any recent travels or vacations. He has been safe through the COVID pandemic and has not had COVID to his knowledge. He has received the COVID vaccines.\par \par His prior pleural effusion was exudative with negative cytology. He has mild orthopnea and uses 2 pillows at night which is stable.\par \par \par PFTs: 1/22/18 - FEV1 1.52L (55%), FVC 1.86L (50%), FEV1/FVC 82%, FEF 25-75 78%, TLC 68%, ERV 55%, RV/TLC 51%, DLCO 45% [Difficulty Maintaining Sleep] : difficulty maintaining sleep [Fatigue] : fatigue [Recent  Weight Gain] : no recent weight gain [Snoring] : snoring [Unusual Movements] : no unusual movements [Witnessed Apneas] : no witnessed apneas [TextBox_100] : 7/22/2015 [TextBox_108] : 0.8 [TextBox_112] : 0.3 [ESS] : 8

## 2022-03-18 NOTE — PHYSICAL EXAM
[No Acute Distress] : no acute distress [Well Nourished] : well nourished [Well Groomed] : well groomed [No Deformities] : no deformities [Well Developed] : well developed [Normal Oropharynx] : normal oropharynx [Normal Appearance] : normal appearance [Supple] : supple [No Neck Mass] : no neck mass [Normal Rate/Rhythm] : normal rate/rhythm [Normal S1, S2] : normal s1, s2 [No Resp Distress] : no resp distress [No Acc Muscle Use] : no acc muscle use [Normal Rhythm and Effort] : normal rhythm and effort [No Abnormalities] : no abnormalities [Benign] : benign [Soft] : soft [Normal Gait] : normal gait [No Clubbing] : no clubbing [No Cyanosis] : no cyanosis [FROM] : FROM [1+ Pitting] : 1+ pitting [Normal Color/ Pigmentation] : normal color/ pigmentation [No Focal Deficits] : no focal deficits [Oriented x3] : oriented x3 [Normal Affect] : normal affect [TextBox_11] : NCAT, EOMI, anicteric, MMM, nares clear, no thrush, trachea midline [TextBox_54] : LE edema bilaterally [TextBox_68] : decreased BS at right base, no wheeze or rhonchi

## 2022-03-18 NOTE — ASSESSMENT
[FreeTextEntry1] : 81M extensive medical history including HTN, HLD, DM2, prior Afib presenting for followup pulmonary evaluation in the setting of progressive dyspnea and fatigue. Patient was last seen 7/2019.\par \par On exam today he has small bilateral pleural effusions and bilateral LE edema consistent with volume overload.\par \par 1. Dyspnea and Fatigue\par - likely multifactorial due to underlying cardiac disease and deconditioning\par - patient has small bilateral pleural effusions and volume overload but this is unlikely to explain all his symptoms. I do think he would benefit from a short interval of increased diuretics and have recommended doubling his Lasix dose for 1 week.\par - PFTs and 6MWT when able\par - Check 2D echo and followup with cardiology\par - check BMP and pro-BNP\par \par 2. Pleural Effusions \par - small pleural effusions on POCUS - right > left\par - no role for thoracentesis at this time\par - increase diuresis for 1 week and then return to baseline dose\par - increase activity as able\par - referral for CXR provided to patient\par \par 3. Snoring and Fatigue\par - patient had last PSG in 2015 which did not have evidence of GABRIELLA\par - given persistent symptoms and worsening fatigue in conjunction with cardiac disease I have referred patient for another in lab PSG to evaluate for sleep disorder breathing\par \par 4. Health Maintenance\par Spirometry: Reviewed \par Smoking status: Nonsmoker\par Pneumococcal vaccination status: Not done - per patient\par COVID vaccination status: Completed with booster \par [place] Sleepiness Scale: 8 (3/17/22)\par \par 5. Followup in 2-3 months or sooner as needed\par - PMD: Dr. Rufino Medina and Dr. Sultana Medina - [phone], fax: [phone]\par \par \par The above plan was discussed with COLTON JARAMILLO  in detail. Patient verbalized understanding and agrees with plan as detailed above. Patient was provided education and counselling on current diagnosis/symptoms, diagnostic work up, treatment options and potential side effects of any prescribed therapy/therapies. COLTON  was advised to call our clinic at [phone] for any new or worsening symptoms, or with any questions or concerns. In case of acute onset of respiratory symptoms or worsening presentation, patient was advised to present to nearest emergency room for further evaluation. COLTON  expressed understanding and all questions/concerns were addressed.\par \par

## 2022-03-18 NOTE — PROCEDURE
[FreeTextEntry1] : Indication: Shortness of Breath, Pleural Effusion\par : Clemente Wells\par \par Findings: Small, simple bilateral pleural effusion. Normal diaphragm contour\par \par Impressions:\par Small bilateral pleural effusion - right > left\par no safe window for thoracentesis\par \par Images saved to [facility]

## 2022-04-13 ENCOUNTER — OUTPATIENT (OUTPATIENT)
Dept: OUTPATIENT SERVICES | Facility: HOSPITAL | Age: 82
LOS: 1 days | End: 2022-04-13
Payer: MEDICARE

## 2022-04-13 ENCOUNTER — APPOINTMENT (OUTPATIENT)
Dept: CV DIAGNOSITCS | Facility: HOSPITAL | Age: 82
End: 2022-04-13

## 2022-04-13 DIAGNOSIS — Z98.49 CATARACT EXTRACTION STATUS, UNSPECIFIED EYE: Chronic | ICD-10-CM

## 2022-04-13 DIAGNOSIS — Z98.890 OTHER SPECIFIED POSTPROCEDURAL STATES: Chronic | ICD-10-CM

## 2022-04-13 DIAGNOSIS — I50.22 CHRONIC SYSTOLIC (CONGESTIVE) HEART FAILURE: ICD-10-CM

## 2022-04-13 PROCEDURE — 93306 TTE W/DOPPLER COMPLETE: CPT | Mod: 26

## 2022-04-13 PROCEDURE — 93306 TTE W/DOPPLER COMPLETE: CPT

## 2022-04-14 ENCOUNTER — APPOINTMENT (OUTPATIENT)
Dept: UROLOGY | Facility: CLINIC | Age: 82
End: 2022-04-14
Payer: MEDICARE

## 2022-04-14 PROCEDURE — 99214 OFFICE O/P EST MOD 30 MIN: CPT

## 2022-04-14 RX ORDER — FINASTERIDE 5 MG/1
5 TABLET, FILM COATED ORAL DAILY
Qty: 90 | Refills: 3 | Status: COMPLETED | COMMUNITY
End: 2022-04-14

## 2022-04-14 RX ORDER — FOLIC ACID 1 MG/1
1 TABLET ORAL DAILY
Refills: 0 | Status: ACTIVE | COMMUNITY
Start: 2022-04-14

## 2022-04-14 RX ORDER — FUROSEMIDE 40 MG/1
40 TABLET ORAL DAILY
Refills: 5 | Status: COMPLETED | COMMUNITY
End: 2022-04-14

## 2022-04-14 NOTE — ASSESSMENT
[FreeTextEntry1] : 12/30/19: Patient presents today for a follow up. He reports waking up 2-3 times at night which is okay for him. He has been taking Finasteride and Tamsulosin which is working for him. He denies constipation or hematuria. \par \par 04/27/2021: Patient presents today for a follow up accompanied by his wife. Pt has been doing well. However, he reports that after he urinates he feels the urge to go again a few minutes later and voids a pretty large amount when he does. This incomplete emptying and double voiding has been going on for a couple of months, however the patient's wife said they were nervous to come out due to covid. Pt was on a high BP medication, Eplerenone, that was discontinued. He is currently taking tamsulosin 0.4 mg once daily after lunch, metformin 1000 mg BID, Doxazosin 4 mg once daily, Januvia 100 mg for diabetes once daily, hydralazine 50 mg 3x a day, Ramipril 5 mg once daily, furosemide 20 mg once daily, and Pravastatin 40 mg once daily for cholesterol. \par \par 05/18/2021: Patient presents today for a follow up accompanied by his wife. The pt had been experiencing urinary frequency, dysuria, and burning last week. He went to the emergency room on 5/13/2021 and a Ramírez catheter was placed. He currently has the catheter in and feels better. He was also given cephalexin 500 mg BID as an antibiotic. This medication has not affected his GI system. The patient has been taking tamsulosin 0.4 mg BID, doxazosin 8 mg at bedtime) as well as finasteride 5 mg once daily. \par \par 05/24/2021: Patient presents today for fill and pull voiding trial for urinary retention. Continues Tadalafil 5mg, Finasteride, and Tamsulosin BID. Has had UDS three years ago without issue. \par \par 06/16/2021: Patient presents today for urodynamics with cystoscopy. He tolerated the procedure well. There was some hematuria when the catheter was passed over the prostate. Patient continues to take doxazosin 8 mg once daily at bedtime, finasteride 5 mg once daily, tadalafil 5 mg once daily, and tamsulosin 0.4 mg BID after a meal for his prostate. He also takes Januvia for diabetes, baby aspirin, ramipril for BP, hydralazine for BP, folic acid, furosemide 20 mg once daily, METformin 1000 mg BID for diabetes and Pravastatin 40 mg for cholesterol. Last PSA from 12/30/2021 was 5.03. MRI of the prostate was satisfactory in December 2019 with a PIRADS-2 and revealed his prostate is 88 cc. PSA has been stable since then. \par \par 07/28/2021: The patient presents today for a post-operative visit. 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 Viktoria Jernigan on 7/20/2021. The procedure went very well and the patient currently has no pain. He no longer has a catheter. His wife reports that his urine still has a slightly pink tint. He has some post void leaking since surgery but his stream is stronger. Denies constipation and his appetite remains good. The patient has not been sexually active the past 3 months. \par \par 09/23/2021: Patient presents today for a follow up. The pt complains of mild stress incontinence as well as urinary urgency. He has been taking tadalafil 5 mg once daily. Erections are adequate. Denies constipation. \par \par 10/18/2021: Patient presents today for follow up. Last visit, patient was started on Trospium with improvement in urination. No longer having urinary urgency. Does have constipation and drinks prune juice. Continues Finasteride 5mg and Tadalafil 5mg once daily. Erections are good. Had laser enucleation of prostate with Dr. Jernigan which has helped his urination. Started seeing PCP Dr. Rufino Medina. \par \par Digital rectal exam found no suspicious rectal masses. No rectal mucosal lesions. Anal tone is normal. Prostate is somewhat flat. The prostate is non tender, with normal texture, discrete borders, and no nodules. It is a 30 gram transurethral resection size. No gross blood on examining fingers. \par \par I recommend patient continue taking Finasteride 5mg which I explained helps prevent prostate cancer. \par Continue Tadalafil 5mg once daily and Trospium 20mg once daily. \par \par Recommend taking prune juice or stool softener every day for constipation. \par \par The patient produced a urine sample which will be sent for urinalysis, urine cytology, and urine culture. \par \par RTO in 6 months for follow up or sooner if new urinary symptoms develop. \par \par Preparation, in-person office visit, and coordination of care took: 30 minutes

## 2022-04-14 NOTE — ADDENDUM
[FreeTextEntry1] : I, Gemma Schwarzin, acted solely as a scribe for Dr. Chris Sibley on this date 10/18/2021.\par \par All medical record entries made by the Scribe were at my, Dr. Chris Sibley, direction and personally dictated by me on 10/18/2021. I have reviewed the chart and agree that the record accurately reflects my personal performance of the history, physical exam, assessment and plan.  I have also personally directed, reviewed and agreed with the chart.

## 2022-04-14 NOTE — PHYSICAL EXAM
[General Appearance - Well Developed] : well developed [General Appearance - Well Nourished] : well nourished [Normal Appearance] : normal appearance [Well Groomed] : well groomed [General Appearance - In No Acute Distress] : no acute distress [Abdomen Soft] : soft [Abdomen Tenderness] : non-tender [Costovertebral Angle Tenderness] : no ~M costovertebral angle tenderness [Edema] : no peripheral edema [] : no respiratory distress [Respiration, Rhythm And Depth] : normal respiratory rhythm and effort [Exaggerated Use Of Accessory Muscles For Inspiration] : no accessory muscle use [Oriented To Time, Place, And Person] : oriented to person, place, and time [Affect] : the affect was normal [Mood] : the mood was normal [Not Anxious] : not anxious [Normal Station and Gait] : the gait and station were normal for the patient's age [No Focal Deficits] : no focal deficits [FreeTextEntry1] : Digital rectal exam found no suspicious rectal masses. No rectal mucosal lesions. Anal tone is normal. Prostate is somewhat flat. The prostate is non tender, with normal texture, discrete borders, and no nodules. It is a 30 gram transurethral resection size. No gross blood on examining fingers.

## 2022-04-14 NOTE — HISTORY OF PRESENT ILLNESS
[FreeTextEntry1] : Mr Gonzales is an 81 year old man followed for hydrocele, past abnormal urine cytology, and BPH. The patient had a hydrocelectomy in October of 2014. Fluid cytology 06/08/16 was abnormal. Fluid cytology 12/07/16 and 12/19/16 were negative for malignant cells. The patient takes finasteride 5 mg, oxybutynin, ER 10 mg, and doxazosin 8 mg once daily. Cystoscopy 12/19/16 found 2+ trabeculation of the bladder. The prostate protruded moderately into the bladder. No bladder stones or tumors. The left and right ureteral orifice visualized and were very close to prostate. I advised him to discontinue taking oxybutynin. On 11/10/17 the patient made 2500 cc of urine. The patient will now begin taking tamsulosin HCl 0.4 MG  one capsule one half hour after breakfast and doxazosin at night. He was able to urinate well over night without any pain. \par 12/10/18: The patient returned today and reported that his urination is satisfactory. Currently takes Finasteride, Tamsulosin and Doxazosin. Complains of intermittent stream and urge incontinence. Denies dysuria and gross hematuria.\par  6/10/19: Patient presents for 6 month follow up. Currently on Proscar and Doxazosin. States that he has no need to strain to initiate urination but flow is intermittent. Reports bothersome urinary urgency/frequency with occasional UUI. Nocturia. Regarding his anemia it is reported that it was evaluated by his PCP and he was told that it will be monitored but is nothing to worry about at this time. \par 6/26/19: Patient presents today for UDS and a cystoscopy. \par 7/3/19: Patient presents today for an ultrasound of the prostate. He is here today to discuss the results. His urination is the same as it was at his last visit. He wake up 4x a night to urinate. During the day he urinates 5-6x. He denies dysuria, gross hematuria, urgency or incontinence. Following the cystoscopy he noticed a small amount of blood and tiny clots but they have since resolved. He is satisfied with his urination at this time. \par 10/8/19: Patient presents today for a renal ultrasound and is here to discuss the results. Doxazosin, Finasteride and Tamsulosin were all prescribed previously. It is reported today that he has only been taking the Doxazosin and the Finasteride. His urination is still troublesome. Nocturia x 4-5 is reported. \par 10/15/19: Patient presents today for a follow up. His most recent PSA from 10/8/19 was 5.14 ng/mL a slight increase when compared to the last PSA. Finasteride is currently taken. When adjusting for the use of Finasteride his PSA is around 10 ng/mL. He is scheduled for surgery with Dr. Rolon next week. \par 11/20/19: Patient presents today for follow-up. He has completed surgery with Dr. Rolon and is improving well. He has finished his Sulfamethoxazole-Trimethoprim. His recent PSA on 11/12 was 5.15 ng/mL. He denies having a pacemaker. Patient has previous abnormal ultrasound and elevated PSA.\par \par 12/30/19: Patient presents today for a follow up. He reports waking up 2-3 times at night which is okay for him. He has been taking Finasteride and Tamsulosin which is working for him. He denies constipation or hematuria. \par \par 04/27/2021: Patient presents today for a follow up accompanied by his wife. Pt has been doing well. However, he reports that after he urinates he feels the urge to go again a few minutes later and voids a pretty large amount when he does. This incomplete emptying and double voiding has been going on for a couple of months, however the patient's wife said they were nervous to come out due to covid. Pt was on a high BP medication, Eplerenone, that was discontinued. He is currently taking tamsulosin 0.4 mg once daily after lunch, metformin BID, Doxazosin 4 mg once daily, Januvia 100 mg for diabetes once daily, hydralazine 50 mg 3x a day, Ramipril 5 mg once daily, furosemide 20 mg once daily, and Pravastatin 40 mg once daily for cholesterol. \par \par 05/18/2021: Patient presents today for a follow up accompanied by his wife. The pt had been experiencing urinary frequency, dysuria, and burning last week. He went to the emergency room on 5/13/2021 and a Ramírez catheter was placed. He currently has the catheter in and feels better. He was also given cephalexin 500 mg BID as an antibiotic. This medication has not affected his GI system. The patient has been taking tamsulosin 0.4 mg BID (increased from once daily) as well as finasteride 5 mg once daily. \par \par 05/24/2021: Patient presents today for fill and pull voiding trial for urinary retention. Continues Tadalafil 5mg, Finasteride, and Tamsulosin BID. Has had UDS three years ago without issue. \par \par 06/16/2021: Patient presents today for urodynamics with cystoscopy. He tolerated the procedure well. There was some hematuria when the catheter was passed over the prostate. Patient continues to take doxazosin 8 mg once daily at bedtime, finasteride 5 mg once daily, tadalafil 5 mg once daily, and tamsulosin 0.4 mg BID after a meal for his prostate. He also takes Januvia for diabetes, baby aspirin, ramipril for BP, hydralazine for BP, folic acid, furosemide 20 mg once daily, METformin 1000 mg BID for diabetes and Pravastatin 40 mg for cholesterol. Last PSA from 12/30/2021 was 5.03. MRI of the prostate was satisfactory in December 2019 with a PIRADS-2 and revealed his prostate is 88 cc. PSA has been stable since then. \par \par 07/28/2021: The patient presents today for a post-operative visit. 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 Viktoria Jernigan on 7/20/2021. The procedure went very well and the patient currently has no pain. He no longer has a catheter. His wife reports that his urine still has a slightly pink tint. He has some post void leaking since surgery but his stream is stronger. Denies constipation and his appetite remains good. The patient has not been sexually active the past 3 months. \par \par 09/23/2021: Patient presents today for a follow up. The pt complains of mild stress incontinence as well as urinary urgency. He has been taking tadalafil 5 mg once daily. Erections are adequate. Denies constipation. \par \par 10/18/2021: Patient presents today for follow up. Last visit, patient was started on Trospium with improvement in urination. No longer having urinary urgency. Does have constipation and drinks prune juice. Continues Finasteride 5mg and Tadalafil 5mg once daily. Erections are good. Had laser enucleation of prostate with Dr. Jernigan which has helped his urination. Started seeing PCP Dr. Rufino Medina.

## 2022-04-19 NOTE — LETTER BODY
[Dear  ___] : Dear  [unfilled], [Courtesy Letter:] : I had the pleasure of seeing your patient, [unfilled], in my office today. [Please see my note below.] : Please see my note below. [Consult Closing:] : Thank you very much for allowing me to participate in the care of this patient.  If you have any questions, please do not hesitate to contact me. [Sincerely,] : Sincerely, [FreeTextEntry2] : Dr. Rufino Medina\par [address] [place] [address]\par [place], [place] \par [id number] [FreeTextEntry3] : Dr. Chris Sibley MD, PhD

## 2022-04-19 NOTE — ASSESSMENT
[FreeTextEntry1] : 12/30/19: Patient presents today for a follow up. He reports waking up 2-3 times at night which is okay for him. He has been taking Finasteride and Tamsulosin which is working for him. He denies constipation or hematuria. \par \par 04/27/2021: Patient presents today for a follow up accompanied by his wife. Pt has been doing well. However, he reports that after he urinates he feels the urge to go again a few minutes later and voids a pretty large amount when he does. This incomplete emptying and double voiding has been going on for a couple of months, however the patient's wife said they were nervous to come out due to covid. Pt was on a high BP medication, Eplerenone, that was discontinued. He is currently taking tamsulosin 0.4 mg once daily after lunch, metformin 1000 mg BID, Doxazosin 4 mg once daily, Januvia 100 mg for diabetes once daily, hydralazine 50 mg 3x a day, Ramipril 5 mg once daily, furosemide 20 mg once daily, and Pravastatin 40 mg once daily for cholesterol. \par \par 05/18/2021: Patient presents today for a follow up accompanied by his wife. The pt had been experiencing urinary frequency, dysuria, and burning last week. He went to the emergency room on 5/13/2021 and a Ramírez catheter was placed. He currently has the catheter in and feels better. He was also given cephalexin 500 mg BID as an antibiotic. This medication has not affected his GI system. The patient has been taking tamsulosin 0.4 mg BID, doxazosin 8 mg at bedtime) as well as finasteride 5 mg once daily. \par \par 05/24/2021: Patient presents today for fill and pull voiding trial for urinary retention. Continues Tadalafil 5mg, Finasteride, and Tamsulosin BID. Has had UDS three years ago without issue. \par \par 06/16/2021: Patient presents today for urodynamics with cystoscopy. He tolerated the procedure well. There was some hematuria when the catheter was passed over the prostate. Patient continues to take doxazosin 8 mg once daily at bedtime, finasteride 5 mg once daily, tadalafil 5 mg once daily, and tamsulosin 0.4 mg BID after a meal for his prostate. He also takes Januvia for diabetes, baby aspirin, ramipril for BP, hydralazine for BP, folic acid, furosemide 20 mg once daily, METformin 1000 mg BID for diabetes and Pravastatin 40 mg for cholesterol. Last PSA from 12/30/2021 was 5.03. MRI of the prostate was satisfactory in December 2019 with a PIRADS-2 and revealed his prostate is 88 cc. PSA has been stable since then. \par \par 07/28/2021: The patient presents today for a post-operative visit. 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 Viktoria Jernigan on 7/20/2021. The procedure went very well and the patient currently has no pain. He no longer has a catheter. His wife reports that his urine still has a slightly pink tint. He has some post void leaking since surgery but his stream is stronger. Denies constipation and his appetite remains good. The patient has not been sexually active the past 3 months. \par \par 09/23/2021: Patient presents today for a follow up. The pt complains of mild stress incontinence as well as urinary urgency. He has been taking tadalafil 5 mg once daily. Erections are adequate. Denies constipation. \par \par 10/18/2021: Patient presents today for follow up. Last visit, patient was started on Trospium with improvement in urination. No longer having urinary urgency. Does have constipation and drinks prune juice. Continues Finasteride 5mg and Tadalafil 5mg once daily. Erections are good. Had laser enucleation of prostate with Dr. Jernigan which has helped his urination. Started seeing PCP Dr. Rufino Medina. \par \par 04/14/2022: Patient presents today for follow up. He was accompanied by his wife. Nocturia 3-4 times per night, especially when he takes the Furosemide late in the day. Pt occasionally has stress incontinence, but does not require a pad because it is miniscule amounts of urine. He recently saw a pulmonologist who increased his Lasix to 40 mg for 2 weeks, but he is taking 20 mg per day now. Denies gross hematuria. Nocturia twice per night. His wife believes he is losing weight and has less of an appetite.  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 Viktoria Jernigan on 7/20/2021. The patient takes Atenolol, Carvedilol, Finasteride, Furosemide, Januvia, Hydralazine, Metformin, Ramipril, folic acid. He occasionally takes Tadalafil. \par \par The patient produced a urine sample which will be sent for urinalysis, urine cytology, and urine culture. \par Blood work today included BMP, CBC, alkaline phosphatase, PSA, and testosterone. \par \par I recommend the patient discontinue taking the Finasteride now that he has had the the surgery. \par \par The patient will RTO in 6 months, sooner if he has any difficulties. \par \par Preparation, in-person office visit, and coordination of care took: 30 minutes

## 2022-04-19 NOTE — ADDENDUM
[FreeTextEntry1] : I, Jolie Valdez, acted solely as a scribe for Dr. Chris Sibley on this date 04/14/2022.\par \par All medical record entries made by the Scribe were at my, Dr. Chris Sibley, direction and personally dictated by me on 04/14/2022. I have reviewed the chart and agree that the record accurately reflects my personal performance of the history, physical exam, assessment and plan. I have also personally directed, reviewed and agreed with the chart.

## 2022-04-21 LAB
ALBUMIN SERPL ELPH-MCNC: 4.1 G/DL
ALBUMIN SERPL ELPH-MCNC: 4.1 G/DL
ALP BLD-CCNC: 54 U/L
ALP BLD-CCNC: 54 U/L
ALT SERPL-CCNC: 30 U/L
ALT SERPL-CCNC: 31 U/L
ANION GAP SERPL CALC-SCNC: 13 MMOL/L
ANION GAP SERPL CALC-SCNC: 13 MMOL/L
APPEARANCE: CLEAR
AST SERPL-CCNC: 29 U/L
AST SERPL-CCNC: 29 U/L
BACTERIA UR CULT: NORMAL
BACTERIA: NEGATIVE
BASOPHILS # BLD AUTO: 0.02 K/UL
BASOPHILS # BLD AUTO: 0.02 K/UL
BASOPHILS NFR BLD AUTO: 0.5 %
BASOPHILS NFR BLD AUTO: 0.5 %
BILIRUB SERPL-MCNC: 0.8 MG/DL
BILIRUB SERPL-MCNC: 0.8 MG/DL
BILIRUBIN URINE: NEGATIVE
BLOOD URINE: NEGATIVE
BUN SERPL-MCNC: 12 MG/DL
BUN SERPL-MCNC: 12 MG/DL
CALCIUM SERPL-MCNC: 9 MG/DL
CALCIUM SERPL-MCNC: 9 MG/DL
CHLORIDE SERPL-SCNC: 102 MMOL/L
CHLORIDE SERPL-SCNC: 103 MMOL/L
CO2 SERPL-SCNC: 23 MMOL/L
CO2 SERPL-SCNC: 23 MMOL/L
COLOR: YELLOW
CREAT SERPL-MCNC: 1.02 MG/DL
CREAT SERPL-MCNC: 1.03 MG/DL
EGFR: 73 ML/MIN/1.73M2
EGFR: 74 ML/MIN/1.73M2
EOSINOPHIL # BLD AUTO: 0.04 K/UL
EOSINOPHIL # BLD AUTO: 0.05 K/UL
EOSINOPHIL NFR BLD AUTO: 1 %
EOSINOPHIL NFR BLD AUTO: 1.2 %
ESTIMATED AVERAGE GLUCOSE: 180 MG/DL
ESTIMATED AVERAGE GLUCOSE: 180 MG/DL
GLUCOSE QUALITATIVE U: NEGATIVE
GLUCOSE SERPL-MCNC: 196 MG/DL
GLUCOSE SERPL-MCNC: 197 MG/DL
HBA1C MFR BLD HPLC: 7.9 %
HBA1C MFR BLD HPLC: 7.9 %
HCT VFR BLD CALC: 35.7 %
HCT VFR BLD CALC: 35.9 %
HGB BLD-MCNC: 11.5 G/DL
HGB BLD-MCNC: 11.6 G/DL
HYALINE CASTS: 0 /LPF
IMM GRANULOCYTES NFR BLD AUTO: 0.2 %
IMM GRANULOCYTES NFR BLD AUTO: 0.7 %
KETONES URINE: NEGATIVE
LEUKOCYTE ESTERASE URINE: NEGATIVE
LYMPHOCYTES # BLD AUTO: 0.79 K/UL
LYMPHOCYTES # BLD AUTO: 0.81 K/UL
LYMPHOCYTES NFR BLD AUTO: 19.1 %
LYMPHOCYTES NFR BLD AUTO: 19.7 %
MAN DIFF?: NORMAL
MAN DIFF?: NORMAL
MCHC RBC-ENTMCNC: 26.6 PG
MCHC RBC-ENTMCNC: 26.9 PG
MCHC RBC-ENTMCNC: 32 GM/DL
MCHC RBC-ENTMCNC: 32.5 GM/DL
MCV RBC AUTO: 82.8 FL
MCV RBC AUTO: 82.9 FL
MICROSCOPIC-UA: NORMAL
MONOCYTES # BLD AUTO: 0.54 K/UL
MONOCYTES # BLD AUTO: 0.54 K/UL
MONOCYTES NFR BLD AUTO: 13.1 %
MONOCYTES NFR BLD AUTO: 13.1 %
NEUTROPHILS # BLD AUTO: 2.68 K/UL
NEUTROPHILS # BLD AUTO: 2.71 K/UL
NEUTROPHILS NFR BLD AUTO: 65.3 %
NEUTROPHILS NFR BLD AUTO: 65.6 %
NITRITE URINE: NEGATIVE
PH URINE: 7
PLATELET # BLD AUTO: 165 K/UL
PLATELET # BLD AUTO: 172 K/UL
POTASSIUM SERPL-SCNC: 4.1 MMOL/L
POTASSIUM SERPL-SCNC: 4.1 MMOL/L
PROT SERPL-MCNC: 6.6 G/DL
PROT SERPL-MCNC: 6.6 G/DL
PROTEIN URINE: NEGATIVE
PSA SERPL-MCNC: 0.18 NG/ML
PSA SERPL-MCNC: 0.19 NG/ML
RBC # BLD: 4.31 M/UL
RBC # BLD: 4.33 M/UL
RBC # FLD: 15.6 %
RBC # FLD: 15.6 %
RED BLOOD CELLS URINE: 1 /HPF
SODIUM SERPL-SCNC: 138 MMOL/L
SODIUM SERPL-SCNC: 139 MMOL/L
SPECIFIC GRAVITY URINE: 1.01
SQUAMOUS EPITHELIAL CELLS: 0 /HPF
TESTOST FREE SERPL-MCNC: 6 PG/ML
TESTOST SERPL-MCNC: 222 NG/DL
TESTOST SERPL-MCNC: 236 NG/DL
URINE CYTOLOGY: NORMAL
UROBILINOGEN URINE: NORMAL
WBC # FLD AUTO: 4.11 K/UL
WBC # FLD AUTO: 4.13 K/UL
WHITE BLOOD CELLS URINE: 0 /HPF

## 2022-04-27 ENCOUNTER — NON-APPOINTMENT (OUTPATIENT)
Age: 82
End: 2022-04-27

## 2022-05-11 ENCOUNTER — APPOINTMENT (OUTPATIENT)
Dept: CARDIOLOGY | Facility: CLINIC | Age: 82
End: 2022-05-11
Payer: MEDICARE

## 2022-05-11 ENCOUNTER — NON-APPOINTMENT (OUTPATIENT)
Age: 82
End: 2022-05-11

## 2022-05-11 VITALS
SYSTOLIC BLOOD PRESSURE: 149 MMHG | HEART RATE: 75 BPM | BODY MASS INDEX: 25.77 KG/M2 | DIASTOLIC BLOOD PRESSURE: 83 MMHG | OXYGEN SATURATION: 98 % | HEIGHT: 70 IN | WEIGHT: 180 LBS

## 2022-05-11 PROCEDURE — 93000 ELECTROCARDIOGRAM COMPLETE: CPT

## 2022-05-11 PROCEDURE — 99214 OFFICE O/P EST MOD 30 MIN: CPT

## 2022-05-11 RX ORDER — TROSPIUM CHLORIDE 20 MG/1
20 TABLET, FILM COATED ORAL
Qty: 30 | Refills: 11 | Status: DISCONTINUED | COMMUNITY
Start: 2021-09-23 | End: 2022-05-11

## 2022-05-11 RX ORDER — FOLIC ACID 1 MG/1
1 TABLET ORAL
Refills: 0 | Status: DISCONTINUED | COMMUNITY
End: 2022-05-11

## 2022-05-11 RX ORDER — FUROSEMIDE 20 MG/1
20 TABLET ORAL TWICE DAILY
Refills: 0 | Status: ACTIVE | COMMUNITY
Start: 2022-03-17

## 2022-05-11 RX ORDER — ATENOLOL 25 MG/1
25 TABLET ORAL
Refills: 0 | Status: DISCONTINUED | COMMUNITY
End: 2022-05-11

## 2022-05-11 NOTE — HISTORY OF PRESENT ILLNESS
[FreeTextEntry1] : 81 year old with a history of atrial fibrillation last year.  No KEV, PND or orthopnea.  has not been walking more than 1 hour.  No chest pain.     No dyspnea.  Has been trying to follow a diet but doesn’t have much of an appetite

## 2022-05-11 NOTE — PHYSICAL EXAM
[General Appearance - Well Developed] : well developed [Normal Appearance] : normal appearance [Well Groomed] : well groomed [General Appearance - Well Nourished] : well nourished [No Deformities] : no deformities [General Appearance - In No Acute Distress] : no acute distress [Normal Conjunctiva] : the conjunctiva exhibited no abnormalities [Eyelids - No Xanthelasma] : the eyelids demonstrated no xanthelasmas [Normal Oral Mucosa] : normal oral mucosa [No Oral Pallor] : no oral pallor [No Oral Cyanosis] : no oral cyanosis [Normal Jugular Venous A Waves Present] : normal jugular venous A waves present [Normal Jugular Venous V Waves Present] : normal jugular venous V waves present [No Jugular Venous Burns A Waves] : no jugular venous burns A waves [Respiration, Rhythm And Depth] : normal respiratory rhythm and effort [Exaggerated Use Of Accessory Muscles For Inspiration] : no accessory muscle use [Heart Rate And Rhythm] : heart rate and rhythm were normal [Auscultation Breath Sounds / Voice Sounds] : lungs were clear to auscultation bilaterally [Abdomen Soft] : soft [Abdomen Tenderness] : non-tender [Abdomen Mass (___ Cm)] : no abdominal mass palpated [Abnormal Walk] : normal gait [Gait - Sufficient For Exercise Testing] : the gait was sufficient for exercise testing [Nail Clubbing] : no clubbing of the fingernails [Cyanosis, Localized] : no localized cyanosis [Petechial Hemorrhages (___cm)] : no petechial hemorrhages [] : no ischemic changes

## 2022-06-23 ENCOUNTER — APPOINTMENT (OUTPATIENT)
Dept: PULMONOLOGY | Facility: CLINIC | Age: 82
End: 2022-06-23
Payer: MEDICARE

## 2022-06-23 VITALS
OXYGEN SATURATION: 99 % | HEART RATE: 69 BPM | WEIGHT: 172 LBS | SYSTOLIC BLOOD PRESSURE: 122 MMHG | TEMPERATURE: 97.9 F | DIASTOLIC BLOOD PRESSURE: 68 MMHG | HEIGHT: 69 IN | BODY MASS INDEX: 25.48 KG/M2

## 2022-06-23 VITALS — RESPIRATION RATE: 15 BRPM | HEART RATE: 83 BPM | OXYGEN SATURATION: 98 %

## 2022-06-23 PROCEDURE — ZZZZZ: CPT

## 2022-06-23 PROCEDURE — 94618 PULMONARY STRESS TESTING: CPT

## 2022-06-23 PROCEDURE — 94726 PLETHYSMOGRAPHY LUNG VOLUMES: CPT

## 2022-06-23 PROCEDURE — 99214 OFFICE O/P EST MOD 30 MIN: CPT | Mod: 25

## 2022-06-23 PROCEDURE — 94010 BREATHING CAPACITY TEST: CPT

## 2022-06-23 PROCEDURE — 94729 DIFFUSING CAPACITY: CPT

## 2022-06-23 NOTE — HISTORY OF PRESENT ILLNESS
[Never] : never [Cough] : coughing [Wheezing] : wheezing [Nonspecific Pain, Swelling, And Stiffness] : chest pain [Fever] : fever [Nasal Passage Blockage (Stuffiness)] : edema [Difficulty Breathing During Exertion] : dyspnea on exertion [Feelings Of Weakness On Exertion] : exercise intolerance [0.5  -  Very, very slight] : 0.5, very, very slight [TextBox_4] : 81M extensive medical history including HTN, HLD, DM2, prior Afib, NICM, prior pericardial effusion s/p window (10/2017 with Dr. Chew) and bilateral pleural effusions s/p thoracentesis (12/2017) presenting for followup pulmonary evaluation.\par \par He is doing well since his last visit without any hospitalizations and tells me that overall his breathing feels improved and he is without any complaints.\par \par - Breathing much improved and without complaints\par - Remains on Lasix PO\par - Remains active\par - Not interested in proceeding with HST at this time\par \par PFTs: 6/23/22 - FEV1 2.15L (83%), FVC 2.73L (76%), FEV1/FVC 79%, FEF 25-75 98%, TLC 79%, ERV 40%, RV/TLC 40%, DLCO 76%\par PFTs: 1/22/18 - FEV1 1.52L (55%), FVC 1.86L (50%), FEV1/FVC 82%, FEF 25-75 78%, TLC 68%, ERV 55%, RV/TLC 51%, DLCO 45%\par \par 6MWT: 6/23/22 - 344 meters - no desaturation\par \par He denies cough or sputum production. He notes dyspnea on exertion which has been progressive. He states that his weight has been generally stable. He remains on Lasix 20mg PO BID. He states adherence to medications. He now follows with Dr. Medina for his primary care. \par \par He has not required any subsequent thoracenteses. He remains on diuretics. He has not had any acute changes to his respiratory status but more a slow progressive deterioration. he denies any fevers, chills, or sick contacts. He denies any recent travels or vacations. He has been safe through the COVID pandemic and has not had COVID to his knowledge. He has received the COVID vaccines.\par \par His prior pleural effusion was exudative with negative cytology. He has mild orthopnea and uses 2 pillows at night which is stable.\par \par \par  [Difficulty Maintaining Sleep] : difficulty maintaining sleep [Fatigue] : fatigue [Recent  Weight Gain] : no recent weight gain [Snoring] : snoring [Unusual Movements] : no unusual movements [Witnessed Apneas] : no witnessed apneas [TextBox_100] : 7/22/2015 [TextBox_108] : 0.8 [TextBox_112] : 0.3 [ESS] : 8

## 2022-06-23 NOTE — REVIEW OF SYSTEMS
[Fever] : no fever [Fatigue] : no fatigue [Recent Wt Gain (___ Lbs)] : ~T no recent weight gain [Chills] : no chills [Poor Appetite] : no poor appetite [Dry Eyes] : no dry eyes [Sore Throat] : no sore throat [Eye Irritation] : no eye irritation [Nasal Congestion] : no nasal congestion [Postnasal Drip] : no postnasal drip [Dry Mouth] : no dry mouth [Cough] : no cough [Hemoptysis] : no hemoptysis [Chest Tightness] : no chest tightness [Frequent URIs] : no frequent URIs [Sputum] : no sputum [Dyspnea] : no dyspnea [Wheezing] : no wheezing [SOB on Exertion] : no sob on exertion [Chest Discomfort] : no chest discomfort [Claudication] : no claudication [Edema] : no edema [Leg Cramps] : no leg cramps [Orthopnea] : no orthopnea [Hay Fever] : no hay fever [Watery Eyes] : no watery eyes [Itchy Eyes] : no itchy eyes [Seasonal Allergies] : no seasonal allergies [Abdominal Pain] : no abdominal pain [Vomiting] : no vomiting [Nocturia] : nocturia [Urgency] : no frequency [Dysuria] : no urgency [Arthralgias] : no arthralgias [Myalgias] : no myalgias [Chronic Pain] : no chronic pain [Rash] : no rash [Easy Bruising] : no easy bruising [Headache] : no headache [Focal Weakness] : no focal weakness [Seizures] : no seizures [Head Injury] : no head injury [Dizziness] : no dizziness [Anxiety] : no anxiety [Diabetes] : diabetes

## 2022-06-23 NOTE — ASSESSMENT
[FreeTextEntry1] : 81M extensive medical history including HTN, HLD, DM2, prior Afib presenting for followup pulmonary evaluation. He is doing well and without complaints\par \par 1. Shortness of breath - improved and without complaints today\par - continue diuretics as per cardiology - monitor daily weights\par - PFTs and 6MWT noted\par - Check 2D echo and followup with cardiology\par - Cardiology followup\par \par 2. Snoring and Fatigue\par - patient had last PSG in 2015 which did not have evidence of GABRIELLA\par - given persistent symptoms and worsening fatigue in conjunction with cardiac disease I have referred patient for another in lab PSG to evaluate for sleep disorder breathing however he is not interested in pursuing this at present\par \par 3. Health Maintenance\par Spirometry: Reviewed \par Smoking status: Nonsmoker\par Pneumococcal vaccination status: Not done - per patient. He is a candidate but has deferred\par COVID vaccination status: Completed with booster \par [place] Sleepiness Scale: 8 (3/17/22)\par \par 5. Followup in 6 months or sooner as needed\par - PMD: Dr. Rufino Medina and Dr. Sultana Medina - [phone], fax: [phone]\par \par \par The above plan was discussed with COLTON JARAMILLO  in detail. Patient verbalized understanding and agrees with plan as detailed above. Patient was provided education and counselling on current diagnosis/symptoms, diagnostic work up, treatment options and potential side effects of any prescribed therapy/therapies. COLTON  was advised to call our clinic at [phone] for any new or worsening symptoms, or with any questions or concerns. In case of acute onset of respiratory symptoms or worsening presentation, patient was advised to present to nearest emergency room for further evaluation. COLTON  expressed understanding and all questions/concerns were addressed.\par \par

## 2022-06-23 NOTE — PHYSICAL EXAM
[No Acute Distress] : no acute distress [Well Nourished] : well nourished [Well Groomed] : well groomed [No Deformities] : no deformities [Well Developed] : well developed [Normal Oropharynx] : normal oropharynx [Normal Appearance] : normal appearance [Supple] : supple [No Neck Mass] : no neck mass [Normal Rate/Rhythm] : normal rate/rhythm [Normal S1, S2] : normal s1, s2 [No Resp Distress] : no resp distress [No Acc Muscle Use] : no acc muscle use [Normal Rhythm and Effort] : normal rhythm and effort [Clear to Auscultation Bilaterally] : clear to auscultation bilaterally [No Abnormalities] : no abnormalities [Benign] : benign [Soft] : soft [Normal Gait] : normal gait [No Clubbing] : no clubbing [No Cyanosis] : no cyanosis [FROM] : FROM [Normal Color/ Pigmentation] : normal color/ pigmentation [No Focal Deficits] : no focal deficits [Oriented x3] : oriented x3 [Normal Affect] : normal affect [TextBox_11] : NCAT, EOMI, anicteric, MMM, nares clear, no thrush, trachea midline [TextBox_68] : good air entry, no wheeze or rhonchi

## 2022-07-25 NOTE — ASU PATIENT PROFILE, ADULT - MUTUALITY COMMENT, PROFILE
Quality 130: Documentation Of Current Medications In The Medical Record: Current Medications Documented Detail Level: Detailed pt will speak with the surgeon and the  anesthesiologist preop

## 2022-09-05 ENCOUNTER — INPATIENT (INPATIENT)
Facility: HOSPITAL | Age: 82
LOS: 10 days | Discharge: NURSING, REHAB OR LONG-TERM CARE | End: 2022-09-16
Attending: HOSPITALIST | Admitting: HOSPITALIST

## 2022-09-05 VITALS
HEART RATE: 89 BPM | SYSTOLIC BLOOD PRESSURE: 142 MMHG | HEIGHT: 70 IN | TEMPERATURE: 99 F | OXYGEN SATURATION: 100 % | DIASTOLIC BLOOD PRESSURE: 80 MMHG | RESPIRATION RATE: 18 BRPM

## 2022-09-05 DIAGNOSIS — Z98.890 OTHER SPECIFIED POSTPROCEDURAL STATES: Chronic | ICD-10-CM

## 2022-09-05 DIAGNOSIS — Z98.49 CATARACT EXTRACTION STATUS, UNSPECIFIED EYE: Chronic | ICD-10-CM

## 2022-09-05 DIAGNOSIS — R53.1 WEAKNESS: ICD-10-CM

## 2022-09-05 LAB
ALBUMIN SERPL ELPH-MCNC: 4.4 G/DL — SIGNIFICANT CHANGE UP (ref 3.3–5)
ALP SERPL-CCNC: 51 U/L — SIGNIFICANT CHANGE UP (ref 40–120)
ALT FLD-CCNC: 23 U/L — SIGNIFICANT CHANGE UP (ref 4–41)
ANION GAP SERPL CALC-SCNC: 11 MMOL/L — SIGNIFICANT CHANGE UP (ref 7–14)
APPEARANCE UR: CLEAR — SIGNIFICANT CHANGE UP
AST SERPL-CCNC: 33 U/L — SIGNIFICANT CHANGE UP (ref 4–40)
B PERT DNA SPEC QL NAA+PROBE: SIGNIFICANT CHANGE UP
B PERT+PARAPERT DNA PNL SPEC NAA+PROBE: SIGNIFICANT CHANGE UP
BASE EXCESS BLDV CALC-SCNC: -1.9 MMOL/L — SIGNIFICANT CHANGE UP (ref -2–3)
BASE EXCESS BLDV CALC-SCNC: 2.6 MMOL/L — SIGNIFICANT CHANGE UP (ref -2–3)
BASOPHILS # BLD AUTO: 0.02 K/UL — SIGNIFICANT CHANGE UP (ref 0–0.2)
BASOPHILS NFR BLD AUTO: 0.3 % — SIGNIFICANT CHANGE UP (ref 0–2)
BILIRUB SERPL-MCNC: 0.8 MG/DL — SIGNIFICANT CHANGE UP (ref 0.2–1.2)
BILIRUB UR-MCNC: NEGATIVE — SIGNIFICANT CHANGE UP
BLOOD GAS VENOUS COMPREHENSIVE RESULT: SIGNIFICANT CHANGE UP
BLOOD GAS VENOUS COMPREHENSIVE RESULT: SIGNIFICANT CHANGE UP
BORDETELLA PARAPERTUSSIS (RAPRVP): SIGNIFICANT CHANGE UP
BUN SERPL-MCNC: 9 MG/DL — SIGNIFICANT CHANGE UP (ref 7–23)
C PNEUM DNA SPEC QL NAA+PROBE: SIGNIFICANT CHANGE UP
CALCIUM SERPL-MCNC: 9.5 MG/DL — SIGNIFICANT CHANGE UP (ref 8.4–10.5)
CHLORIDE BLDV-SCNC: 101 MMOL/L — SIGNIFICANT CHANGE UP (ref 96–108)
CHLORIDE BLDV-SCNC: 99 MMOL/L — SIGNIFICANT CHANGE UP (ref 96–108)
CHLORIDE SERPL-SCNC: 97 MMOL/L — LOW (ref 98–107)
CO2 BLDV-SCNC: 25 MMOL/L — SIGNIFICANT CHANGE UP (ref 22–26)
CO2 BLDV-SCNC: 30.9 MMOL/L — HIGH (ref 22–26)
CO2 SERPL-SCNC: 24 MMOL/L — SIGNIFICANT CHANGE UP (ref 22–31)
COLOR SPEC: SIGNIFICANT CHANGE UP
CREAT SERPL-MCNC: 1.2 MG/DL — SIGNIFICANT CHANGE UP (ref 0.5–1.3)
DIFF PNL FLD: NEGATIVE — SIGNIFICANT CHANGE UP
EGFR: 61 ML/MIN/1.73M2 — SIGNIFICANT CHANGE UP
EOSINOPHIL # BLD AUTO: 0 K/UL — SIGNIFICANT CHANGE UP (ref 0–0.5)
EOSINOPHIL NFR BLD AUTO: 0 % — SIGNIFICANT CHANGE UP (ref 0–6)
FLUAV SUBTYP SPEC NAA+PROBE: SIGNIFICANT CHANGE UP
FLUBV RNA SPEC QL NAA+PROBE: SIGNIFICANT CHANGE UP
GAS PNL BLDV: 130 MMOL/L — LOW (ref 136–145)
GAS PNL BLDV: 132 MMOL/L — LOW (ref 136–145)
GLUCOSE BLDV-MCNC: 111 MG/DL — HIGH (ref 70–99)
GLUCOSE BLDV-MCNC: 116 MG/DL — HIGH (ref 70–99)
GLUCOSE SERPL-MCNC: 107 MG/DL — HIGH (ref 70–99)
GLUCOSE UR QL: NEGATIVE — SIGNIFICANT CHANGE UP
HADV DNA SPEC QL NAA+PROBE: SIGNIFICANT CHANGE UP
HCO3 BLDV-SCNC: 24 MMOL/L — SIGNIFICANT CHANGE UP (ref 22–29)
HCO3 BLDV-SCNC: 29 MMOL/L — SIGNIFICANT CHANGE UP (ref 22–29)
HCOV 229E RNA SPEC QL NAA+PROBE: SIGNIFICANT CHANGE UP
HCOV HKU1 RNA SPEC QL NAA+PROBE: SIGNIFICANT CHANGE UP
HCOV NL63 RNA SPEC QL NAA+PROBE: SIGNIFICANT CHANGE UP
HCOV OC43 RNA SPEC QL NAA+PROBE: SIGNIFICANT CHANGE UP
HCT VFR BLD CALC: 40.6 % — SIGNIFICANT CHANGE UP (ref 39–50)
HCT VFR BLDA CALC: 34 % — LOW (ref 39–51)
HCT VFR BLDA CALC: 39 % — SIGNIFICANT CHANGE UP (ref 39–51)
HGB BLD CALC-MCNC: 11.4 G/DL — LOW (ref 13–17)
HGB BLD CALC-MCNC: 13.1 G/DL — SIGNIFICANT CHANGE UP (ref 13–17)
HGB BLD-MCNC: 12.9 G/DL — LOW (ref 13–17)
HMPV RNA SPEC QL NAA+PROBE: SIGNIFICANT CHANGE UP
HPIV1 RNA SPEC QL NAA+PROBE: SIGNIFICANT CHANGE UP
HPIV2 RNA SPEC QL NAA+PROBE: SIGNIFICANT CHANGE UP
HPIV3 RNA SPEC QL NAA+PROBE: SIGNIFICANT CHANGE UP
HPIV4 RNA SPEC QL NAA+PROBE: SIGNIFICANT CHANGE UP
IANC: 3.76 K/UL — SIGNIFICANT CHANGE UP (ref 1.8–7.4)
IMM GRANULOCYTES NFR BLD AUTO: 0.4 % — SIGNIFICANT CHANGE UP (ref 0–1.5)
KETONES UR-MCNC: NEGATIVE — SIGNIFICANT CHANGE UP
LACTATE BLDV-MCNC: 1.2 MMOL/L — SIGNIFICANT CHANGE UP (ref 0.5–2)
LACTATE BLDV-MCNC: 2.4 MMOL/L — HIGH (ref 0.5–2)
LEUKOCYTE ESTERASE UR-ACNC: NEGATIVE — SIGNIFICANT CHANGE UP
LYMPHOCYTES # BLD AUTO: 2.14 K/UL — SIGNIFICANT CHANGE UP (ref 1–3.3)
LYMPHOCYTES # BLD AUTO: 32 % — SIGNIFICANT CHANGE UP (ref 13–44)
M PNEUMO DNA SPEC QL NAA+PROBE: SIGNIFICANT CHANGE UP
MCHC RBC-ENTMCNC: 25.5 PG — LOW (ref 27–34)
MCHC RBC-ENTMCNC: 31.8 GM/DL — LOW (ref 32–36)
MCV RBC AUTO: 80.4 FL — SIGNIFICANT CHANGE UP (ref 80–100)
MONOCYTES # BLD AUTO: 0.74 K/UL — SIGNIFICANT CHANGE UP (ref 0–0.9)
MONOCYTES NFR BLD AUTO: 11.1 % — SIGNIFICANT CHANGE UP (ref 2–14)
NEUTROPHILS # BLD AUTO: 3.76 K/UL — SIGNIFICANT CHANGE UP (ref 1.8–7.4)
NEUTROPHILS NFR BLD AUTO: 56.2 % — SIGNIFICANT CHANGE UP (ref 43–77)
NITRITE UR-MCNC: NEGATIVE — SIGNIFICANT CHANGE UP
NRBC # BLD: 0 /100 WBCS — SIGNIFICANT CHANGE UP (ref 0–0)
NRBC # FLD: 0 K/UL — SIGNIFICANT CHANGE UP (ref 0–0)
PCO2 BLDV: 43 MMHG — SIGNIFICANT CHANGE UP (ref 42–55)
PCO2 BLDV: 53 MMHG — SIGNIFICANT CHANGE UP (ref 42–55)
PH BLDV: 7.35 — SIGNIFICANT CHANGE UP (ref 7.32–7.43)
PH BLDV: 7.35 — SIGNIFICANT CHANGE UP (ref 7.32–7.43)
PH UR: 6 — SIGNIFICANT CHANGE UP (ref 5–8)
PLATELET # BLD AUTO: 173 K/UL — SIGNIFICANT CHANGE UP (ref 150–400)
PO2 BLDV: 40 MMHG — SIGNIFICANT CHANGE UP
PO2 BLDV: 80 MMHG — SIGNIFICANT CHANGE UP
POTASSIUM BLDV-SCNC: 3.4 MMOL/L — LOW (ref 3.5–5.1)
POTASSIUM BLDV-SCNC: 4 MMOL/L — SIGNIFICANT CHANGE UP (ref 3.5–5.1)
POTASSIUM SERPL-MCNC: 4.7 MMOL/L — SIGNIFICANT CHANGE UP (ref 3.5–5.3)
POTASSIUM SERPL-SCNC: 4.7 MMOL/L — SIGNIFICANT CHANGE UP (ref 3.5–5.3)
PROT SERPL-MCNC: 6.9 G/DL — SIGNIFICANT CHANGE UP (ref 6–8.3)
PROT UR-MCNC: ABNORMAL
RAPID RVP RESULT: SIGNIFICANT CHANGE UP
RBC # BLD: 5.05 M/UL — SIGNIFICANT CHANGE UP (ref 4.2–5.8)
RBC # FLD: 16.8 % — HIGH (ref 10.3–14.5)
RBC CASTS # UR COMP ASSIST: SIGNIFICANT CHANGE UP /HPF (ref 0–4)
RSV RNA SPEC QL NAA+PROBE: SIGNIFICANT CHANGE UP
RV+EV RNA SPEC QL NAA+PROBE: SIGNIFICANT CHANGE UP
SAO2 % BLDV: 60.9 % — SIGNIFICANT CHANGE UP
SAO2 % BLDV: 96.3 % — SIGNIFICANT CHANGE UP
SARS-COV-2 RNA SPEC QL NAA+PROBE: SIGNIFICANT CHANGE UP
SODIUM SERPL-SCNC: 132 MMOL/L — LOW (ref 135–145)
SP GR SPEC: 1.01 — SIGNIFICANT CHANGE UP (ref 1.01–1.05)
TROPONIN T, HIGH SENSITIVITY RESULT: 32 NG/L — SIGNIFICANT CHANGE UP
TROPONIN T, HIGH SENSITIVITY RESULT: 33 NG/L — SIGNIFICANT CHANGE UP
UROBILINOGEN FLD QL: SIGNIFICANT CHANGE UP
WBC # BLD: 6.69 K/UL — SIGNIFICANT CHANGE UP (ref 3.8–10.5)
WBC # FLD AUTO: 6.69 K/UL — SIGNIFICANT CHANGE UP (ref 3.8–10.5)
WBC UR QL: SIGNIFICANT CHANGE UP /HPF (ref 0–5)

## 2022-09-05 PROCEDURE — 93010 ELECTROCARDIOGRAM REPORT: CPT

## 2022-09-05 PROCEDURE — 71045 X-RAY EXAM CHEST 1 VIEW: CPT | Mod: 26

## 2022-09-05 PROCEDURE — 99285 EMERGENCY DEPT VISIT HI MDM: CPT | Mod: 25

## 2022-09-05 PROCEDURE — 70450 CT HEAD/BRAIN W/O DYE: CPT | Mod: 26,MA

## 2022-09-05 RX ORDER — SODIUM CHLORIDE 9 MG/ML
500 INJECTION INTRAMUSCULAR; INTRAVENOUS; SUBCUTANEOUS ONCE
Refills: 0 | Status: COMPLETED | OUTPATIENT
Start: 2022-09-05 | End: 2022-09-05

## 2022-09-05 RX ORDER — CEFTRIAXONE 500 MG/1
1000 INJECTION, POWDER, FOR SOLUTION INTRAMUSCULAR; INTRAVENOUS ONCE
Refills: 0 | Status: COMPLETED | OUTPATIENT
Start: 2022-09-05 | End: 2022-09-05

## 2022-09-05 RX ORDER — ACETAMINOPHEN 500 MG
975 TABLET ORAL ONCE
Refills: 0 | Status: COMPLETED | OUTPATIENT
Start: 2022-09-05 | End: 2022-09-05

## 2022-09-05 RX ADMIN — SODIUM CHLORIDE 500 MILLILITER(S): 9 INJECTION INTRAMUSCULAR; INTRAVENOUS; SUBCUTANEOUS at 19:53

## 2022-09-05 RX ADMIN — Medication 975 MILLIGRAM(S): at 19:53

## 2022-09-05 RX ADMIN — CEFTRIAXONE 100 MILLIGRAM(S): 500 INJECTION, POWDER, FOR SOLUTION INTRAMUSCULAR; INTRAVENOUS at 20:50

## 2022-09-05 NOTE — ED PROVIDER NOTE - ATTENDING CONTRIBUTION TO CARE
Attending Statement: I have personally seen and examined this patient. I have fully participated in the care of this patient. I have reviewed all pertinent clinical information, including history physical exam, plan and the Resident's note and agree except as noted  81yoM history of HTN, HLD, DM2, BPH, pericardial effusion (2017) from home w daughter co overall weakness x two days. Endorse generalized  weakness and unsteadiness, no focal weakness or numbness no cp/sob no cough no abdominal pain no n/v/d no dysuria no fall/trauma. Noted to have a temp 102 w chills last night. no sick contact no travel. no rash. no headache no neck pain  Took tylenol earlier this am. Vital signs noted. nontoxic male. EOMI. no facial asymmetry no slurred speech supple neck. no photophobia normal S1-S2 HR 89 No resp distress. able to speak in full and clear sentences. no wheeze, rales or stridor. pulse ox  soft nontender abdomen. no  rebound. no guarding. no sign of trauma. no CVAT no pedal edema. no calf tenderness. normal pulses bilateral feet. no rash plan labs, ekg, ua, cxr, rvp, ct head, re assess

## 2022-09-05 NOTE — ED PROVIDER NOTE - PHYSICAL EXAMINATION
General: WN/WD NAD  Head: Atraumatic, normocephalic  Eyes: EOM grossly in tact, no scleral icterus, no discharge  ENT: moist mucous membranes  Neurology: A&Ox 3, PERRL, CN grossly intact. 5/5 strength and normal sensation throughout all four extremities. No pronator drift. no facial asymmetry.  Respiratory: CTAB, no wheezing, normal respiratory effort  CV: RRR, good s1/s2, no S3, Extremities warm and well perfused  Abdominal: Soft, non-distended, non-tender, no masses  Extremities: No edema, no deformities  Skin: warm and dry. No rashes

## 2022-09-05 NOTE — ED ADULT NURSE NOTE - PAIN RATING/NUMBER SCALE (0-10): REST
Quality 265: Biopsy Follow-Up: Biopsy results reviewed, communicated, tracked, and documented
0
Detail Level: Simple

## 2022-09-05 NOTE — ED ADULT NURSE REASSESSMENT NOTE - NS ED NURSE REASSESS COMMENT FT1
Per tele tech Eduarda pt. having runs of vtach on cardiac monitor. Strip printed and presented to MD Ayoub. Pt. placed on zol for additional monitoring. Rpt. EKG performed and additional labs sent.

## 2022-09-05 NOTE — ED PROVIDER NOTE - NSICDXPASTMEDICALHX_GEN_ALL_CORE_FT
PAST MEDICAL HISTORY:  Afib on Aspirin    Anemia of chronic disease     BPH (benign prostatic hyperplasia)     BPH with urinary obstruction     Hernia, inguinal, right     HTN (hypertension)     Hyperlipidemia     GABRIELLA (obstructive sleep apnea) by symptoms    T2DM (type 2 diabetes mellitus) > 20 yrs

## 2022-09-05 NOTE — ED PROVIDER NOTE - WR ORDER ID 1
NST procedure and expected outcome explained to patient  Daily fetal kick count reviewed and emphasized  Patient verbalized understanding of all and was receptive      Michael Miranda RN [id number]

## 2022-09-05 NOTE — ED PROVIDER NOTE - OBJECTIVE STATEMENT
81 year old male with history of HTN, DM2, BPH, pericardial effusion (2017), on ASA presenting with L eye visual changes. Per pt and daughter collateral at bedside, since ~4pm yesterday has had double vision in L eye associated generalized weakness/unsteadiness, found to have fever to 102, took tylenol and nyquil. Denies dysarthria, difficulty walking, focal numbness, focal weakness, ab pain, n/v/d, dysuria. No prior known CVA. 81 year old male with history of HTN, HLD, DM2, BPH, pericardial effusion (2017), on ASA presenting with L eye visual changes. Per pt and daughter collateral at bedside, since ~4pm yesterday has had double vision in L eye with associated generalized weakness/unsteadiness since Friday, found to have fever to 102, took tylenol and nyquil. Denies dysarthria, difficulty walking, focal numbness, focal weakness, ab pain, n/v/d, dysuria. No prior known CVA. 81 year old male with history of HTN, HLD, DM2, BPH, pericardial effusion (2017), on ASA presenting with L eye visual changes. Per pt and daughter collateral at bedside, since ~4pm yesterday has had double vision in L eye with associated generalized weakness/unsteadiness since Friday, found to have fever to 102, took tylenol and nyquil. Denies dysarthria, difficulty walking, focal numbness, focal weakness, ab pain, n/v/d, dysuria. No prior known CVA.    PMD: Dr. Rufino Medina MD

## 2022-09-05 NOTE — ED PROVIDER NOTE - CLINICAL SUMMARY MEDICAL DECISION MAKING FREE TEXT BOX
81 year old male with history of HTN, HLD, DM2, BPH, pericardial effusion (2017), on ASA presenting with L eye visual changes. Differential diagnosis includes but is not limited to infection, COVID, CVA. Pt was febrile at home, appears warm here. Would get rectal temp, check labs, ecg, cultures, cxr, CTH. PT likely TBA given age and gait ataxia.

## 2022-09-05 NOTE — ED ADULT NURSE NOTE - OBJECTIVE STATEMENT
Pt received to rm 10 , awake and alert, A&OX4, ambulatory. Daughter at bedside, states pt has been more tired, weak, and lethargic since Friday. Also reporting decreased PO intake. Also reporting intermittent double vision and blurry vision. Respirations even and unlabored. Resting comfortably. Denies CP, SOB, N/V, HA, dizziness, palpitations. NAD.

## 2022-09-05 NOTE — ED PROVIDER NOTE - PROGRESS NOTE DETAILS
Gemma Mayen MD ([id number]) -  admitting hospitalist requesting neuro consult, neurology called and now at bedside

## 2022-09-05 NOTE — ED PROVIDER NOTE - PR
194 negative... Same Histology In Subsequent Stages Text: The pattern and morphology of the tumor is as described in the first stage.

## 2022-09-05 NOTE — ED ADULT TRIAGE NOTE - CHIEF COMPLAINT QUOTE
Worsening fatigue, vision changes to the left eye and loss of balance with subjective fevers x since yesterday about 4pm. PMH HTN, DMII, cataracts

## 2022-09-06 DIAGNOSIS — I42.8 OTHER CARDIOMYOPATHIES: ICD-10-CM

## 2022-09-06 DIAGNOSIS — I10 ESSENTIAL (PRIMARY) HYPERTENSION: ICD-10-CM

## 2022-09-06 DIAGNOSIS — R50.9 FEVER, UNSPECIFIED: ICD-10-CM

## 2022-09-06 DIAGNOSIS — H53.8 OTHER VISUAL DISTURBANCES: ICD-10-CM

## 2022-09-06 DIAGNOSIS — I48.91 UNSPECIFIED ATRIAL FIBRILLATION: ICD-10-CM

## 2022-09-06 DIAGNOSIS — E11.9 TYPE 2 DIABETES MELLITUS WITHOUT COMPLICATIONS: ICD-10-CM

## 2022-09-06 DIAGNOSIS — Z29.9 ENCOUNTER FOR PROPHYLACTIC MEASURES, UNSPECIFIED: ICD-10-CM

## 2022-09-06 DIAGNOSIS — N40.0 BENIGN PROSTATIC HYPERPLASIA WITHOUT LOWER URINARY TRACT SYMPTOMS: ICD-10-CM

## 2022-09-06 LAB
A1C WITH ESTIMATED AVERAGE GLUCOSE RESULT: 7.2 % — HIGH (ref 4–5.6)
ANION GAP SERPL CALC-SCNC: 11 MMOL/L — SIGNIFICANT CHANGE UP (ref 7–14)
BUN SERPL-MCNC: 9 MG/DL — SIGNIFICANT CHANGE UP (ref 7–23)
CALCIUM SERPL-MCNC: 8.9 MG/DL — SIGNIFICANT CHANGE UP (ref 8.4–10.5)
CHLORIDE SERPL-SCNC: 100 MMOL/L — SIGNIFICANT CHANGE UP (ref 98–107)
CHOLEST SERPL-MCNC: 114 MG/DL — SIGNIFICANT CHANGE UP
CO2 SERPL-SCNC: 25 MMOL/L — SIGNIFICANT CHANGE UP (ref 22–31)
CREAT SERPL-MCNC: 1.07 MG/DL — SIGNIFICANT CHANGE UP (ref 0.5–1.3)
CRP SERPL-MCNC: <3 MG/L — SIGNIFICANT CHANGE UP
EGFR: 70 ML/MIN/1.73M2 — SIGNIFICANT CHANGE UP
ERYTHROCYTE [SEDIMENTATION RATE] IN BLOOD: 6 MM/HR — SIGNIFICANT CHANGE UP (ref 1–15)
ESTIMATED AVERAGE GLUCOSE: 160 — SIGNIFICANT CHANGE UP
GLUCOSE BLDC GLUCOMTR-MCNC: 115 MG/DL — HIGH (ref 70–99)
GLUCOSE BLDC GLUCOMTR-MCNC: 148 MG/DL — HIGH (ref 70–99)
GLUCOSE BLDC GLUCOMTR-MCNC: 177 MG/DL — HIGH (ref 70–99)
GLUCOSE BLDC GLUCOMTR-MCNC: 216 MG/DL — HIGH (ref 70–99)
GLUCOSE SERPL-MCNC: 143 MG/DL — HIGH (ref 70–99)
HCT VFR BLD CALC: 37.8 % — LOW (ref 39–50)
HDLC SERPL-MCNC: 30 MG/DL — LOW
HGB BLD-MCNC: 12.1 G/DL — LOW (ref 13–17)
LIPID PNL WITH DIRECT LDL SERPL: 70 MG/DL — SIGNIFICANT CHANGE UP
MAGNESIUM SERPL-MCNC: 1.7 MG/DL — SIGNIFICANT CHANGE UP (ref 1.6–2.6)
MCHC RBC-ENTMCNC: 25.6 PG — LOW (ref 27–34)
MCHC RBC-ENTMCNC: 32 GM/DL — SIGNIFICANT CHANGE UP (ref 32–36)
MCV RBC AUTO: 80.1 FL — SIGNIFICANT CHANGE UP (ref 80–100)
NON HDL CHOLESTEROL: 84 MG/DL — SIGNIFICANT CHANGE UP
NRBC # BLD: 0 /100 WBCS — SIGNIFICANT CHANGE UP (ref 0–0)
NRBC # FLD: 0 K/UL — SIGNIFICANT CHANGE UP (ref 0–0)
PHOSPHATE SERPL-MCNC: 2.6 MG/DL — SIGNIFICANT CHANGE UP (ref 2.5–4.5)
PLATELET # BLD AUTO: 159 K/UL — SIGNIFICANT CHANGE UP (ref 150–400)
POTASSIUM SERPL-MCNC: 4.3 MMOL/L — SIGNIFICANT CHANGE UP (ref 3.5–5.3)
POTASSIUM SERPL-SCNC: 4.3 MMOL/L — SIGNIFICANT CHANGE UP (ref 3.5–5.3)
RBC # BLD: 4.72 M/UL — SIGNIFICANT CHANGE UP (ref 4.2–5.8)
RBC # FLD: 16.6 % — HIGH (ref 10.3–14.5)
SODIUM SERPL-SCNC: 136 MMOL/L — SIGNIFICANT CHANGE UP (ref 135–145)
TRIGL SERPL-MCNC: 71 MG/DL — SIGNIFICANT CHANGE UP
WBC # BLD: 5.67 K/UL — SIGNIFICANT CHANGE UP (ref 3.8–10.5)
WBC # FLD AUTO: 5.67 K/UL — SIGNIFICANT CHANGE UP (ref 3.8–10.5)

## 2022-09-06 PROCEDURE — 71260 CT THORAX DX C+: CPT | Mod: 26

## 2022-09-06 PROCEDURE — 70496 CT ANGIOGRAPHY HEAD: CPT | Mod: 26

## 2022-09-06 PROCEDURE — 74177 CT ABD & PELVIS W/CONTRAST: CPT | Mod: 26

## 2022-09-06 PROCEDURE — 99221 1ST HOSP IP/OBS SF/LOW 40: CPT

## 2022-09-06 PROCEDURE — 99223 1ST HOSP IP/OBS HIGH 75: CPT

## 2022-09-06 PROCEDURE — 70498 CT ANGIOGRAPHY NECK: CPT | Mod: 26

## 2022-09-06 PROCEDURE — 12345: CPT | Mod: NC,GC

## 2022-09-06 RX ORDER — LANOLIN ALCOHOL/MO/W.PET/CERES
1 CREAM (GRAM) TOPICAL
Qty: 0 | Refills: 0 | DISCHARGE

## 2022-09-06 RX ORDER — AZITHROMYCIN 500 MG/1
TABLET, FILM COATED ORAL
Refills: 0 | Status: DISCONTINUED | OUTPATIENT
Start: 2022-09-06 | End: 2022-09-07

## 2022-09-06 RX ORDER — CARVEDILOL PHOSPHATE 80 MG/1
12.5 CAPSULE, EXTENDED RELEASE ORAL EVERY 12 HOURS
Refills: 0 | Status: DISCONTINUED | OUTPATIENT
Start: 2022-09-06 | End: 2022-09-16

## 2022-09-06 RX ORDER — TADALAFIL 10 MG/1
1 TABLET, FILM COATED ORAL
Qty: 0 | Refills: 0 | DISCHARGE

## 2022-09-06 RX ORDER — INSULIN LISPRO 100/ML
VIAL (ML) SUBCUTANEOUS
Refills: 0 | Status: DISCONTINUED | OUTPATIENT
Start: 2022-09-06 | End: 2022-09-16

## 2022-09-06 RX ORDER — INFLUENZA VIRUS VACCINE 15; 15; 15; 15 UG/.5ML; UG/.5ML; UG/.5ML; UG/.5ML
0.7 SUSPENSION INTRAMUSCULAR ONCE
Refills: 0 | Status: COMPLETED | OUTPATIENT
Start: 2022-09-06 | End: 2022-09-06

## 2022-09-06 RX ORDER — DEXTROSE 50 % IN WATER 50 %
12.5 SYRINGE (ML) INTRAVENOUS ONCE
Refills: 0 | Status: DISCONTINUED | OUTPATIENT
Start: 2022-09-06 | End: 2022-09-16

## 2022-09-06 RX ORDER — CEFTRIAXONE 500 MG/1
INJECTION, POWDER, FOR SOLUTION INTRAMUSCULAR; INTRAVENOUS
Refills: 0 | Status: DISCONTINUED | OUTPATIENT
Start: 2022-09-06 | End: 2022-09-07

## 2022-09-06 RX ORDER — DEXTROSE 50 % IN WATER 50 %
15 SYRINGE (ML) INTRAVENOUS ONCE
Refills: 0 | Status: DISCONTINUED | OUTPATIENT
Start: 2022-09-06 | End: 2022-09-16

## 2022-09-06 RX ORDER — CEFTRIAXONE 500 MG/1
1000 INJECTION, POWDER, FOR SOLUTION INTRAMUSCULAR; INTRAVENOUS ONCE
Refills: 0 | Status: COMPLETED | OUTPATIENT
Start: 2022-09-06 | End: 2022-09-06

## 2022-09-06 RX ORDER — AZITHROMYCIN 500 MG/1
500 TABLET, FILM COATED ORAL EVERY 24 HOURS
Refills: 0 | Status: DISCONTINUED | OUTPATIENT
Start: 2022-09-07 | End: 2022-09-07

## 2022-09-06 RX ORDER — AZITHROMYCIN 500 MG/1
500 TABLET, FILM COATED ORAL ONCE
Refills: 0 | Status: COMPLETED | OUTPATIENT
Start: 2022-09-06 | End: 2022-09-06

## 2022-09-06 RX ORDER — LANOLIN ALCOHOL/MO/W.PET/CERES
3 CREAM (GRAM) TOPICAL AT BEDTIME
Refills: 0 | Status: DISCONTINUED | OUTPATIENT
Start: 2022-09-06 | End: 2022-09-16

## 2022-09-06 RX ORDER — FUROSEMIDE 40 MG
20 TABLET ORAL DAILY
Refills: 0 | Status: DISCONTINUED | OUTPATIENT
Start: 2022-09-06 | End: 2022-09-16

## 2022-09-06 RX ORDER — ASPIRIN/CALCIUM CARB/MAGNESIUM 324 MG
81 TABLET ORAL DAILY
Refills: 0 | Status: DISCONTINUED | OUTPATIENT
Start: 2022-09-06 | End: 2022-09-16

## 2022-09-06 RX ORDER — CEFTRIAXONE 500 MG/1
1000 INJECTION, POWDER, FOR SOLUTION INTRAMUSCULAR; INTRAVENOUS EVERY 24 HOURS
Refills: 0 | Status: DISCONTINUED | OUTPATIENT
Start: 2022-09-07 | End: 2022-09-07

## 2022-09-06 RX ORDER — SODIUM CHLORIDE 9 MG/ML
1000 INJECTION, SOLUTION INTRAVENOUS
Refills: 0 | Status: DISCONTINUED | OUTPATIENT
Start: 2022-09-06 | End: 2022-09-16

## 2022-09-06 RX ORDER — ENOXAPARIN SODIUM 100 MG/ML
40 INJECTION SUBCUTANEOUS EVERY 24 HOURS
Refills: 0 | Status: DISCONTINUED | OUTPATIENT
Start: 2022-09-06 | End: 2022-09-08

## 2022-09-06 RX ORDER — FOLIC ACID 0.8 MG
1 TABLET ORAL DAILY
Refills: 0 | Status: DISCONTINUED | OUTPATIENT
Start: 2022-09-06 | End: 2022-09-16

## 2022-09-06 RX ORDER — INSULIN LISPRO 100/ML
VIAL (ML) SUBCUTANEOUS AT BEDTIME
Refills: 0 | Status: DISCONTINUED | OUTPATIENT
Start: 2022-09-06 | End: 2022-09-16

## 2022-09-06 RX ORDER — GLUCAGON INJECTION, SOLUTION 0.5 MG/.1ML
1 INJECTION, SOLUTION SUBCUTANEOUS ONCE
Refills: 0 | Status: DISCONTINUED | OUTPATIENT
Start: 2022-09-06 | End: 2022-09-16

## 2022-09-06 RX ORDER — FINASTERIDE 5 MG/1
1 TABLET, FILM COATED ORAL
Qty: 0 | Refills: 0 | DISCHARGE

## 2022-09-06 RX ORDER — DOXAZOSIN MESYLATE 4 MG
1 TABLET ORAL
Qty: 0 | Refills: 0 | DISCHARGE

## 2022-09-06 RX ORDER — DEXTROSE 50 % IN WATER 50 %
25 SYRINGE (ML) INTRAVENOUS ONCE
Refills: 0 | Status: DISCONTINUED | OUTPATIENT
Start: 2022-09-06 | End: 2022-09-16

## 2022-09-06 RX ORDER — ACETAMINOPHEN 500 MG
650 TABLET ORAL EVERY 6 HOURS
Refills: 0 | Status: DISCONTINUED | OUTPATIENT
Start: 2022-09-06 | End: 2022-09-16

## 2022-09-06 RX ORDER — LISINOPRIL 2.5 MG/1
20 TABLET ORAL DAILY
Refills: 0 | Status: DISCONTINUED | OUTPATIENT
Start: 2022-09-06 | End: 2022-09-16

## 2022-09-06 RX ORDER — HYDRALAZINE HCL 50 MG
50 TABLET ORAL THREE TIMES A DAY
Refills: 0 | Status: DISCONTINUED | OUTPATIENT
Start: 2022-09-06 | End: 2022-09-16

## 2022-09-06 RX ADMIN — Medication 650 MILLIGRAM(S): at 09:58

## 2022-09-06 RX ADMIN — CEFTRIAXONE 100 MILLIGRAM(S): 500 INJECTION, POWDER, FOR SOLUTION INTRAMUSCULAR; INTRAVENOUS at 11:09

## 2022-09-06 RX ADMIN — Medication 20 MILLIGRAM(S): at 05:23

## 2022-09-06 RX ADMIN — Medication 650 MILLIGRAM(S): at 10:45

## 2022-09-06 RX ADMIN — Medication 50 MILLIGRAM(S): at 13:23

## 2022-09-06 RX ADMIN — Medication 1 MILLIGRAM(S): at 11:11

## 2022-09-06 RX ADMIN — CARVEDILOL PHOSPHATE 12.5 MILLIGRAM(S): 80 CAPSULE, EXTENDED RELEASE ORAL at 17:21

## 2022-09-06 RX ADMIN — AZITHROMYCIN 255 MILLIGRAM(S): 500 TABLET, FILM COATED ORAL at 11:09

## 2022-09-06 RX ADMIN — LISINOPRIL 20 MILLIGRAM(S): 2.5 TABLET ORAL at 05:22

## 2022-09-06 RX ADMIN — Medication 50 MILLIGRAM(S): at 05:22

## 2022-09-06 RX ADMIN — Medication 50 MILLIGRAM(S): at 22:00

## 2022-09-06 RX ADMIN — CARVEDILOL PHOSPHATE 12.5 MILLIGRAM(S): 80 CAPSULE, EXTENDED RELEASE ORAL at 05:23

## 2022-09-06 RX ADMIN — Medication 81 MILLIGRAM(S): at 11:10

## 2022-09-06 RX ADMIN — Medication 2: at 13:06

## 2022-09-06 NOTE — H&P ADULT - PROBLEM SELECTOR PLAN 5
Detail Level: Zone Detail Level: Simple Detail Level: Generalized Patient w/ hx HFrEF (EF 40%) w/ BiV dysfunction. On telemetry patient w/ occasional runs of Vtach.   - Will repeat TTE  - Lasix 20 mg QD

## 2022-09-06 NOTE — H&P ADULT - ASSESSMENT
81 year old male with history of HTN, HLD, DM2, NICM, BPH, pericardial effusion (2017), on ASA presenting for fevers of unknown etiology and new L eye vision impairment.

## 2022-09-06 NOTE — PHYSICAL THERAPY INITIAL EVALUATION ADULT - ADDITIONAL COMMENTS
Patient lives with his family in private home, + steps to negotiate. Patient was independent in all ADL prior to admission. Patient was not using an assistive device prior to admission.

## 2022-09-06 NOTE — PROGRESS NOTE ADULT - PROBLEM SELECTOR PLAN 2
Few day hx of new L eye blurry vision. Eye exam unremarkable. CTH negative.  - MR Head w/wo contrast  - CTA Head and Neck  - Telemetry   - Optho c/s in AM   - Neuro recs appreciated Few day hx of new L eye blurry vision. Eye exam unremarkable. CTH negative.  - f/u MR Head w/wo contrast; CTA Head and Neck with contrast  - telemetry   - appreciate ophtho and neuro recs

## 2022-09-06 NOTE — H&P ADULT - NSHPPHYSICALEXAM_GEN_ALL_CORE
VITALS:   T(C): 37.2 (09-05-22 @ 23:21), Max: 38.8 (09-05-22 @ 19:52)  HR: 90 (09-05-22 @ 23:21) (86 - 90)  BP: 152/92 (09-05-22 @ 23:21) (142/80 - 158/94)  RR: 18 (09-05-22 @ 23:21) (18 - 20)  SpO2: 100% (09-05-22 @ 23:21) (100% - 100%)    GENERAL: NAD, lying in bed comfortably  HEAD:  Atraumatic, Normocephalic  EYES: EOMI, PERRLA, post-operative changes from prior cataract surgery appreciated, poor central and peripheral vision on L eye  ENT: Moist mucous membranes  NECK: Supple, No JVD  CHEST/LUNG: CTABL; No rales, rhonchi, wheezing, or rubs. Unlabored respirations  HEART: RRR. No M/R/G  ABDOMEN: Soft, nontender, non-distended, normoactive BS. No hepatomegaly  EXTREMITIES:  2+ Peripheral Pulses, brisk capillary refill. No clubbing, cyanosis, or edema  NERVOUS SYSTEM:  Alert & Oriented X3, speech clear. No deficits, CN II-XII intact. Normal sensation   MSK: FROM all 4 extremities, full and equal strength  PSYCH: Normal affect, normal speech, normal behavior  SKIN: No rashes or lesions

## 2022-09-06 NOTE — H&P ADULT - PROBLEM SELECTOR PLAN 2
Few day hx of new L eye blurry vision. Eye exam unremarkable. CTH negative.  - MR Head w/wo contrast  - CTA Head and Neck  - Telemetry   - Optho c/s in AM   - Neuro recs appreciated

## 2022-09-06 NOTE — PROGRESS NOTE ADULT - PROBLEM SELECTOR PLAN 4
Hx DM2 on Januvia and metformin  - SSI   -A1c    #Cr elevation:  Baseline 1.05-1.2. On Admission SCr 1.2 Patient w/ hx HTN on coreg, hydral, ramipril   - Coreg 12.5 BID  - Hydralazine 50 mg QD  - Ramipril as Lisinopril 5 mg QD

## 2022-09-06 NOTE — PHYSICAL THERAPY INITIAL EVALUATION ADULT - IMPAIRMENTS FOUND, PT EVAL
gait, locomotion, and balance/muscle strength Continue Cosopt and Zioptan; patient will need to have family bring Zioptan Continue Cosopt and Zioptan; patient will need to have family bring Zioptan Continue Cosopt and Zioptan; patient will need to have family bring Zioptan

## 2022-09-06 NOTE — CONSULT NOTE ADULT - ASSESSMENT
81 year old male with history of HTN, HLD, DM2, NICM, BPH, pericardial effusion (2017), on ASA presenting for increased weakness, unsteadiness and fevers. Ophthalmology consulted for left eye blurry vision and diplopia.    #Vision loss and diplopia, left eye  #Left eye dislocated intra-ocular lens  -Pt with acute worsening vision loss and questionable monocular diplopia  -VA 20/200 in the left eye, no RAPD, IOP wnl, EOM full OU  -Left eye intra ocular lens appears significantly dislocated with the lens itself outside of the visual axis - causing decreased VA.   -On exam, no clear signs of intra-ocular infection; no anterior chamber cell or hypopyon, no injection or pain. Small amount of vitreous cell/RBC could be from old vitreous hemorrhage or his dislocated lens  -Pt did have a fever and weight loss (could be explained by suspected lung malignancy?) however rest of ROS for GCA is negative with normal ESR and CRP; low suspicion  -Both decline in VA and monocular diplopia could be explained by his dislocated lens  -Will reach out to outpt ophthalmologist for additional history.  -Ophthalmology will follow    Pt seen and discussed with Dr. Riojas    Outpatient follow-up: Patient should follow-up with his/her ophthalmologist or with [facility] Department of Ophthalmology upon discharge at the address below     [facility] Department of Ophthalmology  [address] [place] [address]. [address]  [place] [address]  [phone] 81 year old male with history of HTN, HLD, DM2, NICM, BPH, pericardial effusion (2017), on ASA presenting for increased weakness, unsteadiness and fevers. Ophthalmology consulted for left eye blurry vision and diplopia.    #Vision loss and diplopia, left eye  #Left eye dislocated intra-ocular lens  -Pt with acute worsening vision loss and questionable monocular diplopia  -VA 20/200 in the left eye, no RAPD, IOP wnl, EOM full OU  -Left eye intra ocular lens appears significantly dislocated with the lens itself outside of the visual axis - likely causing decreased VA.   -On exam, no clear signs of intra-ocular infection; no anterior chamber cell or hypopyon, no injection or pain. Small amount of vitreous cell could be from old vitreous hemorrhage or his dislocated lens.  Will reach out to outpatient ophtho to inquire more about ocular history and if cell is old or new.  -Pt did have a fever and weight loss (could be explained by suspected lung malignancy?) however rest of ROS for GCA is negative with normal ESR and CRP; low suspicion  -Both decline in VA and monocular diplopia could be explained by his dislocated lens  -Will reach out to outpt ophthalmologist for additional history.  -Ophthalmology will follow  - findings and plan discussed with patient and primary team    Pt seen and discussed with Dr. Riojas    Outpatient follow-up: Patient should follow-up with his/her ophthalmologist or with [facility] Department of Ophthalmology upon discharge at the address below within 1-2 days of discharge, sooner if symptoms worsen or change    [facility] Department of Ophthalmology  [address]. [address]  [place] [address]  [phone]

## 2022-09-06 NOTE — PROGRESS NOTE ADULT - PROBLEM SELECTOR PLAN 3
Patient w/ hx HTN on coreg, hydral, ramipril   - Coreg 12.5 BID  - Hydralazine 50 mg QD  - Ramipril as Lisinopril 5 mg QD - [id number]  - was on Xarelto as of 2017 per note 12/2017. Per wife, Xarelto was at some point stopped by a doctor; patient/wife do not recall side effects/bleeding while on Xarelto, and pedron has been on only ASA recently  - per H&P note 5/22/22 was on Eliquis 5 mg po bid, last taken 5/19/19  - consider restarting home Xarelto pending further discussion with patient/family (currently declining) and care team - [id number]  - was on Xarelto as of 2017 per note 12/2017. Per wife, Xarelto was at some point stopped by a doctor; patient/wife do not recall side effects/bleeding while on Xarelto, and pedron has been on only ASA recently  - per H&P note 5/22/19 was on Eliquis 5 mg po bid, last taken 5/19/19  - consider restarting home Xarelto pending further discussion with patient/family (currently declining) and care team

## 2022-09-06 NOTE — CONSULT NOTE ADULT - ATTENDING COMMENTS
20 lb wt. loss over a year.  No headache, no jaw claudication, no scalp tenderness, no muscles aches.   $-5 days of visual symptoms in the left eye - initially - floaters - tiny black dots moving across his vision.  Later - like a film over the left eye with worsening vision.  He wears reading glasses but does not have them.   Visual acuity with [place] chart: OD: 20/400; OS CF only.  VF: OD full; OS with possible temporal VF defect.   No RAPD  Unable to visualized disc.  Sedimentation Rate, Erythrocyte: 6 mm/hr (09.06.22 @ 05:39); C-Reactive Protein, Serum: <3.0 mg/L (09.06.22 @ 05:39)     A/P  Mr. Gonzales is an 80 yo man with visual symptoms in the left eye of uncertain etiology - seems more consistent with ocular disease.  MRI Brain and orbits w/wo  Ophthalmology consult    Thank you
I have interviewed and examined the patient and reviewed the residents note including the history, exam, assessment, and plan.  I agree with the residents assessment and plan.    Subluxed IOL OS  - likely causing blurry vision and ? monocular diplopia  - pt also has vit cell OS, unclear if old or new  - will reach out to outpatient ophtho for more info  - findings and plan discussed with patient and primary team  - will follow    Genny Riojas MD

## 2022-09-06 NOTE — H&P ADULT - NSICDXPASTSURGICALHX_GEN_ALL_CORE_FT
Progress Note: Weekly Education Class in the [facility] Loss Program   Is there anything that you or the patient needs to let Dr Yaneth Gale know about? no  Over the past week, have you experienced any side-effects? no    Abundio Dumont is a 61 y.o. female who is enrolled in [facility]    Visit Vitals  /85 (BP 1 Location: Right arm, BP Patient Position: Sitting)   Pulse 84   Ht 5' 4.5\" (1.638 m)   Wt 201 lb (91.2 kg)   BMI 33.97 kg/m²     Weight Metrics 10/18/2018 10/11/2018 9/27/2018 9/20/2018 9/12/2018 9/12/2018 9/6/2018   Weight 201 lb 203 lb 8 oz 203 lb 198 lb 8 oz - 199 lb 8 oz 205 lb 8 oz   Neck Circ (inches) - - - - 14 - -   Waist Measure Inches 39 39.5 40 39.5 40 - 40   Body Fat % 39.9 - - - 39.6 - -   BMI 33.97 kg/m2 34.39 kg/m2 34.31 kg/m2 33.55 kg/m2 - 33.72 kg/m2 34.73 kg/m2         Have you received any other medical care this week? no  If yes, where and for what? Have you had any change in your medications since your last visit? no  If yes what? Did you have any problems adhering to the program last week? no  If yes, please explain:       Eating Habits Over Last Week:  Did you take in 64 oz of non-caloric fluids? yes     Did you consume your 4 meal replacements each day?  yes       Physical Activity Over the Past Week:    Aerobic exercise: 72 min  Resistance exercise: 50 workouts / week PAST SURGICAL HISTORY:  H/O hernia repair RIHR    S/P cataract surgery bilateral 2017    S/P cataract surgery     S/P repair of hydrocele     Status post creation of pericardial window 2017

## 2022-09-06 NOTE — H&P ADULT - HISTORY OF PRESENT ILLNESS
81 year old male with history of HTN, HLD, DM2, NICM, BPH, pericardial effusion (2017), on ASA presenting for increased weakness, unsteadiness and fevers. The patient reports he was in his usual state of health until a weak ago where he started to feel some intermittent fevers. He did not check his temp. On Sunday, the patient had an acute worsening w/ measured fevers 101, as well as new double vision, blurry, w/ spots in his eyes, and worsening balance. At baseline patient ambulates independently, able to go on walks for over 2 hours w/o difficulty. The patient denies any shortness of breath, nausea, vomiting, diarrhea, recent sick contacts, dysuria recently. The patient has had noted significant weight loss and decreased appetite for the past 3 months, family unclear regarding how much weight loss however the patient's daughter reports that his weight loss is very noticeable visually given that he can no longer fit any of his suits, which he wears frequently.   The patient has noticed poorer vision of his L eye for the past few days, stating it's like there's a screen over his eyes. He had cataract surgery a few years ago w/o complications. He states that this happened insidiously. The patient was trying to see an eye doctor however there were no appointments available.     In the ED, the patient was febrile 101.4, HR, normal, satting well on RA. Initial labs only significant for mildly elevated lactate which resolved w/ some fluids. CTH negative for intracranial pathology, neuro evaluated the patient and requested additional imaging.

## 2022-09-06 NOTE — H&P ADULT - NSHPLABSRESULTS_GEN_ALL_CORE
12.9   6.69  )-----------( 173      ( 05 Sep 2022 19:40 )             40.6         132<L>  |  97<L>  |  9   ----------------------------<  107<H>  4.7   |  24  |  1.20    Ca    9.5      05 Sep 2022 19:40    TPro  6.9  /  Alb  4.4  /  TBili  0.8  /  DBili  x   /  AST  33  /  ALT  23  /  AlkPhos  51            LIVER FUNCTIONS - ( 05 Sep 2022 19:40 )  Alb: 4.4 g/dL / Pro: 6.9 g/dL / ALK PHOS: 51 U/L / ALT: 23 U/L / AST: 33 U/L / GGT: x           Urinalysis Basic - ( 05 Sep 2022 20:30 )    Color: Light Yellow / Appearance: Clear / S.010 / pH: x  Gluc: x / Ketone: Negative  / Bili: Negative / Urobili: <2 mg/dL   Blood: x / Protein: Trace / Nitrite: Negative   Leuk Esterase: Negative / RBC: tnp /HPF / WBC tnp /HPF   Sq Epi: x / Non Sq Epi: x / Bacteria: x      IMAGING:    < from: CT Head No Cont (22 @ 20:40) >      IMPRESSION:    No acute hemorrhage or mass effect.    < end of copied text >

## 2022-09-06 NOTE — PROGRESS NOTE ADULT - PROBLEM SELECTOR PLAN 7
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 Viktoria Pratt on 7/20/2021  - Off meds   - No difficulty w/ urination

## 2022-09-06 NOTE — H&P ADULT - PROBLEM SELECTOR PLAN 1
Patient w/ new recorded fevers 101, present on arrival to ED, however w/o clear source. CXR w/ new lung nodule not present on prior CXR, concern for occult malignant process. Patient hx of progressive weight loss places higher concern for occult malignancy.   - BCx x2, UCx pending  - CT Chest, Abdomen, Pelvis given concern for malignancy  - Tylenol PRN for fevers

## 2022-09-06 NOTE — CONSULT NOTE ADULT - SUBJECTIVE AND OBJECTIVE BOX
[facility] DEPARTMENT OF OPHTHALMOLOGY - INITIAL ADULT CONSULT  ----------------------------------------------------------------------------------------------------  Stanley Anderson, PGY-3  [phone], available on teams  ----------------------------------------------------------------------------------------------------    HPI:  81 year old male with history of HTN, HLD, DM2, NICM, BPH, pericardial effusion (2017), on ASA presenting for increased weakness, unsteadiness and fevers. The patient reports he was in his usual state of health until a weak ago where he started to feel some intermittent fevers. He did not check his temp. On Sunday, the patient had an acute worsening w/ measured fevers 101, as well as new double vision, blurry, w/ spots in his eyes, and worsening balance. At baseline patient ambulates independently, able to go on walks for over 2 hours w/o difficulty. The patient denies any shortness of breath, nausea, vomiting, diarrhea, recent sick contacts, dysuria recently. The patient has had noted significant weight loss and decreased appetite for the past 3 months, family unclear regarding how much weight loss however the patient's daughter reports that his weight loss is very noticeable visually given that he can no longer fit any of his suits, which he wears frequently.   The patient has noticed poorer vision of his L eye for the past few days, stating it's like there's a screen over his eyes. He had cataract surgery a few years ago w/o complications. He states that this happened insidiously. The patient was trying to see an eye doctor however there were no appointments available.     In the ED, the patient was febrile 101.4, HR, normal, satting well on RA. Initial labs only significant for mildly elevated lactate which resolved w/ some fluids. CTH negative for intracranial pathology, neuro evaluated the patient and requested additional imaging.   (06 Sep 2022 02:14)    Interval History: ophthalmology consulted for blurry vision OS. Pt endorsing worsening vision OS for the past week. In addition endorse flaoters in the past few days without flashes or curtain. Pt also endorse diplopia, possibly monocular but could not exactly say. Diplopia intermittent and was not present upon evaluation. Denies eye pain or tenderness. Endorse H/A but no temporal tenderness, had fever and weight loss over past 3 months d/t poor appetite; denies scalp tenderness, jaw claudication, joint pain.     PAST MEDICAL & SURGICAL HISTORY:  T2DM (type 2 diabetes mellitus)  &gt; 20 yrsh      BPH (benign prostatic hyperplasia)      Hernia, inguinal, right      HTN (hypertension)      Hyperlipidemia      Afib  on Aspirin      BPH with urinary obstruction  s/p TURP      GABRIELLA (obstructive sleep apnea)  by symptoms      Anemia of chronic disease      S/P cataract surgery  bilateral 2017      H/O hernia repair  RIHR      S/P repair of hydrocele      Status post creation of pericardial window  2017      S/P cataract surgery        Past Ocular History: CEIOL OU several years ago, has diabetic retinopathy with history of antiVEGF injections in both eyes, most recent was 2 years ago. Treated by Dr. Mic Bustillo [phone]  Ophthalmic Medications: none  FAMILY HISTORY:  FHx: diabetes mellitus  brother, sister  Social History: denies smoking, alcohol socially    MEDICATIONS  (STANDING):  aspirin  chewable 81 milliGRAM(s) Oral daily  azithromycin  IVPB      carvedilol 12.5 milliGRAM(s) Oral every 12 hours  cefTRIAXone   IVPB      dextrose 5%. 1000 milliLiter(s) (50 mL/Hr) IV Continuous <Continuous>  dextrose 5%. 1000 milliLiter(s) (100 mL/Hr) IV Continuous <Continuous>  dextrose 50% Injectable 25 Gram(s) IV Push once  dextrose 50% Injectable 12.5 Gram(s) IV Push once  dextrose 50% Injectable 25 Gram(s) IV Push once  enoxaparin Injectable 40 milliGRAM(s) SubCutaneous every 24 hours  folic acid 1 milliGRAM(s) Oral daily  furosemide    Tablet 20 milliGRAM(s) Oral daily  glucagon  Injectable 1 milliGRAM(s) IntraMuscular once  hydrALAZINE 50 milliGRAM(s) Oral three times a day  influenza  Vaccine (HIGH DOSE) 0.7 milliLiter(s) IntraMuscular once  insulin lispro (ADMELOG) corrective regimen sliding scale   SubCutaneous three times a day before meals  insulin lispro (ADMELOG) corrective regimen sliding scale   SubCutaneous at bedtime  lisinopril 20 milliGRAM(s) Oral daily    MEDICATIONS  (PRN):  acetaminophen     Tablet .. 650 milliGRAM(s) Oral every 6 hours PRN Temp greater or equal to 38C (100.4F), Mild Pain (1 - 3)  dextrose Oral Gel 15 Gram(s) Oral once PRN Blood Glucose LESS THAN 70 milliGRAM(s)/deciliter  melatonin 3 milliGRAM(s) Oral at bedtime PRN Insomnia    Allergies & Intolerances: NKDA    Review of Systems:  Constitutional: No fever, chills  Eyes: ++blurry vision, no flashes, + floaters, FBS, erythema, discharge, +double vision, OU  Neuro: No tremors  Cardiovascular: No chest pain, palpitations  Respiratory: No SOB, no cough  GI: No nausea, vomiting, abdominal pain  : No dysuria  Skin: no rash  Psych: no depression  Endocrine: no polyuria, polydipsia  Heme/lymph: no swelling    VITALS: T(C): 37.6 (09-06-22 @ 17:00)  T(F): 99.7 (09-06-22 @ 17:00), Max: 101.8 (09-06-22 @ 09:45)  HR: 88 (09-06-22 @ 17:00) (86 - 92)  BP: 151/81 (09-06-22 @ 17:00) (132/81 - 160/89)  RR:  (18 - 20)  SpO2:  (100% - 100%)  General: AAO x 3, appropriate mood and affect    Ophthalmology Exam:  Visual acuity (cc): OD 20/50 NIPH OS 20/200 NIPH  Pupils: PERRL OU, no APD  Ttono: OD 16 OS 18  Extraocular movements (EOMs): Full OU, no pain, no diplopia  Confrontational Visual Field (CVF): Full OU  Color Plates: OD 9/12 OS 7/12    Slit lamp exam:  External: Flat OU, no tenderness OS, temporal pulses palpated without tenderness  Lids/Lashes/Lacrimal Ducts: MGD OU    Sclera/Conjunctiva: W+Q, CAM OU  Cornea: OD nasal PTG, OS nasal and temporal PTG  Anterior Chamber: Deep and quiet no cell/flare, no hypopyon  Iris: Flat OU  Lens: OD PCIOL mildly sup dislocated OS significant infero-temp dislocated PCIOL    Fundus Exam: dilated with 1% tropicamide and 2.5% phenylephrine  Approval obtained from primary team for dilation  Patient aware that pupils can remained dilated for at least 4-6 hours  Exam performed with 20D lens    Vitreous: wnl OD, 1+ cell and tr RBC OS  Disc, cup/disc: sharp and pink, 0.3 OU  Macula: OD few DBH and exudates OS poor view   Vessels: wnl OU  Periphery: wnl, no RD OU    Labs/Imaging:  ***   [facility] DEPARTMENT OF OPHTHALMOLOGY - INITIAL ADULT CONSULT  ----------------------------------------------------------------------------------------------------  Stanley Anderson, PGY-3  [phone], available on teams  ----------------------------------------------------------------------------------------------------    HPI:  81 year old male with history of HTN, HLD, DM2, NICM, BPH, pericardial effusion (2017), on ASA presenting for increased weakness, unsteadiness and fevers. The patient reports he was in his usual state of health until a weak ago where he started to feel some intermittent fevers. He did not check his temp. On Sunday, the patient had an acute worsening w/ measured fevers 101, as well as new double vision, blurry, w/ spots in his eyes, and worsening balance. At baseline patient ambulates independently, able to go on walks for over 2 hours w/o difficulty. The patient denies any shortness of breath, nausea, vomiting, diarrhea, recent sick contacts, dysuria recently. The patient has had noted significant weight loss and decreased appetite for the past 3 months, family unclear regarding how much weight loss however the patient's daughter reports that his weight loss is very noticeable visually given that he can no longer fit any of his suits, which he wears frequently.   The patient has noticed poorer vision of his L eye for the past few days, stating it's like there's a screen over his eyes. He had cataract surgery a few years ago w/o complications. He states that this happened insidiously. The patient was trying to see an eye doctor however there were no appointments available.     In the ED, the patient was febrile 101.4, HR, normal, satting well on RA. Initial labs only significant for mildly elevated lactate which resolved w/ some fluids. CTH negative for intracranial pathology, neuro evaluated the patient and requested additional imaging.   (06 Sep 2022 02:14)    Interval History: ophthalmology consulted for blurry vision OS. Pt endorsing worsening vision OS for the past week. In addition endorse flaoters in the past few days without flashes or curtain. Pt also endorse diplopia, possibly monocular but could not exactly say. Diplopia intermittent and was not present upon evaluation. Denies eye pain or tenderness. Endorse H/A but no temporal tenderness, had fever and weight loss over past 3 months d/t poor appetite; denies scalp tenderness, jaw claudication, joint pain.     PAST MEDICAL & SURGICAL HISTORY:  T2DM (type 2 diabetes mellitus)  &gt; 20 yrsh      BPH (benign prostatic hyperplasia)      Hernia, inguinal, right      HTN (hypertension)      Hyperlipidemia      Afib  on Aspirin      BPH with urinary obstruction  s/p TURP      GABRIELLA (obstructive sleep apnea)  by symptoms      Anemia of chronic disease      S/P cataract surgery  bilateral 2017      H/O hernia repair  RIHR      S/P repair of hydrocele      Status post creation of pericardial window  2017      S/P cataract surgery        Past Ocular History: CEIOL OU several years ago, has diabetic retinopathy with history of antiVEGF injections in both eyes, most recent was 2 years ago. Treated by Dr. Mic Bustillo [phone]  Ophthalmic Medications: none  FAMILY HISTORY:  FHx: diabetes mellitus, no glaucoma, no ARMD  brother, sister  Social History: denies smoking, alcohol socially    MEDICATIONS  (STANDING):  aspirin  chewable 81 milliGRAM(s) Oral daily  azithromycin  IVPB      carvedilol 12.5 milliGRAM(s) Oral every 12 hours  cefTRIAXone   IVPB      dextrose 5%. 1000 milliLiter(s) (50 mL/Hr) IV Continuous <Continuous>  dextrose 5%. 1000 milliLiter(s) (100 mL/Hr) IV Continuous <Continuous>  dextrose 50% Injectable 25 Gram(s) IV Push once  dextrose 50% Injectable 12.5 Gram(s) IV Push once  dextrose 50% Injectable 25 Gram(s) IV Push once  enoxaparin Injectable 40 milliGRAM(s) SubCutaneous every 24 hours  folic acid 1 milliGRAM(s) Oral daily  furosemide    Tablet 20 milliGRAM(s) Oral daily  glucagon  Injectable 1 milliGRAM(s) IntraMuscular once  hydrALAZINE 50 milliGRAM(s) Oral three times a day  influenza  Vaccine (HIGH DOSE) 0.7 milliLiter(s) IntraMuscular once  insulin lispro (ADMELOG) corrective regimen sliding scale   SubCutaneous three times a day before meals  insulin lispro (ADMELOG) corrective regimen sliding scale   SubCutaneous at bedtime  lisinopril 20 milliGRAM(s) Oral daily    MEDICATIONS  (PRN):  acetaminophen     Tablet .. 650 milliGRAM(s) Oral every 6 hours PRN Temp greater or equal to 38C (100.4F), Mild Pain (1 - 3)  dextrose Oral Gel 15 Gram(s) Oral once PRN Blood Glucose LESS THAN 70 milliGRAM(s)/deciliter  melatonin 3 milliGRAM(s) Oral at bedtime PRN Insomnia    Allergies & Intolerances: NKDA    Review of Systems:  Constitutional: No fever, chills  Eyes: ++blurry vision, no flashes, + floaters, FBS, erythema, discharge, +double vision, OU  Neuro: No tremors  Cardiovascular: No chest pain, palpitations  Respiratory: No SOB, no cough  GI: No nausea, vomiting, abdominal pain  : No dysuria  Skin: no rash  Psych: no depression  Endocrine: no polyuria, polydipsia  Heme/lymph: no swelling    VITALS: T(C): 37.6 (09-06-22 @ 17:00)  T(F): 99.7 (09-06-22 @ 17:00), Max: 101.8 (09-06-22 @ 09:45)  HR: 88 (09-06-22 @ 17:00) (86 - 92)  BP: 151/81 (09-06-22 @ 17:00) (132/81 - 160/89)  RR:  (18 - 20)  SpO2:  (100% - 100%)  General: AAO x 3, appropriate mood and affect    Ophthalmology Exam:  Visual acuity (cc): OD 20/50 NIPH OS 20/200 NIPH  Pupils: PERRL OU, no APD  Ttono: OD 16 OS 18  Extraocular movements (EOMs): Full OU, no pain, no diplopia  Confrontational Visual Field (CVF): Full OU  Color Plates: OD 9/12 OS 7/12    Slit lamp exam:  External: Flat OU, no tenderness OS, temporal pulses palpated without tenderness  Lids/Lashes/Lacrimal Ducts: MGD OU    Sclera/Conjunctiva: W+Q, CAM OU  Cornea: OD nasal PTG, OS nasal and temporal PTG  Anterior Chamber: Deep and quiet no cell/flare, no hypopyon  Iris: Flat OU  Lens: OD PCIOL,  OS significant infero-temp subluxed PCIOL, through the visual axis    Fundus Exam: dilated with 1% tropicamide and 2.5% phenylephrine  Approval obtained from primary team for dilation  Patient aware that pupils can remained dilated for at least 4-6 hours  Exam performed with 20D lens    Vitreous: wnl OD, 1+ cell and few pigmented cell OS  Disc, cup/disc: sharp and pink, 0.3 OU  Macula: OD few DBH and exudates OS poor view   Vessels: wnl OU  Periphery: wnl, no RD OU

## 2022-09-06 NOTE — PROGRESS NOTE ADULT - PROBLEM SELECTOR PLAN 5
Patient w/ hx HFrEF (EF 40%) w/ BiV dysfunction. On telemetry patient w/ occasional runs of Vtach.   - Will repeat TTE  - Lasix 20 mg QD Hx DM2 on Januvia and metformin  - SSI   -A1c    #Cr elevation:  Baseline 1.05-1.2. On Admission SCr 1.2

## 2022-09-06 NOTE — PROGRESS NOTE ADULT - ASSESSMENT
81 year old male with history of HTN, HLD, DM2, NICM, BPH, pericardial effusion (2017), on ASA presenting for fevers of unknown etiology and new L eye vision impairment.  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

## 2022-09-06 NOTE — H&P ADULT - PROBLEM SELECTOR PLAN 4
Hx DM2 on Januvia and metformin  - SSI   -A1c    #Cr elevation:  Baseline 1.05-1.2. On Admission SCr 1.2

## 2022-09-06 NOTE — H&P ADULT - ATTENDING COMMENTS
82 yo m with acute left eye blurriness; CT head unremarkable, seen by neuro, reccs appreciated,  opthalmology to be called. Also with subacute weight loss, recent high grade fevers; CT chest/abd/pelv ordered. runs of vtach noted in ed that spontaneously resolved; Zoll pads at bedside. tele, tte ordered, Mg levels pending, other electrolytes  wnl

## 2022-09-06 NOTE — PROGRESS NOTE ADULT - PROBLEM SELECTOR PLAN 1
Patient w/ new recorded fevers 101, present on arrival to ED, however w/o clear source. CXR w/ new lung nodule not present on prior CXR, concern for occult malignant process. Patient hx of progressive weight loss places higher concern for occult malignancy.   - BCx x2, UCx pending  - CT Chest, Abdomen, Pelvis given concern for malignancy  - Tylenol PRN for fevers Patient w/ recorded fevers to 101.8, present on arrival to ED, however w/o clear source. CXR w/ new lung nodule not present on prior CXR, concern for occult malignant process. Patient hx of progressive weight loss places higher concern for occult malignancy vs infection.  - BCx x2, UCx pending  - f/u CT A/P with contrast, CT chest with contrast, given concern for occult malignancy  - Tylenol PRN for fevers  - [9/6] start ceftriaxone and azithromycin

## 2022-09-06 NOTE — H&P ADULT - NSHPREVIEWOFSYSTEMS_GEN_ALL_CORE
REVIEW OF SYSTEMS:    CONSTITUTIONAL: + weakness, fevers   EYES: Poor vision on L eye   ENT: No vertigo or throat pain   NECK: No pain or stiffness  RESPIRATORY: No cough, wheezing, hemoptysis; No shortness of breath  CARDIOVASCULAR: No chest pain or palpitations  GASTROINTESTINAL: No abdominal or epigastric pain. No nausea, vomiting, or hematemesis; No diarrhea or constipation. No melena or hematochezia.  GENITOURINARY: No dysuria, frequency or hematuria  NEUROLOGICAL: No numbness or weakness  PSYCH: No anxiety, depression, or behavior changes  All other review of systems is negative unless indicated above.

## 2022-09-06 NOTE — CONSULT NOTE ADULT - SUBJECTIVE AND OBJECTIVE BOX
Neurology - Consult Note - 09-06-22  -  Shira Tao DO  PGY-2 Neurology  Spectra: [id number] ([facility]), [id number] ([facility])  -  Name: COLTON JARAMILLO; 81y (1940)    HPI: 81 year old male with pmhx of HTN, DM type 2, HLD, presenting with left eye blurry vision. Left eye blurry vision started this past Friday. At first, blurry vision was intermittent and then on Sunday it became persistent. Closure of the left eye and vision in right is normal. Patient describes left eye blurry vision as a "film over his eye". Patient did not check his blood pressure at the time of onset. Family also reports patient is usually more active but while in the ED he is sleepy.       Review of Systems: Denies tinnitus, headache, numbness and tingling, eye pain    Allergies:      PMHx:   HTN (hypertension)    T2DM (type 2 diabetes mellitus)    BPH (benign prostatic hyperplasia)    Hernia, inguinal, right    HTN (hypertension)    Hyperlipidemia    Afib    BPH with urinary obstruction    GABRIELLA (obstructive sleep apnea)    Anemia of chronic disease      PFHx:   No pertinent family history in first degree relatives    FHx: diabetes mellitus      PSuHx:   No significant past surgical history    No significant past surgical history    S/P cataract surgery    H/O hernia repair    S/P repair of hydrocele    Status post creation of pericardial window    S/P cataract surgery        Medications:  MEDICATIONS  (STANDING):    MEDICATIONS  (PRN):      Vitals:  T(C): 37.2 (09-05-22 @ 23:21), Max: 38.8 (09-05-22 @ 19:52)  HR: 90 (09-05-22 @ 23:21) (86 - 90)  BP: 152/92 (09-05-22 @ 23:21) (142/80 - 158/94)  RR: 18 (09-05-22 @ 23:21) (18 - 20)  SpO2: 100% (09-05-22 @ 23:21) (100% - 100%)    Neurological Examination:  - Mental Status: Alert, awake, oriented to person, place, and time; speech is fluent with intact naming, repetition, and follows 1-step and 3-step mid-line crossing commands;     II: Visual fields are full to confrontation; pupils are equal, round, and reactive to light, peripheral clouding of cornea b/l   III, IV, VI: Extraocular movements are intact without nystagmus  V: Facial sensation is intact in the V1-V3 distribution bilaterally  VII: Face is symmetric with normal eye closure and smile  VIII: Hearing is intact to finger rub  IX, X: Uvula is midline and soft palate rises symmetrically  XI: Shoulder shrug intact  XII: Tongue protrudes in the midline    - Motor/Strength Testing:                                 Right           Left  Deltoid                     5                 5  Biceps                      5                 5  Triceps                     5                 5  Wrist Ext (radial)       5                 5  Hand                  5                 5    Hip Flex                   5                  5  Knee Ext	      5                  5  Dorsiflex                  5                  5  Plantarflex               5                  5    - There is no pronator drift.   - Sensory: Intact throughout to light touch.   - Coordination: Finger-nose-finger intact without ataxia or dysmetria.    - Gait: Normal steps, base, arm swing, and turning.     Labs:                        12.9   6.69  )-----------( 173      ( 05 Sep 2022 19:40 )             40.6     09-05    132<L>  |  97<L>  |  9   ----------------------------<  107<H>  4.7   |  24  |  1.20    Ca    9.5      05 Sep 2022 19:40    TPro  6.9  /  Alb  4.4  /  TBili  0.8  /  DBili  x   /  AST  33  /  ALT  23  /  AlkPhos  51  09-05    CAPILLARY BLOOD GLUCOSE      POCT Blood Glucose.: 118 mg/dL (05 Sep 2022 22:36)    LIVER FUNCTIONS - ( 05 Sep 2022 19:40 )  Alb: 4.4 g/dL / Pro: 6.9 g/dL / ALK PHOS: 51 U/L / ALT: 23 U/L / AST: 33 U/L / GGT: x             Radiology:  CT Head No Cont:  (05 Sep 2022 20:40) No acute hemorrhage or mass effect.

## 2022-09-06 NOTE — PATIENT PROFILE ADULT - FALL HARM RISK - HARM RISK INTERVENTIONS

## 2022-09-06 NOTE — H&P ADULT - NSICDXPASTMEDICALHX_GEN_ALL_CORE_FT
PAST MEDICAL HISTORY:  Afib on Aspirin    Anemia of chronic disease     BPH (benign prostatic hyperplasia)     BPH with urinary obstruction s/p TURP    Hernia, inguinal, right     HTN (hypertension)     Hyperlipidemia     GABRIELLA (obstructive sleep apnea) by symptoms    T2DM (type 2 diabetes mellitus) > 20 yrs

## 2022-09-06 NOTE — H&P ADULT - PROBLEM SELECTOR PLAN 3
Patient w/ hx HTN on coreg, hydral, ramipril   - Coreg 12.5 BID  - Hydralazine 50 mg QD  - Ramipril as Lisinopril 5 mg QD

## 2022-09-06 NOTE — H&P ADULT - PROBLEM SELECTOR PLAN 6
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 Viktoria Pratt on 7/20/2021  - Off meds   - No difficulty w/ urination

## 2022-09-06 NOTE — PROGRESS NOTE ADULT - PROBLEM SELECTOR PLAN 6
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 Viktoria Pratt on 7/20/2021  - Off meds   - No difficulty w/ urination Patient w/ hx HFrEF (EF 40%) w/ BiV dysfunction. On telemetry patient w/ occasional runs of Vtach.   - Will repeat TTE  - Lasix 20 mg QD Patient w/ hx HFrEF (EF 40%) w/ BiV dysfunction. One note of patient w/ occasional runs of Vtach on telemetry, no duration, HR, or strip available.  - Will repeat TTE  - Lasix 20 mg QD

## 2022-09-06 NOTE — CONSULT NOTE ADULT - ASSESSMENT
Assessment: 81 year old male with pmhx of HTN, DM type 2, HLD, presenting with left eye blurry vision. Left eye blurry vision started this past Friday. At first, blurry vision was intermittent and then on Sunday it became persistent. closure of the left eye and vision in right is normal. Patient describes left eye blurry vision as a "film over his eye". Patient did not check his blood pressure at the time of onset. Family also reports patient is more fatigued.    Impression: left eye blurry vision 2/2 intracranial process vs ocular issue 2/2 to poorly controlled HTN & DM      Plan  [] ophthalmology consult, proper eye exam/evaluation  [] CTA head and neck  [] MRI brain with and without contrast  [] A1c, lipid panel, esr, crp    Case to be discussed and seen with Dr. Avina tomorrow morning

## 2022-09-07 LAB
ALBUMIN SERPL ELPH-MCNC: 3.5 G/DL — SIGNIFICANT CHANGE UP (ref 3.3–5)
ALP SERPL-CCNC: 48 U/L — SIGNIFICANT CHANGE UP (ref 40–120)
ALT FLD-CCNC: 16 U/L — SIGNIFICANT CHANGE UP (ref 4–41)
ANION GAP SERPL CALC-SCNC: 11 MMOL/L — SIGNIFICANT CHANGE UP (ref 7–14)
AST SERPL-CCNC: 20 U/L — SIGNIFICANT CHANGE UP (ref 4–40)
BASOPHILS # BLD AUTO: 0.01 K/UL — SIGNIFICANT CHANGE UP (ref 0–0.2)
BASOPHILS NFR BLD AUTO: 0.2 % — SIGNIFICANT CHANGE UP (ref 0–2)
BILIRUB SERPL-MCNC: 0.9 MG/DL — SIGNIFICANT CHANGE UP (ref 0.2–1.2)
BUN SERPL-MCNC: 9 MG/DL — SIGNIFICANT CHANGE UP (ref 7–23)
CALCIUM SERPL-MCNC: 8.7 MG/DL — SIGNIFICANT CHANGE UP (ref 8.4–10.5)
CHLORIDE SERPL-SCNC: 96 MMOL/L — LOW (ref 98–107)
CO2 SERPL-SCNC: 25 MMOL/L — SIGNIFICANT CHANGE UP (ref 22–31)
CREAT SERPL-MCNC: 1 MG/DL — SIGNIFICANT CHANGE UP (ref 0.5–1.3)
CULTURE RESULTS: NO GROWTH — SIGNIFICANT CHANGE UP
EGFR: 76 ML/MIN/1.73M2 — SIGNIFICANT CHANGE UP
EOSINOPHIL # BLD AUTO: 0 K/UL — SIGNIFICANT CHANGE UP (ref 0–0.5)
EOSINOPHIL NFR BLD AUTO: 0 % — SIGNIFICANT CHANGE UP (ref 0–6)
GLUCOSE BLDC GLUCOMTR-MCNC: 175 MG/DL — HIGH (ref 70–99)
GLUCOSE BLDC GLUCOMTR-MCNC: 176 MG/DL — HIGH (ref 70–99)
GLUCOSE BLDC GLUCOMTR-MCNC: 224 MG/DL — HIGH (ref 70–99)
GLUCOSE BLDC GLUCOMTR-MCNC: 245 MG/DL — HIGH (ref 70–99)
GLUCOSE SERPL-MCNC: 174 MG/DL — HIGH (ref 70–99)
HCT VFR BLD CALC: 35.2 % — LOW (ref 39–50)
HGB BLD-MCNC: 12.2 G/DL — LOW (ref 13–17)
IANC: 3.43 K/UL — SIGNIFICANT CHANGE UP (ref 1.8–7.4)
IMM GRANULOCYTES NFR BLD AUTO: 0.5 % — SIGNIFICANT CHANGE UP (ref 0–1.5)
LYMPHOCYTES # BLD AUTO: 1.45 K/UL — SIGNIFICANT CHANGE UP (ref 1–3.3)
LYMPHOCYTES # BLD AUTO: 25.5 % — SIGNIFICANT CHANGE UP (ref 13–44)
MAGNESIUM SERPL-MCNC: 1.7 MG/DL — SIGNIFICANT CHANGE UP (ref 1.6–2.6)
MCHC RBC-ENTMCNC: 26.8 PG — LOW (ref 27–34)
MCHC RBC-ENTMCNC: 34.7 GM/DL — SIGNIFICANT CHANGE UP (ref 32–36)
MCV RBC AUTO: 77.2 FL — LOW (ref 80–100)
MONOCYTES # BLD AUTO: 0.76 K/UL — SIGNIFICANT CHANGE UP (ref 0–0.9)
MONOCYTES NFR BLD AUTO: 13.4 % — SIGNIFICANT CHANGE UP (ref 2–14)
NEUTROPHILS # BLD AUTO: 3.43 K/UL — SIGNIFICANT CHANGE UP (ref 1.8–7.4)
NEUTROPHILS NFR BLD AUTO: 60.4 % — SIGNIFICANT CHANGE UP (ref 43–77)
NRBC # BLD: 0 /100 WBCS — SIGNIFICANT CHANGE UP (ref 0–0)
NRBC # FLD: 0 K/UL — SIGNIFICANT CHANGE UP (ref 0–0)
PHOSPHATE SERPL-MCNC: 2.6 MG/DL — SIGNIFICANT CHANGE UP (ref 2.5–4.5)
PLATELET # BLD AUTO: 170 K/UL — SIGNIFICANT CHANGE UP (ref 150–400)
POTASSIUM SERPL-MCNC: 3.7 MMOL/L — SIGNIFICANT CHANGE UP (ref 3.5–5.3)
POTASSIUM SERPL-SCNC: 3.7 MMOL/L — SIGNIFICANT CHANGE UP (ref 3.5–5.3)
PROT SERPL-MCNC: 6.2 G/DL — SIGNIFICANT CHANGE UP (ref 6–8.3)
RBC # BLD: 4.56 M/UL — SIGNIFICANT CHANGE UP (ref 4.2–5.8)
RBC # FLD: 16 % — HIGH (ref 10.3–14.5)
SODIUM SERPL-SCNC: 132 MMOL/L — LOW (ref 135–145)
SPECIMEN SOURCE: SIGNIFICANT CHANGE UP
WBC # BLD: 5.68 K/UL — SIGNIFICANT CHANGE UP (ref 3.8–10.5)
WBC # FLD AUTO: 5.68 K/UL — SIGNIFICANT CHANGE UP (ref 3.8–10.5)

## 2022-09-07 PROCEDURE — 93306 TTE W/DOPPLER COMPLETE: CPT | Mod: 26

## 2022-09-07 PROCEDURE — 99233 SBSQ HOSP IP/OBS HIGH 50: CPT | Mod: GC

## 2022-09-07 PROCEDURE — 99231 SBSQ HOSP IP/OBS SF/LOW 25: CPT

## 2022-09-07 RX ADMIN — Medication 1 MILLIGRAM(S): at 12:15

## 2022-09-07 RX ADMIN — CARVEDILOL PHOSPHATE 12.5 MILLIGRAM(S): 80 CAPSULE, EXTENDED RELEASE ORAL at 17:55

## 2022-09-07 RX ADMIN — Medication 1: at 18:26

## 2022-09-07 RX ADMIN — Medication 81 MILLIGRAM(S): at 12:15

## 2022-09-07 RX ADMIN — Medication 50 MILLIGRAM(S): at 05:29

## 2022-09-07 RX ADMIN — Medication 50 MILLIGRAM(S): at 13:08

## 2022-09-07 RX ADMIN — LISINOPRIL 20 MILLIGRAM(S): 2.5 TABLET ORAL at 05:32

## 2022-09-07 RX ADMIN — Medication 1: at 09:00

## 2022-09-07 RX ADMIN — Medication 50 MILLIGRAM(S): at 21:56

## 2022-09-07 RX ADMIN — ENOXAPARIN SODIUM 40 MILLIGRAM(S): 100 INJECTION SUBCUTANEOUS at 05:28

## 2022-09-07 RX ADMIN — CARVEDILOL PHOSPHATE 12.5 MILLIGRAM(S): 80 CAPSULE, EXTENDED RELEASE ORAL at 05:29

## 2022-09-07 RX ADMIN — Medication 2: at 13:07

## 2022-09-07 RX ADMIN — Medication 20 MILLIGRAM(S): at 05:29

## 2022-09-07 NOTE — DIETITIAN INITIAL EVALUATION ADULT - PERTINENT MEDS FT
MEDICATIONS  (STANDING):  aspirin  chewable 81 milliGRAM(s) Oral daily  carvedilol 12.5 milliGRAM(s) Oral every 12 hours  dextrose 5%. 1000 milliLiter(s) (100 mL/Hr) IV Continuous <Continuous>  dextrose 5%. 1000 milliLiter(s) (50 mL/Hr) IV Continuous <Continuous>  dextrose 50% Injectable 25 Gram(s) IV Push once  dextrose 50% Injectable 12.5 Gram(s) IV Push once  dextrose 50% Injectable 25 Gram(s) IV Push once  enoxaparin Injectable 40 milliGRAM(s) SubCutaneous every 24 hours  folic acid 1 milliGRAM(s) Oral daily  furosemide    Tablet 20 milliGRAM(s) Oral daily  glucagon  Injectable 1 milliGRAM(s) IntraMuscular once  hydrALAZINE 50 milliGRAM(s) Oral three times a day  influenza  Vaccine (HIGH DOSE) 0.7 milliLiter(s) IntraMuscular once  insulin lispro (ADMELOG) corrective regimen sliding scale   SubCutaneous three times a day before meals  insulin lispro (ADMELOG) corrective regimen sliding scale   SubCutaneous at bedtime  lisinopril 20 milliGRAM(s) Oral daily    MEDICATIONS  (PRN):  acetaminophen     Tablet .. 650 milliGRAM(s) Oral every 6 hours PRN Temp greater or equal to 38C (100.4F), Mild Pain (1 - 3)  dextrose Oral Gel 15 Gram(s) Oral once PRN Blood Glucose LESS THAN 70 milliGRAM(s)/deciliter  melatonin 3 milliGRAM(s) Oral at bedtime PRN Insomnia

## 2022-09-07 NOTE — PROGRESS NOTE ADULT - PROBLEM SELECTOR PLAN 6
Patient w/ hx HFrEF (EF 40%) w/ BiV dysfunction. One note of patient w/ occasional runs of Vtach on telemetry, no duration, HR, or strip available.  - Will repeat TTE  - Lasix 20 mg QD Patient w/ hx HFrEF (EF 40%) w/ BiV dysfunction  - f/u repeat TTE  - Lasix 20 mg QD

## 2022-09-07 NOTE — PROGRESS NOTE ADULT - ATTENDING COMMENTS
I have interviewed and examined the patient and reviewed the residents note including the history, exam, assessment, and plan.  I agree with the residents assessment and plan.    81 year old male with history of HTN, HLD, DM2, NICM, BPH, pericardial effusion (2017), on ASA presenting for increased weakness, unsteadiness and fevers. Ophthalmology consulted for left eye blurry vision and diplopia.    #Vision loss and diplopia, left eye  #Left eye subluxed intra-ocular lens  #Diabetic retinopathy, both eyes  -Pt presented with worsening vision and questionable monocular diplopia, today vision improved OU.   -Left eye intra ocular lens appears significantly dislocated with the lens itself outside of the visual axis - possibly causing decreased VA. Discussed with outpatient ophthalmology who confirmed that lens is likely more dislocated at this time compared to his last exam.   -On exam, no clear signs of intra-ocular infection; no anterior chamber cell or hypopyon, no injection or pain. Small amount of vitreous cell could be from old vitreous hemorrhage or his dislocated lens  -Pt did have a fever and weight loss (could be explained by suspected lung malignancy?) however rest of ROS for GCA is negative with normal ESR and CRP; low suspicion. In addition, the eye is white and quiet, non tender, no hypopyon, only trace signs of intra ocular inflammation; low suspicion for intra-ocular infection.  -B-scan today showing vitreous debris OS  -On the ddx: dislocated lens, vitreous hemorrhage 2/2 diabetic retinopathy or intermidiate/posterior uveitis  -Both decline in VA and monocular diplopia could be explained by his dislocated lens; however will rule out etiologies for uveitis and will follow management for vitreous hemorrhage.   -Please send CRP, ESR, Lyme titers, syphilis screen, QuantiFeron gold, toxoplasma, bartonella, ACE, lysozyme levels  -Primary team will consider lymphoma workup if necessary  -Agree with MRI brain and orbits w/wo, will follow  -Pt should keep head elevated at 30-degrees, avoid head shaking, bending, straining, heavy lifting  -Avoid NSAIDs unless medically indicated. Continue AC/AP per neurology/cardiology  -Ophthalmology will follow  - findings and plan discussed with patient and primary team    Genny Riojas MD

## 2022-09-07 NOTE — PROGRESS NOTE ADULT - PROBLEM SELECTOR PLAN 3
- [id number]  - was on Xarelto as of 2017 per note 12/2017. Per wife, Xarelto was at some point stopped by a doctor; patient/wife do not recall side effects/bleeding while on Xarelto, and pedron has been on only ASA recently  - per H&P note 5/22/19 was on Eliquis 5 mg po bid, last taken 5/19/19  - consider restarting home Xarelto pending further discussion with patient/family (currently declining) and care team - [id number]  - was on Xarelto as of 2017 per note 12/2017. Per wife, Xarelto was at some point stopped by a doctor; patient/wife do not recall side effects/bleeding while on Xarelto, and patient has been on only ASA recently  - per H&P note 5/22/19 was on Eliquis 5 mg po bid, last taken 5/19/19  - consider restarting home Xarelto pending further discussion with patient/family (currently declining) and care team  - telemetry  - cardiologist contacted

## 2022-09-07 NOTE — PROGRESS NOTE ADULT - ATTENDING COMMENTS
81M Pafib (no AC, unclear why) HTN, HLD, DM2, NICM, BPH, pericardial effusion (2017), on ASA presenting for fevers of unknown etiology and new L eye vision impairment.  --appreciate neuro recs, f/u brain imaging  --appreciate ophtho recs, dislocated lens, outpt f/u  --discuss AC with pt's cardiologist  --CT scans unrevealing of infection, ? significance of persistent hepatic cysts  --monitor off  --test for Went Nile, echinococcus, CBC smear  --consider ID consult 81M Pafib (no AC, unclear why) HTN, HLD, DM2, NICM, BPH, pericardial effusion (2017), on ASA presenting for fevers of unknown etiology and new L eye vision impairment.  --appreciate neuro recs, f/u brain imaging  --appreciate ophtho recs, dislocated lens, outpt f/u  --discuss AC with pt's cardiologist  --CT scans unrevealing of infection, ? significance of persistent hepatic cysts  --monitor off  --test for Went Nile, echinococcus, CBC smear  --consider ID consultation

## 2022-09-07 NOTE — PROGRESS NOTE ADULT - PROBLEM SELECTOR PLAN 8
Diet: regular  DVT: Lovenox  Dispo: in patient Diet: regular  DVT: Lovenox  Dispo: pending clinical course  PT: no needs on dispo

## 2022-09-07 NOTE — DIETITIAN INITIAL EVALUATION ADULT - ORAL INTAKE PTA/DIET HISTORY
Pt lives at home with wife. They cook together at home. No difficulty obtaining groceries. No specific diet followed PTA, patient normally consumes 3 meals/day. No supplements/Vitamins taken PTA.

## 2022-09-07 NOTE — PROGRESS NOTE ADULT - ASSESSMENT
81 year old male with history of HTN, HLD, DM2, NICM, BPH, pericardial effusion (2017), on ASA presenting for increased weakness, unsteadiness and fevers. Ophthalmology consulted for left eye blurry vision and diplopia.    #Vision loss and diplopia, left eye  #Left eye dislocated intra-ocular lens  #Diabetic retinopathy, both eyes  -Pt presented with worsening vision and questionable monocular diplopia, today vision improved OU.   -Left eye intra ocular lens appears significantly dislocated with the lens itself outside of the visual axis - possibly causing decreased VA. Discussed with outpatient ophthalmology who confirmed that lens is likely more dislocated at this time compared to his last exam.   -On exam, no clear signs of intra-ocular infection; no anterior chamber cell or hypopyon, no injection or pain. Small amount of vitreous cell/RBC could be from old vitreous hemorrhage or his dislocated lens  -Pt did have a fever and weight loss (could be explained by suspected lung malignancy?) however rest of ROS for GCA is negative with normal ESR and CRP; low suspicion. In addition, the eye is white and quiet, non tender, no hypopyon, only trace signs of intra ocular inflammation; low suspicion for intra-ocular infection.  -B-scan today showing vitreous debris without vitritis.  -On the ddx: dislocated lens, vitreous hemorrhage 2/2 diabetic retinopathy or intermidiate/posterior uveitis  -Both decline in VA and monocular diplopia could be explained by his dislocated lens; however will rule out etiologies for uveitis and will follow management for vitreous hemorrhage.   -Please send NIMA, ANCA, RF, CRP, ESR, Lyme titers, syphilis screen, QuantiFeron gold, toxoplasma, bartonella, ACE levels  -Primary team will consider lymphoma workup  -Agree with MRI brain and orbits w/wo, will follow  -Pt should keep head elevated at 30-degrees, avoid head shaking, bending, straining, heavy lifting  -Avoid NSAIDs unless medically indicated. Continue AC/AP per neurology/cardiology  -Ophthalmology will follow    Pt seen and discussed with Dr. Riojas    Outpatient follow-up: Patient should follow-up with his/her ophthalmologist or with [facility] Department of Ophthalmology upon discharge at the address below     [facility] Department of Ophthalmology  [address] [place] [address]. [address]  [place] [address]  [phone]     81 year old male with history of HTN, HLD, DM2, NICM, BPH, pericardial effusion (2017), on ASA presenting for increased weakness, unsteadiness and fevers. Ophthalmology consulted for left eye blurry vision and diplopia.    #Vision loss and diplopia, left eye  #Left eye subluxed intra-ocular lens  #Diabetic retinopathy, both eyes  -Pt presented with worsening vision and questionable monocular diplopia, today vision improved OU.   -Left eye intra ocular lens appears significantly dislocated with the lens itself outside of the visual axis - possibly causing decreased VA. Discussed with outpatient ophthalmology who confirmed that lens is likely more dislocated at this time compared to his last exam.   -On exam, no clear signs of intra-ocular infection; no anterior chamber cell or hypopyon, no injection or pain. Small amount of vitreous cell/RBC could be from old vitreous hemorrhage or his dislocated lens  -Pt did have a fever and weight loss (could be explained by suspected lung malignancy?) however rest of ROS for GCA is negative with normal ESR and CRP; low suspicion. In addition, the eye is white and quiet, non tender, no hypopyon, only trace signs of intra ocular inflammation; low suspicion for intra-ocular infection.  -B-scan today showing vitreous debris without vitritis.  -On the ddx: dislocated lens, vitreous hemorrhage 2/2 diabetic retinopathy or intermidiate/posterior uveitis  -Both decline in VA and monocular diplopia could be explained by his dislocated lens; however will rule out etiologies for uveitis and will follow management for vitreous hemorrhage.   -Please send NIMA, ANCA, RF, CRP, ESR, Lyme titers, syphilis screen, QuantiFeron gold, toxoplasma, bartonella, ACE levels  -Primary team will consider lymphoma workup  -Agree with MRI brain and orbits w/wo, will follow  -Pt should keep head elevated at 30-degrees, avoid head shaking, bending, straining, heavy lifting  -Avoid NSAIDs unless medically indicated. Continue AC/AP per neurology/cardiology  -Ophthalmology will follow    Pt seen and discussed with Dr. Riojas    Outpatient follow-up: Patient should follow-up with his/her ophthalmologist or with [facility] Department of Ophthalmology upon discharge at the address below     [facility] Department of Ophthalmology  [address] [place] [address]. [address]  [place] [address]  [phone]     81 year old male with history of HTN, HLD, DM2, NICM, BPH, pericardial effusion (2017), on ASA presenting for increased weakness, unsteadiness and fevers. Ophthalmology consulted for left eye blurry vision and diplopia.    #Vision loss and diplopia, left eye  #Left eye subluxed intra-ocular lens  #Diabetic retinopathy, both eyes  -Pt presented with worsening vision and questionable monocular diplopia, today vision improved OU.   -Left eye intra ocular lens appears significantly dislocated with the lens itself outside of the visual axis - possibly causing decreased VA. Discussed with outpatient ophthalmology who confirmed that lens is likely more dislocated at this time compared to his last exam.   -On exam, no clear signs of intra-ocular infection; no anterior chamber cell or hypopyon, no injection or pain. Small amount of vitreous cell could be from old vitreous hemorrhage or his dislocated lens  -Pt did have a fever and weight loss (could be explained by suspected lung malignancy?) however rest of ROS for GCA is negative with normal ESR and CRP; low suspicion. In addition, the eye is white and quiet, non tender, no hypopyon, only trace signs of intra ocular inflammation; low suspicion for intra-ocular infection.  -B-scan today showing vitreous debris OS  -On the ddx: dislocated lens, vitreous hemorrhage 2/2 diabetic retinopathy or intermidiate/posterior uveitis  -Both decline in VA and monocular diplopia could be explained by his dislocated lens; however will rule out etiologies for uveitis and will follow management for vitreous hemorrhage.   -Please send CRP, ESR, Lyme titers, syphilis screen, QuantiFeron gold, toxoplasma, bartonella, ACE, lysozyme levels  -Primary team will consider lymphoma workup if necessary  -Agree with MRI brain and orbits w/wo, will follow  -Pt should keep head elevated at 30-degrees, avoid head shaking, bending, straining, heavy lifting  -Avoid NSAIDs unless medically indicated. Continue AC/AP per neurology/cardiology  -Ophthalmology will follow  - findings and plan discussed with patient and primary team    Pt seen and discussed with Dr. Riojas    Outpatient follow-up: Patient should follow-up with his/her ophthalmologist or with [facility] Department of Ophthalmology upon discharge at the address below within 1-2 days of discharge, sooner if symptoms worsen or change.    [facility] Department of Ophthalmology  [address]  [place] [address]  [phone]

## 2022-09-07 NOTE — PROGRESS NOTE ADULT - PROBLEM SELECTOR PLAN 1
Patient w/ recorded fevers to 101.8, present on arrival to ED, however w/o clear source. CXR w/ new lung nodule not present on prior CXR, concern for occult malignant process. Patient hx of progressive weight loss places higher concern for occult malignancy vs infection.  - BCx x2, UCx pending  - f/u CT A/P with contrast, CT chest with contrast, given concern for occult malignancy  - Tylenol PRN for fevers  - [9/6] start ceftriaxone and azithromycin Patient w/ recorded fevers to 101.8, present on arrival to ED, and unintentional ~8-lb weight loss. Concern for occult malignant process versus inflammatory or infectious process.   - BCx, UCx: NGTD  - CT CON Abd/Pel stable 7mm pancreatic likely cyst; liver 8.4 cm cyst, innumerable scattered cysts  - CT CON Chest L calcific density unchanged from 2009  - 2 months of new intermittent pains in hands/feet relieved with exercise  - ESR, CRP wnl  - d/c ceftriaxone and azithromycin  - Tylenol PRN for fevers  - f/u peripheral blood smear, West Nile, Echinococcus Ab, O&P, NIMA, blood parasites, HIV1/2,

## 2022-09-07 NOTE — DIETITIAN INITIAL EVALUATION ADULT - OTHER INFO
Medical course: Per chart 81 year old male with PMH of HFrEF, HTN, HLD, DM2, NICM, BPH, chronic fibrous pericarditis, bilateral pleural effusion s/p thoracocentesis (2017), Afib, and 3 months of reduced appetite and concomitant ~8-pound weight loss, presenting for new L eye vision impairment, possibly 2/2 lens dislocation, and fevers, possibly 2/2 to occult malignancy versus inflammatory or infectious process.     Nutrition interview: During time of visit patient in bed alert but tired, wife at bedside to provide information. No recent episodes of nausea, vomiting, diarrhea or constipation, last BM noted today per patient. Denies any chewing/swallowing difficulties. No food allergies. Stated UBW: 200# x1 year ago. Per chart 8# weight loss x3m, non significant. Patient with intentional 20# weight loss "years ago". Food preferences explored and noted (No meat, only fish). Per wife patient only consuming few bites of meal, appetite has been poor for a few months. Feeding skills: independent. Patient amenable to oral nutrition supplement in setting of poor appetite. Noted patient with type 2 DM, A1c 7.2%, DM education not warranted at this time due to poor appetite.

## 2022-09-07 NOTE — PROGRESS NOTE ADULT - PROBLEM SELECTOR PLAN 2
Few day hx of new L eye blurry vision. Eye exam unremarkable. CTH negative.  - f/u MR Head w/wo contrast; CTA Head and Neck with contrast  - telemetry   - appreciate ophtho and neuro recs Few day hx of new L eye blurry vision. Eye exam unremarkable. CTH negative.  - f/u MR Head/orbits w/wo contrast   - CTA Head and Neck noncontributory  - appreciate ophtho and neuro recs

## 2022-09-07 NOTE — DIETITIAN INITIAL EVALUATION ADULT - DIET TYPE
Keep diet liberalized (regular) 2/2 poor p.o. intake. If appetite improves, recommend consistent carb diet.

## 2022-09-07 NOTE — PROGRESS NOTE ADULT - SUBJECTIVE AND OBJECTIVE BOX
[facility] DEPARTMENT OF OPHTHALMOLOGY  ------------------------------------------------------------------------------  Stanley Anderson MD PGY-3  Pager: [phone], also available on teams  ------------------------------------------------------------------------------    Interval History: No acute events overnight. Today patient endorsing mild improvement in vision OS, denies eye pain, flashes, floaters, FBS, erythema, or discharge. Denies diplopia.     MEDICATIONS  (STANDING):  aspirin  chewable 81 milliGRAM(s) Oral daily  carvedilol 12.5 milliGRAM(s) Oral every 12 hours  dextrose 5%. 1000 milliLiter(s) (100 mL/Hr) IV Continuous <Continuous>  dextrose 5%. 1000 milliLiter(s) (50 mL/Hr) IV Continuous <Continuous>  dextrose 50% Injectable 25 Gram(s) IV Push once  dextrose 50% Injectable 12.5 Gram(s) IV Push once  dextrose 50% Injectable 25 Gram(s) IV Push once  enoxaparin Injectable 40 milliGRAM(s) SubCutaneous every 24 hours  folic acid 1 milliGRAM(s) Oral daily  furosemide    Tablet 20 milliGRAM(s) Oral daily  glucagon  Injectable 1 milliGRAM(s) IntraMuscular once  hydrALAZINE 50 milliGRAM(s) Oral three times a day  influenza  Vaccine (HIGH DOSE) 0.7 milliLiter(s) IntraMuscular once  insulin lispro (ADMELOG) corrective regimen sliding scale   SubCutaneous three times a day before meals  insulin lispro (ADMELOG) corrective regimen sliding scale   SubCutaneous at bedtime  lisinopril 20 milliGRAM(s) Oral daily    MEDICATIONS  (PRN):  acetaminophen     Tablet .. 650 milliGRAM(s) Oral every 6 hours PRN Temp greater or equal to 38C (100.4F), Mild Pain (1 - 3)  dextrose Oral Gel 15 Gram(s) Oral once PRN Blood Glucose LESS THAN 70 milliGRAM(s)/deciliter  melatonin 3 milliGRAM(s) Oral at bedtime PRN Insomnia      VITALS: T(C): 36.7 (09-07-22 @ 09:40)  T(F): 98 (09-07-22 @ 09:40), Max: 100.1 (09-06-22 @ 21:40)  HR: 89 (09-07-22 @ 09:40) (88 - 91)  BP: 148/99 (09-07-22 @ 09:40) (133/88 - 151/81)  RR:  (17 - 18)  SpO2:  (97% - 100%)  Wt(kg): --  General: AAO x 3, appropriate mood and affect  Visual acuity (cc): OD 20/30 OS 20/ NIPH  Pupils: PERRL OU, no APD  Ttono: OD 20 OS 19  Extraocular movements (EOMs): Full OU, no pain, no diplopia  Confrontational Visual Field (CVF): Full OU  Color Plates: OD 10/12 OS 9/12    Slit lamp exam:  External: Flat OU, no tenderness OS, temporal pulses palpated without tenderness  Lids/Lashes/Lacrimal Ducts: MGD OU    Sclera/Conjunctiva: W+Q, CAM OU  Cornea: OD nasal PTG, OS nasal and temporal PTG  Anterior Chamber: OD deep and quiet. OS tr celll/pigment, no hypopyon  Iris: Flat OU  Lens: OD PCIOL mildly sup dislocated OS significant infero-temp dislocated PCIOL    Fundus Exam: dilated with 1% tropicamide and 2.5% phenylephrine  Approval obtained from primary team for dilation  Patient aware that pupils can remained dilated for at least 4-6 hours  Exam performed with 20D lens    Vitreous: wnl OD, tr cell and tr RBC OS  Disc, cup/disc: sharp and pink, 0.3 OU  Macula: OD few DBH and exudates OS poor view   Vessels: wnl OU  Periphery: DBH OU, no RD OU, OS inf temp CHRPE    B-scan 9/7/22 OS with vitreous debris, no RD or masses.  [facility] DEPARTMENT OF OPHTHALMOLOGY  ------------------------------------------------------------------------------  Stanley Anderson MD PGY-3  Pager: [phone], also available on teams  ------------------------------------------------------------------------------    Interval History: No acute events overnight. Today patient endorsing mild improvement in vision OS, denies eye pain, flashes, floaters, FBS, erythema, or discharge. Denies diplopia.     MEDICATIONS  (STANDING):  aspirin  chewable 81 milliGRAM(s) Oral daily  carvedilol 12.5 milliGRAM(s) Oral every 12 hours  dextrose 5%. 1000 milliLiter(s) (100 mL/Hr) IV Continuous <Continuous>  dextrose 5%. 1000 milliLiter(s) (50 mL/Hr) IV Continuous <Continuous>  dextrose 50% Injectable 25 Gram(s) IV Push once  dextrose 50% Injectable 12.5 Gram(s) IV Push once  dextrose 50% Injectable 25 Gram(s) IV Push once  enoxaparin Injectable 40 milliGRAM(s) SubCutaneous every 24 hours  folic acid 1 milliGRAM(s) Oral daily  furosemide    Tablet 20 milliGRAM(s) Oral daily  glucagon  Injectable 1 milliGRAM(s) IntraMuscular once  hydrALAZINE 50 milliGRAM(s) Oral three times a day  influenza  Vaccine (HIGH DOSE) 0.7 milliLiter(s) IntraMuscular once  insulin lispro (ADMELOG) corrective regimen sliding scale   SubCutaneous three times a day before meals  insulin lispro (ADMELOG) corrective regimen sliding scale   SubCutaneous at bedtime  lisinopril 20 milliGRAM(s) Oral daily    MEDICATIONS  (PRN):  acetaminophen     Tablet .. 650 milliGRAM(s) Oral every 6 hours PRN Temp greater or equal to 38C (100.4F), Mild Pain (1 - 3)  dextrose Oral Gel 15 Gram(s) Oral once PRN Blood Glucose LESS THAN 70 milliGRAM(s)/deciliter  melatonin 3 milliGRAM(s) Oral at bedtime PRN Insomnia      VITALS: T(C): 36.7 (09-07-22 @ 09:40)  T(F): 98 (09-07-22 @ 09:40), Max: 100.1 (09-06-22 @ 21:40)  HR: 89 (09-07-22 @ 09:40) (88 - 91)  BP: 148/99 (09-07-22 @ 09:40) (133/88 - 151/81)  RR:  (17 - 18)  SpO2:  (97% - 100%)  Wt(kg): --  General: AAO x 3, appropriate mood and affect  Visual acuity (cc): OD 20/30 OS 20/ NIPH  Pupils: PERRL OU, no APD  Ttono: OD 20 OS 19  Extraocular movements (EOMs): Full OU, no pain, no diplopia  Confrontational Visual Field (CVF): Full OU  Color Plates: OD 10/12 OS 9/12    Slit lamp exam:  External: Flat OU, no tenderness OS, temporal pulses palpated without tenderness  Lids/Lashes/Lacrimal Ducts: MGD OU    Sclera/Conjunctiva: W+Q, CAM OU  Cornea: OD nasal PTG, OS nasal and temporal PTG  Anterior Chamber: OD deep and quiet. OS tr celll/pigment, no hypopyon  Iris: Flat OU  Lens: OD PCIOL mildly subluxed OS significant infero-temp subluxed PCIOL    Fundus Exam: dilated with 1% tropicamide and 2.5% phenylephrine  Approval obtained from primary team for dilation  Patient aware that pupils can remained dilated for at least 4-6 hours  Exam performed with 20D lens    Vitreous: wnl OD, tr cell and tr RBC OS  Disc, cup/disc: sharp and pink, 0.3 OU  Macula: OD few DBH and exudates OS poor view   Vessels: wnl OU  Periphery: DBH OU, no RD OU, OS inf temp CHRPE    B-scan 9/7/22 OS with vitreous debris, no RD or masses.  [facility] DEPARTMENT OF OPHTHALMOLOGY  ------------------------------------------------------------------------------  Stanley Anderson MD PGY-3  Pager: [phone], also available on teams  ------------------------------------------------------------------------------    Interval History: No acute events overnight. Today patient endorsing mild improvement in vision OS, denies eye pain, flashes, floaters, FBS, erythema, or discharge. Denies diplopia.  Follow up for subluxed IOL and vit cell OS.    MEDICATIONS  (STANDING):  aspirin  chewable 81 milliGRAM(s) Oral daily  carvedilol 12.5 milliGRAM(s) Oral every 12 hours  dextrose 5%. 1000 milliLiter(s) (100 mL/Hr) IV Continuous <Continuous>  dextrose 5%. 1000 milliLiter(s) (50 mL/Hr) IV Continuous <Continuous>  dextrose 50% Injectable 25 Gram(s) IV Push once  dextrose 50% Injectable 12.5 Gram(s) IV Push once  dextrose 50% Injectable 25 Gram(s) IV Push once  enoxaparin Injectable 40 milliGRAM(s) SubCutaneous every 24 hours  folic acid 1 milliGRAM(s) Oral daily  furosemide    Tablet 20 milliGRAM(s) Oral daily  glucagon  Injectable 1 milliGRAM(s) IntraMuscular once  hydrALAZINE 50 milliGRAM(s) Oral three times a day  influenza  Vaccine (HIGH DOSE) 0.7 milliLiter(s) IntraMuscular once  insulin lispro (ADMELOG) corrective regimen sliding scale   SubCutaneous three times a day before meals  insulin lispro (ADMELOG) corrective regimen sliding scale   SubCutaneous at bedtime  lisinopril 20 milliGRAM(s) Oral daily    MEDICATIONS  (PRN):  acetaminophen     Tablet .. 650 milliGRAM(s) Oral every 6 hours PRN Temp greater or equal to 38C (100.4F), Mild Pain (1 - 3)  dextrose Oral Gel 15 Gram(s) Oral once PRN Blood Glucose LESS THAN 70 milliGRAM(s)/deciliter  melatonin 3 milliGRAM(s) Oral at bedtime PRN Insomnia      VITALS: T(C): 36.7 (09-07-22 @ 09:40)  T(F): 98 (09-07-22 @ 09:40), Max: 100.1 (09-06-22 @ 21:40)  HR: 89 (09-07-22 @ 09:40) (88 - 91)  BP: 148/99 (09-07-22 @ 09:40) (133/88 - 151/81)  RR:  (17 - 18)  SpO2:  (97% - 100%)  Wt(kg): --  General: AAO x 3, appropriate mood and affect  Visual acuity (cc): OD 20/30 OS 20/100 NIPH  Pupils: PERRL OU, no APD  Ttono: OD 20 OS 19  Extraocular movements (EOMs): Full OU, no pain, no diplopia  Confrontational Visual Field (CVF): Full OU  Color Plates: OD 10/12 OS 9/12    Slit lamp exam:  External: Flat OU, no tenderness OS, temporal pulses palpated without tenderness  Lids/Lashes/Lacrimal Ducts: MGD OU    Sclera/Conjunctiva: W+Q, CAM OU  Cornea: OD nasal PTG, OS nasal and temporal PTG  Anterior Chamber: OD deep and quiet. OS tr celll/pigment, no hypopyon  Iris: Flat OU  Lens: OD PCIOL, OS significant infero-temp subluxed PCIOL    Fundus Exam: dilated with 1% tropicamide and 2.5% phenylephrine  Approval obtained from primary team for dilation  Patient aware that pupils can remained dilated for at least 4-6 hours  Exam performed with 20D lens    Vitreous: wnl OD, tr cell and tr RBC OS  Disc, cup/disc: sharp and pink, 0.3 OU  Macula: OD few DBH and exudates OS poor view   Vessels: wnl OU  Periphery: DBH OU, no RD OU, OS inf temp CHRPE    B-scan 9/7/22 OS with vitreous debris, no RD or masses.

## 2022-09-07 NOTE — DIETITIAN INITIAL EVALUATION ADULT - ADD RECOMMEND
1) Recommend regular diet in setting of poor p.o. intake. If appetite improves recommend consistent carb diet.  2) Monitor weights, labs, BM's, skin integrity, p.o. intake.   3) Encourage PO intake and honor food preferences as able.  4) RD available prn  5) May consider appetite stimulant if poor appetite persists.

## 2022-09-07 NOTE — DIETITIAN INITIAL EVALUATION ADULT - PERTINENT LABORATORY DATA
09-07 Na 132 mmol/L<L> Glu 174 mg/dL<H> K+ 3.7 mmol/L Cr 1.00 mg/dL BUN 9 mg/dL Phos 2.6 mg/dL  09-07 @ 12:14 POCT 224 mg/dL    TPro  6.2  /  Alb  3.5  /  TBili  0.9  /  DBili  x   /  AST  20  /  ALT  16  /  AlkPhos  48  09-07  POCT Blood Glucose.: 224 mg/dL (09-07-22 @ 12:14)  A1C with Estimated Average Glucose Result: 7.2 % (09-05-22 @ 19:40)

## 2022-09-07 NOTE — PROGRESS NOTE ADULT - SUBJECTIVE AND OBJECTIVE BOX
Progress Note     == draft in progress ==  Jackie Perdue MD, PhD  PGY-1 Medicine  Teams | [id number]    Patient is a 81y old  Male who presents with a chief complaint of Fevers of Unknown Origin, New L eye vision impairment (07 Sep 2022 10:32)          SUBJECTIVE / INTERVAL EVENTS         MEDICATIONS    Home Medications:  aspirin 81 mg oral tablet: 1  orally once a day (06 Sep 2022 02:30)  carvedilol 12.5 mg oral tablet: 1 tab(s) orally 2 times a day (06 Sep 2022 02:30)  folic acid 1 mg oral tablet: 1 tab(s) orally once a day (06 Sep 2022 02:30)  furosemide 20 mg oral tablet: 1 tab(s) orally once a day (06 Sep 2022 02:30)  hydrALAZINE 50 mg oral tablet: 1 tab(s) orally 3 times a day (06 Sep 2022 02:30)  Januvia 100 mg oral tablet: 1 tab(s) orally once a day (06 Sep 2022 02:30)  metFORMIN 1000 mg oral tablet: 1 tab(s) orally 2 times a day (06 Sep 2022 02:30)  pravastatin 40 mg oral tablet: 1 tab(s) orally once a day (06 Sep 2022 02:30)  ramipril 5 mg oral tablet: 1 cap(s) orally once a day (06 Sep 2022 02:30)      MEDICATIONS  (STANDING):  aspirin  chewable 81 milliGRAM(s) Oral daily  carvedilol 12.5 milliGRAM(s) Oral every 12 hours  dextrose 5%. 1000 milliLiter(s) (50 mL/Hr) IV Continuous <Continuous>  dextrose 5%. 1000 milliLiter(s) (100 mL/Hr) IV Continuous <Continuous>  dextrose 50% Injectable 25 Gram(s) IV Push once  dextrose 50% Injectable 12.5 Gram(s) IV Push once  dextrose 50% Injectable 25 Gram(s) IV Push once  enoxaparin Injectable 40 milliGRAM(s) SubCutaneous every 24 hours  folic acid 1 milliGRAM(s) Oral daily  furosemide    Tablet 20 milliGRAM(s) Oral daily  glucagon  Injectable 1 milliGRAM(s) IntraMuscular once  hydrALAZINE 50 milliGRAM(s) Oral three times a day  influenza  Vaccine (HIGH DOSE) 0.7 milliLiter(s) IntraMuscular once  insulin lispro (ADMELOG) corrective regimen sliding scale   SubCutaneous three times a day before meals  insulin lispro (ADMELOG) corrective regimen sliding scale   SubCutaneous at bedtime  lisinopril 20 milliGRAM(s) Oral daily    MEDICATIONS  (PRN):  acetaminophen     Tablet .. 650 milliGRAM(s) Oral every 6 hours PRN Temp greater or equal to 38C (100.4F), Mild Pain (1 - 3)  dextrose Oral Gel 15 Gram(s) Oral once PRN Blood Glucose LESS THAN 70 milliGRAM(s)/deciliter  melatonin 3 milliGRAM(s) Oral at bedtime PRN Insomnia         VITALS / I&O's  T(C): 36.7 (22 @ 09:40), Max: 37.8 (22 @ 21:40)  HR: 89 (22 @ 09:40) (88 - 91)  BP: 148/99 (22 @ 09:40) (132/81 - 151/81)  RR: 17 (22 @ 09:40) (17 - 18)  SpO2: 100% (22 @ 09:40) (97% - 100%)  I&O's Summary    06 Sep 2022 07:  -  07 Sep 2022 07:00  --------------------------------------------------------  IN: 180 mL / OUT: 500 mL / NET: -320 mL    07 Sep 2022 07:  -  07 Sep 2022 11:26  --------------------------------------------------------  IN: 0 mL / OUT: 200 mL / NET: -200 mL           PHYSICAL EXAM           LABS                        12.2   5.68  )-----------( 170      ( 07 Sep 2022 05:30 )             35.2         132<L>  |  96<L>  |  9   ----------------------------<  174<H>  3.7   |  25  |  1.00    Ca    8.7      07 Sep 2022 05:30  Phos  2.6     09-07  Mg     1.70         TPro  6.2  /  Alb  3.5  /  TBili  0.9  /  DBili  x   /  AST  20  /  ALT  16  /  AlkPhos  48  09-07    CAPILLARY BLOOD GLUCOSE      POCT Blood Glucose.: 175 mg/dL (07 Sep 2022 08:46)  POCT Blood Glucose.: 177 mg/dL (06 Sep 2022 22:02)  POCT Blood Glucose.: 115 mg/dL (06 Sep 2022 17:40)  POCT Blood Glucose.: 216 mg/dL (06 Sep 2022 12:35)      LIVER FUNCTIONS - ( 07 Sep 2022 05:30 )  Alb: 3.5 g/dL / Pro: 6.2 g/dL / ALK PHOS: 48 U/L / ALT: 16 U/L / AST: 20 U/L / GGT: x                   Urinalysis Basic - ( 05 Sep 2022 20:30 )    Color: Light Yellow / Appearance: Clear / S.010 / pH: x  Gluc: x / Ketone: Negative  / Bili: Negative / Urobili: <2 mg/dL   Blood: x / Protein: Trace / Nitrite: Negative   Leuk Esterase: Negative / RBC: tnp /HPF / WBC tnp /HPF   Sq Epi: x / Non Sq Epi: x / Bacteria: x        Culture - Blood (collected 05 Sep 2022 19:40)  Source: .Blood Blood-Peripheral  Preliminary Report (07 Sep 2022 04:01):    No growth to date.    Culture - Blood (collected 05 Sep 2022 19:30)  Source: .Blood Blood-Peripheral  Preliminary Report (07 Sep 2022 04:01):    No growth to date.             RADIOLOGY & ADDITIONAL TESTS    Imaging Personally Reviewed:     Consultant(s) Notes Reviewed:     Care Discussed with Consultants/Other Providers:     Progress Note     == draft in progress ==  Jackie Perdue MD, PhD  PGY-1 Medicine  Teams | [id number]    Patient is a 81y old  Male who presents with a chief complaint of Fevers of Unknown Origin, New L eye vision impairment (07 Sep 2022 10:32)          SUBJECTIVE / INTERVAL EVENTS  Interviewed with PCT Samuel jasso at bedside.   Patient endorsed feeling okay. He said he had a mild headache 5/10; when asked if would like pain medication, said already had Tylenol. He denied chest pain, shortness of breath, abdominal pain, pain in his arms, hands, legs, or feet; numbness or tingling; new vision changes; dysuria or hematuria.   He traveled to [place] in January and recently, for a , to [place], [place]. He denies hiking or being in grassy areas; he does not recall any tick bites. He was last in [facility] 13 years ago. He is retired. 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 He denied new sexual partners or being hurt by sharps recently.  He said he his fever started on Saturday on the same day as his reduction in vision of the left eye.  He said that years ago, his doctor asked him to lose weight, and he subsequently lost 20 pounds. He said that for the last 3 months, he has had no appetite. He says he eats normally and does not have early satiety. He denied vomiting, faintness, or dizziness. He said he had painful cramps of his feet and hands that started a couple of months ago; he        MEDICATIONS    Home Medications:  aspirin 81 mg oral tablet: 1  orally once a day (06 Sep 2022 02:30)  carvedilol 12.5 mg oral tablet: 1 tab(s) orally 2 times a day (06 Sep 2022 02:30)  folic acid 1 mg oral tablet: 1 tab(s) orally once a day (06 Sep 2022 02:30)  furosemide 20 mg oral tablet: 1 tab(s) orally once a day (06 Sep 2022 02:30)  hydrALAZINE 50 mg oral tablet: 1 tab(s) orally 3 times a day (06 Sep 2022 02:30)  Januvia 100 mg oral tablet: 1 tab(s) orally once a day (06 Sep 2022 02:30)  metFORMIN 1000 mg oral tablet: 1 tab(s) orally 2 times a day (06 Sep 2022 02:30)  pravastatin 40 mg oral tablet: 1 tab(s) orally once a day (06 Sep 2022 02:30)  ramipril 5 mg oral tablet: 1 cap(s) orally once a day (06 Sep 2022 02:30)      MEDICATIONS  (STANDING):  aspirin  chewable 81 milliGRAM(s) Oral daily  carvedilol 12.5 milliGRAM(s) Oral every 12 hours  dextrose 5%. 1000 milliLiter(s) (50 mL/Hr) IV Continuous <Continuous>  dextrose 5%. 1000 milliLiter(s) (100 mL/Hr) IV Continuous <Continuous>  dextrose 50% Injectable 25 Gram(s) IV Push once  dextrose 50% Injectable 12.5 Gram(s) IV Push once  dextrose 50% Injectable 25 Gram(s) IV Push once  enoxaparin Injectable 40 milliGRAM(s) SubCutaneous every 24 hours  folic acid 1 milliGRAM(s) Oral daily  furosemide    Tablet 20 milliGRAM(s) Oral daily  glucagon  Injectable 1 milliGRAM(s) IntraMuscular once  hydrALAZINE 50 milliGRAM(s) Oral three times a day  influenza  Vaccine (HIGH DOSE) 0.7 milliLiter(s) IntraMuscular once  insulin lispro (ADMELOG) corrective regimen sliding scale   SubCutaneous three times a day before meals  insulin lispro (ADMELOG) corrective regimen sliding scale   SubCutaneous at bedtime  lisinopril 20 milliGRAM(s) Oral daily    MEDICATIONS  (PRN):  acetaminophen     Tablet .. 650 milliGRAM(s) Oral every 6 hours PRN Temp greater or equal to 38C (100.4F), Mild Pain (1 - 3)  dextrose Oral Gel 15 Gram(s) Oral once PRN Blood Glucose LESS THAN 70 milliGRAM(s)/deciliter  melatonin 3 milliGRAM(s) Oral at bedtime PRN Insomnia         VITALS / I&O's  T(C): 36.7 (22 @ 09:40), Max: 37.8 (22 @ 21:40)  HR: 89 (22 @ 09:40) (88 - 91)  BP: 148/99 (22 @ 09:40) (132/81 - 151/81)  RR: 17 (22 @ 09:40) (17 - 18)  SpO2: 100% (22 @ 09:40) (97% - 100%)  I&O's Summary    06 Sep 2022 07:01  -  07 Sep 2022 07:00  --------------------------------------------------------  IN: 180 mL / OUT: 500 mL / NET: -320 mL    07 Sep 2022 07:01  -  07 Sep 2022 11:26  --------------------------------------------------------  IN: 0 mL / OUT: 200 mL / NET: -200 mL           PHYSICAL EXAM           LABS                        12.2   5.68  )-----------( 170      ( 07 Sep 2022 05:30 )             35.2     09-07    132<L>  |  96<L>  |  9   ----------------------------<  174<H>  3.7   |  25  |  1.00    Ca    8.7      07 Sep 2022 05:30  Phos  2.6     -  Mg     1.70         TPro  6.2  /  Alb  3.5  /  TBili  0.9  /  DBili  x   /  AST  20  /  ALT  16  /  AlkPhos  48  09-07    CAPILLARY BLOOD GLUCOSE      POCT Blood Glucose.: 175 mg/dL (07 Sep 2022 08:46)  POCT Blood Glucose.: 177 mg/dL (06 Sep 2022 22:02)  POCT Blood Glucose.: 115 mg/dL (06 Sep 2022 17:40)  POCT Blood Glucose.: 216 mg/dL (06 Sep 2022 12:35)      LIVER FUNCTIONS - ( 07 Sep 2022 05:30 )  Alb: 3.5 g/dL / Pro: 6.2 g/dL / ALK PHOS: 48 U/L / ALT: 16 U/L / AST: 20 U/L / GGT: x                   Urinalysis Basic - ( 05 Sep 2022 20:30 )    Color: Light Yellow / Appearance: Clear / S.010 / pH: x  Gluc: x / Ketone: Negative  / Bili: Negative / Urobili: <2 mg/dL   Blood: x / Protein: Trace / Nitrite: Negative   Leuk Esterase: Negative / RBC: tnp /HPF / WBC tnp /HPF   Sq Epi: x / Non Sq Epi: x / Bacteria: x        Culture - Blood (collected 05 Sep 2022 19:40)  Source: .Blood Blood-Peripheral  Preliminary Report (07 Sep 2022 04:01):    No growth to date.    Culture - Blood (collected 05 Sep 2022 19:30)  Source: .Blood Blood-Peripheral  Preliminary Report (07 Sep 2022 04:01):    No growth to date.             RADIOLOGY & ADDITIONAL TESTS    Imaging Personally Reviewed:     Consultant(s) Notes Reviewed:     Care Discussed with Consultants/Other Providers:     Progress Note     == draft in progress ==  Jackie Perdue MD, PhD  PGY-1 Medicine  Teams | [id number]    Patient is a 81y old  Male who presents with a chief complaint of Fevers of Unknown Origin, New L eye vision impairment (07 Sep 2022 10:32)          SUBJECTIVE / INTERVAL EVENTS  Interviewed with PCT Samuel jasso at bedside.   Patient endorsed feeling okay. He said he had a mild headache 5/10; when asked if would like pain medication, said already had Tylenol. He denied chest pain, shortness of breath, abdominal pain, pain in his arms, hands, legs, or feet; numbness or tingling; new vision changes; dysuria or hematuria.   He traveled to [place] in January and recently, for a , to [place], [place]. He denies hiking or being in grassy areas; he does not recall any tick bites. He was last in [facility] 13 years ago. He is retired. 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 He denied new sexual partners or being hurt by sharps recently.  He said he his fever started on Saturday on the same day as his reduction in vision of the left eye.  He said that years ago, his doctor asked him to lose weight, and he subsequently lost 20 pounds. He said that for the last 3 months, he has had no appetite. He says he eats normally and does not have early satiety. He denied vomiting, faintness, or dizziness. He said he had painful cramps of his feet and hands that started a couple of months ago and was new; the pain did not occur everyday or last the whole day when it did; it was alleviated by walking; he was not having this pain today.          MEDICATIONS    Home Medications:  aspirin 81 mg oral tablet: 1  orally once a day (06 Sep 2022 02:30)  carvedilol 12.5 mg oral tablet: 1 tab(s) orally 2 times a day (06 Sep 2022 02:30)  folic acid 1 mg oral tablet: 1 tab(s) orally once a day (06 Sep 2022 02:30)  furosemide 20 mg oral tablet: 1 tab(s) orally once a day (06 Sep 2022 02:30)  hydrALAZINE 50 mg oral tablet: 1 tab(s) orally 3 times a day (06 Sep 2022 02:30)  Januvia 100 mg oral tablet: 1 tab(s) orally once a day (06 Sep 2022 02:30)  metFORMIN 1000 mg oral tablet: 1 tab(s) orally 2 times a day (06 Sep 2022 02:30)  pravastatin 40 mg oral tablet: 1 tab(s) orally once a day (06 Sep 2022 02:30)  ramipril 5 mg oral tablet: 1 cap(s) orally once a day (06 Sep 2022 02:30)      MEDICATIONS  (STANDING):  aspirin  chewable 81 milliGRAM(s) Oral daily  carvedilol 12.5 milliGRAM(s) Oral every 12 hours  dextrose 5%. 1000 milliLiter(s) (50 mL/Hr) IV Continuous <Continuous>  dextrose 5%. 1000 milliLiter(s) (100 mL/Hr) IV Continuous <Continuous>  dextrose 50% Injectable 25 Gram(s) IV Push once  dextrose 50% Injectable 12.5 Gram(s) IV Push once  dextrose 50% Injectable 25 Gram(s) IV Push once  enoxaparin Injectable 40 milliGRAM(s) SubCutaneous every 24 hours  folic acid 1 milliGRAM(s) Oral daily  furosemide    Tablet 20 milliGRAM(s) Oral daily  glucagon  Injectable 1 milliGRAM(s) IntraMuscular once  hydrALAZINE 50 milliGRAM(s) Oral three times a day  influenza  Vaccine (HIGH DOSE) 0.7 milliLiter(s) IntraMuscular once  insulin lispro (ADMELOG) corrective regimen sliding scale   SubCutaneous three times a day before meals  insulin lispro (ADMELOG) corrective regimen sliding scale   SubCutaneous at bedtime  lisinopril 20 milliGRAM(s) Oral daily    MEDICATIONS  (PRN):  acetaminophen     Tablet .. 650 milliGRAM(s) Oral every 6 hours PRN Temp greater or equal to 38C (100.4F), Mild Pain (1 - 3)  dextrose Oral Gel 15 Gram(s) Oral once PRN Blood Glucose LESS THAN 70 milliGRAM(s)/deciliter  melatonin 3 milliGRAM(s) Oral at bedtime PRN Insomnia         VITALS / I&O's  T(C): 36.7 (22 @ 09:40), Max: 37.8 (22 @ 21:40)  HR: 89 (22 @ 09:40) (88 - 91)  BP: 148/99 (22 @ 09:40) (132/81 - 151/81)  RR: 17 (22 @ 09:40) (17 - 18)  SpO2: 100% (22 @ 09:40) (97% - 100%)    2022: 81.6 kg (179.9 lbs)  2022: 78.1 kg (172.2 lbs), BMI 24.7  ~ 8lbs weight loss in ~3 months    06 Sep 2022 07:01  -  07 Sep 2022 07:00  --------------------------------------------------------  IN: 180 mL / OUT: 500 mL / NET: -320 mL    07 Sep 2022 07:01  -  07 Sep 2022 11:26  --------------------------------------------------------  IN: 0 mL / OUT: 200 mL / NET: -200 mL           PHYSICAL EXAM  Constitutional: patient lying in bed in no acute distress  HEENT: NCAT, external ocular movements grossly intact, apparently not hard of hearing  Neuro: alert, oriented*3, moving all extremities  CV: irregularly irregular, S1/S2, no pedal edema; extremities warm  Pulm: clear lung fields bilaterally. No rales, rhonchi, or wheezing  Abdomen: soft nontender nondistended, bowel sounds present  : suprapubic area nontender nondistended  Skin: no rashes on face, back, arms, or lower extremities  Psych: appropriate mood and affect             LABS                        12.2   5.68  )-----------( 170      ( 07 Sep 2022 05:30 )             35.2         132<L>  |  96<L>  |  9   ----------------------------<  174<H>  3.7   |  25  |  1.00    Ca    8.7      07 Sep 2022 05:30  Phos  2.6     09-07  Mg     1.70     09-    TPro  6.2  /  Alb  3.5  /  TBili  0.9  /  DBili  x   /  AST  20  /  ALT  16  /  AlkPhos  48  -    CAPILLARY BLOOD GLUCOSE      POCT Blood Glucose.: 175 mg/dL (07 Sep 2022 08:46)  POCT Blood Glucose.: 177 mg/dL (06 Sep 2022 22:02)  POCT Blood Glucose.: 115 mg/dL (06 Sep 2022 17:40)  POCT Blood Glucose.: 216 mg/dL (06 Sep 2022 12:35)      LIVER FUNCTIONS - ( 07 Sep 2022 05:30 )  Alb: 3.5 g/dL / Pro: 6.2 g/dL / ALK PHOS: 48 U/L / ALT: 16 U/L / AST: 20 U/L / GGT: x                   Urinalysis Basic - ( 05 Sep 2022 20:30 )    Color: Light Yellow / Appearance: Clear / S.010 / pH: x  Gluc: x / Ketone: Negative  / Bili: Negative / Urobili: <2 mg/dL   Blood: x / Protein: Trace / Nitrite: Negative   Leuk Esterase: Negative / RBC: tnp /HPF / WBC tnp /HPF   Sq Epi: x / Non Sq Epi: x / Bacteria: x        Culture - Blood (collected 05 Sep 2022 19:40)  Source: .Blood Blood-Peripheral  Preliminary Report (07 Sep 2022 04:01):    No growth to date.    Culture - Blood (collected 05 Sep 2022 19:30)  Source: .Blood Blood-Peripheral  Preliminary Report (07 Sep 2022 04:01):    No growth to date.         RADIOLOGY & ADDITIONAL TESTS      Imaging Personally Reviewed.  Summary of radiology reports:   CTA CON Head/Neck L temporal ~2cm lipoma  CT CON Abd/Pel stable 7mm pancreatic likely cyst; liver 8.4 cm cyst, innumerable scattered cysts;   CT CON Chest L calcific density unchanged from 2009    Ophtho note: dislocated L lens             Progress Note     Jackie Perdue MD, PhD  PGY-1 Medicine  Teams | [id number]    Patient is a 81y old  Male who presents with a chief complaint of Fevers of Unknown Origin, New L eye vision impairment (07 Sep 2022 10:32)          SUBJECTIVE / INTERVAL EVENTS  Interviewed with PCT Samuel jasso at bedside.   Patient endorsed feeling okay. He said he had a mild headache 5/10; when asked if would like pain medication, said already had Tylenol. He denied chest pain, shortness of breath, abdominal pain, pain in his arms, hands, legs, or feet; numbness or tingling; new vision changes; dysuria or hematuria.   He traveled to [place] in January and recently, for a , to [place], [place]. He denies hiking or being in grassy areas; he does not recall any tick bites. He was last in [facility] 13 years ago. He is retired. 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 He denied new sexual partners or being hurt by sharps recently.  He said he his fever started on Saturday on the same day as his reduction in vision of the left eye.  He said that years ago, his doctor asked him to lose weight, and he subsequently lost 20 pounds. He said that for the last 3 months, he has had no appetite. He says he eats normally and does not have early satiety. He denied vomiting, faintness, or dizziness. He said he had painful cramps of his feet and hands that started a couple of months ago and was new; the pain did not occur everyday or last the whole day when it did; it was alleviated by walking; he was not having this pain today.          MEDICATIONS    Home Medications:  aspirin 81 mg oral tablet: 1  orally once a day (06 Sep 2022 02:30)  carvedilol 12.5 mg oral tablet: 1 tab(s) orally 2 times a day (06 Sep 2022 02:30)  folic acid 1 mg oral tablet: 1 tab(s) orally once a day (06 Sep 2022 02:30)  furosemide 20 mg oral tablet: 1 tab(s) orally once a day (06 Sep 2022 02:30)  hydrALAZINE 50 mg oral tablet: 1 tab(s) orally 3 times a day (06 Sep 2022 02:30)  Januvia 100 mg oral tablet: 1 tab(s) orally once a day (06 Sep 2022 02:30)  metFORMIN 1000 mg oral tablet: 1 tab(s) orally 2 times a day (06 Sep 2022 02:30)  pravastatin 40 mg oral tablet: 1 tab(s) orally once a day (06 Sep 2022 02:30)  ramipril 5 mg oral tablet: 1 cap(s) orally once a day (06 Sep 2022 02:30)      MEDICATIONS  (STANDING):  aspirin  chewable 81 milliGRAM(s) Oral daily  carvedilol 12.5 milliGRAM(s) Oral every 12 hours  dextrose 5%. 1000 milliLiter(s) (50 mL/Hr) IV Continuous <Continuous>  dextrose 5%. 1000 milliLiter(s) (100 mL/Hr) IV Continuous <Continuous>  dextrose 50% Injectable 25 Gram(s) IV Push once  dextrose 50% Injectable 12.5 Gram(s) IV Push once  dextrose 50% Injectable 25 Gram(s) IV Push once  enoxaparin Injectable 40 milliGRAM(s) SubCutaneous every 24 hours  folic acid 1 milliGRAM(s) Oral daily  furosemide    Tablet 20 milliGRAM(s) Oral daily  glucagon  Injectable 1 milliGRAM(s) IntraMuscular once  hydrALAZINE 50 milliGRAM(s) Oral three times a day  influenza  Vaccine (HIGH DOSE) 0.7 milliLiter(s) IntraMuscular once  insulin lispro (ADMELOG) corrective regimen sliding scale   SubCutaneous three times a day before meals  insulin lispro (ADMELOG) corrective regimen sliding scale   SubCutaneous at bedtime  lisinopril 20 milliGRAM(s) Oral daily    MEDICATIONS  (PRN):  acetaminophen     Tablet .. 650 milliGRAM(s) Oral every 6 hours PRN Temp greater or equal to 38C (100.4F), Mild Pain (1 - 3)  dextrose Oral Gel 15 Gram(s) Oral once PRN Blood Glucose LESS THAN 70 milliGRAM(s)/deciliter  melatonin 3 milliGRAM(s) Oral at bedtime PRN Insomnia         VITALS / I&O's  T(C): 36.7 (22 @ 09:40), Max: 37.8 (22 @ 21:40)  HR: 89 (22 @ 09:40) (88 - 91)  BP: 148/99 (22 @ 09:40) (132/81 - 151/81)  RR: 17 (22 @ 09:40) (17 - 18)  SpO2: 100% (22 @ 09:40) (97% - 100%)    2022: 81.6 kg (179.9 lbs)  2022: 78.1 kg (172.2 lbs), BMI 24.7  ~ 8lbs weight loss in ~3 months    06 Sep 2022 07:01  -  07 Sep 2022 07:00  --------------------------------------------------------  IN: 180 mL / OUT: 500 mL / NET: -320 mL    07 Sep 2022 07:01  -  07 Sep 2022 11:26  --------------------------------------------------------  IN: 0 mL / OUT: 200 mL / NET: -200 mL           PHYSICAL EXAM  Constitutional: patient lying in bed in no acute distress  HEENT: NCAT, external ocular movements grossly intact, apparently not hard of hearing  Neuro: alert, oriented*3, moving all extremities  CV: irregularly irregular, S1/S2, no pedal edema; extremities warm  Pulm: clear lung fields bilaterally. No rales, rhonchi, or wheezing  Abdomen: soft nontender nondistended, bowel sounds present  : suprapubic area nontender nondistended  Skin: no rashes on face, back, arms, or lower extremities  Psych: appropriate mood and affect    tele: 1 event with 3 consecutive VTach beats; multiple events of Afib / irregular rate         LABS                        12.2   5.68  )-----------( 170      ( 07 Sep 2022 05:30 )             35.2         132<L>  |  96<L>  |  9   ----------------------------<  174<H>  3.7   |  25  |  1.00    Ca    8.7      07 Sep 2022 05:30  Phos  2.6     09-07  Mg     1.70     09-    TPro  6.2  /  Alb  3.5  /  TBili  0.9  /  DBili  x   /  AST  20  /  ALT  16  /  AlkPhos  48  09-07    CAPILLARY BLOOD GLUCOSE      POCT Blood Glucose.: 175 mg/dL (07 Sep 2022 08:46)  POCT Blood Glucose.: 177 mg/dL (06 Sep 2022 22:02)  POCT Blood Glucose.: 115 mg/dL (06 Sep 2022 17:40)  POCT Blood Glucose.: 216 mg/dL (06 Sep 2022 12:35)      LIVER FUNCTIONS - ( 07 Sep 2022 05:30 )  Alb: 3.5 g/dL / Pro: 6.2 g/dL / ALK PHOS: 48 U/L / ALT: 16 U/L / AST: 20 U/L / GGT: x                   Urinalysis Basic - ( 05 Sep 2022 20:30 )    Color: Light Yellow / Appearance: Clear / S.010 / pH: x  Gluc: x / Ketone: Negative  / Bili: Negative / Urobili: <2 mg/dL   Blood: x / Protein: Trace / Nitrite: Negative   Leuk Esterase: Negative / RBC: tnp /HPF / WBC tnp /HPF   Sq Epi: x / Non Sq Epi: x / Bacteria: x        Culture - Blood (collected 05 Sep 2022 19:40)  Source: .Blood Blood-Peripheral  Preliminary Report (07 Sep 2022 04:01):    No growth to date.    Culture - Blood (collected 05 Sep 2022 19:30)  Source: .Blood Blood-Peripheral  Preliminary Report (07 Sep 2022 04:01):    No growth to date.         RADIOLOGY & ADDITIONAL TESTS      Imaging Personally Reviewed.  Summary of radiology reports:   CTA CON Head/Neck L temporal ~2cm lipoma  CT CON Abd/Pel stable 7mm pancreatic likely cyst; liver 8.4 cm cyst, innumerable scattered cysts;   CT CON Chest L calcific density unchanged from 2009    Ophtho note: dislocated L lens

## 2022-09-07 NOTE — PROGRESS NOTE ADULT - ASSESSMENT
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

## 2022-09-07 NOTE — DIETITIAN INITIAL EVALUATION ADULT - NS FNS WEIGHT CHANGE REASON
Wife states 200# x1 year ago. Per [facility] patients # x1 year ago./unintentional

## 2022-09-08 LAB
ALBUMIN SERPL ELPH-MCNC: 3.7 G/DL — SIGNIFICANT CHANGE UP (ref 3.3–5)
ALP SERPL-CCNC: 53 U/L — SIGNIFICANT CHANGE UP (ref 40–120)
ALT FLD-CCNC: 20 U/L — SIGNIFICANT CHANGE UP (ref 4–41)
ANION GAP SERPL CALC-SCNC: 15 MMOL/L — HIGH (ref 7–14)
AST SERPL-CCNC: 24 U/L — SIGNIFICANT CHANGE UP (ref 4–40)
BASOPHILS # BLD AUTO: 0.01 K/UL — SIGNIFICANT CHANGE UP (ref 0–0.2)
BASOPHILS NFR BLD AUTO: 0.2 % — SIGNIFICANT CHANGE UP (ref 0–2)
BILIRUB SERPL-MCNC: 0.8 MG/DL — SIGNIFICANT CHANGE UP (ref 0.2–1.2)
BUN SERPL-MCNC: 10 MG/DL — SIGNIFICANT CHANGE UP (ref 7–23)
CALCIUM SERPL-MCNC: 9.2 MG/DL — SIGNIFICANT CHANGE UP (ref 8.4–10.5)
CHLORIDE SERPL-SCNC: 96 MMOL/L — LOW (ref 98–107)
CO2 SERPL-SCNC: 23 MMOL/L — SIGNIFICANT CHANGE UP (ref 22–31)
CREAT SERPL-MCNC: 0.97 MG/DL — SIGNIFICANT CHANGE UP (ref 0.5–1.3)
CRP SERPL-MCNC: <3 MG/L — SIGNIFICANT CHANGE UP
EGFR: 78 ML/MIN/1.73M2 — SIGNIFICANT CHANGE UP
EOSINOPHIL # BLD AUTO: 0.03 K/UL — SIGNIFICANT CHANGE UP (ref 0–0.5)
EOSINOPHIL NFR BLD AUTO: 0.7 % — SIGNIFICANT CHANGE UP (ref 0–6)
ERYTHROCYTE [SEDIMENTATION RATE] IN BLOOD: 7 MM/HR — SIGNIFICANT CHANGE UP (ref 1–15)
GLUCOSE BLDC GLUCOMTR-MCNC: 165 MG/DL — HIGH (ref 70–99)
GLUCOSE BLDC GLUCOMTR-MCNC: 212 MG/DL — HIGH (ref 70–99)
GLUCOSE BLDC GLUCOMTR-MCNC: 249 MG/DL — HIGH (ref 70–99)
GLUCOSE BLDC GLUCOMTR-MCNC: 258 MG/DL — HIGH (ref 70–99)
GLUCOSE SERPL-MCNC: 190 MG/DL — HIGH (ref 70–99)
HCT VFR BLD CALC: 38.2 % — LOW (ref 39–50)
HGB BLD-MCNC: 12.9 G/DL — LOW (ref 13–17)
HIV 1+2 AB+HIV1 P24 AG SERPL QL IA: SIGNIFICANT CHANGE UP
IANC: 2.17 K/UL — SIGNIFICANT CHANGE UP (ref 1.8–7.4)
IMM GRANULOCYTES NFR BLD AUTO: 0.5 % — SIGNIFICANT CHANGE UP (ref 0–1.5)
LYMPHOCYTES # BLD AUTO: 1.59 K/UL — SIGNIFICANT CHANGE UP (ref 1–3.3)
LYMPHOCYTES # BLD AUTO: 35.8 % — SIGNIFICANT CHANGE UP (ref 13–44)
MAGNESIUM SERPL-MCNC: 1.8 MG/DL — SIGNIFICANT CHANGE UP (ref 1.6–2.6)
MCHC RBC-ENTMCNC: 26.2 PG — LOW (ref 27–34)
MCHC RBC-ENTMCNC: 33.8 GM/DL — SIGNIFICANT CHANGE UP (ref 32–36)
MCV RBC AUTO: 77.5 FL — LOW (ref 80–100)
MONOCYTES # BLD AUTO: 0.62 K/UL — SIGNIFICANT CHANGE UP (ref 0–0.9)
MONOCYTES NFR BLD AUTO: 14 % — SIGNIFICANT CHANGE UP (ref 2–14)
NEUTROPHILS # BLD AUTO: 2.17 K/UL — SIGNIFICANT CHANGE UP (ref 1.8–7.4)
NEUTROPHILS NFR BLD AUTO: 48.8 % — SIGNIFICANT CHANGE UP (ref 43–77)
NRBC # BLD: 0 /100 WBCS — SIGNIFICANT CHANGE UP (ref 0–0)
NRBC # FLD: 0 K/UL — SIGNIFICANT CHANGE UP (ref 0–0)
PHOSPHATE SERPL-MCNC: 3.6 MG/DL — SIGNIFICANT CHANGE UP (ref 2.5–4.5)
PLATELET # BLD AUTO: 165 K/UL — SIGNIFICANT CHANGE UP (ref 150–400)
POTASSIUM SERPL-MCNC: 3.8 MMOL/L — SIGNIFICANT CHANGE UP (ref 3.5–5.3)
POTASSIUM SERPL-SCNC: 3.8 MMOL/L — SIGNIFICANT CHANGE UP (ref 3.5–5.3)
PROT SERPL-MCNC: 6.3 G/DL — SIGNIFICANT CHANGE UP (ref 6–8.3)
RBC # BLD: 4.93 M/UL — SIGNIFICANT CHANGE UP (ref 4.2–5.8)
RBC # FLD: 16.3 % — HIGH (ref 10.3–14.5)
RHEUMATOID FACT SERPL-ACNC: 23 IU/ML — HIGH (ref 0–13)
SODIUM SERPL-SCNC: 134 MMOL/L — LOW (ref 135–145)
WBC # BLD: 4.44 K/UL — SIGNIFICANT CHANGE UP (ref 3.8–10.5)
WBC # FLD AUTO: 4.44 K/UL — SIGNIFICANT CHANGE UP (ref 3.8–10.5)

## 2022-09-08 PROCEDURE — 70543 MRI ORBT/FAC/NCK W/O &W/DYE: CPT | Mod: 26

## 2022-09-08 PROCEDURE — 99232 SBSQ HOSP IP/OBS MODERATE 35: CPT | Mod: GC

## 2022-09-08 PROCEDURE — 70553 MRI BRAIN STEM W/O & W/DYE: CPT | Mod: 26

## 2022-09-08 RX ORDER — APIXABAN 2.5 MG/1
2.5 TABLET, FILM COATED ORAL
Refills: 0 | Status: DISCONTINUED | OUTPATIENT
Start: 2022-09-08 | End: 2022-09-16

## 2022-09-08 RX ORDER — INSULIN LISPRO 100/ML
1 VIAL (ML) SUBCUTANEOUS
Refills: 0 | Status: DISCONTINUED | OUTPATIENT
Start: 2022-09-08 | End: 2022-09-09

## 2022-09-08 RX ORDER — INSULIN GLARGINE 100 [IU]/ML
1 INJECTION, SOLUTION SUBCUTANEOUS AT BEDTIME
Refills: 0 | Status: DISCONTINUED | OUTPATIENT
Start: 2022-09-08 | End: 2022-09-09

## 2022-09-08 RX ADMIN — Medication 50 MILLIGRAM(S): at 13:28

## 2022-09-08 RX ADMIN — Medication 1 MILLIGRAM(S): at 12:01

## 2022-09-08 RX ADMIN — Medication 2: at 10:02

## 2022-09-08 RX ADMIN — LISINOPRIL 20 MILLIGRAM(S): 2.5 TABLET ORAL at 06:03

## 2022-09-08 RX ADMIN — Medication 1: at 22:09

## 2022-09-08 RX ADMIN — APIXABAN 2.5 MILLIGRAM(S): 2.5 TABLET, FILM COATED ORAL at 17:26

## 2022-09-08 RX ADMIN — Medication 50 MILLIGRAM(S): at 22:10

## 2022-09-08 RX ADMIN — Medication 81 MILLIGRAM(S): at 12:06

## 2022-09-08 RX ADMIN — Medication 50 MILLIGRAM(S): at 06:03

## 2022-09-08 RX ADMIN — Medication 2: at 12:56

## 2022-09-08 RX ADMIN — Medication 1 UNIT(S): at 17:58

## 2022-09-08 RX ADMIN — CARVEDILOL PHOSPHATE 12.5 MILLIGRAM(S): 80 CAPSULE, EXTENDED RELEASE ORAL at 06:03

## 2022-09-08 RX ADMIN — ENOXAPARIN SODIUM 40 MILLIGRAM(S): 100 INJECTION SUBCUTANEOUS at 06:03

## 2022-09-08 RX ADMIN — CARVEDILOL PHOSPHATE 12.5 MILLIGRAM(S): 80 CAPSULE, EXTENDED RELEASE ORAL at 17:21

## 2022-09-08 RX ADMIN — INSULIN GLARGINE 1 UNIT(S): 100 INJECTION, SOLUTION SUBCUTANEOUS at 22:10

## 2022-09-08 RX ADMIN — Medication 1: at 17:57

## 2022-09-08 RX ADMIN — Medication 20 MILLIGRAM(S): at 06:03

## 2022-09-08 NOTE — PROGRESS NOTE ADULT - PROBLEM SELECTOR PLAN 3
- [id number]  - was on Xarelto as of 2017 per note 12/2017. Per wife, Xarelto was at some point stopped by a doctor; patient/wife do not recall side effects/bleeding while on Xarelto, and patient has been on only ASA recently  - per H&P note 5/22/19 was on Eliquis 5 mg po bid, last taken 5/19/19  - consider restarting home Xarelto pending further discussion with patient/family (currently declining) and care team  - telemetry  - cardiologist contacted

## 2022-09-08 NOTE — PROGRESS NOTE ADULT - PROBLEM SELECTOR PLAN 6
Patient w/ hx HFrEF (EF 40%) w/ BiV dysfunction  - TTE: EF 32%, minimal AR, moderate MR, moderate-severe global LVSD, mild TR/MD, 8.9-5.7cm liver cyst  - Lasix 20 mg QD

## 2022-09-08 NOTE — PROGRESS NOTE ADULT - ASSESSMENT
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

## 2022-09-08 NOTE — PROGRESS NOTE ADULT - SUBJECTIVE AND OBJECTIVE BOX
Progress Note     == draft in progress ==  Jackie Perdue MD, PhD  PGY-1 Medicine  Teams | [id number]    Patient is a 81y old  Male who presents with a chief complaint of Weakness     (07 Sep 2022 14:08)          SUBJECTIVE / INTERVAL EVENTS    NAEON.     Yesterday afternoon, spoke with patient, wife at bedside, older brother Brett, and by phone daughter Nataly. (Patient confirmed that it was all right to share medical information/updates with Brett and daughter and could call latter with updates.) Collateral from daughter Nataly - Pt at baseline is "boisterous... animated...can his voice hear down the crowley....Dressed....up and about by 5-7AM....Goes for walks by himself." He has been more fatigued since at least Sunday when, after Jewish, he sat the recliner all day; he was dozing, even when Nataly and sister brought pt to ER. Family spends time together in the garage; no car there, has old furniture (no mold suspected), gardening tools, BBQ grill. Pt enjoys gardening.    This morning, patient at MRI scanner.       MEDICATIONS    Home Medications:  aspirin 81 mg oral tablet: 1  orally once a day (06 Sep 2022 02:30)  carvedilol 12.5 mg oral tablet: 1 tab(s) orally 2 times a day (06 Sep 2022 02:30)  folic acid 1 mg oral tablet: 1 tab(s) orally once a day (06 Sep 2022 02:30)  furosemide 20 mg oral tablet: 1 tab(s) orally once a day (06 Sep 2022 02:30)  hydrALAZINE 50 mg oral tablet: 1 tab(s) orally 3 times a day (06 Sep 2022 02:30)  Januvia 100 mg oral tablet: 1 tab(s) orally once a day (06 Sep 2022 02:30)  metFORMIN 1000 mg oral tablet: 1 tab(s) orally 2 times a day (06 Sep 2022 02:30)  pravastatin 40 mg oral tablet: 1 tab(s) orally once a day (06 Sep 2022 02:30)  ramipril 5 mg oral tablet: 1 cap(s) orally once a day (06 Sep 2022 02:30)      MEDICATIONS  (STANDING):  aspirin  chewable 81 milliGRAM(s) Oral daily  carvedilol 12.5 milliGRAM(s) Oral every 12 hours  dextrose 5%. 1000 milliLiter(s) (50 mL/Hr) IV Continuous <Continuous>  dextrose 5%. 1000 milliLiter(s) (100 mL/Hr) IV Continuous <Continuous>  dextrose 50% Injectable 25 Gram(s) IV Push once  dextrose 50% Injectable 12.5 Gram(s) IV Push once  dextrose 50% Injectable 25 Gram(s) IV Push once  enoxaparin Injectable 40 milliGRAM(s) SubCutaneous every 24 hours  folic acid 1 milliGRAM(s) Oral daily  furosemide    Tablet 20 milliGRAM(s) Oral daily  glucagon  Injectable 1 milliGRAM(s) IntraMuscular once  hydrALAZINE 50 milliGRAM(s) Oral three times a day  influenza  Vaccine (HIGH DOSE) 0.7 milliLiter(s) IntraMuscular once  insulin lispro (ADMELOG) corrective regimen sliding scale   SubCutaneous three times a day before meals  insulin lispro (ADMELOG) corrective regimen sliding scale   SubCutaneous at bedtime  lisinopril 20 milliGRAM(s) Oral daily    MEDICATIONS  (PRN):  acetaminophen     Tablet .. 650 milliGRAM(s) Oral every 6 hours PRN Temp greater or equal to 38C (100.4F), Mild Pain (1 - 3)  dextrose Oral Gel 15 Gram(s) Oral once PRN Blood Glucose LESS THAN 70 milliGRAM(s)/deciliter  melatonin 3 milliGRAM(s) Oral at bedtime PRN Insomnia         VITALS / I&O's  T(C): 36.7 (09-08-22 @ 06:01), Max: 36.7 (09-07-22 @ 17:50)  HR: 72 (09-08-22 @ 06:01) (72 - 94)  BP: 142/81 (09-08-22 @ 06:01) (131/90 - 142/81)  RR: 15 (09-08-22 @ 06:01) (15 - 18)  SpO2: 100% (09-08-22 @ 06:01) (98% - 100%)  I&O's Summary    07 Sep 2022 07:01  -  08 Sep 2022 07:00  --------------------------------------------------------  IN: 0 mL / OUT: 200 mL / NET: -200 mL           PHYSICAL EXAM  Yesterday afternoon, no signs of meningeal irritation.  This morning, patient at MRI scanner, not examined.  On tele, one missed beat, multiple Afib events, multiple irregular rate events.          LABS                        12.9   4.44  )-----------( 165      ( 08 Sep 2022 05:55 )             38.2     09-08    134<L>  |  96<L>  |  10  ----------------------------<  190<H>  3.8   |  23  |  0.97    Ca    9.2      08 Sep 2022 05:55  Phos  3.6     09-08  Mg     1.80     09-08    TPro  6.3  /  Alb  3.7  /  TBili  0.8  /  DBili  x   /  AST  24  /  ALT  20  /  AlkPhos  53  09-08    CAPILLARY BLOOD GLUCOSE      POCT Blood Glucose.: 212 mg/dL (08 Sep 2022 09:58)  POCT Blood Glucose.: 245 mg/dL (07 Sep 2022 21:25)  POCT Blood Glucose.: 176 mg/dL (07 Sep 2022 17:49)  POCT Blood Glucose.: 224 mg/dL (07 Sep 2022 12:14)      LIVER FUNCTIONS - ( 08 Sep 2022 05:55 )  Alb: 3.7 g/dL / Pro: 6.3 g/dL / ALK PHOS: 53 U/L / ALT: 20 U/L / AST: 24 U/L / GGT: x                       Culture - Urine (collected 05 Sep 2022 20:43)  Source: Clean Catch Clean Catch (Midstream)  Final Report (07 Sep 2022 12:53):    No growth    Culture - Blood (collected 05 Sep 2022 19:40)  Source: .Blood Blood-Peripheral  Preliminary Report (07 Sep 2022 04:01):    No growth to date.    Culture - Blood (collected 05 Sep 2022 19:30)  Source: .Blood Blood-Peripheral  Preliminary Report (07 Sep 2022 04:01):    No growth to date.             RADIOLOGY & ADDITIONAL TESTS  Summary of TTE report: EF 32%, minimal AR, moderate MR, moderate-severe global LVSD, mild TR/RI, 8.9-5.7cm liver cyst    RADIOLOGY, EKG & ADDITIONAL TESTS: Reviewed.   Imaging Personally Reviewed.  Consultant(s) Notes Reviewed.  Care Discussed with Consultants/Other Providers.

## 2022-09-08 NOTE — PROGRESS NOTE ADULT - PROBLEM SELECTOR PLAN 1
Patient w/ recorded fevers to 101.8, present on arrival to ED, unintentional ~8-lb weight loss, 2 months of intermittent b/l hand/foot pain/cramps alleviated by exercise. Concern for occult malignant process versus inflammatory or infectious process.   - BCx, UCx: NGTD  - CT CON Abd/Pel stable 7mm pancreatic likely cyst; liver 8.4 cm cyst, innumerable scattered cysts  - CT CON Chest L calcific density unchanged from 2009  - 2 months of new intermittent pains in hands/feet relieved with exercise  - ESR, CRP wnl  - d/c ceftriaxone and azithromycin  - Tylenol PRN for fevers. Received 975mg [9/5 ~7pm], 650mg [9/6 ~10p].  - has home baby aspirin. Received [9/6 ~11A, 9/7 ~12p].  - HIV1/2 nonreactive  - f/u peripheral blood smear, West Nile, Echinococcus Ab, O&P, NIMA, blood parasites  - f/u ANCA, RF, CRP, ESR, Lyme, syphilis, Quantiferon gold, Toxo, Bartonella, ACE per ophtho recs  - f/u Ehrlichiosis/Anaplasma  - f/u MR head/orbits w/wo contrast; afterward, consider ID c/s  - appreciate neuro recs

## 2022-09-08 NOTE — PROGRESS NOTE ADULT - ATTENDING COMMENTS
81M Pafib (no AC, unclear why) HTN, HLD, DM2, NICM, BPH, pericardial effusion (2017), on ASA presenting for fevers of unknown etiology and new L eye vision impairment.  --appreciate neuro recs, f/u brain imaging  --appreciate ophtho recs, dislocated lens, outpt f/u  --team discussed AC with pt's cardiologist, resume eliquis  --CT scans unrevealing of infection, ? significance of persistent hepatic cysts  --monitor off ABX, now afebrile  --test for Went Nile, echinococcus, CBC smear

## 2022-09-08 NOTE — PROGRESS NOTE ADULT - PROBLEM SELECTOR PLAN 2
Few day hx of new L eye blurry vision. Eye exam: dislocated L lens.   - CTH negative. CTA Head and Neck noncontributory.  - f/u MR Head/orbits w/wo contrast   - appreciate ophtho and neuro recs

## 2022-09-08 NOTE — CHART NOTE - NSCHARTNOTEFT_GEN_A_CORE
MRI brain w/wo and MR Orbits:   IMPRESSION:  MRI BRAIN: No acute intracranial hemorrhage or evidence of acute ischemia.  Chronic lacunar infarct within the left cerebellar hemisphere and mild   chronic white matter microvascular type changes.  MRI ORBITS: Slightly motion limited study.  Atrophy of the right optic nerve and right side of the optic chiasm.  Otherwise unremarkable study.    CTA H/N:   IMPRESSION:  Head CT: No acute intracranial hemorrhage, mass effect, or shift of the   midline structures.  CTA head and neck: No large vessel occlusion or major stenosis.    Echo:   CONCLUSIONS:  1. Mitral annular calcification, otherwise normal mitral  valve. Moderate mitral regurgitation.  2. Mildly dilated left atrium.  LA volume index = 36 cc/m2.  3. Mild left ventricular enlargement.  4. Moderate to severe global left ventricular systolic  dysfunction.  5. Normal right ventricular size and function.  6. A large (8.9 cm X 5.7 cm) cyst is visualized in the  liver.  Consider dedicated abdominal imaging for further  evaluation, if clinically indicated.  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   Recommendations:   MRI w/wo contrast: neg noted above   MRI orbits: neg results noted above  CTA H/N:  negative   Echo completed   Continue with current medication regiment  lipid panel completed- start on Lipitor 40mg QHS   Tsh level   Strict DM control   If there is no contraindication, to discontinue ASA, can do so, but will defer to primary team   Can Follow up with Stroke Neurology Clinic  Can Follow up with General Neurology at [address] [place] [address]. [phone]. [phone].    Please call for any further questions. [id number] MRI brain w/wo and MR Orbits:   IMPRESSION:  MRI BRAIN: No acute intracranial hemorrhage or evidence of acute ischemia.  Chronic lacunar infarct within the left cerebellar hemisphere and mild   chronic white matter microvascular type changes.  MRI ORBITS: Slightly motion limited study.  Atrophy of the right optic nerve and right side of the optic chiasm.  Otherwise unremarkable study.    CTA H/N:   IMPRESSION:  Head CT: No acute intracranial hemorrhage, mass effect, or shift of the   midline structures.  CTA head and neck: No large vessel occlusion or major stenosis.    Echo:   CONCLUSIONS:  1. Mitral annular calcification, otherwise normal mitral  valve. Moderate mitral regurgitation.  2. Mildly dilated left atrium.  LA volume index = 36 cc/m2.  3. Mild left ventricular enlargement.  4. Moderate to severe global left ventricular systolic  dysfunction.  5. Normal right ventricular size and function.  6. A large (8.9 cm X 5.7 cm) cyst is visualized in the  liver.  Consider dedicated abdominal imaging for further  evaluation, if clinically indicated.  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   Recommendations:   MRI w/wo contrast: neg noted above   MRI orbits: neg results noted above  CTA H/N:  negative   Echo completed   Continue with current medication regiment  lipid panel completed- start on Lipitor 40mg QHS   Tsh level   Strict DM control   If there is no contraindication, to discontinue ASA, can do so, but will defer to primary team   F/U with ophthalmologist.   Can Follow up with Stroke Neurology Clinic  Can Follow up with General Neurology at [address] [place] [address]. [phone]. [phone].    Please call for any further questions. [id number]

## 2022-09-09 LAB
ACE SERPL-CCNC: 5 U/L — LOW (ref 14–82)
ALBUMIN SERPL ELPH-MCNC: 3.5 G/DL — SIGNIFICANT CHANGE UP (ref 3.3–5)
ALP SERPL-CCNC: 48 U/L — SIGNIFICANT CHANGE UP (ref 40–120)
ALT FLD-CCNC: 22 U/L — SIGNIFICANT CHANGE UP (ref 4–41)
ANA TITR SER: NEGATIVE — SIGNIFICANT CHANGE UP
ANION GAP SERPL CALC-SCNC: 10 MMOL/L — SIGNIFICANT CHANGE UP (ref 7–14)
AST SERPL-CCNC: 23 U/L — SIGNIFICANT CHANGE UP (ref 4–40)
BASOPHILS # BLD AUTO: 0.01 K/UL — SIGNIFICANT CHANGE UP (ref 0–0.2)
BASOPHILS NFR BLD AUTO: 0.2 % — SIGNIFICANT CHANGE UP (ref 0–2)
BILIRUB SERPL-MCNC: 0.8 MG/DL — SIGNIFICANT CHANGE UP (ref 0.2–1.2)
BUN SERPL-MCNC: 9 MG/DL — SIGNIFICANT CHANGE UP (ref 7–23)
CALCIUM SERPL-MCNC: 8.9 MG/DL — SIGNIFICANT CHANGE UP (ref 8.4–10.5)
CHLORIDE SERPL-SCNC: 100 MMOL/L — SIGNIFICANT CHANGE UP (ref 98–107)
CO2 SERPL-SCNC: 24 MMOL/L — SIGNIFICANT CHANGE UP (ref 22–31)
CREAT SERPL-MCNC: 0.9 MG/DL — SIGNIFICANT CHANGE UP (ref 0.5–1.3)
EGFR: 86 ML/MIN/1.73M2 — SIGNIFICANT CHANGE UP
EOSINOPHIL # BLD AUTO: 0.05 K/UL — SIGNIFICANT CHANGE UP (ref 0–0.5)
EOSINOPHIL NFR BLD AUTO: 1.2 % — SIGNIFICANT CHANGE UP (ref 0–6)
GLUCOSE BLDC GLUCOMTR-MCNC: 162 MG/DL — HIGH (ref 70–99)
GLUCOSE BLDC GLUCOMTR-MCNC: 174 MG/DL — HIGH (ref 70–99)
GLUCOSE BLDC GLUCOMTR-MCNC: 218 MG/DL — HIGH (ref 70–99)
GLUCOSE BLDC GLUCOMTR-MCNC: 236 MG/DL — HIGH (ref 70–99)
GLUCOSE BLDC GLUCOMTR-MCNC: 260 MG/DL — HIGH (ref 70–99)
GLUCOSE SERPL-MCNC: 196 MG/DL — HIGH (ref 70–99)
HCT VFR BLD CALC: 36.2 % — LOW (ref 39–50)
HGB BLD-MCNC: 11.9 G/DL — LOW (ref 13–17)
IANC: 1.86 K/UL — SIGNIFICANT CHANGE UP (ref 1.8–7.4)
IMM GRANULOCYTES NFR BLD AUTO: 0.7 % — SIGNIFICANT CHANGE UP (ref 0–1.5)
LYMPHOCYTES # BLD AUTO: 1.59 K/UL — SIGNIFICANT CHANGE UP (ref 1–3.3)
LYMPHOCYTES # BLD AUTO: 38.2 % — SIGNIFICANT CHANGE UP (ref 13–44)
MAGNESIUM SERPL-MCNC: 1.8 MG/DL — SIGNIFICANT CHANGE UP (ref 1.6–2.6)
MCHC RBC-ENTMCNC: 25.8 PG — LOW (ref 27–34)
MCHC RBC-ENTMCNC: 32.9 GM/DL — SIGNIFICANT CHANGE UP (ref 32–36)
MCV RBC AUTO: 78.5 FL — LOW (ref 80–100)
MONOCYTES # BLD AUTO: 0.62 K/UL — SIGNIFICANT CHANGE UP (ref 0–0.9)
MONOCYTES NFR BLD AUTO: 14.9 % — HIGH (ref 2–14)
NEUTROPHILS # BLD AUTO: 1.86 K/UL — SIGNIFICANT CHANGE UP (ref 1.8–7.4)
NEUTROPHILS NFR BLD AUTO: 44.8 % — SIGNIFICANT CHANGE UP (ref 43–77)
NRBC # BLD: 0 /100 WBCS — SIGNIFICANT CHANGE UP (ref 0–0)
NRBC # FLD: 0 K/UL — SIGNIFICANT CHANGE UP (ref 0–0)
PHOSPHATE SERPL-MCNC: 3.9 MG/DL — SIGNIFICANT CHANGE UP (ref 2.5–4.5)
PLATELET # BLD AUTO: 187 K/UL — SIGNIFICANT CHANGE UP (ref 150–400)
POTASSIUM SERPL-MCNC: 3.5 MMOL/L — SIGNIFICANT CHANGE UP (ref 3.5–5.3)
POTASSIUM SERPL-SCNC: 3.5 MMOL/L — SIGNIFICANT CHANGE UP (ref 3.5–5.3)
PROT SERPL-MCNC: 5.8 G/DL — LOW (ref 6–8.3)
RBC # BLD: 4.61 M/UL — SIGNIFICANT CHANGE UP (ref 4.2–5.8)
RBC # FLD: 16.4 % — HIGH (ref 10.3–14.5)
SODIUM SERPL-SCNC: 134 MMOL/L — LOW (ref 135–145)
T GONDII IGG SER QL: 25.1 IU/ML — HIGH
T GONDII IGG SER QL: POSITIVE
T GONDII IGM SER QL: <3 AU/ML — SIGNIFICANT CHANGE UP
T GONDII IGM SER QL: NEGATIVE — SIGNIFICANT CHANGE UP
WBC # BLD: 4.16 K/UL — SIGNIFICANT CHANGE UP (ref 3.8–10.5)
WBC # FLD AUTO: 4.16 K/UL — SIGNIFICANT CHANGE UP (ref 3.8–10.5)

## 2022-09-09 PROCEDURE — 99233 SBSQ HOSP IP/OBS HIGH 50: CPT

## 2022-09-09 RX ORDER — INSULIN LISPRO 100/ML
2 VIAL (ML) SUBCUTANEOUS
Refills: 0 | Status: DISCONTINUED | OUTPATIENT
Start: 2022-09-09 | End: 2022-09-10

## 2022-09-09 RX ORDER — INSULIN GLARGINE 100 [IU]/ML
2 INJECTION, SOLUTION SUBCUTANEOUS AT BEDTIME
Refills: 0 | Status: DISCONTINUED | OUTPATIENT
Start: 2022-09-09 | End: 2022-09-10

## 2022-09-09 RX ADMIN — CARVEDILOL PHOSPHATE 12.5 MILLIGRAM(S): 80 CAPSULE, EXTENDED RELEASE ORAL at 05:41

## 2022-09-09 RX ADMIN — Medication 1: at 18:00

## 2022-09-09 RX ADMIN — Medication 1 MILLIGRAM(S): at 11:51

## 2022-09-09 RX ADMIN — CARVEDILOL PHOSPHATE 12.5 MILLIGRAM(S): 80 CAPSULE, EXTENDED RELEASE ORAL at 18:00

## 2022-09-09 RX ADMIN — Medication 50 MILLIGRAM(S): at 21:50

## 2022-09-09 RX ADMIN — INSULIN GLARGINE 2 UNIT(S): 100 INJECTION, SOLUTION SUBCUTANEOUS at 22:09

## 2022-09-09 RX ADMIN — Medication 81 MILLIGRAM(S): at 11:51

## 2022-09-09 RX ADMIN — Medication 1 UNIT(S): at 08:45

## 2022-09-09 RX ADMIN — APIXABAN 2.5 MILLIGRAM(S): 2.5 TABLET, FILM COATED ORAL at 18:00

## 2022-09-09 RX ADMIN — Medication 50 MILLIGRAM(S): at 13:26

## 2022-09-09 RX ADMIN — Medication 2 UNIT(S): at 18:01

## 2022-09-09 RX ADMIN — Medication 20 MILLIGRAM(S): at 05:41

## 2022-09-09 RX ADMIN — Medication 3: at 12:42

## 2022-09-09 RX ADMIN — Medication 1 UNIT(S): at 12:42

## 2022-09-09 RX ADMIN — APIXABAN 2.5 MILLIGRAM(S): 2.5 TABLET, FILM COATED ORAL at 05:45

## 2022-09-09 RX ADMIN — Medication 50 MILLIGRAM(S): at 05:41

## 2022-09-09 RX ADMIN — LISINOPRIL 20 MILLIGRAM(S): 2.5 TABLET ORAL at 05:41

## 2022-09-09 RX ADMIN — Medication 2: at 08:44

## 2022-09-09 NOTE — PROGRESS NOTE ADULT - PROBLEM SELECTOR PLAN 8
Diet: regular  DVT: Lovenox  Dispo: pending clinical course  PT: no needs on dispo Diet: regular  DVT ppx: Eliquis  Dispo: pending clinical course  PT: no needs on dispo

## 2022-09-09 NOTE — PROGRESS NOTE ADULT - SUBJECTIVE AND OBJECTIVE BOX
Progress Note     == draft in progress ==  Jackie Perdue MD, PhD  PGY-1 Medicine  Teams | [id number]    Patient is a 81y old  Male who presents with a chief complaint of Fevers, New L eye vision impairment (08 Sep 2022 11:22)          SUBJECTIVE / INTERVAL EVENTS         MEDICATIONS    Home Medications:  aspirin 81 mg oral tablet: 1  orally once a day (06 Sep 2022 02:30)  carvedilol 12.5 mg oral tablet: 1 tab(s) orally 2 times a day (06 Sep 2022 02:30)  folic acid 1 mg oral tablet: 1 tab(s) orally once a day (06 Sep 2022 02:30)  furosemide 20 mg oral tablet: 1 tab(s) orally once a day (06 Sep 2022 02:30)  hydrALAZINE 50 mg oral tablet: 1 tab(s) orally 3 times a day (06 Sep 2022 02:30)  Januvia 100 mg oral tablet: 1 tab(s) orally once a day (06 Sep 2022 02:30)  metFORMIN 1000 mg oral tablet: 1 tab(s) orally 2 times a day (06 Sep 2022 02:30)  pravastatin 40 mg oral tablet: 1 tab(s) orally once a day (06 Sep 2022 02:30)  ramipril 5 mg oral tablet: 1 cap(s) orally once a day (06 Sep 2022 02:30)      MEDICATIONS  (STANDING):  apixaban 2.5 milliGRAM(s) Oral two times a day  aspirin  chewable 81 milliGRAM(s) Oral daily  carvedilol 12.5 milliGRAM(s) Oral every 12 hours  dextrose 5%. 1000 milliLiter(s) (50 mL/Hr) IV Continuous <Continuous>  dextrose 5%. 1000 milliLiter(s) (100 mL/Hr) IV Continuous <Continuous>  dextrose 50% Injectable 25 Gram(s) IV Push once  dextrose 50% Injectable 12.5 Gram(s) IV Push once  dextrose 50% Injectable 25 Gram(s) IV Push once  folic acid 1 milliGRAM(s) Oral daily  furosemide    Tablet 20 milliGRAM(s) Oral daily  glucagon  Injectable 1 milliGRAM(s) IntraMuscular once  hydrALAZINE 50 milliGRAM(s) Oral three times a day  influenza  Vaccine (HIGH DOSE) 0.7 milliLiter(s) IntraMuscular once  insulin glargine Injectable (LANTUS) 2 Unit(s) SubCutaneous at bedtime  insulin lispro (ADMELOG) corrective regimen sliding scale   SubCutaneous three times a day before meals  insulin lispro (ADMELOG) corrective regimen sliding scale   SubCutaneous at bedtime  insulin lispro Injectable (ADMELOG) 2 Unit(s) SubCutaneous three times a day before meals  lisinopril 20 milliGRAM(s) Oral daily    MEDICATIONS  (PRN):  acetaminophen     Tablet .. 650 milliGRAM(s) Oral every 6 hours PRN Temp greater or equal to 38C (100.4F), Mild Pain (1 - 3)  dextrose Oral Gel 15 Gram(s) Oral once PRN Blood Glucose LESS THAN 70 milliGRAM(s)/deciliter  melatonin 3 milliGRAM(s) Oral at bedtime PRN Insomnia         VITALS / I&O's  T(C): 36.8 (09-09-22 @ 12:40), Max: 36.8 (09-09-22 @ 05:14)  HR: 90 (09-09-22 @ 12:40) (77 - 90)  BP: 109/70 (09-09-22 @ 12:40) (109/70 - 159/98)  RR: 16 (09-09-22 @ 12:40) (16 - 20)  SpO2: 100% (09-09-22 @ 12:40) (98% - 100%)  I&O's Summary    08 Sep 2022 07:01  -  09 Sep 2022 07:00  --------------------------------------------------------  IN: 0 mL / OUT: 875 mL / NET: -875 mL           PHYSICAL EXAM           LABS                        11.9   4.16  )-----------( 187      ( 09 Sep 2022 05:40 )             36.2     09-09    134<L>  |  100  |  9   ----------------------------<  196<H>  3.5   |  24  |  0.90    Ca    8.9      09 Sep 2022 05:40  Phos  3.9     09-09  Mg     1.80     09-09    TPro  5.8<L>  /  Alb  3.5  /  TBili  0.8  /  DBili  x   /  AST  23  /  ALT  22  /  AlkPhos  48  09-09    CAPILLARY BLOOD GLUCOSE      POCT Blood Glucose.: 260 mg/dL (09 Sep 2022 12:19)  POCT Blood Glucose.: 218 mg/dL (09 Sep 2022 08:37)  POCT Blood Glucose.: 174 mg/dL (09 Sep 2022 06:00)  POCT Blood Glucose.: 258 mg/dL (08 Sep 2022 21:13)  POCT Blood Glucose.: 165 mg/dL (08 Sep 2022 17:37)      LIVER FUNCTIONS - ( 09 Sep 2022 05:40 )  Alb: 3.5 g/dL / Pro: 5.8 g/dL / ALK PHOS: 48 U/L / ALT: 22 U/L / AST: 23 U/L / GGT: x                              RADIOLOGY & ADDITIONAL TESTS    Imaging Personally Reviewed:     Consultant(s) Notes Reviewed:     Care Discussed with Consultants/Other Providers:     Progress Note     Jackie Perdue MD, PhD  PGY-1 Medicine  Teams | [id number]    Patient is a 81y old  Male who presents with a chief complaint of Fevers, New L eye vision impairment (08 Sep 2022 11:22)          SUBJECTIVE / INTERVAL EVENTS  Patient doing okay today. He said he used Listerine and spit it into kidney basin at the bedside. He denied emesis. He endorsed ongoing headache that had been aggravated by being in the MRI scanner. He denied fevers. He denied shortness of breath or pain in the chest, abdomen, limbs, hands, or feet. He denied dysuria or hematuria. Patient endorsed a normal bowel movement.        MEDICATIONS    Home Medications:  aspirin 81 mg oral tablet: 1  orally once a day (06 Sep 2022 02:30)  carvedilol 12.5 mg oral tablet: 1 tab(s) orally 2 times a day (06 Sep 2022 02:30)  folic acid 1 mg oral tablet: 1 tab(s) orally once a day (06 Sep 2022 02:30)  furosemide 20 mg oral tablet: 1 tab(s) orally once a day (06 Sep 2022 02:30)  hydrALAZINE 50 mg oral tablet: 1 tab(s) orally 3 times a day (06 Sep 2022 02:30)  Januvia 100 mg oral tablet: 1 tab(s) orally once a day (06 Sep 2022 02:30)  metFORMIN 1000 mg oral tablet: 1 tab(s) orally 2 times a day (06 Sep 2022 02:30)  pravastatin 40 mg oral tablet: 1 tab(s) orally once a day (06 Sep 2022 02:30)  ramipril 5 mg oral tablet: 1 cap(s) orally once a day (06 Sep 2022 02:30)      MEDICATIONS  (STANDING):  apixaban 2.5 milliGRAM(s) Oral two times a day  aspirin  chewable 81 milliGRAM(s) Oral daily  carvedilol 12.5 milliGRAM(s) Oral every 12 hours  dextrose 5%. 1000 milliLiter(s) (50 mL/Hr) IV Continuous <Continuous>  dextrose 5%. 1000 milliLiter(s) (100 mL/Hr) IV Continuous <Continuous>  dextrose 50% Injectable 25 Gram(s) IV Push once  dextrose 50% Injectable 12.5 Gram(s) IV Push once  dextrose 50% Injectable 25 Gram(s) IV Push once  folic acid 1 milliGRAM(s) Oral daily  furosemide    Tablet 20 milliGRAM(s) Oral daily  glucagon  Injectable 1 milliGRAM(s) IntraMuscular once  hydrALAZINE 50 milliGRAM(s) Oral three times a day  influenza  Vaccine (HIGH DOSE) 0.7 milliLiter(s) IntraMuscular once  insulin glargine Injectable (LANTUS) 2 Unit(s) SubCutaneous at bedtime  insulin lispro (ADMELOG) corrective regimen sliding scale   SubCutaneous three times a day before meals  insulin lispro (ADMELOG) corrective regimen sliding scale   SubCutaneous at bedtime  insulin lispro Injectable (ADMELOG) 2 Unit(s) SubCutaneous three times a day before meals  lisinopril 20 milliGRAM(s) Oral daily    MEDICATIONS  (PRN):  acetaminophen     Tablet .. 650 milliGRAM(s) Oral every 6 hours PRN Temp greater or equal to 38C (100.4F), Mild Pain (1 - 3)  dextrose Oral Gel 15 Gram(s) Oral once PRN Blood Glucose LESS THAN 70 milliGRAM(s)/deciliter  melatonin 3 milliGRAM(s) Oral at bedtime PRN Insomnia         VITALS / I&O's  T(C): 36.8 (09-09-22 @ 12:40), Max: 36.8 (09-09-22 @ 05:14)  HR: 90 (09-09-22 @ 12:40) (77 - 90)  BP: 109/70 (09-09-22 @ 12:40) (109/70 - 159/98)  RR: 16 (09-09-22 @ 12:40) (16 - 20)  SpO2: 100% (09-09-22 @ 12:40) (98% - 100%)  I&O's Summary    08 Sep 2022 07:01  -  09 Sep 2022 07:00  --------------------------------------------------------  IN: 0 mL / OUT: 875 mL / NET: -875 mL           PHYSICAL EXAM  General: Reclining in bed in no acute distress.  HEENT: Normocephalic, atraumatic. Extraocular movements grossly intact. No nasal discharge.  Neuro: Alert and oriented. No facial asymmetry or dysarthria. Moving all extremities.  CV: Regular rate and rhythm. S1/S2. No murmurs, rubs, or gallops appreciated.  Respiratory: Anterior lung fields clear bilaterally. No crackles or wheezes appreciated.  Abdomen: Soft; nontender to palpation, all quandrants; nondistended. No rebound or guarding.  : Suprapubic region nontender.  Skin: No rashes or bruising of face, forearms, or calves bilaterally.  Psych: Mood and affect appropriate.         LABS  Toxo IgG + , IgM neg  NIMA negative  RF +                           11.9   4.16  )-----------( 187      ( 09 Sep 2022 05:40 )             36.2     09-09    134<L>  |  100  |  9   ----------------------------<  196<H>  3.5   |  24  |  0.90    Ca    8.9      09 Sep 2022 05:40  Phos  3.9     09-09  Mg     1.80     09-09    TPro  5.8<L>  /  Alb  3.5  /  TBili  0.8  /  DBili  x   /  AST  23  /  ALT  22  /  AlkPhos  48  09-09    CAPILLARY BLOOD GLUCOSE      POCT Blood Glucose.: 260 mg/dL (09 Sep 2022 12:19)  POCT Blood Glucose.: 218 mg/dL (09 Sep 2022 08:37)  POCT Blood Glucose.: 174 mg/dL (09 Sep 2022 06:00)  POCT Blood Glucose.: 258 mg/dL (08 Sep 2022 21:13)  POCT Blood Glucose.: 165 mg/dL (08 Sep 2022 17:37)      LIVER FUNCTIONS - ( 09 Sep 2022 05:40 )  Alb: 3.5 g/dL / Pro: 5.8 g/dL / ALK PHOS: 48 U/L / ALT: 22 U/L / AST: 23 U/L / GGT: x

## 2022-09-09 NOTE — PROGRESS NOTE ADULT - PROBLEM SELECTOR PLAN 6
Patient w/ hx HFrEF (EF 40%) w/ BiV dysfunction  - TTE: EF 32%, minimal AR, moderate MR, moderate-severe global LVSD, mild TR/AZ, 8.9-5.7cm liver cyst  - Lasix 20 mg QD

## 2022-09-09 NOTE — PROGRESS NOTE ADULT - ASSESSMENT
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

## 2022-09-09 NOTE — PROGRESS NOTE ADULT - PROBLEM SELECTOR PLAN 2
Few day hx of new L eye blurry vision. Eye exam: dislocated L lens.   - CTH negative. CTA Head and Neck noncontributory.  - f/u MR Head/orbits w/wo contrast   - appreciate ophtho and neuro recs Few day hx of new L eye blurry vision.   - Per ophtho: dislocated L lens.   - CTH negative. CTA Head/Neck, MR head/orbits noncontributory.

## 2022-09-09 NOTE — PROGRESS NOTE ADULT - ATTENDING COMMENTS
Briefly, patient is an 82 y/o M with paroxysmal afib (not AC previously, however restarted during this admission) HTN, HLD, DM2, NICM, BPH, pericardial effusion (2017), on ASA presenting for fevers of unknown etiology and new L eye vision impairment.  Patient currently remaining afebrile off antibiotics for >48 hours. Blood and urine cultures without any growth and infectious workup negative to date. Patient followed by neurology and ophthalmology during this admission. Now s/p evaluation and MR imaging of brain and orbits. Currently no further plan for inpatient w/u by neuro or ophtho, patient will require outpatient follow up with both services. CT imaging of abdomen notable for multiple hepatic cysts of unclear etiology (which appear to be unchanged since 2021). Further testing currently pending, including echinococcus. However as patient remaining afebrile and currently asymptomatic (and as patient requesting to go home), can likely plan for discharge with outpatient follow up for further workup.    Case discussed with HS 1.

## 2022-09-09 NOTE — PROGRESS NOTE ADULT - PROBLEM SELECTOR PLAN 3
- [id number]  - was on Xarelto as of 2017 per note 12/2017. Per wife, Xarelto was at some point stopped by a doctor; patient/wife do not recall side effects/bleeding while on Xarelto, and patient has been on only ASA recently  - per H&P note 5/22/19 was on Eliquis 5 mg po bid, last taken 5/19/19  - consider restarting home Xarelto pending further discussion with patient/family (currently declining) and care team  - telemetry  - cardiologist contacted - [id number]  - c/w Eliquis

## 2022-09-09 NOTE — PROGRESS NOTE ADULT - PROBLEM SELECTOR PLAN 5
Hx DM2 on Januvia and metformin  - SSI   -A1c    #Cr elevation:  Baseline 1.05-1.2. On Admission SCr 1.2 Hx DM2 on Januvia and metformin  - SSI     #Cr elevation:  Baseline 1.05-1.2. On Admission SCr 1.2

## 2022-09-09 NOTE — PROGRESS NOTE ADULT - PROBLEM SELECTOR PLAN 1
Patient w/ recorded fevers to 101.8, present on arrival to ED, unintentional ~8-lb weight loss, 2 months of intermittent b/l hand/foot pain/cramps alleviated by exercise. Concern for occult malignant process versus inflammatory or infectious process.   - BCx, UCx: NGTD  - CT CON Abd/Pel stable 7mm pancreatic likely cyst; liver 8.4 cm cyst, innumerable scattered cysts  - CT CON Chest L calcific density unchanged from 2009  - 2 months of new intermittent pains in hands/feet relieved with exercise  - ESR, CRP wnl  - d/c ceftriaxone and azithromycin  - Tylenol PRN for fevers. Received 975mg [9/5 ~7pm], 650mg [9/6 ~10p].  - has home baby aspirin. Received [9/6 ~11A, 9/7 ~12p].  - HIV1/2 nonreactive  - f/u peripheral blood smear, West Nile, Echinococcus Ab, O&P, NIMA, blood parasites  - f/u ANCA, RF, CRP, ESR, Lyme, syphilis, Quantiferon gold, Toxo, Bartonella, ACE per ophtho recs  - f/u Ehrlichiosis/Anaplasma  - f/u MR head/orbits w/wo contrast; afterward, consider ID c/s  - appreciate neuro recs Patient w/ recorded fevers to 101.8, present on arrival to ED, unintentional ~8-lb weight loss, 2 months of intermittent b/l hand/foot pain/cramps alleviated by exercise. Concern for occult malignant process versus inflammatory or infectious process.   - BCx, UCx: NGTD  - CT CON Abd/Pel stable 7mm pancreatic likely cyst; liver 8.4 cm cyst, innumerable scattered cysts  - CT CON Chest L calcific density unchanged from 2009  - MR Head/orbits noncontributory  - 2 months of new intermittent pains in hands/feet relieved with exercise  - d/c ceftriaxone and azithromycin  - Tylenol PRN for fevers. Received 975mg [9/5 ~7pm], 650mg [9/6 ~10p]  - HIV1/2 nonreactive  - Toxo IgG+, IgM neg  - NIMA negative  - RF+   - ESR, CRP wnl  - f/u Echinococcus Ab  - f/u peripheral blood smear, West Nile, O&P, blood parasites  - f/u ANCA, Lyme, syphilis, Quantiferon gold, Toxo, Bartonella, ACE per ophtho recs  - f/u Ehrlichiosis/Anaplasma

## 2022-09-10 ENCOUNTER — TRANSCRIPTION ENCOUNTER (OUTPATIENT)
Age: 82
End: 2022-09-10

## 2022-09-10 LAB
ALBUMIN SERPL ELPH-MCNC: 3.5 G/DL — SIGNIFICANT CHANGE UP (ref 3.3–5)
ALP SERPL-CCNC: 50 U/L — SIGNIFICANT CHANGE UP (ref 40–120)
ALT FLD-CCNC: 21 U/L — SIGNIFICANT CHANGE UP (ref 4–41)
ANION GAP SERPL CALC-SCNC: 12 MMOL/L — SIGNIFICANT CHANGE UP (ref 7–14)
AST SERPL-CCNC: 23 U/L — SIGNIFICANT CHANGE UP (ref 4–40)
BASOPHILS # BLD AUTO: 0.02 K/UL — SIGNIFICANT CHANGE UP (ref 0–0.2)
BASOPHILS NFR BLD AUTO: 0.5 % — SIGNIFICANT CHANGE UP (ref 0–2)
BILIRUB SERPL-MCNC: 0.7 MG/DL — SIGNIFICANT CHANGE UP (ref 0.2–1.2)
BUN SERPL-MCNC: 12 MG/DL — SIGNIFICANT CHANGE UP (ref 7–23)
CALCIUM SERPL-MCNC: 8.9 MG/DL — SIGNIFICANT CHANGE UP (ref 8.4–10.5)
CHLORIDE SERPL-SCNC: 98 MMOL/L — SIGNIFICANT CHANGE UP (ref 98–107)
CO2 SERPL-SCNC: 23 MMOL/L — SIGNIFICANT CHANGE UP (ref 22–31)
CREAT SERPL-MCNC: 1.02 MG/DL — SIGNIFICANT CHANGE UP (ref 0.5–1.3)
CULTURE RESULTS: SIGNIFICANT CHANGE UP
EGFR: 74 ML/MIN/1.73M2 — SIGNIFICANT CHANGE UP
EOSINOPHIL # BLD AUTO: 0.06 K/UL — SIGNIFICANT CHANGE UP (ref 0–0.5)
EOSINOPHIL NFR BLD AUTO: 1.6 % — SIGNIFICANT CHANGE UP (ref 0–6)
GAMMA INTERFERON BACKGROUND BLD IA-ACNC: 0.05 IU/ML — SIGNIFICANT CHANGE UP
GLUCOSE BLDC GLUCOMTR-MCNC: 149 MG/DL — HIGH (ref 70–99)
GLUCOSE BLDC GLUCOMTR-MCNC: 150 MG/DL — HIGH (ref 70–99)
GLUCOSE BLDC GLUCOMTR-MCNC: 167 MG/DL — HIGH (ref 70–99)
GLUCOSE BLDC GLUCOMTR-MCNC: 241 MG/DL — HIGH (ref 70–99)
GLUCOSE BLDC GLUCOMTR-MCNC: 302 MG/DL — HIGH (ref 70–99)
GLUCOSE SERPL-MCNC: 217 MG/DL — HIGH (ref 70–99)
HCT VFR BLD CALC: 35.4 % — LOW (ref 39–50)
HGB BLD-MCNC: 11.8 G/DL — LOW (ref 13–17)
IANC: 1.87 K/UL — SIGNIFICANT CHANGE UP (ref 1.8–7.4)
IMM GRANULOCYTES NFR BLD AUTO: 0.8 % — SIGNIFICANT CHANGE UP (ref 0–1.5)
LYMPHOCYTES # BLD AUTO: 1.32 K/UL — SIGNIFICANT CHANGE UP (ref 1–3.3)
LYMPHOCYTES # BLD AUTO: 34.4 % — SIGNIFICANT CHANGE UP (ref 13–44)
M TB IFN-G BLD-IMP: NEGATIVE — SIGNIFICANT CHANGE UP
M TB IFN-G CD4+ BCKGRND COR BLD-ACNC: 0.01 IU/ML — SIGNIFICANT CHANGE UP
M TB IFN-G CD4+CD8+ BCKGRND COR BLD-ACNC: -0.01 IU/ML — SIGNIFICANT CHANGE UP
MAGNESIUM SERPL-MCNC: 1.8 MG/DL — SIGNIFICANT CHANGE UP (ref 1.6–2.6)
MCHC RBC-ENTMCNC: 26.2 PG — LOW (ref 27–34)
MCHC RBC-ENTMCNC: 33.3 GM/DL — SIGNIFICANT CHANGE UP (ref 32–36)
MCV RBC AUTO: 78.5 FL — LOW (ref 80–100)
MONOCYTES # BLD AUTO: 0.54 K/UL — SIGNIFICANT CHANGE UP (ref 0–0.9)
MONOCYTES NFR BLD AUTO: 14.1 % — HIGH (ref 2–14)
NEUTROPHILS # BLD AUTO: 1.87 K/UL — SIGNIFICANT CHANGE UP (ref 1.8–7.4)
NEUTROPHILS NFR BLD AUTO: 48.6 % — SIGNIFICANT CHANGE UP (ref 43–77)
NRBC # BLD: 0 /100 WBCS — SIGNIFICANT CHANGE UP (ref 0–0)
NRBC # FLD: 0 K/UL — SIGNIFICANT CHANGE UP (ref 0–0)
PHOSPHATE SERPL-MCNC: 3.5 MG/DL — SIGNIFICANT CHANGE UP (ref 2.5–4.5)
PLATELET # BLD AUTO: 182 K/UL — SIGNIFICANT CHANGE UP (ref 150–400)
POTASSIUM SERPL-MCNC: 4 MMOL/L — SIGNIFICANT CHANGE UP (ref 3.5–5.3)
POTASSIUM SERPL-SCNC: 4 MMOL/L — SIGNIFICANT CHANGE UP (ref 3.5–5.3)
PROT SERPL-MCNC: 6.2 G/DL — SIGNIFICANT CHANGE UP (ref 6–8.3)
QUANT TB PLUS MITOGEN MINUS NIL: 6.81 IU/ML — SIGNIFICANT CHANGE UP
RBC # BLD: 4.51 M/UL — SIGNIFICANT CHANGE UP (ref 4.2–5.8)
RBC # FLD: 16.3 % — HIGH (ref 10.3–14.5)
RPR SER-TITR: SIGNIFICANT CHANGE UP
RPR SERPL-ACNC: SIGNIFICANT CHANGE UP
SODIUM SERPL-SCNC: 133 MMOL/L — LOW (ref 135–145)
SPECIMEN SOURCE: SIGNIFICANT CHANGE UP
T GONDII DNA BLD QL NAA+PROBE: SIGNIFICANT CHANGE UP COPIES/ML
T PALLIDUM AB TITR SER: POSITIVE
T PALLIDUM IGG SER QL IF: REACTIVE
TOXOPLASMA BY RT-PCR, WHOLE BLOOD RESULT: SIGNIFICANT CHANGE UP COPIES/ML
WBC # BLD: 3.84 K/UL — SIGNIFICANT CHANGE UP (ref 3.8–10.5)
WBC # FLD AUTO: 3.84 K/UL — SIGNIFICANT CHANGE UP (ref 3.8–10.5)

## 2022-09-10 PROCEDURE — 99222 1ST HOSP IP/OBS MODERATE 55: CPT

## 2022-09-10 PROCEDURE — 99233 SBSQ HOSP IP/OBS HIGH 50: CPT

## 2022-09-10 RX ORDER — INSULIN LISPRO 100/ML
3 VIAL (ML) SUBCUTANEOUS
Refills: 0 | Status: DISCONTINUED | OUTPATIENT
Start: 2022-09-10 | End: 2022-09-13

## 2022-09-10 RX ORDER — INSULIN GLARGINE 100 [IU]/ML
4 INJECTION, SOLUTION SUBCUTANEOUS AT BEDTIME
Refills: 0 | Status: DISCONTINUED | OUTPATIENT
Start: 2022-09-10 | End: 2022-09-13

## 2022-09-10 RX ORDER — APIXABAN 2.5 MG/1
1 TABLET, FILM COATED ORAL
Qty: 0 | Refills: 0 | DISCHARGE
Start: 2022-09-10

## 2022-09-10 RX ADMIN — Medication 50 MILLIGRAM(S): at 22:31

## 2022-09-10 RX ADMIN — APIXABAN 2.5 MILLIGRAM(S): 2.5 TABLET, FILM COATED ORAL at 18:29

## 2022-09-10 RX ADMIN — Medication 81 MILLIGRAM(S): at 12:19

## 2022-09-10 RX ADMIN — CARVEDILOL PHOSPHATE 12.5 MILLIGRAM(S): 80 CAPSULE, EXTENDED RELEASE ORAL at 06:01

## 2022-09-10 RX ADMIN — Medication 3 UNIT(S): at 18:28

## 2022-09-10 RX ADMIN — Medication 2: at 08:55

## 2022-09-10 RX ADMIN — Medication 20 MILLIGRAM(S): at 06:01

## 2022-09-10 RX ADMIN — LISINOPRIL 20 MILLIGRAM(S): 2.5 TABLET ORAL at 06:02

## 2022-09-10 RX ADMIN — Medication 2 UNIT(S): at 12:19

## 2022-09-10 RX ADMIN — Medication 1 MILLIGRAM(S): at 12:19

## 2022-09-10 RX ADMIN — INSULIN GLARGINE 4 UNIT(S): 100 INJECTION, SOLUTION SUBCUTANEOUS at 22:29

## 2022-09-10 RX ADMIN — Medication 1: at 18:28

## 2022-09-10 RX ADMIN — Medication 50 MILLIGRAM(S): at 14:30

## 2022-09-10 RX ADMIN — Medication 50 MILLIGRAM(S): at 06:01

## 2022-09-10 RX ADMIN — Medication 4: at 12:19

## 2022-09-10 RX ADMIN — APIXABAN 2.5 MILLIGRAM(S): 2.5 TABLET, FILM COATED ORAL at 06:02

## 2022-09-10 RX ADMIN — CARVEDILOL PHOSPHATE 12.5 MILLIGRAM(S): 80 CAPSULE, EXTENDED RELEASE ORAL at 18:29

## 2022-09-10 NOTE — DISCHARGE NOTE PROVIDER - NSDCMRMEDTOKEN_GEN_ALL_CORE_FT
apixaban 2.5 mg oral tablet: 1 tab(s) orally 2 times a day  aspirin 81 mg oral tablet: 1  orally once a day  carvedilol 12.5 mg oral tablet: 1 tab(s) orally 2 times a day  folic acid 1 mg oral tablet: 1 tab(s) orally once a day  furosemide 20 mg oral tablet: 1 tab(s) orally once a day  hydrALAZINE 50 mg oral tablet: 1 tab(s) orally 3 times a day  Januvia 100 mg oral tablet: 1 tab(s) orally once a day  metFORMIN 1000 mg oral tablet: 1 tab(s) orally 2 times a day  pravastatin 40 mg oral tablet: 1 tab(s) orally once a day  ramipril 5 mg oral tablet: 1 cap(s) orally once a day   apixaban 2.5 mg oral tablet: 1 tab(s) orally 2 times a day  aspirin 81 mg oral tablet: 1  orally once a day  carvedilol 12.5 mg oral tablet: 1 tab(s) orally 2 times a day  folic acid 1 mg oral tablet: 1 tab(s) orally once a day  furosemide 20 mg oral tablet: 1 tab(s) orally once a day  hydrALAZINE 50 mg oral tablet: 1 tab(s) orally 3 times a day  Januvia 100 mg oral tablet: 1 tab(s) orally once a day  metFORMIN 1000 mg oral tablet: 1 tab(s) orally 2 times a day  penicillin G potassium: Penicillin G Potassium 4,000,000 unit(s) IVPG q4h until 9/24   ICD-10: A52.3  pravastatin 40 mg oral tablet: 1 tab(s) orally once a day  ramipril 5 mg oral tablet: 1 cap(s) orally once a day   apixaban 2.5 mg oral tablet: 1 tab(s) orally 2 times a day  aspirin 81 mg oral tablet: 1  orally once a day  carvedilol 12.5 mg oral tablet: 1 tab(s) orally 2 times a day  folic acid 1 mg oral tablet: 1 tab(s) orally once a day  furosemide 20 mg oral tablet: 1 tab(s) orally once a day  hydrALAZINE 50 mg oral tablet: 1 tab(s) orally 3 times a day  Januvia 100 mg oral tablet: 1 tab(s) orally once a day  metFORMIN 1000 mg oral tablet: 1 tab(s) orally 2 times a day  penicillin G potassium: Penicillin G Potassium 4,000,000 unit(s) IVPG q4h until last dose to start 9/25 6PM  ICD-10: A52.3  pravastatin 40 mg oral tablet: 1 tab(s) orally once a day  ramipril 5 mg oral tablet: 1 cap(s) orally once a day

## 2022-09-10 NOTE — DISCHARGE NOTE PROVIDER - PROVIDER TOKENS
FREE:[LAST:[Adam],FIRST:[Rufino],PHONE:[phone]],FAX:[(   )    -],ADDRESS:[[address] [place] [address]],FOLLOWUP:[1 week],ESTABLISHEDPATIENT:[T]]

## 2022-09-10 NOTE — PROGRESS NOTE ADULT - SUBJECTIVE AND OBJECTIVE BOX
Interviewed with PADDY jasso at bedside.  Patient doing well today. Headache is gone. He enjoyed breakfast. He denied chest pain and shortness of breath, abdominal pain, limb pain, hand/foot pain, or new weakness, swelling, numbness, or tingling of the limbs. Patient denied fever, chills, nauseas, vomiting, dysuria, or hematuria.  Updated patient regarding positive test results for syphilis and that ID would look into his case to see if he would need treatment. Patient was surprised and had not been told he had syphilis in the past. He confirmed that medical information/updates including about these results could be shared with his family, including his daughter, wife, and brother Brett.  Progress Note     Jackie Perdue MD, PhD  PGY-1 Medicine  Teams | [id number]    Patient is a 81y old  Male who presents with a chief complaint of Fevers, New L eye vision impairment (10 Sep 2022 11:26)          SUBJECTIVE / INTERVAL EVENTS    Interviewed with PADDY Tellez translating at bedside.  Patient doing well today. Headache is gone. He enjoyed breakfast. He denied chest pain and shortness of breath, abdominal pain, limb pain, hand/foot pain, or new weakness, swelling, numbness, or tingling of the limbs. Patient denied fever, chills, nauseas, vomiting, dysuria, or hematuria.  Updated patient regarding positive test results for syphilis and that ID would look into his case to see if he would need treatment. Patient was surprised and had not been told he had syphilis in the past. He confirmed that medical information/updates including about these results could be shared with his family, including his daughter, wife, and brother Brett.         MEDICATIONS    Home Medications:  aspirin 81 mg oral tablet: 1  orally once a day (06 Sep 2022 02:30)  carvedilol 12.5 mg oral tablet: 1 tab(s) orally 2 times a day (06 Sep 2022 02:30)  folic acid 1 mg oral tablet: 1 tab(s) orally once a day (06 Sep 2022 02:30)  furosemide 20 mg oral tablet: 1 tab(s) orally once a day (06 Sep 2022 02:30)  hydrALAZINE 50 mg oral tablet: 1 tab(s) orally 3 times a day (06 Sep 2022 02:30)  Januvia 100 mg oral tablet: 1 tab(s) orally once a day (06 Sep 2022 02:30)  metFORMIN 1000 mg oral tablet: 1 tab(s) orally 2 times a day (06 Sep 2022 02:30)  pravastatin 40 mg oral tablet: 1 tab(s) orally once a day (06 Sep 2022 02:30)  ramipril 5 mg oral tablet: 1 cap(s) orally once a day (06 Sep 2022 02:30)      MEDICATIONS  (STANDING):  apixaban 2.5 milliGRAM(s) Oral two times a day  aspirin  chewable 81 milliGRAM(s) Oral daily  carvedilol 12.5 milliGRAM(s) Oral every 12 hours  dextrose 5%. 1000 milliLiter(s) (50 mL/Hr) IV Continuous <Continuous>  dextrose 5%. 1000 milliLiter(s) (100 mL/Hr) IV Continuous <Continuous>  dextrose 50% Injectable 25 Gram(s) IV Push once  dextrose 50% Injectable 12.5 Gram(s) IV Push once  dextrose 50% Injectable 25 Gram(s) IV Push once  folic acid 1 milliGRAM(s) Oral daily  furosemide    Tablet 20 milliGRAM(s) Oral daily  glucagon  Injectable 1 milliGRAM(s) IntraMuscular once  hydrALAZINE 50 milliGRAM(s) Oral three times a day  influenza  Vaccine (HIGH DOSE) 0.7 milliLiter(s) IntraMuscular once  insulin glargine Injectable (LANTUS) 4 Unit(s) SubCutaneous at bedtime  insulin lispro (ADMELOG) corrective regimen sliding scale   SubCutaneous three times a day before meals  insulin lispro (ADMELOG) corrective regimen sliding scale   SubCutaneous at bedtime  insulin lispro Injectable (ADMELOG) 3 Unit(s) SubCutaneous three times a day before meals  lisinopril 20 milliGRAM(s) Oral daily    MEDICATIONS  (PRN):  acetaminophen     Tablet .. 650 milliGRAM(s) Oral every 6 hours PRN Temp greater or equal to 38C (100.4F), Mild Pain (1 - 3)  dextrose Oral Gel 15 Gram(s) Oral once PRN Blood Glucose LESS THAN 70 milliGRAM(s)/deciliter  melatonin 3 milliGRAM(s) Oral at bedtime PRN Insomnia         VITALS / I&O's  T(C): 36.9 (09-10-22 @ 12:24), Max: 36.9 (09-10-22 @ 12:24)  HR: 69 (09-10-22 @ 12:24) (69 - 86)  BP: 104/57 (09-10-22 @ 12:24) (104/57 - 138/85)  RR: 17 (09-10-22 @ 12:24) (15 - 17)  SpO2: 100% (09-10-22 @ 12:24) (100% - 100%)  I&O's Summary         PHYSICAL EXAM  General: Reclining in bed in no acute distress.  HEENT: Normocephalic, atraumatic. Extraocular movements grossly intact. No nasal discharge.  Neuro: Alert and oriented. No facial asymmetry or dysarthria. Moving all extremities.  CV: Regular rate and rhythm. S1/S2. No murmurs, rubs, or gallops appreciated.  - tele: Afib [id number]. ~11p angel to 45. Some blnaton PVCs.  Respiratory: Anterior lung fields clear bilaterally. No crackles or wheezes appreciated.  Abdomen: Soft; nontender to palpation, all quadrants; nondistended. No rebound or guarding.  : Suprapubic region nontender.  Skin: No rashes or bruising of face, forearms, or calves bilaterally.  Psych: Mood and affect appropriate.          LABS                        11.8   3.84  )-----------( 182      ( 10 Sep 2022 07:00 )             35.4     09-10    133<L>  |  98  |  12  ----------------------------<  217<H>  4.0   |  23  |  1.02    Ca    8.9      10 Sep 2022 07:00  Phos  3.5     09-10  Mg     1.80     09-10    TPro  6.2  /  Alb  3.5  /  TBili  0.7  /  DBili  x   /  AST  23  /  ALT  21  /  AlkPhos  50  09-10    CAPILLARY BLOOD GLUCOSE      POCT Blood Glucose.: 302 mg/dL (10 Sep 2022 11:50)  POCT Blood Glucose.: 241 mg/dL (10 Sep 2022 08:50)  POCT Blood Glucose.: 236 mg/dL (09 Sep 2022 21:13)  POCT Blood Glucose.: 162 mg/dL (09 Sep 2022 17:37)      LIVER FUNCTIONS - ( 10 Sep 2022 07:00 )  Alb: 3.5 g/dL / Pro: 6.2 g/dL / ALK PHOS: 50 U/L / ALT: 21 U/L / AST: 23 U/L / GGT: x                              RADIOLOGY & ADDITIONAL TESTS    Imaging Personally Reviewed:     Consultant(s) Notes Reviewed:     Care Discussed with Consultants/Other Providers:

## 2022-09-10 NOTE — PROGRESS NOTE ADULT - ATTENDING COMMENTS
Briefly, patient is an 82 y/o M with paroxysmal afib (not AC previously, however restarted during this admission) HTN, HLD, DM2, NICM, BPH, pericardial effusion (2017), on ASA presenting for fevers of unknown etiology and new L eye vision impairment.    Patient currently remaining afebrile off antibiotics for >48 hours. Blood and urine cultures without any growth. Patient followed by neurology and ophthalmology during this admission. Now s/p evaluation and MR imaging of brain and orbits. Currently no further plan for inpatient w/u by neuro or ophtho, patient will require outpatient follow up with both services. CT imaging of abdomen notable for multiple hepatic cysts of unclear etiology (which appear to be unchanged since 2021). Further testing currently pending, including echinococcus. Initially patient planned for tentative d/c on 9/10, however noted with positive treponemal antibody testing with negative RPR with normal titers. Patient without any history of syphilis infection or treatment. 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 ID consulted for further guidance. Will hold off on d/c for now as patient may require further treatment of syphilis.    Case discussed with HS 1.

## 2022-09-10 NOTE — DISCHARGE NOTE PROVIDER - NSDCFUADDAPPT_GEN_ALL_CORE_FT
- Please follow up with your primary care doctor (Dr. Medina) in 1 week for an exam and to discuss test results.  - Please follow up with an ophthalmologist in 1 week about your dislocated lens.   [facility] Department of Ophthalmology, [address] [place] [address]. [address]  [place] [address], [phone]   - Please follow up with your primary care doctor (Dr. Medina) in 1-2 week for an exam and to discuss test results.  - Please follow up with an ophthalmologist in 1-2 weeks about your dislocated lens. You can go to your usual ophthalmologist or make an appointment with the [facility] Department of Ophthalmology, [address] [place] [address]. [address], [place] [address], [phone]  - You may follow up with a neurologist at General Neurology at [address], [place]. [address]. [phone].

## 2022-09-10 NOTE — CONSULT NOTE ADULT - SUBJECTIVE AND OBJECTIVE BOX
Patient is a 81y old  Male who presents with a chief complaint of Fevers of Unknown Origin, New L eye vision impairment (10 Sep 2022 08:53)    HPI:  81 year old male with history of HTN, HLD, DM2, NICM, BPH, pericardial effusion (2017), on ASA presenting for increased weakness, unsteadiness and fevers. The patient reports he was in his usual state of health until a weak ago where he started to feel some intermittent fevers. He did not check his temp. On Sunday, the patient had an acute worsening w/ measured fevers 101, as well as new double vision, blurry, w/ spots in his eyes, and worsening balance. At baseline patient ambulates independently, able to go on walks for over 2 hours w/o difficulty. The patient denies any shortness of breath, nausea, vomiting, diarrhea, recent sick contacts, dysuria recently. The patient has had noted significant weight loss and decreased appetite for the past 3 months, family unclear regarding how much weight loss however the patient's daughter reports that his weight loss is very noticeable visually given that he can no longer fit any of his suits, which he wears frequently.   The patient has noticed poorer vision of his L eye for the past few days, stating it's like there's a screen over his eyes. He had cataract surgery a few years ago w/o complications. He states that this happened insidiously. The patient was trying to see an eye doctor however there were no appointments available.     In the ED, the patient was febrile 101.4, HR, normal, satting well on RA. Initial labs only significant for mildly elevated lactate which resolved w/ some fluids. CTH negative for intracranial pathology, neuro evaluated the patient and requested additional imaging.   (06 Sep 2022 02:14)       REVIEW OF SYSTEMS  [  ] ROS unobtainable because:    [ x ] All other systems negative except as noted below    Constitutional:  [ ] fever [ ] chills  [ ] weight loss  [ ]night sweat  [ ]poor appetite/PO intake [ ]fatigue   Skin:  [ ] rash [ ] phlebitis	  Eyes: [ ] icterus [ ] pain  [ ] discharge	  ENMT: [ ] sore throat  [ ] thrush [ ] ulcers [ ] exudates [ ]anosmia  Respiratory: [ ] dyspnea [ ] hemoptysis [ ] cough [ ] sputum	  Cardiovascular:  [ ] chest pain [ ] palpitations [ ] edema	  Gastrointestinal:  [ ] nausea [ ] vomiting [ ] diarrhea [ ] constipation [ ] pain	  Genitourinary:  [ ] dysuria [ ] frequency [ ] hematuria [ ] discharge [ ] flank pain  [ ] incontinence  Musculoskeletal:  [ ] myalgias [ ] arthralgias [ ] arthritis  [ ] back pain  Neurological:  [ ] headache [ ] weakness [ ] seizures  [ ] confusion/altered mental status    prior hospital charts reviewed [V]  primary team notes reviewed [V]  other consultant notes reviewed [V]    PAST MEDICAL & SURGICAL HISTORY:  T2DM (type 2 diabetes mellitus)  BPH (benign prostatic hyperplasia)  Hernia, inguinal, right  HTN (hypertension)  Hyperlipidemia  Afib on Aspirin  BPH with urinary obstruction s/p TURP  GABRIELLA (obstructive sleep apnea)  Anemia of chronic disease  S/P cataract surgery bilateral 2017  H/O hernia repair  S/P repair of hydrocele  Status post creation of pericardial window 2017  S/P cataract surgery    FAMILY HISTORY:  FHx: diabetes mellitus  brother, sister    SOCIAL HISTORY:  - Denied smoking/vaping/alcohol/recreational drug use    Allergies  No Known Allergies    ANTIMICROBIALS:    ANTIMICROBIALS (past 90 days):   MEDICATIONS  (STANDING):    azithromycin  IVPB   255 mL/Hr IV Intermittent (09-06-22 @ 11:09)    cefTRIAXone   IVPB   100 mL/Hr IV Intermittent (09-05-22 @ 20:50)    cefTRIAXone   IVPB   100 mL/Hr IV Intermittent (09-06-22 @ 11:09)    MEDICATIONS  (STANDING):  acetaminophen     Tablet .. 650 every 6 hours PRN  apixaban 2.5 two times a day  aspirin  chewable 81 daily  carvedilol 12.5 every 12 hours  dextrose 50% Injectable 25 once  dextrose 50% Injectable 25 once  dextrose 50% Injectable 12.5 once  dextrose Oral Gel 15 once PRN  furosemide    Tablet 20 daily  glucagon  Injectable 1 once  hydrALAZINE 50 three times a day  influenza  Vaccine (HIGH DOSE) 0.7 once  insulin glargine Injectable (LANTUS) 2 at bedtime  insulin lispro (ADMELOG) corrective regimen sliding scale  three times a day before meals  insulin lispro (ADMELOG) corrective regimen sliding scale  at bedtime  insulin lispro Injectable (ADMELOG) 2 three times a day before meals  lisinopril 20 daily  melatonin 3 at bedtime PRN    VITALS:  Vital Signs Last 24 Hrs  T(F): 98.2 (09-10-22 @ 05:47), Max: 101.8 (09-05-22 @ 19:52)  Vital Signs Last 24 Hrs  HR: 84 (09-10-22 @ 05:47) (82 - 90)  BP: 118/75 (09-10-22 @ 05:47) (109/70 - 138/85)  RR: 15 (09-10-22 @ 05:47)  SpO2: 100% (09-10-22 @ 05:47) (100% - 100%)  Wt(kg): --    PHYSICAL EXAM:  Constitutional: non-toxic, no distress  HEAD/EYES: anicteric, no conjunctival injection  ENT:  supple, no thrush  Cardiovascular:   +S1/S2  Respiratory:  clear BS bilaterally  GI:  soft, non-tender, normal bowel sounds  :  no dela cruz, no CVA tenderness  Musculoskeletal:  no synovitis, normal ROM  Neurologic: awake and alert,  no focal findings  Skin:  no rash, no erythema, no phlebitis  Heme/Onc: no lymphadenopathy   Psychiatric:  awake, alert, appropriate mood    Labs:                        11.8   3.84  )-----------( 182      ( 10 Sep 2022 07:00 )             35.4     09-10    133<L>  |  98  |  12  ----------------------------<  217<H>  4.0   |  23  |  1.02    Ca    8.9      10 Sep 2022 07:00  Phos  3.5     09-10  Mg     1.80     09-10    TPro  6.2  /  Alb  3.5  /  TBili  0.7  /  DBili  x   /  AST  23  /  ALT  21  /  AlkPhos  50  09-10    WBC Trend:  WBC Count: 3.84 (09-10-22 @ 07:00)  WBC Count: 4.16 (09-09-22 @ 05:40)  WBC Count: 4.44 (09-08-22 @ 05:55)  WBC Count: 5.68 (09-07-22 @ 05:30)    Auto Neutrophil #: 1.87 K/uL (09-10-22 @ 07:00)  Auto Neutrophil #: 1.86 K/uL (09-09-22 @ 05:40)  Auto Neutrophil #: 2.17 K/uL (09-08-22 @ 05:55)  Auto Neutrophil #: 3.43 K/uL (09-07-22 @ 05:30)  Auto Neutrophil #: 3.76 K/uL (09-05-22 @ 19:40)    Auto Eosinophil %: 1.6 % (09-10-22 @ 07:00)  Auto Eosinophil %: 1.2 % (09-09-22 @ 05:40)  Auto Eosinophil %: 0.7 % (09-08-22 @ 05:55)    MICROBIOLOGY:  Culture - Urine (collected 05 Sep 2022 20:43)  Source: Clean Catch Clean Catch (Midstream)  Final Report:    No growth    Culture - Blood (collected 05 Sep 2022 19:40)  Source: .Blood Blood-Peripheral  Preliminary Report:    No growth to date.    Culture - Blood (collected 05 Sep 2022 19:30)  Source: .Blood Blood-Peripheral  Preliminary Report:    No growth to date.    Culture - Urine (collected 13 Jul 2021 11:49)  Source: .Urine Catheterized  Final Report:    >=3 organisms. Probable collection contamination.    Culture - Urine (collected 13 May 2021 19:40)  Source: .Urine Catheterized  Final Report:    Normal Urogenital brandi present    HIV-1/2 Combo Result: Nonreact (09-08-22 @ 07:50)    Treponema Pallidum Antibody Interpretation: Positive (09-08-22 @ 11:08)  Toxoplasma IgG Interpretation: Positive (09-08-22 @ 11:08)  Toxoplasma IgM Interpretation: Negative (09-08-22 @ 11:08)    Rapid RVP Result: NotDetec (09-05 @ 19:36)      RADIOLOGY:  imaging below personally reviewed    < from: MR Orbits w/wo IV Cont (09.08.22 @ 10:35) >  MRI BRAIN: No acute intracranial hemorrhage or evidence of acute ischemia.  Chronic lacunar infarct within the left cerebellar hemisphere and mild   chronic white matter microvascular type changes.  MRI ORBITS: Slightly motion limited study.  Atrophy of the right optic nerve and right side of the optic chiasm.  < end of copied text >     Patient is a 81y old  Male who presents with a chief complaint of Fevers of Unknown Origin, New L eye vision impairment (10 Sep 2022 08:53)    HPI:  80 y/o M PMHx HTN, HLD, DM2, NICM, BPH, presenting with increased weakness, unsteadiness and fevers. The patient reports he was in his usual state of health until a week ago when he started to feel some intermittent fevers. He did not check his temp. On Sunday, the patient had an acute worsening w/ measured fevers 101, as well as new double vision, blurry, w/ spots in his eyes, and worsening balance. The patient denied any shortness of breath, nausea, vomiting, diarrhea, recent sick contacts, dysuria recently. The patient has had noted significant weight loss and decreased appetite for the past 3 months, family unclear regarding how much weight loss however the patient's daughter reports that his weight loss is very noticeable visually given that he can no longer fit any of his suits, which he wears frequently.  The patient had noticed poorer vision of his L eye for the past few days, stating it's like there's a screen over his eyes. He had cataract surgery a few years ago w/o complications.     ID consulted for +FTA, RPR <1:1.   Still with L sided blurry vision, no change since admission. Denies any other complaints at this time. Denies prior syphilis infection, denies ever being treated for syphilis.     REVIEW OF SYSTEMS  [  ] ROS unobtainable because:    [ x ] All other systems negative except as noted below    Constitutional:  [ ] fever [ ] chills  [ ] weight loss  [ ]night sweat  [ ]poor appetite/PO intake [ ]fatigue   Skin:  [ ] rash [ ] phlebitis	  Eyes: [ ] icterus [ ] pain  [ ] discharge	  ENMT: [ ] sore throat  [ ] thrush [ ] ulcers [ ] exudates [ ]anosmia  Respiratory: [ ] dyspnea [ ] hemoptysis [ ] cough [ ] sputum	  Cardiovascular:  [ ] chest pain [ ] palpitations [ ] edema	  Gastrointestinal:  [ ] nausea [ ] vomiting [ ] diarrhea [ ] constipation [ ] pain	  Genitourinary:  [ ] dysuria [ ] frequency [ ] hematuria [ ] discharge [ ] flank pain  [ ] incontinence  Musculoskeletal:  [ ] myalgias [ ] arthralgias [ ] arthritis  [ ] back pain  Neurological:  [ ] headache [ ] weakness [ ] seizures  [ ] confusion/altered mental status    prior hospital charts reviewed [V]  primary team notes reviewed [V]  other consultant notes reviewed [V]    PAST MEDICAL & SURGICAL HISTORY:  T2DM (type 2 diabetes mellitus)  BPH (benign prostatic hyperplasia)  Hernia, inguinal, right  HTN (hypertension)  Hyperlipidemia  Afib on Aspirin  BPH with urinary obstruction s/p TURP  GABRIELLA (obstructive sleep apnea)  Anemia of chronic disease  S/P cataract surgery bilateral 2017  H/O hernia repair  S/P repair of hydrocele  Status post creation of pericardial window 2017  S/P cataract surgery    FAMILY HISTORY:  FHx: diabetes mellitus  brother, sister    SOCIAL HISTORY:  - Denied smoking/vaping/alcohol/recreational drug use    Allergies  No Known Allergies    ANTIMICROBIALS:    ANTIMICROBIALS (past 90 days):   MEDICATIONS  (STANDING):    azithromycin  IVPB   255 mL/Hr IV Intermittent (09-06-22 @ 11:09)    cefTRIAXone   IVPB   100 mL/Hr IV Intermittent (09-05-22 @ 20:50)    cefTRIAXone   IVPB   100 mL/Hr IV Intermittent (09-06-22 @ 11:09)    MEDICATIONS  (STANDING):  acetaminophen     Tablet .. 650 every 6 hours PRN  apixaban 2.5 two times a day  aspirin  chewable 81 daily  carvedilol 12.5 every 12 hours  dextrose 50% Injectable 25 once  dextrose 50% Injectable 25 once  dextrose 50% Injectable 12.5 once  dextrose Oral Gel 15 once PRN  furosemide    Tablet 20 daily  glucagon  Injectable 1 once  hydrALAZINE 50 three times a day  influenza  Vaccine (HIGH DOSE) 0.7 once  insulin glargine Injectable (LANTUS) 2 at bedtime  insulin lispro (ADMELOG) corrective regimen sliding scale  three times a day before meals  insulin lispro (ADMELOG) corrective regimen sliding scale  at bedtime  insulin lispro Injectable (ADMELOG) 2 three times a day before meals  lisinopril 20 daily  melatonin 3 at bedtime PRN    VITALS:  Vital Signs Last 24 Hrs  T(F): 98.2 (09-10-22 @ 05:47), Max: 101.8 (09-05-22 @ 19:52)  Vital Signs Last 24 Hrs  HR: 84 (09-10-22 @ 05:47) (82 - 90)  BP: 118/75 (09-10-22 @ 05:47) (109/70 - 138/85)  RR: 15 (09-10-22 @ 05:47)  SpO2: 100% (09-10-22 @ 05:47) (100% - 100%)  Wt(kg): --    PHYSICAL EXAM:  Constitutional: non-toxic, no distress  HEAD/EYES: anicteric, no conjunctival injection  ENT:  supple, no thrush  Cardiovascular:   +S1/S2  Respiratory:  clear BS bilaterally  GI:  soft, non-tender, normal bowel sounds  :  no dela cruz, no CVA tenderness  Musculoskeletal:  no synovitis, normal ROM  Neurologic: awake and alert,  no focal findings  Skin:  faint rash bottom of soles  Heme/Onc: no lymphadenopathy   Psychiatric:  awake, alert, appropriate mood    Labs:                        11.8   3.84  )-----------( 182      ( 10 Sep 2022 07:00 )             35.4     09-10    133<L>  |  98  |  12  ----------------------------<  217<H>  4.0   |  23  |  1.02    Ca    8.9      10 Sep 2022 07:00  Phos  3.5     09-10  Mg     1.80     09-10    TPro  6.2  /  Alb  3.5  /  TBili  0.7  /  DBili  x   /  AST  23  /  ALT  21  /  AlkPhos  50  09-10    WBC Trend:  WBC Count: 3.84 (09-10-22 @ 07:00)  WBC Count: 4.16 (09-09-22 @ 05:40)  WBC Count: 4.44 (09-08-22 @ 05:55)  WBC Count: 5.68 (09-07-22 @ 05:30)    Auto Neutrophil #: 1.87 K/uL (09-10-22 @ 07:00)  Auto Neutrophil #: 1.86 K/uL (09-09-22 @ 05:40)  Auto Neutrophil #: 2.17 K/uL (09-08-22 @ 05:55)  Auto Neutrophil #: 3.43 K/uL (09-07-22 @ 05:30)  Auto Neutrophil #: 3.76 K/uL (09-05-22 @ 19:40)    Auto Eosinophil %: 1.6 % (09-10-22 @ 07:00)  Auto Eosinophil %: 1.2 % (09-09-22 @ 05:40)  Auto Eosinophil %: 0.7 % (09-08-22 @ 05:55)    MICROBIOLOGY:  Culture - Urine (collected 05 Sep 2022 20:43)  Source: Clean Catch Clean Catch (Midstream)  Final Report:    No growth    Culture - Blood (collected 05 Sep 2022 19:40)  Source: .Blood Blood-Peripheral  Preliminary Report:    No growth to date.    Culture - Blood (collected 05 Sep 2022 19:30)  Source: .Blood Blood-Peripheral  Preliminary Report:    No growth to date.    Culture - Urine (collected 13 Jul 2021 11:49)  Source: .Urine Catheterized  Final Report:    >=3 organisms. Probable collection contamination.    Culture - Urine (collected 13 May 2021 19:40)  Source: .Urine Catheterized  Final Report:    Normal Urogenital brandi present    HIV-1/2 Combo Result: Nonreact (09-08-22 @ 07:50)    Treponema Pallidum Antibody Interpretation: Positive (09-08-22 @ 11:08)  Toxoplasma IgG Interpretation: Positive (09-08-22 @ 11:08)  Toxoplasma IgM Interpretation: Negative (09-08-22 @ 11:08)    Rapid RVP Result: NotDetec (09-05 @ 19:36)      RADIOLOGY:  imaging below personally reviewed    < from: MR Orbits w/wo IV Cont (09.08.22 @ 10:35) >  MRI BRAIN: No acute intracranial hemorrhage or evidence of acute ischemia.  Chronic lacunar infarct within the left cerebellar hemisphere and mild   chronic white matter microvascular type changes.  MRI ORBITS: Slightly motion limited study.  Atrophy of the right optic nerve and right side of the optic chiasm.  < end of copied text >

## 2022-09-10 NOTE — CONSULT NOTE ADULT - REASON FOR ADMISSION
Fevers of Unknown Origin, New L eye vision impairment
Fevers of Unknown Origin, New L eye vision impairment

## 2022-09-10 NOTE — PROGRESS NOTE ADULT - PROBLEM SELECTOR PLAN 2
Few day hx of new L eye blurry vision. 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  - appreciate ophtho recs

## 2022-09-10 NOTE — DISCHARGE NOTE PROVIDER - CARE PROVIDER_API CALL
Rufino Medina  [address] [place] [address]  Phone: [phone]  Fax: (   )    -  Established Patient  Follow Up Time: 1 week

## 2022-09-10 NOTE — PROGRESS NOTE ADULT - PROBLEM SELECTOR PLAN 1
- possibly 2/2 syphilis, which could possibly contribute to findings on imaging (e.g. chest, liver?, infarcts/atrophy on CT?), past effusions?, cutaneous findings? 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 chest, liver?, infarcts/atrophy on CT?), past effusions?, cutaneous findings? 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 L cerebellum)  - 2 months of new intermittent pains in hands/feet relieved with exercise  - HIV1/2 nonreactive  - Toxo IgG+, IgM neg  - NIMA negative  - RF+  - ESR, CRP wnl  - T pallid Ab +, RPR NR, FTA Reactive, RPR Titer <1:1  - f/u Echinococcus Ab  - f/u peripheral blood smear, West Nile, O&P, blood parasites  - f/u ANCA, Lyme, syphilis, Quantiferon gold, Toxo, Bartonella, ACE per ophtho recs  - f/u Ehrlichiosis/Anaplasma  - appreciate ID recs

## 2022-09-10 NOTE — PROGRESS NOTE ADULT - ASSESSMENT
81 year old male with history of HTN, HLD, DM2, NICM, BPH, pericardial effusion (2017), on ASA presenting for increased weakness, unsteadiness and fevers. Ophthalmology consulted for left eye blurry vision and diplopia.    # Vision loss and diplopia, left eye  # Left eye subluxed intra-ocular lens  # Diabetic retinopathy, both eyes  - Pt presented with worsening vision and questionable monocular diplopia, vision has been fluctuating while inpatient  - Left eye intra-ocular lens appears significantly dislocated with the lens itself outside of the visual axis - possibly causing decreased VA. Discussed with outpatient ophthalmology who confirmed that lens is likely more dislocated at this time compared to his last exam.   - On exam, no clear signs of intra-ocular infection; no anterior chamber cell or hypopyon, no injection or pain. Small amount of vitreous cell/RBC could be from old vitreous hemorrhage or his dislocated lens  - Pt did have a fever and weight loss (could be explained by suspected lung malignancy?) however rest of ROS for GCA is negative with normal ESR and CRP; low suspicion. In addition, the eye is white and quiet, non tender, no hypopyon, only trace signs of intra ocular inflammation; low suspicion for intra-ocular infection.  - B-scan 9/7 showing vitreous debris without vitritis.  - On the ddx: dislocated lens, vitreous hemorrhage 2/2 diabetic retinopathy or intermidiate/posterior uveitis  - Both decline in VA and monocular diplopia could be explained by his dislocated lens; however will rule out etiologies for uveitis and will follow management for vitreous hemorrhage.   - Pending  ANCA, Lyme titers, QuantiFeron gold bartonella,   - Positive work-up: treponema pallidum antibody, RPR assay, toxo IgG (neg IgM), RF  - Negative work-up thus far: NIMA, ESR, CRP, ACE  - Primary team will consider lymphoma workup  - MRI brain & orbits with atrophy of the right optic nerve and right side of the optic chiasm  - Pt should keep head elevated at 30-degrees, avoid head shaking, bending, straining, heavy lifting  - Avoid NSAIDs unless medically indicated. Continue AC/AP per neurology/cardiology  - Ophthalmology will follow    Outpatient follow-up: Patient should follow-up with his/her ophthalmologist or with [facility] Department of Ophthalmology upon discharge at the address below     [facility] Department of Ophthalmology  [address] [place] [address]. [address]  [place] [address]  [phone]     81 year old male with history of HTN, HLD, DM2, NICM, BPH, pericardial effusion (2017), on ASA presenting for increased weakness, unsteadiness and fevers. Ophthalmology consulted for left eye blurry vision and diplopia.    # Vision loss and diplopia, left eye  # Left eye subluxed intra-ocular lens  # Diabetic retinopathy, both eyes  - Pt presented with worsening vision and questionable monocular diplopia, vision has been fluctuating while inpatient  - Left eye intra-ocular lens appears significantly dislocated with the lens itself outside of the visual axis - possibly causing decreased VA. Discussed with outpatient ophthalmology who confirmed that lens is likely more dislocated at this time compared to his last exam.   - On exam, no clear signs of intra-ocular infection; no anterior chamber cell or hypopyon, no injection or pain. Small amount of vitreous cell/RBC could be from old vitreous hemorrhage or his dislocated lens  - Pt did have a fever and weight loss (could be explained by suspected lung malignancy?) however rest of ROS for GCA is negative with normal ESR and CRP; low suspicion. In addition, the eye is white and quiet, non tender, no hypopyon, only trace signs of intra ocular inflammation; low suspicion for intra-ocular infection.  - B-scan 9/7 showing vitreous debris without vitritis.  - On the ddx: dislocated lens, vitreous hemorrhage 2/2 diabetic retinopathy or intermidiate/posterior uveitis  - Both decline in VA and monocular diplopia could be explained by his dislocated lens; however will rule out etiologies for uveitis and will follow management for vitreous hemorrhage.   - Pending  ANCA, Lyme titers, QuantiFeron gold bartonella,   - Positive work-up: treponema pallidum antibody, RPR assay, toxo IgG (neg IgM), RF  - Negative work-up thus far: NIMA, ESR, CRP, ACE  - Given vitreous cells, decreased vision, positive syphilis findings: recommend ID consult and treatment for neurosyphilis  - Primary team will consider lymphoma workup  - MRI brain & orbits with atrophy of the right optic nerve and right side of the optic chiasm  - Pt should keep head elevated at 30-degrees, avoid head shaking, bending, straining, heavy lifting  - Avoid NSAIDs unless medically indicated. Continue AC/AP per neurology/cardiology  - Ophthalmology will follow    Discussed with Dr Chan    Outpatient follow-up: Patient should follow-up with his/her ophthalmologist or with [facility] Department of Ophthalmology upon discharge at the address below     [facility] Department of Ophthalmology  [address] [place] [address]. [address]  [place] [address]  [phone]

## 2022-09-10 NOTE — DISCHARGE NOTE PROVIDER - HOSPITAL COURSE
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   Ophthalmology exam revealed left lens dislocation to be followed up outpatient. Fever workup considered infectious, inflammatory, and malignant etiologies. Patient received a dose of ceftriaxone. CXR showed a "[l]eft lower lung 1 cm nodular opacity" that "may represent a calcified granuloma." He then received ceftriaxone and azithromycin for possible respiratory infection. CMP, CBC, LFTs were largely stable, prompting imaging in addition to bloodwork.    CTH, CTA Head/Neck showed, "[i]ntramuscular lipomas are seen involving the trapezius muscles....No acute intracranial hemorrhage, mass effect, or shift of the midline structures....No large vessel occlusion or major stenosis." CT Chest/Abdomen/Pelvis [compared to CT abdomen and pelvis 8/19/2021, CT chest 12/14/2017] showed "LUNGS AND LARGE AIRWAYS: Patent central airways. Nodule seen on recent chest x-ray corresponds with calcific density posterior to the left sixth rib, unchanged from 3/16/2009....LIVER: Innumerable scattered cysts, the largest measuring up to 8.4 cm, and subcentimeter hypodensities too small to characterize....PANCREAS: Stable 7 mm pancreatic body/tail lesion, likely a cyst (6-78)....IMPRESSION: No evidence of suspicious mass or lymphadenopathy. Innumerable scattered hepatic cysts overall unchanged from 8/19/2021. Cholelithiasis." MR Head/Neck showed "[a] 2.2 cm lipoma overlying the left anterior temporal scalp....mild right-sided optic nerve atrophy...slight atrophy of the right side of the optic chiasm....No acute intracranial hemorrhage or evidence of acute ischemia....[c]hronic lacunar infarct within the left cerebellar hemisphere and mild chronic white matter microvascular type changes." Patient ceased to be febrile off Tylenol. Antibiotics were discontinued. Infectious workup was sent. CMP, CBC, and labs did not signify an acute process. Ophthamology exam revealed L lens dislocation. MR Head/Neck showed "[a] 2.2 cm lipoma overlying the left anterior temporal scalp....mild right-sided optic nerve atrophy...slight atrophy of the right side of the optic chiasm....No acute intracranial hemorrhage or evidence of acute ischemia....[c]hronic lacunar infarct within the left cerebellar hemisphere and mild chronic white matter microvascular type changes."     Patient was afebrile after second day of admission. After initially declining full anti-coagulation for atrial fibrillation, patient and family assented to starting apixaban after primary team discussed with patient's regular cardiologist. TTE showed EF 32%.   Labwork was significant for HIV1/2 nonreactive; Toxo IgG+, IgM negative; NIMA negative; RF+; ESR, CRP within normal limits; T pallidum Ab +, RPR NR, FTA Reactive, RPR Titer <1:1. ID was consulted for possible syphilis. 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   Ophthalmology exam revealed left lens dislocation to be followed up outpatient. Fever workup considered infectious, inflammatory, and malignant etiologies. Patient received a dose of ceftriaxone. CXR showed a "[l]eft lower lung 1 cm nodular opacity" that "may represent a calcified granuloma." He then received ceftriaxone and azithromycin for possible respiratory infection. CMP, CBC, LFTs were largely stable, prompting imaging in addition to bloodwork.    CTH, CTA Head/Neck showed, "[i]ntramuscular lipomas are seen involving the trapezius muscles....No acute intracranial hemorrhage, mass effect, or shift of the midline structures....No large vessel occlusion or major stenosis." CT Chest/Abdomen/Pelvis showed "Patent central airways. Nodule seen on recent chest x-ray corresponds with calcific density posterior to the left sixth rib, unchanged from 3/16/2009....LIVER: Innumerable scattered cysts, the largest measuring up to 8.4 cm, and subcentimeter hypodensities too small to characterize....Stable 7 mm pancreatic body/tail lesion, likely a cyst....IMPRESSION: No evidence of suspicious mass or lymphadenopathy. Innumerable scattered hepatic cysts overall unchanged from 8/19/2021. Cholelithiasis." MR Head/Neck showed "[a] 2.2 cm lipoma overlying the left anterior temporal scalp....mild right-sided optic nerve atrophy...slight atrophy of the right side of the optic chiasm....No acute intracranial hemorrhage or evidence of acute ischemia....[c]hronic lacunar infarct within the left cerebellar hemisphere and mild chronic white matter microvascular type changes." Patient was afebrile off Tylenol. Antibiotics were discontinued. Infectious workup was sent. CMP, CBC, and labs did not signify an acute process. Ophthamology exam revealed L lens dislocation. MR Head/Neck showed "[a] 2.2 cm lipoma overlying the left anterior temporal scalp....mild right-sided optic nerve atrophy...slight atrophy of the right side of the optic chiasm....No acute intracranial hemorrhage or evidence of acute ischemia....[c]hronic lacunar infarct within the left cerebellar hemisphere and mild chronic white matter microvascular type changes."     Patient was afebrile after second day of admission. After initially declining full anti-coagulation for atrial fibrillation, patient and family assented to starting apixaban after primary team discussed with patient's regular cardiologist. TTE showed EF 32%. 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 Patient refused diagnostic LP so began empiric penicillin G IV 4 million units q4h for neurosyphilis. He tolerated the treatment well. A PICC line was placed to allow for IV penicillin G administration at rehab. The patient was discharged to [facility].

## 2022-09-10 NOTE — DISCHARGE NOTE PROVIDER - NSDCCPCAREPLAN_GEN_ALL_CORE_FT
PRINCIPAL DISCHARGE DIAGNOSIS  Diagnosis: Blurry vision, left eye  Assessment and Plan of Treatment: You were found to have a dislocated lens in your left eye, leading to blurry vision in that eye. Please follow up with an ophthalmologist outpatient. Until then, please keep your head elevated at 30 degrees and avoid head shaking, bending, straining, or heavy lifting. Please also avoid non-steroidal anti-inflammatory drugs like ibuprofen (Motrin) or naproxen (Aleve) unless medically indicated.      SECONDARY DISCHARGE DIAGNOSES  Diagnosis: Fever  Assessment and Plan of Treatment:      PRINCIPAL DISCHARGE DIAGNOSIS  Diagnosis: Other specified neurosyphilis  Assessment and Plan of Treatment: You were found to have tests concerning for neurosyphilis. Since you preferred not to have a diagnostic lumbar puncture, you were treated empirically for neurosyphilis with penicillin G IV antibiotics. You had a PICC line placed so you could continue this treatment while at rehab. Please continue this antibiotic every four hours and follow up with your primary care doctor.      SECONDARY DISCHARGE DIAGNOSES  Diagnosis: Blurry vision, left eye  Assessment and Plan of Treatment: You were found to have a dislocated lens in your left eye, leading to blurry vision in that eye. Please follow up with an ophthalmologist outpatient. Until then, please keep your head elevated at 30 degrees and avoid head shaking, bending, straining, or heavy lifting. Please also avoid non-steroidal anti-inflammatory drugs like ibuprofen (Motrin) or naproxen (Aleve) unless medically indicated.    Diagnosis: Fever  Assessment and Plan of Treatment:     Diagnosis: Other specified neurosyphilis  Assessment and Plan of Treatment: You were found to have tests concerning for neurosyphilis. Since you preferred not to have a diagnostic lumbar puncture, you were treated empirically for neurosyphilis with penicillin G IV antibiotics. You had a PICC line placed so you could continue this treatment while at rehab. Please continue this antibiotic every four hours and follow up with your primary care doctor.

## 2022-09-10 NOTE — CONSULT NOTE ADULT - NSCONSULTADDITIONALINFOA_GEN_ALL_CORE
81 year old male with a PMH significant for HTN, HLD, T2DM who is admitted to the hospital due to weakness, fever and L eye visual disturbances.     Patient received ceftriaxone and azithromycin on admission. Fevers appear to have subsided however patient continues to complain of visual disturbances and weakness. Evaluated by optho and determined that patient has dislocated lens. Additional work-up revealed a positive FTA-ABS with a negative RPR. Further discussion of the case with optho today - this is not a classic feature of ocular syphilis however can't completely rule out.   Patient adamantly denies ever being diagnosed with syphilis and denies ever being treated for syphilis. Denies high-risk sexual behavior in the past and lives now with his wife and kids. Other reasons for a positive test may be that it is a false positive resulting from another spirochete infection including borrelia.    Given that patient has ocular symptoms in the setting of FTA-ABS positive test as well as unexplained fevers syphilis is certainly within the differential diagnosis, RPR is negative however this can normalize over time (even without treatment) so not very useful in this interpretation. Therefore, in order to further evaluate for neurosyphilis, will recommend an LP - please obtain cell count, protein, glucose, VDRL, CSF PCR. Would hold off empiric treatment at this time.    Jordan Serna MD  Division of Infectious Diseases  Available on Teams

## 2022-09-10 NOTE — PROGRESS NOTE ADULT - ASSESSMENT
81 year old male with PMH of HFrEF (40% EF), HTN, HLD, DM2, NICM, BPH (s/p TURP 7/2021), chronic fibrous pericarditis (s/p pericardial window for pericardial effusion 2017), bilateral pleural effusion s/p thoracocentesis (2017), Afib (on ASA not on full AC), and 3 months of reduced appetite and concomitant ~8-pound weight loss, presenting for new L eye vision impairment, possibly 2/2 lens dislocation, and fevers, possibly 2/2 to inflammatory, infectious, or neoplastic process, though recently afebrile.

## 2022-09-10 NOTE — PROGRESS NOTE ADULT - PROBLEM SELECTOR PLAN 6
Patient w/ hx HFrEF (EF 40%) w/ BiV dysfunction  - TTE: EF 32%, minimal AR, moderate MR, moderate-severe global LVSD, mild TR/WI, 8.9-5.7cm liver cyst  - Lasix 20 mg QD

## 2022-09-10 NOTE — DISCHARGE NOTE PROVIDER - NSDCFUSCHEDAPPT_GEN_ALL_CORE_FT
Chris Sibley  [facility]  UROLOGY [address] [facility]  Scheduled Appointment: 09/19/2022    [facility]  PULMMED [address] [facility]  Scheduled Appointment: 11/10/2022    Savage Oconnor  [facility]  CARDIOLOGY [address]  Scheduled Appointment: 11/30/2022

## 2022-09-10 NOTE — CONSULT NOTE ADULT - ASSESSMENT
80 y/o M PMHx HTN, HLD, DM2, NICM, and BPH, presented with generalized weakness, fever, and L eye blurry vision. Evaluated by Opthalmology, noted to have dislocated lens on exam along with possible vitreous hemorrhage. Syphilis screen sent for uveitis workup, FTA+ with RPR <1:1. No known history of syphilis infection or treatment.     Impression:  #Positive FTA - DDx includes previously treated syphilis infection, untreated remote syphilis infection, vs false-positive test. Would expect titers to be positive in primary or secondary syphilis.   #L Eye Vision Loss - unable to definitively exclude neurosyphilis without LP, but given viable alternative explanation (dislocated lens), would consider less likely to be neurosyphilis.   #Hepatic Cysts - unchanged from prior imaging  #Fever - last fever 9/6, unclear source, remainder of infectious workup unrevealing.    Recs:  - monitor off antibiotics  - hold off on syphilis treatment at this time  - would not pursue LP at this time given alternative etiology for his vision changes      Hermelindo Brown MD  Infectious Disease Fellow  Available on [employer] Teams  Before 9AM or after 5PM: [phone]      80 y/o M PMHx HTN, HLD, DM2, NICM, and BPH, presented with generalized weakness, fever, and L eye blurry vision. Evaluated by Opthalmology, noted to have dislocated lens on exam along with possible vitreous hemorrhage. Syphilis screen sent for uveitis workup, FTA+ with RPR <1:1. No known history of syphilis infection or treatment.     Impression:  #Positive FTA - DDx includes previously treated syphilis infection, untreated remote syphilis infection, vs false-positive test. Would expect titers to be positive in primary or secondary syphilis however RPR can normalize without treatment over time  #L Eye Vision Loss - unable to definitively exclude neurosyphilis without LP, but given viable alternative explanation (dislocated lens), would consider less likely to be neurosyphilis despite this can pursue work-up as below   #Hepatic Cysts - unchanged from prior imaging  #Fever - last fever 9/6, unclear source, remainder of infectious workup unrevealing.    Recs:  - monitor off antibiotics  - hold off on syphilis treatment at this time  - can pursue LP at this time given vision changes, FTA and concern for neurosyphilis      Hermelindo Brown MD  Infectious Disease Fellow  Available on [employer] Teams  Before 9AM or after 5PM: [phone]

## 2022-09-10 NOTE — DISCHARGE NOTE PROVIDER - REASON FOR ADMISSION
fevers, L eye vision impairment Fevers of Unknown Origin, New L eye vision impairment Fever, New L eye vision impairment

## 2022-09-10 NOTE — PROGRESS NOTE ADULT - SUBJECTIVE AND OBJECTIVE BOX
[facility] DEPARTMENT OF OPHTHALMOLOGY  ------------------------------------------------------------------------------  Stanley Anderson MD PGY-3  [phone] also available on teams  ------------------------------------------------------------------------------    Interval History: No acute events overnight. Today patient endorsing no change in vision. Denies eye pain, flashes, floaters, FBS, erythema, or discharge. Denies diplopia.     MEDICATIONS  (STANDING):  aspirin  chewable 81 milliGRAM(s) Oral daily  carvedilol 12.5 milliGRAM(s) Oral every 12 hours  dextrose 5%. 1000 milliLiter(s) (100 mL/Hr) IV Continuous <Continuous>  dextrose 5%. 1000 milliLiter(s) (50 mL/Hr) IV Continuous <Continuous>  dextrose 50% Injectable 25 Gram(s) IV Push once  dextrose 50% Injectable 12.5 Gram(s) IV Push once  dextrose 50% Injectable 25 Gram(s) IV Push once  enoxaparin Injectable 40 milliGRAM(s) SubCutaneous every 24 hours  folic acid 1 milliGRAM(s) Oral daily  furosemide    Tablet 20 milliGRAM(s) Oral daily  glucagon  Injectable 1 milliGRAM(s) IntraMuscular once  hydrALAZINE 50 milliGRAM(s) Oral three times a day  influenza  Vaccine (HIGH DOSE) 0.7 milliLiter(s) IntraMuscular once  insulin lispro (ADMELOG) corrective regimen sliding scale   SubCutaneous three times a day before meals  insulin lispro (ADMELOG) corrective regimen sliding scale   SubCutaneous at bedtime  lisinopril 20 milliGRAM(s) Oral daily    MEDICATIONS  (PRN):  acetaminophen     Tablet .. 650 milliGRAM(s) Oral every 6 hours PRN Temp greater or equal to 38C (100.4F), Mild Pain (1 - 3)  dextrose Oral Gel 15 Gram(s) Oral once PRN Blood Glucose LESS THAN 70 milliGRAM(s)/deciliter  melatonin 3 milliGRAM(s) Oral at bedtime PRN Insomnia      VITALS: T(C): 36.7 (09-07-22 @ 09:40)  T(F): 98 (09-07-22 @ 09:40), Max: 100.1 (09-06-22 @ 21:40)  HR: 89 (09-07-22 @ 09:40) (88 - 91)  BP: 148/99 (09-07-22 @ 09:40) (133/88 - 151/81)  RR:  (17 - 18)  SpO2:  (97% - 100%)  Wt(kg): --  General: AAO x 3, appropriate mood and affect  Visual acuity (cc): OD 20/40 PH 20/30-; OS 20/100 PH 20/60  Pupils: PERRL OU, no APD  Ttono: OD 22 OS 21  Extraocular movements (EOMs): Full OU, no pain, no diplopia    Penlight exam:  External: Flat OU, no tenderness OS, temporal pulses palpated without tenderness  Lids/Lashes/Lacrimal Ducts: MGD OU    Sclera/Conjunctiva: W+Q, CAM OU  Cornea: OD nasal PTG, OS nasal and temporal PTG  Anterior Chamber: OU deep and quiet. no hypopyon OU  Iris: Flat OU  Lens: OD PCIOL mildly subluxed OS significant infero-temp subluxed PCIOL    B-scan 9/7/22 OS with vitreous debris, no RD or masses.     MRI brain & orbits:      IMPRESSION:    MRI BRAIN: No acute intracranial hemorrhage or evidence of acute ischemia.    Chronic lacunar infarct within the left cerebellar hemisphere and mild   chronic white matter microvascular type changes.    MRI ORBITS: Slightly motion limited study.    Atrophy of the right optic nerve and right side of the optic chiasm.    Otherwise unremarkable study.    --- End of Report ---        CARLA ANGULO MD; Attending Radiologist  This document has been electronically signed. Sep  8 2022 11:02AM

## 2022-09-10 NOTE — DISCHARGE NOTE PROVIDER - NSDCFUADDINST_GEN_ALL_CORE_FT
Because of your left lens dislocation, please keep your head elevated at 30 degrees, avoid head shaking, bending, straining, or heavy lifting until you follow up with an ophthalmologist.

## 2022-09-11 LAB
A PHAGOCYTOPH DNA BLD QL NAA+PROBE: NEGATIVE — SIGNIFICANT CHANGE UP
ALBUMIN SERPL ELPH-MCNC: 3.6 G/DL — SIGNIFICANT CHANGE UP (ref 3.3–5)
ALP SERPL-CCNC: 49 U/L — SIGNIFICANT CHANGE UP (ref 40–120)
ALT FLD-CCNC: 21 U/L — SIGNIFICANT CHANGE UP (ref 4–41)
ANION GAP SERPL CALC-SCNC: 10 MMOL/L — SIGNIFICANT CHANGE UP (ref 7–14)
AST SERPL-CCNC: 21 U/L — SIGNIFICANT CHANGE UP (ref 4–40)
BASOPHILS # BLD AUTO: 0.03 K/UL — SIGNIFICANT CHANGE UP (ref 0–0.2)
BASOPHILS NFR BLD AUTO: 0.7 % — SIGNIFICANT CHANGE UP (ref 0–2)
BILIRUB SERPL-MCNC: 0.9 MG/DL — SIGNIFICANT CHANGE UP (ref 0.2–1.2)
BUN SERPL-MCNC: 14 MG/DL — SIGNIFICANT CHANGE UP (ref 7–23)
CALCIUM SERPL-MCNC: 9.1 MG/DL — SIGNIFICANT CHANGE UP (ref 8.4–10.5)
CHLORIDE SERPL-SCNC: 99 MMOL/L — SIGNIFICANT CHANGE UP (ref 98–107)
CO2 SERPL-SCNC: 26 MMOL/L — SIGNIFICANT CHANGE UP (ref 22–31)
CREAT SERPL-MCNC: 0.93 MG/DL — SIGNIFICANT CHANGE UP (ref 0.5–1.3)
CULTURE RESULTS: SIGNIFICANT CHANGE UP
CULTURE RESULTS: SIGNIFICANT CHANGE UP
E CHAFFEENSIS DNA BLD QL NAA+PROBE: NEGATIVE — SIGNIFICANT CHANGE UP
E EWINGII DNA SPEC QL NAA+PROBE: NEGATIVE — SIGNIFICANT CHANGE UP
EGFR: 82 ML/MIN/1.73M2 — SIGNIFICANT CHANGE UP
EHRLICHIA DNA SPEC QL NAA+PROBE: NEGATIVE — SIGNIFICANT CHANGE UP
EOSINOPHIL # BLD AUTO: 0.06 K/UL — SIGNIFICANT CHANGE UP (ref 0–0.5)
EOSINOPHIL NFR BLD AUTO: 1.5 % — SIGNIFICANT CHANGE UP (ref 0–6)
GLUCOSE BLDC GLUCOMTR-MCNC: 150 MG/DL — HIGH (ref 70–99)
GLUCOSE BLDC GLUCOMTR-MCNC: 181 MG/DL — HIGH (ref 70–99)
GLUCOSE BLDC GLUCOMTR-MCNC: 206 MG/DL — HIGH (ref 70–99)
GLUCOSE BLDC GLUCOMTR-MCNC: 276 MG/DL — HIGH (ref 70–99)
GLUCOSE SERPL-MCNC: 214 MG/DL — HIGH (ref 70–99)
HCT VFR BLD CALC: 36.1 % — LOW (ref 39–50)
HGB BLD-MCNC: 12 G/DL — LOW (ref 13–17)
IANC: 2.16 K/UL — SIGNIFICANT CHANGE UP (ref 1.8–7.4)
IMM GRANULOCYTES NFR BLD AUTO: 0.5 % — SIGNIFICANT CHANGE UP (ref 0–1.5)
LYMPHOCYTES # BLD AUTO: 1.28 K/UL — SIGNIFICANT CHANGE UP (ref 1–3.3)
LYMPHOCYTES # BLD AUTO: 31.2 % — SIGNIFICANT CHANGE UP (ref 13–44)
MAGNESIUM SERPL-MCNC: 2 MG/DL — SIGNIFICANT CHANGE UP (ref 1.6–2.6)
MCHC RBC-ENTMCNC: 26.1 PG — LOW (ref 27–34)
MCHC RBC-ENTMCNC: 33.2 GM/DL — SIGNIFICANT CHANGE UP (ref 32–36)
MCV RBC AUTO: 78.6 FL — LOW (ref 80–100)
MONOCYTES # BLD AUTO: 0.55 K/UL — SIGNIFICANT CHANGE UP (ref 0–0.9)
MONOCYTES NFR BLD AUTO: 13.4 % — SIGNIFICANT CHANGE UP (ref 2–14)
NEUTROPHILS # BLD AUTO: 2.16 K/UL — SIGNIFICANT CHANGE UP (ref 1.8–7.4)
NEUTROPHILS NFR BLD AUTO: 52.7 % — SIGNIFICANT CHANGE UP (ref 43–77)
NRBC # BLD: 0 /100 WBCS — SIGNIFICANT CHANGE UP (ref 0–0)
NRBC # FLD: 0 K/UL — SIGNIFICANT CHANGE UP (ref 0–0)
PHOSPHATE SERPL-MCNC: 3.7 MG/DL — SIGNIFICANT CHANGE UP (ref 2.5–4.5)
PLATELET # BLD AUTO: 220 K/UL — SIGNIFICANT CHANGE UP (ref 150–400)
POTASSIUM SERPL-MCNC: 3.9 MMOL/L — SIGNIFICANT CHANGE UP (ref 3.5–5.3)
POTASSIUM SERPL-SCNC: 3.9 MMOL/L — SIGNIFICANT CHANGE UP (ref 3.5–5.3)
PROT SERPL-MCNC: 6 G/DL — SIGNIFICANT CHANGE UP (ref 6–8.3)
RBC # BLD: 4.59 M/UL — SIGNIFICANT CHANGE UP (ref 4.2–5.8)
RBC # FLD: 16.8 % — HIGH (ref 10.3–14.5)
SODIUM SERPL-SCNC: 135 MMOL/L — SIGNIFICANT CHANGE UP (ref 135–145)
SPECIMEN SOURCE: SIGNIFICANT CHANGE UP
SPECIMEN SOURCE: SIGNIFICANT CHANGE UP
WBC # BLD: 4.1 K/UL — SIGNIFICANT CHANGE UP (ref 3.8–10.5)
WBC # FLD AUTO: 4.1 K/UL — SIGNIFICANT CHANGE UP (ref 3.8–10.5)
WNV RNA SPEC QL NAA+PROBE: SIGNIFICANT CHANGE UP
WNV RNA SPEC QL NAA+PROBE: SIGNIFICANT CHANGE UP

## 2022-09-11 PROCEDURE — 99233 SBSQ HOSP IP/OBS HIGH 50: CPT

## 2022-09-11 PROCEDURE — 99232 SBSQ HOSP IP/OBS MODERATE 35: CPT

## 2022-09-11 RX ORDER — PENICILLIN G POTASSIUM 5000000 [IU]/1
4 POWDER, FOR SOLUTION INTRAMUSCULAR; INTRAPLEURAL; INTRATHECAL; INTRAVENOUS EVERY 4 HOURS
Refills: 0 | Status: DISCONTINUED | OUTPATIENT
Start: 2022-09-11 | End: 2022-09-16

## 2022-09-11 RX ORDER — PENICILLIN G POTASSIUM 5000000 [IU]/1
POWDER, FOR SOLUTION INTRAMUSCULAR; INTRAPLEURAL; INTRATHECAL; INTRAVENOUS
Refills: 0 | Status: DISCONTINUED | OUTPATIENT
Start: 2022-09-11 | End: 2022-09-16

## 2022-09-11 RX ORDER — PENICILLIN G POTASSIUM 5000000 [IU]/1
4 POWDER, FOR SOLUTION INTRAMUSCULAR; INTRAPLEURAL; INTRATHECAL; INTRAVENOUS ONCE
Refills: 0 | Status: COMPLETED | OUTPATIENT
Start: 2022-09-11 | End: 2022-09-11

## 2022-09-11 RX ADMIN — Medication 3 UNIT(S): at 13:07

## 2022-09-11 RX ADMIN — PENICILLIN G POTASSIUM 100 MILLION UNIT(S): 5000000 POWDER, FOR SOLUTION INTRAMUSCULAR; INTRAPLEURAL; INTRATHECAL; INTRAVENOUS at 22:12

## 2022-09-11 RX ADMIN — LISINOPRIL 20 MILLIGRAM(S): 2.5 TABLET ORAL at 05:44

## 2022-09-11 RX ADMIN — Medication 1 MILLIGRAM(S): at 12:37

## 2022-09-11 RX ADMIN — CARVEDILOL PHOSPHATE 12.5 MILLIGRAM(S): 80 CAPSULE, EXTENDED RELEASE ORAL at 05:44

## 2022-09-11 RX ADMIN — APIXABAN 2.5 MILLIGRAM(S): 2.5 TABLET, FILM COATED ORAL at 05:43

## 2022-09-11 RX ADMIN — APIXABAN 2.5 MILLIGRAM(S): 2.5 TABLET, FILM COATED ORAL at 17:36

## 2022-09-11 RX ADMIN — Medication 2: at 09:03

## 2022-09-11 RX ADMIN — Medication 50 MILLIGRAM(S): at 05:44

## 2022-09-11 RX ADMIN — Medication 0: at 18:15

## 2022-09-11 RX ADMIN — Medication 50 MILLIGRAM(S): at 13:08

## 2022-09-11 RX ADMIN — CARVEDILOL PHOSPHATE 12.5 MILLIGRAM(S): 80 CAPSULE, EXTENDED RELEASE ORAL at 17:37

## 2022-09-11 RX ADMIN — Medication 50 MILLIGRAM(S): at 22:12

## 2022-09-11 RX ADMIN — Medication 3 UNIT(S): at 18:15

## 2022-09-11 RX ADMIN — Medication 20 MILLIGRAM(S): at 05:44

## 2022-09-11 RX ADMIN — Medication 3 MILLIGRAM(S): at 22:22

## 2022-09-11 RX ADMIN — Medication 3: at 13:07

## 2022-09-11 RX ADMIN — Medication 3 UNIT(S): at 09:04

## 2022-09-11 RX ADMIN — Medication 81 MILLIGRAM(S): at 12:38

## 2022-09-11 RX ADMIN — INSULIN GLARGINE 4 UNIT(S): 100 INJECTION, SOLUTION SUBCUTANEOUS at 22:11

## 2022-09-11 NOTE — PROGRESS NOTE ADULT - ATTENDING COMMENTS
Patient seen and examined at bedside today. Continues to not have any complaints except for vision of left eye.     Discussed rationale for LP to evaluate for neurosyphilis with patient and nephew (hospitalist in [place]). Patient refuses LP at this time. If no LP, discussed that we would treat empirically for neurosyphilis which patient is agreeable to. Therefore will recommend to start penicillin G therapy as stated above.    Jordan Serna MD  Division of Infectious Diseases  Available on Teams

## 2022-09-11 NOTE — PROGRESS NOTE ADULT - SUBJECTIVE AND OBJECTIVE BOX
Follow Up:  +FTA    Interval History/ROS:  No acute interval events  Remains afebrile    Allergies  No Known Allergies      ANTIMICROBIALS:      OTHER MEDS:  MEDICATIONS  (STANDING):  acetaminophen     Tablet .. 650 every 6 hours PRN  apixaban 2.5 two times a day  aspirin  chewable 81 daily  carvedilol 12.5 every 12 hours  dextrose 50% Injectable 25 once  dextrose 50% Injectable 12.5 once  dextrose 50% Injectable 25 once  dextrose Oral Gel 15 once PRN  furosemide    Tablet 20 daily  glucagon  Injectable 1 once  hydrALAZINE 50 three times a day  influenza  Vaccine (HIGH DOSE) 0.7 once  insulin glargine Injectable (LANTUS) 4 at bedtime  insulin lispro (ADMELOG) corrective regimen sliding scale  three times a day before meals  insulin lispro (ADMELOG) corrective regimen sliding scale  at bedtime  insulin lispro Injectable (ADMELOG) 3 three times a day before meals  lisinopril 20 daily  melatonin 3 at bedtime PRN      Vital Signs Last 24 Hrs  T(C): 36.5 (11 Sep 2022 05:44), Max: 37 (10 Sep 2022 22:31)  T(F): 97.7 (11 Sep 2022 05:44), Max: 98.6 (10 Sep 2022 22:31)  HR: 67 (11 Sep 2022 05:44) (65 - 76)  BP: 138/70 (11 Sep 2022 05:44) (104/57 - 138/70)  BP(mean): 72 (10 Sep 2022 12:24) (72 - 72)  RR: 18 (11 Sep 2022 05:44) (17 - 18)  SpO2: 100% (11 Sep 2022 05:44) (100% - 100%)    Parameters below as of 11 Sep 2022 05:44  Patient On (Oxygen Delivery Method): room air        PHYSICAL EXAM:  Constitutional: non-toxic, no distress  HEAD/EYES: anicteric, no conjunctival injection  ENT:  supple, no thrush  Cardiovascular:   +S1/S2  Respiratory:  clear BS bilaterally  GI:  soft, non-tender, normal bowel sounds  :  no dela cruz, no CVA tenderness  Musculoskeletal:  no synovitis, normal ROM  Neurologic: awake and alert,  no focal findings                            12.0   4.10  )-----------( 220      ( 11 Sep 2022 06:46 )             36.1       09-11    135  |  99  |  14  ----------------------------<  214<H>  3.9   |  26  |  0.93    Ca    9.1      11 Sep 2022 06:46  Phos  3.7     09-11  Mg     2.00     09-11    TPro  6.0  /  Alb  3.6  /  TBili  0.9  /  DBili  x   /  AST  21  /  ALT  21  /  AlkPhos  49  09-11      MICROBIOLOGY:  Toxoplasma IgG Screen: 25.1 IU/mL (09-08-22 @ 11:08)    Rapid RVP Result: NotDetec (09-05 @ 19:36)      RADIOLOGY:  < from: MR Orbits w/wo IV Cont (09.08.22 @ 10:35) >  IMPRESSION:  MRI BRAIN: No acute intracranial hemorrhage or evidence of acute ischemia.  Chronic lacunar infarct within the left cerebellar hemisphere and mild   chronic white matter microvascular type changes.  MRI ORBITS: Slightly motion limited study.  Atrophy of the right optic nerve and right side of the optic chiasm.  < end of copied text >

## 2022-09-11 NOTE — PROGRESS NOTE ADULT - ATTENDING COMMENTS
Briefly, patient is an 80 y/o M with paroxysmal afib (not AC previously, however restarted during this admission) HTN, HLD, DM2, NICM, BPH, pericardial effusion (2017), on ASA presenting for fevers of unknown etiology and new L eye vision impairment.    Patient currently remaining afebrile off antibiotics for >72 hours. Blood and urine cultures without any growth. Patient followed by neurology and ophthalmology during this admission. Now s/p evaluation and MR imaging of brain and orbits. Currently no further plan for inpatient w/u by neuro or ophtho, patient will require outpatient follow up with both services. CT imaging of abdomen notable for multiple hepatic cysts of unclear etiology (which appear to be unchanged since 2021). Initially patient planned for tentative d/c on 9/10, however noted with positive treponemal antibody testing with negative RPR with normal titers. Patient without any history of syphilis infection or treatment. 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 ID consulted for further guidance.    Patient seen and examined at bedside. Currently denies any complaints today. Wishes to go home. Exam unremarkable. Discussed case extensively with patient, spouse at bedside, and patient's nephew over telephone. ID team recommended LP for further diagnostic testing. Patient adamantly refusing LP to clarify diagnosis. Explained that if confirmatory LP is negative, patient would likely not require any antibiotic treatment. Additional options include empiric treatment for neurosyphilis. Explained that effective treatment regimens for neurosyphilis are via intravenous infusion. Explained that while the treatment can be initiated in the hospital, the patient may be able to go home earlier with a midline to complete the 10-14 day course. Will discuss with ID team regarding optimal course of treatment for patient. Explained to family that as the patient at this time has a presumptive diagnosis of neurosyphilis, would not advise discharge without appropriate confirmatory diagnostics/empiric treatment. Informed patient that if he wishes to leave the hospital, it would be against medical advice. Patient is otherwise A&Ox3 with good understanding of the risks. Will follow up final ID recommendations for empiric treatment and offer patient options as such.     Case discussed with HS 1.

## 2022-09-11 NOTE — PROGRESS NOTE ADULT - PROBLEM SELECTOR PLAN 6
Patient w/ hx HFrEF (EF 40%) w/ BiV dysfunction  - TTE: EF 32%, minimal AR, moderate MR, moderate-severe global LVSD, mild TR/OH, 8.9-5.7cm liver cyst  - Lasix 20 mg QD

## 2022-09-11 NOTE — PROGRESS NOTE ADULT - SUBJECTIVE AND OBJECTIVE BOX
Patient is a 81y old  Male who presents with a chief complaint of fevers, L eye vision impairment (10 Sep 2022 18:03)      SUBJECTIVE / OVERNIGHT EVENTS:    12 point ROS negative except as noted above.     MEDICATIONS  (STANDING):  apixaban 2.5 milliGRAM(s) Oral two times a day  aspirin  chewable 81 milliGRAM(s) Oral daily  carvedilol 12.5 milliGRAM(s) Oral every 12 hours  dextrose 5%. 1000 milliLiter(s) (100 mL/Hr) IV Continuous <Continuous>  dextrose 5%. 1000 milliLiter(s) (50 mL/Hr) IV Continuous <Continuous>  dextrose 50% Injectable 25 Gram(s) IV Push once  dextrose 50% Injectable 12.5 Gram(s) IV Push once  dextrose 50% Injectable 25 Gram(s) IV Push once  folic acid 1 milliGRAM(s) Oral daily  furosemide    Tablet 20 milliGRAM(s) Oral daily  glucagon  Injectable 1 milliGRAM(s) IntraMuscular once  hydrALAZINE 50 milliGRAM(s) Oral three times a day  influenza  Vaccine (HIGH DOSE) 0.7 milliLiter(s) IntraMuscular once  insulin glargine Injectable (LANTUS) 4 Unit(s) SubCutaneous at bedtime  insulin lispro (ADMELOG) corrective regimen sliding scale   SubCutaneous three times a day before meals  insulin lispro (ADMELOG) corrective regimen sliding scale   SubCutaneous at bedtime  insulin lispro Injectable (ADMELOG) 3 Unit(s) SubCutaneous three times a day before meals  lisinopril 20 milliGRAM(s) Oral daily    MEDICATIONS  (PRN):  acetaminophen     Tablet .. 650 milliGRAM(s) Oral every 6 hours PRN Temp greater or equal to 38C (100.4F), Mild Pain (1 - 3)  dextrose Oral Gel 15 Gram(s) Oral once PRN Blood Glucose LESS THAN 70 milliGRAM(s)/deciliter  melatonin 3 milliGRAM(s) Oral at bedtime PRN Insomnia      CAPILLARY BLOOD GLUCOSE      POCT Blood Glucose.: 150 mg/dL (10 Sep 2022 22:21)  POCT Blood Glucose.: 149 mg/dL (10 Sep 2022 20:54)  POCT Blood Glucose.: 167 mg/dL (10 Sep 2022 17:29)  POCT Blood Glucose.: 302 mg/dL (10 Sep 2022 11:50)  POCT Blood Glucose.: 241 mg/dL (10 Sep 2022 08:50)    I&O's Summary      Vital Signs Last 24 Hrs  T(C): 36.5 (11 Sep 2022 05:44), Max: 37 (10 Sep 2022 22:31)  T(F): 97.7 (11 Sep 2022 05:44), Max: 98.6 (10 Sep 2022 22:31)  HR: 67 (11 Sep 2022 05:44) (65 - 76)  BP: 138/70 (11 Sep 2022 05:44) (104/57 - 138/70)  BP(mean): 72 (10 Sep 2022 12:24) (72 - 72)  RR: 18 (11 Sep 2022 05:44) (17 - 18)  SpO2: 100% (11 Sep 2022 05:44) (100% - 100%)    Parameters below as of 11 Sep 2022 05:44  Patient On (Oxygen Delivery Method): room air        PHYSICAL EXAM:  GENERAL: NAD, well-developed, well-nourished  HEAD: Atraumatic, Normocephalic  EYES: EOMI, PERRLA, conjunctiva and sclera clear  NECK: Supple, No JVD  CHEST/LUNG: Clear to auscultation bilaterally; No wheezes or crackles  HEART: Normal S1/S2; Regular rate and rhythm; No murmurs, rubs, or gallops  ABDOMEN: Soft, Nontender, Nondistended; Bowel sounds present  EXTREMITIES: 2+ Peripheral Pulses; No clubbing, cyanosis, or edema  PSYCH: A&Ox3  NEUROLOGY: no focal neurologic deficit  SKIN: No rashes or lesions    LABS:                        11.8   3.84  )-----------( 182      ( 10 Sep 2022 07:00 )             35.4      09-10    133<L>  |  98  |  12  ----------------------------<  217<H>  4.0   |  23  |  1.02    Ca    8.9      10 Sep 2022 07:00  Phos  3.5     09-10  Mg     1.80     09-10    TPro  6.2  /  Alb  3.5  /  TBili  0.7  /  DBili  x   /  AST  23  /  ALT  21  /  AlkPhos  50  09-10              RADIOLOGY & ADDITIONAL TESTS:    Imaging Personally Reviewed:    Consultant(s) Notes Reviewed:      Care Discussed with Consultants/Other Providers:   Patient is a 81y old  Male who presents with a chief complaint of fevers, L eye vision impairment (10 Sep 2022 18:03)      SUBJECTIVE / OVERNIGHT EVENTS: no acute events. Continues to endorse blurry vision. No nausea/vomiting, chest pain, dyspnea, abdominal pain, diarrhea/constipation or dysuria.     12 point ROS negative except as noted above.     MEDICATIONS  (STANDING):  apixaban 2.5 milliGRAM(s) Oral two times a day  aspirin  chewable 81 milliGRAM(s) Oral daily  carvedilol 12.5 milliGRAM(s) Oral every 12 hours  dextrose 5%. 1000 milliLiter(s) (100 mL/Hr) IV Continuous <Continuous>  dextrose 5%. 1000 milliLiter(s) (50 mL/Hr) IV Continuous <Continuous>  dextrose 50% Injectable 25 Gram(s) IV Push once  dextrose 50% Injectable 12.5 Gram(s) IV Push once  dextrose 50% Injectable 25 Gram(s) IV Push once  folic acid 1 milliGRAM(s) Oral daily  furosemide    Tablet 20 milliGRAM(s) Oral daily  glucagon  Injectable 1 milliGRAM(s) IntraMuscular once  hydrALAZINE 50 milliGRAM(s) Oral three times a day  influenza  Vaccine (HIGH DOSE) 0.7 milliLiter(s) IntraMuscular once  insulin glargine Injectable (LANTUS) 4 Unit(s) SubCutaneous at bedtime  insulin lispro (ADMELOG) corrective regimen sliding scale   SubCutaneous three times a day before meals  insulin lispro (ADMELOG) corrective regimen sliding scale   SubCutaneous at bedtime  insulin lispro Injectable (ADMELOG) 3 Unit(s) SubCutaneous three times a day before meals  lisinopril 20 milliGRAM(s) Oral daily    MEDICATIONS  (PRN):  acetaminophen     Tablet .. 650 milliGRAM(s) Oral every 6 hours PRN Temp greater or equal to 38C (100.4F), Mild Pain (1 - 3)  dextrose Oral Gel 15 Gram(s) Oral once PRN Blood Glucose LESS THAN 70 milliGRAM(s)/deciliter  melatonin 3 milliGRAM(s) Oral at bedtime PRN Insomnia      CAPILLARY BLOOD GLUCOSE      POCT Blood Glucose.: 150 mg/dL (10 Sep 2022 22:21)  POCT Blood Glucose.: 149 mg/dL (10 Sep 2022 20:54)  POCT Blood Glucose.: 167 mg/dL (10 Sep 2022 17:29)  POCT Blood Glucose.: 302 mg/dL (10 Sep 2022 11:50)  POCT Blood Glucose.: 241 mg/dL (10 Sep 2022 08:50)    I&O's Summary      Vital Signs Last 24 Hrs  T(C): 36.5 (11 Sep 2022 05:44), Max: 37 (10 Sep 2022 22:31)  T(F): 97.7 (11 Sep 2022 05:44), Max: 98.6 (10 Sep 2022 22:31)  HR: 67 (11 Sep 2022 05:44) (65 - 76)  BP: 138/70 (11 Sep 2022 05:44) (104/57 - 138/70)  BP(mean): 72 (10 Sep 2022 12:24) (72 - 72)  RR: 18 (11 Sep 2022 05:44) (17 - 18)  SpO2: 100% (11 Sep 2022 05:44) (100% - 100%)    Parameters below as of 11 Sep 2022 05:44  Patient On (Oxygen Delivery Method): room air        PHYSICAL EXAM:  GENERAL: NAD, well-developed, well-nourished  HEAD: Atraumatic, Normocephalic  EYES: EOMI, PERRLA, conjunctiva and sclera clear  NECK: Supple, No JVD  CHEST/LUNG: Clear to auscultation bilaterally; No wheezes or crackles  HEART: Normal S1/S2; Regular rate and rhythm; No murmurs, rubs, or gallops  ABDOMEN: Soft, Nontender, Nondistended; Bowel sounds present  EXTREMITIES: 2+ Peripheral Pulses; No clubbing, cyanosis, or edema  PSYCH: A&Ox3  NEUROLOGY: no focal neurologic deficit  SKIN: No rashes or lesions    LABS:                        11.8   3.84  )-----------( 182      ( 10 Sep 2022 07:00 )             35.4      09-10    133<L>  |  98  |  12  ----------------------------<  217<H>  4.0   |  23  |  1.02    Ca    8.9      10 Sep 2022 07:00  Phos  3.5     09-10  Mg     1.80     09-10    TPro  6.2  /  Alb  3.5  /  TBili  0.7  /  DBili  x   /  AST  23  /  ALT  21  /  AlkPhos  50  09-10              RADIOLOGY & ADDITIONAL TESTS:    Imaging Personally Reviewed:    Consultant(s) Notes Reviewed:      Care Discussed with Consultants/Other Providers:

## 2022-09-11 NOTE — PROGRESS NOTE ADULT - ASSESSMENT
82 y/o M PMHx HTN, HLD, DM2, NICM, and BPH, presented with generalized weakness, fever, and L eye blurry vision. Evaluated by Opthalmology, noted to have dislocated lens on exam along with possible vitreous hemorrhage. Syphilis screen sent for uveitis workup, FTA+ with RPR <1:1. No known history of syphilis infection or treatment.     Impression:  #Positive FTA - DDx includes previously treated syphilis infection, untreated remote syphilis infection, vs false-positive test. Would expect titers to be positive in primary or secondary syphilis however RPR can normalize without treatment over time  #L Eye Vision Loss - unable to definitively exclude neurosyphilis without LP  #Hepatic Cysts - unchanged from prior imaging  #Fever - last fever 9/6, unclear source, remainder of infectious workup unrevealing.    Recs:  - monitor off antibiotics  - hold off on syphilis treatment at this time  - can pursue LP at this time given vision changes, +FTA and concern for neurosyphilis      Hermelindo Brown MD  Infectious Disease Fellow  Available on [employer] Teams  Before 9AM or after 5PM: [phone]      82 y/o M PMHx HTN, HLD, DM2, NICM, and BPH, presented with generalized weakness, fever, and L eye blurry vision. Evaluated by Opthalmology, noted to have dislocated lens on exam along with possible vitreous hemorrhage. Syphilis screen sent for uveitis workup, FTA+ with RPR <1:1. No known history of syphilis infection or treatment.     Impression:  #Positive FTA - DDx includes previously treated syphilis infection, untreated remote syphilis infection, vs false-positive test. Would expect titers to be positive in primary or secondary syphilis however RPR can normalize without treatment over time  #L Eye Vision Loss - unable to definitively exclude neurosyphilis without LP  #Hepatic Cysts - unchanged from prior imaging  #Fever - last fever 9/6, unclear source, remainder of infectious workup unrevealing.    Recs:  - Due to unexplained fever, ocular symptoms, positive FTA-ABS test there is concern for neurosyphilis and only way to diagnose is through LP, patient refuses LP therefore will pursue empiric treatment  - Start IV penicillin G 4mil units q4h for a total of 24 million units daily  - Anticipating 14 day course      Hermelindo Brown MD  Infectious Disease Fellow  Available on [facility] Teams  Before 9AM or after 5PM: [phone]

## 2022-09-11 NOTE — PROGRESS NOTE ADULT - PROBLEM SELECTOR PLAN 1
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 chest, liver?, infarcts/atrophy on CT?), past effusions?, cutaneous findings? 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 chest, liver?, infarcts/atrophy on CT?), past effusions?, cutaneous findings? 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 L cerebellum)  - 2 months of new intermittent pains in hands/feet relieved with exercise  - T pallid Ab +, RPR NR, FTA Reactive, RPR Titer <1:1. ID recommending LP.   - HIV1/2 nonreactive  - Toxo IgG+, IgM neg  - NIMA negative  - RF+  - ESR, CRP wnl  - f/u Echinococcus Ab  - f/u peripheral blood smear, West Nile, O&P, blood parasites  - f/u ANCA, Lyme, syphilis, Quantiferon gold, Toxo, Bartonella, ACE per ophtho recs  - f/u Ehrlichiosis/Anaplasma  - appreciate ID recs

## 2022-09-12 LAB
ALBUMIN SERPL ELPH-MCNC: 3.7 G/DL — SIGNIFICANT CHANGE UP (ref 3.3–5)
ALP SERPL-CCNC: 50 U/L — SIGNIFICANT CHANGE UP (ref 40–120)
ALT FLD-CCNC: 21 U/L — SIGNIFICANT CHANGE UP (ref 4–41)
ANION GAP SERPL CALC-SCNC: 10 MMOL/L — SIGNIFICANT CHANGE UP (ref 7–14)
AST SERPL-CCNC: 20 U/L — SIGNIFICANT CHANGE UP (ref 4–40)
B HENSELAE IGG SER-ACNC: NEGATIVE TITER — SIGNIFICANT CHANGE UP
B HENSELAE IGM SER-ACNC: NEGATIVE TITER — SIGNIFICANT CHANGE UP
B QUINTANA DNA SPEC QL NAA+PROBE: NEGATIVE — SIGNIFICANT CHANGE UP
B QUINTANA IGG SERPL-ACNC: NEGATIVE TITER — SIGNIFICANT CHANGE UP
B QUINTANA IGM SER-ACNC: NEGATIVE TITER — SIGNIFICANT CHANGE UP
BASOPHILS # BLD AUTO: 0.02 K/UL — SIGNIFICANT CHANGE UP (ref 0–0.2)
BASOPHILS NFR BLD AUTO: 0.5 % — SIGNIFICANT CHANGE UP (ref 0–2)
BILIRUB SERPL-MCNC: 0.8 MG/DL — SIGNIFICANT CHANGE UP (ref 0.2–1.2)
BUN SERPL-MCNC: 12 MG/DL — SIGNIFICANT CHANGE UP (ref 7–23)
CALCIUM SERPL-MCNC: 8.9 MG/DL — SIGNIFICANT CHANGE UP (ref 8.4–10.5)
CHLORIDE SERPL-SCNC: 97 MMOL/L — LOW (ref 98–107)
CO2 SERPL-SCNC: 25 MMOL/L — SIGNIFICANT CHANGE UP (ref 22–31)
CREAT SERPL-MCNC: 0.93 MG/DL — SIGNIFICANT CHANGE UP (ref 0.5–1.3)
EGFR: 82 ML/MIN/1.73M2 — SIGNIFICANT CHANGE UP
EOSINOPHIL # BLD AUTO: 0.03 K/UL — SIGNIFICANT CHANGE UP (ref 0–0.5)
EOSINOPHIL NFR BLD AUTO: 0.8 % — SIGNIFICANT CHANGE UP (ref 0–6)
GLUCOSE BLDC GLUCOMTR-MCNC: 189 MG/DL — HIGH (ref 70–99)
GLUCOSE BLDC GLUCOMTR-MCNC: 249 MG/DL — HIGH (ref 70–99)
GLUCOSE BLDC GLUCOMTR-MCNC: 276 MG/DL — HIGH (ref 70–99)
GLUCOSE BLDC GLUCOMTR-MCNC: 328 MG/DL — HIGH (ref 70–99)
GLUCOSE SERPL-MCNC: 214 MG/DL — HIGH (ref 70–99)
HCT VFR BLD CALC: 37.2 % — LOW (ref 39–50)
HGB BLD-MCNC: 12 G/DL — LOW (ref 13–17)
IANC: 1.8 K/UL — SIGNIFICANT CHANGE UP (ref 1.8–7.4)
IMM GRANULOCYTES NFR BLD AUTO: 0.8 % — SIGNIFICANT CHANGE UP (ref 0–1.5)
LYMPHOCYTES # BLD AUTO: 1.27 K/UL — SIGNIFICANT CHANGE UP (ref 1–3.3)
LYMPHOCYTES # BLD AUTO: 33.9 % — SIGNIFICANT CHANGE UP (ref 13–44)
MAGNESIUM SERPL-MCNC: 1.9 MG/DL — SIGNIFICANT CHANGE UP (ref 1.6–2.6)
MCHC RBC-ENTMCNC: 25.7 PG — LOW (ref 27–34)
MCHC RBC-ENTMCNC: 32.3 GM/DL — SIGNIFICANT CHANGE UP (ref 32–36)
MCV RBC AUTO: 79.7 FL — LOW (ref 80–100)
MONOCYTES # BLD AUTO: 0.6 K/UL — SIGNIFICANT CHANGE UP (ref 0–0.9)
MONOCYTES NFR BLD AUTO: 16 % — HIGH (ref 2–14)
NEUTROPHILS # BLD AUTO: 1.8 K/UL — SIGNIFICANT CHANGE UP (ref 1.8–7.4)
NEUTROPHILS NFR BLD AUTO: 48 % — SIGNIFICANT CHANGE UP (ref 43–77)
NRBC # BLD: 0 /100 WBCS — SIGNIFICANT CHANGE UP (ref 0–0)
NRBC # FLD: 0 K/UL — SIGNIFICANT CHANGE UP (ref 0–0)
PHOSPHATE SERPL-MCNC: 3.2 MG/DL — SIGNIFICANT CHANGE UP (ref 2.5–4.5)
PLATELET # BLD AUTO: 220 K/UL — SIGNIFICANT CHANGE UP (ref 150–400)
POTASSIUM SERPL-MCNC: 3.9 MMOL/L — SIGNIFICANT CHANGE UP (ref 3.5–5.3)
POTASSIUM SERPL-SCNC: 3.9 MMOL/L — SIGNIFICANT CHANGE UP (ref 3.5–5.3)
PROT SERPL-MCNC: 6.3 G/DL — SIGNIFICANT CHANGE UP (ref 6–8.3)
RBC # BLD: 4.67 M/UL — SIGNIFICANT CHANGE UP (ref 4.2–5.8)
RBC # FLD: 17.1 % — HIGH (ref 10.3–14.5)
SARS-COV-2 RNA SPEC QL NAA+PROBE: SIGNIFICANT CHANGE UP
SODIUM SERPL-SCNC: 132 MMOL/L — LOW (ref 135–145)
WBC # BLD: 3.75 K/UL — LOW (ref 3.8–10.5)
WBC # FLD AUTO: 3.75 K/UL — LOW (ref 3.8–10.5)

## 2022-09-12 PROCEDURE — 99232 SBSQ HOSP IP/OBS MODERATE 35: CPT

## 2022-09-12 PROCEDURE — 99232 SBSQ HOSP IP/OBS MODERATE 35: CPT | Mod: GC

## 2022-09-12 RX ORDER — PENICILLIN G POTASSIUM 5000000 [IU]/1
4000000 POWDER, FOR SOLUTION INTRAMUSCULAR; INTRAPLEURAL; INTRATHECAL; INTRAVENOUS
Qty: 2 | Refills: 0
Start: 2022-09-12

## 2022-09-12 RX ADMIN — Medication 3 UNIT(S): at 09:18

## 2022-09-12 RX ADMIN — CARVEDILOL PHOSPHATE 12.5 MILLIGRAM(S): 80 CAPSULE, EXTENDED RELEASE ORAL at 18:07

## 2022-09-12 RX ADMIN — PENICILLIN G POTASSIUM 100 MILLION UNIT(S): 5000000 POWDER, FOR SOLUTION INTRAMUSCULAR; INTRAPLEURAL; INTRATHECAL; INTRAVENOUS at 02:19

## 2022-09-12 RX ADMIN — Medication 50 MILLIGRAM(S): at 21:31

## 2022-09-12 RX ADMIN — Medication 2: at 09:17

## 2022-09-12 RX ADMIN — Medication 1 MILLIGRAM(S): at 11:38

## 2022-09-12 RX ADMIN — Medication 4: at 13:02

## 2022-09-12 RX ADMIN — PENICILLIN G POTASSIUM 100 MILLION UNIT(S): 5000000 POWDER, FOR SOLUTION INTRAMUSCULAR; INTRAPLEURAL; INTRATHECAL; INTRAVENOUS at 19:17

## 2022-09-12 RX ADMIN — Medication 81 MILLIGRAM(S): at 11:38

## 2022-09-12 RX ADMIN — Medication 3 UNIT(S): at 18:06

## 2022-09-12 RX ADMIN — LISINOPRIL 20 MILLIGRAM(S): 2.5 TABLET ORAL at 05:27

## 2022-09-12 RX ADMIN — PENICILLIN G POTASSIUM 100 MILLION UNIT(S): 5000000 POWDER, FOR SOLUTION INTRAMUSCULAR; INTRAPLEURAL; INTRATHECAL; INTRAVENOUS at 14:04

## 2022-09-12 RX ADMIN — Medication 50 MILLIGRAM(S): at 13:03

## 2022-09-12 RX ADMIN — Medication 1: at 18:05

## 2022-09-12 RX ADMIN — PENICILLIN G POTASSIUM 100 MILLION UNIT(S): 5000000 POWDER, FOR SOLUTION INTRAMUSCULAR; INTRAPLEURAL; INTRATHECAL; INTRAVENOUS at 06:21

## 2022-09-12 RX ADMIN — Medication 3 UNIT(S): at 13:02

## 2022-09-12 RX ADMIN — APIXABAN 2.5 MILLIGRAM(S): 2.5 TABLET, FILM COATED ORAL at 18:07

## 2022-09-12 RX ADMIN — Medication 1: at 21:32

## 2022-09-12 RX ADMIN — Medication 50 MILLIGRAM(S): at 05:27

## 2022-09-12 RX ADMIN — PENICILLIN G POTASSIUM 100 MILLION UNIT(S): 5000000 POWDER, FOR SOLUTION INTRAMUSCULAR; INTRAPLEURAL; INTRATHECAL; INTRAVENOUS at 11:30

## 2022-09-12 RX ADMIN — APIXABAN 2.5 MILLIGRAM(S): 2.5 TABLET, FILM COATED ORAL at 05:27

## 2022-09-12 RX ADMIN — INSULIN GLARGINE 4 UNIT(S): 100 INJECTION, SOLUTION SUBCUTANEOUS at 21:31

## 2022-09-12 RX ADMIN — CARVEDILOL PHOSPHATE 12.5 MILLIGRAM(S): 80 CAPSULE, EXTENDED RELEASE ORAL at 05:28

## 2022-09-12 RX ADMIN — PENICILLIN G POTASSIUM 100 MILLION UNIT(S): 5000000 POWDER, FOR SOLUTION INTRAMUSCULAR; INTRAPLEURAL; INTRATHECAL; INTRAVENOUS at 23:08

## 2022-09-12 RX ADMIN — Medication 20 MILLIGRAM(S): at 05:27

## 2022-09-12 NOTE — PROGRESS NOTE ADULT - PROBLEM SELECTOR PLAN 6
Patient w/ hx HFrEF (EF 40%) w/ BiV dysfunction  - TTE: EF 32%, minimal AR, moderate MR, moderate-severe global LVSD, mild TR/GA, 8.9-5.7cm liver cyst  - Lasix 20 mg QD

## 2022-09-12 NOTE — PROGRESS NOTE ADULT - SUBJECTIVE AND OBJECTIVE BOX
**************************************  Arnold Horne, PGY-2  After 7PM, please contact night float at #[phone] or [phone]  **************************************    INTERVAL HPI/OVERNIGHT EVENTS:  Patient was seen and examined at bedside. As per nurse and patient, no o/n events, patient resting comfortably. No complaints at this time.     VITAL SIGNS:  T(F): 98.3 (09-12-22 @ 05:25)  HR: 87 (09-12-22 @ 05:25)  BP: 131/68 (09-12-22 @ 05:25)  RR: 17 (09-12-22 @ 05:25)  SpO2: 99% (09-12-22 @ 05:25)    PHYSICAL EXAM:    PHYSICAL EXAM:  GENERAL: NAD, well-developed, well-nourished  HEAD: Atraumatic, Normocephalic  EYES: EOMI, PERRLA, conjunctiva and sclera clear  NECK: Supple, No JVD  CHEST/LUNG: Clear to auscultation bilaterally; No wheezes or crackles  HEART: Normal S1/S2; Regular rate and rhythm; No murmurs, rubs, or gallops  ABDOMEN: Soft, Nontender, Nondistended; Bowel sounds present  EXTREMITIES: 2+ Peripheral Pulses; No clubbing, cyanosis, or edema  PSYCH: A&Ox3  NEUROLOGY: no focal neurologic deficit  SKIN: No rashes or lesions    MEDICATIONS  (STANDING):  apixaban 2.5 milliGRAM(s) Oral two times a day  aspirin  chewable 81 milliGRAM(s) Oral daily  carvedilol 12.5 milliGRAM(s) Oral every 12 hours  dextrose 5%. 1000 milliLiter(s) (100 mL/Hr) IV Continuous <Continuous>  dextrose 5%. 1000 milliLiter(s) (50 mL/Hr) IV Continuous <Continuous>  dextrose 50% Injectable 25 Gram(s) IV Push once  dextrose 50% Injectable 12.5 Gram(s) IV Push once  dextrose 50% Injectable 25 Gram(s) IV Push once  folic acid 1 milliGRAM(s) Oral daily  furosemide    Tablet 20 milliGRAM(s) Oral daily  glucagon  Injectable 1 milliGRAM(s) IntraMuscular once  hydrALAZINE 50 milliGRAM(s) Oral three times a day  influenza  Vaccine (HIGH DOSE) 0.7 milliLiter(s) IntraMuscular once  insulin glargine Injectable (LANTUS) 4 Unit(s) SubCutaneous at bedtime  insulin lispro (ADMELOG) corrective regimen sliding scale   SubCutaneous three times a day before meals  insulin lispro (ADMELOG) corrective regimen sliding scale   SubCutaneous at bedtime  insulin lispro Injectable (ADMELOG) 3 Unit(s) SubCutaneous three times a day before meals  lisinopril 20 milliGRAM(s) Oral daily  penicillin   G  potassium  IVPB 4 Million Unit(s) IV Intermittent every 4 hours  penicillin   G  potassium  IVPB        MEDICATIONS  (PRN):  acetaminophen     Tablet .. 650 milliGRAM(s) Oral every 6 hours PRN Temp greater or equal to 38C (100.4F), Mild Pain (1 - 3)  dextrose Oral Gel 15 Gram(s) Oral once PRN Blood Glucose LESS THAN 70 milliGRAM(s)/deciliter  melatonin 3 milliGRAM(s) Oral at bedtime PRN Insomnia      Allergies    No Known Allergies    Intolerances        LABS:                        12.0   3.75  )-----------( 220      ( 12 Sep 2022 06:24 )             37.2     09-12    132<L>  |  97<L>  |  12  ----------------------------<  214<H>  3.9   |  25  |  0.93    Ca    8.9      12 Sep 2022 06:24  Phos  3.2     09-12  Mg     1.90     09-12    TPro  6.3  /  Alb  3.7  /  TBili  0.8  /  DBili  x   /  AST  20  /  ALT  21  /  AlkPhos  50  09-12          RADIOLOGY & ADDITIONAL TESTS:  Reviewed

## 2022-09-12 NOTE — CHART NOTE - NSCHARTNOTEFT_GEN_A_CORE
Was informed by nurse that patient had 6 beats of V. tach. Patient noted to be asymptomatic/sleeping comfortably. VSS. Per chart review, hx of paroxsymal A fib and nonischemic cardiomyopathy. Went up to review telemetry strip. Noted 6 beats of non-sustained V. tach over 1.74 seconds. Reviewed AM labs with no overt electrolyte abnormalities identified. CTM

## 2022-09-12 NOTE — PROGRESS NOTE ADULT - SUBJECTIVE AND OBJECTIVE BOX
Follow Up:  Fevers, vision disturbances, positive syphilis screen    Interval History/ROS:  Overnight: no acute events. patient afebrile. Leukopenia noted.    patient seen and examined at bedside. Appears to be doing well, eating lunch. Family is present during encounter. Spoke to niece (anesthesiologist) on the phone. Patient has no complaints denies any new pain or discomfort.    Allergies  No Known Allergies        ANTIMICROBIALS:  penicillin   G  potassium  IVPB    penicillin   G  potassium  IVPB 4 every 4 hours      OTHER MEDS:  MEDICATIONS  (STANDING):  acetaminophen     Tablet .. 650 every 6 hours PRN  apixaban 2.5 two times a day  aspirin  chewable 81 daily  carvedilol 12.5 every 12 hours  dextrose 50% Injectable 25 once  dextrose 50% Injectable 12.5 once  dextrose 50% Injectable 25 once  dextrose Oral Gel 15 once PRN  furosemide    Tablet 20 daily  glucagon  Injectable 1 once  hydrALAZINE 50 three times a day  influenza  Vaccine (HIGH DOSE) 0.7 once  insulin glargine Injectable (LANTUS) 4 at bedtime  insulin lispro (ADMELOG) corrective regimen sliding scale  three times a day before meals  insulin lispro (ADMELOG) corrective regimen sliding scale  at bedtime  insulin lispro Injectable (ADMELOG) 3 three times a day before meals  lisinopril 20 daily  melatonin 3 at bedtime PRN      Vital Signs Last 24 Hrs  T(C): 36.6 (12 Sep 2022 11:30), Max: 36.8 (12 Sep 2022 05:25)  T(F): 97.8 (12 Sep 2022 11:30), Max: 98.3 (12 Sep 2022 05:25)  HR: 66 (12 Sep 2022 11:30) (65 - 87)  BP: 118/74 (12 Sep 2022 11:30) (107/60 - 131/68)  BP(mean): --  RR: 17 (12 Sep 2022 11:30) (17 - 18)  SpO2: 100% (12 Sep 2022 11:30) (97% - 100%)    Parameters below as of 12 Sep 2022 11:30  Patient On (Oxygen Delivery Method): room air        PHYSICAL EXAM:  Constitutional: non-toxic, no distress  HEAD/EYES: anicteric, no conjunctival injection  ENT:  supple, no thrush  Cardiovascular:   +S1/S2  Respiratory:  clear BS bilaterally  GI:  soft, non-tender, normal bowel sounds  :  no dela cruz, no CVA tenderness  Musculoskeletal:  no synovitis, normal ROM  Neurologic: awake and alert,  no focal findings                              12.0   3.75  )-----------( 220      ( 12 Sep 2022 06:24 )             37.2       09-12    132<L>  |  97<L>  |  12  ----------------------------<  214<H>  3.9   |  25  |  0.93    Ca    8.9      12 Sep 2022 06:24  Phos  3.2     09-12  Mg     1.90     09-12    TPro  6.3  /  Alb  3.7  /  TBili  0.8  /  DBili  x   /  AST  20  /  ALT  21  /  AlkPhos  50  09-12          MICROBIOLOGY:  v          Toxoplasma IgG Screen: 25.1 IU/mL (09-08-22 @ 11:08)    Rapid RVP Result: NotDetec (09-05 @ 19:36)        RADIOLOGY:

## 2022-09-12 NOTE — PROGRESS NOTE ADULT - PROBLEM SELECTOR PLAN 1
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 chest, liver?, infarcts/atrophy on CT?), past effusions?, cutaneous findings? 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 L cerebellum)  - 2 months of new intermittent pains in hands/feet relieved with exercise  - T pallid Ab +, RPR NR, FTA Reactive, RPR Titer <1:1. ID recommending LP. Patient refused LP; now on empiric 14 day treatment with PCN G  - HIV1/2 nonreactive  - Toxo IgG+, IgM neg  - NIMA negative  - RF+  - ESR, CRP wnl  - f/u Echinococcus Ab  - f/u peripheral blood smear, West Nile, O&P, blood parasites  - f/u ANCA, Lyme, syphilis, Quantiferon gold, Toxo, Bartonella, ACE per ophtho recs  - f/u Ehrlichiosis/Anaplasma  - appreciate ID recs

## 2022-09-12 NOTE — PROGRESS NOTE ADULT - PROBLEM SELECTOR PLAN 2
Few day hx of new L eye blurry vision. 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  - appreciate ophtho recs  - Syphilis tx as above

## 2022-09-12 NOTE — PROGRESS NOTE ADULT - ATTENDING COMMENTS
Briefly, patient is an 82 y/o M with paroxysmal afib (not AC previously, however restarted during this admission) HTN, HLD, DM2, NICM, BPH, pericardial effusion (2017), on ASA presenting for fevers of unknown etiology and new L eye vision impairment.    -pt clinically stable  -outpt ophtho f/u  -treating empirically for neurosyphilis  -d/c planning to [facility] to complete IV PCN

## 2022-09-12 NOTE — PROGRESS NOTE ADULT - ASSESSMENT
80 y/o M PMHx HTN, HLD, DM2, NICM, and BPH, presented with generalized weakness, fever, and L eye blurry vision. Evaluated by Opthalmology, noted to have dislocated lens on exam along with possible vitreous hemorrhage. Syphilis screen sent for uveitis workup, FTA+ with RPR <1:1. No known history of syphilis infection or treatment.       #Positive FTA - DDx includes previously treated syphilis infection, untreated remote syphilis infection, vs false-positive test. Would expect titers to be positive in primary or secondary syphilis however RPR can normalize without treatment over time  #Hepatic Cysts - unchanged from prior imaging  #Fever - last fever 9/6, unclear source, remainder of infectious workup unrevealing.    Recommendations:  Given fevers, visual symptoms, ocular findings and a positive syphilis screen will pursue to treat empirically with penicillin G  Will plan for a 14 day course of 24 million units daily  Continue 4 million units q4 hours while inpatient however for discharge would favor continuous penicillin G infusion if possible  Follow fever curve and WBC count      Jordan Serna MD  Available on [employer] Teams  Before 9AM or after 5PM: [phone]

## 2022-09-12 NOTE — PROVIDER CONTACT NOTE (OTHER) - ASSESSMENT
patient had 3 beats of V-tach. VSS as per flowsheet
pt asymptomatic. no c/o chest pain/tightness/discomfort. VSS at this time

## 2022-09-13 LAB
ANION GAP SERPL CALC-SCNC: 9 MMOL/L — SIGNIFICANT CHANGE UP (ref 7–14)
AUTO DIFF PNL BLD: NEGATIVE — SIGNIFICANT CHANGE UP
B MICROTI IGG TITR SER: SIGNIFICANT CHANGE UP
B MICROTI IGM TITR SER: SIGNIFICANT CHANGE UP
BASOPHILS # BLD AUTO: 0.02 K/UL — SIGNIFICANT CHANGE UP (ref 0–0.2)
BASOPHILS NFR BLD AUTO: 0.5 % — SIGNIFICANT CHANGE UP (ref 0–2)
BUN SERPL-MCNC: 12 MG/DL — SIGNIFICANT CHANGE UP (ref 7–23)
C-ANCA SER-ACNC: NEGATIVE — SIGNIFICANT CHANGE UP
CALCIUM SERPL-MCNC: 8.9 MG/DL — SIGNIFICANT CHANGE UP (ref 8.4–10.5)
CHLORIDE SERPL-SCNC: 99 MMOL/L — SIGNIFICANT CHANGE UP (ref 98–107)
CO2 SERPL-SCNC: 25 MMOL/L — SIGNIFICANT CHANGE UP (ref 22–31)
CREAT SERPL-MCNC: 0.89 MG/DL — SIGNIFICANT CHANGE UP (ref 0.5–1.3)
ECHINOCOCCUS AB TITR SER: NEGATIVE — SIGNIFICANT CHANGE UP
EGFR: 86 ML/MIN/1.73M2 — SIGNIFICANT CHANGE UP
EOSINOPHIL # BLD AUTO: 0.04 K/UL — SIGNIFICANT CHANGE UP (ref 0–0.5)
EOSINOPHIL NFR BLD AUTO: 1.1 % — SIGNIFICANT CHANGE UP (ref 0–6)
GLUCOSE BLDC GLUCOMTR-MCNC: 203 MG/DL — HIGH (ref 70–99)
GLUCOSE BLDC GLUCOMTR-MCNC: 244 MG/DL — HIGH (ref 70–99)
GLUCOSE BLDC GLUCOMTR-MCNC: 263 MG/DL — HIGH (ref 70–99)
GLUCOSE BLDC GLUCOMTR-MCNC: 325 MG/DL — HIGH (ref 70–99)
GLUCOSE BLDC GLUCOMTR-MCNC: 435 MG/DL — HIGH (ref 70–99)
GLUCOSE SERPL-MCNC: 249 MG/DL — HIGH (ref 70–99)
HCT VFR BLD CALC: 35.3 % — LOW (ref 39–50)
HGB BLD-MCNC: 12 G/DL — LOW (ref 13–17)
IANC: 1.84 K/UL — SIGNIFICANT CHANGE UP (ref 1.8–7.4)
IMM GRANULOCYTES NFR BLD AUTO: 0.8 % — SIGNIFICANT CHANGE UP (ref 0–1.5)
LYMPHOCYTES # BLD AUTO: 1.24 K/UL — SIGNIFICANT CHANGE UP (ref 1–3.3)
LYMPHOCYTES # BLD AUTO: 32.9 % — SIGNIFICANT CHANGE UP (ref 13–44)
MAGNESIUM SERPL-MCNC: 1.9 MG/DL — SIGNIFICANT CHANGE UP (ref 1.6–2.6)
MCHC RBC-ENTMCNC: 26.7 PG — LOW (ref 27–34)
MCHC RBC-ENTMCNC: 34 GM/DL — SIGNIFICANT CHANGE UP (ref 32–36)
MCV RBC AUTO: 78.6 FL — LOW (ref 80–100)
MONOCYTES # BLD AUTO: 0.6 K/UL — SIGNIFICANT CHANGE UP (ref 0–0.9)
MONOCYTES NFR BLD AUTO: 15.9 % — HIGH (ref 2–14)
MPO AB + PR3 PNL SER: SIGNIFICANT CHANGE UP
NEUTROPHILS # BLD AUTO: 1.84 K/UL — SIGNIFICANT CHANGE UP (ref 1.8–7.4)
NEUTROPHILS NFR BLD AUTO: 48.8 % — SIGNIFICANT CHANGE UP (ref 43–77)
NRBC # BLD: 0 /100 WBCS — SIGNIFICANT CHANGE UP (ref 0–0)
NRBC # FLD: 0 K/UL — SIGNIFICANT CHANGE UP (ref 0–0)
P-ANCA SER-ACNC: NEGATIVE — SIGNIFICANT CHANGE UP
PHOSPHATE SERPL-MCNC: 3 MG/DL — SIGNIFICANT CHANGE UP (ref 2.5–4.5)
PLATELET # BLD AUTO: 225 K/UL — SIGNIFICANT CHANGE UP (ref 150–400)
POTASSIUM SERPL-MCNC: 4.1 MMOL/L — SIGNIFICANT CHANGE UP (ref 3.5–5.3)
POTASSIUM SERPL-SCNC: 4.1 MMOL/L — SIGNIFICANT CHANGE UP (ref 3.5–5.3)
RBC # BLD: 4.49 M/UL — SIGNIFICANT CHANGE UP (ref 4.2–5.8)
RBC # FLD: 17 % — HIGH (ref 10.3–14.5)
SODIUM SERPL-SCNC: 133 MMOL/L — LOW (ref 135–145)
WBC # BLD: 3.77 K/UL — LOW (ref 3.8–10.5)
WBC # FLD AUTO: 3.77 K/UL — LOW (ref 3.8–10.5)

## 2022-09-13 PROCEDURE — 99232 SBSQ HOSP IP/OBS MODERATE 35: CPT | Mod: GC

## 2022-09-13 RX ORDER — INSULIN LISPRO 100/ML
4 VIAL (ML) SUBCUTANEOUS
Refills: 0 | Status: DISCONTINUED | OUTPATIENT
Start: 2022-09-13 | End: 2022-09-14

## 2022-09-13 RX ORDER — INSULIN GLARGINE 100 [IU]/ML
6 INJECTION, SOLUTION SUBCUTANEOUS AT BEDTIME
Refills: 0 | Status: DISCONTINUED | OUTPATIENT
Start: 2022-09-13 | End: 2022-09-14

## 2022-09-13 RX ADMIN — PENICILLIN G POTASSIUM 100 MILLION UNIT(S): 5000000 POWDER, FOR SOLUTION INTRAMUSCULAR; INTRAPLEURAL; INTRATHECAL; INTRAVENOUS at 20:20

## 2022-09-13 RX ADMIN — INSULIN GLARGINE 6 UNIT(S): 100 INJECTION, SOLUTION SUBCUTANEOUS at 21:39

## 2022-09-13 RX ADMIN — Medication 4 UNIT(S): at 17:41

## 2022-09-13 RX ADMIN — Medication 3 UNIT(S): at 09:06

## 2022-09-13 RX ADMIN — Medication 4: at 12:42

## 2022-09-13 RX ADMIN — Medication 81 MILLIGRAM(S): at 12:45

## 2022-09-13 RX ADMIN — Medication 50 MILLIGRAM(S): at 14:04

## 2022-09-13 RX ADMIN — APIXABAN 2.5 MILLIGRAM(S): 2.5 TABLET, FILM COATED ORAL at 05:54

## 2022-09-13 RX ADMIN — Medication 2: at 17:41

## 2022-09-13 RX ADMIN — CARVEDILOL PHOSPHATE 12.5 MILLIGRAM(S): 80 CAPSULE, EXTENDED RELEASE ORAL at 05:54

## 2022-09-13 RX ADMIN — PENICILLIN G POTASSIUM 100 MILLION UNIT(S): 5000000 POWDER, FOR SOLUTION INTRAMUSCULAR; INTRAPLEURAL; INTRATHECAL; INTRAVENOUS at 16:06

## 2022-09-13 RX ADMIN — Medication 3 UNIT(S): at 12:43

## 2022-09-13 RX ADMIN — Medication 20 MILLIGRAM(S): at 05:54

## 2022-09-13 RX ADMIN — Medication 50 MILLIGRAM(S): at 21:39

## 2022-09-13 RX ADMIN — LISINOPRIL 20 MILLIGRAM(S): 2.5 TABLET ORAL at 05:55

## 2022-09-13 RX ADMIN — PENICILLIN G POTASSIUM 100 MILLION UNIT(S): 5000000 POWDER, FOR SOLUTION INTRAMUSCULAR; INTRAPLEURAL; INTRATHECAL; INTRAVENOUS at 12:12

## 2022-09-13 RX ADMIN — PENICILLIN G POTASSIUM 100 MILLION UNIT(S): 5000000 POWDER, FOR SOLUTION INTRAMUSCULAR; INTRAPLEURAL; INTRATHECAL; INTRAVENOUS at 06:48

## 2022-09-13 RX ADMIN — Medication 1 MILLIGRAM(S): at 12:44

## 2022-09-13 RX ADMIN — Medication 50 MILLIGRAM(S): at 05:54

## 2022-09-13 RX ADMIN — CARVEDILOL PHOSPHATE 12.5 MILLIGRAM(S): 80 CAPSULE, EXTENDED RELEASE ORAL at 17:40

## 2022-09-13 RX ADMIN — Medication 3: at 09:05

## 2022-09-13 RX ADMIN — APIXABAN 2.5 MILLIGRAM(S): 2.5 TABLET, FILM COATED ORAL at 17:40

## 2022-09-13 RX ADMIN — PENICILLIN G POTASSIUM 100 MILLION UNIT(S): 5000000 POWDER, FOR SOLUTION INTRAMUSCULAR; INTRAPLEURAL; INTRATHECAL; INTRAVENOUS at 03:12

## 2022-09-13 NOTE — PROGRESS NOTE ADULT - PROBLEM SELECTOR PLAN 6
Patient w/ hx HFrEF (EF 40%) w/ BiV dysfunction  - TTE: EF 32%, minimal AR, moderate MR, moderate-severe global LVSD, mild TR/UT, 8.9-5.7cm liver cyst  - Lasix 20 mg QD

## 2022-09-13 NOTE — PROGRESS NOTE ADULT - PROBLEM SELECTOR PLAN 8
Diet: regular  DVT ppx: Eliquis  Dispo: pending clinical course  PT: no needs on dispo Diet: regular  DVT ppx: Eliquis  Dispo: pending arrangements for IV medication  PT: no needs on dispo

## 2022-09-13 NOTE — PROGRESS NOTE ADULT - PROBLEM SELECTOR PLAN 1
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 chest, liver?, infarcts/atrophy on CT?), past effusions?, cutaneous findings? 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 L cerebellum)  - 2 months of new intermittent pains in hands/feet relieved with exercise  - T pallid Ab +, RPR NR, FTA Reactive, RPR Titer <1:1. ID recommending LP. 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 chest, liver?, infarcts/atrophy on CT?), past effusions?, cutaneous findings? 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 L cerebellum)  - 2 months of new intermittent pains in hands/feet relieved with exercise  - T pallid Ab +, RPR NR, FTA Reactive, RPR Titer <1:1. ID recommending LP. Patient refused LP; now on empiric treatment with PCN G (for planned 14 days)  - Toxo IgG+, IgM neg  - RF+  - ESR, CRP wnl  - Other infectious workup negative: HIV1/2, Echinococcus, West Nile, Ehrlichiosis/Anaplasma, Bartonella  - Other inflammatory work up negative: NIMA, ANCA  - BCx, UCx: NGTD  - f/u peripheral blood smear, O&P, blood parasites  - f/u ANCA neg, Lyme, Quantiferon gold, ACE per ophtho recs  - appreciate ID recs

## 2022-09-13 NOTE — PROGRESS NOTE ADULT - ASSESSMENT
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 Labs and history concerning for neurosyphilis; now on empiric treatment and pending dispo on IV penicillin G.

## 2022-09-13 NOTE — PROGRESS NOTE ADULT - SUBJECTIVE AND OBJECTIVE BOX
Progress Note     == draft in progress ==  Jackie Perdue MD, PhD  PGY-1 Medicine  Teams | [id number]    Patient is a 81y old  Male who presents with a chief complaint of Fevers of Unknown Origin, New L eye vision impairment (12 Sep 2022 16:43)          SUBJECTIVE / INTERVAL EVENTS         MEDICATIONS    Home Medications:  apixaban 2.5 mg oral tablet: 1 tab(s) orally 2 times a day (10 Sep 2022 18:51)  aspirin 81 mg oral tablet: 1  orally once a day (06 Sep 2022 02:30)  carvedilol 12.5 mg oral tablet: 1 tab(s) orally 2 times a day (06 Sep 2022 02:30)  folic acid 1 mg oral tablet: 1 tab(s) orally once a day (06 Sep 2022 02:30)  furosemide 20 mg oral tablet: 1 tab(s) orally once a day (06 Sep 2022 02:30)  hydrALAZINE 50 mg oral tablet: 1 tab(s) orally 3 times a day (06 Sep 2022 02:30)  Januvia 100 mg oral tablet: 1 tab(s) orally once a day (06 Sep 2022 02:30)  metFORMIN 1000 mg oral tablet: 1 tab(s) orally 2 times a day (06 Sep 2022 02:30)  pravastatin 40 mg oral tablet: 1 tab(s) orally once a day (06 Sep 2022 02:30)  ramipril 5 mg oral tablet: 1 cap(s) orally once a day (06 Sep 2022 02:30)      MEDICATIONS  (STANDING):  apixaban 2.5 milliGRAM(s) Oral two times a day  aspirin  chewable 81 milliGRAM(s) Oral daily  carvedilol 12.5 milliGRAM(s) Oral every 12 hours  dextrose 5%. 1000 milliLiter(s) (50 mL/Hr) IV Continuous <Continuous>  dextrose 5%. 1000 milliLiter(s) (100 mL/Hr) IV Continuous <Continuous>  dextrose 50% Injectable 25 Gram(s) IV Push once  dextrose 50% Injectable 12.5 Gram(s) IV Push once  dextrose 50% Injectable 25 Gram(s) IV Push once  folic acid 1 milliGRAM(s) Oral daily  furosemide    Tablet 20 milliGRAM(s) Oral daily  glucagon  Injectable 1 milliGRAM(s) IntraMuscular once  hydrALAZINE 50 milliGRAM(s) Oral three times a day  influenza  Vaccine (HIGH DOSE) 0.7 milliLiter(s) IntraMuscular once  insulin glargine Injectable (LANTUS) 4 Unit(s) SubCutaneous at bedtime  insulin lispro (ADMELOG) corrective regimen sliding scale   SubCutaneous three times a day before meals  insulin lispro (ADMELOG) corrective regimen sliding scale   SubCutaneous at bedtime  insulin lispro Injectable (ADMELOG) 3 Unit(s) SubCutaneous three times a day before meals  lisinopril 20 milliGRAM(s) Oral daily  penicillin   G  potassium  IVPB      penicillin   G  potassium  IVPB 4 Million Unit(s) IV Intermittent every 4 hours    MEDICATIONS  (PRN):  acetaminophen     Tablet .. 650 milliGRAM(s) Oral every 6 hours PRN Temp greater or equal to 38C (100.4F), Mild Pain (1 - 3)  dextrose Oral Gel 15 Gram(s) Oral once PRN Blood Glucose LESS THAN 70 milliGRAM(s)/deciliter  melatonin 3 milliGRAM(s) Oral at bedtime PRN Insomnia         VITALS / I&O's  T(C): 36.8 (09-13-22 @ 05:36), Max: 36.8 (09-13-22 @ 05:36)  HR: 77 (09-13-22 @ 05:36) (65 - 77)  BP: 151/84 (09-13-22 @ 05:36) (117/59 - 151/84)  RR: 20 (09-13-22 @ 05:36) (16 - 20)  SpO2: 100% (09-13-22 @ 05:36) (100% - 100%)  I&O's Summary    12 Sep 2022 07:01  -  13 Sep 2022 07:00  --------------------------------------------------------  IN: 0 mL / OUT: 2825 mL / NET: -2825 mL           PHYSICAL EXAM           LABS                        12.0   3.77  )-----------( 225      ( 13 Sep 2022 06:05 )             35.3     09-13    133<L>  |  99  |  12  ----------------------------<  249<H>  4.1   |  25  |  0.89    Ca    8.9      13 Sep 2022 06:05  Phos  3.0     09-13  Mg     1.90     09-13    TPro  6.3  /  Alb  3.7  /  TBili  0.8  /  DBili  x   /  AST  20  /  ALT  21  /  AlkPhos  50  09-12    CAPILLARY BLOOD GLUCOSE      POCT Blood Glucose.: 276 mg/dL (12 Sep 2022 21:05)  POCT Blood Glucose.: 189 mg/dL (12 Sep 2022 17:30)  POCT Blood Glucose.: 328 mg/dL (12 Sep 2022 12:29)  POCT Blood Glucose.: 249 mg/dL (12 Sep 2022 08:35)      LIVER FUNCTIONS - ( 12 Sep 2022 06:24 )  Alb: 3.7 g/dL / Pro: 6.3 g/dL / ALK PHOS: 50 U/L / ALT: 21 U/L / AST: 20 U/L / GGT: x             Consultant(s) Notes Reviewed.  Progress Note       Jackie Perdue MD, PhD  PGY-1 Medicine  Teams | [id number]    Patient is a 81y old  Male who presents with a chief complaint of Fevers of Unknown Origin, New L eye vision impairment (12 Sep 2022 16:43)          SUBJECTIVE / INTERVAL EVENTS  Overnight on tele: multiple events of Afib and irregular rate. Also, an event of 6 consec beats ?SVT and an event of 6 consecutive beats VTach.  Interviewed patient with VIPIN Castro at the bedside. Patient denied pain or other discomfort, said he felt so-so, meaning that he was without physical symptoms but was "not happy." He strongly wished to go home. He denied fevers, headache, chest pain, SOB, abdominal, limb or joint pains; pain with urination or blood in the urine; nausea or vomiting; numbness or tingling.       MEDICATIONS    Home Medications:  apixaban 2.5 mg oral tablet: 1 tab(s) orally 2 times a day (10 Sep 2022 18:51)  aspirin 81 mg oral tablet: 1  orally once a day (06 Sep 2022 02:30)  carvedilol 12.5 mg oral tablet: 1 tab(s) orally 2 times a day (06 Sep 2022 02:30)  folic acid 1 mg oral tablet: 1 tab(s) orally once a day (06 Sep 2022 02:30)  furosemide 20 mg oral tablet: 1 tab(s) orally once a day (06 Sep 2022 02:30)  hydrALAZINE 50 mg oral tablet: 1 tab(s) orally 3 times a day (06 Sep 2022 02:30)  Januvia 100 mg oral tablet: 1 tab(s) orally once a day (06 Sep 2022 02:30)  metFORMIN 1000 mg oral tablet: 1 tab(s) orally 2 times a day (06 Sep 2022 02:30)  pravastatin 40 mg oral tablet: 1 tab(s) orally once a day (06 Sep 2022 02:30)  ramipril 5 mg oral tablet: 1 cap(s) orally once a day (06 Sep 2022 02:30)      MEDICATIONS  (STANDING):  apixaban 2.5 milliGRAM(s) Oral two times a day  aspirin  chewable 81 milliGRAM(s) Oral daily  carvedilol 12.5 milliGRAM(s) Oral every 12 hours  dextrose 5%. 1000 milliLiter(s) (50 mL/Hr) IV Continuous <Continuous>  dextrose 5%. 1000 milliLiter(s) (100 mL/Hr) IV Continuous <Continuous>  dextrose 50% Injectable 25 Gram(s) IV Push once  dextrose 50% Injectable 12.5 Gram(s) IV Push once  dextrose 50% Injectable 25 Gram(s) IV Push once  folic acid 1 milliGRAM(s) Oral daily  furosemide    Tablet 20 milliGRAM(s) Oral daily  glucagon  Injectable 1 milliGRAM(s) IntraMuscular once  hydrALAZINE 50 milliGRAM(s) Oral three times a day  influenza  Vaccine (HIGH DOSE) 0.7 milliLiter(s) IntraMuscular once  insulin glargine Injectable (LANTUS) 4 Unit(s) SubCutaneous at bedtime  insulin lispro (ADMELOG) corrective regimen sliding scale   SubCutaneous three times a day before meals  insulin lispro (ADMELOG) corrective regimen sliding scale   SubCutaneous at bedtime  insulin lispro Injectable (ADMELOG) 3 Unit(s) SubCutaneous three times a day before meals  lisinopril 20 milliGRAM(s) Oral daily  penicillin   G  potassium  IVPB      penicillin   G  potassium  IVPB 4 Million Unit(s) IV Intermittent every 4 hours    MEDICATIONS  (PRN):  acetaminophen     Tablet .. 650 milliGRAM(s) Oral every 6 hours PRN Temp greater or equal to 38C (100.4F), Mild Pain (1 - 3)  dextrose Oral Gel 15 Gram(s) Oral once PRN Blood Glucose LESS THAN 70 milliGRAM(s)/deciliter  melatonin 3 milliGRAM(s) Oral at bedtime PRN Insomnia         VITALS / I&O's  T(C): 36.8 (09-13-22 @ 05:36), Max: 36.8 (09-13-22 @ 05:36)  HR: 77 (09-13-22 @ 05:36) (65 - 77)  BP: 151/84 (09-13-22 @ 05:36) (117/59 - 151/84)  RR: 20 (09-13-22 @ 05:36) (16 - 20)  SpO2: 100% (09-13-22 @ 05:36) (100% - 100%)  I&O's Summary    12 Sep 2022 07:01  -  13 Sep 2022 07:00  --------------------------------------------------------  IN: 0 mL / OUT: 2825 mL / NET: -2825 mL           PHYSICAL EXAM  General: Reclining in bed in no acute distress.  HEENT: Normocephalic, atraumatic. Extraocular movements grossly intact. No nasal discharge.  Neuro: Alert and oriented. No facial asymmetry or dysarthria. Moving all extremities.  CV: Regular rate and rhythm. S1/S2. No murmurs, rubs, or gallops appreciated.  Respiratory: Anterior lung fields clear bilaterally. No crackles or wheezes appreciated.  Abdomen: Soft; nontender to palpation, all quadrants; nondistended. No rebound or guarding.  : Suprapubic region nontender.  Skin: No rashes or bruising of face, forearms, or calves bilaterally. Nodule on back possibly flatter; left neck nodule unchanged.  Psych: Mood and affect appropriate.              LABS                        12.0   3.77  )-----------( 225      ( 13 Sep 2022 06:05 )             35.3     09-13    133<L>  |  99  |  12  ----------------------------<  249<H>  4.1   |  25  |  0.89    Ca    8.9      13 Sep 2022 06:05  Phos  3.0     09-13  Mg     1.90     09-13    TPro  6.3  /  Alb  3.7  /  TBili  0.8  /  DBili  x   /  AST  20  /  ALT  21  /  AlkPhos  50  09-12    CAPILLARY BLOOD GLUCOSE      POCT Blood Glucose.: 276 mg/dL (12 Sep 2022 21:05)  POCT Blood Glucose.: 189 mg/dL (12 Sep 2022 17:30)  POCT Blood Glucose.: 328 mg/dL (12 Sep 2022 12:29)  POCT Blood Glucose.: 249 mg/dL (12 Sep 2022 08:35)      LIVER FUNCTIONS - ( 12 Sep 2022 06:24 )  Alb: 3.7 g/dL / Pro: 6.3 g/dL / ALK PHOS: 50 U/L / ALT: 21 U/L / AST: 20 U/L / GGT: x             Consultant(s) Notes Reviewed.

## 2022-09-14 LAB
A PHAGOCYTOPH IGG TITR SER IF: NEGATIVE — SIGNIFICANT CHANGE UP
A PHAGOCYTOPH IGM TITR SER IF: NEGATIVE — SIGNIFICANT CHANGE UP
ANION GAP SERPL CALC-SCNC: 14 MMOL/L — SIGNIFICANT CHANGE UP (ref 7–14)
BASOPHILS # BLD AUTO: 0.03 K/UL — SIGNIFICANT CHANGE UP (ref 0–0.2)
BASOPHILS NFR BLD AUTO: 0.7 % — SIGNIFICANT CHANGE UP (ref 0–2)
BUN SERPL-MCNC: 13 MG/DL — SIGNIFICANT CHANGE UP (ref 7–23)
CALCIUM SERPL-MCNC: 9.2 MG/DL — SIGNIFICANT CHANGE UP (ref 8.4–10.5)
CHLORIDE SERPL-SCNC: 97 MMOL/L — LOW (ref 98–107)
CO2 SERPL-SCNC: 22 MMOL/L — SIGNIFICANT CHANGE UP (ref 22–31)
CREAT SERPL-MCNC: 0.93 MG/DL — SIGNIFICANT CHANGE UP (ref 0.5–1.3)
E CHAFFEENSIS IGG TITR SER: NEGATIVE — SIGNIFICANT CHANGE UP
E CHAFFEENSIS IGM TITR SER IF: NEGATIVE — SIGNIFICANT CHANGE UP
EGFR: 82 ML/MIN/1.73M2 — SIGNIFICANT CHANGE UP
EOSINOPHIL # BLD AUTO: 0.02 K/UL — SIGNIFICANT CHANGE UP (ref 0–0.5)
EOSINOPHIL NFR BLD AUTO: 0.5 % — SIGNIFICANT CHANGE UP (ref 0–6)
GLUCOSE BLDC GLUCOMTR-MCNC: 183 MG/DL — HIGH (ref 70–99)
GLUCOSE BLDC GLUCOMTR-MCNC: 238 MG/DL — HIGH (ref 70–99)
GLUCOSE BLDC GLUCOMTR-MCNC: 258 MG/DL — HIGH (ref 70–99)
GLUCOSE BLDC GLUCOMTR-MCNC: 276 MG/DL — HIGH (ref 70–99)
GLUCOSE SERPL-MCNC: 238 MG/DL — HIGH (ref 70–99)
HCT VFR BLD CALC: 37.9 % — LOW (ref 39–50)
HGB BLD-MCNC: 12.5 G/DL — LOW (ref 13–17)
IANC: 2.19 K/UL — SIGNIFICANT CHANGE UP (ref 1.8–7.4)
IMM GRANULOCYTES NFR BLD AUTO: 0.7 % — SIGNIFICANT CHANGE UP (ref 0–1.5)
LYMPHOCYTES # BLD AUTO: 1.26 K/UL — SIGNIFICANT CHANGE UP (ref 1–3.3)
LYMPHOCYTES # BLD AUTO: 30.1 % — SIGNIFICANT CHANGE UP (ref 13–44)
MAGNESIUM SERPL-MCNC: 1.9 MG/DL — SIGNIFICANT CHANGE UP (ref 1.6–2.6)
MCHC RBC-ENTMCNC: 26 PG — LOW (ref 27–34)
MCHC RBC-ENTMCNC: 33 GM/DL — SIGNIFICANT CHANGE UP (ref 32–36)
MCV RBC AUTO: 79 FL — LOW (ref 80–100)
MONOCYTES # BLD AUTO: 0.65 K/UL — SIGNIFICANT CHANGE UP (ref 0–0.9)
MONOCYTES NFR BLD AUTO: 15.6 % — HIGH (ref 2–14)
NEUTROPHILS # BLD AUTO: 2.19 K/UL — SIGNIFICANT CHANGE UP (ref 1.8–7.4)
NEUTROPHILS NFR BLD AUTO: 52.4 % — SIGNIFICANT CHANGE UP (ref 43–77)
NRBC # BLD: 0 /100 WBCS — SIGNIFICANT CHANGE UP (ref 0–0)
NRBC # FLD: 0 K/UL — SIGNIFICANT CHANGE UP (ref 0–0)
PHOSPHATE SERPL-MCNC: 3 MG/DL — SIGNIFICANT CHANGE UP (ref 2.5–4.5)
PLATELET # BLD AUTO: 261 K/UL — SIGNIFICANT CHANGE UP (ref 150–400)
POTASSIUM SERPL-MCNC: 4.2 MMOL/L — SIGNIFICANT CHANGE UP (ref 3.5–5.3)
POTASSIUM SERPL-SCNC: 4.2 MMOL/L — SIGNIFICANT CHANGE UP (ref 3.5–5.3)
RBC # BLD: 4.8 M/UL — SIGNIFICANT CHANGE UP (ref 4.2–5.8)
RBC # FLD: 17.2 % — HIGH (ref 10.3–14.5)
SODIUM SERPL-SCNC: 133 MMOL/L — LOW (ref 135–145)
WBC # BLD: 4.18 K/UL — SIGNIFICANT CHANGE UP (ref 3.8–10.5)
WBC # FLD AUTO: 4.18 K/UL — SIGNIFICANT CHANGE UP (ref 3.8–10.5)

## 2022-09-14 PROCEDURE — 99232 SBSQ HOSP IP/OBS MODERATE 35: CPT | Mod: GC

## 2022-09-14 PROCEDURE — 99231 SBSQ HOSP IP/OBS SF/LOW 25: CPT

## 2022-09-14 RX ORDER — INSULIN GLARGINE 100 [IU]/ML
9 INJECTION, SOLUTION SUBCUTANEOUS AT BEDTIME
Refills: 0 | Status: DISCONTINUED | OUTPATIENT
Start: 2022-09-14 | End: 2022-09-15

## 2022-09-14 RX ORDER — INSULIN LISPRO 100/ML
6 VIAL (ML) SUBCUTANEOUS
Refills: 0 | Status: DISCONTINUED | OUTPATIENT
Start: 2022-09-14 | End: 2022-09-15

## 2022-09-14 RX ADMIN — PENICILLIN G POTASSIUM 100 MILLION UNIT(S): 5000000 POWDER, FOR SOLUTION INTRAMUSCULAR; INTRAPLEURAL; INTRATHECAL; INTRAVENOUS at 21:33

## 2022-09-14 RX ADMIN — PENICILLIN G POTASSIUM 100 MILLION UNIT(S): 5000000 POWDER, FOR SOLUTION INTRAMUSCULAR; INTRAPLEURAL; INTRATHECAL; INTRAVENOUS at 08:52

## 2022-09-14 RX ADMIN — PENICILLIN G POTASSIUM 100 MILLION UNIT(S): 5000000 POWDER, FOR SOLUTION INTRAMUSCULAR; INTRAPLEURAL; INTRATHECAL; INTRAVENOUS at 17:15

## 2022-09-14 RX ADMIN — INSULIN GLARGINE 9 UNIT(S): 100 INJECTION, SOLUTION SUBCUTANEOUS at 21:33

## 2022-09-14 RX ADMIN — Medication 3: at 12:35

## 2022-09-14 RX ADMIN — PENICILLIN G POTASSIUM 100 MILLION UNIT(S): 5000000 POWDER, FOR SOLUTION INTRAMUSCULAR; INTRAPLEURAL; INTRATHECAL; INTRAVENOUS at 05:05

## 2022-09-14 RX ADMIN — LISINOPRIL 20 MILLIGRAM(S): 2.5 TABLET ORAL at 05:06

## 2022-09-14 RX ADMIN — Medication 20 MILLIGRAM(S): at 05:08

## 2022-09-14 RX ADMIN — Medication 50 MILLIGRAM(S): at 21:33

## 2022-09-14 RX ADMIN — Medication 81 MILLIGRAM(S): at 12:42

## 2022-09-14 RX ADMIN — Medication 50 MILLIGRAM(S): at 05:06

## 2022-09-14 RX ADMIN — Medication 50 MILLIGRAM(S): at 12:43

## 2022-09-14 RX ADMIN — Medication 2: at 17:39

## 2022-09-14 RX ADMIN — PENICILLIN G POTASSIUM 100 MILLION UNIT(S): 5000000 POWDER, FOR SOLUTION INTRAMUSCULAR; INTRAPLEURAL; INTRATHECAL; INTRAVENOUS at 12:43

## 2022-09-14 RX ADMIN — Medication 4 UNIT(S): at 12:35

## 2022-09-14 RX ADMIN — CARVEDILOL PHOSPHATE 12.5 MILLIGRAM(S): 80 CAPSULE, EXTENDED RELEASE ORAL at 17:38

## 2022-09-14 RX ADMIN — PENICILLIN G POTASSIUM 100 MILLION UNIT(S): 5000000 POWDER, FOR SOLUTION INTRAMUSCULAR; INTRAPLEURAL; INTRATHECAL; INTRAVENOUS at 01:06

## 2022-09-14 RX ADMIN — APIXABAN 2.5 MILLIGRAM(S): 2.5 TABLET, FILM COATED ORAL at 05:06

## 2022-09-14 RX ADMIN — APIXABAN 2.5 MILLIGRAM(S): 2.5 TABLET, FILM COATED ORAL at 17:37

## 2022-09-14 RX ADMIN — Medication 4 UNIT(S): at 08:51

## 2022-09-14 RX ADMIN — Medication 3: at 08:51

## 2022-09-14 RX ADMIN — Medication 6 UNIT(S): at 17:39

## 2022-09-14 RX ADMIN — Medication 1 MILLIGRAM(S): at 12:42

## 2022-09-14 RX ADMIN — CARVEDILOL PHOSPHATE 12.5 MILLIGRAM(S): 80 CAPSULE, EXTENDED RELEASE ORAL at 05:05

## 2022-09-14 NOTE — PROGRESS NOTE ADULT - PROBLEM SELECTOR PLAN 1
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 chest, liver?, infarcts/atrophy on CT?), past effusions?, cutaneous findings? 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 L cerebellum)  - 2 months of new intermittent pains in hands/feet relieved with exercise  - T pallid Ab +, RPR NR, FTA Reactive, RPR Titer <1:1. ID recommending LP. Patient refused LP; now on empiric treatment with PCN G (for planned 14 days)  - Toxo IgG+, IgM neg  - RF+  - ESR, CRP wnl  - Other infectious workup negative: HIV1/2, Echinococcus, West Nile, Ehrlichiosis/Anaplasma, Bartonella  - Other inflammatory work up negative: NIMA, ANCA  - BCx, UCx: NGTD  - f/u peripheral blood smear, O&P, blood parasites  - f/u ANCA neg, Lyme, Quantiferon gold, ACE per ophtho recs  - appreciate ID recs

## 2022-09-14 NOTE — PROGRESS NOTE ADULT - SUBJECTIVE AND OBJECTIVE BOX
[facility] DEPARTMENT OF OPHTHALMOLOGY  ------------------------------------------------------------------------------    Interval History: Following for OS blurry vision/monocular diplopia, as well as neurosyphilis. No acute events overnight. Today patient states he feels his vision OS is better this morning but it fluctuates throughout the day. Sometimes he sees an object floating in his visual axis when he looks around.     MEDICATIONS  (STANDING):  apixaban 2.5 milliGRAM(s) Oral two times a day  aspirin  chewable 81 milliGRAM(s) Oral daily  carvedilol 12.5 milliGRAM(s) Oral every 12 hours  dextrose 5%. 1000 milliLiter(s) (50 mL/Hr) IV Continuous <Continuous>  dextrose 5%. 1000 milliLiter(s) (100 mL/Hr) IV Continuous <Continuous>  dextrose 50% Injectable 12.5 Gram(s) IV Push once  dextrose 50% Injectable 25 Gram(s) IV Push once  dextrose 50% Injectable 25 Gram(s) IV Push once  folic acid 1 milliGRAM(s) Oral daily  furosemide    Tablet 20 milliGRAM(s) Oral daily  glucagon  Injectable 1 milliGRAM(s) IntraMuscular once  hydrALAZINE 50 milliGRAM(s) Oral three times a day  influenza  Vaccine (HIGH DOSE) 0.7 milliLiter(s) IntraMuscular once  insulin glargine Injectable (LANTUS) 6 Unit(s) SubCutaneous at bedtime  insulin lispro (ADMELOG) corrective regimen sliding scale   SubCutaneous three times a day before meals  insulin lispro (ADMELOG) corrective regimen sliding scale   SubCutaneous at bedtime  insulin lispro Injectable (ADMELOG) 4 Unit(s) SubCutaneous three times a day before meals  lisinopril 20 milliGRAM(s) Oral daily  penicillin   G  potassium  IVPB      penicillin   G  potassium  IVPB 4 Million Unit(s) IV Intermittent every 4 hours    MEDICATIONS  (PRN):  acetaminophen     Tablet .. 650 milliGRAM(s) Oral every 6 hours PRN Temp greater or equal to 38C (100.4F), Mild Pain (1 - 3)  dextrose Oral Gel 15 Gram(s) Oral once PRN Blood Glucose LESS THAN 70 milliGRAM(s)/deciliter  melatonin 3 milliGRAM(s) Oral at bedtime PRN Insomnia      VITALS: T(C): 36.6 (09-14-22 @ 09:00)  T(F): 97.9 (09-14-22 @ 09:00), Max: 98.5 (09-13-22 @ 11:54)  HR: 89 (09-14-22 @ 09:00) (67 - 98)  BP: 139/66 (09-14-22 @ 09:00) (103/55 - 167/99)  RR:  (17 - 18)  SpO2:  (98% - 100%)  Wt(kg): --  General: AAO x 3, appropriate mood and affect    Ophthalmology Exam:  Visual acuity (sc): 20/40 OD, NIPH; 20/20 OS  Pupils: PERRL OU, no APD  Ttono: 20 OU  Extraocular movements (EOMs): Full OU, no pain, no diplopia  Confrontational Visual Field (CVF):     Pen Light Exam (PLE)  External: Flat OU, no tenderness OS, temporal pulses palpated without tenderness  Lids/Lashes/Lacrimal Ducts: MGD OU    Sclera/Conjunctiva: W+Q, CAM OU  Cornea: OD nasal PTG, OS nasal and temporal PTG  Anterior Chamber: OU deep and quiet. no hypopyon OU  Iris: Flat OU  Lens: OD PCIOL, OS PCIOL loose but in place     [facility] DEPARTMENT OF OPHTHALMOLOGY  ------------------------------------------------------------------------------    Interval History: Following for OS blurry vision/monocular diplopia, as well as neurosyphilis. No acute events overnight. Today patient states he feels his vision OS is better this morning but it fluctuates throughout the day. Sometimes he sees an object floating in his visual axis when he looks around.     MEDICATIONS  (STANDING):  apixaban 2.5 milliGRAM(s) Oral two times a day  aspirin  chewable 81 milliGRAM(s) Oral daily  carvedilol 12.5 milliGRAM(s) Oral every 12 hours  dextrose 5%. 1000 milliLiter(s) (50 mL/Hr) IV Continuous <Continuous>  dextrose 5%. 1000 milliLiter(s) (100 mL/Hr) IV Continuous <Continuous>  dextrose 50% Injectable 12.5 Gram(s) IV Push once  dextrose 50% Injectable 25 Gram(s) IV Push once  dextrose 50% Injectable 25 Gram(s) IV Push once  folic acid 1 milliGRAM(s) Oral daily  furosemide    Tablet 20 milliGRAM(s) Oral daily  glucagon  Injectable 1 milliGRAM(s) IntraMuscular once  hydrALAZINE 50 milliGRAM(s) Oral three times a day  influenza  Vaccine (HIGH DOSE) 0.7 milliLiter(s) IntraMuscular once  insulin glargine Injectable (LANTUS) 6 Unit(s) SubCutaneous at bedtime  insulin lispro (ADMELOG) corrective regimen sliding scale   SubCutaneous three times a day before meals  insulin lispro (ADMELOG) corrective regimen sliding scale   SubCutaneous at bedtime  insulin lispro Injectable (ADMELOG) 4 Unit(s) SubCutaneous three times a day before meals  lisinopril 20 milliGRAM(s) Oral daily  penicillin   G  potassium  IVPB      penicillin   G  potassium  IVPB 4 Million Unit(s) IV Intermittent every 4 hours    MEDICATIONS  (PRN):  acetaminophen     Tablet .. 650 milliGRAM(s) Oral every 6 hours PRN Temp greater or equal to 38C (100.4F), Mild Pain (1 - 3)  dextrose Oral Gel 15 Gram(s) Oral once PRN Blood Glucose LESS THAN 70 milliGRAM(s)/deciliter  melatonin 3 milliGRAM(s) Oral at bedtime PRN Insomnia      VITALS: T(C): 36.6 (09-14-22 @ 09:00)  T(F): 97.9 (09-14-22 @ 09:00), Max: 98.5 (09-13-22 @ 11:54)  HR: 89 (09-14-22 @ 09:00) (67 - 98)  BP: 139/66 (09-14-22 @ 09:00) (103/55 - 167/99)  RR:  (17 - 18)  SpO2:  (98% - 100%)  Wt(kg): --  General: AAO x 3, appropriate mood and affect    Ophthalmology Exam:  Visual acuity (sc): 20/40 OD, NIPH; 20/20 OS  Pupils: PERRL OU, no APD  Ttono: 20 OU  Extraocular movements (EOMs): Full OU, no pain, no diplopia  Confrontational Visual Field (CVF):     Pen Light Exam (PLE)  External: Flat OU, no tenderness OS, temporal pulses palpated without tenderness  Lids/Lashes/Lacrimal Ducts: MGD OU    Sclera/Conjunctiva: W+Q, CAM OU  Cornea: OD nasal PTG, OS nasal and temporal PTG  Anterior Chamber: OU deep and quiet. no hypopyon OU  Iris: Flat OU  Lens: OD PCIOL, OS PCIOL ? appears in place through undilated pupil

## 2022-09-14 NOTE — PROGRESS NOTE ADULT - ATTENDING COMMENTS
I have interviewed and examined the patient and reviewed the residents note including the history, exam, assessment, and plan.  I agree with the residents assessment and plan.    81 year old male with history of HTN, HLD, DM2, NICM, BPH, pericardial effusion (2017), on ASA presenting for increased weakness, unsteadiness and fevers. Ophthalmology consulted for left eye blurry vision and diplopia. Found to have possible  neurosyphilis and subluxed PCIOL that moves in and out of visual axis.     # Vision loss and diplopia, left eye  # Left eye subluxed intra-ocular lens  - Pt presented with worsening vision and questionable monocular diplopia, vision has been fluctuating while inpatient  - Left eye intra-ocular lens appears to be moving in and out of visual axis. When in visual axis, VA OS is excellent. VA deteriorates when lens moves out of axis. Pt should follow up with his ophthalmologist for possible surgical correction after discharge  - On exam, no clear signs of intra-ocular infection; no anterior chamber cell or hypopyon, no injection or pain. Small amount of vitreous cell could represent true inflammation vs from dislocated lens.  - No suspicion for GCA  - B-scan 9/7 showing vitreous debris.  - Both decline in VA and monocular diplopia could be explained by his dislocated lens; however will rule out etiologies for intermediate uveitis.   - Ophthalmology will follow    # Positive RPR and treponema antibody  - Uveitis w/u performed due to possible vitreous cell  - Positive work-up: treponema pallidum antibody, RPR assay, toxo IgG (neg IgM), RF  - Negative work-up: NIMA, ESR, CRP, ACE, ANCA, QuantiFeron gold, bartonella  - Given vitreous cells, decreased vision, associated systemic symptoms, positive syphilis findings: appreciate ID consult and treatment for possible neurosyphilis  - MRI brain & orbits with atrophy of the right optic nerve and right side of the optic chiasm - unclear significance.  May be secondary to old vascular event- discussed with Dr. Chan.  Outpatient follow up.    # Diabetic retinopathy  - BG management per primary team.   - findings and plan discussed with patient and primary team    Genny Riojas MD

## 2022-09-14 NOTE — PROGRESS NOTE ADULT - PROBLEM SELECTOR PLAN 6
Patient w/ hx HFrEF (EF 40%) w/ BiV dysfunction  - TTE: EF 32%, minimal AR, moderate MR, moderate-severe global LVSD, mild TR/CA, 8.9-5.7cm liver cyst  - Lasix 20 mg QD

## 2022-09-14 NOTE — PROGRESS NOTE ADULT - SUBJECTIVE AND OBJECTIVE BOX
Progress Note     Jackie Perdue MD, PhD  PGY-1 Medicine  Teams | [id number]    Patient is a 81y old  Male who presents with a chief complaint of Fevers, New L eye vision impairment (13 Sep 2022 07:21)          SUBJECTIVE / INTERVAL EVENTS  NAEON. Yesterday afternoon, updated wife about pending IV medication arrangements.       MEDICATIONS    Home Medications:  apixaban 2.5 mg oral tablet: 1 tab(s) orally 2 times a day (10 Sep 2022 18:51)  aspirin 81 mg oral tablet: 1  orally once a day (06 Sep 2022 02:30)  carvedilol 12.5 mg oral tablet: 1 tab(s) orally 2 times a day (06 Sep 2022 02:30)  folic acid 1 mg oral tablet: 1 tab(s) orally once a day (06 Sep 2022 02:30)  furosemide 20 mg oral tablet: 1 tab(s) orally once a day (06 Sep 2022 02:30)  hydrALAZINE 50 mg oral tablet: 1 tab(s) orally 3 times a day (06 Sep 2022 02:30)  Januvia 100 mg oral tablet: 1 tab(s) orally once a day (06 Sep 2022 02:30)  metFORMIN 1000 mg oral tablet: 1 tab(s) orally 2 times a day (06 Sep 2022 02:30)  pravastatin 40 mg oral tablet: 1 tab(s) orally once a day (06 Sep 2022 02:30)  ramipril 5 mg oral tablet: 1 cap(s) orally once a day (06 Sep 2022 02:30)      MEDICATIONS  (STANDING):  apixaban 2.5 milliGRAM(s) Oral two times a day  aspirin  chewable 81 milliGRAM(s) Oral daily  carvedilol 12.5 milliGRAM(s) Oral every 12 hours  dextrose 5%. 1000 milliLiter(s) (50 mL/Hr) IV Continuous <Continuous>  dextrose 5%. 1000 milliLiter(s) (100 mL/Hr) IV Continuous <Continuous>  dextrose 50% Injectable 25 Gram(s) IV Push once  dextrose 50% Injectable 25 Gram(s) IV Push once  dextrose 50% Injectable 12.5 Gram(s) IV Push once  folic acid 1 milliGRAM(s) Oral daily  furosemide    Tablet 20 milliGRAM(s) Oral daily  glucagon  Injectable 1 milliGRAM(s) IntraMuscular once  hydrALAZINE 50 milliGRAM(s) Oral three times a day  influenza  Vaccine (HIGH DOSE) 0.7 milliLiter(s) IntraMuscular once  insulin glargine Injectable (LANTUS) 6 Unit(s) SubCutaneous at bedtime  insulin lispro (ADMELOG) corrective regimen sliding scale   SubCutaneous three times a day before meals  insulin lispro (ADMELOG) corrective regimen sliding scale   SubCutaneous at bedtime  insulin lispro Injectable (ADMELOG) 4 Unit(s) SubCutaneous three times a day before meals  lisinopril 20 milliGRAM(s) Oral daily  penicillin   G  potassium  IVPB      penicillin   G  potassium  IVPB 4 Million Unit(s) IV Intermittent every 4 hours    MEDICATIONS  (PRN):  acetaminophen     Tablet .. 650 milliGRAM(s) Oral every 6 hours PRN Temp greater or equal to 38C (100.4F), Mild Pain (1 - 3)  dextrose Oral Gel 15 Gram(s) Oral once PRN Blood Glucose LESS THAN 70 milliGRAM(s)/deciliter  melatonin 3 milliGRAM(s) Oral at bedtime PRN Insomnia         VITALS / I&O's  T(C): 36.6 (09-14-22 @ 05:00), Max: 36.9 (09-13-22 @ 11:54)  HR: 82 (09-14-22 @ 05:00) (67 - 100)  BP: 167/99 (09-14-22 @ 05:00) (103/55 - 167/99)  RR: 17 (09-14-22 @ 05:00) (17 - 18)  SpO2: 99% (09-14-22 @ 05:00) (98% - 100%)  I&O's Summary    12 Sep 2022 07:01  -  13 Sep 2022 07:00  --------------------------------------------------------  IN: 0 mL / OUT: 2825 mL / NET: -2825 mL           PHYSICAL EXAM  General: Reclining in bed in no acute distress.  HEENT: Normocephalic, atraumatic. Extraocular movements grossly intact. No nasal discharge.  Neuro: Alert and oriented. No facial asymmetry or dysarthria. Moving all extremities.  CV: Regular rate and rhythm. S1/S2. No murmurs, rubs, or gallops appreciated.  Respiratory: Lung fields clear bilaterally. No crackles or wheezes appreciated.  Abdomen: Soft; nontender to palpation, all quadrants; nondistended. No rebound or guarding.  : Suprapubic region nontender.  Skin: No rashes or bruising of face, forearms, or calves bilaterally.  Psych: Mood and affect appropriate.          LABS                        12.0   3.77  )-----------( 225      ( 13 Sep 2022 06:05 )             35.3     09-13    133<L>  |  99  |  12  ----------------------------<  249<H>  4.1   |  25  |  0.89    Ca    8.9      13 Sep 2022 06:05  Phos  3.0     09-13  Mg     1.90     09-13      CAPILLARY BLOOD GLUCOSE      POCT Blood Glucose.: 203 mg/dL (13 Sep 2022 21:18)  POCT Blood Glucose.: 244 mg/dL (13 Sep 2022 17:29)  POCT Blood Glucose.: 325 mg/dL (13 Sep 2022 12:28)  POCT Blood Glucose.: 435 mg/dL (13 Sep 2022 12:25)  POCT Blood Glucose.: 263 mg/dL (13 Sep 2022 08:49)                           RADIOLOGY & ADDITIONAL TESTS    Imaging Personally Reviewed:     Consultant(s) Notes Reviewed:     Care Discussed with Consultants/Other Providers:     Progress Note     Jackie Perdue MD, PhD  PGY-1 Medicine  Teams | [id number]    Patient is a 81y old  Male who presents with a chief complaint of Fevers, New L eye vision impairment (13 Sep 2022 07:21)          SUBJECTIVE / INTERVAL EVENTS  NAEON. Yesterday afternoon, updated wife about pending IV medication arrangements. Updated daughter today.  Tele: 1 event of 24 interspersed PVCs in a minute, 1 missed beat event, multiple events of each Afib and irregular rate.  Today, patient was doing well physically. He denied headache, chest pain, shortness of breath, abdominal pain, limb/joint pain, fevers, chills, or vomiting. He continued to wish to go home. Spoke with daughter at bedside about pending JONNY placement for IV medications. Patient's spouse and patient's brother Brett also present.       MEDICATIONS    Home Medications:  apixaban 2.5 mg oral tablet: 1 tab(s) orally 2 times a day (10 Sep 2022 18:51)  aspirin 81 mg oral tablet: 1  orally once a day (06 Sep 2022 02:30)  carvedilol 12.5 mg oral tablet: 1 tab(s) orally 2 times a day (06 Sep 2022 02:30)  folic acid 1 mg oral tablet: 1 tab(s) orally once a day (06 Sep 2022 02:30)  furosemide 20 mg oral tablet: 1 tab(s) orally once a day (06 Sep 2022 02:30)  hydrALAZINE 50 mg oral tablet: 1 tab(s) orally 3 times a day (06 Sep 2022 02:30)  Januvia 100 mg oral tablet: 1 tab(s) orally once a day (06 Sep 2022 02:30)  metFORMIN 1000 mg oral tablet: 1 tab(s) orally 2 times a day (06 Sep 2022 02:30)  pravastatin 40 mg oral tablet: 1 tab(s) orally once a day (06 Sep 2022 02:30)  ramipril 5 mg oral tablet: 1 cap(s) orally once a day (06 Sep 2022 02:30)      MEDICATIONS  (STANDING):  apixaban 2.5 milliGRAM(s) Oral two times a day  aspirin  chewable 81 milliGRAM(s) Oral daily  carvedilol 12.5 milliGRAM(s) Oral every 12 hours  dextrose 5%. 1000 milliLiter(s) (50 mL/Hr) IV Continuous <Continuous>  dextrose 5%. 1000 milliLiter(s) (100 mL/Hr) IV Continuous <Continuous>  dextrose 50% Injectable 25 Gram(s) IV Push once  dextrose 50% Injectable 25 Gram(s) IV Push once  dextrose 50% Injectable 12.5 Gram(s) IV Push once  folic acid 1 milliGRAM(s) Oral daily  furosemide    Tablet 20 milliGRAM(s) Oral daily  glucagon  Injectable 1 milliGRAM(s) IntraMuscular once  hydrALAZINE 50 milliGRAM(s) Oral three times a day  influenza  Vaccine (HIGH DOSE) 0.7 milliLiter(s) IntraMuscular once  insulin glargine Injectable (LANTUS) 6 Unit(s) SubCutaneous at bedtime  insulin lispro (ADMELOG) corrective regimen sliding scale   SubCutaneous three times a day before meals  insulin lispro (ADMELOG) corrective regimen sliding scale   SubCutaneous at bedtime  insulin lispro Injectable (ADMELOG) 4 Unit(s) SubCutaneous three times a day before meals  lisinopril 20 milliGRAM(s) Oral daily  penicillin   G  potassium  IVPB      penicillin   G  potassium  IVPB 4 Million Unit(s) IV Intermittent every 4 hours    MEDICATIONS  (PRN):  acetaminophen     Tablet .. 650 milliGRAM(s) Oral every 6 hours PRN Temp greater or equal to 38C (100.4F), Mild Pain (1 - 3)  dextrose Oral Gel 15 Gram(s) Oral once PRN Blood Glucose LESS THAN 70 milliGRAM(s)/deciliter  melatonin 3 milliGRAM(s) Oral at bedtime PRN Insomnia         VITALS / I&O's  T(C): 36.6 (09-14-22 @ 05:00), Max: 36.9 (09-13-22 @ 11:54)  HR: 82 (09-14-22 @ 05:00) (67 - 100)  BP: 167/99 (09-14-22 @ 05:00) (103/55 - 167/99)  RR: 17 (09-14-22 @ 05:00) (17 - 18)  SpO2: 99% (09-14-22 @ 05:00) (98% - 100%)  I&O's Summary    12 Sep 2022 07:01  -  13 Sep 2022 07:00  --------------------------------------------------------  IN: 0 mL / OUT: 2825 mL / NET: -2825 mL           PHYSICAL EXAM  General: Reclining in bed in no acute distress.  HEENT: Normocephalic, atraumatic. Extraocular movements grossly intact. No nasal discharge.  Neuro: Alert and oriented. No facial asymmetry or dysarthria. Moving all extremities.  CV: Regular rate and rhythm. S1/S2. No murmurs, rubs, or gallops appreciated.  Respiratory: Lung fields clear bilaterally. No crackles or wheezes appreciated.  Abdomen: Soft; nontender to palpation, all quadrants; nondistended. No rebound or guarding.  : Suprapubic region nontender.  Skin: No rashes or bruising of face, forearms, or calves bilaterally.  Psych: Mood and affect appropriate.          LABS                        12.0   3.77  )-----------( 225      ( 13 Sep 2022 06:05 )             35.3     09-13    133<L>  |  99  |  12  ----------------------------<  249<H>  4.1   |  25  |  0.89    Ca    8.9      13 Sep 2022 06:05  Phos  3.0     09-13  Mg     1.90     09-13      CAPILLARY BLOOD GLUCOSE      POCT Blood Glucose.: 203 mg/dL (13 Sep 2022 21:18)  POCT Blood Glucose.: 244 mg/dL (13 Sep 2022 17:29)  POCT Blood Glucose.: 325 mg/dL (13 Sep 2022 12:28)  POCT Blood Glucose.: 435 mg/dL (13 Sep 2022 12:25)  POCT Blood Glucose.: 263 mg/dL (13 Sep 2022 08:49)                           RADIOLOGY & ADDITIONAL TESTS    Imaging Personally Reviewed:     Consultant(s) Notes Reviewed:     Care Discussed with Consultants/Other Providers:

## 2022-09-14 NOTE — PROGRESS NOTE ADULT - PROBLEM SELECTOR PLAN 8
Diet: regular  DVT ppx: Eliquis  Dispo: pending arrangements for IV medication  PT: no needs on dispo

## 2022-09-14 NOTE — PROGRESS NOTE ADULT - ASSESSMENT
81 year old male with history of HTN, HLD, DM2, NICM, BPH, pericardial effusion (2017), on ASA presenting for increased weakness, unsteadiness and fevers. Ophthalmology consulted for left eye blurry vision and diplopia. Found to have possible  neurosyphilis and subluxed PCIOL that moves in and out of visual axis.     # Vision loss and diplopia, left eye  # Left eye subluxed intra-ocular lens  - Pt presented with worsening vision and questionable monocular diplopia, vision has been fluctuating while inpatient  - Left eye intra-ocular lens appears to be moving in and out of visual axis. When in visual axis, VA OS is excellent. VA deteriorates when lens moves out of axis. Pt should follow up with his ophthalmologist for possible surgical correction after discharge  - On exam, no clear signs of intra-ocular infection; no anterior chamber cell or hypopyon, no injection or pain. Small amount of vitreous cell/RBC could be from old vitreous hemorrhage or his dislocated lens  - No suspicion for GCA  - B-scan 9/7 showing vitreous debris without vitritis.  - Both decline in VA and monocular diplopia could be explained by his dislocated lens; however will rule out etiologies for uveitis and will follow management for vitreous hemorrhage.   - Ophthalmology will follow    # Positive RPR and treponema antibody  - Uveitis w/u performed due to possible vitreous cell  - Positive work-up: treponema pallidum antibody, RPR assay, toxo IgG (neg IgM), RF  - Negative work-up: NIMA, ESR, CRP, ACE, ANCA, QuantiFeron gold, bartonella  - Given vitreous cells, decreased vision, positive syphilis findings: appreciate ID consult and treatment for possible neurosyphilis  - MRI brain & orbits with atrophy of the right optic nerve and right side of the optic chiasm  - Pt should keep head elevated at 30-degrees, avoid head shaking, bending, straining, heavy lifting  - Avoid NSAIDs unless medically indicated. Continue AC/AP per neurology/cardiology    # Diabetic retinopathy  - BG management per primary team.     Seen and discussed with Dr Riojas, attending.     Outpatient follow-up: Patient should follow-up with his/her ophthalmologist or with [facility] Department of Ophthalmology upon discharge at the address below     [facility] Department of Ophthalmology  [address] [place] [address]. [address]  [place] [address]  [phone]   81 year old male with history of HTN, HLD, DM2, NICM, BPH, pericardial effusion (2017), on ASA presenting for increased weakness, unsteadiness and fevers. Ophthalmology consulted for left eye blurry vision and diplopia. Found to have possible  neurosyphilis and subluxed PCIOL that moves in and out of visual axis.     # Vision loss and diplopia, left eye  # Left eye subluxed intra-ocular lens  - Pt presented with worsening vision and questionable monocular diplopia, vision has been fluctuating while inpatient  - Left eye intra-ocular lens appears to be moving in and out of visual axis. When in visual axis, VA OS is excellent. VA deteriorates when lens moves out of axis. Pt should follow up with his ophthalmologist for possible surgical correction after discharge  - On exam, no clear signs of intra-ocular infection; no anterior chamber cell or hypopyon, no injection or pain. Small amount of vitreous cell could represent true inflammation vs from dislocated lens.  - No suspicion for GCA  - B-scan 9/7 showing vitreous debris.  - Both decline in VA and monocular diplopia could be explained by his dislocated lens; however will rule out etiologies for intermediate uveitis.   - Ophthalmology will follow    # Positive RPR and treponema antibody  - Uveitis w/u performed due to possible vitreous cell  - Positive work-up: treponema pallidum antibody, RPR assay, toxo IgG (neg IgM), RF  - Negative work-up: NIMA, ESR, CRP, ACE, ANCA, QuantiFeron gold, bartonella  - Given vitreous cells, decreased vision, associated systemic symptoms, positive syphilis findings: appreciate ID consult and treatment for possible neurosyphilis  - MRI brain & orbits with atrophy of the right optic nerve and right side of the optic chiasm - unclear significance.  May be secondary to old vascular event- discussed with Dr. Chan.  Outpatient follow up.    # Diabetic retinopathy  - BG management per primary team.   - findings and plan discussed with patient and primary team    Seen and discussed with Dr Riojas, attending.     Outpatient follow-up: Patient should follow-up with his/her ophthalmologist or with [facility] Department of Ophthalmology upon discharge at the address below within 2-3 days of discharge, sooner if symptoms worsen or change.    [facility] Department of Ophthalmology  [address] [place] [address]. [address]  [place] [address]  [phone]

## 2022-09-14 NOTE — PROGRESS NOTE ADULT - ASSESSMENT
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 Labs and history concerning for neurosyphilis; now on empiric treatment and pending dispo on IV penicillin G.

## 2022-09-15 LAB
ALBUMIN SERPL ELPH-MCNC: 3.8 G/DL — SIGNIFICANT CHANGE UP (ref 3.3–5)
ALP SERPL-CCNC: 49 U/L — SIGNIFICANT CHANGE UP (ref 40–120)
ALT FLD-CCNC: 23 U/L — SIGNIFICANT CHANGE UP (ref 4–41)
ANION GAP SERPL CALC-SCNC: 9 MMOL/L — SIGNIFICANT CHANGE UP (ref 7–14)
AST SERPL-CCNC: 22 U/L — SIGNIFICANT CHANGE UP (ref 4–40)
BASOPHILS # BLD AUTO: 0.04 K/UL — SIGNIFICANT CHANGE UP (ref 0–0.2)
BASOPHILS NFR BLD AUTO: 0.9 % — SIGNIFICANT CHANGE UP (ref 0–2)
BILIRUB SERPL-MCNC: 0.8 MG/DL — SIGNIFICANT CHANGE UP (ref 0.2–1.2)
BUN SERPL-MCNC: 15 MG/DL — SIGNIFICANT CHANGE UP (ref 7–23)
CALCIUM SERPL-MCNC: 9.5 MG/DL — SIGNIFICANT CHANGE UP (ref 8.4–10.5)
CHLORIDE SERPL-SCNC: 99 MMOL/L — SIGNIFICANT CHANGE UP (ref 98–107)
CO2 SERPL-SCNC: 28 MMOL/L — SIGNIFICANT CHANGE UP (ref 22–31)
CREAT SERPL-MCNC: 1.02 MG/DL — SIGNIFICANT CHANGE UP (ref 0.5–1.3)
EGFR: 74 ML/MIN/1.73M2 — SIGNIFICANT CHANGE UP
EOSINOPHIL # BLD AUTO: 0.06 K/UL — SIGNIFICANT CHANGE UP (ref 0–0.5)
EOSINOPHIL NFR BLD AUTO: 1.3 % — SIGNIFICANT CHANGE UP (ref 0–6)
GLUCOSE BLDC GLUCOMTR-MCNC: 171 MG/DL — HIGH (ref 70–99)
GLUCOSE BLDC GLUCOMTR-MCNC: 198 MG/DL — HIGH (ref 70–99)
GLUCOSE BLDC GLUCOMTR-MCNC: 202 MG/DL — HIGH (ref 70–99)
GLUCOSE BLDC GLUCOMTR-MCNC: 290 MG/DL — HIGH (ref 70–99)
GLUCOSE BLDC GLUCOMTR-MCNC: 335 MG/DL — HIGH (ref 70–99)
GLUCOSE SERPL-MCNC: 219 MG/DL — HIGH (ref 70–99)
HCT VFR BLD CALC: 36.8 % — LOW (ref 39–50)
HGB BLD-MCNC: 12.4 G/DL — LOW (ref 13–17)
IANC: 1.85 K/UL — SIGNIFICANT CHANGE UP (ref 1.8–7.4)
IMM GRANULOCYTES NFR BLD AUTO: 0.4 % — SIGNIFICANT CHANGE UP (ref 0–1.5)
LYMPHOCYTES # BLD AUTO: 1.83 K/UL — SIGNIFICANT CHANGE UP (ref 1–3.3)
LYMPHOCYTES # BLD AUTO: 39.4 % — SIGNIFICANT CHANGE UP (ref 13–44)
MAGNESIUM SERPL-MCNC: 2 MG/DL — SIGNIFICANT CHANGE UP (ref 1.6–2.6)
MCHC RBC-ENTMCNC: 26.3 PG — LOW (ref 27–34)
MCHC RBC-ENTMCNC: 33.7 GM/DL — SIGNIFICANT CHANGE UP (ref 32–36)
MCV RBC AUTO: 78.1 FL — LOW (ref 80–100)
MONOCYTES # BLD AUTO: 0.85 K/UL — SIGNIFICANT CHANGE UP (ref 0–0.9)
MONOCYTES NFR BLD AUTO: 18.3 % — HIGH (ref 2–14)
NEUTROPHILS # BLD AUTO: 1.85 K/UL — SIGNIFICANT CHANGE UP (ref 1.8–7.4)
NEUTROPHILS NFR BLD AUTO: 39.7 % — LOW (ref 43–77)
NRBC # BLD: 0 /100 WBCS — SIGNIFICANT CHANGE UP (ref 0–0)
NRBC # FLD: 0 K/UL — SIGNIFICANT CHANGE UP (ref 0–0)
PHOSPHATE SERPL-MCNC: 3.8 MG/DL — SIGNIFICANT CHANGE UP (ref 2.5–4.5)
PLATELET # BLD AUTO: 281 K/UL — SIGNIFICANT CHANGE UP (ref 150–400)
POTASSIUM SERPL-MCNC: 4.2 MMOL/L — SIGNIFICANT CHANGE UP (ref 3.5–5.3)
POTASSIUM SERPL-SCNC: 4.2 MMOL/L — SIGNIFICANT CHANGE UP (ref 3.5–5.3)
PROT SERPL-MCNC: 6.6 G/DL — SIGNIFICANT CHANGE UP (ref 6–8.3)
RBC # BLD: 4.71 M/UL — SIGNIFICANT CHANGE UP (ref 4.2–5.8)
RBC # FLD: 17.2 % — HIGH (ref 10.3–14.5)
SARS-COV-2 RNA SPEC QL NAA+PROBE: SIGNIFICANT CHANGE UP
SODIUM SERPL-SCNC: 136 MMOL/L — SIGNIFICANT CHANGE UP (ref 135–145)
WBC # BLD: 4.65 K/UL — SIGNIFICANT CHANGE UP (ref 3.8–10.5)
WBC # FLD AUTO: 4.65 K/UL — SIGNIFICANT CHANGE UP (ref 3.8–10.5)

## 2022-09-15 PROCEDURE — 36573 INSJ PICC RS&I 5 YR+: CPT

## 2022-09-15 PROCEDURE — 99232 SBSQ HOSP IP/OBS MODERATE 35: CPT

## 2022-09-15 PROCEDURE — 99232 SBSQ HOSP IP/OBS MODERATE 35: CPT | Mod: GC

## 2022-09-15 RX ORDER — CHLORHEXIDINE GLUCONATE 213 G/1000ML
1 SOLUTION TOPICAL
Refills: 0 | Status: DISCONTINUED | OUTPATIENT
Start: 2022-09-15 | End: 2022-09-16

## 2022-09-15 RX ORDER — INSULIN GLARGINE 100 [IU]/ML
13 INJECTION, SOLUTION SUBCUTANEOUS AT BEDTIME
Refills: 0 | Status: DISCONTINUED | OUTPATIENT
Start: 2022-09-15 | End: 2022-09-16

## 2022-09-15 RX ORDER — INSULIN LISPRO 100/ML
8 VIAL (ML) SUBCUTANEOUS
Refills: 0 | Status: DISCONTINUED | OUTPATIENT
Start: 2022-09-15 | End: 2022-09-16

## 2022-09-15 RX ADMIN — Medication 8 UNIT(S): at 17:56

## 2022-09-15 RX ADMIN — APIXABAN 2.5 MILLIGRAM(S): 2.5 TABLET, FILM COATED ORAL at 05:41

## 2022-09-15 RX ADMIN — CARVEDILOL PHOSPHATE 12.5 MILLIGRAM(S): 80 CAPSULE, EXTENDED RELEASE ORAL at 17:58

## 2022-09-15 RX ADMIN — PENICILLIN G POTASSIUM 100 MILLION UNIT(S): 5000000 POWDER, FOR SOLUTION INTRAMUSCULAR; INTRAPLEURAL; INTRATHECAL; INTRAVENOUS at 08:44

## 2022-09-15 RX ADMIN — Medication 20 MILLIGRAM(S): at 05:42

## 2022-09-15 RX ADMIN — Medication 3: at 13:13

## 2022-09-15 RX ADMIN — PENICILLIN G POTASSIUM 100 MILLION UNIT(S): 5000000 POWDER, FOR SOLUTION INTRAMUSCULAR; INTRAPLEURAL; INTRATHECAL; INTRAVENOUS at 01:02

## 2022-09-15 RX ADMIN — Medication 6 UNIT(S): at 08:59

## 2022-09-15 RX ADMIN — Medication 50 MILLIGRAM(S): at 13:15

## 2022-09-15 RX ADMIN — Medication 50 MILLIGRAM(S): at 21:23

## 2022-09-15 RX ADMIN — Medication 1 MILLIGRAM(S): at 12:15

## 2022-09-15 RX ADMIN — PENICILLIN G POTASSIUM 100 MILLION UNIT(S): 5000000 POWDER, FOR SOLUTION INTRAMUSCULAR; INTRAPLEURAL; INTRATHECAL; INTRAVENOUS at 17:57

## 2022-09-15 RX ADMIN — APIXABAN 2.5 MILLIGRAM(S): 2.5 TABLET, FILM COATED ORAL at 17:59

## 2022-09-15 RX ADMIN — INSULIN GLARGINE 13 UNIT(S): 100 INJECTION, SOLUTION SUBCUTANEOUS at 21:22

## 2022-09-15 RX ADMIN — Medication 81 MILLIGRAM(S): at 12:15

## 2022-09-15 RX ADMIN — LISINOPRIL 20 MILLIGRAM(S): 2.5 TABLET ORAL at 05:42

## 2022-09-15 RX ADMIN — PENICILLIN G POTASSIUM 100 MILLION UNIT(S): 5000000 POWDER, FOR SOLUTION INTRAMUSCULAR; INTRAPLEURAL; INTRATHECAL; INTRAVENOUS at 21:23

## 2022-09-15 RX ADMIN — Medication 50 MILLIGRAM(S): at 05:42

## 2022-09-15 RX ADMIN — Medication 6 UNIT(S): at 13:14

## 2022-09-15 RX ADMIN — CARVEDILOL PHOSPHATE 12.5 MILLIGRAM(S): 80 CAPSULE, EXTENDED RELEASE ORAL at 05:42

## 2022-09-15 RX ADMIN — Medication 1: at 17:57

## 2022-09-15 RX ADMIN — PENICILLIN G POTASSIUM 100 MILLION UNIT(S): 5000000 POWDER, FOR SOLUTION INTRAMUSCULAR; INTRAPLEURAL; INTRATHECAL; INTRAVENOUS at 05:42

## 2022-09-15 RX ADMIN — Medication 4: at 08:58

## 2022-09-15 RX ADMIN — PENICILLIN G POTASSIUM 100 MILLION UNIT(S): 5000000 POWDER, FOR SOLUTION INTRAMUSCULAR; INTRAPLEURAL; INTRATHECAL; INTRAVENOUS at 13:15

## 2022-09-15 NOTE — PRE PROCEDURE NOTE - PRE PROCEDURE EVALUATION
Vascular & Interventional Radiology Pre-Procedure Note    Procedure Name: PICC placement    HPI: 81y Male with PMHx HTN, HLD, DM2, NICM, and BPH, presented with generalized weakness, fever, and L eye blurry vision with concern fo syphilis infection presents for PICC placement for treatment.    Allergies: NKDA    Medications:   apixaban: 2.5 milliGRAM(s) Oral (09-15 @ 05:41)  aspirin  chewable: 81 milliGRAM(s) Oral (09-15 @ 12:15)  carvedilol: 12.5 milliGRAM(s) Oral (09-15 @ 05:42)  furosemide    Tablet: 20 milliGRAM(s) Oral (09-15 @ 05:42)  hydrALAZINE: 50 milliGRAM(s) Oral (09-15 @ 13:15)  lisinopril: 20 milliGRAM(s) Oral (09-15 @ 05:42)  penicillin   G  potassium  IVPB: 100 mL/Hr IV Intermittent (09-15 @ 13:15)      Data:  Vital Signs Last 24 Hrs  T(C): 36.6 (15 Sep 2022 12:30), Max: 36.6 (15 Sep 2022 05:42)  T(F): 97.8 (15 Sep 2022 12:30), Max: 97.9 (15 Sep 2022 05:42)  HR: 67 (15 Sep 2022 12:30) (61 - 67)  BP: 127/69 (15 Sep 2022 12:30) (106/60 - 127/69)  BP(mean): --  RR: 17 (15 Sep 2022 12:30) (17 - 18)  SpO2: 100% (15 Sep 2022 12:30) (99% - 100%)    Parameters below as of 15 Sep 2022 12:30  Patient On (Oxygen Delivery Method): room air        LABS:                        12.4   4.65  )-----------( 281      ( 15 Sep 2022 05:40 )             36.8     09-15    136  |  99  |  15  ----------------------------<  219<H>  4.2   |  28  |  1.02    Ca    9.5      15 Sep 2022 05:40  Phos  3.8     09-15  Mg     2.00     09-15    TPro  6.6  /  Alb  3.8  /  TBili  0.8  /  DBili  x   /  AST  22  /  ALT  23  /  AlkPhos  49  09-15      Plan:   -81y Male presents for PICC placement  -Risks/Benefits/alternatives explained with the patient and witnessed informed consent obtained.

## 2022-09-15 NOTE — PROCEDURE NOTE - PLAN
-OK to use PICC  -PICC is heparin locked with 2cc fluid. please aspirate prior to use and re-heparin lock after use.

## 2022-09-15 NOTE — PROGRESS NOTE ADULT - SUBJECTIVE AND OBJECTIVE BOX
Follow Up:  Fever, positive treponemal test, ocular lens dislocatoin, concern for neurosyphilis    Interval History/ROS:  Overnight: no acute events.    Patient seen and examined at bedside. Patient appears well. reports some abdominal bloating. denies any other new pain or discomfort.    Allergies  No Known Allergies        ANTIMICROBIALS:  penicillin   G  potassium  IVPB    penicillin   G  potassium  IVPB 4 every 4 hours      OTHER MEDS:  MEDICATIONS  (STANDING):  acetaminophen     Tablet .. 650 every 6 hours PRN  apixaban 2.5 two times a day  aspirin  chewable 81 daily  carvedilol 12.5 every 12 hours  dextrose 50% Injectable 25 once  dextrose 50% Injectable 12.5 once  dextrose 50% Injectable 25 once  dextrose Oral Gel 15 once PRN  furosemide    Tablet 20 daily  glucagon  Injectable 1 once  hydrALAZINE 50 three times a day  influenza  Vaccine (HIGH DOSE) 0.7 once  insulin glargine Injectable (LANTUS) 9 at bedtime  insulin lispro (ADMELOG) corrective regimen sliding scale  three times a day before meals  insulin lispro (ADMELOG) corrective regimen sliding scale  at bedtime  insulin lispro Injectable (ADMELOG) 6 three times a day before meals  lisinopril 20 daily  melatonin 3 at bedtime PRN      Vital Signs Last 24 Hrs  T(C): 36.6 (15 Sep 2022 05:42), Max: 36.9 (14 Sep 2022 12:30)  T(F): 97.9 (15 Sep 2022 05:42), Max: 98.4 (14 Sep 2022 12:30)  HR: 67 (15 Sep 2022 05:42) (61 - 84)  BP: 120/72 (15 Sep 2022 05:42) (106/60 - 162/68)  BP(mean): --  RR: 17 (15 Sep 2022 05:42) (17 - 18)  SpO2: 99% (15 Sep 2022 05:42) (99% - 100%)    Parameters below as of 15 Sep 2022 05:42  Patient On (Oxygen Delivery Method): room air        PHYSICAL EXAM:  Constitutional: non-toxic, no distress  HEAD/EYES: anicteric, no conjunctival injection  ENT:  supple, no thrush  Cardiovascular:   +S1/S2  Respiratory:  clear BS bilaterally  GI:  soft, non-tender, normal bowel sounds  :  no dela cruz, no CVA tenderness  Musculoskeletal:  no synovitis, normal ROM  Neurologic: awake and alert,  no focal findings                                12.4   4.65  )-----------( 281      ( 15 Sep 2022 05:40 )             36.8       09-15    136  |  99  |  15  ----------------------------<  219<H>  4.2   |  28  |  1.02    Ca    9.5      15 Sep 2022 05:40  Phos  3.8     09-15  Mg     2.00     09-15    TPro  6.6  /  Alb  3.8  /  TBili  0.8  /  DBili  x   /  AST  22  /  ALT  23  /  AlkPhos  49  09-15          MICROBIOLOGY:  v          Toxoplasma IgG Screen: 25.1 IU/mL (09-08-22 @ 11:08)          RADIOLOGY:

## 2022-09-15 NOTE — PROGRESS NOTE ADULT - PROBLEM SELECTOR PLAN 5
Daily Note     Today's date: 2021  Patient name: Ron Almaguer  : 1980  MRN: [id number]  Referring provider: Margaret Stafford  Dx:   Encounter Diagnosis     ICD-10-CM    1  Internal derangement of shoulder, left  M24 812        Start Time: 830  Stop Time: 925  Total time in clinic (min): 55 minutes    Subjective: "My shoulder is sore but I didn't do anything this morning "      Objective: See treatment diary below      Assessment: Tolerated treatment fair  Able to start AAROM  Pt apprehensive to move shoulder and has pain with all motions  Noted pain during all PROM with empty end feel; no signs of distress noted; Pt limiting in all directions  PROM is less than excepted at this time 2* pain  Will continue to monitor and progress as able  Patient demonstrated fatigue post treatment and would benefit from continued PT      Plan: Continue per plan of care  Progress treatment as tolerated         Precautions: s/p L RTC repair, NO AROM X 6 weeks       Re-eval Date:     Date    Visit Count 1 2 3 4 5   FOTO     Pain In 0/10 2-3 5-6 4-5 4-5   Pain Out 0/10 2-3 3-4 3-4 3-4         Manuals    PROM R shoulder   GENTLE     PROM R shoulder   GENTLE  10' PROM R shoulder   GENTLE  10' PROM R shoulder   GENTLE  10' PROM R shoulder   GENTLE  15'   Dressing change  10' total  Post op dressing removal /change                        Neuro Re-Ed                                                                Ther Ex        UT stretch    Levator stretch Reviewed      reviewed 4x30"      4x30" 4x30"      4x30"   4x30"      4x30"   Reviewed HEP    Reviewed HEP   C-spine AROM    Elbow AAROM Reviewed       reviewed 10x      2x10 10x      3x10   10x      3x10         3x10   Wrist AROM Reviewed  2x10 2x10 2x10 2# 3x10    indv     composite  Reviewed iso  Blue 2'      Blue 2' Blue 2'      Blue 2' Blue 2'      Blue 2' Blue 2'      Blue 2'   Pendulums f/b, s/s  NV Fwd/lat  1' ea Fwd/lat  2' ea Fwd/lat  2' ea Fwd/lat  2' ea   scap retract  2x10/3-5" 2x10/3-5" 3x10/3-5" 3x10/3-5"   Pulleys     scap 2'   Table slides     Scap/flex  20x/3-5"   Ther Activity                        Gait Training                        Modalities Deferred to home  CP 10' no adverse effects  CP 10' no adverse effects CP 10' no adverse effects CP 10' no adverse effects Hx DM2 on Januvia and metformin  - SSI     #Cr elevation:  Baseline 1.05-1.2. On Admission SCr 1.2

## 2022-09-15 NOTE — PROCEDURE NOTE - PROCEDURE FINDINGS AND DETAILS
technically successful US and fluoroscopic guided L basilic vein PICC placement with final length of 42cm. locked with 2cc heparin.

## 2022-09-15 NOTE — CHART NOTE - NSCHARTNOTEFT_GEN_A_CORE
PRE-INTERVENTIONAL RADIOLOGY PROCEDURE NOTE  ============================  Arnold Horne, PGY1  Internal Medicine Resident  Pager: [phone] (NS) / [phone] ([facility])  ============================  Patient Name: COLTON JARAMILLO    Patient Age: 81y    Patient Gender: Male    Procedure: PICC placement    Diagnosis/Indication: IV penicillin G    Interventional Radiology Attending Physician:     Ordering Attending Physician: Parveen Lopez    Pertinent Medical History: c/f neurosyphilis    Pertinent labs:                      12.4   4.65  )-----------( 281      ( 15 Sep 2022 05:40 )             36.8       09-15    136  |  99  |  15  ----------------------------<  219<H>  4.2   |  28  |  1.02    Ca    9.5      15 Sep 2022 05:40  Phos  3.8     09-15  Mg     2.00     09-15    TPro  6.6  /  Alb  3.8  /  TBili  0.8  /  DBili  x   /  AST  22  /  ALT  23  /  AlkPhos  49  09-15              Patient and Family Aware ? Yes    Discussed with Chris Stevens - approved

## 2022-09-15 NOTE — PROGRESS NOTE ADULT - ASSESSMENT
80 y/o M PMHx HTN, HLD, DM2, NICM, and BPH, presented with generalized weakness, fever, and L eye blurry vision. Evaluated by Opthalmology, noted to have dislocated lens on exam along with possible vitreous hemorrhage. Syphilis screen sent for uveitis workup, FTA+ with RPR <1:1. No known history of syphilis infection or treatment.       #Positive FTA - DDx includes previously treated syphilis infection, untreated remote syphilis infection, vs false-positive test. 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 Evaluated by Opthalmology, noted to have dislocated lens on exam along with possible vitreous hemorrhage. Syphilis screen sent for uveitis workup, FTA+ with RPR <1:1. No known history of syphilis infection or treatment.       #Positive FTA - DDx includes previously treated syphilis infection, untreated remote syphilis infection, vs false-positive test. 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

## 2022-09-15 NOTE — PROGRESS NOTE ADULT - PROBLEM SELECTOR PLAN 8
Status: Pt here for VEEG monitoring.    Vitals: VSS on RA  Neuros: Alert and oriented x4. Decreased sensation to left side (face, arm and leg)-baseline per pt. Intermittent LUE N/T. No events witnessed or reported this shift.  IV: PIV Sl'd.  Resp/trach: Wnl on RA  Diet: Gluten free diet.   Bowel status: Bs+x4. Denied nausea.  : Voiding.   Skin: Small blister to RUE. Bruise to left forearm.  Pain: C/o sore throat, left hip pain this am, declined intervention.  Activity: Up with SBA, GB. Bed alarm on. Up in harness x1 this shift.  Plan: Continue to monitor and follow POC.          Diet: regular  DVT ppx: Eliquis  Dispo: pending arrangements for IV medication  PT: no needs on dispo

## 2022-09-15 NOTE — PROGRESS NOTE ADULT - PROBLEM SELECTOR PLAN 6
Patient w/ hx HFrEF (EF 40%) w/ BiV dysfunction  - TTE: EF 32%, minimal AR, moderate MR, moderate-severe global LVSD, mild TR/DE, 8.9-5.7cm liver cyst  - Lasix 20 mg QD

## 2022-09-15 NOTE — CHART NOTE - NSCHARTNOTEFT_GEN_A_CORE
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 Labs and history concerning for neurosyphilis; now on empiric treatment and pending dispo on IV penicillin G.    Nutrition Course: Nutrition interview: No recent episodes of nausea, vomiting, diarrhea, pt c/o constipation- will recommend bowel regimen. Last BM noted per today per Pt, however he states he has difficulty going. Denies any chewing/swallowing difficulties. Food preferences explored and noted. Pt mostly eats food brought in from home by family. Intake is % per RN flowsheets and per pt. Feeding skills: independent. Pt receives Glucerna 2x daily (440kcals, 20g protein) to promote optimal PO intake, with 100% consumption per Pt.     Diet Prescription: Diet, Regular:   No Beef  No Pork  No Poultry  Supplement Feeding Modality:  Oral  Glucerna Shake Cans or Servings Per Day:  2       Frequency:  Daily (09-07-22 @ 15:50)    Pertinent Medications: MEDICATIONS  (STANDING):  apixaban 2.5 milliGRAM(s) Oral two times a day  aspirin  chewable 81 milliGRAM(s) Oral daily  carvedilol 12.5 milliGRAM(s) Oral every 12 hours  dextrose 5%. 1000 milliLiter(s) (50 mL/Hr) IV Continuous <Continuous>  dextrose 5%. 1000 milliLiter(s) (100 mL/Hr) IV Continuous <Continuous>  dextrose 50% Injectable 25 Gram(s) IV Push once  dextrose 50% Injectable 12.5 Gram(s) IV Push once  dextrose 50% Injectable 25 Gram(s) IV Push once  folic acid 1 milliGRAM(s) Oral daily  furosemide    Tablet 20 milliGRAM(s) Oral daily  glucagon  Injectable 1 milliGRAM(s) IntraMuscular once  hydrALAZINE 50 milliGRAM(s) Oral three times a day  influenza  Vaccine (HIGH DOSE) 0.7 milliLiter(s) IntraMuscular once  insulin glargine Injectable (LANTUS) 13 Unit(s) SubCutaneous at bedtime  insulin lispro (ADMELOG) corrective regimen sliding scale   SubCutaneous three times a day before meals  insulin lispro (ADMELOG) corrective regimen sliding scale   SubCutaneous at bedtime  insulin lispro Injectable (ADMELOG) 8 Unit(s) SubCutaneous three times a day before meals  lisinopril 20 milliGRAM(s) Oral daily  penicillin   G  potassium  IVPB      penicillin   G  potassium  IVPB 4 Million Unit(s) IV Intermittent every 4 hours    MEDICATIONS  (PRN):  acetaminophen     Tablet .. 650 milliGRAM(s) Oral every 6 hours PRN Temp greater or equal to 38C (100.4F), Mild Pain (1 - 3)  dextrose Oral Gel 15 Gram(s) Oral once PRN Blood Glucose LESS THAN 70 milliGRAM(s)/deciliter  melatonin 3 milliGRAM(s) Oral at bedtime PRN Insomnia    Pertinent Labs: 09-15 Na136 mmol/L Glu 219 mg/dL<H> K+ 4.2 mmol/L Cr  1.02 mg/dL BUN 15 mg/dL 09-15 Phos 3.8 mg/dL 09-15 Alb 3.8 g/dL 09-05 Chol 114 mg/dL LDL --    HDL 30 mg/dL<L> Trig 71 mg/dL  POCT Blood Glucose.: 290 mg/dL (15 Sep 2022 12:49)  POCT Blood Glucose.: 335 mg/dL (15 Sep 2022 08:53)  POCT Blood Glucose.: 183 mg/dL (14 Sep 2022 21:20)  POCT Blood Glucose.: 238 mg/dL (14 Sep 2022 17:32)      Weight: Height (cm): 177.8 (09-05 @ 17:20)  Weight (kg): 77.7 (09-10), 78.1 (09-06 @ 17:00)  BMI (kg/m2): 24.7 (09-06 @ 17:00)  Weight Assessment: Pt with stable wt x4 days (-0.4%).       Physical Assessment, per flowsheets:  Edema: none noted  Skin: Intact w/ no pressure injuries noted per RN flowsheets     Estimated Needs:   [X] No change since previous assessment    Previous Nutrition Diagnosis: Inadequate Oral Intake related to inability to consume adequate kcal/protein as evidence by reported decreased appetite x3 months, and weight loss (non-significant).  Nutrition Diagnosis is [ x] ongoing  [ ] resolved [ ] not applicable   New Nutrition Diagnosis: [x ] not applicable     Interventions:   1) Continue on current diet rx: Regular, no pork, no beef, no poultry   2) Continue to provide Glucerna 2x daily (440kcals, 20g protein)   3) Recommend multivitamin once daily for micronutrient provisions   4) Recommend bowel regimen to aide in normal BMs  5) Encourage PO intake and honor food preferences as able.  6) RD to f/u prn     Monitor & Evaluate:  PO intake, tolerance to diet/supplement, nutrition related lab values, weight trends, BMs/GI distress, hydration status, skin integrity.    Delia Wayne MS, RDN (Pager #[phone]) | Also available on TEAMS

## 2022-09-15 NOTE — PROGRESS NOTE ADULT - SUBJECTIVE AND OBJECTIVE BOX
Progress Note     == draft in progress ==  Jackie Perdue MD, PhD  PGY-1 Medicine  Teams | [id number]    Patient is a 81y old  Male who presents with a chief complaint of Fevers of Unknown Origin, New L eye vision impairment (14 Sep 2022 11:39)          SUBJECTIVE / INTERVAL EVENTS         MEDICATIONS    Home Medications:  apixaban 2.5 mg oral tablet: 1 tab(s) orally 2 times a day (10 Sep 2022 18:51)  aspirin 81 mg oral tablet: 1  orally once a day (06 Sep 2022 02:30)  carvedilol 12.5 mg oral tablet: 1 tab(s) orally 2 times a day (06 Sep 2022 02:30)  folic acid 1 mg oral tablet: 1 tab(s) orally once a day (06 Sep 2022 02:30)  furosemide 20 mg oral tablet: 1 tab(s) orally once a day (06 Sep 2022 02:30)  hydrALAZINE 50 mg oral tablet: 1 tab(s) orally 3 times a day (06 Sep 2022 02:30)  Januvia 100 mg oral tablet: 1 tab(s) orally once a day (06 Sep 2022 02:30)  metFORMIN 1000 mg oral tablet: 1 tab(s) orally 2 times a day (06 Sep 2022 02:30)  pravastatin 40 mg oral tablet: 1 tab(s) orally once a day (06 Sep 2022 02:30)  ramipril 5 mg oral tablet: 1 cap(s) orally once a day (06 Sep 2022 02:30)      MEDICATIONS  (STANDING):  apixaban 2.5 milliGRAM(s) Oral two times a day  aspirin  chewable 81 milliGRAM(s) Oral daily  carvedilol 12.5 milliGRAM(s) Oral every 12 hours  dextrose 5%. 1000 milliLiter(s) (50 mL/Hr) IV Continuous <Continuous>  dextrose 5%. 1000 milliLiter(s) (100 mL/Hr) IV Continuous <Continuous>  dextrose 50% Injectable 12.5 Gram(s) IV Push once  dextrose 50% Injectable 25 Gram(s) IV Push once  dextrose 50% Injectable 25 Gram(s) IV Push once  folic acid 1 milliGRAM(s) Oral daily  furosemide    Tablet 20 milliGRAM(s) Oral daily  glucagon  Injectable 1 milliGRAM(s) IntraMuscular once  hydrALAZINE 50 milliGRAM(s) Oral three times a day  influenza  Vaccine (HIGH DOSE) 0.7 milliLiter(s) IntraMuscular once  insulin glargine Injectable (LANTUS) 9 Unit(s) SubCutaneous at bedtime  insulin lispro (ADMELOG) corrective regimen sliding scale   SubCutaneous three times a day before meals  insulin lispro (ADMELOG) corrective regimen sliding scale   SubCutaneous at bedtime  insulin lispro Injectable (ADMELOG) 6 Unit(s) SubCutaneous three times a day before meals  lisinopril 20 milliGRAM(s) Oral daily  penicillin   G  potassium  IVPB      penicillin   G  potassium  IVPB 4 Million Unit(s) IV Intermittent every 4 hours    MEDICATIONS  (PRN):  acetaminophen     Tablet .. 650 milliGRAM(s) Oral every 6 hours PRN Temp greater or equal to 38C (100.4F), Mild Pain (1 - 3)  dextrose Oral Gel 15 Gram(s) Oral once PRN Blood Glucose LESS THAN 70 milliGRAM(s)/deciliter  melatonin 3 milliGRAM(s) Oral at bedtime PRN Insomnia         VITALS / I&O's  T(C): 36.6 (09-15-22 @ 05:42), Max: 36.9 (09-14-22 @ 12:30)  HR: 67 (09-15-22 @ 05:42) (61 - 89)  BP: 120/72 (09-15-22 @ 05:42) (106/60 - 162/68)  RR: 17 (09-15-22 @ 05:42) (17 - 18)  SpO2: 99% (09-15-22 @ 05:42) (99% - 100%)  I&O's Summary         PHYSICAL EXAM           LABS                        12.5   4.18  )-----------( 261      ( 14 Sep 2022 06:30 )             37.9     09-14    133<L>  |  97<L>  |  13  ----------------------------<  238<H>  4.2   |  22  |  0.93    Ca    9.2      14 Sep 2022 06:30  Phos  3.0     09-14  Mg     1.90     09-14      CAPILLARY BLOOD GLUCOSE      POCT Blood Glucose.: 183 mg/dL (14 Sep 2022 21:20)  POCT Blood Glucose.: 238 mg/dL (14 Sep 2022 17:32)  POCT Blood Glucose.: 276 mg/dL (14 Sep 2022 12:21)  POCT Blood Glucose.: 258 mg/dL (14 Sep 2022 08:39)                           RADIOLOGY & ADDITIONAL TESTS    Imaging Personally Reviewed:     Consultant(s) Notes Reviewed:     Care Discussed with Consultants/Other Providers:     Progress Note     Jackie Perdue MD, PhD  PGY-1 Medicine  Teams | [id number]    Patient is a 81y old  Male who presents with a chief complaint of Fevers of Unknown Origin, New L eye vision impairment (14 Sep 2022 11:39)          SUBJECTIVE / INTERVAL EVENTS  NAEON. Tele: movement artifact, a missed beat event, multiple Afib and irregular rate events  Patient doing well today, denies pain (headache, chest pain, abdominal pain, limb pain), fever, shortness of breath, or vomiting. Discussed plan for [place] and PICC line with patient and brother Brett at bedside with Lisette LEW translating; also discussed with daughter on phone. ***       MEDICATIONS    Home Medications:  apixaban 2.5 mg oral tablet: 1 tab(s) orally 2 times a day (10 Sep 2022 18:51)  aspirin 81 mg oral tablet: 1  orally once a day (06 Sep 2022 02:30)  carvedilol 12.5 mg oral tablet: 1 tab(s) orally 2 times a day (06 Sep 2022 02:30)  folic acid 1 mg oral tablet: 1 tab(s) orally once a day (06 Sep 2022 02:30)  furosemide 20 mg oral tablet: 1 tab(s) orally once a day (06 Sep 2022 02:30)  hydrALAZINE 50 mg oral tablet: 1 tab(s) orally 3 times a day (06 Sep 2022 02:30)  Januvia 100 mg oral tablet: 1 tab(s) orally once a day (06 Sep 2022 02:30)  metFORMIN 1000 mg oral tablet: 1 tab(s) orally 2 times a day (06 Sep 2022 02:30)  pravastatin 40 mg oral tablet: 1 tab(s) orally once a day (06 Sep 2022 02:30)  ramipril 5 mg oral tablet: 1 cap(s) orally once a day (06 Sep 2022 02:30)      MEDICATIONS  (STANDING):  apixaban 2.5 milliGRAM(s) Oral two times a day  aspirin  chewable 81 milliGRAM(s) Oral daily  carvedilol 12.5 milliGRAM(s) Oral every 12 hours  dextrose 5%. 1000 milliLiter(s) (50 mL/Hr) IV Continuous <Continuous>  dextrose 5%. 1000 milliLiter(s) (100 mL/Hr) IV Continuous <Continuous>  dextrose 50% Injectable 12.5 Gram(s) IV Push once  dextrose 50% Injectable 25 Gram(s) IV Push once  dextrose 50% Injectable 25 Gram(s) IV Push once  folic acid 1 milliGRAM(s) Oral daily  furosemide    Tablet 20 milliGRAM(s) Oral daily  glucagon  Injectable 1 milliGRAM(s) IntraMuscular once  hydrALAZINE 50 milliGRAM(s) Oral three times a day  influenza  Vaccine (HIGH DOSE) 0.7 milliLiter(s) IntraMuscular once  insulin glargine Injectable (LANTUS) 9 Unit(s) SubCutaneous at bedtime  insulin lispro (ADMELOG) corrective regimen sliding scale   SubCutaneous three times a day before meals  insulin lispro (ADMELOG) corrective regimen sliding scale   SubCutaneous at bedtime  insulin lispro Injectable (ADMELOG) 6 Unit(s) SubCutaneous three times a day before meals  lisinopril 20 milliGRAM(s) Oral daily  penicillin   G  potassium  IVPB      penicillin   G  potassium  IVPB 4 Million Unit(s) IV Intermittent every 4 hours    MEDICATIONS  (PRN):  acetaminophen     Tablet .. 650 milliGRAM(s) Oral every 6 hours PRN Temp greater or equal to 38C (100.4F), Mild Pain (1 - 3)  dextrose Oral Gel 15 Gram(s) Oral once PRN Blood Glucose LESS THAN 70 milliGRAM(s)/deciliter  melatonin 3 milliGRAM(s) Oral at bedtime PRN Insomnia         VITALS / I&O's  T(C): 36.6 (09-15-22 @ 05:42), Max: 36.9 (09-14-22 @ 12:30)  HR: 67 (09-15-22 @ 05:42) (61 - 89)  BP: 120/72 (09-15-22 @ 05:42) (106/60 - 162/68)  RR: 17 (09-15-22 @ 05:42) (17 - 18)  SpO2: 99% (09-15-22 @ 05:42) (99% - 100%)  I&O's Summary         PHYSICAL EXAM           LABS                        12.5   4.18  )-----------( 261      ( 14 Sep 2022 06:30 )             37.9     09-14    133<L>  |  97<L>  |  13  ----------------------------<  238<H>  4.2   |  22  |  0.93    Ca    9.2      14 Sep 2022 06:30  Phos  3.0     09-14  Mg     1.90     09-14      CAPILLARY BLOOD GLUCOSE      POCT Blood Glucose.: 183 mg/dL (14 Sep 2022 21:20)  POCT Blood Glucose.: 238 mg/dL (14 Sep 2022 17:32)  POCT Blood Glucose.: 276 mg/dL (14 Sep 2022 12:21)  POCT Blood Glucose.: 258 mg/dL (14 Sep 2022 08:39)                           RADIOLOGY & ADDITIONAL TESTS    Imaging Personally Reviewed:     Consultant(s) Notes Reviewed:     Care Discussed with Consultants/Other Providers:     Progress Note     Jackie Perdue MD, PhD  PGY-1 Medicine  Teams | [id number]    Patient is a 81y old  Male who presents with a chief complaint of Fevers of Unknown Origin, New L eye vision impairment (14 Sep 2022 11:39)          SUBJECTIVE / INTERVAL EVENTS  NAEON. Tele: movement artifact, a missed beat event, multiple Afib and irregular rate events  Patient doing well today, denies pain (headache, chest pain, abdominal pain, limb pain), fever, shortness of breath, or vomiting. Discussed plan for [place] and PICC line with patient and brother Brett at bedside with Lisette LEW translating; also discussed with daughter on phone. Questions answered; reassurance provided.       MEDICATIONS    Home Medications:  apixaban 2.5 mg oral tablet: 1 tab(s) orally 2 times a day (10 Sep 2022 18:51)  aspirin 81 mg oral tablet: 1  orally once a day (06 Sep 2022 02:30)  carvedilol 12.5 mg oral tablet: 1 tab(s) orally 2 times a day (06 Sep 2022 02:30)  folic acid 1 mg oral tablet: 1 tab(s) orally once a day (06 Sep 2022 02:30)  furosemide 20 mg oral tablet: 1 tab(s) orally once a day (06 Sep 2022 02:30)  hydrALAZINE 50 mg oral tablet: 1 tab(s) orally 3 times a day (06 Sep 2022 02:30)  Januvia 100 mg oral tablet: 1 tab(s) orally once a day (06 Sep 2022 02:30)  metFORMIN 1000 mg oral tablet: 1 tab(s) orally 2 times a day (06 Sep 2022 02:30)  pravastatin 40 mg oral tablet: 1 tab(s) orally once a day (06 Sep 2022 02:30)  ramipril 5 mg oral tablet: 1 cap(s) orally once a day (06 Sep 2022 02:30)      MEDICATIONS  (STANDING):  apixaban 2.5 milliGRAM(s) Oral two times a day  aspirin  chewable 81 milliGRAM(s) Oral daily  carvedilol 12.5 milliGRAM(s) Oral every 12 hours  dextrose 5%. 1000 milliLiter(s) (50 mL/Hr) IV Continuous <Continuous>  dextrose 5%. 1000 milliLiter(s) (100 mL/Hr) IV Continuous <Continuous>  dextrose 50% Injectable 12.5 Gram(s) IV Push once  dextrose 50% Injectable 25 Gram(s) IV Push once  dextrose 50% Injectable 25 Gram(s) IV Push once  folic acid 1 milliGRAM(s) Oral daily  furosemide    Tablet 20 milliGRAM(s) Oral daily  glucagon  Injectable 1 milliGRAM(s) IntraMuscular once  hydrALAZINE 50 milliGRAM(s) Oral three times a day  influenza  Vaccine (HIGH DOSE) 0.7 milliLiter(s) IntraMuscular once  insulin glargine Injectable (LANTUS) 9 Unit(s) SubCutaneous at bedtime  insulin lispro (ADMELOG) corrective regimen sliding scale   SubCutaneous three times a day before meals  insulin lispro (ADMELOG) corrective regimen sliding scale   SubCutaneous at bedtime  insulin lispro Injectable (ADMELOG) 6 Unit(s) SubCutaneous three times a day before meals  lisinopril 20 milliGRAM(s) Oral daily  penicillin   G  potassium  IVPB      penicillin   G  potassium  IVPB 4 Million Unit(s) IV Intermittent every 4 hours    MEDICATIONS  (PRN):  acetaminophen     Tablet .. 650 milliGRAM(s) Oral every 6 hours PRN Temp greater or equal to 38C (100.4F), Mild Pain (1 - 3)  dextrose Oral Gel 15 Gram(s) Oral once PRN Blood Glucose LESS THAN 70 milliGRAM(s)/deciliter  melatonin 3 milliGRAM(s) Oral at bedtime PRN Insomnia         VITALS / I&O's  T(C): 36.6 (09-15-22 @ 05:42), Max: 36.9 (09-14-22 @ 12:30)  HR: 67 (09-15-22 @ 05:42) (61 - 89)  BP: 120/72 (09-15-22 @ 05:42) (106/60 - 162/68)  RR: 17 (09-15-22 @ 05:42) (17 - 18)  SpO2: 99% (09-15-22 @ 05:42) (99% - 100%)  I&O's Summary         PHYSICAL EXAM  General: Sitting in bed in no acute distress.  HEENT: Normocephalic, atraumatic. Extraocular movements grossly intact. No nasal discharge.  Neuro: Alert and oriented. No facial asymmetry or dysarthria. Moving all extremities.  CV: Regular rate and rhythm. S1/S2. No murmurs, rubs, or gallops appreciated. No distal edema.  Respiratory: Lung fields clear bilaterally. No crackles or wheezes appreciated.  Abdomen: Soft; nontender to palpation, all quadrants; nondistended. No rebound or guarding.  : Suprapubic region nontender.  Skin: No rashes or bruising of face, forearms, or calves bilaterally. Back nodule possibly flatter than on admission.  Psych: Mood and affect appropriate.          LABS                        12.5   4.18  )-----------( 261      ( 14 Sep 2022 06:30 )             37.9     09-14    133<L>  |  97<L>  |  13  ----------------------------<  238<H>  4.2   |  22  |  0.93    Ca    9.2      14 Sep 2022 06:30  Phos  3.0     09-14  Mg     1.90     09-14      CAPILLARY BLOOD GLUCOSE      POCT Blood Glucose.: 183 mg/dL (14 Sep 2022 21:20)  POCT Blood Glucose.: 238 mg/dL (14 Sep 2022 17:32)  POCT Blood Glucose.: 276 mg/dL (14 Sep 2022 12:21)  POCT Blood Glucose.: 258 mg/dL (14 Sep 2022 08:39)           RADIOLOGY & ADDITIONAL TESTS      Consultant(s) Notes Reviewed.    Care Discussed with Consultants/Other Providers.

## 2022-09-15 NOTE — PROGRESS NOTE ADULT - ASSESSMENT
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 Labs and history concerning for neurosyphilis; now on empiric treatment and pending dispo on IV penicillin G. 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 Labs and history concerning for neurosyphilis; now on empiric treatment and pending PICC for dispo to [facility] on IV penicillin G.

## 2022-09-16 ENCOUNTER — TRANSCRIPTION ENCOUNTER (OUTPATIENT)
Age: 82
End: 2022-09-16

## 2022-09-16 VITALS
OXYGEN SATURATION: 100 % | HEART RATE: 67 BPM | TEMPERATURE: 99 F | SYSTOLIC BLOOD PRESSURE: 154 MMHG | RESPIRATION RATE: 18 BRPM | DIASTOLIC BLOOD PRESSURE: 86 MMHG

## 2022-09-16 LAB
ALBUMIN SERPL ELPH-MCNC: 3.7 G/DL — SIGNIFICANT CHANGE UP (ref 3.3–5)
ALP SERPL-CCNC: 47 U/L — SIGNIFICANT CHANGE UP (ref 40–120)
ALT FLD-CCNC: 21 U/L — SIGNIFICANT CHANGE UP (ref 4–41)
ANION GAP SERPL CALC-SCNC: 10 MMOL/L — SIGNIFICANT CHANGE UP (ref 7–14)
AST SERPL-CCNC: 22 U/L — SIGNIFICANT CHANGE UP (ref 4–40)
BILIRUB SERPL-MCNC: 0.8 MG/DL — SIGNIFICANT CHANGE UP (ref 0.2–1.2)
BUN SERPL-MCNC: 15 MG/DL — SIGNIFICANT CHANGE UP (ref 7–23)
CALCIUM SERPL-MCNC: 9.3 MG/DL — SIGNIFICANT CHANGE UP (ref 8.4–10.5)
CHLORIDE SERPL-SCNC: 98 MMOL/L — SIGNIFICANT CHANGE UP (ref 98–107)
CO2 SERPL-SCNC: 25 MMOL/L — SIGNIFICANT CHANGE UP (ref 22–31)
CREAT SERPL-MCNC: 0.97 MG/DL — SIGNIFICANT CHANGE UP (ref 0.5–1.3)
EGFR: 78 ML/MIN/1.73M2 — SIGNIFICANT CHANGE UP
GLUCOSE BLDC GLUCOMTR-MCNC: 230 MG/DL — HIGH (ref 70–99)
GLUCOSE BLDC GLUCOMTR-MCNC: 313 MG/DL — HIGH (ref 70–99)
GLUCOSE SERPL-MCNC: 162 MG/DL — HIGH (ref 70–99)
HCT VFR BLD CALC: 35.7 % — LOW (ref 39–50)
HGB BLD-MCNC: 12 G/DL — LOW (ref 13–17)
INR BLD: 1.43 RATIO — HIGH (ref 0.88–1.16)
MAGNESIUM SERPL-MCNC: 1.9 MG/DL — SIGNIFICANT CHANGE UP (ref 1.6–2.6)
MCHC RBC-ENTMCNC: 26.5 PG — LOW (ref 27–34)
MCHC RBC-ENTMCNC: 33.6 GM/DL — SIGNIFICANT CHANGE UP (ref 32–36)
MCV RBC AUTO: 79 FL — LOW (ref 80–100)
NRBC # BLD: 0 /100 WBCS — SIGNIFICANT CHANGE UP (ref 0–0)
NRBC # FLD: 0 K/UL — SIGNIFICANT CHANGE UP (ref 0–0)
PHOSPHATE SERPL-MCNC: 3.9 MG/DL — SIGNIFICANT CHANGE UP (ref 2.5–4.5)
PLATELET # BLD AUTO: 255 K/UL — SIGNIFICANT CHANGE UP (ref 150–400)
POTASSIUM SERPL-MCNC: 3.9 MMOL/L — SIGNIFICANT CHANGE UP (ref 3.5–5.3)
POTASSIUM SERPL-SCNC: 3.9 MMOL/L — SIGNIFICANT CHANGE UP (ref 3.5–5.3)
PROT SERPL-MCNC: 6.3 G/DL — SIGNIFICANT CHANGE UP (ref 6–8.3)
PROTHROM AB SERPL-ACNC: 16.7 SEC — HIGH (ref 10.5–13.4)
RBC # BLD: 4.52 M/UL — SIGNIFICANT CHANGE UP (ref 4.2–5.8)
RBC # FLD: 17.1 % — HIGH (ref 10.3–14.5)
SODIUM SERPL-SCNC: 133 MMOL/L — LOW (ref 135–145)
WBC # BLD: 4.64 K/UL — SIGNIFICANT CHANGE UP (ref 3.8–10.5)
WBC # FLD AUTO: 4.64 K/UL — SIGNIFICANT CHANGE UP (ref 3.8–10.5)

## 2022-09-16 PROCEDURE — 99232 SBSQ HOSP IP/OBS MODERATE 35: CPT

## 2022-09-16 PROCEDURE — 99239 HOSP IP/OBS DSCHRG MGMT >30: CPT | Mod: GC

## 2022-09-16 RX ORDER — PENICILLIN G POTASSIUM 5000000 [IU]/1
4000000 POWDER, FOR SOLUTION INTRAMUSCULAR; INTRAPLEURAL; INTRATHECAL; INTRAVENOUS
Qty: 2 | Refills: 0
Start: 2022-09-16 | End: 2022-09-25

## 2022-09-16 RX ORDER — PENICILLIN G POTASSIUM 5000000 [IU]/1
4000000 POWDER, FOR SOLUTION INTRAMUSCULAR; INTRAPLEURAL; INTRATHECAL; INTRAVENOUS
Qty: 2 | Refills: 0
Start: 2022-09-16

## 2022-09-16 RX ADMIN — PENICILLIN G POTASSIUM 100 MILLION UNIT(S): 5000000 POWDER, FOR SOLUTION INTRAMUSCULAR; INTRAPLEURAL; INTRATHECAL; INTRAVENOUS at 05:11

## 2022-09-16 RX ADMIN — LISINOPRIL 20 MILLIGRAM(S): 2.5 TABLET ORAL at 05:11

## 2022-09-16 RX ADMIN — Medication 81 MILLIGRAM(S): at 12:09

## 2022-09-16 RX ADMIN — Medication 1 MILLIGRAM(S): at 12:09

## 2022-09-16 RX ADMIN — PENICILLIN G POTASSIUM 100 MILLION UNIT(S): 5000000 POWDER, FOR SOLUTION INTRAMUSCULAR; INTRAPLEURAL; INTRATHECAL; INTRAVENOUS at 16:11

## 2022-09-16 RX ADMIN — Medication 8 UNIT(S): at 13:15

## 2022-09-16 RX ADMIN — Medication 50 MILLIGRAM(S): at 13:16

## 2022-09-16 RX ADMIN — Medication 50 MILLIGRAM(S): at 05:11

## 2022-09-16 RX ADMIN — CHLORHEXIDINE GLUCONATE 1 APPLICATION(S): 213 SOLUTION TOPICAL at 13:14

## 2022-09-16 RX ADMIN — Medication 8 UNIT(S): at 09:10

## 2022-09-16 RX ADMIN — Medication 2: at 09:10

## 2022-09-16 RX ADMIN — APIXABAN 2.5 MILLIGRAM(S): 2.5 TABLET, FILM COATED ORAL at 05:11

## 2022-09-16 RX ADMIN — PENICILLIN G POTASSIUM 100 MILLION UNIT(S): 5000000 POWDER, FOR SOLUTION INTRAMUSCULAR; INTRAPLEURAL; INTRATHECAL; INTRAVENOUS at 09:11

## 2022-09-16 RX ADMIN — CARVEDILOL PHOSPHATE 12.5 MILLIGRAM(S): 80 CAPSULE, EXTENDED RELEASE ORAL at 05:11

## 2022-09-16 RX ADMIN — PENICILLIN G POTASSIUM 100 MILLION UNIT(S): 5000000 POWDER, FOR SOLUTION INTRAMUSCULAR; INTRAPLEURAL; INTRATHECAL; INTRAVENOUS at 01:13

## 2022-09-16 RX ADMIN — Medication 4: at 13:14

## 2022-09-16 RX ADMIN — Medication 20 MILLIGRAM(S): at 05:11

## 2022-09-16 NOTE — DISCHARGE NOTE NURSING/CASE MANAGEMENT/SOCIAL WORK - NSDCPEFALRISK_GEN_ALL_CORE
For information on Fall & Injury Prevention, visit: [email or web address] OR  [email or web address] OR  https://www.cdc.gov/steadi/patient.html

## 2022-09-16 NOTE — DISCHARGE NOTE NURSING/CASE MANAGEMENT/SOCIAL WORK - NSTRANSFERBELONGINGSDISPO_GEN_A_NUR
There are no preventive care reminders to display for this patient.    Patient is up to date, no discussion needed.             with patient

## 2022-09-16 NOTE — PROGRESS NOTE ADULT - REASON FOR ADMISSION
Fevers of Unknown Origin, New L eye vision impairment
Fevers, New L eye vision impairment
Fevers of Unknown Origin, New L eye vision impairment
Fevers, New L eye vision impairment
Fevers of Unknown Origin, New L eye vision impairment
Fever, New L eye vision impairment
Fevers, New L eye vision impairment
Fevers of Unknown Origin, New L eye vision impairment
Fevers of Unknown Origin, New L eye vision impairment
Fever, New L eye vision impairment
Fevers, New L eye vision impairment

## 2022-09-16 NOTE — PROGRESS NOTE ADULT - PROBLEM SELECTOR PLAN 8
Diet: regular  DVT ppx: Eliquis  Dispo: pending arrangements for IV medication  PT: no needs on dispo Diet: regular  DVT ppx: Eliquis  Dispo: to [facility] for IV medication  PT: no needs on dispo

## 2022-09-16 NOTE — PROGRESS NOTE ADULT - SUBJECTIVE AND OBJECTIVE BOX
Progress Note     == draft in progress ==  Jackie Perdue MD, PhD  PGY-1 Medicine  Teams | [id number]    Patient is a 81y old  Male who presents with a chief complaint of Fevers of Unknown Origin, New L eye vision impairment (15 Sep 2022 10:32)          SUBJECTIVE / INTERVAL EVENTS         MEDICATIONS    Home Medications:  apixaban 2.5 mg oral tablet: 1 tab(s) orally 2 times a day (10 Sep 2022 18:51)  aspirin 81 mg oral tablet: 1  orally once a day (06 Sep 2022 02:30)  carvedilol 12.5 mg oral tablet: 1 tab(s) orally 2 times a day (06 Sep 2022 02:30)  folic acid 1 mg oral tablet: 1 tab(s) orally once a day (06 Sep 2022 02:30)  furosemide 20 mg oral tablet: 1 tab(s) orally once a day (06 Sep 2022 02:30)  hydrALAZINE 50 mg oral tablet: 1 tab(s) orally 3 times a day (06 Sep 2022 02:30)  Januvia 100 mg oral tablet: 1 tab(s) orally once a day (06 Sep 2022 02:30)  metFORMIN 1000 mg oral tablet: 1 tab(s) orally 2 times a day (06 Sep 2022 02:30)  pravastatin 40 mg oral tablet: 1 tab(s) orally once a day (06 Sep 2022 02:30)  ramipril 5 mg oral tablet: 1 cap(s) orally once a day (06 Sep 2022 02:30)      MEDICATIONS  (STANDING):  apixaban 2.5 milliGRAM(s) Oral two times a day  aspirin  chewable 81 milliGRAM(s) Oral daily  carvedilol 12.5 milliGRAM(s) Oral every 12 hours  chlorhexidine 4% Liquid 1 Application(s) Topical <User Schedule>  dextrose 5%. 1000 milliLiter(s) (100 mL/Hr) IV Continuous <Continuous>  dextrose 5%. 1000 milliLiter(s) (50 mL/Hr) IV Continuous <Continuous>  dextrose 50% Injectable 25 Gram(s) IV Push once  dextrose 50% Injectable 12.5 Gram(s) IV Push once  dextrose 50% Injectable 25 Gram(s) IV Push once  folic acid 1 milliGRAM(s) Oral daily  furosemide    Tablet 20 milliGRAM(s) Oral daily  glucagon  Injectable 1 milliGRAM(s) IntraMuscular once  hydrALAZINE 50 milliGRAM(s) Oral three times a day  influenza  Vaccine (HIGH DOSE) 0.7 milliLiter(s) IntraMuscular once  insulin glargine Injectable (LANTUS) 13 Unit(s) SubCutaneous at bedtime  insulin lispro (ADMELOG) corrective regimen sliding scale   SubCutaneous three times a day before meals  insulin lispro (ADMELOG) corrective regimen sliding scale   SubCutaneous at bedtime  insulin lispro Injectable (ADMELOG) 8 Unit(s) SubCutaneous three times a day before meals  lisinopril 20 milliGRAM(s) Oral daily  penicillin   G  potassium  IVPB      penicillin   G  potassium  IVPB 4 Million Unit(s) IV Intermittent every 4 hours    MEDICATIONS  (PRN):  acetaminophen     Tablet .. 650 milliGRAM(s) Oral every 6 hours PRN Temp greater or equal to 38C (100.4F), Mild Pain (1 - 3)  dextrose Oral Gel 15 Gram(s) Oral once PRN Blood Glucose LESS THAN 70 milliGRAM(s)/deciliter  melatonin 3 milliGRAM(s) Oral at bedtime PRN Insomnia         VITALS / I&O's  T(C): 36.9 (09-16-22 @ 05:11), Max: 37.2 (09-15-22 @ 20:43)  HR: 79 (09-16-22 @ 05:11) (66 - 79)  BP: 148/82 (09-16-22 @ 05:11) (127/69 - 155/84)  RR: 17 (09-16-22 @ 05:11) (16 - 20)  SpO2: 100% (09-16-22 @ 05:11) (95% - 100%)  I&O's Summary         PHYSICAL EXAM           LABS                        12.4   4.65  )-----------( 281      ( 15 Sep 2022 05:40 )             36.8     09-15    136  |  99  |  15  ----------------------------<  219<H>  4.2   |  28  |  1.02    Ca    9.5      15 Sep 2022 05:40  Phos  3.8     09-15  Mg     2.00     09-15    TPro  6.6  /  Alb  3.8  /  TBili  0.8  /  DBili  x   /  AST  22  /  ALT  23  /  AlkPhos  49  09-15    CAPILLARY BLOOD GLUCOSE      POCT Blood Glucose.: 198 mg/dL (15 Sep 2022 21:40)  POCT Blood Glucose.: 202 mg/dL (15 Sep 2022 21:21)  POCT Blood Glucose.: 171 mg/dL (15 Sep 2022 17:49)  POCT Blood Glucose.: 290 mg/dL (15 Sep 2022 12:49)  POCT Blood Glucose.: 335 mg/dL (15 Sep 2022 08:53)      LIVER FUNCTIONS - ( 15 Sep 2022 05:40 )  Alb: 3.8 g/dL / Pro: 6.6 g/dL / ALK PHOS: 49 U/L / ALT: 23 U/L / AST: 22 U/L / GGT: x                              RADIOLOGY & ADDITIONAL TESTS    Imaging Personally Reviewed:     Consultant(s) Notes Reviewed:     Care Discussed with Consultants/Other Providers:     Progress Note     Jackie Perdue MD, PhD  PGY-1 Medicine  Teams | [id number]    Patient is a 81y old  Male who presents with a chief complaint of Fevers of Unknown Origin, New L eye vision impairment (15 Sep 2022 10:32)          SUBJECTIVE / INTERVAL EVENTS  NAEON. PICC line placed. Tele: AFib/flutter 60s-80s.  Patient doing okay today. Seen with Lisette LEW translating. Patient denied new pain -- headache, chest pain, shortness of breath, abdominal pain, limb pain -- or new weakness, swelling, numbness, or tingling of the limbs. Patient denied dysuria or hematuria. He endorsed a BM. Questions answered; reassurance provided.        MEDICATIONS    Home Medications:  apixaban 2.5 mg oral tablet: 1 tab(s) orally 2 times a day (10 Sep 2022 18:51)  aspirin 81 mg oral tablet: 1  orally once a day (06 Sep 2022 02:30)  carvedilol 12.5 mg oral tablet: 1 tab(s) orally 2 times a day (06 Sep 2022 02:30)  folic acid 1 mg oral tablet: 1 tab(s) orally once a day (06 Sep 2022 02:30)  furosemide 20 mg oral tablet: 1 tab(s) orally once a day (06 Sep 2022 02:30)  hydrALAZINE 50 mg oral tablet: 1 tab(s) orally 3 times a day (06 Sep 2022 02:30)  Januvia 100 mg oral tablet: 1 tab(s) orally once a day (06 Sep 2022 02:30)  metFORMIN 1000 mg oral tablet: 1 tab(s) orally 2 times a day (06 Sep 2022 02:30)  pravastatin 40 mg oral tablet: 1 tab(s) orally once a day (06 Sep 2022 02:30)  ramipril 5 mg oral tablet: 1 cap(s) orally once a day (06 Sep 2022 02:30)      MEDICATIONS  (STANDING):  apixaban 2.5 milliGRAM(s) Oral two times a day  aspirin  chewable 81 milliGRAM(s) Oral daily  carvedilol 12.5 milliGRAM(s) Oral every 12 hours  chlorhexidine 4% Liquid 1 Application(s) Topical <User Schedule>  dextrose 5%. 1000 milliLiter(s) (100 mL/Hr) IV Continuous <Continuous>  dextrose 5%. 1000 milliLiter(s) (50 mL/Hr) IV Continuous <Continuous>  dextrose 50% Injectable 25 Gram(s) IV Push once  dextrose 50% Injectable 12.5 Gram(s) IV Push once  dextrose 50% Injectable 25 Gram(s) IV Push once  folic acid 1 milliGRAM(s) Oral daily  furosemide    Tablet 20 milliGRAM(s) Oral daily  glucagon  Injectable 1 milliGRAM(s) IntraMuscular once  hydrALAZINE 50 milliGRAM(s) Oral three times a day  influenza  Vaccine (HIGH DOSE) 0.7 milliLiter(s) IntraMuscular once  insulin glargine Injectable (LANTUS) 13 Unit(s) SubCutaneous at bedtime  insulin lispro (ADMELOG) corrective regimen sliding scale   SubCutaneous three times a day before meals  insulin lispro (ADMELOG) corrective regimen sliding scale   SubCutaneous at bedtime  insulin lispro Injectable (ADMELOG) 8 Unit(s) SubCutaneous three times a day before meals  lisinopril 20 milliGRAM(s) Oral daily  penicillin   G  potassium  IVPB      penicillin   G  potassium  IVPB 4 Million Unit(s) IV Intermittent every 4 hours    MEDICATIONS  (PRN):  acetaminophen     Tablet .. 650 milliGRAM(s) Oral every 6 hours PRN Temp greater or equal to 38C (100.4F), Mild Pain (1 - 3)  dextrose Oral Gel 15 Gram(s) Oral once PRN Blood Glucose LESS THAN 70 milliGRAM(s)/deciliter  melatonin 3 milliGRAM(s) Oral at bedtime PRN Insomnia         VITALS / I&O's  T(C): 36.9 (09-16-22 @ 05:11), Max: 37.2 (09-15-22 @ 20:43)  HR: 79 (09-16-22 @ 05:11) (66 - 79)  BP: 148/82 (09-16-22 @ 05:11) (127/69 - 155/84)  RR: 17 (09-16-22 @ 05:11) (16 - 20)  SpO2: 100% (09-16-22 @ 05:11) (95% - 100%)  I&O's Summary         PHYSICAL EXAM  General: Sitting in bed in no acute distress.  HEENT: Normocephalic, atraumatic. Extraocular movements grossly intact. No nasal discharge.  Neuro: Alert and oriented. No facial asymmetry or dysarthria. Moving all extremities.  CV: Regular rate and rhythm. S1/S2. No murmurs, rubs, or gallops appreciated. No distal edema.  Respiratory: Lung fields clear bilaterally. No crackles or wheezes appreciated.  Abdomen: Soft; nontender to palpation, all quadrants; nondistended. No rebound or guarding.  : Suprapubic region nontender.  Skin: No rashes or bruising of face, forearms, or calves bilaterally. Back nodule roughly 1in x 1in x 0.25 in  Psych: Mood and affect appropriate.          LABS                        12.4   4.65  )-----------( 281      ( 15 Sep 2022 05:40 )             36.8     09-15    136  |  99  |  15  ----------------------------<  219<H>  4.2   |  28  |  1.02    Ca    9.5      15 Sep 2022 05:40  Phos  3.8     09-15  Mg     2.00     09-15    TPro  6.6  /  Alb  3.8  /  TBili  0.8  /  DBili  x   /  AST  22  /  ALT  23  /  AlkPhos  49  09-15    CAPILLARY BLOOD GLUCOSE      POCT Blood Glucose.: 198 mg/dL (15 Sep 2022 21:40)  POCT Blood Glucose.: 202 mg/dL (15 Sep 2022 21:21)  POCT Blood Glucose.: 171 mg/dL (15 Sep 2022 17:49)  POCT Blood Glucose.: 290 mg/dL (15 Sep 2022 12:49)  POCT Blood Glucose.: 335 mg/dL (15 Sep 2022 08:53)      LIVER FUNCTIONS - ( 15 Sep 2022 05:40 )  Alb: 3.8 g/dL / Pro: 6.6 g/dL / ALK PHOS: 49 U/L / ALT: 23 U/L / AST: 22 U/L / GGT: x               Consultant(s) Notes Reviewed.

## 2022-09-16 NOTE — DISCHARGE NOTE NURSING/CASE MANAGEMENT/SOCIAL WORK - PATIENT PORTAL LINK FT
You can access the FollowMyHealth Patient Portal offered by [facility] by registering at the following website: [email or web address]. By joining [employer]’s FollowMyHealth portal, you will also be able to view your health information using other applications (apps) compatible with our system.

## 2022-09-16 NOTE — PROGRESS NOTE ADULT - ATTENDING COMMENTS
Briefly, patient is an 80 y/o M with paroxysmal afib (not AC previously, however restarted during this admission) HTN, HLD, DM2, NICM, BPH, pericardial effusion (2017), on ASA presenting for fevers of unknown etiology and new L eye vision impairment.    -pt clinically stable  -outpt ophtho f/u  -treating empirically for neurosyphilis  -stable for d/c to [facility]  d/c time 36 min coordinating care

## 2022-09-16 NOTE — PROGRESS NOTE ADULT - ASSESSMENT
80 y/o M PMHx HTN, HLD, DM2, NICM, and BPH, presented with generalized weakness, fever, and L eye blurry vision. Evaluated by Opthalmology, noted to have dislocated lens on exam along with possible vitreous hemorrhage. Syphilis screen sent for uveitis workup, FTA+ with RPR <1:1. No known history of syphilis infection or treatment.       #Positive FTA - DDx includes previously treated syphilis infection, untreated remote syphilis infection, vs false-positive test. Would expect titers to be positive in primary or secondary syphilis however RPR can normalize without treatment over time  Due to unexplained fevers, ocular symptoms and a positive FTA - will treat for neurosyphilis empirically     Recommendations:  Complete 14 day course of 24 million units daily (end date: 9/25/22)  Plan for discharge to rehab - continue 4 million units q4 hours  Discharge plan per primary team  Follow fever curve and WBC count      Jordan Serna MD  Available on [employer] Teams  Before 9AM or after 5PM: [phone]

## 2022-09-16 NOTE — PROGRESS NOTE ADULT - SUBJECTIVE AND OBJECTIVE BOX
Follow Up:  fever, visual disturbance, positive syphilis screen    Interval History/ROS:  Overnight, no acute events    Patient appears comfortable. denies any pain or discomfort. possible discharge from hospital today.    Allergies  No Known Allergies        ANTIMICROBIALS:  penicillin   G  potassium  IVPB    penicillin   G  potassium  IVPB 4 every 4 hours      OTHER MEDS:  MEDICATIONS  (STANDING):  acetaminophen     Tablet .. 650 every 6 hours PRN  apixaban 2.5 two times a day  aspirin  chewable 81 daily  carvedilol 12.5 every 12 hours  dextrose 50% Injectable 25 once  dextrose 50% Injectable 12.5 once  dextrose 50% Injectable 25 once  dextrose Oral Gel 15 once PRN  furosemide    Tablet 20 daily  glucagon  Injectable 1 once  hydrALAZINE 50 three times a day  influenza  Vaccine (HIGH DOSE) 0.7 once  insulin glargine Injectable (LANTUS) 13 at bedtime  insulin lispro (ADMELOG) corrective regimen sliding scale  at bedtime  insulin lispro (ADMELOG) corrective regimen sliding scale  three times a day before meals  insulin lispro Injectable (ADMELOG) 8 three times a day before meals  lisinopril 20 daily  melatonin 3 at bedtime PRN      Vital Signs Last 24 Hrs  T(C): 36.6 (16 Sep 2022 12:55), Max: 37.2 (15 Sep 2022 20:43)  T(F): 97.9 (16 Sep 2022 12:55), Max: 98.9 (15 Sep 2022 20:43)  HR: 68 (16 Sep 2022 12:55) (66 - 79)  BP: 141/77 (16 Sep 2022 12:55) (129/67 - 155/84)  BP(mean): 101 (15 Sep 2022 16:53) (101 - 101)  RR: 17 (16 Sep 2022 12:55) (16 - 20)  SpO2: 100% (16 Sep 2022 12:55) (95% - 100%)    Parameters below as of 16 Sep 2022 12:55  Patient On (Oxygen Delivery Method): room air        PHYSICAL EXAM:  Constitutional: non-toxic, no distress  HEAD/EYES: anicteric, no conjunctival injection  Cardiovascular:   +S1/S2  Respiratory:  clear BS bilaterally  Musculoskeletal:  no synovitis, normal ROM  Neurologic: awake and alert,  no focal findings                                  12.0   4.64  )-----------( 255      ( 16 Sep 2022 05:27 )             35.7       09-16    133<L>  |  98  |  15  ----------------------------<  162<H>  3.9   |  25  |  0.97    Ca    9.3      16 Sep 2022 05:27  Phos  3.9     09-16  Mg     1.90     09-16    TPro  6.3  /  Alb  3.7  /  TBili  0.8  /  DBili  x   /  AST  22  /  ALT  21  /  AlkPhos  47  09-16          MICROBIOLOGY:  v          Toxoplasma IgG Screen: 25.1 IU/mL (09-08-22 @ 11:08)          RADIOLOGY:

## 2022-09-16 NOTE — PROGRESS NOTE ADULT - ASSESSMENT
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 Labs and history concerning for neurosyphilis; now on empiric treatment and pending PICC for dispo to [facility] on IV penicillin G. 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 Labs and history concerning for neurosyphilis; now on empiric treatment and medically cleared for dispo to [facility] on IV penicillin G.

## 2022-09-16 NOTE — DISCHARGE NOTE NURSING/CASE MANAGEMENT/SOCIAL WORK - NSDCPEELIQUISCOMP_GEN_ALL_CORE
Head atraumatic, normal cephalic shape. Apixaban/Eliquis is used to treat and prevent blood clots. If you are not able to swallow the tablets whole, they may be crushed and mixed in water, apple juice, or applesauce and promptly taken within four hours. Never skip a dose of Apixaban/Eliquis. If you forget to take your Apixaban/Eliquis, take a dose as soon as you remember. If it is almost time for your next Apixaban/Eliquis dose, wait until then and take a regular dose. DO NOT take an extra pill to ‘catch up’.  NEVER TAKE A DOUBLE DOSE. Notify your doctor that you missed a dose. Take Apixaban/Eliquis at the same time each morning and evening. Apixaban/Eliquis may be taken with other medication or food.

## 2022-09-16 NOTE — PROGRESS NOTE ADULT - PROVIDER SPECIALTY LIST ADULT
Internal Medicine
Internal Medicine
Ophthalmology
Infectious Disease
Infectious Disease
Ophthalmology
Infectious Disease
Ophthalmology
Infectious Disease
Internal Medicine

## 2022-09-16 NOTE — DISCHARGE NOTE NURSING/CASE MANAGEMENT/SOCIAL WORK - NSDCFUADDAPPT_GEN_ALL_CORE_FT
- Please follow up with your primary care doctor (Dr. Medina) in 1-2 week for an exam and to discuss test results.  - Please follow up with an ophthalmologist in 1-2 weeks about your dislocated lens. You can go to your usual ophthalmologist or make an appointment with the [facility] Department of Ophthalmology, [address] [place] [address]. [address], [place] [address], [phone]  - You may follow up with a neurologist at General Neurology at [address], [place]. [address]. [phone].

## 2022-09-16 NOTE — PROGRESS NOTE ADULT - PROBLEM SELECTOR PROBLEM 2
Blurry vision, left eye

## 2022-09-19 ENCOUNTER — APPOINTMENT (OUTPATIENT)
Dept: UROLOGY | Facility: CLINIC | Age: 82
End: 2022-09-19

## 2022-09-19 DIAGNOSIS — D64.9 ANEMIA, UNSPECIFIED: ICD-10-CM

## 2022-09-19 DIAGNOSIS — A41.9 SEPSIS, UNSPECIFIED ORGANISM: ICD-10-CM

## 2022-09-19 DIAGNOSIS — Z87.898 PERSONAL HISTORY OF OTHER SPECIFIED CONDITIONS: ICD-10-CM

## 2022-09-19 PROCEDURE — 99443: CPT

## 2022-09-19 NOTE — ED PROVIDER NOTE - CROS ED ENMT ALL NEG
Impression: Combined forms of age-related cataract, bilateral: H25.813. Plan: Plan to perform cataract surgery in both eyes with the left eye first: Discussed the risks, benefits, and alternatives of cataract surgery. The patient stated a full understanding and a desire to proceed with the procedure. Advised against driving until complete. Reviewed the increased risk of retinal detachment after cataract surgery and reviewed retinal detachment and general warnings and to contact us immediately should any of those develop. Cataracts affecting driving at night due to increased glare and starbursts from oncoming traffic. Patient cannot see television, signs and labels as a result of the cataracts. Patient is candidate/interested standard lens, toric lens , ORA and laser assisted cataract surgery. negative...

## 2022-09-22 ENCOUNTER — OUTPATIENT (OUTPATIENT)
Dept: OUTPATIENT SERVICES | Facility: HOSPITAL | Age: 82
LOS: 1 days | Discharge: HOME | End: 2022-09-22

## 2022-09-22 DIAGNOSIS — A52.3 NEUROSYPHILIS, UNSPECIFIED: ICD-10-CM

## 2022-09-22 DIAGNOSIS — Z98.890 OTHER SPECIFIED POSTPROCEDURAL STATES: Chronic | ICD-10-CM

## 2022-09-22 DIAGNOSIS — Z98.49 CATARACT EXTRACTION STATUS, UNSPECIFIED EYE: Chronic | ICD-10-CM

## 2022-09-22 PROBLEM — N40.1 BENIGN PROSTATIC HYPERPLASIA WITH LOWER URINARY TRACT SYMPTOMS: Chronic | Status: ACTIVE | Noted: 2021-07-13

## 2022-09-23 PROCEDURE — 71045 X-RAY EXAM CHEST 1 VIEW: CPT | Mod: 26

## 2022-10-17 ENCOUNTER — APPOINTMENT (OUTPATIENT)
Dept: UROLOGY | Facility: CLINIC | Age: 82
End: 2022-10-17

## 2022-10-23 PROBLEM — A41.9 SEPTICEMIA: Status: ACTIVE | Noted: 2022-10-23

## 2022-10-23 NOTE — ASSESSMENT
[FreeTextEntry1] : 12/30/19: Patient presents today for a follow up. He reports waking up 2-3 times at night which is okay for him. He has been taking Finasteride and Tamsulosin which is working for him. He denies constipation or hematuria. \par \par 04/27/2021: Patient presents today for a follow up accompanied by his wife. Pt has been doing well. However, he reports that after he urinates he feels the urge to go again a few minutes later and voids a pretty large amount when he does. This incomplete emptying and double voiding has been going on for a couple of months, however the patient's wife said they were nervous to come out due to covid. Pt was on a high BP medication, Eplerenone, that was discontinued. He is currently taking tamsulosin 0.4 mg once daily after lunch, metformin 1000 mg BID, Doxazosin 4 mg once daily, Januvia 100 mg for diabetes once daily, hydralazine 50 mg 3x a day, Ramipril 5 mg once daily, furosemide 20 mg once daily, and Pravastatin 40 mg once daily for cholesterol. \par \par 05/18/2021: Patient presents today for a follow up accompanied by his wife. The pt had been experiencing urinary frequency, dysuria, and burning last week. He went to the emergency room on 5/13/2021 and a Ramírez catheter was placed. He currently has the catheter in and feels better. He was also given cephalexin 500 mg BID as an antibiotic. This medication has not affected his GI system. The patient has been taking tamsulosin 0.4 mg BID, doxazosin 8 mg at bedtime) as well as finasteride 5 mg once daily. \par \par 05/24/2021: Patient presents today for fill and pull voiding trial for urinary retention. Continues Tadalafil 5mg, Finasteride, and Tamsulosin BID. Has had UDS three years ago without issue. \par \par 06/16/2021: Patient presents today for urodynamics with cystoscopy. He tolerated the procedure well. There was some hematuria when the catheter was passed over the prostate. Patient continues to take doxazosin 8 mg once daily at bedtime, finasteride 5 mg once daily, tadalafil 5 mg once daily, and tamsulosin 0.4 mg BID after a meal for his prostate. He also takes Januvia for diabetes, baby aspirin, ramipril for BP, hydralazine for BP, folic acid, furosemide 20 mg once daily, METformin 1000 mg BID for diabetes and Pravastatin 40 mg for cholesterol. Last PSA from 12/30/2021 was 5.03. MRI of the prostate was satisfactory in December 2019 with a PIRADS-2 and revealed his prostate is 88 cc. PSA has been stable since then. \par \par 07/28/2021: The patient presents today for a post-operative visit. 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 Viktoria Jernigan on 7/20/2021. The procedure went very well and the patient currently has no pain. He no longer has a catheter. His wife reports that his urine still has a slightly pink tint. He has some post void leaking since surgery but his stream is stronger. Denies constipation and his appetite remains good. The patient has not been sexually active the past 3 months. \par \par 09/23/2021: Patient presents today for a follow up. The pt complains of mild stress incontinence as well as urinary urgency. He has been taking tadalafil 5 mg once daily. Erections are adequate. Denies constipation. \par \par 10/18/2021: Patient presents today for follow up. Last visit, patient was started on Trospium with improvement in urination. No longer having urinary urgency. Does have constipation and drinks prune juice. Continues Finasteride 5mg and Tadalafil 5mg once daily. Erections are good. Had laser enucleation of prostate with Dr. Jernigan which has helped his urination. Started seeing PCP Dr. Rufino Medina. \par \par 04/14/2022: Patient presents today for follow up. He was accompanied by his wife. Nocturia 3-4 times per night, especially when he takes the Furosemide late in the day. Pt occasionally has stress incontinence, but does not require a pad because it is miniscule amounts of urine. He recently saw a pulmonologist who increased his Lasix to 40 mg for 2 weeks, but he is taking 20 mg per day now. Denies gross hematuria. Nocturia twice per night. His wife believes he is losing weight and has less of an appetite.  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 Viktoria Jernigan on 7/20/2021. The patient takes Atenolol, Carvedilol, Finasteride, Furosemide, Januvia, Hydralazine, Metformin, Ramipril, folic acid. He occasionally takes Tadalafil. \par \par The patient produced a urine sample which will be sent for urinalysis, urine cytology, and urine culture. \par Blood work today included BMP, CBC, alkaline phosphatase, PSA, and testosterone. \par I recommend the patient discontinue taking the Finasteride now that he has had the the surgery. \par The patient will RTO in 6 months, sooner if he has any difficulties. \par \par 9/19/2022:  presents today for a follow up telephone visit. Visit was conducted with him and his wife as he felt his wife could better relay his medical care. Pt had been hospitalized for septicemia at [facility]. Origin was unclear. Was on IV penicillin for 14 days. Pt has a past hx of syphilis which was treated. They were concerned he might have neurosyphillis and treated him empirically. Was discharged on 9/16/2022. Is better now and currently denies problems with urination. Denies nocturia, gross hematuria, dysuria, pushing, straining, and leakage. PSA was 0.19 in April. Had a laser enucleation of the prostate by . Since then he is very pleased with his urination. Continues to take tadalafil 5 mg once daily. Takes no other meds to improve urination. Diabetes needs further improvement. Last A1C on 4/14/2022 was 7.9. \par \par Reviewed the patient's hospital records from recent hospitalization. \par \par Recommended the patient speak to his PCP or endocrinologist about diabetes management. \par \par When patient recovers his energy and strength, patient should schedule an in person office visit. \par \par Duration of telephone visit: 30 minutes.

## 2022-10-23 NOTE — HISTORY OF PRESENT ILLNESS
[FreeTextEntry1] : Mr Gonzales is an 82 year old man followed for hydrocele, past abnormal urine cytology, and BPH. The patient had a hydrocelectomy in October of 2014. Fluid cytology 06/08/16 was abnormal. Fluid cytology 12/07/16 and 12/19/16 were negative for malignant cells. The patient takes finasteride 5 mg, oxybutynin, ER 10 mg, and doxazosin 8 mg once daily. Cystoscopy 12/19/16 found 2+ trabeculation of the bladder. The prostate protruded moderately into the bladder. No bladder stones or tumors. The left and right ureteral orifice visualized and were very close to prostate. I advised him to discontinue taking oxybutynin. On 11/10/17 the patient made 2500 cc of urine. The patient will now begin taking tamsulosin HCl 0.4 MG  one capsule one half hour after breakfast and doxazosin at night. He was able to urinate well over night without any pain. \par 12/10/18: The patient returned today and reported that his urination is satisfactory. Currently takes Finasteride, Tamsulosin and Doxazosin. Complains of intermittent stream and urge incontinence. Denies dysuria and gross hematuria.\par  6/10/19: Patient presents for 6 month follow up. Currently on Proscar and Doxazosin. States that he has no need to strain to initiate urination but flow is intermittent. Reports bothersome urinary urgency/frequency with occasional UUI. Nocturia. Regarding his anemia it is reported that it was evaluated by his PCP and he was told that it will be monitored but is nothing to worry about at this time. \par 6/26/19: Patient presents today for UDS and a cystoscopy. \par 7/3/19: Patient presents today for an ultrasound of the prostate. He is here today to discuss the results. His urination is the same as it was at his last visit. He wake up 4x a night to urinate. During the day he urinates 5-6x. He denies dysuria, gross hematuria, urgency or incontinence. Following the cystoscopy he noticed a small amount of blood and tiny clots but they have since resolved. He is satisfied with his urination at this time. \par 10/8/19: Patient presents today for a renal ultrasound and is here to discuss the results. Doxazosin, Finasteride and Tamsulosin were all prescribed previously. It is reported today that he has only been taking the Doxazosin and the Finasteride. His urination is still troublesome. Nocturia x 4-5 is reported. \par 10/15/19: Patient presents today for a follow up. His most recent PSA from 10/8/19 was 5.14 ng/mL a slight increase when compared to the last PSA. Finasteride is currently taken. When adjusting for the use of Finasteride his PSA is around 10 ng/mL. He is scheduled for surgery with Dr. Rolon next week. \par 11/20/19: Patient presents today for follow-up. He has completed surgery with Dr. Rolon and is improving well. He has finished his Sulfamethoxazole-Trimethoprim. His recent PSA on 11/12 was 5.15 ng/mL. He denies having a pacemaker. Patient has previous abnormal ultrasound and elevated PSA.\par \par 12/30/19: Patient presents today for a follow up. He reports waking up 2-3 times at night which is okay for him. He has been taking Finasteride and Tamsulosin which is working for him. He denies constipation or hematuria. \par \par 04/27/2021: Patient presents today for a follow up accompanied by his wife. Pt has been doing well. However, he reports that after he urinates he feels the urge to go again a few minutes later and voids a pretty large amount when he does. This incomplete emptying and double voiding has been going on for a couple of months, however the patient's wife said they were nervous to come out due to covid. Pt was on a high BP medication, Eplerenone, that was discontinued. He is currently taking tamsulosin 0.4 mg once daily after lunch, metformin BID, Doxazosin 4 mg once daily, Januvia 100 mg for diabetes once daily, hydralazine 50 mg 3x a day, Ramipril 5 mg once daily, furosemide 20 mg once daily, and Pravastatin 40 mg once daily for cholesterol. \par \par 05/18/2021: Patient presents today for a follow up accompanied by his wife. The pt had been experiencing urinary frequency, dysuria, and burning last week. He went to the emergency room on 5/13/2021 and a Ramírez catheter was placed. He currently has the catheter in and feels better. He was also given cephalexin 500 mg BID as an antibiotic. This medication has not affected his GI system. The patient has been taking tamsulosin 0.4 mg BID (increased from once daily) as well as finasteride 5 mg once daily. \par \par 05/24/2021: Patient presents today for fill and pull voiding trial for urinary retention. Continues Tadalafil 5mg, Finasteride, and Tamsulosin BID. Has had UDS three years ago without issue. \par \par 06/16/2021: Patient presents today for urodynamics with cystoscopy. He tolerated the procedure well. There was some hematuria when the catheter was passed over the prostate. Patient continues to take doxazosin 8 mg once daily at bedtime, finasteride 5 mg once daily, tadalafil 5 mg once daily, and tamsulosin 0.4 mg BID after a meal for his prostate. He also takes Januvia for diabetes, baby aspirin, ramipril for BP, hydralazine for BP, folic acid, furosemide 20 mg once daily, METformin 1000 mg BID for diabetes and Pravastatin 40 mg for cholesterol. Last PSA from 12/30/2021 was 5.03. MRI of the prostate was satisfactory in December 2019 with a PIRADS-2 and revealed his prostate is 88 cc. PSA has been stable since then. \par \par 07/28/2021: The patient presents today for a post-operative visit. 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 Viktoria Jernigan on 7/20/2021. The procedure went very well and the patient currently has no pain. He no longer has a catheter. His wife reports that his urine still has a slightly pink tint. He has some post void leaking since surgery but his stream is stronger. Denies constipation and his appetite remains good. The patient has not been sexually active the past 3 months. \par \par 09/23/2021: Patient presents today for a follow up. The pt complains of mild stress incontinence as well as urinary urgency. He has been taking tadalafil 5 mg once daily. Erections are adequate. Denies constipation. \par \par 10/18/2021: Patient presents today for follow up. Last visit, patient was started on Trospium with improvement in urination. No longer having urinary urgency. Does have constipation and drinks prune juice. Continues Finasteride 5mg and Tadalafil 5mg once daily. Erections are good. Had laser enucleation of prostate with Dr. Jernigan which has helped his urination. Started seeing PCP Dr. Rufino Medina. \par \par 04/14/2022: Patient presents today for follow up. He was accompanied by his wife. Nocturia 3-4 times per night, especially when he takes the Furosemide late in the day. Pt occasionally has stress incontinence, but does not require a pad because it is miniscule amounts of urine. He recently saw a pulmonologist who increased his Lasix to 40 mg for 2 weeks, but he is taking 20 mg per day now. Denies gross hematuria. Nocturia twice per night. His wife believes he is losing weight and has less of an appetite.  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 Viktoria Jernigan on 7/20/2021. The patient takes Atenolol, Carvedilol, Finasteride, Furosemide, Januvia, Hydralazine, Metformin, Ramipril, folic acid. He occasionally takes Tadalafil. \par \par 9/19/2022:  presents today for a follow up telephone visit. Visit was conducted with him and his wife as he felt his wife could better relay his medical care. Pt had been hospitalized for septicemia at [facility]. Origin was unclear. Was on IV penicillin for 14 days. Pt has a past hx of syphilis which was treated. They were concerned he might have neurosyphillis and treated him empirically. Was discharged on 9/16/2022. Is better now and currently denies problems with urination. Denies nocturia, gross hematuria, dysuria, pushing, straining, and leakage. PSA was 0.19 in April. Had a laser enucleation of the prostate by . Since then he is very pleased with his urination. Continues to take tadalafil 5 mg once daily. Takes no other meds to improve urination. Diabetes needs further improvement. Last A1C on 4/14/2022 was 7.9.

## 2022-10-23 NOTE — ADDENDUM
[FreeTextEntry1] : This note was authored by Marissa Castro working as a scribe for Dr.Gary Sibley. I, Dr. Chris Sibley have reviewed the content of this note and confirm it is true and accurate. I personally performed the history and physical examination and made all the decisions 10/13/2022.

## 2022-11-10 ENCOUNTER — APPOINTMENT (OUTPATIENT)
Dept: PULMONOLOGY | Facility: CLINIC | Age: 82
End: 2022-11-10

## 2022-11-10 VITALS
HEIGHT: 69 IN | SYSTOLIC BLOOD PRESSURE: 132 MMHG | WEIGHT: 172 LBS | DIASTOLIC BLOOD PRESSURE: 75 MMHG | OXYGEN SATURATION: 99 % | HEART RATE: 79 BPM | BODY MASS INDEX: 25.48 KG/M2 | TEMPERATURE: 98.2 F

## 2022-11-10 PROCEDURE — 99213 OFFICE O/P EST LOW 20 MIN: CPT

## 2022-11-10 RX ORDER — CARVEDILOL 12.5 MG/1
12.5 TABLET, FILM COATED ORAL TWICE DAILY
Refills: 5 | Status: DISCONTINUED | COMMUNITY
End: 2022-11-10

## 2022-11-10 RX ORDER — APIXABAN 2.5 MG/1
2.5 TABLET, FILM COATED ORAL
Qty: 180 | Refills: 0 | Status: ACTIVE | COMMUNITY
Start: 2022-10-21

## 2022-11-10 RX ORDER — CARVEDILOL 25 MG/1
25 TABLET, FILM COATED ORAL
Qty: 60 | Refills: 0 | Status: ACTIVE | COMMUNITY
Start: 2022-09-26

## 2022-11-10 RX ORDER — BLOOD SUGAR DIAGNOSTIC
STRIP MISCELLANEOUS
Qty: 200 | Refills: 0 | Status: ACTIVE | COMMUNITY
Start: 2022-10-23

## 2022-11-10 NOTE — REVIEW OF SYSTEMS
[Nocturia] : nocturia [Diabetes] : diabetes [Fever] : no fever [Fatigue] : no fatigue [Recent Wt Gain (___ Lbs)] : ~T no recent weight gain [Chills] : no chills [Poor Appetite] : no poor appetite [Dry Eyes] : no dry eyes [Sore Throat] : no sore throat [Eye Irritation] : no eye irritation [Nasal Congestion] : no nasal congestion [Postnasal Drip] : no postnasal drip [Dry Mouth] : no dry mouth [Cough] : no cough [Hemoptysis] : no hemoptysis [Chest Tightness] : no chest tightness [Frequent URIs] : no frequent URIs [Sputum] : no sputum [Dyspnea] : no dyspnea [Wheezing] : no wheezing [SOB on Exertion] : no sob on exertion [Chest Discomfort] : no chest discomfort [Claudication] : no claudication [Edema] : no edema [Leg Cramps] : no leg cramps [Orthopnea] : no orthopnea [Hay Fever] : no hay fever [Watery Eyes] : no watery eyes [Itchy Eyes] : no itchy eyes [Seasonal Allergies] : no seasonal allergies [Abdominal Pain] : no abdominal pain [Vomiting] : no vomiting [Urgency] : no frequency [Dysuria] : no urgency [Arthralgias] : no arthralgias [Myalgias] : no myalgias [Chronic Pain] : no chronic pain [Rash] : no rash [Easy Bruising] : no easy bruising [Headache] : no headache [Focal Weakness] : no focal weakness [Seizures] : no seizures [Head Injury] : no head injury [Dizziness] : no dizziness [Anxiety] : no anxiety

## 2022-11-10 NOTE — ASSESSMENT
[FreeTextEntry1] : 82M extensive medical history including HTN, HLD, DM2, Afib, and CHF presenting for followup pulmonary evaluation. 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 He is now feeling well and has returned home. He denies any pulmonary complaints.\par \par 1. Shortness of breath - improved and without complaints today\par - continue diuretics as per cardiology - monitor daily weights\par - Prior PFTs and 6MWT noted\par - Cardiology followup scheduled for next week \par \par 2. Pleural Effusion - presently resolved\par - CT imaging from hospitalization reviewed\par - Continue diuresis\par \par 3. Snoring and Fatigue\par - patient had last PSG in 2015 which did not have evidence of GABRIELLA\par - given persistent symptoms and worsening fatigue in conjunction with cardiac disease I have recommended patient for another in lab PSG to evaluate for sleep disorder breathing however he is not interested in pursuing this at present\par \par 4. Health Maintenance\par Spirometry: Reviewed \par Smoking status: Nonsmoker\par Pneumococcal vaccination status: Deferred by patient - he is a candidate\par COVID vaccination status: Completed with booster \par [place] Sleepiness Scale: 8 (3/17/22)\par \par 5. Followup in 6 months or sooner as needed\par - PMD: Dr. Rufino Medina and Dr. Sultana Medina - [phone], fax: [phone]\par \par \par The above plan was discussed with COLTON JARAMILLO  in detail. Patient verbalized understanding and agrees with plan as detailed above. Patient was provided education and counselling on current diagnosis/symptoms, diagnostic work up, treatment options and potential side effects of any prescribed therapy/therapies. COLTON  was advised to call our clinic at [phone] for any new or worsening symptoms, or with any questions or concerns. In case of acute onset of respiratory symptoms or worsening presentation, patient was advised to present to nearest emergency room for further evaluation. COLTON  expressed understanding and all questions/concerns were addressed.\par \par

## 2022-11-10 NOTE — HISTORY OF PRESENT ILLNESS
[Never] : never [Cough] : coughing [Wheezing] : wheezing [Nonspecific Pain, Swelling, And Stiffness] : chest pain [Fever] : fever [Nasal Passage Blockage (Stuffiness)] : edema [Difficulty Breathing During Exertion] : dyspnea on exertion [Feelings Of Weakness On Exertion] : exercise intolerance [0.5  -  Very, very slight] : 0.5, very, very slight [Difficulty Maintaining Sleep] : difficulty maintaining sleep [Fatigue] : fatigue [Snoring] : snoring [TextBox_4] : 82M extensive medical history including HTN, HLD, DM2, recurrent Afib, NICM, prior pericardial effusion s/p window (10/2017 with Dr. Chew) and bilateral pleural effusions s/p thoracentesis (12/2017) presenting for followup pulmonary evaluation.\par \par He was hospitalized at [facility] from 9/5-9/16/22 for fevers and L eye vision impairment. He duke found to have a left lens dislocation and his fevers were thought due to possible neurosyphilis and he was treated with extended IV Penicillin G\par \par - Discharged to rehab and now home since 9/26/22 - doing well\par - Afib in hospital - and now started on AC (Eliquis)\par - Has had Ophtho followup since discharge - feels vision improved\par - Breathing remains improved\par - Remains on Lasix PO\par - Remains active and no complaints with exertion\par - Not interested in proceeding with HST at this time\par \par PFTs: 6/23/22 - FEV1 2.15L (83%), FVC 2.73L (76%), FEV1/FVC 79%, FEF 25-75 98%, TLC 79%, ERV 40%, RV/TLC 40%, DLCO 76%\par PFTs: 1/22/18 - FEV1 1.52L (55%), FVC 1.86L (50%), FEV1/FVC 82%, FEF 25-75 78%, TLC 68%, ERV 55%, RV/TLC 51%, DLCO 45%\par \par 6MWT: 6/23/22 - 344 meters - no desaturation\par \par He denies cough or sputum production. He notes dyspnea on exertion which has been progressive. He states that his weight has been generally stable. He remains on Lasix 20mg PO. He states adherence to medications. He now follows with Dr. Medina for his primary care. \par \par He has not required any subsequent thoracenteses. He remains on diuretics. He has not had any acute changes to his respiratory status. he denies any fevers, chills, or sick contacts. He denies any recent travels or vacations. He has been safe through the COVID pandemic and has not had COVID to his knowledge. He has received the COVID vaccines.\par \par His prior pleural effusion was exudative with negative cytology. He has mild orthopnea and uses 2 pillows at night which is stable. [Recent  Weight Gain] : no recent weight gain [Unusual Movements] : no unusual movements [Witnessed Apneas] : no witnessed apneas [TextBox_100] : 7/22/2015 [TextBox_108] : 0.8 [TextBox_112] : 0.3 [ESS] : 8 [TextBox_42] : 9/6/22 - \par FINDINGS:\par CHEST:\par LUNGS AND LARGE AIRWAYS: Patent central airways. Nodule seen on recent chest x-ray corresponds with calcific density posterior to the left sixth rib, unchanged from 3/16/2009. Bibasilar atelectasis.\par PLEURA: No pleural effusion.\par VESSELS: Calcified plaques within the aortic arch and coronary arteries.\par HEART: Heart size is normal. No pericardial effusion.\par MEDIASTINUM AND ALICE: No lymphadenopathy.\par CHEST WALL AND LOWER NECK: Within normal limits.\par \par ABDOMEN AND PELVIS:\par LIVER: Innumerable scattered cysts, the largest measuring up to 8.4 cm, and subcentimeter hypodensities too small to characterize.\par BILE DUCTS: Normal caliber.\par GALLBLADDER: Cholelithiasis.\par SPLEEN: Within normal limits.\par PANCREAS: Stable 7 mm pancreatic body/tail lesion, likely a cyst (6-78).\par ADRENALS: Within normal limits.\par KIDNEYS/URETERS: Within normal limits.\par \par BLADDER: Within normal limits.\par REPRODUCTIVE ORGANS: Prior TURP.\par \par BOWEL: No bowel obstruction. Appendix is not visualized. No evidence of inflammation in the pericecal region.\par PERITONEUM: No ascites.\par VESSELS: Atherosclerotic changes.\par RETROPERITONEUM/LYMPH NODES: No lymphadenopathy.\par ABDOMINAL WALL: Within normal limits.\par BONES: Degenerative changes. Superior endplate changes of the L4 vertebral body.\par \par IMPRESSION:\par No evidence of suspicious mass or lymphadenopathy.\par \par Innumerable scattered hepatic cysts overall unchanged from 8/19/2021.\par \par Cholelithiasis.

## 2022-11-29 ENCOUNTER — APPOINTMENT (OUTPATIENT)
Dept: CARDIOLOGY | Facility: CLINIC | Age: 82
End: 2022-11-29

## 2022-11-29 ENCOUNTER — NON-APPOINTMENT (OUTPATIENT)
Age: 82
End: 2022-11-29

## 2022-11-29 VITALS
HEIGHT: 69 IN | WEIGHT: 172 LBS | DIASTOLIC BLOOD PRESSURE: 92 MMHG | BODY MASS INDEX: 25.48 KG/M2 | SYSTOLIC BLOOD PRESSURE: 149 MMHG | OXYGEN SATURATION: 99 % | HEART RATE: 91 BPM

## 2022-11-29 PROCEDURE — 93000 ELECTROCARDIOGRAM COMPLETE: CPT

## 2022-11-29 PROCEDURE — 99214 OFFICE O/P EST MOD 30 MIN: CPT | Mod: 25

## 2022-11-29 NOTE — REASON FOR VISIT
[Symptom and Test Evaluation] : symptom and test evaluation [Follow-Up - Clinic] : a clinic follow-up of [Atrial Fibrillation] : atrial fibrillation

## 2022-11-29 NOTE — HISTORY OF PRESENT ILLNESS
[FreeTextEntry1] : 82 year old with a history of atrial fibrillation last year.  No KEV, PND or orthopnea.  has not been walking but states that he can walk 5 blocks.  No chest pain.     No dyspnea.  Has been trying to follow a diet but doesn’t have much of an appetite

## 2022-11-29 NOTE — PHYSICAL EXAM
[General Appearance - Well Developed] : well developed [Normal Appearance] : normal appearance [Well Groomed] : well groomed [General Appearance - Well Nourished] : well nourished [No Deformities] : no deformities [General Appearance - In No Acute Distress] : no acute distress [Normal Conjunctiva] : the conjunctiva exhibited no abnormalities [Eyelids - No Xanthelasma] : the eyelids demonstrated no xanthelasmas [Normal Oral Mucosa] : normal oral mucosa [No Oral Pallor] : no oral pallor [No Oral Cyanosis] : no oral cyanosis [Normal Jugular Venous A Waves Present] : normal jugular venous A waves present [Normal Jugular Venous V Waves Present] : normal jugular venous V waves present [No Jugular Venous Burns A Waves] : no jugular venous burns A waves [Respiration, Rhythm And Depth] : normal respiratory rhythm and effort [Exaggerated Use Of Accessory Muscles For Inspiration] : no accessory muscle use [Auscultation Breath Sounds / Voice Sounds] : lungs were clear to auscultation bilaterally [Heart Rate And Rhythm] : heart rate and rhythm were normal [Abdomen Soft] : soft [Abdomen Tenderness] : non-tender [Abdomen Mass (___ Cm)] : no abdominal mass palpated [Abnormal Walk] : normal gait [Gait - Sufficient For Exercise Testing] : the gait was sufficient for exercise testing [Nail Clubbing] : no clubbing of the fingernails [Cyanosis, Localized] : no localized cyanosis [Petechial Hemorrhages (___cm)] : no petechial hemorrhages [] : no ischemic changes

## 2022-11-29 NOTE — DISCUSSION/SUMMARY
[FreeTextEntry1] : 1.  atrial fib -  now in nsr.  Angiogram was nonobstructive in May, 2019. He is on asa and eliquis.  controlled.

## 2023-01-30 NOTE — H&P ADULT - PROBLEM SELECTOR PLAN 3
Ranjan Meza is a 69 year old male presenting for   Chief Complaint   Patient presents with   • Follow-up     6 month   • Convey Results     labs   • Refill Request     Denies Latex allergy or sensitivity.    Medication verified and med list updated  Refills needed today: Yes    Health Maintenance Due   Topic Date Due   • Diabetes Eye Exam  02/20/2021   • Medicare Advantage- Medicare Wellness Visit  01/01/2023   • Diabetes Foot Exam  02/09/2023   • Depression Screening  07/04/2023       Patient is due for topics as listed above but is not proceeding with Diabetes Eye Exam, Diabetes Foot Exam and MWV (Medicare Wellness Visit) at this time.       Unaddressed Risk Adjusted HCC Categories and Diagnoses  HCC 18 - Diabetes with Chronic Complications  - Unaddressed Dx:Type 2 Diabetes Mellitus With Unspecified Complications (Cms/Hcc)      Last lab results:   Hemoglobin A1C (%)   Date Value   01/26/2023 7.1 (H)     Cholesterol (mg/dL)   Date Value   06/29/2022 105     HDL (mg/dL)   Date Value   06/29/2022 44     Triglycerides (mg/dL)   Date Value   06/29/2022 99     LDL (mg/dL)   Date Value   06/29/2022 41     MICROALBUMIN, UA (TTL) (mg/dL)   Date Value   03/26/2019 1.83     Creatinine, Urine (mg/dL)   Date Value   01/26/2023 114.00     Microalbumin/ Creatinine Ratio (mg/g)   Date Value   01/26/2023 10.3     No results found for: IFOB               Depression Screening:  Recent Review Flowsheet Data     Date 1/30/2023    Adult PHQ 2 Score 0    Adult PHQ 2 Interpretation No further screening needed    Little interest or pleasure in activity? Not at all    Feeling down, depressed or hopeless? Not at all           Advance Directives:  on file     coreg, hydralazine, lasix, ramipril

## 2023-01-31 ENCOUNTER — APPOINTMENT (OUTPATIENT)
Dept: UROLOGY | Facility: CLINIC | Age: 83
End: 2023-01-31
Payer: MEDICARE

## 2023-01-31 DIAGNOSIS — N50.9 DISORDER OF MALE GENITAL ORGANS, UNSPECIFIED: ICD-10-CM

## 2023-01-31 PROCEDURE — 99443: CPT

## 2023-01-31 RX ORDER — TADALAFIL 20 MG/1
20 TABLET ORAL
Qty: 30 | Refills: 11 | Status: ACTIVE | COMMUNITY
Start: 2023-01-31 | End: 1900-01-01

## 2023-02-03 NOTE — ASSESSMENT
[FreeTextEntry1] : 12/30/19: Patient presents today for a follow up. He reports waking up 2-3 times at night which is okay for him. He has been taking Finasteride and Tamsulosin which is working for him. He denies constipation or hematuria. \par \par 04/27/2021: Patient presents today for a follow up accompanied by his wife. Pt has been doing well. However, he reports that after he urinates he feels the urge to go again a few minutes later and voids a pretty large amount when he does. This incomplete emptying and double voiding has been going on for a couple of months, however the patient's wife said they were nervous to come out due to covid. Pt was on a high BP medication, Eplerenone, that was discontinued. He is currently taking tamsulosin 0.4 mg once daily after lunch, metformin 1000 mg BID, Doxazosin 4 mg once daily, Januvia 100 mg for diabetes once daily, hydralazine 50 mg 3x a day, Ramipril 5 mg once daily, furosemide 20 mg once daily, and Pravastatin 40 mg once daily for cholesterol. \par \par 05/18/2021: Patient presents today for a follow up accompanied by his wife. The pt had been experiencing urinary frequency, dysuria, and burning last week. He went to the emergency room on 5/13/2021 and a Ramírez catheter was placed. He currently has the catheter in and feels better. He was also given cephalexin 500 mg BID as an antibiotic. This medication has not affected his GI system. The patient has been taking tamsulosin 0.4 mg BID, doxazosin 8 mg at bedtime) as well as finasteride 5 mg once daily. \par \par 05/24/2021: Patient presents today for fill and pull voiding trial for urinary retention. Continues Tadalafil 5mg, Finasteride, and Tamsulosin BID. Has had UDS three years ago without issue. \par \par 06/16/2021: Patient presents today for urodynamics with cystoscopy. He tolerated the procedure well. There was some hematuria when the catheter was passed over the prostate. Patient continues to take doxazosin 8 mg once daily at bedtime, finasteride 5 mg once daily, tadalafil 5 mg once daily, and tamsulosin 0.4 mg BID after a meal for his prostate. He also takes Januvia for diabetes, baby aspirin, ramipril for BP, hydralazine for BP, folic acid, furosemide 20 mg once daily, METformin 1000 mg BID for diabetes and Pravastatin 40 mg for cholesterol. Last PSA from 12/30/2021 was 5.03. MRI of the prostate was satisfactory in December 2019 with a PIRADS-2 and revealed his prostate is 88 cc. PSA has been stable since then. \par \par 07/28/2021: The patient presents today for a post-operative visit. 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 Viktoria Jernigan on 7/20/2021. The procedure went very well and the patient currently has no pain. He no longer has a catheter. His wife reports that his urine still has a slightly pink tint. He has some post void leaking since surgery but his stream is stronger. Denies constipation and his appetite remains good. The patient has not been sexually active the past 3 months. \par \par 09/23/2021: Patient presents today for a follow up. The pt complains of mild stress incontinence as well as urinary urgency. He has been taking tadalafil 5 mg once daily. Erections are adequate. Denies constipation. \par \par 10/18/2021: Patient presents today for follow up. Last visit, patient was started on Trospium with improvement in urination. No longer having urinary urgency. Does have constipation and drinks prune juice. Continues Finasteride 5mg and Tadalafil 5mg once daily. Erections are good. Had laser enucleation of prostate with Dr. Jernigan which has helped his urination. Started seeing PCP Dr. Rufino Medina. \par \par 04/14/2022: Patient presents today for follow up. He was accompanied by his wife. Nocturia 3-4 times per night, especially when he takes the Furosemide late in the day. Pt occasionally has stress incontinence, but does not require a pad because it is miniscule amounts of urine. He recently saw a pulmonologist who increased his Lasix to 40 mg for 2 weeks, but he is taking 20 mg per day now. Denies gross hematuria. Nocturia twice per night. His wife believes he is losing weight and has less of an appetite.  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 Viktoria Jernigan on 7/20/2021. The patient takes Atenolol, Carvedilol, Finasteride, Furosemide, Januvia, Hydralazine, Metformin, Ramipril, folic acid. He occasionally takes Tadalafil. \par \par The patient produced a urine sample which will be sent for urinalysis, urine cytology, and urine culture. \par Blood work today included BMP, CBC, alkaline phosphatase, PSA, and testosterone. \par I recommend the patient discontinue taking the Finasteride now that he has had the the surgery. \par The patient will RTO in 6 months, sooner if he has any difficulties. \par \par 9/19/2022:  presents today for a follow up telephone visit. Visit was conducted with him and his wife as he felt his wife could better relay his medical care. Pt had been hospitalized for septicemia at [facility]. Origin was unclear. Was on IV penicillin for 14 days. Pt has a past hx of syphilis which was treated. They were concerned he might have neurosyphillis and treated him empirically. Was discharged on 9/16/2022. Is better now and currently denies problems with urination. Denies nocturia, gross hematuria, dysuria, pushing, straining, and leakage. PSA was 0.19 in April. Had a laser enucleation of the prostate by . Since then he is very pleased with his urination. Continues to take tadalafil 5 mg once daily. Takes no other meds to improve urination. Diabetes needs further improvement. Last A1C on 4/14/2022 was 7.9. \par Reviewed the patient's hospital records from recent hospitalization. \par Recommended the patient speak to his PCP or endocrinologist about diabetes management. \par When patient recovers his energy and strength, patient should schedule an in person office visit. \par \par 01/31/2023: Mr. JARAMILLO presents today for a follow up audio only telehealth visit for which they gave permission for. Visit was conducted with him and his wife as he felt his wife could better relay his medical care. The patient was located at home [address] [place] [address] and I was located in my office in [place], [place]. The pt reports his urination is fairly okay. The scrotum and testicles are good. Nocturia 3-4x and 4-5x during the day. His wife reports of bed wetting but the pt is unaware. Pt denies urinary urgency, straining, pushing, gross hematuria, or dysuria. He takes a water pill and the wife reports no problems\par  The wife reports his Erection takes time and is not as adequate. He is still taking Tadalafil 5 mg but his wife desires a better erection so I will prescribe Tadalafil 20. He complains of cramps in the legs and hands but this pain has been there for years and is not due to tadalafil. \par \par I am prescribing the patient tadalafil 20 mg to be taken 2-3 hours prior to sexual activity. I informed him of the possible side effects of heart burn, tingling feeling on the skin, back ache, and on rare occasion visual and auditory problems. Patient was provided with a good RX card and prescription was sent to his local * for the patient to obtain the best price for the prescription.\par \par Pt will go to his nearest [facility] lab for blood work and a urine sample which will be sent for urinalysis, urine culture, and urine cytology to observe for an infection. If not I will prescribe him a pill for better control.\par \par Pt will schedule a telehealth visit in 3 weeks after lab work.\par \par Duration of call: 30  Minutes

## 2023-02-03 NOTE — ADDENDUM
[FreeTextEntry1] : This note was authored by Mari Farah working as a scribe for Dr.Gary Sibley. I, Dr. Chris Sibley have reviewed the content of this note and confirm it is true and accurate. I personally performed the history and physical examination and made all the decisions 01/31/2023\par

## 2023-02-03 NOTE — HISTORY OF PRESENT ILLNESS
[FreeTextEntry1] : Mr Gonzales is an 82 year old man followed for hydrocele, past abnormal urine cytology, and BPH. The patient had a hydrocelectomy in October of 2014. Fluid cytology 06/08/16 was abnormal. Fluid cytology 12/07/16 and 12/19/16 were negative for malignant cells. The patient takes finasteride 5 mg, oxybutynin, ER 10 mg, and doxazosin 8 mg once daily. Cystoscopy 12/19/16 found 2+ trabeculation of the bladder. The prostate protruded moderately into the bladder. No bladder stones or tumors. The left and right ureteral orifice visualized and were very close to prostate. I advised him to discontinue taking oxybutynin. On 11/10/17 the patient made 2500 cc of urine. The patient will now begin taking tamsulosin HCl 0.4 MG  one capsule one half hour after breakfast and doxazosin at night. He was able to urinate well over night without any pain. \par 12/10/18: The patient returned today and reported that his urination is satisfactory. Currently takes Finasteride, Tamsulosin and Doxazosin. Complains of intermittent stream and urge incontinence. Denies dysuria and gross hematuria.\par  6/10/19: Patient presents for 6 month follow up. Currently on Proscar and Doxazosin. States that he has no need to strain to initiate urination but flow is intermittent. Reports bothersome urinary urgency/frequency with occasional UUI. Nocturia. Regarding his anemia it is reported that it was evaluated by his PCP and he was told that it will be monitored but is nothing to worry about at this time. \par 6/26/19: Patient presents today for UDS and a cystoscopy. \par 7/3/19: Patient presents today for an ultrasound of the prostate. He is here today to discuss the results. His urination is the same as it was at his last visit. He wake up 4x a night to urinate. During the day he urinates 5-6x. He denies dysuria, gross hematuria, urgency or incontinence. Following the cystoscopy he noticed a small amount of blood and tiny clots but they have since resolved. He is satisfied with his urination at this time. \par 10/8/19: Patient presents today for a renal ultrasound and is here to discuss the results. Doxazosin, Finasteride and Tamsulosin were all prescribed previously. It is reported today that he has only been taking the Doxazosin and the Finasteride. His urination is still troublesome. Nocturia x 4-5 is reported. \par 10/15/19: Patient presents today for a follow up. His most recent PSA from 10/8/19 was 5.14 ng/mL a slight increase when compared to the last PSA. Finasteride is currently taken. When adjusting for the use of Finasteride his PSA is around 10 ng/mL. He is scheduled for surgery with Dr. Rolon next week. \par 11/20/19: Patient presents today for follow-up. He has completed surgery with Dr. Rolon and is improving well. He has finished his Sulfamethoxazole-Trimethoprim. His recent PSA on 11/12 was 5.15 ng/mL. He denies having a pacemaker. Patient has previous abnormal ultrasound and elevated PSA.\par \par 12/30/19: Patient presents today for a follow up. He reports waking up 2-3 times at night which is okay for him. He has been taking Finasteride and Tamsulosin which is working for him. He denies constipation or hematuria. \par \par 04/27/2021: Patient presents today for a follow up accompanied by his wife. Pt has been doing well. However, he reports that after he urinates he feels the urge to go again a few minutes later and voids a pretty large amount when he does. This incomplete emptying and double voiding has been going on for a couple of months, however the patient's wife said they were nervous to come out due to covid. Pt was on a high BP medication, Eplerenone, that was discontinued. He is currently taking tamsulosin 0.4 mg once daily after lunch, metformin BID, Doxazosin 4 mg once daily, Januvia 100 mg for diabetes once daily, hydralazine 50 mg 3x a day, Ramipril 5 mg once daily, furosemide 20 mg once daily, and Pravastatin 40 mg once daily for cholesterol. \par \par 05/18/2021: Patient presents today for a follow up accompanied by his wife. The pt had been experiencing urinary frequency, dysuria, and burning last week. He went to the emergency room on 5/13/2021 and a Ramírez catheter was placed. He currently has the catheter in and feels better. He was also given cephalexin 500 mg BID as an antibiotic. This medication has not affected his GI system. The patient has been taking tamsulosin 0.4 mg BID (increased from once daily) as well as finasteride 5 mg once daily. \par \par 05/24/2021: Patient presents today for fill and pull voiding trial for urinary retention. Continues Tadalafil 5mg, Finasteride, and Tamsulosin BID. Has had UDS three years ago without issue. \par \par 06/16/2021: Patient presents today for urodynamics with cystoscopy. He tolerated the procedure well. There was some hematuria when the catheter was passed over the prostate. Patient continues to take doxazosin 8 mg once daily at bedtime, finasteride 5 mg once daily, tadalafil 5 mg once daily, and tamsulosin 0.4 mg BID after a meal for his prostate. He also takes Januvia for diabetes, baby aspirin, ramipril for BP, hydralazine for BP, folic acid, furosemide 20 mg once daily, METformin 1000 mg BID for diabetes and Pravastatin 40 mg for cholesterol. Last PSA from 12/30/2021 was 5.03. MRI of the prostate was satisfactory in December 2019 with a PIRADS-2 and revealed his prostate is 88 cc. PSA has been stable since then. \par \par 07/28/2021: The patient presents today for a post-operative visit. 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 Viktoria Jernigan on 7/20/2021. The procedure went very well and the patient currently has no pain. He no longer has a catheter. His wife reports that his urine still has a slightly pink tint. He has some post void leaking since surgery but his stream is stronger. Denies constipation and his appetite remains good. The patient has not been sexually active the past 3 months. \par \par 09/23/2021: Patient presents today for a follow up. The pt complains of mild stress incontinence as well as urinary urgency. He has been taking tadalafil 5 mg once daily. Erections are adequate. Denies constipation. \par \par 10/18/2021: Patient presents today for follow up. Last visit, patient was started on Trospium with improvement in urination. No longer having urinary urgency. Does have constipation and drinks prune juice. Continues Finasteride 5mg and Tadalafil 5mg once daily. Erections are good. Had laser enucleation of prostate with Dr. Jernigan which has helped his urination. Started seeing PCP Dr. Rufino Medina. \par \par 04/14/2022: Patient presents today for follow up. He was accompanied by his wife. Nocturia 3-4 times per night, especially when he takes the Furosemide late in the day. Pt occasionally has stress incontinence, but does not require a pad because it is miniscule amounts of urine. He recently saw a pulmonologist who increased his Lasix to 40 mg for 2 weeks, but he is taking 20 mg per day now. Denies gross hematuria. Nocturia twice per night. His wife believes he is losing weight and has less of an appetite.  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 Viktoria Jernigan on 7/20/2021. The patient takes Atenolol, Carvedilol, Finasteride, Furosemide, Januvia, Hydralazine, Metformin, Ramipril, folic acid. He occasionally takes Tadalafil. \par \par 9/19/2022:  presents today for a follow up telephone visit. Visit was conducted with him and his wife as he felt his wife could better relay his medical care. Pt had been hospitalized for septicemia at [facility]. Origin was unclear. Was on IV penicillin for 14 days. Pt has a past hx of syphilis which was treated. They were concerned he might have neurosyphillis and treated him empirically. Was discharged on 9/16/2022. Is better now and currently denies problems with urination. Denies nocturia, gross hematuria, dysuria, pushing, straining, and leakage. PSA was 0.19 in April. Had a laser enucleation of the prostate by . Since then he is very pleased with his urination. Continues to take tadalafil 5 mg once daily. Takes no other meds to improve urination. Diabetes needs further improvement. Last A1C on 4/14/2022 was 7.9. \par \par 01/31/2023: Mr. GONZALES presents today for a follow up audio only telehealth visit for which they gave permission for. Visit was conducted with him and his wife as he felt his wife could better relay his medical care. The patient was located at home [address] [place] [address] and I was located in my office in [place], [place]. The pt reports his urination is fairly okay. The scrotum and testicles are good. Nocturia 3-4x and 4-5x during the day. His wife reports of bed wetting but the pt is unaware. Pt denies urinary urgency, straining, pushing, gross hematuria, or dysuria. He takes a water pill and the wife reports no problems\par  The wife reports his Erection takes time and is not as adequate. He is still taking Tadalafil 5 mg but his wife desires a better erection so I will prescribe Tadalafil 20. He complains of cramps in the legs and hands but this pain has been there for years and is not due to tadalafil.

## 2023-02-08 LAB — BACTERIA UR CULT: NORMAL

## 2023-02-08 NOTE — ED PROVIDER NOTE - RESPIRATORY, MLM
What Type Of Note Output Would You Prefer (Optional)?: Standard Output
How Severe Is Your Rash?: moderate
Is This A New Presentation, Or A Follow-Up?: Rash
Breath sounds clear and equal bilaterally.

## 2023-02-10 ENCOUNTER — NON-APPOINTMENT (OUTPATIENT)
Age: 83
End: 2023-02-10

## 2023-02-10 LAB
ALP BLD-CCNC: 54 U/L
ANION GAP SERPL CALC-SCNC: 12 MMOL/L
APPEARANCE: CLEAR
BACTERIA: NEGATIVE
BASOPHILS # BLD AUTO: 0.02 K/UL
BASOPHILS NFR BLD AUTO: 0.6 %
BILIRUBIN URINE: NEGATIVE
BLOOD URINE: NEGATIVE
BUN SERPL-MCNC: 11 MG/DL
CALCIUM SERPL-MCNC: 9.6 MG/DL
CHLORIDE SERPL-SCNC: 101 MMOL/L
CO2 SERPL-SCNC: 27 MMOL/L
COLOR: NORMAL
CREAT SERPL-MCNC: 1.13 MG/DL
EGFR: 65 ML/MIN/1.73M2
EOSINOPHIL # BLD AUTO: 0.03 K/UL
EOSINOPHIL NFR BLD AUTO: 0.9 %
ESTIMATED AVERAGE GLUCOSE: 174 MG/DL
GLUCOSE QUALITATIVE U: ABNORMAL
GLUCOSE SERPL-MCNC: 244 MG/DL
HBA1C MFR BLD HPLC: 7.7 %
HCT VFR BLD CALC: 37.7 %
HGB BLD-MCNC: 11.7 G/DL
HYALINE CASTS: 0 /LPF
IMM GRANULOCYTES NFR BLD AUTO: 0.6 %
KETONES URINE: NEGATIVE
LEUKOCYTE ESTERASE URINE: NEGATIVE
LYMPHOCYTES # BLD AUTO: 1.16 K/UL
LYMPHOCYTES NFR BLD AUTO: 33.5 %
MAN DIFF?: NORMAL
MCHC RBC-ENTMCNC: 25.1 PG
MCHC RBC-ENTMCNC: 31 GM/DL
MCV RBC AUTO: 80.9 FL
MICROSCOPIC-UA: NORMAL
MONOCYTES # BLD AUTO: 0.41 K/UL
MONOCYTES NFR BLD AUTO: 11.8 %
NEUTROPHILS # BLD AUTO: 1.82 K/UL
NEUTROPHILS NFR BLD AUTO: 52.6 %
NITRITE URINE: NEGATIVE
PH URINE: 7
PLATELET # BLD AUTO: 187 K/UL
POTASSIUM SERPL-SCNC: 4.9 MMOL/L
PROTEIN URINE: NEGATIVE
PSA SERPL-MCNC: 0.77 NG/ML
RBC # BLD: 4.66 M/UL
RBC # FLD: 19.4 %
RED BLOOD CELLS URINE: 1 /HPF
SODIUM SERPL-SCNC: 139 MMOL/L
SPECIFIC GRAVITY URINE: 1.01
SQUAMOUS EPITHELIAL CELLS: 0 /HPF
URINE CYTOLOGY: NORMAL
UROBILINOGEN URINE: NORMAL
WBC # FLD AUTO: 3.46 K/UL
WHITE BLOOD CELLS URINE: 0 /HPF

## 2023-02-22 LAB
TESTOST FREE SERPL-MCNC: 6.6 PG/ML
TESTOST SERPL-MCNC: 377 NG/DL

## 2023-02-28 ENCOUNTER — APPOINTMENT (OUTPATIENT)
Dept: UROLOGY | Facility: CLINIC | Age: 83
End: 2023-02-28
Payer: MEDICARE

## 2023-02-28 DIAGNOSIS — R89.6 ABNORMAL CYTOLOGICAL FINDINGS IN SPECIMENS FROM OTHER ORGANS, SYSTEMS AND TISSUES: ICD-10-CM

## 2023-02-28 DIAGNOSIS — E11.9 TYPE 2 DIABETES MELLITUS W/OUT COMPLICATIONS: ICD-10-CM

## 2023-02-28 DIAGNOSIS — A52.3 NEUROSYPHILIS, UNSPECIFIED: ICD-10-CM

## 2023-02-28 PROCEDURE — 99442: CPT

## 2023-02-28 NOTE — ASSESSMENT
[FreeTextEntry1] : 12/30/19: Patient presents today for a follow up. He reports waking up 2-3 times at night which is okay for him. He has been taking Finasteride and Tamsulosin which is working for him. He denies constipation or hematuria. \par \par 04/27/2021: Patient presents today for a follow up accompanied by his wife. Pt has been doing well. However, he reports that after he urinates he feels the urge to go again a few minutes later and voids a pretty large amount when he does. This incomplete emptying and double voiding has been going on for a couple of months, however the patient's wife said they were nervous to come out due to covid. Pt was on a high BP medication, Eplerenone, that was discontinued. He is currently taking tamsulosin 0.4 mg once daily after lunch, metformin 1000 mg BID, Doxazosin 4 mg once daily, Januvia 100 mg for diabetes once daily, hydralazine 50 mg 3x a day, Ramipril 5 mg once daily, furosemide 20 mg once daily, and Pravastatin 40 mg once daily for cholesterol. \par \par 05/18/2021: Patient presents today for a follow up accompanied by his wife. The pt had been experiencing urinary frequency, dysuria, and burning last week. He went to the emergency room on 5/13/2021 and a Ramírez catheter was placed. He currently has the catheter in and feels better. He was also given cephalexin 500 mg BID as an antibiotic. This medication has not affected his GI system. The patient has been taking tamsulosin 0.4 mg BID, doxazosin 8 mg at bedtime) as well as finasteride 5 mg once daily. \par \par 05/24/2021: Patient presents today for fill and pull voiding trial for urinary retention. Continues Tadalafil 5mg, Finasteride, and Tamsulosin BID. Has had UDS three years ago without issue. \par \par 06/16/2021: Patient presents today for urodynamics with cystoscopy. He tolerated the procedure well. There was some hematuria when the catheter was passed over the prostate. Patient continues to take doxazosin 8 mg once daily at bedtime, finasteride 5 mg once daily, tadalafil 5 mg once daily, and tamsulosin 0.4 mg BID after a meal for his prostate. He also takes Januvia for diabetes, baby aspirin, ramipril for BP, hydralazine for BP, folic acid, furosemide 20 mg once daily, METformin 1000 mg BID for diabetes and Pravastatin 40 mg for cholesterol. Last PSA from 12/30/2021 was 5.03. MRI of the prostate was satisfactory in December 2019 with a PIRADS-2 and revealed his prostate is 88 cc. PSA has been stable since then. \par \par 07/28/2021: The patient presents today for a post-operative visit. 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 Viktoria Jernigan on 7/20/2021. The procedure went very well and the patient currently has no pain. He no longer has a catheter. His wife reports that his urine still has a slightly pink tint. He has some post void leaking since surgery but his stream is stronger. Denies constipation and his appetite remains good. The patient has not been sexually active the past 3 months. \par \par 09/23/2021: Patient presents today for a follow up. The pt complains of mild stress incontinence as well as urinary urgency. He has been taking tadalafil 5 mg once daily. Erections are adequate. Denies constipation. \par \par 10/18/2021: Patient presents today for follow up. Last visit, patient was started on Trospium with improvement in urination. No longer having urinary urgency. Does have constipation and drinks prune juice. Continues Finasteride 5mg and Tadalafil 5mg once daily. Erections are good. Had laser enucleation of prostate with Dr. Jernigan which has helped his urination. Started seeing PCP Dr. Rufino Medina. \par \par 04/14/2022: Patient presents today for follow up. He was accompanied by his wife. Nocturia 3-4 times per night, especially when he takes the Furosemide late in the day. Pt occasionally has stress incontinence, but does not require a pad because it is miniscule amounts of urine. He recently saw a pulmonologist who increased his Lasix to 40 mg for 2 weeks, but he is taking 20 mg per day now. Denies gross hematuria. Nocturia twice per night. His wife believes he is losing weight and has less of an appetite.  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 Viktoria Jernigan on 7/20/2021. The patient takes Atenolol, Carvedilol, Finasteride, Furosemide, Januvia, Hydralazine, Metformin, Ramipril, folic acid. He occasionally takes Tadalafil. \par \par The patient produced a urine sample which will be sent for urinalysis, urine cytology, and urine culture. \par Blood work today included BMP, CBC, alkaline phosphatase, PSA, and testosterone. \par I recommend the patient discontinue taking the Finasteride now that he has had the the surgery. \par The patient will RTO in 6 months, sooner if he has any difficulties. \par \par 9/19/2022:  presents today for a follow up telephone visit. Visit was conducted with him and his wife as he felt his wife could better relay his medical care. Pt had been hospitalized for septicemia at [facility]. Origin was unclear. Was on IV penicillin for 14 days. Pt has a past hx of syphilis which was treated. They were concerned he might have neurosyphillis and treated him empirically. Was discharged on 9/16/2022. Is better now and currently denies problems with urination. Denies nocturia, gross hematuria, dysuria, pushing, straining, and leakage. PSA was 0.19 in April. Had a laser enucleation of the prostate by . Since then he is very pleased with his urination. Continues to take tadalafil 5 mg once daily. Takes no other meds to improve urination. Diabetes needs further improvement. Last A1C on 4/14/2022 was 7.9. \par Reviewed the patient's hospital records from recent hospitalization. \par Recommended the patient speak to his PCP or endocrinologist about diabetes management. \par When patient recovers his energy and strength, patient should schedule an in person office visit. \par \par 01/31/2023: Mr. JARAMILLO presents today for a follow up audio only telehealth visit for which they gave permission for. Visit was conducted with him and his wife as he felt his wife could better relay his medical care. The patient was located at home [address] [place] [address] and I was located in my office in [place], [place]. The pt reports his urination is fairly okay. The scrotum and testicles are good. Nocturia 3-4x and 4-5x during the day. His wife reports of bed wetting but the pt is unaware. Pt denies urinary urgency, straining, pushing, gross hematuria, or dysuria. He takes a water pill and the wife reports no problems\par  The wife reports his Erection takes time and is not as adequate. He is still taking Tadalafil 5 mg but his wife desires a better erection so I will prescribe Tadalafil 20. He complains of cramps in the legs and hands but this pain has been there for years and is not due to tadalafil. \par \par I am prescribing the patient tadalafil 20 mg to be taken 2-3 hours prior to sexual activity. I informed him of the possible side effects of heart burn, tingling feeling on the skin, back ache, and on rare occasion visual and auditory problems. Patient was provided with a good RX card and prescription was sent to his local * for the patient to obtain the best price for the prescription.\par \par Pt will go to his nearest [facility] lab for blood work and a urine sample which will be sent for urinalysis, urine culture, and urine cytology to observe for an infection. If not I will prescribe him a pill for better control.\par \par Pt will schedule a telehealth visit in 3 weeks after lab work.\par \par 02/28/2023: Mr. JARAMILLO presents today for a follow up audio only telehealth visit for which they gave permission for. Visit was conducted with him and his wife as he felt his wife could better relay his medical care.The patient was located at home [address] [place] [address] and I was located in my office in [place], [place]. The patient obtained lab results prior to today’s visit. His UC and Urine Cytology were normal findings. However, his UA revealed elevated glucose qual urine at 200 and his BMP showed elevated glucose levels at 244. Serum glucose is too high and glucose is present in urine, the pt should improve glucose control. In addition, pt should improve diabetes control as his A1c was elevated at 7.7%. The Alk Phos was normal at 54 and testosterone free is normal at 6.6. The pt’s CBC is comparable to prior blood counts with low WBC at 3.46, HGB at 11.7, HCT at 37.7 and Red cell Distribution width at 19.4. Lastly, his PSA is u a bit at 0.77, and I will repeat in 3 months. \par The pt reports urination is well while on Tadalafil 5 mg. Pt denies urinary urgency, straining, pushing, gross hematuria, or dysuria. He states erections are adequate while on Tadalafil 20 mg is very pleased.\par \par Reviewed and discussed laboratory work of 2/7/23 which He  obtained prior to today's visit as requested.  PSA was 0.77 ng/mL which is quite low.  Patient really will be however it does represent a small rise from the April 14, 2022 which was 0.19.\par \par Pt will go to his nearest [facility] lab for blood work including BMP, alkaline phosphatase, PSA, and testosterone and a urine sample which will be sent for urinalysis, urine culture, and urine cytology. \par \par Pt will schedule a telehealth visit in 3 months to re evaluate PSA levels.\par \par Duration of call: 12 minutes\par

## 2023-02-28 NOTE — HISTORY OF PRESENT ILLNESS
[FreeTextEntry1] : Mr Gonzales is an 82 year old man followed for hydrocele, past abnormal urine cytology, and BPH. The patient had a hydrocelectomy in October of 2014. Fluid cytology 06/08/16 was abnormal. Fluid cytology 12/07/16 and 12/19/16 were negative for malignant cells. The patient takes finasteride 5 mg, oxybutynin, ER 10 mg, and doxazosin 8 mg once daily. Cystoscopy 12/19/16 found 2+ trabeculation of the bladder. The prostate protruded moderately into the bladder. No bladder stones or tumors. The left and right ureteral orifice visualized and were very close to prostate. I advised him to discontinue taking oxybutynin. On 11/10/17 the patient made 2500 cc of urine. The patient will now begin taking tamsulosin HCl 0.4 MG  one capsule one half hour after breakfast and doxazosin at night. He was able to urinate well over night without any pain. \par 12/10/18: The patient returned today and reported that his urination is satisfactory. Currently takes Finasteride, Tamsulosin and Doxazosin. Complains of intermittent stream and urge incontinence. Denies dysuria and gross hematuria.\par  6/10/19: Patient presents for 6 month follow up. Currently on Proscar and Doxazosin. States that he has no need to strain to initiate urination but flow is intermittent. Reports bothersome urinary urgency/frequency with occasional UUI. Nocturia. Regarding his anemia it is reported that it was evaluated by his PCP and he was told that it will be monitored but is nothing to worry about at this time. \par 6/26/19: Patient presents today for UDS and a cystoscopy. \par 7/3/19: Patient presents today for an ultrasound of the prostate. He is here today to discuss the results. His urination is the same as it was at his last visit. He wake up 4x a night to urinate. During the day he urinates 5-6x. He denies dysuria, gross hematuria, urgency or incontinence. Following the cystoscopy he noticed a small amount of blood and tiny clots but they have since resolved. He is satisfied with his urination at this time. \par 10/8/19: Patient presents today for a renal ultrasound and is here to discuss the results. Doxazosin, Finasteride and Tamsulosin were all prescribed previously. It is reported today that he has only been taking the Doxazosin and the Finasteride. His urination is still troublesome. Nocturia x 4-5 is reported. \par 10/15/19: Patient presents today for a follow up. His most recent PSA from 10/8/19 was 5.14 ng/mL a slight increase when compared to the last PSA. Finasteride is currently taken. When adjusting for the use of Finasteride his PSA is around 10 ng/mL. He is scheduled for surgery with Dr. Rolon next week. \par 11/20/19: Patient presents today for follow-up. He has completed surgery with Dr. Rolon and is improving well. He has finished his Sulfamethoxazole-Trimethoprim. His recent PSA on 11/12 was 5.15 ng/mL. He denies having a pacemaker. Patient has previous abnormal ultrasound and elevated PSA.\par \par 12/30/19: Patient presents today for a follow up. He reports waking up 2-3 times at night which is okay for him. He has been taking Finasteride and Tamsulosin which is working for him. He denies constipation or hematuria. \par \par 04/27/2021: Patient presents today for a follow up accompanied by his wife. Pt has been doing well. However, he reports that after he urinates he feels the urge to go again a few minutes later and voids a pretty large amount when he does. This incomplete emptying and double voiding has been going on for a couple of months, however the patient's wife said they were nervous to come out due to covid. Pt was on a high BP medication, Eplerenone, that was discontinued. He is currently taking tamsulosin 0.4 mg once daily after lunch, metformin BID, Doxazosin 4 mg once daily, Januvia 100 mg for diabetes once daily, hydralazine 50 mg 3x a day, Ramipril 5 mg once daily, furosemide 20 mg once daily, and Pravastatin 40 mg once daily for cholesterol. \par \par 05/18/2021: Patient presents today for a follow up accompanied by his wife. The pt had been experiencing urinary frequency, dysuria, and burning last week. He went to the emergency room on 5/13/2021 and a Ramírez catheter was placed. He currently has the catheter in and feels better. He was also given cephalexin 500 mg BID as an antibiotic. This medication has not affected his GI system. The patient has been taking tamsulosin 0.4 mg BID (increased from once daily) as well as finasteride 5 mg once daily. \par \par 05/24/2021: Patient presents today for fill and pull voiding trial for urinary retention. Continues Tadalafil 5mg, Finasteride, and Tamsulosin BID. Has had UDS three years ago without issue. \par \par 06/16/2021: Patient presents today for urodynamics with cystoscopy. He tolerated the procedure well. There was some hematuria when the catheter was passed over the prostate. Patient continues to take doxazosin 8 mg once daily at bedtime, finasteride 5 mg once daily, tadalafil 5 mg once daily, and tamsulosin 0.4 mg BID after a meal for his prostate. He also takes Januvia for diabetes, baby aspirin, ramipril for BP, hydralazine for BP, folic acid, furosemide 20 mg once daily, METformin 1000 mg BID for diabetes and Pravastatin 40 mg for cholesterol. Last PSA from 12/30/2021 was 5.03. MRI of the prostate was satisfactory in December 2019 with a PIRADS-2 and revealed his prostate is 88 cc. PSA has been stable since then. \par \par 07/28/2021: The patient presents today for a post-operative visit. 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 Viktoria Jernigan on 7/20/2021. The procedure went very well and the patient currently has no pain. He no longer has a catheter. His wife reports that his urine still has a slightly pink tint. He has some post void leaking since surgery but his stream is stronger. Denies constipation and his appetite remains good. The patient has not been sexually active the past 3 months. \par \par 09/23/2021: Patient presents today for a follow up. The pt complains of mild stress incontinence as well as urinary urgency. He has been taking tadalafil 5 mg once daily. Erections are adequate. Denies constipation. \par \par 10/18/2021: Patient presents today for follow up. Last visit, patient was started on Trospium with improvement in urination. No longer having urinary urgency. Does have constipation and drinks prune juice. Continues Finasteride 5mg and Tadalafil 5mg once daily. Erections are good. Had laser enucleation of prostate with Dr. Jernigan which has helped his urination. Started seeing PCP Dr. Rufino Medina. \par \par 04/14/2022: Patient presents today for follow up. He was accompanied by his wife. Nocturia 3-4 times per night, especially when he takes the Furosemide late in the day. Pt occasionally has stress incontinence, but does not require a pad because it is miniscule amounts of urine. He recently saw a pulmonologist who increased his Lasix to 40 mg for 2 weeks, but he is taking 20 mg per day now. Denies gross hematuria. Nocturia twice per night. His wife believes he is losing weight and has less of an appetite.  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 Viktoria Jernigan on 7/20/2021. The patient takes Atenolol, Carvedilol, Finasteride, Furosemide, Januvia, Hydralazine, Metformin, Ramipril, folic acid. He occasionally takes Tadalafil. \par \par 9/19/2022:  presents today for a follow up telephone visit. Visit was conducted with him and his wife as he felt his wife could better relay his medical care. Pt had been hospitalized for septicemia at [facility]. Origin was unclear. Was on IV penicillin for 14 days. Pt has a past hx of syphilis which was treated. They were concerned he might have neurosyphillis and treated him empirically. Was discharged on 9/16/2022. Is better now and currently denies problems with urination. Denies nocturia, gross hematuria, dysuria, pushing, straining, and leakage. PSA was 0.19 in April. Had a laser enucleation of the prostate by . Since then he is very pleased with his urination. Continues to take tadalafil 5 mg once daily. Takes no other meds to improve urination. Diabetes needs further improvement. Last A1C on 4/14/2022 was 7.9. \par \par 01/31/2023: Mr. GONZALES presents today for a follow up audio only telehealth visit for which they gave permission for. Visit was conducted with him and his wife as he felt his wife could better relay his medical care. The patient was located at home [address] [place] [address] and I was located in my office in [place], [place]. The pt reports his urination is fairly okay. The scrotum and testicles are good. Nocturia 3-4x and 4-5x during the day. His wife reports of bed wetting but the pt is unaware. Pt denies urinary urgency, straining, pushing, gross hematuria, or dysuria. He takes a water pill and the wife reports no problems\par  The wife reports his Erection takes time and is not as adequate. He is still taking Tadalafil 5 mg but his wife desires a better erection so I will prescribe Tadalafil 20. He complains of cramps in the legs and hands but this pain has been there for years and is not due to tadalafil. \par \par 02/28/2023: Mr. GONZALES presents today for a follow up audio only telehealth visit for which they gave permission for. Visit was conducted with him and his wife as he felt his wife could better relay his medical care.The patient was located at home [address] [place] [address] and I was located in my office in [place], [place]. The patient obtained lab results prior to today’s visit. His UC and Urine Cytology were normal findings. However, his UA revealed elevated glucose qual urine at 200 and his BMP showed elevated glucose levels at 244. Serum glucose is too high and glucose is present in urine, the pt should improve glucose control. In addition, pt should improve diabetes control as his A1c was elevated at 7.7%. The Alk Phos was normal at 54 and testosterone free is normal at 6.6. The pt’s CBC is comparable to prior blood counts with low WBC at 3.46, HGB at 11.7, HCT at 37.7 and Red cell Distribution width at 19.4. Lastly, his PSA is u a bit at 0.77, and I will repeat in 3 months. \par The pt reports urination is well while on Tadalafil 5 mg. Pt denies urinary urgency, straining, pushing, gross hematuria, or dysuria. He states erections are adequate while on Tadalafil 20 mg is very pleased.

## 2023-02-28 NOTE — ADDENDUM
[FreeTextEntry1] : This note was authored by Mari Farah working as a scribe for Dr.Gary Sibley. I, Dr. Chris Sibley have reviewed the content of this note and confirm it is true and accurate. I personally performed the history and physical examination and made all the decisions 02/28/2023\par

## 2023-03-13 ENCOUNTER — APPOINTMENT (OUTPATIENT)
Dept: PULMONOLOGY | Facility: CLINIC | Age: 83
End: 2023-03-13
Payer: MEDICARE

## 2023-03-13 VITALS
HEART RATE: 61 BPM | BODY MASS INDEX: 24.62 KG/M2 | HEIGHT: 70 IN | SYSTOLIC BLOOD PRESSURE: 116 MMHG | WEIGHT: 172 LBS | DIASTOLIC BLOOD PRESSURE: 81 MMHG

## 2023-03-13 DIAGNOSIS — R06.83 SNORING: ICD-10-CM

## 2023-03-13 DIAGNOSIS — Z00.00 ENCOUNTER FOR GENERAL ADULT MEDICAL EXAMINATION W/OUT ABNORMAL FINDINGS: ICD-10-CM

## 2023-03-13 PROCEDURE — ZZZZZ: CPT

## 2023-03-13 PROCEDURE — 94726 PLETHYSMOGRAPHY LUNG VOLUMES: CPT

## 2023-03-13 PROCEDURE — 94729 DIFFUSING CAPACITY: CPT

## 2023-03-13 PROCEDURE — 99214 OFFICE O/P EST MOD 30 MIN: CPT | Mod: 25

## 2023-03-13 PROCEDURE — 94010 BREATHING CAPACITY TEST: CPT

## 2023-03-13 RX ORDER — TADALAFIL 5 MG/1
5 TABLET ORAL
Qty: 30 | Refills: 11 | Status: DISCONTINUED | COMMUNITY
Start: 2021-05-18 | End: 2023-03-13

## 2023-03-13 NOTE — REASON FOR VISIT
[Follow-Up] : a follow-up visit [Spouse] : spouse [Shortness of Breath] : shortness of breath [[id number]] : History of Pleural Effusion

## 2023-03-13 NOTE — ASSESSMENT
[FreeTextEntry1] : 82M extensive medical history including HTN, HLD, DM2, Afib, and CHF presenting for followup pulmonary evaluation. He was initially seen after having a pleural effusion requiring thoracentesis in December 2017. He has not had recurrent effusions and has been doing well on diuresis.\par \par 1. Shortness of breath - improved and without complaints today\par - continue diuretics - monitor daily weights\par - PFTs and 6MWT noted\par - Cardiology followup\par \par 2. Pleural Effusion - presently resolved\par - Prior CT imaging (9/2022) reviewed and patient with good air entry on exam today\par - Continue diuresis\par \par 3. Snoring, Daytime Somnolence, and Sleep Talking\par - patient had last PSG in 2015 which did not have evidence of GABRIELLA\par - given persistent symptoms and worsening fatigue in conjunction with cardiac disease I have recommended patient for another in lab PSG to evaluate for sleep disorder breathing however he is not interested in pursuing this at present\par \par 4. Health Maintenance\par Spirometry: Reviewed \par Smoking status: Nonsmoker\par Pneumococcal vaccination status: Deferred by patient - he is a candidate\par COVID vaccination status: Completed with booster \par [place] Sleepiness Scale: 8 (3/17/22)\par \par 5. Followup in 6 months or sooner as needed\par - All questions answered\par - PMD: Dr. Rufino Medina and Dr. Sultana Medina - [phone], fax: [phone]\par \par \par The above plan was discussed with COLTON JARAMILLO  in detail. Patient verbalized understanding and agrees with plan as detailed above. Patient was provided education and counselling on current diagnosis/symptoms, diagnostic work up, treatment options and potential side effects of any prescribed therapy/therapies. COLTON  was advised to call our clinic at [phone] for any new or worsening symptoms, or with any questions or concerns. In case of acute onset of respiratory symptoms or worsening presentation, patient was advised to present to nearest emergency room for further evaluation. COLTON  expressed understanding and all questions/concerns were addressed.\par \par

## 2023-03-13 NOTE — HISTORY OF PRESENT ILLNESS
[Cough] : coughing [Wheezing] : wheezing [Nonspecific Pain, Swelling, And Stiffness] : chest pain [Fever] : fever [Nasal Passage Blockage (Stuffiness)] : edema [Difficulty Breathing During Exertion] : dyspnea on exertion [Feelings Of Weakness On Exertion] : exercise intolerance [0.5  -  Very, very slight] : 0.5, very, very slight [Difficulty Maintaining Sleep] : difficulty maintaining sleep [Fatigue] : fatigue [Snoring] : snoring [Unusual Sleep Behavior] : unusual sleep behavior [TextBox_4] : 82M extensive medical history including HTN, HLD, DM2, recurrent Afib, NICM, prior pericardial effusion s/p window (10/2017 with Dr. Chew) and bilateral pleural effusions s/p thoracentesis (12/2017) presenting for followup pulmonary evaluation.\par \par - Doing well without pulmonary complaints - no cough, dyspnea, or sputum production\par - Patient not on any standing pulmonary medications\par - Remains on Lasix 20mg PO (also Hydralazine, Coreg, Ramipril, and Pravastatin)\par - Remains on Eliquis for Afib\par - Remains active and energetic. \par - Not interested in pursuing sleep apnea testing - has had noted sleep talking per his wife\par - Treated for neurosyphillis during hospitalization 9/2022 with extended IV Penicillin G\par \par PFTs: 3/13/23 - FEV1 2.13L (81%), FVC 2.60L (72%), FEV1/FVC 82%, FEF 25-75 106%, TLC 75%, ERV 81%, RV/TLC 42%, DLCO 68%\par PFTs: 6/23/22 - FEV1 2.15L (83%), FVC 2.73L (76%), FEV1/FVC 79%, FEF 25-75 98%, TLC 79%, ERV 40%, RV/TLC 40%, DLCO 76%\par PFTs: 1/22/18 - FEV1 1.52L (55%), FVC 1.86L (50%), FEV1/FVC 82%, FEF 25-75 78%, TLC 68%, ERV 55%, RV/TLC 51%, DLCO 45%\par \par 6MWT: 6/23/22 - 344 meters - no desaturation\par \par He had a thoracentesis done on the right in December 2017. He has not required any subsequent thoracenteses. He remains on diuretics. He has not had any acute changes to his respiratory status. he denies any fevers, chills, or sick contacts. He denies any recent travels or vacations. His prior pleural effusion was exudative with negative cytology.  [Recent  Weight Gain] : no recent weight gain [Unusual Movements] : no unusual movements [Witnessed Apneas] : no witnessed apneas [TextBox_100] : 7/22/2015 [TextBox_108] : 0.8 [TextBox_112] : 0.3 [ESS] : 8 [TextBox_42] : 9/6/22 - FINDINGS:\par CHEST:\par LUNGS AND LARGE AIRWAYS: Patent central airways. Nodule seen on recent chest x-ray corresponds with calcific density posterior to the left sixth rib, unchanged from 3/16/2009. Bibasilar atelectasis.\par PLEURA: No pleural effusion.\par VESSELS: Calcified plaques within the aortic arch and coronary arteries.\par HEART: Heart size is normal. No pericardial effusion.\par MEDIASTINUM AND ALICE: No lymphadenopathy.\par CHEST WALL AND LOWER NECK: Within normal limits.\par \par ABDOMEN AND PELVIS:\par LIVER: Innumerable scattered cysts, the largest measuring up to 8.4 cm, and subcentimeter hypodensities too small to characterize.\par BILE DUCTS: Normal caliber.\par GALLBLADDER: Cholelithiasis.\par SPLEEN: Within normal limits.\par PANCREAS: Stable 7 mm pancreatic body/tail lesion, likely a cyst (6-78).\par ADRENALS: Within normal limits.\par KIDNEYS/URETERS: Within normal limits.\par \par BLADDER: Within normal limits.\par REPRODUCTIVE ORGANS: Prior TURP.\par \par BOWEL: No bowel obstruction. Appendix is not visualized. No evidence of inflammation in the pericecal region.\par PERITONEUM: No ascites.\par VESSELS: Atherosclerotic changes.\par RETROPERITONEUM/LYMPH NODES: No lymphadenopathy.\par ABDOMINAL WALL: Within normal limits.\par BONES: Degenerative changes. Superior endplate changes of the L4 vertebral body.\par \par IMPRESSION:\par No evidence of suspicious mass or lymphadenopathy.\par \par Innumerable scattered hepatic cysts overall unchanged from 8/19/2021.\par \par Cholelithiasis.

## 2023-05-04 ENCOUNTER — APPOINTMENT (OUTPATIENT)
Dept: UROLOGY | Facility: CLINIC | Age: 83
End: 2023-05-04
Payer: MEDICARE

## 2023-05-04 DIAGNOSIS — N39.0 URINARY TRACT INFECTION, SITE NOT SPECIFIED: ICD-10-CM

## 2023-05-04 DIAGNOSIS — E11.9 TYPE 2 DIABETES MELLITUS W/OUT COMPLICATIONS: ICD-10-CM

## 2023-05-04 DIAGNOSIS — N39.44 NOCTURNAL ENURESIS: ICD-10-CM

## 2023-05-04 DIAGNOSIS — N43.3 HYDROCELE, UNSPECIFIED: ICD-10-CM

## 2023-05-04 DIAGNOSIS — R31.29 OTHER MICROSCOPIC HEMATURIA: ICD-10-CM

## 2023-05-04 DIAGNOSIS — R39.15 URGENCY OF URINATION: ICD-10-CM

## 2023-05-04 DIAGNOSIS — D72.819 DECREASED WHITE BLOOD CELL COUNT, UNSPECIFIED: ICD-10-CM

## 2023-05-04 DIAGNOSIS — R33.9 RETENTION OF URINE, UNSPECIFIED: ICD-10-CM

## 2023-05-04 DIAGNOSIS — R97.20 ELEVATED PROSTATE, SPECIFIC ANTIGEN [PSA]: ICD-10-CM

## 2023-05-04 LAB
ANION GAP SERPL CALC-SCNC: 14 MMOL/L
BUN SERPL-MCNC: 12 MG/DL
CALCIUM SERPL-MCNC: 10 MG/DL
CHLORIDE SERPL-SCNC: 102 MMOL/L
CO2 SERPL-SCNC: 24 MMOL/L
CREAT SERPL-MCNC: 1.05 MG/DL
EGFR: 71 ML/MIN/1.73M2
ESTIMATED AVERAGE GLUCOSE: 169 MG/DL
GLUCOSE SERPL-MCNC: 154 MG/DL
HBA1C MFR BLD HPLC: 7.5 %
POTASSIUM SERPL-SCNC: 4.7 MMOL/L
SODIUM SERPL-SCNC: 140 MMOL/L

## 2023-05-04 PROCEDURE — 99443: CPT

## 2023-05-05 LAB
APPEARANCE: CLEAR
BACTERIA: NEGATIVE /HPF
BILIRUBIN URINE: NEGATIVE
BLOOD URINE: NEGATIVE
CAST: 0 /LPF
COLOR: YELLOW
EPITHELIAL CELLS: 0 /HPF
GLUCOSE QUALITATIVE U: NEGATIVE MG/DL
KETONES URINE: NEGATIVE MG/DL
LEUKOCYTE ESTERASE URINE: ABNORMAL
MICROSCOPIC-UA: NORMAL
NITRITE URINE: NEGATIVE
PH URINE: 7
PROTEIN URINE: NORMAL MG/DL
PSA SERPL-MCNC: 0.61 NG/ML
RED BLOOD CELLS URINE: 2 /HPF
SPECIFIC GRAVITY URINE: 1.02
UROBILINOGEN URINE: 0.2 MG/DL
WHITE BLOOD CELLS URINE: 0 /HPF

## 2023-05-05 NOTE — ASSESSMENT
[FreeTextEntry1] : 12/30/19: Patient presents today for a follow up. He reports waking up 2-3 times at night which is okay for him. He has been taking Finasteride and Tamsulosin which is working for him. He denies constipation or hematuria. \par \par 04/27/2021: Patient presents today for a follow up accompanied by his wife. Pt has been doing well. However, he reports that after he urinates he feels the urge to go again a few minutes later and voids a pretty large amount when he does. This incomplete emptying and double voiding has been going on for a couple of months, however the patient's wife said they were nervous to come out due to covid. Pt was on a high BP medication, Eplerenone, that was discontinued. He is currently taking tamsulosin 0.4 mg once daily after lunch, metformin 1000 mg BID, Doxazosin 4 mg once daily, Januvia 100 mg for diabetes once daily, hydralazine 50 mg 3x a day, Ramipril 5 mg once daily, furosemide 20 mg once daily, and Pravastatin 40 mg once daily for cholesterol. \par \par 05/18/2021: Patient presents today for a follow up accompanied by his wife. The pt had been experiencing urinary frequency, dysuria, and burning last week. He went to the emergency room on 5/13/2021 and a Ramírez catheter was placed. He currently has the catheter in and feels better. He was also given cephalexin 500 mg BID as an antibiotic. This medication has not affected his GI system. The patient has been taking tamsulosin 0.4 mg BID, doxazosin 8 mg at bedtime) as well as finasteride 5 mg once daily. \par \par 05/24/2021: Patient presents today for fill and pull voiding trial for urinary retention. Continues Tadalafil 5mg, Finasteride, and Tamsulosin BID. Has had UDS three years ago without issue. \par \par 06/16/2021: Patient presents today for urodynamics with cystoscopy. He tolerated the procedure well. There was some hematuria when the catheter was passed over the prostate. Patient continues to take doxazosin 8 mg once daily at bedtime, finasteride 5 mg once daily, tadalafil 5 mg once daily, and tamsulosin 0.4 mg BID after a meal for his prostate. He also takes Januvia for diabetes, baby aspirin, ramipril for BP, hydralazine for BP, folic acid, furosemide 20 mg once daily, METformin 1000 mg BID for diabetes and Pravastatin 40 mg for cholesterol. Last PSA from 12/30/2021 was 5.03. MRI of the prostate was satisfactory in December 2019 with a PIRADS-2 and revealed his prostate is 88 cc. PSA has been stable since then. \par \par 07/28/2021: The patient presents today for a post-operative visit. 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 Viktoria Jernigan on 7/20/2021. The procedure went very well and the patient currently has no pain. He no longer has a catheter. His wife reports that his urine still has a slightly pink tint. He has some post void leaking since surgery but his stream is stronger. Denies constipation and his appetite remains good. The patient has not been sexually active the past 3 months. \par \par 09/23/2021: Patient presents today for a follow up. The pt complains of mild stress incontinence as well as urinary urgency. He has been taking tadalafil 5 mg once daily. Erections are adequate. Denies constipation. \par \par 10/18/2021: Patient presents today for follow up. Last visit, patient was started on Trospium with improvement in urination. No longer having urinary urgency. Does have constipation and drinks prune juice. Continues Finasteride 5mg and Tadalafil 5mg once daily. Erections are good. Had laser enucleation of prostate with Dr. Jernigan which has helped his urination. Started seeing PCP Dr. Rufino Medina. \par \par 04/14/2022: Patient presents today for follow up. He was accompanied by his wife. Nocturia 3-4 times per night, especially when he takes the Furosemide late in the day. Pt occasionally has stress incontinence, but does not require a pad because it is miniscule amounts of urine. He recently saw a pulmonologist who increased his Lasix to 40 mg for 2 weeks, but he is taking 20 mg per day now. Denies gross hematuria. Nocturia twice per night. His wife believes he is losing weight and has less of an appetite.  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 Viktoria Jernigan on 7/20/2021. The patient takes Atenolol, Carvedilol, Finasteride, Furosemide, Januvia, Hydralazine, Metformin, Ramipril, folic acid. He occasionally takes Tadalafil. \par \par The patient produced a urine sample which will be sent for urinalysis, urine cytology, and urine culture. \par Blood work today included BMP, CBC, alkaline phosphatase, PSA, and testosterone. \par I recommend the patient discontinue taking the Finasteride now that he has had the the surgery. \par The patient will RTO in 6 months, sooner if he has any difficulties. \par \par 9/19/2022:  presents today for a follow up telephone visit. Visit was conducted with him and his wife as he felt his wife could better relay his medical care. Pt had been hospitalized for septicemia at [facility]. Origin was unclear. Was on IV penicillin for 14 days. Pt has a past hx of syphilis which was treated. They were concerned he might have neurosyphillis and treated him empirically. Was discharged on 9/16/2022. Is better now and currently denies problems with urination. Denies nocturia, gross hematuria, dysuria, pushing, straining, and leakage. PSA was 0.19 in April. Had a laser enucleation of the prostate by . Since then he is very pleased with his urination. Continues to take tadalafil 5 mg once daily. Takes no other meds to improve urination. Diabetes needs further improvement. Last A1C on 4/14/2022 was 7.9. \par Reviewed the patient's hospital records from recent hospitalization. \par Recommended the patient speak to his PCP or endocrinologist about diabetes management. \par When patient recovers his energy and strength, patient should schedule an in person office visit. \par \par 01/31/2023: Mr. JARAMILLO presents today for a follow up audio only telehealth visit for which they gave permission for. Visit was conducted with him and his wife as he felt his wife could better relay his medical care. The patient was located at home [address] [place] [address] and I was located in my office in [place], [place]. The pt reports his urination is fairly okay. The scrotum and testicles are good. Nocturia 3-4x and 4-5x during the day. His wife reports of bed wetting but the pt is unaware. Pt denies urinary urgency, straining, pushing, gross hematuria, or dysuria. He takes a water pill and the wife reports no problems\par  The wife reports his Erection takes time and is not as adequate. He is still taking Tadalafil 5 mg but his wife desires a better erection so I will prescribe Tadalafil 20. He complains of cramps in the legs and hands but this pain has been there for years and is not due to tadalafil. \par \par I am prescribing the patient tadalafil 20 mg to be taken 2-3 hours prior to sexual activity. I informed him of the possible side effects of heart burn, tingling feeling on the skin, back ache, and on rare occasion visual and auditory problems. Patient was provided with a good RX card and prescription was sent to his local * for the patient to obtain the best price for the prescription.\par \par Pt will go to his nearest [facility] lab for blood work and a urine sample which will be sent for urinalysis, urine culture, and urine cytology to observe for an infection. If not I will prescribe him a pill for better control.\par \par Pt will schedule a telehealth visit in 3 weeks after lab work.\par \par 02/28/2023: Mr. JARAMILLO presents today for a follow up audio only telehealth visit for which they gave permission for. Visit was conducted with him and his wife as he felt his wife could better relay his medical care.The patient was located at home [address] [place] [address] and I was located in my office in [place], [place]. The patient obtained lab results prior to today’s visit. His UC and Urine Cytology were normal findings. However, his UA revealed elevated glucose qual urine at 200 and his BMP showed elevated glucose levels at 244. Serum glucose is too high and glucose is present in urine, the pt should improve glucose control. In addition, pt should improve diabetes control as his A1c was elevated at 7.7%. The Alk Phos was normal at 54 and testosterone free is normal at 6.6. The pt’s CBC is comparable to prior blood counts with low WBC at 3.46, HGB at 11.7, HCT at 37.7 and Red cell Distribution width at 19.4. Lastly, his PSA is up a bit at 0.77, and I will repeat in 3 months. \par The pt reports urination is well while on Tadalafil 5 mg. Pt denies urinary urgency, straining, pushing, gross hematuria, or dysuria. He states erections are adequate while on Tadalafil 20 mg is very pleased.\par \par Reviewed and discussed laboratory work of 2/7/23 which He  obtained prior to today's visit as requested.\par  his PSA is up a bit at 0.77which He  obtained prior to today's visit as requested.  PSA was 0.77 ng/mL which is quite low.  Patient really will be however it does represent a small rise from the April 14, 2022 which was 0.19.\par \par Pt will go to his nearest [facility] lab for blood work including BMP, alkaline phosphatase, PSA, and testosterone and a urine sample which will be sent for urinalysis, urine culture, and urine cytology. \par \par Pt will schedule a telehealth visit in 3 months to re evaluate PSA levels.\par \par 05/04/2023: Mr. JARAMILLO presents today for a follow up audio visual telehealth visit for which they gave permission for. The patient was located at home [address] [place] [address] and I was located in my office in [place], [place]. His last lab work 2/7/23 revealed elevated glucose qual urine at 200, his BMP showed elevated glucose levels at 244 and his PSA is up a bit at 0.77. His wife states he is doing well and they will be going to the lab today for the prior lab work requested she states his testicles is okay. She denies any leakage. The patient has also informed me he has no complications and is feeling good. He informs me he is still sexually active and continues to take Tadalafil 20 mg as they are both very pleased with the results. \par \par Reviewed laboratory work of 2/7/23\par \par Pt will go to his nearest [facility] lab for blood work including BMP, alkaline phosphatase, PSA, and testosterone and a urine sample which will be sent for urinalysis, urine culture, and urine cytology. \par \par Pt will schedule a telehealth visit 3-4 days after lab work. \par \par Preparation, audio-visual visit, and coordination of care took : 30 Minutes

## 2023-05-05 NOTE — ADDENDUM
[FreeTextEntry1] : This note was authored by Mari Farah working as a scribe for Dr.Gary Sibley. I, Dr. Chris Sibley have reviewed the content of this note and confirm it is true and accurate. I personally performed the history and physical examination and made all the decisions 05/04/2023\par

## 2023-05-05 NOTE — HISTORY OF PRESENT ILLNESS
[FreeTextEntry1] : Mr Gonzales is an 82 year old man followed for hydrocele, past abnormal urine cytology, and BPH. The patient had a hydrocelectomy in October of 2014. Fluid cytology 06/08/16 was abnormal. Fluid cytology 12/07/16 and 12/19/16 were negative for malignant cells. The patient takes finasteride 5 mg, oxybutynin, ER 10 mg, and doxazosin 8 mg once daily. Cystoscopy 12/19/16 found 2+ trabeculation of the bladder. The prostate protruded moderately into the bladder. No bladder stones or tumors. The left and right ureteral orifice visualized and were very close to prostate. I advised him to discontinue taking oxybutynin. On 11/10/17 the patient made 2500 cc of urine. The patient will now begin taking tamsulosin HCl 0.4 MG  one capsule one half hour after breakfast and doxazosin at night. He was able to urinate well over night without any pain. \par 12/10/18: The patient returned today and reported that his urination is satisfactory. Currently takes Finasteride, Tamsulosin and Doxazosin. Complains of intermittent stream and urge incontinence. Denies dysuria and gross hematuria.\par  6/10/19: Patient presents for 6 month follow up. Currently on Proscar and Doxazosin. States that he has no need to strain to initiate urination but flow is intermittent. Reports bothersome urinary urgency/frequency with occasional UUI. Nocturia. Regarding his anemia it is reported that it was evaluated by his PCP and he was told that it will be monitored but is nothing to worry about at this time. \par 6/26/19: Patient presents today for UDS and a cystoscopy. \par 7/3/19: Patient presents today for an ultrasound of the prostate. He is here today to discuss the results. His urination is the same as it was at his last visit. He wake up 4x a night to urinate. During the day he urinates 5-6x. He denies dysuria, gross hematuria, urgency or incontinence. Following the cystoscopy he noticed a small amount of blood and tiny clots but they have since resolved. He is satisfied with his urination at this time. \par 10/8/19: Patient presents today for a renal ultrasound and is here to discuss the results. Doxazosin, Finasteride and Tamsulosin were all prescribed previously. It is reported today that he has only been taking the Doxazosin and the Finasteride. His urination is still troublesome. Nocturia x 4-5 is reported. \par 10/15/19: Patient presents today for a follow up. His most recent PSA from 10/8/19 was 5.14 ng/mL a slight increase when compared to the last PSA. Finasteride is currently taken. When adjusting for the use of Finasteride his PSA is around 10 ng/mL. He is scheduled for surgery with Dr. Rolon next week. \par 11/20/19: Patient presents today for follow-up. He has completed surgery with Dr. Rolon and is improving well. He has finished his Sulfamethoxazole-Trimethoprim. His recent PSA on 11/12 was 5.15 ng/mL. He denies having a pacemaker. Patient has previous abnormal ultrasound and elevated PSA.\par \par 12/30/19: Patient presents today for a follow up. He reports waking up 2-3 times at night which is okay for him. He has been taking Finasteride and Tamsulosin which is working for him. He denies constipation or hematuria. \par \par 04/27/2021: Patient presents today for a follow up accompanied by his wife. Pt has been doing well. However, he reports that after he urinates he feels the urge to go again a few minutes later and voids a pretty large amount when he does. This incomplete emptying and double voiding has been going on for a couple of months, however the patient's wife said they were nervous to come out due to covid. Pt was on a high BP medication, Eplerenone, that was discontinued. He is currently taking tamsulosin 0.4 mg once daily after lunch, metformin BID, Doxazosin 4 mg once daily, Januvia 100 mg for diabetes once daily, hydralazine 50 mg 3x a day, Ramipril 5 mg once daily, furosemide 20 mg once daily, and Pravastatin 40 mg once daily for cholesterol. \par \par 05/18/2021: Patient presents today for a follow up accompanied by his wife. The pt had been experiencing urinary frequency, dysuria, and burning last week. He went to the emergency room on 5/13/2021 and a Ramírez catheter was placed. He currently has the catheter in and feels better. He was also given cephalexin 500 mg BID as an antibiotic. This medication has not affected his GI system. The patient has been taking tamsulosin 0.4 mg BID (increased from once daily) as well as finasteride 5 mg once daily. \par \par 05/24/2021: Patient presents today for fill and pull voiding trial for urinary retention. Continues Tadalafil 5mg, Finasteride, and Tamsulosin BID. Has had UDS three years ago without issue. \par \par 06/16/2021: Patient presents today for urodynamics with cystoscopy. He tolerated the procedure well. There was some hematuria when the catheter was passed over the prostate. Patient continues to take doxazosin 8 mg once daily at bedtime, finasteride 5 mg once daily, tadalafil 5 mg once daily, and tamsulosin 0.4 mg BID after a meal for his prostate. He also takes Januvia for diabetes, baby aspirin, ramipril for BP, hydralazine for BP, folic acid, furosemide 20 mg once daily, METformin 1000 mg BID for diabetes and Pravastatin 40 mg for cholesterol. Last PSA from 12/30/2021 was 5.03. MRI of the prostate was satisfactory in December 2019 with a PIRADS-2 and revealed his prostate is 88 cc. PSA has been stable since then. \par \par 07/28/2021: The patient presents today for a post-operative visit. 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 Viktoria Jernigan on 7/20/2021. The procedure went very well and the patient currently has no pain. He no longer has a catheter. His wife reports that his urine still has a slightly pink tint. He has some post void leaking since surgery but his stream is stronger. Denies constipation and his appetite remains good. The patient has not been sexually active the past 3 months. \par \par 09/23/2021: Patient presents today for a follow up. The pt complains of mild stress incontinence as well as urinary urgency. He has been taking tadalafil 5 mg once daily. Erections are adequate. Denies constipation. \par \par 10/18/2021: Patient presents today for follow up. Last visit, patient was started on Trospium with improvement in urination. No longer having urinary urgency. Does have constipation and drinks prune juice. Continues Finasteride 5mg and Tadalafil 5mg once daily. Erections are good. Had laser enucleation of prostate with Dr. Jernigan which has helped his urination. Started seeing PCP Dr. Rufino Medina. \par \par 04/14/2022: Patient presents today for follow up. He was accompanied by his wife. Nocturia 3-4 times per night, especially when he takes the Furosemide late in the day. Pt occasionally has stress incontinence, but does not require a pad because it is miniscule amounts of urine. He recently saw a pulmonologist who increased his Lasix to 40 mg for 2 weeks, but he is taking 20 mg per day now. Denies gross hematuria. Nocturia twice per night. His wife believes he is losing weight and has less of an appetite.  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 Viktoria Jernigan on 7/20/2021. The patient takes Atenolol, Carvedilol, Finasteride, Furosemide, Januvia, Hydralazine, Metformin, Ramipril, folic acid. He occasionally takes Tadalafil. \par \par 9/19/2022:  presents today for a follow up telephone visit. Visit was conducted with him and his wife as he felt his wife could better relay his medical care. Pt had been hospitalized for septicemia at [facility]. Origin was unclear. Was on IV penicillin for 14 days. Pt has a past hx of syphilis which was treated. They were concerned he might have neurosyphillis and treated him empirically. Was discharged on 9/16/2022. Is better now and currently denies problems with urination. Denies nocturia, gross hematuria, dysuria, pushing, straining, and leakage. PSA was 0.19 in April. Had a laser enucleation of the prostate by . Since then he is very pleased with his urination. Continues to take tadalafil 5 mg once daily. Takes no other meds to improve urination. Diabetes needs further improvement. Last A1C on 4/14/2022 was 7.9. \par \par 01/31/2023: Mr. GONZALES presents today for a follow up audio only telehealth visit for which they gave permission for. Visit was conducted with him and his wife as he felt his wife could better relay his medical care. The patient was located at home [address] [place] [address] and I was located in my office in [place], [place]. The pt reports his urination is fairly okay. The scrotum and testicles are good. Nocturia 3-4x and 4-5x during the day. His wife reports of bed wetting but the pt is unaware. Pt denies urinary urgency, straining, pushing, gross hematuria, or dysuria. He takes a water pill and the wife reports no problems\par  The wife reports his Erection takes time and is not as adequate. He is still taking Tadalafil 5 mg but his wife desires a better erection so I will prescribe Tadalafil 20. He complains of cramps in the legs and hands but this pain has been there for years and is not due to tadalafil. \par \par 02/28/2023: Mr. GONZALES presents today for a follow up audio only telehealth visit for which they gave permission for. Visit was conducted with him and his wife as he felt his wife could better relay his medical care.The patient was located at home [address] [place] [address] and I was located in my office in [place], [place]. The patient obtained lab results prior to today’s visit. His UC and Urine Cytology were normal findings. However, his UA revealed elevated glucose qual urine at 200 and his BMP showed elevated glucose levels at 244. Serum glucose is too high and glucose is present in urine, the pt should improve glucose control. In addition, pt should improve diabetes control as his A1c was elevated at 7.7%. The Alk Phos was normal at 54 and testosterone free is normal at 6.6. The pt’s CBC is comparable to prior blood counts with low WBC at 3.46, HGB at 11.7, HCT at 37.7 and Red cell Distribution width at 19.4. Lastly, his PSA is u a bit at 0.77, and I will repeat in 3 months. \par The pt reports urination is well while on Tadalafil 5 mg. Pt denies urinary urgency, straining, pushing, gross hematuria, or dysuria. He states erections are adequate while on Tadalafil 20 mg is very pleased.\par \par 05/04/2023: Mr. GONZALES presents today for a follow up audio visual telehealth visit for which they gave permission for. The patient was located at home [address] [place] [address] and I was located in my office in [place], [place]. His last lab work 2/7/23 revealed elevated glucose qual urine at 200, his BMP showed elevated glucose levels at 244 and his PSA is up a bit at 0.77. His wife states he is doing well and they will be going to the lab today for the prior lab work requested she states his testicles is okay. She denies any leakage. The patient has also informed me he has no complications and is feeling good. He informs me he is still sexually active and continues to take Tadalafil 20 mg as they are both very pleased with the results.

## 2023-05-06 LAB — BACTERIA UR CULT: NORMAL

## 2023-05-08 LAB
TESTOST FREE SERPL-MCNC: 6.6 PG/ML
TESTOST SERPL-MCNC: 403 NG/DL

## 2023-05-09 ENCOUNTER — APPOINTMENT (OUTPATIENT)
Dept: UROLOGY | Facility: CLINIC | Age: 83
End: 2023-05-09
Payer: MEDICARE

## 2023-05-09 DIAGNOSIS — R35.0 FREQUENCY OF MICTURITION: ICD-10-CM

## 2023-05-09 DIAGNOSIS — I50.22 CHRONIC SYSTOLIC (CONGESTIVE) HEART FAILURE: ICD-10-CM

## 2023-05-09 DIAGNOSIS — N52.9 MALE ERECTILE DYSFUNCTION, UNSPECIFIED: ICD-10-CM

## 2023-05-09 DIAGNOSIS — N39.41 URGE INCONTINENCE: ICD-10-CM

## 2023-05-09 DIAGNOSIS — N13.8 BENIGN PROSTATIC HYPERPLASIA WITH LOWER URINARY TRACT SYMPMS: ICD-10-CM

## 2023-05-09 DIAGNOSIS — N40.1 BENIGN PROSTATIC HYPERPLASIA WITH LOWER URINARY TRACT SYMPMS: ICD-10-CM

## 2023-05-09 LAB — URINE CYTOLOGY: NORMAL

## 2023-05-09 PROCEDURE — 99214 OFFICE O/P EST MOD 30 MIN: CPT | Mod: 95

## 2023-05-09 NOTE — HISTORY OF PRESENT ILLNESS
[FreeTextEntry1] : Mr Gonzales is an 82 year old man followed for hydrocele, past abnormal urine cytology, and BPH. The patient had a hydrocelectomy in October of 2014. Fluid cytology 06/08/16 was abnormal. Fluid cytology 12/07/16 and 12/19/16 were negative for malignant cells. The patient takes finasteride 5 mg, oxybutynin, ER 10 mg, and doxazosin 8 mg once daily. Cystoscopy 12/19/16 found 2+ trabeculation of the bladder. The prostate protruded moderately into the bladder. No bladder stones or tumors. The left and right ureteral orifice visualized and were very close to prostate. I advised him to discontinue taking oxybutynin. On 11/10/17 the patient made 2500 cc of urine. The patient will now begin taking tamsulosin HCl 0.4 MG  one capsule one half hour after breakfast and doxazosin at night. He was able to urinate well over night without any pain. \par 12/10/18: The patient returned today and reported that his urination is satisfactory. Currently takes Finasteride, Tamsulosin and Doxazosin. Complains of intermittent stream and urge incontinence. Denies dysuria and gross hematuria.\par  6/10/19: Patient presents for 6 month follow up. Currently on Proscar and Doxazosin. States that he has no need to strain to initiate urination but flow is intermittent. Reports bothersome urinary urgency/frequency with occasional UUI. Nocturia. Regarding his anemia it is reported that it was evaluated by his PCP and he was told that it will be monitored but is nothing to worry about at this time. \par 6/26/19: Patient presents today for UDS and a cystoscopy. \par 7/3/19: Patient presents today for an ultrasound of the prostate. He is here today to discuss the results. His urination is the same as it was at his last visit. He wake up 4x a night to urinate. During the day he urinates 5-6x. He denies dysuria, gross hematuria, urgency or incontinence. Following the cystoscopy he noticed a small amount of blood and tiny clots but they have since resolved. He is satisfied with his urination at this time. \par 10/8/19: Patient presents today for a renal ultrasound and is here to discuss the results. Doxazosin, Finasteride and Tamsulosin were all prescribed previously. It is reported today that he has only been taking the Doxazosin and the Finasteride. His urination is still troublesome. Nocturia x 4-5 is reported. \par 10/15/19: Patient presents today for a follow up. His most recent PSA from 10/8/19 was 5.14 ng/mL a slight increase when compared to the last PSA. Finasteride is currently taken. When adjusting for the use of Finasteride his PSA is around 10 ng/mL. He is scheduled for surgery with Dr. Rolon next week. \par 11/20/19: Patient presents today for follow-up. He has completed surgery with Dr. Rolon and is improving well. He has finished his Sulfamethoxazole-Trimethoprim. His recent PSA on 11/12 was 5.15 ng/mL. He denies having a pacemaker. Patient has previous abnormal ultrasound and elevated PSA.\par \par 12/30/19: Patient presents today for a follow up. He reports waking up 2-3 times at night which is okay for him. He has been taking Finasteride and Tamsulosin which is working for him. He denies constipation or hematuria. \par \par 04/27/2021: Patient presents today for a follow up accompanied by his wife. Pt has been doing well. However, he reports that after he urinates he feels the urge to go again a few minutes later and voids a pretty large amount when he does. This incomplete emptying and double voiding has been going on for a couple of months, however the patient's wife said they were nervous to come out due to covid. Pt was on a high BP medication, Eplerenone, that was discontinued. He is currently taking tamsulosin 0.4 mg once daily after lunch, metformin BID, Doxazosin 4 mg once daily, Januvia 100 mg for diabetes once daily, hydralazine 50 mg 3x a day, Ramipril 5 mg once daily, furosemide 20 mg once daily, and Pravastatin 40 mg once daily for cholesterol. \par \par 05/18/2021: Patient presents today for a follow up accompanied by his wife. The pt had been experiencing urinary frequency, dysuria, and burning last week. He went to the emergency room on 5/13/2021 and a Ramírez catheter was placed. He currently has the catheter in and feels better. He was also given cephalexin 500 mg BID as an antibiotic. This medication has not affected his GI system. The patient has been taking tamsulosin 0.4 mg BID (increased from once daily) as well as finasteride 5 mg once daily. \par \par 05/24/2021: Patient presents today for fill and pull voiding trial for urinary retention. Continues Tadalafil 5mg, Finasteride, and Tamsulosin BID. Has had UDS three years ago without issue. \par \par 06/16/2021: Patient presents today for urodynamics with cystoscopy. He tolerated the procedure well. There was some hematuria when the catheter was passed over the prostate. Patient continues to take doxazosin 8 mg once daily at bedtime, finasteride 5 mg once daily, tadalafil 5 mg once daily, and tamsulosin 0.4 mg BID after a meal for his prostate. He also takes Januvia for diabetes, baby aspirin, ramipril for BP, hydralazine for BP, folic acid, furosemide 20 mg once daily, METformin 1000 mg BID for diabetes and Pravastatin 40 mg for cholesterol. Last PSA from 12/30/2021 was 5.03. MRI of the prostate was satisfactory in December 2019 with a PIRADS-2 and revealed his prostate is 88 cc. PSA has been stable since then. \par \par 07/28/2021: The patient presents today for a post-operative visit. 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 Viktoria Jernigan on 7/20/2021. The procedure went very well and the patient currently has no pain. He no longer has a catheter. His wife reports that his urine still has a slightly pink tint. He has some post void leaking since surgery but his stream is stronger. Denies constipation and his appetite remains good. The patient has not been sexually active the past 3 months. \par \par 09/23/2021: Patient presents today for a follow up. The pt complains of mild stress incontinence as well as urinary urgency. He has been taking tadalafil 5 mg once daily. Erections are adequate. Denies constipation. \par \par 10/18/2021: Patient presents today for follow up. Last visit, patient was started on Trospium with improvement in urination. No longer having urinary urgency. Does have constipation and drinks prune juice. Continues Finasteride 5mg and Tadalafil 5mg once daily. Erections are good. Had laser enucleation of prostate with Dr. Jernigan which has helped his urination. Started seeing PCP Dr. Rufino Medina. \par \par 04/14/2022: Patient presents today for follow up. He was accompanied by his wife. Nocturia 3-4 times per night, especially when he takes the Furosemide late in the day. Pt occasionally has stress incontinence, but does not require a pad because it is miniscule amounts of urine. He recently saw a pulmonologist who increased his Lasix to 40 mg for 2 weeks, but he is taking 20 mg per day now. Denies gross hematuria. Nocturia twice per night. His wife believes he is losing weight and has less of an appetite.  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 Viktoria Jernigan on 7/20/2021. The patient takes Atenolol, Carvedilol, Finasteride, Furosemide, Januvia, Hydralazine, Metformin, Ramipril, folic acid. He occasionally takes Tadalafil. \par \par 9/19/2022:  presents today for a follow up telephone visit. Visit was conducted with him and his wife as he felt his wife could better relay his medical care. Pt had been hospitalized for septicemia at [facility]. Origin was unclear. Was on IV penicillin for 14 days. Pt has a past hx of syphilis which was treated. They were concerned he might have neurosyphillis and treated him empirically. Was discharged on 9/16/2022. Is better now and currently denies problems with urination. Denies nocturia, gross hematuria, dysuria, pushing, straining, and leakage. PSA was 0.19 in April. Had a laser enucleation of the prostate by . Since then he is very pleased with his urination. Continues to take tadalafil 5 mg once daily. Takes no other meds to improve urination. Diabetes needs further improvement. Last A1C on 4/14/2022 was 7.9. \par \par 01/31/2023: Mr. GONZALES presents today for a follow up audio only telehealth visit for which they gave permission for. Visit was conducted with him and his wife as he felt his wife could better relay his medical care. The patient was located at home [address] [place] [address] and I was located in my office in [place], [place]. The pt reports his urination is fairly okay. The scrotum and testicles are good. Nocturia 3-4x and 4-5x during the day. His wife reports of bed wetting but the pt is unaware. Pt denies urinary urgency, straining, pushing, gross hematuria, or dysuria. He takes a water pill and the wife reports no problems\par  The wife reports his Erection takes time and is not as adequate. He is still taking Tadalafil 5 mg but his wife desires a better erection so I will prescribe Tadalafil 20. He complains of cramps in the legs and hands but this pain has been there for years and is not due to tadalafil. \par \par 02/28/2023: Mr. GONZALES presents today for a follow up audio only telehealth visit for which they gave permission for. Visit was conducted with him and his wife as he felt his wife could better relay his medical care.The patient was located at home [address] [place] [address] and I was located in my office in [place], [place]. The patient obtained lab results prior to today’s visit. His UC and Urine Cytology were normal findings. However, his UA revealed elevated glucose qual urine at 200 and his BMP showed elevated glucose levels at 244. Serum glucose is too high and glucose is present in urine, the pt should improve glucose control. In addition, pt should improve diabetes control as his A1c was elevated at 7.7%. The Alk Phos was normal at 54 and testosterone free is normal at 6.6. The pt’s CBC is comparable to prior blood counts with low WBC at 3.46, HGB at 11.7, HCT at 37.7 and Red cell Distribution width at 19.4. Lastly, his PSA is u a bit at 0.77, and I will repeat in 3 months. \par The pt reports urination is well while on Tadalafil 5 mg. Pt denies urinary urgency, straining, pushing, gross hematuria, or dysuria. He states erections are adequate while on Tadalafil 20 mg is very pleased.\par \par 05/04/2023: Mr. GONZALES presents today for a follow up audio visual telehealth visit for which they gave permission for. The patient was located at home [address] [place] [address] and I was located in my office in [place], [place]. His last lab work 2/7/23 revealed elevated glucose qual urine at 200, his BMP showed elevated glucose levels at 244 and his PSA is up a bit at 0.77. His wife states he is doing well and they will be going to the lab today for the prior lab work requested she states his testicles is okay. She denies any leakage. The patient has also informed me he has no complications and is feeling good. He informs me he is still sexually active and continues to take Tadalafil 20 mg as they are both very pleased with the results. \par \par 05/09/2023: Mr. GONZALES presents today for a follow up audio visual telehealth [facility] visit for which they gave permission for and he is accompanied by his wife. The patient was located at home [address] [place] [address] and I was located in my office in [place], [place]. The patient obtained lab work 5/4/23 prior to today's visit. The testosterone levels are normal at 6.6 free and 403 total. The BMP revealed elevated glucose at 154, Creatinine is at 1.05. Patient's hemoglobin A1c is a bit higher than recommended at 7.5%. Patient should see primary care physician and/or endocrinologist to improve glucose control. the UA revealed a trace of Leukocyte Esterase. PSA is at 0.61 whereas 3 months ago PSA was 0.77. \par The patient reports feeling great. However, he complains of urinary leakage. The patient states last night he had an accident but it was not enough for him to change his underwear. Overall, he states he feels good as the accident has only happened once.

## 2023-05-09 NOTE — ADDENDUM
[FreeTextEntry1] : This note was authored by Mari Farah working as a scribe for Dr.Gary Sibley. I, Dr. Chris Sibley have reviewed the content of this note and confirm it is true and accurate. I personally performed the history and physical examination and made all the decisions 05/09/2023\par

## 2023-05-19 NOTE — H&P ADULT - ATTENDING SUPERVISION STATEMENT
Received an 11 page fax regarding patient's OT recert, progress report, and updated therapy plan. Two physician signatures required, form placed in triage bin.   Resident

## 2023-05-30 ENCOUNTER — NON-APPOINTMENT (OUTPATIENT)
Age: 83
End: 2023-05-30

## 2023-05-30 ENCOUNTER — APPOINTMENT (OUTPATIENT)
Dept: CARDIOLOGY | Facility: CLINIC | Age: 83
End: 2023-05-30
Payer: MEDICARE

## 2023-05-30 VITALS
BODY MASS INDEX: 24.91 KG/M2 | WEIGHT: 174 LBS | HEART RATE: 80 BPM | DIASTOLIC BLOOD PRESSURE: 89 MMHG | OXYGEN SATURATION: 99 % | SYSTOLIC BLOOD PRESSURE: 160 MMHG | HEIGHT: 70 IN

## 2023-05-30 PROCEDURE — 93000 ELECTROCARDIOGRAM COMPLETE: CPT

## 2023-05-30 PROCEDURE — 99214 OFFICE O/P EST MOD 30 MIN: CPT | Mod: 25

## 2023-05-30 NOTE — HISTORY OF PRESENT ILLNESS
[FreeTextEntry1] : 82 year old with a history of atrial fibrillation last year.  No KEV, PND or orthopnea.  has not been walking but states that he can walk 5 blocks.  No chest pain.     No dyspnea.  Has been trying to follow a diet but doesn’t have much of an appetite.  Has some swelling on feet

## 2023-05-30 NOTE — DISCUSSION/SUMMARY
[FreeTextEntry1] : 1.  atrial fib -  now in nsr.  Angiogram was nonobstructive in May, 2019. He is on asa and eliquis.  controlled.  Will get an echo

## 2023-10-23 ENCOUNTER — APPOINTMENT (OUTPATIENT)
Dept: PULMONOLOGY | Facility: CLINIC | Age: 83
End: 2023-10-23
Payer: MEDICARE

## 2023-10-23 VITALS
WEIGHT: 175 LBS | BODY MASS INDEX: 25.05 KG/M2 | TEMPERATURE: 97.3 F | SYSTOLIC BLOOD PRESSURE: 131 MMHG | RESPIRATION RATE: 16 BRPM | HEART RATE: 86 BPM | HEIGHT: 70 IN | DIASTOLIC BLOOD PRESSURE: 74 MMHG | OXYGEN SATURATION: 98 %

## 2023-10-23 DIAGNOSIS — R06.02 SHORTNESS OF BREATH: ICD-10-CM

## 2023-10-23 DIAGNOSIS — J90 PLEURAL EFFUSION, NOT ELSEWHERE CLASSIFIED: ICD-10-CM

## 2023-10-23 DIAGNOSIS — G47.52 REM SLEEP BEHAVIOR DISORDER: ICD-10-CM

## 2023-10-23 PROCEDURE — 99214 OFFICE O/P EST MOD 30 MIN: CPT

## 2023-11-10 NOTE — ASSESSMENT
Called and spoke to patient.     Pt wanted to clarify when to take her OCP, discussed skipping placebo so she does not have cycle. Start on Sunday.     Questions answered.    [FreeTextEntry1] : 12/30/19: Patient presents today for a follow up. He reports waking up 2-3 times at night which is okay for him. He has been taking Finasteride and Tamsulosin which is working for him. He denies constipation or hematuria. \par \par 04/27/2021: Patient presents today for a follow up accompanied by his wife. Pt has been doing well. However, he reports that after he urinates he feels the urge to go again a few minutes later and voids a pretty large amount when he does. This incomplete emptying and double voiding has been going on for a couple of months, however the patient's wife said they were nervous to come out due to covid. Pt was on a high BP medication, Eplerenone, that was discontinued. He is currently taking tamsulosin 0.4 mg once daily after lunch, metformin 1000 mg BID, Doxazosin 4 mg once daily, Januvia 100 mg for diabetes once daily, hydralazine 50 mg 3x a day, Ramipril 5 mg once daily, furosemide 20 mg once daily, and Pravastatin 40 mg once daily for cholesterol. \par \par 05/18/2021: Patient presents today for a follow up accompanied by his wife. The pt had been experiencing urinary frequency, dysuria, and burning last week. He went to the emergency room on 5/13/2021 and a Ramírez catheter was placed. He currently has the catheter in and feels better. He was also given cephalexin 500 mg BID as an antibiotic. This medication has not affected his GI system. The patient has been taking tamsulosin 0.4 mg BID, doxazosin 8 mg at bedtime) as well as finasteride 5 mg once daily. \par \par 05/24/2021: Patient presents today for fill and pull voiding trial for urinary retention. Continues Tadalafil 5mg, Finasteride, and Tamsulosin BID. Has had UDS three years ago without issue. \par \par 06/16/2021: Patient presents today for urodynamics with cystoscopy. He tolerated the procedure well. There was some hematuria when the catheter was passed over the prostate. Patient continues to take doxazosin 8 mg once daily at bedtime, finasteride 5 mg once daily, tadalafil 5 mg once daily, and tamsulosin 0.4 mg BID after a meal for his prostate. He also takes Januvia for diabetes, baby aspirin, ramipril for BP, hydralazine for BP, folic acid, furosemide 20 mg once daily, METformin 1000 mg BID for diabetes and Pravastatin 40 mg for cholesterol. Last PSA from 12/30/2021 was 5.03. MRI of the prostate was satisfactory in December 2019 with a PIRADS-2 and revealed his prostate is 88 cc. PSA has been stable since then. \par \par 07/28/2021: The patient presents today for a post-operative visit. 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 Viktoria Jernigan on 7/20/2021. The procedure went very well and the patient currently has no pain. He no longer has a catheter. His wife reports that his urine still has a slightly pink tint. He has some post void leaking since surgery but his stream is stronger. Denies constipation and his appetite remains good. The patient has not been sexually active the past 3 months. \par \par 09/23/2021: Patient presents today for a follow up. The pt complains of mild stress incontinence as well as urinary urgency. He has been taking tadalafil 5 mg once daily. Erections are adequate. Denies constipation. \par \par 10/18/2021: Patient presents today for follow up. Last visit, patient was started on Trospium with improvement in urination. No longer having urinary urgency. Does have constipation and drinks prune juice. Continues Finasteride 5mg and Tadalafil 5mg once daily. Erections are good. Had laser enucleation of prostate with Dr. Jernigan which has helped his urination. Started seeing PCP Dr. Rufino Medina. \par \par 04/14/2022: Patient presents today for follow up. He was accompanied by his wife. Nocturia 3-4 times per night, especially when he takes the Furosemide late in the day. Pt occasionally has stress incontinence, but does not require a pad because it is miniscule amounts of urine. He recently saw a pulmonologist who increased his Lasix to 40 mg for 2 weeks, but he is taking 20 mg per day now. Denies gross hematuria. Nocturia twice per night. His wife believes he is losing weight and has less of an appetite.  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 Viktoria Jernigan on 7/20/2021. The patient takes Atenolol, Carvedilol, Finasteride, Furosemide, Januvia, Hydralazine, Metformin, Ramipril, folic acid. He occasionally takes Tadalafil. \par \par The patient produced a urine sample which will be sent for urinalysis, urine cytology, and urine culture. \par Blood work today included BMP, CBC, alkaline phosphatase, PSA, and testosterone. \par I recommend the patient discontinue taking the Finasteride now that he has had the the surgery. \par The patient will RTO in 6 months, sooner if he has any difficulties. \par \par 9/19/2022:  presents today for a follow up telephone visit. Visit was conducted with him and his wife as he felt his wife could better relay his medical care. Pt had been hospitalized for septicemia at [facility]. Origin was unclear. Was on IV penicillin for 14 days. Pt has a past hx of syphilis which was treated. They were concerned he might have neurosyphillis and treated him empirically. Was discharged on 9/16/2022. Is better now and currently denies problems with urination. Denies nocturia, gross hematuria, dysuria, pushing, straining, and leakage. PSA was 0.19 in April. Had a laser enucleation of the prostate by . Since then he is very pleased with his urination. Continues to take tadalafil 5 mg once daily. Takes no other meds to improve urination. Diabetes needs further improvement. Last A1C on 4/14/2022 was 7.9. \par Reviewed the patient's hospital records from recent hospitalization. \par Recommended the patient speak to his PCP or endocrinologist about diabetes management. \par When patient recovers his energy and strength, patient should schedule an in person office visit. \par \par 01/31/2023: Mr. JARAMILLO presents today for a follow up audio only telehealth visit for which they gave permission for. Visit was conducted with him and his wife as he felt his wife could better relay his medical care. The patient was located at home [address] [place] [address] and I was located in my office in [place], [place]. The pt reports his urination is fairly okay. The scrotum and testicles are good. Nocturia 3-4x and 4-5x during the day. His wife reports of bed wetting but the pt is unaware. Pt denies urinary urgency, straining, pushing, gross hematuria, or dysuria. He takes a water pill and the wife reports no problems\par  The wife reports his Erection takes time and is not as adequate. He is still taking Tadalafil 5 mg but his wife desires a better erection so I will prescribe Tadalafil 20. He complains of cramps in the legs and hands but this pain has been there for years and is not due to tadalafil. \par \par I am prescribing the patient tadalafil 20 mg to be taken 2-3 hours prior to sexual activity. I informed him of the possible side effects of heart burn, tingling feeling on the skin, back ache, and on rare occasion visual and auditory problems. Patient was provided with a good RX card and prescription was sent to his local * for the patient to obtain the best price for the prescription.\par \par Pt will go to his nearest [facility] lab for blood work and a urine sample which will be sent for urinalysis, urine culture, and urine cytology to observe for an infection. If not I will prescribe him a pill for better control.\par \par Pt will schedule a telehealth visit in 3 weeks after lab work.\par \par 02/28/2023: Mr. JARAMILLO presents today for a follow up audio only telehealth visit for which they gave permission for. Visit was conducted with him and his wife as he felt his wife could better relay his medical care.The patient was located at home [address] [place] [address] and I was located in my office in [place], [place]. The patient obtained lab results prior to today’s visit. His UC and Urine Cytology were normal findings. However, his UA revealed elevated glucose qual urine at 200 and his BMP showed elevated glucose levels at 244. Serum glucose is too high and glucose is present in urine, the pt should improve glucose control. In addition, pt should improve diabetes control as his A1c was elevated at 7.7%. The Alk Phos was normal at 54 and testosterone free is normal at 6.6. The pt’s CBC is comparable to prior blood counts with low WBC at 3.46, HGB at 11.7, HCT at 37.7 and Red cell Distribution width at 19.4. Lastly, his PSA is up a bit at 0.77, and I will repeat in 3 months. \par The pt reports urination is well while on Tadalafil 5 mg. Pt denies urinary urgency, straining, pushing, gross hematuria, or dysuria. He states erections are adequate while on Tadalafil 20 mg is very pleased.\par \par Reviewed and discussed laboratory work of 2/7/23 which He  obtained prior to today's visit as requested.\par  his PSA is up a bit at 0.77which He  obtained prior to today's visit as requested.  PSA was 0.77 ng/mL which is quite low.  Patient really will be however it does represent a small rise from the April 14, 2022 which was 0.19.\par \par Pt will go to his nearest [facility] lab for blood work including BMP, alkaline phosphatase, PSA, and testosterone and a urine sample which will be sent for urinalysis, urine culture, and urine cytology. \par Pt will schedule a telehealth visit in 3 months to re evaluate PSA levels.\par \par 05/04/2023: Mr. JARAMILLO presents today for a follow up audio visual telehealth visit for which they gave permission for. The patient was located at home [address] [place] [address] and I was located in my office in [place], [place]. His last lab work 2/7/23 revealed elevated glucose qual urine at 200, his BMP showed elevated glucose levels at 244 and his PSA is up a bit at 0.77. His wife states he is doing well and they will be going to the lab today for the prior lab work requested she states his testicles is okay. She denies any leakage. The patient has also informed me he has no complications and is feeling good. He informs me he is still sexually active and continues to take Tadalafil 20 mg as they are both very pleased with the results. \par \par Reviewed laboratory work of 2/7/23\par \par Pt will go to his nearest [facility] lab for blood work including BMP, alkaline phosphatase, PSA, and testosterone and a urine sample which will be sent for urinalysis, urine culture, and urine cytology. \par \par Pt will schedule a telehealth visit 3-4 days after lab work. \par \par 05/09/2023: Mr. JARAMILLO presents today for a follow up audio visual telehealth [facility] visit for which they gave permission for and he is accompanied by his wife. The patient was located at home [address] [place] [address] and I was located in my office in [place], [place]. The patient obtained lab work 5/4/23 prior to today's visit. The testosterone levels are normal at 6.6 free and 403 total. The BMP revealed elevated glucose at 154, Creatinine is at 1.05. Patient's hemoglobin A1c is a bit higher than recommended at 7.5%. Patient should see primary care physician and/or endocrinologist to improve glucose control. the UA revealed a trace of Leukocyte Esterase. PSA is at 0.61 whereas 3 months ago PSA was 0.77. \par The patient reports feeling great. However, he complains of urinary leakage. The patient states last night he had an accident but it was not enough for him to change his underwear. Overall, he states he feels good as the accident has only happened once. \par \par We will continue to monitor urinary leakage and if it worsens he is to schedule a telehealth appointment in 6 months. \par \par Reviewed and discussed laboratory work of 5/4/23 which He  obtained prior to today's visit as requested.\par \par Preparation, audio-visual visit, and coordination of care took : 30  Minutes

## 2023-11-13 NOTE — ED ADULT NURSE NOTE - NS ED NURSE LEVEL OF CONSCIOUSNESS ORIENTATION
Visit Discharge/Physician Orders     Discharge condition: Stable     Assessment of pain at discharge:none     Anesthetic used: 4% lidocaine topically     Discharge to: Home     Left via:Private automobile     Accompanied by: daughter     ECF/HHA: [facility] - please fax NEW ORDERS     Dressing Orders: Cleanse left leg with normal saline apply alginate and dry dressing change daily. Compression stockings to bilateral lower legs. Wash legs once or twice with baby shampoo, pat dry. Elevate legs 20 minutes 4 times a day to reduce swelling and edema. Treatment Orders:FOLLOW NUTRITIOUS DIET. CHOOSE FOODS HIGH IN PROTEIN -CHICKEN- FISH-AND EGGS,  CHOOSE FOODS HIGH IN VITAMIN C.   MULTIVITAMIN DAILY. FOLLOW NUTRITIOUS DIET. CHOOSE FOODS HIGH IN PROTEIN -CHICKEN- FISH-AND EGGS,  CHOOSE FOODS HIGH IN VITAMIN C.   MULTIVITAMIN DAILY. Watch Sodium intake less than 2,000 mg daily. [address] [place] [address] followup visit ______________3 weeks   Dr. Colmenares_______________  (Please note your next appointment above and if you are unable to keep, kindly give a 24 hour notice. Thank you.)     Physician signature:__________________________        If you experience any of the following, please call the [address] [place] [address] during business hours:     * Increase in Pain  * Temperature over 101  * Increase in drainage from your wound  * Drainage with a foul odor  * Bleeding  * Increase in swelling  * Need for compression bandage changes due to slippage, breakthrough drainage. If you need medical attention outside of the business hours of the [address] [place] [address] please contact your PCP or go to the nearest emergency room. Oriented - self; Oriented - place; Oriented - time

## 2023-12-23 NOTE — ASU PREOP CHECKLIST - HEIGHT IN INCHES
Pt rated  her pain a 7/10.  Ibuprofen, oxy and an ice pack were given.  Incision looks good and VS are WNL.   10

## 2024-01-09 ENCOUNTER — NON-APPOINTMENT (OUTPATIENT)
Age: 84
End: 2024-01-09

## 2024-01-09 ENCOUNTER — APPOINTMENT (OUTPATIENT)
Dept: CARDIOLOGY | Facility: CLINIC | Age: 84
End: 2024-01-09
Payer: MEDICARE

## 2024-01-09 VITALS
SYSTOLIC BLOOD PRESSURE: 128 MMHG | WEIGHT: 175 LBS | DIASTOLIC BLOOD PRESSURE: 72 MMHG | BODY MASS INDEX: 25.05 KG/M2 | HEIGHT: 70 IN | HEART RATE: 74 BPM | OXYGEN SATURATION: 99 %

## 2024-01-09 DIAGNOSIS — I48.91 UNSPECIFIED ATRIAL FIBRILLATION: ICD-10-CM

## 2024-01-09 PROCEDURE — 93000 ELECTROCARDIOGRAM COMPLETE: CPT

## 2024-01-09 PROCEDURE — 99214 OFFICE O/P EST MOD 30 MIN: CPT | Mod: 25

## 2024-03-04 NOTE — ED ADULT NURSE NOTE - SUICIDE SCREENING DEPRESSION
Diabetes Care Specialist Progress Note  Author: Elke Singh RN  Date: 3/12/2024    Program Intake  Reason for Diabetes Program Visit:: Intervention  Type of Intervention:: Individual  Education: Insulin Pump Evaluation  Current diabetes risk level:: moderate  In the last 12 months, have you:: used emergency room services  Was the ER or hospital admission related to diabetes?: Yes  Permission to speak with others about care:: yes (Ms. Montelongo)  Continuous Glucose Monitoring  Patient has CGM: Yes  Personal CGM type:: Dexcom G6, glooko  GMI Date: 03/04/24  GMI Value: 8.4 %    Lab Results   Component Value Date    HGBA1C 7.6 (H) 07/31/2023       Clinical    There is no height or weight on file to calculate BMI.    Patient Health Rating  Compared to other people your age, how would you rate your health?: Good    Problem Review  Reviewed Problem List with Patient: no (see comments)  Active comorbidities affecting diabetes self-care.: no  Reviewed health maintenance: no (see comments)    Clinical Assessment  Current Diabetes Treatment: Insulin pump  Have you ever experienced hypoglycemia (low blood sugar)?: yes  In the last month, how often have you experienced low blood sugar?: more than once a week  Are you able to tell when your blood sugar is low?: No  Have you ever been hospitalized because your blood sugar was too low?: no  How do you treat hypoglycemia (low blood sugar)?: 1/2 can soda/fruit juice, 4 glucose tablets, 5-6 pieces of hard candy  Have you ever experienced hyperglycemia (high blood sugar)?: yes  In the last month, how often have you experienced high blood sugar?: more than once a day  Are you able to tell when your blood sugar is high?: Yes  What are your symptoms?: frequent urination, thirst  Have you ever been hospitalized because your blood sugar was high?: yes (see comments) (new onset 1/22)    Medication Information  How do you obtain your medications?: Family picks up  How many days a week do  you miss your medications?: Never  Do you use a pill box or medication chart to help you manage your medications?: No  Do you sometimes have difficulty refilling your medications?: No  Medication adherence impacting ability to self-manage diabetes?: No    Labs  Do you have regular lab work to monitor your medications?: Yes  Type of Regular Lab Work: A1c, Cholesterol, Microalbumin  Where do you get your labs drawn?: [facility]  Lab Compliance Barriers: Yes    Nutritional Status  Diet: Regular (Carb counting for insulin admin)  Meal Plan 24 Hour Recall:  (NOTES)  Change in appetite?: No  Dentation:: Intact  Current nutritional status an area of need that is impacting patient's ability to self-manage diabetes?: No    Assessment Summary and Plan    Based on today's diabetes care assessment, the following areas of need were identified:          1/26/2024    12:01 AM   Social   Support No   Access to Mass Media/Tech No   Cognitive/Behavioral Health No   Culture/Sikhism No   Communication No   Health Literacy No            3/4/2024    12:01 AM   Clinical   Medication Adherence No   Lab Compliance Yes   Nutritional Status No            1/26/2024    12:01 AM   Diabetes Self-Management Skills   Diabetes Disease Process/Treatment Options Deferred   Nutrition/Healthy Eating Deferred   Physical Activity/Exercise No   Home Blood Glucose Monitoring No   Acute Complications Deferred   Chronic Complications Deferred   Psychosocial/Coping No          Today's interventions were provided through individual discussion, instruction, and written materials were provided.      Patient verbalized understanding of instruction and written materials.  Pt was able to return back demonstration of instructions today. Patient understood key points, needs reinforcement and further instruction.     Diabetes Self-Management Care Plan:    Today's Diabetes Self-Management Care Plan was developed with Nerissa's input. Nerissa has agreed to work toward the  following goal(s) to improve his/her overall diabetes control.        Goal: Increase the variety of foods in diet, incrase fruits and veggies with each meal    Start Date: 3/4/2024   This Visit's Progress: On track   Barriers: No Barriers Identified   Note:    Mom states Nerissa likes fruits and veggies. Even if they eat out he will have a vegetable with his dinner. Discussed increasing fruits a veggies. Continued goal set by Dr. Pascual, only have chocolate milk once a day       Task: Reviewed the sources and role of Carbohydrate, Protein, and Fat and how each nutrient impacts blood sugar. Completed 3/12/2024          Task: Discussed strategies for choosing healthier menu options when dining out. Completed 3/12/2024          Task: Review the importance of balancing carbohydrates with each meal using portion control techniques to count servings of carbohydrate and label reading to identify serving size and amount of total carbs per serving. Completed 3/12/2024        Medication:  Insulin Instructions  Pump Settings   insulin lispro 100 unit/mL Inph   Last edited by Ashley Pascual MD on 2/21/2024 at 3:58 PM      Basal Rate   Total Basal Dose: 8.4 units/day   Time units/hr   12:00 AM 0.4      Blood Glucose Target   Time mg/dL   12:00  - 140    6:30  - 130   10:00  - 140      Sensitivity Factor   Time mg/dL/unit   12:00 AM 90      Carb Ratio   Time g/unit   12:00 AM 16       Knowledge  Mom state he had a prolonged high on Sat, she changed his pump and his BG came back down in range, explained she could reach out to [employer] for a replacement. Reached out via portal to give instructions and phone number for [employer].  Hypoglycemia- tab 2 tabs, tries to not to over treat - he often feels nauseous and tired  Hyperglycemia- thirsty, stomach hurts, headache   Reminded to increase water intake and getting all insulin admin.    Nutrition:  Breakfast cereal, pop tarts, white milk  at breakfast, apple-school    Lunch: chocolate milk-school   Dinner brocovannessa, chicken, mom states they eat out a lot but he always has a veggie with dinner states he likes corn, cabbage    Plan:  Changed his 8 pm carb ratio to 1:14 consulted with Mattie Lucas NP    Follow Up Plan     Mattie Lucas NP 5/20    Today's care plan and follow up schedule was discussed with patient.  Nerissa verbalized understanding of the care plan, goals, and agrees to follow up plan.        The patient was encouraged to communicate with his/her health care provider/physician and care team regarding his/her condition(s) and treatment.  I provided the patient with my contact information today and encouraged to contact me via phone or [facility] Patient Portal as needed.     Length of Visit   Total Time: 30 Minutes        Negative

## 2024-05-22 ENCOUNTER — OUTPATIENT (OUTPATIENT)
Dept: OUTPATIENT SERVICES | Facility: HOSPITAL | Age: 84
LOS: 1 days | End: 2024-05-22

## 2024-05-22 VITALS
TEMPERATURE: 97 F | HEIGHT: 69 IN | DIASTOLIC BLOOD PRESSURE: 79 MMHG | HEART RATE: 43 BPM | RESPIRATION RATE: 16 BRPM | SYSTOLIC BLOOD PRESSURE: 128 MMHG | WEIGHT: 171.96 LBS | OXYGEN SATURATION: 98 %

## 2024-05-22 DIAGNOSIS — L72.0 EPIDERMAL CYST: ICD-10-CM

## 2024-05-22 DIAGNOSIS — Z98.49 CATARACT EXTRACTION STATUS, UNSPECIFIED EYE: Chronic | ICD-10-CM

## 2024-05-22 DIAGNOSIS — Z98.890 OTHER SPECIFIED POSTPROCEDURAL STATES: Chronic | ICD-10-CM

## 2024-05-22 DIAGNOSIS — D49.2 NEOPLASM OF UNSPECIFIED BEHAVIOR OF BONE, SOFT TISSUE, AND SKIN: ICD-10-CM

## 2024-05-22 DIAGNOSIS — E11.9 TYPE 2 DIABETES MELLITUS WITHOUT COMPLICATIONS: ICD-10-CM

## 2024-05-22 DIAGNOSIS — I48.91 UNSPECIFIED ATRIAL FIBRILLATION: ICD-10-CM

## 2024-05-22 LAB
A1C WITH ESTIMATED AVERAGE GLUCOSE RESULT: 7.8 % — HIGH (ref 4–5.6)
ANION GAP SERPL CALC-SCNC: 12 MMOL/L — SIGNIFICANT CHANGE UP (ref 7–14)
BUN SERPL-MCNC: 12 MG/DL — SIGNIFICANT CHANGE UP (ref 7–23)
CALCIUM SERPL-MCNC: 9.5 MG/DL — SIGNIFICANT CHANGE UP (ref 8.4–10.5)
CHLORIDE SERPL-SCNC: 101 MMOL/L — SIGNIFICANT CHANGE UP (ref 98–107)
CO2 SERPL-SCNC: 25 MMOL/L — SIGNIFICANT CHANGE UP (ref 22–31)
CREAT SERPL-MCNC: 1.06 MG/DL — SIGNIFICANT CHANGE UP (ref 0.5–1.3)
EGFR: 70 ML/MIN/1.73M2 — SIGNIFICANT CHANGE UP
ESTIMATED AVERAGE GLUCOSE: 177 — SIGNIFICANT CHANGE UP
GLUCOSE SERPL-MCNC: 131 MG/DL — HIGH (ref 70–99)
HCT VFR BLD CALC: 34.1 % — LOW (ref 39–50)
HGB BLD-MCNC: 11.1 G/DL — LOW (ref 13–17)
MCHC RBC-ENTMCNC: 24.3 PG — LOW (ref 27–34)
MCHC RBC-ENTMCNC: 32.6 GM/DL — SIGNIFICANT CHANGE UP (ref 32–36)
MCV RBC AUTO: 74.8 FL — LOW (ref 80–100)
NRBC # BLD: 0 /100 WBCS — SIGNIFICANT CHANGE UP (ref 0–0)
NRBC # FLD: 0 K/UL — SIGNIFICANT CHANGE UP (ref 0–0)
PLATELET # BLD AUTO: 192 K/UL — SIGNIFICANT CHANGE UP (ref 150–400)
POTASSIUM SERPL-MCNC: 3.9 MMOL/L — SIGNIFICANT CHANGE UP (ref 3.5–5.3)
POTASSIUM SERPL-SCNC: 3.9 MMOL/L — SIGNIFICANT CHANGE UP (ref 3.5–5.3)
RBC # BLD: 4.56 M/UL — SIGNIFICANT CHANGE UP (ref 4.2–5.8)
RBC # FLD: 17.7 % — HIGH (ref 10.3–14.5)
SODIUM SERPL-SCNC: 138 MMOL/L — SIGNIFICANT CHANGE UP (ref 135–145)
WBC # BLD: 3.39 K/UL — LOW (ref 3.8–10.5)
WBC # FLD AUTO: 3.39 K/UL — LOW (ref 3.8–10.5)

## 2024-05-22 RX ORDER — ASPIRIN/CALCIUM CARB/MAGNESIUM 324 MG
1 TABLET ORAL
Qty: 0 | Refills: 0 | DISCHARGE

## 2024-05-22 RX ORDER — FUROSEMIDE 40 MG
1 TABLET ORAL
Qty: 0 | Refills: 0 | DISCHARGE

## 2024-05-22 NOTE — H&P PST ADULT - OTHER CARE PROVIDERS
Dr. Elvin Wan ([facility]) [phone] Last visit 2/2021 Dr. Elvin Wan ([facility]) [phone]

## 2024-05-22 NOTE — H&P PST ADULT - CARDIOVASCULAR COMMENTS
II/VI pericardial effusion s/p window (10/2017)and bilateral pleural effusions s/p thoracentesis (12/2017) , atrial Fibrilation pt on eliquis pericardial effusion s/p window (10/2017)and bilateral pleural effusions s/p thoracentesis (12/2017) , atrial Fibrillation pt on Eliquis

## 2024-05-22 NOTE — H&P PST ADULT - ENDOCRINE COMMENTS
pmhx DM Hgaic in ~7 Unknown when the last testing was, preprandial -133 Pmhx DM Hgaic in ~7 Unknown when the last testing was, preprandial -133

## 2024-05-22 NOTE — H&P PST ADULT - PROBLEM SELECTOR PLAN 1
Scheduled for Excision epidermal cyst left upper back and lipoma of left shoulder  Preop instructions provided and patient verbalizes understanding.  Hibiclens and Famotidine provided with instructions.  BMP, CBC, HgAIC, results  pending    Pt instructed to take Carvedilol, Ramipril, Pravastatin, hydralazine am of surgery

## 2024-05-22 NOTE — H&P PST ADULT - TEMPERATURE IN CELSIUS (DEGREES C)
patient was using a bar for weight lifting the bar flipped up and hit his head.  There is no LOC no vomiting occurred at 1030 acting at baseline.  There is a 1 cm laceration above the left eyebrow.  Cranial nerves II through XII are intact.  Gait is normal. for wound care and management 36.3

## 2024-05-22 NOTE — H&P PST ADULT - CARDIOVASCULAR
S1 S2 present/Irregularly irregular rhythm/murmur details… pitting +1/S1 S2 present/Irregularly irregular rhythm/murmur/peripheral edema

## 2024-05-22 NOTE — H&P PST ADULT - MUSCULOSKELETAL COMMENTS
Patient is calling for refills of her EFFEXOR and would like to know if Dr can combine the dosages.  Patient needs to order medication this evening.  Patient can be reached at the above number for questions/concerns.   cyst to upper back, fatty tissue to left shoulder

## 2024-05-22 NOTE — H&P PST ADULT - HISTORY OF PRESENT ILLNESS
84 yo male who noted swelling to back and left shoulder for several  years steadily increasing in size denies pain. Pt went to surgeon for an evaluation.  Pt was dx with lipoma to left shoulder and cyst to left upper back.  Pt Now present in Presurgical testing for preop evaluation for scheduled procedure excsion epidrmal cyst of left upper shy and lipoma of left shoulder. 84 yo male who noted swelling to back and left shoulder for several  years steadily increasing in size denies pain. Pt went to surgeon for an evaluation.  Pt was dx with lipoma to left shoulder and cyst to left upper back.  Pt Now present in Presurgical testing for preop evaluation for scheduled procedure excision epidermal cyst of left upper shy and lipoma of left shoulder.

## 2024-05-22 NOTE — H&P PST ADULT - NSICDXPASTMEDICALHX_GEN_ALL_CORE_FT
PAST MEDICAL HISTORY:  Afib on Aspirin    Anemia of chronic disease     BPH (benign prostatic hyperplasia)     BPH with urinary obstruction s/p TURP    H/O pleural effusion     Hernia, inguinal, right     HTN (hypertension)     Hyperlipidemia     Left ventricular systolic dysfunction (LVSD)     GABRIELLA (obstructive sleep apnea) by symptoms    Pericardial effusion     T2DM (type 2 diabetes mellitus) > 20 yrs

## 2024-05-22 NOTE — H&P PST ADULT - NSICDXPASTSURGICALHX_GEN_ALL_CORE_FT
PAST SURGICAL HISTORY:  H/O hernia repair RIHR    S/P cataract surgery bilateral 2017    S/P cataract surgery     S/P pericardial window creation     S/P repair of hydrocele     S/P thoracentesis     Status post creation of pericardial window 2017

## 2024-05-22 NOTE — H&P PST ADULT - LAST CARDIAC ANGIOGRAM/IMAGING
Angiogram 2019 recommendation continue with present medication Angiogram 5/2019 recommendation continue with present medication

## 2024-05-29 ENCOUNTER — TRANSCRIPTION ENCOUNTER (OUTPATIENT)
Age: 84
End: 2024-05-29

## 2024-05-29 NOTE — ASU PATIENT PROFILE, ADULT - FALL HARM RISK - UNIVERSAL INTERVENTIONS
Bed in lowest position, wheels locked, appropriate side rails in place/Call bell, personal items and telephone in reach/Instruct patient to call for assistance before getting out of bed or chair/Non-slip footwear when patient is out of bed/[place] to call system/Physically safe environment - no spills, clutter or unnecessary equipment/Purposeful Proactive Rounding/Room/bathroom lighting operational, light cord in reach

## 2024-05-29 NOTE — ASU PATIENT PROFILE, ADULT - TELEPHONIC ID NUMBER OF THE INTERPRETER
SUGGEST A PUREE TEXTURE DIET WITH THIN LIQUID CONSISTENCIES AS THIS IS CURRENTLY THE LEAST RESTRICTIVE TOLERABLE DIET CONSISTENCIES FROM AN OROPHARYNGEAL SWALLOWING PERSPECTIVE WHEN IN AN ALERT STATE. [id number]

## 2024-05-29 NOTE — ASU PATIENT PROFILE, ADULT - BLOOD TRANSFUSION, PREVIOUS, PROFILE
REFERRING PHYSICIAN:  Karla Iglesias D.O.    HISTORY OF PRESENT ILLNESS:  A 72-year-old female with ESRD on HD since 8/2018, M/W/F, s/p    1a/  Stent placement, L mid AVF and cephalic arch PTA 11/7/19  1. Covered stent placement (cephalic arch) and mid PTA, L AVG  6/20/19,  2. Drug coated PTA, L AVF (7x60 Lutonix) 9/20/18  3. PTa, L AVF 5/31/18  4.  L brachio-cephalic AVF 4/16/18.  No c/o    This is a 2 month follow-up.  She did not come back to see me after the most recent fistulogram.  She is having no issues whatsoever related to her dialysis use    PAST MEDICAL HISTORY:  1.  Coronary artery disease, status post MI x5.   MI 8/2018 with PCI  2.  Hypertension.  3.  Diabetes.  4.  Hyperparathyroidism.  5.  Chronic atrial fibrillation.    PAST SURGICAL HISTORY:  1.  Hysterectomy.  2.  Tonsillectomy.  3.  Appendectomy.  4.  Coronary artery bypass.  5.  PCI.  6.  Fracture surgery.    FAMILY HISTORY:  Positive for diabetes and hypertension.    MEDICATIONS:  Include Plavix, aspirin and statin.  See EPIC for full list.    ALLERGIES:  Penicillin, Percodan and Phenergan.    REVIEW OF SYSTEMS:  Denies postprandial pain or DVT.  All other systems including eyes, ENT, respiratory, musculoskeletal, breast,   neurologic, psychiatric, heme, lymph, allergy and immune are negative.    PHYSICAL EXAMINATION:  VITAL SIGNS:  See nursing notes.  GENERAL:  She is in no acute distress.  RESPIRATORY:  Normal effort.  Clear to claudication.  CARDIOVASCULAR:  Regular rhythm.  Nondisplaced PMI, no murmur.  VASCULAR:  L radial 1+ palpable.  EXTREMITIES:   Reasonably good thrill throughout, with proximal mild pulsatility but not dramatically so  NEUROLOGIC:  Cranial nerves VII through XII intact.  5/5 motor strength in all   extremities.    Imaging:  Suggestion of a luisana anastomotic stenosis with a velocity of 655.  No significant outflow stenosis.  Flow volume is 590 mils per minute which is substantially reduced from prior, see  below    PRior: Recurent distal AVF stenosis, no confluence stenosis, flow volume decreased to 1.3 L (prior 2.3 L    Fistualgram personally revieed    ASSESSMENT:  Clinically, this fistula is doing well despite a suggestion of substantially decreased flows.  Given clinical exam and lack of any issues, will watch rather than consider transradial fistulogram    RECOMMENDATION:  1.  Observe, follow-up in 3 months with AV fistula duplex clinically indicated, sooner if clinically indicated  2.  If she develops issues with dialysis efficiency difficult cannulation, be best treated with trans radial fistulogram    JULIO CÉSAR Perry III, MD, FACS  Professor and Chief, Vascular and Endovascular Surgery    CC: Karla Iglesias D.O.       no

## 2024-05-30 ENCOUNTER — OUTPATIENT (OUTPATIENT)
Dept: OUTPATIENT SERVICES | Facility: HOSPITAL | Age: 84
LOS: 1 days | Discharge: HOME | End: 2024-05-30
Payer: MEDICARE

## 2024-05-30 ENCOUNTER — TRANSCRIPTION ENCOUNTER (OUTPATIENT)
Age: 84
End: 2024-05-30

## 2024-05-30 VITALS
OXYGEN SATURATION: 99 % | HEART RATE: 80 BPM | TEMPERATURE: 98 F | RESPIRATION RATE: 12 BRPM | DIASTOLIC BLOOD PRESSURE: 81 MMHG | SYSTOLIC BLOOD PRESSURE: 143 MMHG

## 2024-05-30 VITALS
HEART RATE: 56 BPM | TEMPERATURE: 98 F | HEIGHT: 69 IN | RESPIRATION RATE: 19 BRPM | OXYGEN SATURATION: 97 % | DIASTOLIC BLOOD PRESSURE: 79 MMHG | SYSTOLIC BLOOD PRESSURE: 156 MMHG | WEIGHT: 171.96 LBS

## 2024-05-30 DIAGNOSIS — Z98.890 OTHER SPECIFIED POSTPROCEDURAL STATES: Chronic | ICD-10-CM

## 2024-05-30 DIAGNOSIS — Z98.49 CATARACT EXTRACTION STATUS, UNSPECIFIED EYE: Chronic | ICD-10-CM

## 2024-05-30 DIAGNOSIS — L72.0 EPIDERMAL CYST: ICD-10-CM

## 2024-05-30 LAB — GLUCOSE BLDC GLUCOMTR-MCNC: 148 MG/DL — HIGH (ref 70–99)

## 2024-05-30 PROCEDURE — 88304 TISSUE EXAM BY PATHOLOGIST: CPT | Mod: 26

## 2024-05-30 RX ORDER — FUROSEMIDE 40 MG
1 TABLET ORAL
Refills: 0 | DISCHARGE

## 2024-05-30 RX ORDER — CARVEDILOL PHOSPHATE 80 MG/1
1 CAPSULE, EXTENDED RELEASE ORAL
Qty: 0 | Refills: 0 | DISCHARGE

## 2024-05-30 RX ORDER — FOLIC ACID 0.8 MG
1 TABLET ORAL
Qty: 0 | Refills: 0 | DISCHARGE

## 2024-05-30 RX ORDER — APIXABAN 2.5 MG/1
1 TABLET, FILM COATED ORAL
Refills: 0 | DISCHARGE

## 2024-05-30 RX ORDER — FINASTERIDE 5 MG/1
1 TABLET, FILM COATED ORAL
Refills: 0 | DISCHARGE

## 2024-05-30 RX ORDER — RAMIPRIL 5 MG
1 CAPSULE ORAL
Qty: 0 | Refills: 0 | DISCHARGE

## 2024-05-30 RX ORDER — SITAGLIPTIN 50 MG/1
1 TABLET, FILM COATED ORAL
Qty: 0 | Refills: 0 | DISCHARGE

## 2024-05-30 RX ORDER — HYDRALAZINE HCL 50 MG
1 TABLET ORAL
Qty: 0 | Refills: 0 | DISCHARGE

## 2024-05-30 RX ORDER — METFORMIN HYDROCHLORIDE 850 MG/1
1 TABLET ORAL
Qty: 0 | Refills: 0 | DISCHARGE

## 2024-05-30 RX ORDER — HYDROMORPHONE HYDROCHLORIDE 2 MG/ML
0.25 INJECTION INTRAMUSCULAR; INTRAVENOUS; SUBCUTANEOUS
Refills: 0 | Status: DISCONTINUED | OUTPATIENT
Start: 2024-05-30 | End: 2024-05-30

## 2024-05-30 NOTE — BRIEF OPERATIVE NOTE - OPERATION/FINDINGS
3cm x 2.5 cm lipoma of left shoulder delivered and removed from linear incision. 4cm x 2cm sebaceous cyst with central punctum excised from skin with elliptical incision. Both closed in two layers and covered with steri strips.

## 2024-05-30 NOTE — ASU PREOP CHECKLIST - HEIGHT IN CM
175.26 Dupixent Pregnancy And Lactation Text: This medication likely crosses the placenta but the risk for the fetus is uncertain. This medication is excreted in breast milk.

## 2024-05-30 NOTE — BRIEF OPERATIVE NOTE - NSICDXBRIEFPROCEDURE_GEN_ALL_CORE_FT
PROCEDURES:  Excision, sebaceous cyst, back 30-May-2024 09:38:30  Gio Oropeza  Excision, lipoma, scapula 30-May-2024 09:39:38  Gio Oropeza

## 2024-05-30 NOTE — ASU DISCHARGE PLAN (ADULT/PEDIATRIC) - NURSING INSTRUCTIONS
You were given intravenous TYLENOL for pain management at  7.45 am Please DO NOT take any products containing TYLENOL or ACETAMINOPHEN, such as VICODIN, PERCOCET, EXCEDRIN, and any over-the-counter cold medication for the next 6 hours (until  1.45 pm. DO NOT TAKE MORE THAN 3000 MG OF TYLENOL in a 24 hour period.

## 2024-05-30 NOTE — ASU DISCHARGE PLAN (ADULT/PEDIATRIC) - ASU DC SPECIAL INSTRUCTIONSFT
May shower tomorrow  Steri strips fall off on their own  tylenol and motrin for pain  antibiotics sent to pharmacy  follow up in the office in one week

## 2024-05-30 NOTE — ASU DISCHARGE PLAN (ADULT/PEDIATRIC) - CARE PROVIDER_API CALL
Deandre Rolon  Surgery  [address], [address]  [place] [address]  Phone: [phone]  Fax: [phone]  Follow Up Time: 1 week

## 2024-06-06 LAB — SURGICAL PATHOLOGY STUDY: SIGNIFICANT CHANGE UP

## 2024-07-09 ENCOUNTER — APPOINTMENT (OUTPATIENT)
Dept: CARDIOLOGY | Facility: CLINIC | Age: 84
End: 2024-07-09

## 2024-07-18 PROBLEM — I31.39 OTHER PERICARDIAL EFFUSION (NONINFLAMMATORY): Chronic | Status: ACTIVE | Noted: 2024-05-22

## 2024-07-18 PROBLEM — Z87.09 PERSONAL HISTORY OF OTHER DISEASES OF THE RESPIRATORY SYSTEM: Chronic | Status: ACTIVE | Noted: 2024-05-22

## 2024-07-18 PROBLEM — I51.9 HEART DISEASE, UNSPECIFIED: Chronic | Status: ACTIVE | Noted: 2024-05-22

## 2024-08-06 ENCOUNTER — APPOINTMENT (OUTPATIENT)
Dept: ELECTROPHYSIOLOGY | Facility: CLINIC | Age: 84
End: 2024-08-06

## 2024-08-06 ENCOUNTER — APPOINTMENT (OUTPATIENT)
Dept: CARDIOLOGY | Facility: CLINIC | Age: 84
End: 2024-08-06

## 2024-08-06 ENCOUNTER — NON-APPOINTMENT (OUTPATIENT)
Age: 84
End: 2024-08-06

## 2024-08-06 PROCEDURE — G2211 COMPLEX E/M VISIT ADD ON: CPT

## 2024-08-06 PROCEDURE — 99214 OFFICE O/P EST MOD 30 MIN: CPT

## 2024-08-06 PROCEDURE — 93000 ELECTROCARDIOGRAM COMPLETE: CPT

## 2024-08-06 NOTE — PHYSICAL EXAM
[General Appearance - Well Developed] : well developed [Normal Appearance] : normal appearance [Well Groomed] : well groomed [General Appearance - Well Nourished] : well nourished [No Deformities] : no deformities [General Appearance - In No Acute Distress] : no acute distress [Normal Conjunctiva] : the conjunctiva exhibited no abnormalities [Eyelids - No Xanthelasma] : the eyelids demonstrated no xanthelasmas [Normal Oral Mucosa] : normal oral mucosa [No Oral Pallor] : no oral pallor [No Oral Cyanosis] : no oral cyanosis [Normal Jugular Venous A Waves Present] : normal jugular venous A waves present [Normal Jugular Venous V Waves Present] : normal jugular venous V waves present [No Jugular Venous Burns A Waves] : no jugular venous burns A waves [Respiration, Rhythm And Depth] : normal respiratory rhythm and effort [Exaggerated Use Of Accessory Muscles For Inspiration] : no accessory muscle use [Auscultation Breath Sounds / Voice Sounds] : lungs were clear to auscultation bilaterally [Heart Rate And Rhythm] : heart rate and rhythm were normal [Abdomen Soft] : soft [Abdomen Tenderness] : non-tender [Abdomen Mass (___ Cm)] : no abdominal mass palpated [Abnormal Walk] : normal gait [Gait - Sufficient For Exercise Testing] : the gait was sufficient for exercise testing [Cyanosis, Localized] : no localized cyanosis [Nail Clubbing] : no clubbing of the fingernails [Petechial Hemorrhages (___cm)] : no petechial hemorrhages [] : no ischemic changes

## 2024-08-06 NOTE — DISCUSSION/SUMMARY
[FreeTextEntry1] : 1.  atrial fib -   Angiogram was nonobstructive in May, 2019. He is on asa and eliquis.  controlled.   will get repeat echo and event monitor [EKG obtained to assist in diagnosis and management of assessed problem(s)] : EKG obtained to assist in diagnosis and management of assessed problem(s)

## 2024-08-06 NOTE — HISTORY OF PRESENT ILLNESS
[FreeTextEntry1] : 83 year old with a history of atrial fibrillation last year.  No KEV, PND or orthopnea.  has not been walking but states that he can walk 5 blocks.  No chest pain.     No dyspnea.  Has been trying to follow a diet but doesn't have much of an appetite.  Has some swelling on feet

## 2024-08-15 PROCEDURE — 93244 EXT ECG>48HR<7D REV&INTERPJ: CPT

## 2024-09-26 ENCOUNTER — OUTPATIENT (OUTPATIENT)
Dept: OUTPATIENT SERVICES | Facility: HOSPITAL | Age: 84
LOS: 1 days | End: 2024-09-26
Payer: MEDICARE

## 2024-09-26 ENCOUNTER — APPOINTMENT (OUTPATIENT)
Dept: CV DIAGNOSITCS | Facility: HOSPITAL | Age: 84
End: 2024-09-26

## 2024-09-26 ENCOUNTER — RESULT REVIEW (OUTPATIENT)
Age: 84
End: 2024-09-26

## 2024-09-26 DIAGNOSIS — Z98.890 OTHER SPECIFIED POSTPROCEDURAL STATES: Chronic | ICD-10-CM

## 2024-09-26 DIAGNOSIS — Z98.49 CATARACT EXTRACTION STATUS, UNSPECIFIED EYE: Chronic | ICD-10-CM

## 2024-09-26 DIAGNOSIS — I48.91 UNSPECIFIED ATRIAL FIBRILLATION: ICD-10-CM

## 2024-09-26 PROCEDURE — C8929: CPT

## 2024-09-26 PROCEDURE — 93306 TTE W/DOPPLER COMPLETE: CPT | Mod: 26

## 2024-11-27 NOTE — ED ADULT NURSE NOTE - NS PRO AD NO ADVANCE DIRECTIVE
No PAST MEDICAL HISTORY:  Acid reflux     History of BPH     Hypothyroidism     Osteoarthritis     Vitiligo

## 2025-01-09 ENCOUNTER — APPOINTMENT (OUTPATIENT)
Dept: CARDIOLOGY | Facility: CLINIC | Age: 85
End: 2025-01-09
Payer: MEDICARE

## 2025-01-09 ENCOUNTER — NON-APPOINTMENT (OUTPATIENT)
Age: 85
End: 2025-01-09

## 2025-01-09 VITALS
OXYGEN SATURATION: 98 % | SYSTOLIC BLOOD PRESSURE: 166 MMHG | HEART RATE: 75 BPM | HEIGHT: 70 IN | BODY MASS INDEX: 25.05 KG/M2 | DIASTOLIC BLOOD PRESSURE: 88 MMHG | WEIGHT: 175 LBS

## 2025-01-09 DIAGNOSIS — I48.91 UNSPECIFIED ATRIAL FIBRILLATION: ICD-10-CM

## 2025-01-09 PROCEDURE — G2211 COMPLEX E/M VISIT ADD ON: CPT

## 2025-01-09 PROCEDURE — 93000 ELECTROCARDIOGRAM COMPLETE: CPT

## 2025-01-09 PROCEDURE — 99215 OFFICE O/P EST HI 40 MIN: CPT

## 2025-01-13 ENCOUNTER — APPOINTMENT (OUTPATIENT)
Dept: ELECTROPHYSIOLOGY | Facility: CLINIC | Age: 85
End: 2025-01-13
Payer: MEDICARE

## 2025-01-13 VITALS
DIASTOLIC BLOOD PRESSURE: 95 MMHG | WEIGHT: 175 LBS | RESPIRATION RATE: 14 BRPM | HEIGHT: 70 IN | OXYGEN SATURATION: 97 % | HEART RATE: 112 BPM | SYSTOLIC BLOOD PRESSURE: 142 MMHG | BODY MASS INDEX: 25.05 KG/M2

## 2025-01-13 DIAGNOSIS — I48.92 UNSPECIFIED ATRIAL FLUTTER: ICD-10-CM

## 2025-01-13 DIAGNOSIS — I48.91 UNSPECIFIED ATRIAL FIBRILLATION: ICD-10-CM

## 2025-01-13 PROCEDURE — 99204 OFFICE O/P NEW MOD 45 MIN: CPT | Mod: 25

## 2025-01-13 PROCEDURE — 93000 ELECTROCARDIOGRAM COMPLETE: CPT

## 2025-01-13 RX ORDER — MULTIVIT-MINERALS/FA/LYCOPENE 400-370MCG
TABLET ORAL DAILY
Refills: 0 | Status: ACTIVE | COMMUNITY

## 2025-01-13 RX ORDER — ASPIRIN 81 MG/1
81 TABLET, COATED ORAL DAILY
Refills: 0 | Status: ACTIVE | COMMUNITY

## 2025-01-13 RX ORDER — TAMSULOSIN HYDROCHLORIDE 0.4 MG/1
0.4 CAPSULE ORAL
Refills: 0 | Status: ACTIVE | COMMUNITY

## 2025-01-16 NOTE — PROGRESS NOTE ADULT - ASSESSMENT
Patient is a 77y old  Male who presents with a chief complaint of "Abdominal discomfort x 1 month".     Pericardial Effusion:  S/p Pericardial window.  Cardio f/up noted.  Pain control      HTN:  Coreg/Hydralazine/Lisinopril    A Fib:  IV Heparin.    Dw family.
76 yo M with H>O afib, HTN DM BPH with c/o RAMACHANDRAN and abd pain. Found to have large pericardial effusion with early signs of tamponade. Currently in new onset KEN.
77 year old male with abdominal pain and RAMACHANDRAN found to have pericardial effusion and elevated LFTs
77 year old male with abdominal pain and RAMACHANDRAN found to have pericardial effusion and elevated LFTs
Patient is a 77y old  Male who presents with a chief complaint of "Abdominal discomfort x 1 month".     Pericardial Effusion:  S/p Pericardial window.  Cardio f/up noted.  Pain control      HTN:  Coreg/Hydralazine/Lisinopril/Lasix.    A Fib:  Xarelto.    Dw family.
76 yo male s/p drainage of pericardial effusion and pericardial window  - Continue monitoring   - Pain management w/ oxycodone  - Continue jatin drain to suction  - f/u CXR/labs and output  - s/p 2gr mag and x1 lopressor 2.5 for a-fib  - Failed TOV, dela cruz inserted
77 year old male with abdominal pain and RAMACHANDRAN found to have pericardial effusion and elevated LFTs
Assessment  77y Male  w/ PAST MEDICAL & SURGICAL HISTORY:  Afib  Hyperlipidemia  HTN (hypertension)  Hernia, inguinal, right  BPH (benign prostatic hyperplasia)  T2DM (type 2 diabetes mellitus): &gt; 20 yrs  S/P cataract surgery: bilateral 4 years ago  admitted with complaints of Patient is a 77y old  Male who presents with a chief complaint of "Abdominal discomfort x 1 month" (27 Oct 2017 16:30)  Found to have large pericardial effusion w early tamponade. On 10/28- Had IR placed pericardial PTC, over 1.5 liter drained. :
DISCHARGE HOME TO FOLLOW UP WITH UROLOGY AS OUTPATIENT>
Patient is a 77y old  Male who presents with a chief complaint of "Abdominal discomfort x 1 month".     Pericardial Effusion:  S/p Pericardial window.  Cardio f/up noted.      HTN:  Coreg/Hydralazine/Lisinopril    A Fib:  AC once cleared by Thoracic Sx.    Dw family.
Patient is a 77y old  Male who presents with a chief complaint of "Abdominal discomfort x 1 month".     Pericardial Effusion:  S/p Pericardial window.  Cardio f/up noted.  Pain control      HTN:  Coreg/Hydralazine/Lisinopril/Lasix.    A Fib:  Xarelto.    Dw family.
Patient is a 77y old  Male who presents with a chief complaint of "Abdominal discomfort x 1 month".     Pericardial Effusion:  S/p Pericardial window.  Cardio f/up noted.  Pain control    Polyclonal Gammopathy:  Hem f/up noted.    HTN:  Coreg/Hydralazine/Lisinopril/Lasix.    A Fib:  IV Heparin.    Dw family.
Patient is a 77y old  Male who presents with a chief complaint of "Abdominal discomfort x 1 month".     Pericardial Effusion:  S/p Pericardial window.  Cardio f/up noted.  Pain control    Polyclonal Gammopathy:  Hem f/up noted.    HTN:  Coreg/Hydralazine/Lisinopril/Lasix.    A Fib:  IV Heparin.    Dw family.
Patient is a 77y old  Male who presents with a chief complaint of "Abdominal discomfort x 1 month".     Pericardial Effusion:  S/p Pericardial window.  Cardio f/up noted.  Pain control  Cytology pleural fluid: Negative.      HTN:  Coreg/Hydralazine/Lisinopril/Lasix.    A Fib:  Xarelto.    Dw family.
Patient is a 77y old  Male who presents with a chief complaint of "Abdominal discomfort x 1 month".     Pericardial Effusion:  S/p Pericardial window.  Cardio f/up noted.  Pain control  Cytology pleural fluid: Negative.   ECHO reviewed.    U. Retention:  Ramírez.  HTN:  Coreg/Hydralazine/Lisinopril/Lasix.    A Fib:  Xarelto.    DC planning.  Dw pt's family.
Patient is a 77y old  Male who presents with a chief complaint of "Abdominal discomfort x 1 month".     Pericardial Effusion:  S/p Pericardial window.  Cardio f/up noted.  Pain control  Cytology pleural fluid: Negative.   ECHO reviewed.    U. Retention:  Ramírez.  HTN:  Coreg/Hydralazine/Lisinopril/Lasix.    A Fib:  Xarelto.    Dw pt's family.
Patient is a 77y old  Male who presents with a chief complaint of "Abdominal discomfort x 1 month".     Pericardial Effusion:  S/p Pericardial window.  Cardio f/up noted.  Pain control  Cytology pleural fluid: Negative.  Repeat ECHO.    U. Retention:  Ramírez.  HTN:  Coreg/Hydralazine/Lisinopril/Lasix.    A Fib:  Xarelto.    Dw pt's family member on phone who is a physician.
77 year old male with abdominal pain and RAMACHANDRAN found to have pericardial effusion and elevated LFTs
77 year old male with abdominal pain and RAMACHANDRAN found to have pericardial effusion and elevated LFTs
77M with anemia without any renal insufficiency and above clinical features. Discussed the results, relevance, the diagnosis of anemia, the broad differential in detail with him and his daughter and all questions answered. Will recommend:  - continue Rx as per medicine, cardiology  - on IV heparin, monitor hgb, plts and for bleeding  - has polyclonal gammopathy  - no evidence of malignancy at this time  - will f/u periodically
77 year old male with abdominal pain and RAMACHANDRAN found to have pericardial effusion and elevated LFTs
77 year old male with abdominal pain and RAMACHANDRAN found to have pericardial effusion and elevated LFTs
77M with anemia without any renal insufficiency and above clinical features. Discussed the results, relevance, the diagnosis of anemia, the broad differential in detail with him and his daughter and all questions answered. Will recommend:  - continue Rx as per medicine, cardiology  - on IV heparin, monitor hgb and for bleeding  - has polyclonal gammopathy  - f/u on cytology results on the pericardial fluid, still pending  - will f/u periodically

## 2025-01-21 PROBLEM — I48.92 ATRIAL FLUTTER: Status: ACTIVE | Noted: 2025-01-21

## 2025-02-03 ENCOUNTER — APPOINTMENT (OUTPATIENT)
Dept: ELECTROPHYSIOLOGY | Facility: CLINIC | Age: 85
End: 2025-02-03
Payer: MEDICARE

## 2025-02-03 VITALS
HEART RATE: 77 BPM | HEIGHT: 70 IN | BODY MASS INDEX: 25.05 KG/M2 | RESPIRATION RATE: 14 BRPM | OXYGEN SATURATION: 99 % | DIASTOLIC BLOOD PRESSURE: 81 MMHG | SYSTOLIC BLOOD PRESSURE: 146 MMHG | WEIGHT: 175 LBS

## 2025-02-03 DIAGNOSIS — I48.91 UNSPECIFIED ATRIAL FIBRILLATION: ICD-10-CM

## 2025-02-03 DIAGNOSIS — I50.22 CHRONIC SYSTOLIC (CONGESTIVE) HEART FAILURE: ICD-10-CM

## 2025-02-03 PROCEDURE — 93000 ELECTROCARDIOGRAM COMPLETE: CPT

## 2025-02-03 PROCEDURE — 99214 OFFICE O/P EST MOD 30 MIN: CPT | Mod: 25

## 2025-02-03 RX ORDER — APIXABAN 2.5 MG/1
2.5 TABLET, FILM COATED ORAL
Refills: 0 | Status: ACTIVE | COMMUNITY
Start: 2024-12-07

## 2025-02-03 RX ORDER — CALCIUM CARBONATE/VITAMIN D3 600 MG-10
TABLET ORAL
Refills: 0 | Status: ACTIVE | COMMUNITY

## 2025-02-04 ENCOUNTER — APPOINTMENT (OUTPATIENT)
Dept: CARDIOLOGY | Facility: CLINIC | Age: 85
End: 2025-02-04
Payer: MEDICARE

## 2025-02-04 ENCOUNTER — NON-APPOINTMENT (OUTPATIENT)
Age: 85
End: 2025-02-04

## 2025-02-04 VITALS
WEIGHT: 175 LBS | OXYGEN SATURATION: 98 % | HEART RATE: 78 BPM | HEIGHT: 70 IN | DIASTOLIC BLOOD PRESSURE: 83 MMHG | BODY MASS INDEX: 25.05 KG/M2 | SYSTOLIC BLOOD PRESSURE: 158 MMHG

## 2025-02-04 PROCEDURE — 93000 ELECTROCARDIOGRAM COMPLETE: CPT

## 2025-02-04 PROCEDURE — 99214 OFFICE O/P EST MOD 30 MIN: CPT

## 2025-02-04 PROCEDURE — G2211 COMPLEX E/M VISIT ADD ON: CPT

## 2025-02-14 ENCOUNTER — NON-APPOINTMENT (OUTPATIENT)
Age: 85
End: 2025-02-14

## 2025-02-25 ENCOUNTER — INPATIENT (INPATIENT)
Facility: HOSPITAL | Age: 85
LOS: 0 days | Discharge: HOME | DRG: 274 | End: 2025-02-26
Attending: STUDENT IN AN ORGANIZED HEALTH CARE EDUCATION/TRAINING PROGRAM | Admitting: STUDENT IN AN ORGANIZED HEALTH CARE EDUCATION/TRAINING PROGRAM
Payer: MEDICARE

## 2025-02-25 ENCOUNTER — RESULT REVIEW (OUTPATIENT)
Age: 85
End: 2025-02-25

## 2025-02-25 VITALS
RESPIRATION RATE: 16 BRPM | SYSTOLIC BLOOD PRESSURE: 167 MMHG | DIASTOLIC BLOOD PRESSURE: 84 MMHG | HEIGHT: 70 IN | HEART RATE: 78 BPM | TEMPERATURE: 98 F | WEIGHT: 175.05 LBS | OXYGEN SATURATION: 98 %

## 2025-02-25 DIAGNOSIS — Z98.49 CATARACT EXTRACTION STATUS, UNSPECIFIED EYE: Chronic | ICD-10-CM

## 2025-02-25 DIAGNOSIS — Z98.890 OTHER SPECIFIED POSTPROCEDURAL STATES: Chronic | ICD-10-CM

## 2025-02-25 DIAGNOSIS — Z86.018 PERSONAL HISTORY OF OTHER BENIGN NEOPLASM: Chronic | ICD-10-CM

## 2025-02-25 DIAGNOSIS — I48.4 ATYPICAL ATRIAL FLUTTER: ICD-10-CM

## 2025-02-25 LAB
ANION GAP SERPL CALC-SCNC: 9 MMOL/L — SIGNIFICANT CHANGE UP (ref 5–17)
BUN SERPL-MCNC: 12 MG/DL — SIGNIFICANT CHANGE UP (ref 7–23)
CALCIUM SERPL-MCNC: 9.8 MG/DL — SIGNIFICANT CHANGE UP (ref 8.4–10.5)
CHLORIDE SERPL-SCNC: 99 MMOL/L — SIGNIFICANT CHANGE UP (ref 96–108)
CO2 SERPL-SCNC: 29 MMOL/L — SIGNIFICANT CHANGE UP (ref 22–31)
CREAT SERPL-MCNC: 1.06 MG/DL — SIGNIFICANT CHANGE UP (ref 0.5–1.3)
EGFR: 69 ML/MIN/1.73M2 — SIGNIFICANT CHANGE UP
GLUCOSE BLDC GLUCOMTR-MCNC: 187 MG/DL — HIGH (ref 70–99)
GLUCOSE BLDC GLUCOMTR-MCNC: 229 MG/DL — HIGH (ref 70–99)
GLUCOSE BLDC GLUCOMTR-MCNC: 261 MG/DL — HIGH (ref 70–99)
GLUCOSE SERPL-MCNC: 181 MG/DL — HIGH (ref 70–99)
HCT VFR BLD CALC: 35.8 % — LOW (ref 39–50)
HGB BLD-MCNC: 11 G/DL — LOW (ref 13–17)
MCHC RBC-ENTMCNC: 23.4 PG — LOW (ref 27–34)
MCHC RBC-ENTMCNC: 30.7 G/DL — LOW (ref 32–36)
MCV RBC AUTO: 76.2 FL — LOW (ref 80–100)
NRBC BLD AUTO-RTO: 0 /100 WBCS — SIGNIFICANT CHANGE UP (ref 0–0)
PLATELET # BLD AUTO: 199 K/UL — SIGNIFICANT CHANGE UP (ref 150–400)
POTASSIUM SERPL-MCNC: 4.1 MMOL/L — SIGNIFICANT CHANGE UP (ref 3.5–5.3)
POTASSIUM SERPL-SCNC: 4.1 MMOL/L — SIGNIFICANT CHANGE UP (ref 3.5–5.3)
RBC # BLD: 4.7 M/UL — SIGNIFICANT CHANGE UP (ref 4.2–5.8)
RBC # FLD: 18.6 % — HIGH (ref 10.3–14.5)
SODIUM SERPL-SCNC: 137 MMOL/L — SIGNIFICANT CHANGE UP (ref 135–145)
WBC # BLD: 3.02 K/UL — LOW (ref 3.8–10.5)
WBC # FLD AUTO: 3.02 K/UL — LOW (ref 3.8–10.5)

## 2025-02-25 PROCEDURE — 93325 DOPPLER ECHO COLOR FLOW MAPG: CPT | Mod: 26

## 2025-02-25 PROCEDURE — 93320 DOPPLER ECHO COMPLETE: CPT | Mod: 26

## 2025-02-25 PROCEDURE — 93010 ELECTROCARDIOGRAM REPORT: CPT | Mod: 77

## 2025-02-25 PROCEDURE — 93312 ECHO TRANSESOPHAGEAL: CPT | Mod: 26

## 2025-02-25 PROCEDURE — 76376 3D RENDER W/INTRP POSTPROCES: CPT | Mod: 26

## 2025-02-25 RX ORDER — GLUCAGON 3 MG/1
1 POWDER NASAL ONCE
Refills: 0 | Status: DISCONTINUED | OUTPATIENT
Start: 2025-02-25 | End: 2025-02-26

## 2025-02-25 RX ORDER — LISINOPRIL 5 MG/1
20 TABLET ORAL DAILY
Refills: 0 | Status: DISCONTINUED | OUTPATIENT
Start: 2025-02-25 | End: 2025-02-26

## 2025-02-25 RX ORDER — SODIUM CHLORIDE 9 G/1000ML
1000 INJECTION, SOLUTION INTRAVENOUS
Refills: 0 | Status: DISCONTINUED | OUTPATIENT
Start: 2025-02-25 | End: 2025-02-26

## 2025-02-25 RX ORDER — FOLIC ACID 1 MG/1
1 TABLET ORAL DAILY
Refills: 0 | Status: DISCONTINUED | OUTPATIENT
Start: 2025-02-25 | End: 2025-02-26

## 2025-02-25 RX ORDER — FUROSEMIDE 10 MG/ML
20 INJECTION INTRAMUSCULAR; INTRAVENOUS ONCE
Refills: 0 | Status: COMPLETED | OUTPATIENT
Start: 2025-02-25 | End: 2025-02-25

## 2025-02-25 RX ORDER — DEXTROSE 50 % IN WATER 50 %
25 SYRINGE (ML) INTRAVENOUS ONCE
Refills: 0 | Status: DISCONTINUED | OUTPATIENT
Start: 2025-02-25 | End: 2025-02-26

## 2025-02-25 RX ORDER — FUROSEMIDE 10 MG/ML
20 INJECTION INTRAMUSCULAR; INTRAVENOUS
Refills: 0 | Status: DISCONTINUED | OUTPATIENT
Start: 2025-02-26 | End: 2025-02-26

## 2025-02-25 RX ORDER — ASPIRIN 325 MG
81 TABLET ORAL DAILY
Refills: 0 | Status: DISCONTINUED | OUTPATIENT
Start: 2025-02-26 | End: 2025-02-26

## 2025-02-25 RX ORDER — DEXTROSE 50 % IN WATER 50 %
15 SYRINGE (ML) INTRAVENOUS ONCE
Refills: 0 | Status: DISCONTINUED | OUTPATIENT
Start: 2025-02-25 | End: 2025-02-26

## 2025-02-25 RX ORDER — SIMETHICONE 80 MG
80 TABLET,CHEWABLE ORAL EVERY 4 HOURS
Refills: 0 | Status: DISCONTINUED | OUTPATIENT
Start: 2025-02-25 | End: 2025-02-26

## 2025-02-25 RX ORDER — APIXABAN 2.5 MG/1
1 TABLET, FILM COATED ORAL
Refills: 0 | DISCHARGE

## 2025-02-25 RX ORDER — ASPIRIN 325 MG
0 TABLET ORAL
Refills: 0 | DISCHARGE

## 2025-02-25 RX ORDER — TAMSULOSIN HYDROCHLORIDE 0.4 MG/1
0.4 CAPSULE ORAL AT BEDTIME
Refills: 0 | Status: DISCONTINUED | OUTPATIENT
Start: 2025-02-25 | End: 2025-02-26

## 2025-02-25 RX ORDER — INSULIN LISPRO 100 U/ML
INJECTION, SOLUTION INTRAVENOUS; SUBCUTANEOUS
Refills: 0 | Status: DISCONTINUED | OUTPATIENT
Start: 2025-02-25 | End: 2025-02-26

## 2025-02-25 RX ORDER — APIXABAN 2.5 MG/1
5 TABLET, FILM COATED ORAL
Refills: 0 | Status: DISCONTINUED | OUTPATIENT
Start: 2025-02-25 | End: 2025-02-26

## 2025-02-25 RX ORDER — TAMSULOSIN HYDROCHLORIDE 0.4 MG/1
1 CAPSULE ORAL
Refills: 0 | DISCHARGE

## 2025-02-25 RX ORDER — DEXTROSE 50 % IN WATER 50 %
12.5 SYRINGE (ML) INTRAVENOUS ONCE
Refills: 0 | Status: DISCONTINUED | OUTPATIENT
Start: 2025-02-25 | End: 2025-02-26

## 2025-02-25 RX ORDER — ATORVASTATIN CALCIUM 80 MG/1
20 TABLET, FILM COATED ORAL AT BEDTIME
Refills: 0 | Status: DISCONTINUED | OUTPATIENT
Start: 2025-02-25 | End: 2025-02-26

## 2025-02-25 RX ORDER — FUROSEMIDE 10 MG/ML
1 INJECTION INTRAMUSCULAR; INTRAVENOUS
Refills: 0 | DISCHARGE

## 2025-02-25 RX ORDER — INSULIN LISPRO 100 U/ML
INJECTION, SOLUTION INTRAVENOUS; SUBCUTANEOUS AT BEDTIME
Refills: 0 | Status: DISCONTINUED | OUTPATIENT
Start: 2025-02-25 | End: 2025-02-26

## 2025-02-25 RX ORDER — CARVEDILOL 3.12 MG/1
12.5 TABLET, FILM COATED ORAL EVERY 12 HOURS
Refills: 0 | Status: DISCONTINUED | OUTPATIENT
Start: 2025-02-25 | End: 2025-02-26

## 2025-02-25 RX ADMIN — APIXABAN 5 MILLIGRAM(S): 2.5 TABLET, FILM COATED ORAL at 19:08

## 2025-02-25 RX ADMIN — INSULIN LISPRO 1: 100 INJECTION, SOLUTION INTRAVENOUS; SUBCUTANEOUS at 21:11

## 2025-02-25 RX ADMIN — Medication 50 MILLIGRAM(S): at 20:59

## 2025-02-25 RX ADMIN — FUROSEMIDE 20 MILLIGRAM(S): 10 INJECTION INTRAMUSCULAR; INTRAVENOUS at 17:31

## 2025-02-25 RX ADMIN — TAMSULOSIN HYDROCHLORIDE 0.4 MILLIGRAM(S): 0.4 CAPSULE ORAL at 20:59

## 2025-02-25 RX ADMIN — CARVEDILOL 12.5 MILLIGRAM(S): 3.12 TABLET, FILM COATED ORAL at 18:22

## 2025-02-25 RX ADMIN — Medication 80 MILLIGRAM(S): at 22:02

## 2025-02-25 RX ADMIN — ATORVASTATIN CALCIUM 20 MILLIGRAM(S): 80 TABLET, FILM COATED ORAL at 20:59

## 2025-02-25 RX ADMIN — Medication 50 MILLIGRAM(S): at 17:30

## 2025-02-25 NOTE — H&P CARDIOLOGY - HISTORY OF PRESENT ILLNESS
85 y/o Male with Pmhx of Afib on Eliquis, Anemia, BPH, HTN, HLD GABRIELLA, DM. He had angiogram that was non obstructive in May 2019, EF 38%. Pt has RAMACHANDRAN and has noticed a change in his capacity recently. He presents today for Afib ablation.    9/26/24 TTE:   CONCLUSIONS:   1. Left ventricular cavity is normal in size. Left ventricular wall thickness is normal. Left ventricular systolic function is moderately decreased with an ejection fraction of 38 % by Salcido's method of disks. Global left ventricular hypokinesis.   2. Multiple segmental abnormalities exist. See findings.   3. Mildly enlarged right ventricular cavity size, with normal wall thickness, and normal right ventricular systolic function.   4. No pericardial effusion seen.   5. No prior echocardiogram is available for comparison.   6. There is increased LV mass and concentric hypertrophy.   7. There is no evidence of a left ventricular thrombus.

## 2025-02-25 NOTE — PRE-ANESTHESIA EVALUATION ADULT - WEIGHT IN LBS
175

## 2025-02-25 NOTE — PROVIDER CONTACT NOTE (OTHER) - ACTION/TREATMENT ORDERED:
no further interventions at this time negative Alert & oriented; no sensory, motor or coordination deficits, normal reflexes

## 2025-02-25 NOTE — PATIENT PROFILE ADULT - ARE SIGNIFICANT INDICATORS COMPLETE.
Render Post-Care Instructions In Note?: no Detail Level: Detailed Consent: The patient's consent was obtained including but not limited to risks of crusting, scabbing, blistering, scarring, darker or lighter pigmentary change, recurrence, incomplete removal and infection. Medical Necessity Information: It is in your best interest to select a reason for this procedure from the list below. All of these items fulfill various CMS LCD requirements except the new and changing color options. Number Of Freeze-Thaw Cycles: 2 freeze-thaw cycles Medical Necessity Clause: This procedure was medically necessary because the lesions that were treated were: Post-Care Instructions: I reviewed with the patient in detail post-care instructions. Patient is to wear sunprotection, and avoid picking at any of the treated lesions. Pt may apply Vaseline to crusted or scabbing areas. No Yes

## 2025-02-25 NOTE — H&P CARDIOLOGY - NSICDXPASTSURGICALHX_GEN_ALL_CORE_FT
PAST SURGICAL HISTORY:  H/O hernia repair RIHR    H/O lipoma     S/P cataract surgery bilateral 2017    S/P cataract surgery     S/P pericardial window creation     S/P repair of hydrocele     S/P thoracentesis     Status post creation of pericardial window 2017

## 2025-02-25 NOTE — CHART NOTE - NSCHARTNOTEFT_GEN_A_CORE
2/25 s/p Afib/flutter ablation with Dr Irwin, under general anesthesia   RFV Vascade x4, site benign.   BR 2 hrs till 7pm  Resume Eliquis 5mg bid, start at 7pm (ordered) (was entered 2.5mg previously but pt is on 5mg BID)  20mg IV Lasix ordered, pt received 2L total fluid in, pt due to void, strict I&O ordered  continue all other home meds   DC tomorrow 2/26 am if remains stable   post procedure EKG, NSR@83  pt alert and oriented, denies pain at this time.  Pt will return to [facility], bed 7    Vital Signs Last 24 Hrs  T(C): 36.4 (25 Feb 2025 16:45), Max: 36.8 (25 Feb 2025 07:45)  T(F): 97.5 (25 Feb 2025 16:45), Max: 98.3 (25 Feb 2025 07:45)  HR: 85 (25 Feb 2025 16:50) (78 - 85)  BP: 171/95 (25 Feb 2025 16:50) (165/95 - 178/100)  BP(mean): 118 (25 Feb 2025 08:52) (118 - 118)  RR: 16 (25 Feb 2025 16:50) (15 - 16)  SpO2: 95% (25 Feb 2025 16:50) (95% - 98%)    O2 Parameters below as of 25 Feb 2025 16:35  Patient On (Oxygen Delivery Method): room air    Shireen So NP  [facility] PAC[facility]  [id number]

## 2025-02-26 ENCOUNTER — TRANSCRIPTION ENCOUNTER (OUTPATIENT)
Age: 85
End: 2025-02-26

## 2025-02-26 VITALS
RESPIRATION RATE: 18 BRPM | DIASTOLIC BLOOD PRESSURE: 71 MMHG | HEART RATE: 89 BPM | OXYGEN SATURATION: 98 % | TEMPERATURE: 98 F | SYSTOLIC BLOOD PRESSURE: 151 MMHG

## 2025-02-26 DIAGNOSIS — I48.92 UNSPECIFIED ATRIAL FLUTTER: ICD-10-CM

## 2025-02-26 DIAGNOSIS — E78.5 HYPERLIPIDEMIA, UNSPECIFIED: ICD-10-CM

## 2025-02-26 DIAGNOSIS — E11.9 TYPE 2 DIABETES MELLITUS WITHOUT COMPLICATIONS: ICD-10-CM

## 2025-02-26 LAB
ANION GAP SERPL CALC-SCNC: 12 MMOL/L — SIGNIFICANT CHANGE UP (ref 5–17)
BUN SERPL-MCNC: 16 MG/DL — SIGNIFICANT CHANGE UP (ref 7–23)
CALCIUM SERPL-MCNC: 8.9 MG/DL — SIGNIFICANT CHANGE UP (ref 8.4–10.5)
CHLORIDE SERPL-SCNC: 101 MMOL/L — SIGNIFICANT CHANGE UP (ref 96–108)
CO2 SERPL-SCNC: 23 MMOL/L — SIGNIFICANT CHANGE UP (ref 22–31)
CREAT SERPL-MCNC: 1.07 MG/DL — SIGNIFICANT CHANGE UP (ref 0.5–1.3)
EGFR: 68 ML/MIN/1.73M2 — SIGNIFICANT CHANGE UP
GLUCOSE BLDC GLUCOMTR-MCNC: 211 MG/DL — HIGH (ref 70–99)
GLUCOSE BLDC GLUCOMTR-MCNC: 318 MG/DL — HIGH (ref 70–99)
GLUCOSE BLDC GLUCOMTR-MCNC: 320 MG/DL — HIGH (ref 70–99)
GLUCOSE SERPL-MCNC: 285 MG/DL — HIGH (ref 70–99)
HCT VFR BLD CALC: 32.6 % — LOW (ref 39–50)
HGB BLD-MCNC: 10.3 G/DL — LOW (ref 13–17)
MAGNESIUM SERPL-MCNC: 1.8 MG/DL — SIGNIFICANT CHANGE UP (ref 1.6–2.6)
MCHC RBC-ENTMCNC: 23.9 PG — LOW (ref 27–34)
MCHC RBC-ENTMCNC: 31.6 G/DL — LOW (ref 32–36)
MCV RBC AUTO: 75.6 FL — LOW (ref 80–100)
NRBC BLD AUTO-RTO: 0 /100 WBCS — SIGNIFICANT CHANGE UP (ref 0–0)
PLATELET # BLD AUTO: 169 K/UL — SIGNIFICANT CHANGE UP (ref 150–400)
POTASSIUM SERPL-MCNC: 4 MMOL/L — SIGNIFICANT CHANGE UP (ref 3.5–5.3)
POTASSIUM SERPL-SCNC: 4 MMOL/L — SIGNIFICANT CHANGE UP (ref 3.5–5.3)
RBC # BLD: 4.31 M/UL — SIGNIFICANT CHANGE UP (ref 4.2–5.8)
RBC # FLD: 18.3 % — HIGH (ref 10.3–14.5)
SODIUM SERPL-SCNC: 136 MMOL/L — SIGNIFICANT CHANGE UP (ref 135–145)
WBC # BLD: 6.62 K/UL — SIGNIFICANT CHANGE UP (ref 3.8–10.5)
WBC # FLD AUTO: 6.62 K/UL — SIGNIFICANT CHANGE UP (ref 3.8–10.5)

## 2025-02-26 PROCEDURE — 93005 ELECTROCARDIOGRAM TRACING: CPT

## 2025-02-26 PROCEDURE — C1766: CPT

## 2025-02-26 PROCEDURE — C1730: CPT

## 2025-02-26 PROCEDURE — C1894: CPT

## 2025-02-26 PROCEDURE — C1760: CPT

## 2025-02-26 PROCEDURE — 99238 HOSP IP/OBS DSCHRG MGMT 30/<: CPT

## 2025-02-26 PROCEDURE — 83735 ASSAY OF MAGNESIUM: CPT

## 2025-02-26 PROCEDURE — 93312 ECHO TRANSESOPHAGEAL: CPT

## 2025-02-26 PROCEDURE — 82962 GLUCOSE BLOOD TEST: CPT

## 2025-02-26 PROCEDURE — 93325 DOPPLER ECHO COLOR FLOW MAPG: CPT

## 2025-02-26 PROCEDURE — 93320 DOPPLER ECHO COMPLETE: CPT

## 2025-02-26 PROCEDURE — 86901 BLOOD TYPING SEROLOGIC RH(D): CPT

## 2025-02-26 PROCEDURE — 86900 BLOOD TYPING SEROLOGIC ABO: CPT

## 2025-02-26 PROCEDURE — 93655 ICAR CATH ABLTJ DSCRT ARRHYT: CPT

## 2025-02-26 PROCEDURE — C9399: CPT

## 2025-02-26 PROCEDURE — 76376 3D RENDER W/INTRP POSTPROCES: CPT

## 2025-02-26 PROCEDURE — 93657 TX L/R ATRIAL FIB ADDL: CPT

## 2025-02-26 PROCEDURE — 93656 COMPRE EP EVAL ABLTJ ATR FIB: CPT

## 2025-02-26 PROCEDURE — 85027 COMPLETE CBC AUTOMATED: CPT

## 2025-02-26 PROCEDURE — C1759: CPT

## 2025-02-26 PROCEDURE — 93010 ELECTROCARDIOGRAM REPORT: CPT

## 2025-02-26 PROCEDURE — 80048 BASIC METABOLIC PNL TOTAL CA: CPT

## 2025-02-26 PROCEDURE — 86850 RBC ANTIBODY SCREEN: CPT

## 2025-02-26 PROCEDURE — C1769: CPT

## 2025-02-26 PROCEDURE — C1732: CPT

## 2025-02-26 RX ORDER — SIMETHICONE 80 MG
1 TABLET,CHEWABLE ORAL
Qty: 0 | Refills: 0 | DISCHARGE
Start: 2025-02-26

## 2025-02-26 RX ORDER — APIXABAN 2.5 MG/1
1 TABLET, FILM COATED ORAL
Qty: 180 | Refills: 0
Start: 2025-02-26 | End: 2025-05-26

## 2025-02-26 RX ORDER — MAGNESIUM OXIDE 400 MG
400 TABLET ORAL ONCE
Refills: 0 | Status: COMPLETED | OUTPATIENT
Start: 2025-02-26 | End: 2025-02-26

## 2025-02-26 RX ADMIN — Medication 81 MILLIGRAM(S): at 11:28

## 2025-02-26 RX ADMIN — APIXABAN 5 MILLIGRAM(S): 2.5 TABLET, FILM COATED ORAL at 05:28

## 2025-02-26 RX ADMIN — CARVEDILOL 12.5 MILLIGRAM(S): 3.12 TABLET, FILM COATED ORAL at 05:27

## 2025-02-26 RX ADMIN — Medication 400 MILLIGRAM(S): at 08:12

## 2025-02-26 RX ADMIN — INSULIN LISPRO 8: 100 INJECTION, SOLUTION INTRAVENOUS; SUBCUTANEOUS at 11:28

## 2025-02-26 RX ADMIN — FUROSEMIDE 20 MILLIGRAM(S): 10 INJECTION INTRAMUSCULAR; INTRAVENOUS at 05:28

## 2025-02-26 RX ADMIN — Medication 50 MILLIGRAM(S): at 05:28

## 2025-02-26 RX ADMIN — FOLIC ACID 1 MILLIGRAM(S): 1 TABLET ORAL at 11:29

## 2025-02-26 RX ADMIN — LISINOPRIL 20 MILLIGRAM(S): 5 TABLET ORAL at 05:28

## 2025-02-26 RX ADMIN — INSULIN LISPRO 4: 100 INJECTION, SOLUTION INTRAVENOUS; SUBCUTANEOUS at 07:36

## 2025-02-26 NOTE — DISCHARGE NOTE NURSING/CASE MANAGEMENT/SOCIAL WORK - FINANCIAL ASSISTANCE
[facility] provides services at a reduced cost to those who are determined to be eligible through [facility]’s financial assistance program. Information regarding [facility]’s financial assistance program can be found by going to [email or web address] or by calling [phone].

## 2025-02-26 NOTE — DISCHARGE NOTE PROVIDER - NSDCCPCAREPLAN_GEN_ALL_CORE_FT
PRINCIPAL DISCHARGE DIAGNOSIS  Diagnosis: Atrial flutter  Assessment and Plan of Treatment: Continue with your cardiologist and primary care MD. Continue your current medications. Call your physician for palpitations, feelings of rapid heart beat, lightheadedness, or dizziness. Continue Eliquis as prescribed. No heavy lifting, strenuous activity, bending, straining, or unnecessary stair climbing for 2 weeks. No driving for 2 days. You may shower 24 hours following the procedure but avoid baths/swimming for 1 week. Check your groin site for bleeding and/or swelling daily following procedure and call your doctor immediately if it occurs or if you experience increased pain at the site. Follow up with your cardiologist in 1-2 weeks. You may call [facility] if you have any questions/concerns regarding your procedure [phone].      SECONDARY DISCHARGE DIAGNOSES  Diagnosis: Hyperlipidemia  Assessment and Plan of Treatment: Continue with your cholesterol medications. Eat a heart healthy diet that is low in saturated fats and salt, and includes whole grains, fruits, vegetables and lean protein; exercise regularly (consult with your physician or cardiologist first); maintain a heart healthy weight; if you smoke - quit (A resource to help you stop smoking is the [facility] for Tobacco Control – phone number [phone].). Continue to follow with your primary physician or cardiologist.    Diagnosis: DM type 2, goal HbA1c < 7%  Assessment and Plan of Treatment: Your Hemoglobin A1c level is   Continue to follow with your primary care MD or your endocrinologist.  Follow a heart healthy diabetic diet. If you check your fingerstick glucose at home, call your MD if it is greater than 250mg/dL on 2 occasions or less than 100mg/dL on 2 occasions. Know signs of low blood sugar, such as: dizziness, shakiness, sweating, confusion, hunger, nervousness-drink 4 ounces apple juice if occurs and call your doctor. Know early signs of high blood sugar, such as: frequent urination, increased thirst, blurry vision, fatigue, headache - call your doctor if this occurs. Follow with other practitioners to care for your diabetes, such as ophthalmologist and podiatrist.     PRINCIPAL DISCHARGE DIAGNOSIS  Diagnosis: Atrial flutter  Assessment and Plan of Treatment: Continue with your cardiologist and primary care MD. Continue your current medications. Call your physician for palpitations, feelings of rapid heart beat, lightheadedness, or dizziness. Continue Eliquis as prescribed. No heavy lifting, strenuous activity, bending, straining, or unnecessary stair climbing for 2 weeks. No driving for 2 days. You may shower 24 hours following the procedure but avoid baths/swimming for 1 week. Check your groin site for bleeding and/or swelling daily following procedure and call your doctor immediately if it occurs or if you experience increased pain at the site. Follow up with your cardiologist in 1-2 weeks. You may call [facility] if you have any questions/concerns regarding your procedure [phone]. Resume Eliquis at 5mg BID      SECONDARY DISCHARGE DIAGNOSES  Diagnosis: Hyperlipidemia  Assessment and Plan of Treatment: Continue with your cholesterol medications. Eat a heart healthy diet that is low in saturated fats and salt, and includes whole grains, fruits, vegetables and lean protein; exercise regularly (consult with your physician or cardiologist first); maintain a heart healthy weight; if you smoke - quit (A resource to help you stop smoking is the [facility] for Tobacco Control – phone number [phone].). Continue to follow with your primary physician or cardiologist.    Diagnosis: DM type 2, goal HbA1c < 7%  Assessment and Plan of Treatment: Your Hemoglobin A1c level is   Continue to follow with your primary care MD or your endocrinologist.  Follow a heart healthy diabetic diet. If you check your fingerstick glucose at home, call your MD if it is greater than 250mg/dL on 2 occasions or less than 100mg/dL on 2 occasions. Know signs of low blood sugar, such as: dizziness, shakiness, sweating, confusion, hunger, nervousness-drink 4 ounces apple juice if occurs and call your doctor. Know early signs of high blood sugar, such as: frequent urination, increased thirst, blurry vision, fatigue, headache - call your doctor if this occurs. Follow with other practitioners to care for your diabetes, such as ophthalmologist and podiatrist.

## 2025-02-26 NOTE — DISCHARGE NOTE PROVIDER - CARE PROVIDER_API CALL
Leticia Irwin  Cardiac Electrophysiology  [address] [place] [address]  Phone: [phone]  Fax: [phone]  Scheduled Appointment: 04/14/2025   Leticia Irwin  Cardiac Electrophysiology  [address] [place] [address]  Phone: [phone]  Fax: [phone]  Established Patient  Scheduled Appointment: 04/14/2025 02:00 PM   Leticia Irwin  Cardiac Electrophysiology  [address] [place] [id number]  Phone: [phone]  Fax: [phone]  Established Patient  Scheduled Appointment: 04/14/2025 02:00 PM    Rufino Medina  Internal Medicine  [address] [place] [id number]  Phone: [phone]  Fax: [phone]  Established Patient  Follow Up Time: 2 weeks

## 2025-02-26 NOTE — DISCHARGE NOTE PROVIDER - PROVIDER TOKENS
PROVIDER:[TOKEN:[[id number]],SCHEDULEDAPPT:[04/14/2025]] PROVIDER:[TOKEN:[[id number]],SCHEDULEDAPPT:[04/14/2025],SCHEDULEDAPPTTIME:[02:00 PM],ESTABLISHEDPATIENT:[T]] PROVIDER:[TOKEN:[[id number]],SCHEDULEDAPPT:[04/14/2025],SCHEDULEDAPPTTIME:[02:00 PM],ESTABLISHEDPATIENT:[T]],PROVIDER:[TOKEN:[[id number]],FOLLOWUP:[2 weeks],ESTABLISHEDPATIENT:[T]]

## 2025-02-26 NOTE — PROGRESS NOTE ADULT - SUBJECTIVE AND OBJECTIVE BOX
Patient is a 84y old  Male who presents with a chief complaint of         Allergies    No Known Allergies    Intolerances        Medications:  apixaban 5 milliGRAM(s) Oral two times a day  aspirin enteric coated 81 milliGRAM(s) Oral daily  atorvastatin 20 milliGRAM(s) Oral at bedtime  carvedilol 12.5 milliGRAM(s) Oral every 12 hours  dextrose 5%. 1000 milliLiter(s) IV Continuous <Continuous>  dextrose 5%. 1000 milliLiter(s) IV Continuous <Continuous>  dextrose 50% Injectable 25 Gram(s) IV Push once  dextrose 50% Injectable 12.5 Gram(s) IV Push once  dextrose 50% Injectable 25 Gram(s) IV Push once  dextrose Oral Gel 15 Gram(s) Oral once PRN  folic acid 1 milliGRAM(s) Oral daily  furosemide    Tablet 20 milliGRAM(s) Oral two times a day  glucagon  Injectable 1 milliGRAM(s) IntraMuscular once  hydrALAZINE 50 milliGRAM(s) Oral three times a day  insulin lispro (ADMELOG) corrective regimen sliding scale   SubCutaneous three times a day before meals  insulin lispro (ADMELOG) corrective regimen sliding scale   SubCutaneous at bedtime  lisinopril 20 milliGRAM(s) Oral daily  simethicone 80 milliGRAM(s) Chew every 4 hours PRN  tamsulosin 0.4 milliGRAM(s) Oral at bedtime      Vitals:  T(C): 36.9 (25 @ 00:00), Max: 37.3 (25 @ 19:42)  HR: 80 (25 @ 00:00) (78 - 95)  BP: 107/56 (25 @ 00:00) (107/56 - 178/100)  BP(mean): 75 (25 @ 00:00) (75 - 118)  RR: 17 (25 @ 00:00) (14 - 17)  SpO2: 96% (25 @ 00:00) (94% - 98%)  Wt(kg): --  Daily Height in cm: 177.8 (2025 08:52)    Daily Weight in k.4 (2025 08:01)  I&O's Summary    2025 07:01  -  2025 01:39  --------------------------------------------------------  IN: 180 mL / OUT: 450 mL / NET: -270 mL          Physical Exam:  Appearance: Normal  Eyes: PERRL, EOMI  HENT: Normal oral muscosa, NC/AT  Cardiovascular: S1S2, RRR, No M/R/G, no JVD, No Lower extremity edema  Procedural Access Site: No hematoma, Non-tender to palpation, 2+ pulse, No bruit, No Ecchymosis  Respiratory: Clear to auscultation bilaterally  Gastrointestinal: Soft, Non tender, Normal Bowel Sounds  Musculoskeletal: No clubbing, No joint deformity   Neurologic: Non-focal  Lymphatic: No lymphadenopathy  Psychiatry: AAOx3, Mood & affect appropriate  Skin: No rashes, No ecchymoses, No cyanosis        137  |  99  |  12  ----------------------------<  181[H]  4.1   |  29  |  1.06    Ca    9.8      2025 08:41              Lipid panel   Hgb A1c                         10.3   6.62  )-----------( 169      ( 2025 01:30 )             32.6         ECG: SR 86 bpm

## 2025-02-26 NOTE — DISCHARGE NOTE NURSING/CASE MANAGEMENT/SOCIAL WORK - PATIENT PORTAL LINK FT
You can access the FollowMyHealth Patient Portal offered by [facility] by registering at the following website: [email or web address]. By joining [employer]’s FollowMyHealth portal, you will also be able to view your health information using other applications (apps) compatible with our system.

## 2025-02-26 NOTE — DISCHARGE NOTE PROVIDER - ATTENDING DISCHARGE PHYSICAL EXAMINATION:
appears comfortable, RRR, lungs CTA b/l, no LE edema, access sites, clean, no ecchymoses, no bleeding, no focal deficits

## 2025-02-26 NOTE — DISCHARGE NOTE PROVIDER - NSDCFUSCHEDAPPT_GEN_ALL_CORE_FT
Leticia Irwin  [facility]  ELECTROPH [address]  Scheduled Appointment: 04/14/2025

## 2025-02-26 NOTE — DISCHARGE NOTE NURSING/CASE MANAGEMENT/SOCIAL WORK - NSDCPEFALRISK_GEN_ALL_CORE
For information on Fall & Injury Prevention, visit: [email or web address] OR  [email or web address] OR  https://www.cdc.gov/steadi/patient.html

## 2025-02-26 NOTE — PROGRESS NOTE ADULT - PROBLEM SELECTOR PLAN 3
Humalog insulin sliding scale with finger sticks before meals and QHS  Confirm HgbA1c level   low carbohydrate diet

## 2025-02-26 NOTE — DISCHARGE NOTE PROVIDER - HOSPITAL COURSE
HPI:  85 y/o Male with Pmhx of Afib on Eliquis, Anemia, BPH, HTN, HLD GABRIELLA, DM. He had angiogram that was non obstructive in May 2019, EF 38%. Pt has RAMACHANDRAN and has noticed a change in his capacity recently. He presents today for Afib ablation.    9/26/24 TTE:   CONCLUSIONS:   1. Left ventricular cavity is normal in size. Left ventricular wall thickness is normal. Left ventricular systolic function is moderately decreased with an ejection fraction of 38 % by Salcido's method of disks. Global left ventricular hypokinesis.   2. Multiple segmental abnormalities exist. See findings.   3. Mildly enlarged right ventricular cavity size, with normal wall thickness, and normal right ventricular systolic function.   4. No pericardial effusion seen.   5. No prior echocardiogram is available for comparison.   6. There is increased LV mass and concentric hypertrophy.   7. There is no evidence of a left ventricular thrombus.   (25 Feb 2025 08:01)    2/25 s/p atrial flutter ablation via RFV access, Vascade closure x3.   83 y/o Male with PMHx of Afib on Eliquis, Anemia, BPH, HTN, HLD GABRIELLA, T2DM, non obstructive CAD in May 2019, EF 38% underwent an atrial fibrillation ablation on _2/25/25.   Procedure was performed via right femoral vein and left femoral vein and was closed with Vascade. (see procedure report for full details). Post procedure, femoral access site(s) was monitored closely according to protocol and remained stable without bleeding, hematoma, or edema. Home medications were resumed including Eliquis and Carvedilol . Patient remained hemodynamically stable, neurovascularly intact and chest pain free. Patient voided and ambulated and had no EKG changes post procedure.  However patient with c/o bladder fullness post void and ordered for a post void bladder scan. He remained overnight for observation and pain control.  The remainder of his hospitalization was uneventful. . He was provided education regarding medications, as well as post procedure wound care instructions.  Post ablation follow up appointment was scheduled with Dr. Irwin, and patient was advised to return to ER with any concerning symptoms.   83 y/o Male with PMHx of Afib on Eliquis, Anemia, BPH, HTN, HLD GABRIELLA, T2DM, non obstructive CAD in May 2019, EF 38% underwent an atrial fibrillation ablation on _2/25/25.   Procedure was performed via right femoral vein and left femoral vein and was closed with Vascade. (see procedure report for full details). Post procedure, femoral access site(s) was monitored closely according to protocol and remained stable without bleeding, hematoma, or edema. Home medications were resumed including Eliquis and Carvedilol . Patient remained hemodynamically stable, neurovascularly intact and chest pain free. Patient voided and ambulated and had no EKG changes post procedure.  However patient with c/o bladder fullness post void and ordered for a post void bladder scan with only 90 cc. Case discussed with Dr. Askew and ok for discharge with follow up with PCP in 1-2 weeks. He remained overnight for observation and pain control.  The remainder of his hospitalization was uneventful. . He was provided education regarding medications, as well as post procedure wound care instructions.  Post ablation follow up appointment was scheduled with Dr. Irwin, and patient was advised to return to ER with any concerning symptoms.

## 2025-02-26 NOTE — DISCHARGE NOTE PROVIDER - NSDCCPTREATMENT_GEN_ALL_CORE_FT
PRINCIPAL PROCEDURE  Procedure: Electrophysiology study, with arrhythmogenic focus ablation, for atrial flutter  Findings and Treatment: Atrial flutter ablation via RFA access     PRINCIPAL PROCEDURE  Procedure: Electrophysiology study, with arrhythmogenic focus ablation, for atrial flutter  Findings and Treatment: 2/25 Atrial flutter ablation via RFV and LFV access

## 2025-02-26 NOTE — DISCHARGE NOTE PROVIDER - NSDCMRMEDTOKEN_GEN_ALL_CORE_FT
aspirin 81 mg oral tablet: orally once a day  carvedilol 12.5 mg oral tablet: 1 tab(s) orally 2 times a day  folic acid 1 mg oral tablet: 1 tab(s) orally once a day  furosemide 20 mg oral tablet: 1 tab(s) orally 2 times a day  hydrALAZINE 50 mg oral tablet: 1 tab(s) orally 3 times a day  Januvia 100 mg oral tablet: 1 tab(s) orally once a day  metFORMIN 1000 mg oral tablet: 1 tab(s) orally 2 times a day  pravastatin 40 mg oral tablet: 1 tab(s) orally once a day  ramipril 5 mg oral tablet: 1 cap(s) orally once a day  simethicone 80 mg oral tablet, chewable: 1 tab(s) orally every 4 hours As needed Gas  tamsulosin 0.4 mg oral capsule: 1 cap(s) orally once a day   apixaban 5 mg oral tablet: 1 tab(s) orally 2 times a day  aspirin 81 mg oral tablet: orally once a day  carvedilol 12.5 mg oral tablet: 1 tab(s) orally 2 times a day  folic acid 1 mg oral tablet: 1 tab(s) orally once a day  furosemide 20 mg oral tablet: 1 tab(s) orally 2 times a day  hydrALAZINE 50 mg oral tablet: 1 tab(s) orally 3 times a day  Januvia 100 mg oral tablet: 1 tab(s) orally once a day  metFORMIN 1000 mg oral tablet: 1 tab(s) orally 2 times a day  pravastatin 40 mg oral tablet: 1 tab(s) orally once a day  ramipril 5 mg oral tablet: 1 cap(s) orally once a day  simethicone 80 mg oral tablet, chewable: 1 tab(s) orally every 4 hours As needed Gas  tamsulosin 0.4 mg oral capsule: 1 cap(s) orally once a day

## 2025-02-26 NOTE — PROGRESS NOTE ADULT - PROBLEM SELECTOR PLAN 2
Goal is to keep LDL<70. Continue with your cholesterol medications as prescribed. Eat a heart healthy diet that is low in saturated fats and salt, and includes whole grains, fruits, vegetables and lean protein; exercise regularly (consult with your physician or cardiologist first); maintain a heart healthy weight; if you smoke - quit (A resource to help you stop smoking is the [facility] for Tobacco Control – phone number [phone].). Continue to follow with your primary physician or cardiologist.

## 2025-03-19 NOTE — H&P PST ADULT - NSALCOHOLPROBLEMSRELYN_GEN_A_CORE_SD
38-year-old male,  history of perianal abscess in the past (unsure if with fistula or not), presents for evaluation of perianal pain for 3 days.  Today associated with intermittent chills and feeling warm but did not take his temperature.  Denies any abdominal pain, drainage, BRBPR or any other complaints.  Last incision and drainage was 1 year ago approximately and was told to follow-up with a general surgeon but symptoms improved so he did not follow-up.   Denies receptive anal sex or other injury.  < from: CT Abdomen and Pelvis w/ IV Cont (03.18.25 @ 22:27) >    FINDINGS:  LOWER CHEST: Within normal limits.    LIVER: Within normal limits.  BILE DUCTS: Normal caliber.  GALLBLADDER: Within normal limits.  SPLEEN: Within normal limits.  PANCREAS: Within normal limits.  ADRENALS: Within normal limits.  KIDNEYS/URETERS: Subcentimeter bilateral hypodense lesions, too small to   characterize. Symmetric renal enhancement without hydronephrosis..    BLADDER: Within normal limits.  REPRODUCTIVE ORGANS: Prostate within normal limits.    BOWEL: No bowel obstruction. Appendix is normal.  PERITONEUM/RETROPERITONEUM: Within normal limits.  VESSELS: Within normal limits.  LYMPH NODES: Nonenlarged hyperemic left inguinal nodes, likely reactive.  ABDOMINAL WALL: A perianal peripherally enhancing gas-liquid collection   with a tract extending along the left medial gluteal fold to the skin   surface. The collection measures 5.5 x 3.6 x 3.6 cm. There is mild   subcutaneous infiltration in the left medial gluteal fold. The collection   abuts the levator anus muscle, however, does not appear to extend above   it.  BONES: Within normal limits.    IMPRESSION:  Left perianal abscess with a tract extending to the skin surface along   the medial gluteal folds. The collection abuts levator anus muscle though   does not appear to extend above it. This can be better evaluated with   pelvic MRI.    < end of copied text >  
no

## 2025-03-28 NOTE — ED PROCEDURE NOTE - NS_EDPROVIDERDISPOUSERTYPE_ED_A_ED
Attending Attestation (For Attendings USE Only)...
Tdap; 02-Mar-2023 20:21; Hafsa Vuong (BRIGIDO); [employer]; [id number] (Exp. Date: 08-Dec-2024); IntraMuscular; Deltoid Left.; 0.5 milliLiter(s); VIS (VIS Published: 09-May-2013, VIS Presented: 02-Mar-2023);

## 2025-04-14 ENCOUNTER — APPOINTMENT (OUTPATIENT)
Dept: ELECTROPHYSIOLOGY | Facility: CLINIC | Age: 85
End: 2025-04-14
Payer: MEDICARE

## 2025-04-14 VITALS
OXYGEN SATURATION: 98 % | HEART RATE: 78 BPM | BODY MASS INDEX: 25.05 KG/M2 | HEIGHT: 70 IN | WEIGHT: 175 LBS | SYSTOLIC BLOOD PRESSURE: 132 MMHG | DIASTOLIC BLOOD PRESSURE: 82 MMHG

## 2025-04-14 DIAGNOSIS — Z86.79 OTHER SPECIFIED POSTPROCEDURAL STATES: ICD-10-CM

## 2025-04-14 DIAGNOSIS — Z98.890 OTHER SPECIFIED POSTPROCEDURAL STATES: ICD-10-CM

## 2025-04-14 DIAGNOSIS — I50.22 CHRONIC SYSTOLIC (CONGESTIVE) HEART FAILURE: ICD-10-CM

## 2025-04-14 DIAGNOSIS — I48.91 UNSPECIFIED ATRIAL FIBRILLATION: ICD-10-CM

## 2025-04-14 PROCEDURE — 99214 OFFICE O/P EST MOD 30 MIN: CPT | Mod: 25

## 2025-04-14 PROCEDURE — 93000 ELECTROCARDIOGRAM COMPLETE: CPT

## 2025-04-16 PROBLEM — Z98.890 S/P ABLATION OF ATRIAL FIBRILLATION: Status: ACTIVE | Noted: 2025-04-16

## 2025-04-16 PROBLEM — Z98.890 S/P ABLATION OF ATRIAL FLUTTER: Status: ACTIVE | Noted: 2025-04-16

## 2025-05-01 ENCOUNTER — APPOINTMENT (OUTPATIENT)
Dept: RADIOLOGY | Facility: IMAGING CENTER | Age: 85
End: 2025-05-01
Payer: MEDICARE

## 2025-05-01 ENCOUNTER — OUTPATIENT (OUTPATIENT)
Dept: OUTPATIENT SERVICES | Facility: HOSPITAL | Age: 85
LOS: 1 days | End: 2025-05-01
Payer: MEDICARE

## 2025-05-01 ENCOUNTER — APPOINTMENT (OUTPATIENT)
Dept: PULMONOLOGY | Facility: CLINIC | Age: 85
End: 2025-05-01
Payer: MEDICARE

## 2025-05-01 DIAGNOSIS — I50.22 CHRONIC SYSTOLIC (CONGESTIVE) HEART FAILURE: ICD-10-CM

## 2025-05-01 DIAGNOSIS — Z86.018 PERSONAL HISTORY OF OTHER BENIGN NEOPLASM: Chronic | ICD-10-CM

## 2025-05-01 DIAGNOSIS — Z98.49 CATARACT EXTRACTION STATUS, UNSPECIFIED EYE: Chronic | ICD-10-CM

## 2025-05-01 DIAGNOSIS — I31.39 OTHER PERICARDIAL EFFUSION (NONINFLAMMATORY): ICD-10-CM

## 2025-05-01 DIAGNOSIS — Z98.890 OTHER SPECIFIED POSTPROCEDURAL STATES: Chronic | ICD-10-CM

## 2025-05-01 DIAGNOSIS — R60.0 LOCALIZED EDEMA: ICD-10-CM

## 2025-05-01 DIAGNOSIS — R06.09 OTHER FORMS OF DYSPNEA: ICD-10-CM

## 2025-05-01 PROCEDURE — 71046 X-RAY EXAM CHEST 2 VIEWS: CPT | Mod: 26

## 2025-05-01 PROCEDURE — 36415 COLL VENOUS BLD VENIPUNCTURE: CPT

## 2025-05-01 PROCEDURE — G2211 COMPLEX E/M VISIT ADD ON: CPT

## 2025-05-01 PROCEDURE — 99214 OFFICE O/P EST MOD 30 MIN: CPT

## 2025-05-01 PROCEDURE — 71046 X-RAY EXAM CHEST 2 VIEWS: CPT

## 2025-05-02 ENCOUNTER — NON-APPOINTMENT (OUTPATIENT)
Age: 85
End: 2025-05-02

## 2025-05-02 LAB
ALBUMIN SERPL ELPH-MCNC: 4.1 G/DL
ALP BLD-CCNC: 74 U/L
ALT SERPL-CCNC: 36 U/L
ANION GAP SERPL CALC-SCNC: 13 MMOL/L
AST SERPL-CCNC: 38 U/L
BASOPHILS # BLD AUTO: 0.02 K/UL
BASOPHILS NFR BLD AUTO: 0.7 %
BILIRUB SERPL-MCNC: 0.7 MG/DL
BUN SERPL-MCNC: 12 MG/DL
CALCIUM SERPL-MCNC: 10 MG/DL
CHLORIDE SERPL-SCNC: 101 MMOL/L
CO2 SERPL-SCNC: 27 MMOL/L
CREAT SERPL-MCNC: 1.09 MG/DL
EGFRCR SERPLBLD CKD-EPI 2021: 67 ML/MIN/1.73M2
EOSINOPHIL # BLD AUTO: 0.04 K/UL
EOSINOPHIL NFR BLD AUTO: 1.3 %
GLUCOSE SERPL-MCNC: 139 MG/DL
HCT VFR BLD CALC: 33.8 %
HGB BLD-MCNC: 10.3 G/DL
IMM GRANULOCYTES NFR BLD AUTO: 0.3 %
LYMPHOCYTES # BLD AUTO: 0.97 K/UL
LYMPHOCYTES NFR BLD AUTO: 32.6 %
MAN DIFF?: NORMAL
MCHC RBC-ENTMCNC: 23.1 PG
MCHC RBC-ENTMCNC: 30.5 G/DL
MCV RBC AUTO: 76 FL
MONOCYTES # BLD AUTO: 0.35 K/UL
MONOCYTES NFR BLD AUTO: 11.7 %
NEUTROPHILS # BLD AUTO: 1.59 K/UL
NEUTROPHILS NFR BLD AUTO: 53.4 %
NT-PROBNP SERPL-MCNC: 2752 PG/ML
PLATELET # BLD AUTO: 224 K/UL
POTASSIUM SERPL-SCNC: 4.6 MMOL/L
PROT SERPL-MCNC: 6.9 G/DL
RBC # BLD: 4.45 M/UL
RBC # FLD: 19.7 %
SODIUM SERPL-SCNC: 141 MMOL/L
WBC # FLD AUTO: 2.98 K/UL

## 2025-07-03 ENCOUNTER — APPOINTMENT (OUTPATIENT)
Dept: CV DIAGNOSITCS | Facility: HOSPITAL | Age: 85
End: 2025-07-03

## 2025-07-03 ENCOUNTER — RESULT REVIEW (OUTPATIENT)
Age: 85
End: 2025-07-03

## 2025-07-03 ENCOUNTER — OUTPATIENT (OUTPATIENT)
Dept: OUTPATIENT SERVICES | Facility: HOSPITAL | Age: 85
LOS: 1 days | End: 2025-07-03
Payer: MEDICARE

## 2025-07-03 DIAGNOSIS — Z98.890 OTHER SPECIFIED POSTPROCEDURAL STATES: Chronic | ICD-10-CM

## 2025-07-03 DIAGNOSIS — Z98.49 CATARACT EXTRACTION STATUS, UNSPECIFIED EYE: Chronic | ICD-10-CM

## 2025-07-03 DIAGNOSIS — Z86.018 PERSONAL HISTORY OF OTHER BENIGN NEOPLASM: Chronic | ICD-10-CM

## 2025-07-03 DIAGNOSIS — I25.10 ATHEROSCLEROTIC HEART DISEASE OF NATIVE CORONARY ARTERY WITHOUT ANGINA PECTORIS: ICD-10-CM

## 2025-07-03 PROCEDURE — 93356 MYOCRD STRAIN IMG SPCKL TRCK: CPT

## 2025-07-03 PROCEDURE — 76376 3D RENDER W/INTRP POSTPROCES: CPT

## 2025-07-03 PROCEDURE — 93306 TTE W/DOPPLER COMPLETE: CPT | Mod: 26

## 2025-07-03 PROCEDURE — C8929: CPT

## 2025-07-03 PROCEDURE — 76376 3D RENDER W/INTRP POSTPROCES: CPT | Mod: 26

## 2025-07-21 ENCOUNTER — APPOINTMENT (OUTPATIENT)
Dept: ELECTROPHYSIOLOGY | Facility: CLINIC | Age: 85
End: 2025-07-21
Payer: MEDICARE

## 2025-07-21 VITALS — OXYGEN SATURATION: 97 % | HEART RATE: 90 BPM | SYSTOLIC BLOOD PRESSURE: 147 MMHG | DIASTOLIC BLOOD PRESSURE: 80 MMHG

## 2025-07-21 DIAGNOSIS — Z98.890 OTHER SPECIFIED POSTPROCEDURAL STATES: ICD-10-CM

## 2025-07-21 DIAGNOSIS — Z86.79 OTHER SPECIFIED POSTPROCEDURAL STATES: ICD-10-CM

## 2025-07-21 DIAGNOSIS — I50.22 CHRONIC SYSTOLIC (CONGESTIVE) HEART FAILURE: ICD-10-CM

## 2025-07-21 PROCEDURE — 93000 ELECTROCARDIOGRAM COMPLETE: CPT

## 2025-07-21 PROCEDURE — 99214 OFFICE O/P EST MOD 30 MIN: CPT | Mod: 25

## 2025-08-05 ENCOUNTER — APPOINTMENT (OUTPATIENT)
Dept: CARDIOLOGY | Facility: CLINIC | Age: 85
End: 2025-08-05
Payer: MEDICARE

## 2025-08-05 VITALS
WEIGHT: 172 LBS | SYSTOLIC BLOOD PRESSURE: 162 MMHG | BODY MASS INDEX: 24.62 KG/M2 | DIASTOLIC BLOOD PRESSURE: 79 MMHG | HEIGHT: 70 IN | HEART RATE: 78 BPM | OXYGEN SATURATION: 98 %

## 2025-08-05 DIAGNOSIS — I48.92 UNSPECIFIED ATRIAL FLUTTER: ICD-10-CM

## 2025-08-05 DIAGNOSIS — I48.91 UNSPECIFIED ATRIAL FIBRILLATION: ICD-10-CM

## 2025-08-05 PROCEDURE — 93306 TTE W/DOPPLER COMPLETE: CPT

## 2025-08-05 PROCEDURE — 93000 ELECTROCARDIOGRAM COMPLETE: CPT

## 2025-08-05 PROCEDURE — G2211 COMPLEX E/M VISIT ADD ON: CPT

## 2025-08-05 PROCEDURE — 99214 OFFICE O/P EST MOD 30 MIN: CPT

## 2025-08-06 RX ORDER — SACUBITRIL AND VALSARTAN 24; 26 MG/1; MG/1
24-26 TABLET, FILM COATED ORAL TWICE DAILY
Qty: 180 | Refills: 1 | Status: ACTIVE | COMMUNITY
Start: 2025-08-05 | End: 1900-01-01

## 2025-08-18 ENCOUNTER — APPOINTMENT (OUTPATIENT)
Dept: ELECTROPHYSIOLOGY | Facility: CLINIC | Age: 85
End: 2025-08-18

## 2025-08-18 VITALS
OXYGEN SATURATION: 99 % | BODY MASS INDEX: 24.34 KG/M2 | RESPIRATION RATE: 14 BRPM | HEART RATE: 74 BPM | SYSTOLIC BLOOD PRESSURE: 146 MMHG | WEIGHT: 170 LBS | HEIGHT: 70 IN | DIASTOLIC BLOOD PRESSURE: 72 MMHG

## (undated) DEVICE — DRSG BENZOIN 0.6CC

## (undated) DEVICE — ELCTR GROUNDING PAD ADULT COVIDIEN

## (undated) DEVICE — ELCTR BOVIE TIP BLADE INSULATED 2.75" EDGE

## (undated) DEVICE — SUT SILK 2-0 18" FS

## (undated) DEVICE — ELCTR BOVIE PENCIL SMOKE EVACUATION

## (undated) DEVICE — GLV 7.5 PROTEXIS (CREAM) MICRO

## (undated) DEVICE — BASIN SET DOUBLE

## (undated) DEVICE — SUT MONOCRYL 4-0 27" PS-2 UNDYED

## (undated) DEVICE — POSITIONER STRAP ARMBOARD VELCRO TS-30

## (undated) DEVICE — DRSG STERISTRIPS 0.5 X 4"

## (undated) DEVICE — STAPLER SKIN MULTI DIRECTION W35

## (undated) DEVICE — PACK MINOR WITH LAP

## (undated) DEVICE — POSITIONER BLUE BOLSTER

## (undated) DEVICE — PREP BETADINE KIT

## (undated) DEVICE — SUT VICRYL 2-0 27" SH UNDYED

## (undated) DEVICE — DRAPE LAPAROTOMY TRANSVERSE

## (undated) DEVICE — SUT VICRYL 3-0 27" SH UNDYED

## (undated) DEVICE — PROTECTOR HEEL / ELBOW FLUFFY

## (undated) DEVICE — VENODYNE/SCD SLEEVE CALF MEDIUM

## (undated) DEVICE — SUT MONOCRYL 4-0 18" P-3 UNDYED

## (undated) DEVICE — SUT ETHILON 4-0 18" PS-2

## (undated) DEVICE — SYR LUER LOK 20CC